# Patient Record
Sex: MALE | Race: WHITE | Employment: OTHER | URBAN - METROPOLITAN AREA
[De-identification: names, ages, dates, MRNs, and addresses within clinical notes are randomized per-mention and may not be internally consistent; named-entity substitution may affect disease eponyms.]

---

## 2017-03-17 ENCOUNTER — HOSPITAL ENCOUNTER (OUTPATIENT)
Dept: VASCULAR LAB | Age: 72
Discharge: HOME | End: 2017-03-17
Payer: MEDICARE

## 2017-03-17 DIAGNOSIS — I65.23 BILATERAL CAROTID ARTERY STENOSIS: Primary | ICD-10-CM

## 2017-03-17 PROCEDURE — 93880 EXTRACRANIAL BILAT STUDY: CPT

## 2018-02-14 ENCOUNTER — HOSPITAL ENCOUNTER (OUTPATIENT)
Dept: VASCULAR LAB | Facility: CLINIC | Age: 73
Discharge: HOME | End: 2018-02-14
Payer: MEDICARE

## 2018-02-14 PROCEDURE — 93880 EXTRACRANIAL BILAT STUDY: CPT

## 2018-05-04 PROBLEM — 76114004 DECREASED RENAL FUNCTION: Status: ACTIVE | Noted: 2018-05-04

## 2018-05-04 PROBLEM — 363418001 MALIGNANT TUMOR OF PANCREAS: Status: ACTIVE | Noted: 2018-05-04

## 2018-05-04 PROBLEM — 35489007 DEPRESSION: Status: ACTIVE | Noted: 2018-05-04

## 2018-05-04 PROBLEM — 931000119107 DEPENDENCE ON SUPPLEMENTAL OXYGEN: Status: ACTIVE | Noted: 2018-05-04

## 2018-05-04 PROBLEM — 13644009 HYPERCHOLESTEROLEMIA: Status: ACTIVE | Noted: 2018-05-04

## 2018-05-04 PROBLEM — 38341003 HYPERTENSION: Status: ACTIVE | Noted: 2018-05-04

## 2018-05-04 PROBLEM — 53741008 CORONARY ARTERY DISEASE: Status: ACTIVE | Noted: 2018-05-04

## 2018-05-04 PROBLEM — 134407002 CHRONIC BACK PAIN: Status: ACTIVE | Noted: 2018-05-04

## 2018-05-04 PROBLEM — 396275006 OSTEOARTHRITIS: Status: ACTIVE | Noted: 2018-05-04

## 2018-05-04 PROBLEM — 197480006 ANXIETY DISORDER: Status: ACTIVE | Noted: 2018-05-04

## 2018-05-14 PROBLEM — 313435000 TYPE I DIABETES MELLITUS WITHOUT COMPLICATION: Status: ACTIVE | Noted: 2018-05-14

## 2018-05-14 PROBLEM — 90708001 RENAL DISEASE: Status: ACTIVE | Noted: 2018-05-14

## 2018-06-10 ENCOUNTER — HOSPITAL ENCOUNTER (OUTPATIENT)
Age: 73
Setting detail: OBSERVATION
Discharge: HOME | End: 2018-06-11
Attending: EMERGENCY MEDICINE | Admitting: INTERNAL MEDICINE
Payer: MEDICARE

## 2018-06-10 ENCOUNTER — APPOINTMENT (OUTPATIENT)
Dept: GENERAL RADIOLOGY | Age: 73
End: 2018-06-10
Payer: MEDICARE

## 2018-06-10 DIAGNOSIS — I20.0 UNSTABLE ANGINA (HCC): Primary | ICD-10-CM

## 2018-06-10 LAB
ABSOLUTE EOS #: 0.1 K/UL (ref 0–0.4)
ABSOLUTE IMMATURE GRANULOCYTE: ABNORMAL K/UL (ref 0–0.3)
ABSOLUTE LYMPH #: 2.2 K/UL (ref 1–4.8)
ABSOLUTE MONO #: 0.5 K/UL (ref 0.1–1.3)
ANION GAP SERPL CALCULATED.3IONS-SCNC: 12 MMOL/L (ref 9–17)
BASOPHILS # BLD: 1 % (ref 0–2)
BASOPHILS ABSOLUTE: 0 K/UL (ref 0–0.2)
BUN BLDV-MCNC: 20 MG/DL (ref 8–23)
BUN/CREAT BLD: ABNORMAL (ref 9–20)
CALCIUM SERPL-MCNC: 9.2 MG/DL (ref 8.6–10.4)
CHLORIDE BLD-SCNC: 100 MMOL/L (ref 98–107)
CO2: 27 MMOL/L (ref 20–31)
CREAT SERPL-MCNC: 0.85 MG/DL (ref 0.7–1.2)
DIFFERENTIAL TYPE: ABNORMAL
EKG ATRIAL RATE: 68 BPM
EKG P AXIS: 69 DEGREES
EKG P-R INTERVAL: 252 MS
EKG Q-T INTERVAL: 378 MS
EKG QRS DURATION: 104 MS
EKG QTC CALCULATION (BAZETT): 401 MS
EKG R AXIS: 53 DEGREES
EKG T AXIS: 56 DEGREES
EKG VENTRICULAR RATE: 68 BPM
EOSINOPHILS RELATIVE PERCENT: 2 % (ref 0–4)
GFR AFRICAN AMERICAN: >60 ML/MIN
GFR NON-AFRICAN AMERICAN: >60 ML/MIN
GFR SERPL CREATININE-BSD FRML MDRD: ABNORMAL ML/MIN/{1.73_M2}
GFR SERPL CREATININE-BSD FRML MDRD: ABNORMAL ML/MIN/{1.73_M2}
GLUCOSE BLD-MCNC: 112 MG/DL (ref 70–99)
HCT VFR BLD CALC: 43.1 % (ref 41–53)
HEMOGLOBIN: 14.5 G/DL (ref 13.5–17.5)
IMMATURE GRANULOCYTES: ABNORMAL %
LYMPHOCYTES # BLD: 43 % (ref 24–44)
MCH RBC QN AUTO: 30.5 PG (ref 26–34)
MCHC RBC AUTO-ENTMCNC: 33.5 G/DL (ref 31–37)
MCV RBC AUTO: 90.9 FL (ref 80–100)
MONOCYTES # BLD: 10 % (ref 1–7)
NRBC AUTOMATED: ABNORMAL PER 100 WBC
PDW BLD-RTO: 14 % (ref 11.5–14.9)
PLATELET # BLD: 174 K/UL (ref 150–450)
PLATELET ESTIMATE: ABNORMAL
PMV BLD AUTO: 9 FL (ref 6–12)
POTASSIUM SERPL-SCNC: 4.3 MMOL/L (ref 3.7–5.3)
RBC # BLD: 4.74 M/UL (ref 4.5–5.9)
RBC # BLD: ABNORMAL 10*6/UL
SEG NEUTROPHILS: 44 % (ref 36–66)
SEGMENTED NEUTROPHILS ABSOLUTE COUNT: 2.3 K/UL (ref 1.3–9.1)
SODIUM BLD-SCNC: 139 MMOL/L (ref 135–144)
TROPONIN INTERP: NORMAL
TROPONIN INTERP: NORMAL
TROPONIN T: <0.03 NG/ML
TROPONIN T: <0.03 NG/ML
WBC # BLD: 5.2 K/UL (ref 3.5–11)
WBC # BLD: ABNORMAL 10*3/UL

## 2018-06-10 PROCEDURE — G0378 HOSPITAL OBSERVATION PER HR: HCPCS

## 2018-06-10 PROCEDURE — 84484 ASSAY OF TROPONIN QUANT: CPT

## 2018-06-10 PROCEDURE — 2700000000 HC OXYGEN THERAPY PER DAY

## 2018-06-10 PROCEDURE — 6370000000 HC RX 637 (ALT 250 FOR IP): Performed by: INTERNAL MEDICINE

## 2018-06-10 PROCEDURE — 99285 EMERGENCY DEPT VISIT HI MDM: CPT

## 2018-06-10 PROCEDURE — 6370000000 HC RX 637 (ALT 250 FOR IP): Performed by: EMERGENCY MEDICINE

## 2018-06-10 PROCEDURE — 85025 COMPLETE CBC W/AUTO DIFF WBC: CPT

## 2018-06-10 PROCEDURE — 93005 ELECTROCARDIOGRAM TRACING: CPT

## 2018-06-10 PROCEDURE — 36415 COLL VENOUS BLD VENIPUNCTURE: CPT

## 2018-06-10 PROCEDURE — 6360000002 HC RX W HCPCS: Performed by: INTERNAL MEDICINE

## 2018-06-10 PROCEDURE — 96372 THER/PROPH/DIAG INJ SC/IM: CPT

## 2018-06-10 PROCEDURE — 99220 PR INITIAL OBSERVATION CARE/DAY 70 MINUTES: CPT | Performed by: INTERNAL MEDICINE

## 2018-06-10 PROCEDURE — 2580000003 HC RX 258: Performed by: INTERNAL MEDICINE

## 2018-06-10 PROCEDURE — 71045 X-RAY EXAM CHEST 1 VIEW: CPT

## 2018-06-10 PROCEDURE — 80048 BASIC METABOLIC PNL TOTAL CA: CPT

## 2018-06-10 RX ORDER — ATORVASTATIN CALCIUM 20 MG/1
20 TABLET, FILM COATED ORAL NIGHTLY
Status: DISCONTINUED | OUTPATIENT
Start: 2018-06-10 | End: 2018-06-11 | Stop reason: HOSPADM

## 2018-06-10 RX ORDER — NITROGLYCERIN 0.4 MG/1
0.4 TABLET SUBLINGUAL EVERY 5 MIN PRN
Status: DISCONTINUED | OUTPATIENT
Start: 2018-06-10 | End: 2018-06-11 | Stop reason: HOSPADM

## 2018-06-10 RX ORDER — ONDANSETRON 2 MG/ML
4 INJECTION INTRAMUSCULAR; INTRAVENOUS EVERY 6 HOURS PRN
Status: DISCONTINUED | OUTPATIENT
Start: 2018-06-10 | End: 2018-06-11 | Stop reason: HOSPADM

## 2018-06-10 RX ORDER — ASPIRIN 81 MG/1
324 TABLET, CHEWABLE ORAL ONCE
Status: COMPLETED | OUTPATIENT
Start: 2018-06-10 | End: 2018-06-10

## 2018-06-10 RX ORDER — SODIUM CHLORIDE 0.9 % (FLUSH) 0.9 %
10 SYRINGE (ML) INJECTION PRN
Status: DISCONTINUED | OUTPATIENT
Start: 2018-06-10 | End: 2018-06-11 | Stop reason: HOSPADM

## 2018-06-10 RX ORDER — LISINOPRIL 20 MG/1
20 TABLET ORAL DAILY
Status: DISCONTINUED | OUTPATIENT
Start: 2018-06-10 | End: 2018-06-11 | Stop reason: HOSPADM

## 2018-06-10 RX ORDER — SODIUM CHLORIDE 0.9 % (FLUSH) 0.9 %
10 SYRINGE (ML) INJECTION EVERY 12 HOURS SCHEDULED
Status: DISCONTINUED | OUTPATIENT
Start: 2018-06-10 | End: 2018-06-11 | Stop reason: HOSPADM

## 2018-06-10 RX ADMIN — ASPIRIN 81 MG 324 MG: 81 TABLET ORAL at 05:30

## 2018-06-10 RX ADMIN — Medication 10 ML: at 09:51

## 2018-06-10 RX ADMIN — NITROGLYCERIN 0.4 MG: 0.4 TABLET SUBLINGUAL at 05:30

## 2018-06-10 RX ADMIN — ENOXAPARIN SODIUM 30 MG: 30 INJECTION SUBCUTANEOUS at 09:51

## 2018-06-10 RX ADMIN — ENOXAPARIN SODIUM 30 MG: 30 INJECTION SUBCUTANEOUS at 20:46

## 2018-06-10 RX ADMIN — ATORVASTATIN CALCIUM 20 MG: 20 TABLET, FILM COATED ORAL at 20:45

## 2018-06-10 RX ADMIN — LISINOPRIL 20 MG: 20 TABLET ORAL at 15:55

## 2018-06-10 RX ADMIN — Medication 10 ML: at 20:46

## 2018-06-10 RX ADMIN — METOPROLOL TARTRATE 25 MG: 25 TABLET ORAL at 20:45

## 2018-06-10 ASSESSMENT — PAIN SCALES - GENERAL
PAINLEVEL_OUTOF10: 4
PAINLEVEL_OUTOF10: 0
PAINLEVEL_OUTOF10: 2
PAINLEVEL_OUTOF10: 7

## 2018-06-10 ASSESSMENT — ENCOUNTER SYMPTOMS
DIARRHEA: 0
WHEEZING: 0
VOMITING: 0
ABDOMINAL PAIN: 0
BLURRED VISION: 0
COUGH: 0
EYE DISCHARGE: 0
SHORTNESS OF BREATH: 0
NAUSEA: 0
CONSTIPATION: 0
SORE THROAT: 0

## 2018-06-10 ASSESSMENT — PAIN DESCRIPTION - ORIENTATION
ORIENTATION: LEFT
ORIENTATION: LEFT

## 2018-06-10 ASSESSMENT — PAIN DESCRIPTION - PAIN TYPE
TYPE: ACUTE PAIN
TYPE: ACUTE PAIN

## 2018-06-10 ASSESSMENT — PAIN DESCRIPTION - LOCATION
LOCATION: CHEST
LOCATION: CHEST

## 2018-06-11 ENCOUNTER — APPOINTMENT (OUTPATIENT)
Dept: NUCLEAR MEDICINE | Age: 73
End: 2018-06-11
Payer: MEDICARE

## 2018-06-11 VITALS
TEMPERATURE: 98.1 F | DIASTOLIC BLOOD PRESSURE: 67 MMHG | OXYGEN SATURATION: 97 % | HEIGHT: 70 IN | SYSTOLIC BLOOD PRESSURE: 136 MMHG | HEART RATE: 66 BPM | WEIGHT: 268.74 LBS | BODY MASS INDEX: 38.47 KG/M2 | RESPIRATION RATE: 16 BRPM

## 2018-06-11 LAB
ALBUMIN SERPL-MCNC: 4 G/DL (ref 3.5–5.2)
ALBUMIN/GLOBULIN RATIO: ABNORMAL (ref 1–2.5)
ALP BLD-CCNC: 81 U/L (ref 40–129)
ALT SERPL-CCNC: 16 U/L (ref 5–41)
AST SERPL-CCNC: 18 U/L
BILIRUB SERPL-MCNC: 0.47 MG/DL (ref 0.3–1.2)
BILIRUBIN DIRECT: 0.11 MG/DL
BILIRUBIN, INDIRECT: 0.36 MG/DL (ref 0–1)
CHOLESTEROL/HDL RATIO: 6.7
CHOLESTEROL: 234 MG/DL
GLOBULIN: ABNORMAL G/DL (ref 1.5–3.8)
HDLC SERPL-MCNC: 35 MG/DL
LDL CHOLESTEROL: 136 MG/DL (ref 0–130)
LV EF: 43 %
LV EF: 55 %
LVEF MODALITY: NORMAL
LVEF MODALITY: NORMAL
TOTAL PROTEIN: 6.3 G/DL (ref 6.4–8.3)
TRIGL SERPL-MCNC: 313 MG/DL
VLDLC SERPL CALC-MCNC: ABNORMAL MG/DL (ref 1–30)

## 2018-06-11 PROCEDURE — 2580000003 HC RX 258: Performed by: INTERNAL MEDICINE

## 2018-06-11 PROCEDURE — 78452 HT MUSCLE IMAGE SPECT MULT: CPT

## 2018-06-11 PROCEDURE — 3430000000 HC RX DIAGNOSTIC RADIOPHARMACEUTICAL: Performed by: INTERNAL MEDICINE

## 2018-06-11 PROCEDURE — 99217 PR OBSERVATION CARE DISCHARGE MANAGEMENT: CPT | Performed by: INTERNAL MEDICINE

## 2018-06-11 PROCEDURE — 80076 HEPATIC FUNCTION PANEL: CPT

## 2018-06-11 PROCEDURE — 6360000002 HC RX W HCPCS: Performed by: INTERNAL MEDICINE

## 2018-06-11 PROCEDURE — 80061 LIPID PANEL: CPT

## 2018-06-11 PROCEDURE — 6370000000 HC RX 637 (ALT 250 FOR IP): Performed by: INTERNAL MEDICINE

## 2018-06-11 PROCEDURE — 93306 TTE W/DOPPLER COMPLETE: CPT

## 2018-06-11 PROCEDURE — G0378 HOSPITAL OBSERVATION PER HR: HCPCS

## 2018-06-11 PROCEDURE — 90670 PCV13 VACCINE IM: CPT | Performed by: INTERNAL MEDICINE

## 2018-06-11 PROCEDURE — 93017 CV STRESS TEST TRACING ONLY: CPT

## 2018-06-11 PROCEDURE — A9500 TC99M SESTAMIBI: HCPCS | Performed by: INTERNAL MEDICINE

## 2018-06-11 PROCEDURE — 36415 COLL VENOUS BLD VENIPUNCTURE: CPT

## 2018-06-11 PROCEDURE — G0009 ADMIN PNEUMOCOCCAL VACCINE: HCPCS | Performed by: INTERNAL MEDICINE

## 2018-06-11 RX ORDER — SODIUM CHLORIDE 0.9 % (FLUSH) 0.9 %
10 SYRINGE (ML) INJECTION PRN
Status: DISCONTINUED | OUTPATIENT
Start: 2018-06-11 | End: 2018-06-11 | Stop reason: HOSPADM

## 2018-06-11 RX ORDER — SODIUM CHLORIDE 0.9 % (FLUSH) 0.9 %
10 SYRINGE (ML) INJECTION PRN
Status: ACTIVE | OUTPATIENT
Start: 2018-06-11 | End: 2018-06-11

## 2018-06-11 RX ORDER — SODIUM CHLORIDE 9 MG/ML
INJECTION, SOLUTION INTRAVENOUS ONCE
Status: DISCONTINUED | OUTPATIENT
Start: 2018-06-11 | End: 2018-06-11 | Stop reason: HOSPADM

## 2018-06-11 RX ORDER — AMINOPHYLLINE DIHYDRATE 25 MG/ML
100 INJECTION, SOLUTION INTRAVENOUS
Status: DISCONTINUED | OUTPATIENT
Start: 2018-06-11 | End: 2018-06-11 | Stop reason: HOSPADM

## 2018-06-11 RX ORDER — METOPROLOL TARTRATE 5 MG/5ML
2.5 INJECTION INTRAVENOUS PRN
Status: ACTIVE | OUTPATIENT
Start: 2018-06-11 | End: 2018-06-11

## 2018-06-11 RX ORDER — ATORVASTATIN CALCIUM 20 MG/1
20 TABLET, FILM COATED ORAL NIGHTLY
Qty: 30 TABLET | Refills: 3 | Status: ON HOLD | OUTPATIENT
Start: 2018-06-11 | End: 2019-07-26 | Stop reason: HOSPADM

## 2018-06-11 RX ORDER — LISINOPRIL 20 MG/1
20 TABLET ORAL DAILY
Qty: 30 TABLET | Refills: 3 | Status: ON HOLD | OUTPATIENT
Start: 2018-06-12 | End: 2019-07-26 | Stop reason: HOSPADM

## 2018-06-11 RX ORDER — NITROGLYCERIN 0.4 MG/1
0.4 TABLET SUBLINGUAL EVERY 5 MIN PRN
Status: ACTIVE | OUTPATIENT
Start: 2018-06-11 | End: 2018-06-11

## 2018-06-11 RX ADMIN — METOPROLOL TARTRATE 25 MG: 25 TABLET ORAL at 13:34

## 2018-06-11 RX ADMIN — TETRAKIS(2-METHOXYISOBUTYLISOCYANIDE)COPPER(I) TETRAFLUOROBORATE 12.3 MILLICURIE: 1 INJECTION, POWDER, LYOPHILIZED, FOR SOLUTION INTRAVENOUS at 07:23

## 2018-06-11 RX ADMIN — REGADENOSON 0.4 MG: 0.08 INJECTION, SOLUTION INTRAVENOUS at 09:11

## 2018-06-11 RX ADMIN — TETRAKIS(2-METHOXYISOBUTYLISOCYANIDE)COPPER(I) TETRAFLUOROBORATE 35 MILLICURIE: 1 INJECTION, POWDER, LYOPHILIZED, FOR SOLUTION INTRAVENOUS at 09:14

## 2018-06-11 RX ADMIN — Medication 10 ML: at 07:23

## 2018-06-11 RX ADMIN — LISINOPRIL 20 MG: 20 TABLET ORAL at 11:46

## 2018-06-11 RX ADMIN — Medication 10 ML: at 09:11

## 2018-06-11 RX ADMIN — ASPIRIN 325 MG: 325 TABLET, DELAYED RELEASE ORAL at 11:46

## 2018-06-11 RX ADMIN — PNEUMOCOCCAL 13-VALENT CONJUGATE VACCINE 0.5 ML: 2.2; 2.2; 2.2; 2.2; 2.2; 4.4; 2.2; 2.2; 2.2; 2.2; 2.2; 2.2; 2.2 INJECTION, SUSPENSION INTRAMUSCULAR at 17:11

## 2018-07-06 ENCOUNTER — HOSPITAL ENCOUNTER (OUTPATIENT)
Dept: NON INVASIVE DIAGNOSTICS | Age: 73
Discharge: HOME | End: 2018-07-06
Payer: MEDICARE

## 2018-07-06 DIAGNOSIS — R00.2 HEART PALPITATIONS: ICD-10-CM

## 2018-07-06 LAB
LV EF: 55 %
LVEF MODALITY: NORMAL

## 2018-07-06 PROCEDURE — 93306 TTE W/DOPPLER COMPLETE: CPT

## 2018-07-06 PROCEDURE — 93225 XTRNL ECG REC<48 HRS REC: CPT

## 2018-07-06 PROCEDURE — 93226 XTRNL ECG REC<48 HR SCAN A/R: CPT

## 2018-07-10 LAB
ACQUISITION DURATION: NORMAL S
AVERAGE HEART RATE: 69 BPM
FASTEST SUPRAVENTRICULAR RATE: 140 BPM
HOOKUP DATE: NORMAL
HOOKUP TIME: NORMAL
LONGEST SUPRAVENTRICULAR RATE: 121 BPM
MAX HEART RATE TIME/DATE: NORMAL
MAX HEART RATE: 119 BPM
MIN HEART RATE TIME/DATE: NORMAL
MIN HEART RATE: 44 BPM
NUMBER OF FASTEST SUPRAVENTRICULAR BEATS: 3
NUMBER OF LONGEST SUPRAVENTRICULAR BEATS: 6
NUMBER OF QRS COMPLEXES: NORMAL
NUMBER OF SUPRAVENTRICULAR BEATS IN RUNS: 12
NUMBER OF SUPRAVENTRICULAR COUPLETS: 1
NUMBER OF SUPRAVENTRICULAR ECTOPICS: 47
NUMBER OF SUPRAVENTRICULAR ISOLATED BEATS: 32
NUMBER OF SUPRAVENTRICULAR RUNS: 3
NUMBER OF VENTRICULAR BEATS IN RUNS: 0
NUMBER OF VENTRICULAR BIGEMINAL CYCLES: 0
NUMBER OF VENTRICULAR COUPLETS: 1
NUMBER OF VENTRICULAR ECTOPICS: 6
NUMBER OF VENTRICULAR ISOLATED BEATS: 4
NUMBER OF VENTRICULAR RUNS: 0

## 2018-07-19 PROBLEM — I42.9 MYOCARDIOPATHY (HCC): Status: ACTIVE | Noted: 2018-07-19

## 2018-07-19 PROBLEM — R19.01 ABDOMINAL MASS, RUQ (RIGHT UPPER QUADRANT): Status: ACTIVE | Noted: 2018-07-19

## 2018-07-27 ENCOUNTER — HOSPITAL ENCOUNTER (OUTPATIENT)
Age: 73
Discharge: HOME | End: 2018-07-27
Payer: MEDICARE

## 2018-07-27 ENCOUNTER — HOSPITAL ENCOUNTER (OUTPATIENT)
Dept: ULTRASOUND IMAGING | Age: 73
Discharge: HOME | End: 2018-07-29
Payer: MEDICARE

## 2018-07-27 DIAGNOSIS — R19.01 RIGHT UPPER QUADRANT ABDOMINAL MASS: ICD-10-CM

## 2018-07-27 DIAGNOSIS — R10.11 RIGHT UPPER QUADRANT ABDOMINAL PAIN: ICD-10-CM

## 2018-07-27 LAB
ABSOLUTE EOS #: 0.1 K/UL (ref 0–0.4)
ABSOLUTE IMMATURE GRANULOCYTE: ABNORMAL K/UL (ref 0–0.3)
ABSOLUTE LYMPH #: 1.9 K/UL (ref 1–4.8)
ABSOLUTE MONO #: 0.4 K/UL (ref 0.1–1.3)
ALBUMIN SERPL-MCNC: 4.5 G/DL (ref 3.5–5.2)
ALBUMIN/GLOBULIN RATIO: NORMAL (ref 1–2.5)
ALP BLD-CCNC: 85 U/L (ref 40–129)
ALT SERPL-CCNC: 22 U/L (ref 5–41)
ANION GAP SERPL CALCULATED.3IONS-SCNC: 12 MMOL/L (ref 9–17)
AST SERPL-CCNC: 22 U/L
BASOPHILS # BLD: 1 % (ref 0–2)
BASOPHILS ABSOLUTE: 0 K/UL (ref 0–0.2)
BILIRUB SERPL-MCNC: 0.57 MG/DL (ref 0.3–1.2)
BILIRUBIN DIRECT: 0.13 MG/DL
BILIRUBIN, INDIRECT: 0.44 MG/DL (ref 0–1)
BUN BLDV-MCNC: 20 MG/DL (ref 8–23)
CHLORIDE BLD-SCNC: 100 MMOL/L (ref 98–107)
CHOLESTEROL/HDL RATIO: 3.7
CHOLESTEROL: 179 MG/DL
CO2: 28 MMOL/L (ref 20–31)
CREAT SERPL-MCNC: 0.71 MG/DL (ref 0.7–1.2)
CREATININE URINE: 135.4 MG/DL (ref 39–259)
DIFFERENTIAL TYPE: ABNORMAL
EOSINOPHILS RELATIVE PERCENT: 2 % (ref 0–4)
ESTIMATED AVERAGE GLUCOSE: 128 MG/DL
GFR AFRICAN AMERICAN: >60 ML/MIN
GFR NON-AFRICAN AMERICAN: >60 ML/MIN
GFR SERPL CREATININE-BSD FRML MDRD: NORMAL ML/MIN/{1.73_M2}
GFR SERPL CREATININE-BSD FRML MDRD: NORMAL ML/MIN/{1.73_M2}
GLOBULIN: NORMAL G/DL (ref 1.5–3.8)
GLUCOSE BLD-MCNC: 119 MG/DL (ref 70–99)
HBA1C MFR BLD: 6.1 % (ref 4–6)
HCT VFR BLD CALC: 41.5 % (ref 41–53)
HDLC SERPL-MCNC: 48 MG/DL
HEMOGLOBIN: 13.9 G/DL (ref 13.5–17.5)
IMMATURE GRANULOCYTES: ABNORMAL %
LDL CHOLESTEROL: 107 MG/DL (ref 0–130)
LYMPHOCYTES # BLD: 39 % (ref 24–44)
MCH RBC QN AUTO: 31 PG (ref 26–34)
MCHC RBC AUTO-ENTMCNC: 33.5 G/DL (ref 31–37)
MCV RBC AUTO: 92.4 FL (ref 80–100)
MICROALBUMIN/CREAT 24H UR: <12 MG/L
MICROALBUMIN/CREAT UR-RTO: NORMAL MCG/MG CREAT
MONOCYTES # BLD: 7 % (ref 1–7)
NRBC AUTOMATED: ABNORMAL PER 100 WBC
PDW BLD-RTO: 13.7 % (ref 11.5–14.9)
PLATELET # BLD: 149 K/UL (ref 150–450)
PLATELET ESTIMATE: ABNORMAL
PMV BLD AUTO: 9.7 FL (ref 6–12)
POTASSIUM SERPL-SCNC: 4.3 MMOL/L (ref 3.7–5.3)
PROSTATE SPECIFIC ANTIGEN: 0.95 UG/L
RBC # BLD: 4.49 M/UL (ref 4.5–5.9)
RBC # BLD: ABNORMAL 10*6/UL
SEG NEUTROPHILS: 51 % (ref 36–66)
SEGMENTED NEUTROPHILS ABSOLUTE COUNT: 2.5 K/UL (ref 1.3–9.1)
SODIUM BLD-SCNC: 140 MMOL/L (ref 135–144)
TOTAL PROTEIN: 7.6 G/DL (ref 6.4–8.3)
TRIGL SERPL-MCNC: 122 MG/DL
TSH SERPL DL<=0.05 MIU/L-ACNC: 0.58 MIU/L (ref 0.3–5)
VITAMIN D 25-HYDROXY: 25.2 NG/ML (ref 30–100)
VLDLC SERPL CALC-MCNC: NORMAL MG/DL (ref 1–30)
WBC # BLD: 4.9 K/UL (ref 3.5–11)
WBC # BLD: ABNORMAL 10*3/UL

## 2018-07-27 PROCEDURE — 80051 ELECTROLYTE PANEL: CPT

## 2018-07-27 PROCEDURE — 84520 ASSAY OF UREA NITROGEN: CPT

## 2018-07-27 PROCEDURE — 36415 COLL VENOUS BLD VENIPUNCTURE: CPT

## 2018-07-27 PROCEDURE — 82306 VITAMIN D 25 HYDROXY: CPT

## 2018-07-27 PROCEDURE — 82570 ASSAY OF URINE CREATININE: CPT

## 2018-07-27 PROCEDURE — 80061 LIPID PANEL: CPT

## 2018-07-27 PROCEDURE — 83036 HEMOGLOBIN GLYCOSYLATED A1C: CPT

## 2018-07-27 PROCEDURE — 82043 UR ALBUMIN QUANTITATIVE: CPT

## 2018-07-27 PROCEDURE — 84443 ASSAY THYROID STIM HORMONE: CPT

## 2018-07-27 PROCEDURE — 82565 ASSAY OF CREATININE: CPT

## 2018-07-27 PROCEDURE — G0103 PSA SCREENING: HCPCS

## 2018-07-27 PROCEDURE — 80076 HEPATIC FUNCTION PANEL: CPT

## 2018-07-27 PROCEDURE — 85025 COMPLETE CBC W/AUTO DIFF WBC: CPT

## 2018-07-27 PROCEDURE — 82947 ASSAY GLUCOSE BLOOD QUANT: CPT

## 2018-07-27 PROCEDURE — 76705 ECHO EXAM OF ABDOMEN: CPT

## 2018-08-12 ENCOUNTER — APPOINTMENT (OUTPATIENT)
Dept: CT IMAGING | Age: 73
End: 2018-08-12
Payer: MEDICARE

## 2018-08-12 ENCOUNTER — HOSPITAL ENCOUNTER (EMERGENCY)
Age: 73
Discharge: HOME | End: 2018-08-12
Attending: EMERGENCY MEDICINE
Payer: MEDICARE

## 2018-08-12 VITALS
BODY MASS INDEX: 38.65 KG/M2 | DIASTOLIC BLOOD PRESSURE: 76 MMHG | SYSTOLIC BLOOD PRESSURE: 161 MMHG | HEIGHT: 70 IN | WEIGHT: 270 LBS | HEART RATE: 60 BPM | RESPIRATION RATE: 16 BRPM | TEMPERATURE: 97.7 F | OXYGEN SATURATION: 97 %

## 2018-08-12 DIAGNOSIS — M54.31 SCIATICA OF RIGHT SIDE: Primary | ICD-10-CM

## 2018-08-12 DIAGNOSIS — M79.604 RIGHT LEG PAIN: ICD-10-CM

## 2018-08-12 LAB
BILIRUBIN URINE: NEGATIVE
COLOR: YELLOW
COMMENT UA: NORMAL
GLUCOSE URINE: NEGATIVE
KETONES, URINE: NEGATIVE
LEUKOCYTE ESTERASE, URINE: NEGATIVE
NITRITE, URINE: NEGATIVE
PH UA: 5.5 (ref 5–8)
PROTEIN UA: NEGATIVE
SPECIFIC GRAVITY UA: 1.02 (ref 1–1.03)
TURBIDITY: CLEAR
URINE HGB: NEGATIVE
UROBILINOGEN, URINE: NORMAL

## 2018-08-12 PROCEDURE — 74176 CT ABD & PELVIS W/O CONTRAST: CPT

## 2018-08-12 PROCEDURE — 99284 EMERGENCY DEPT VISIT MOD MDM: CPT

## 2018-08-12 PROCEDURE — 81003 URINALYSIS AUTO W/O SCOPE: CPT

## 2018-08-12 PROCEDURE — 6360000002 HC RX W HCPCS: Performed by: EMERGENCY MEDICINE

## 2018-08-12 RX ORDER — DEXAMETHASONE SODIUM PHOSPHATE 4 MG/ML
10 INJECTION, SOLUTION INTRA-ARTICULAR; INTRALESIONAL; INTRAMUSCULAR; INTRAVENOUS; SOFT TISSUE ONCE
Status: COMPLETED | OUTPATIENT
Start: 2018-08-12 | End: 2018-08-12

## 2018-08-12 RX ORDER — ASPIRIN 81 MG/1
81 TABLET ORAL DAILY
Status: ON HOLD | COMMUNITY
End: 2019-07-26 | Stop reason: HOSPADM

## 2018-08-12 RX ORDER — HYDROCODONE BITARTRATE AND ACETAMINOPHEN 5; 325 MG/1; MG/1
1 TABLET ORAL EVERY 6 HOURS PRN
Qty: 10 TABLET | Refills: 0 | Status: SHIPPED | OUTPATIENT
Start: 2018-08-12 | End: 2018-08-15

## 2018-08-12 RX ADMIN — DEXAMETHASONE SODIUM PHOSPHATE 10 MG: 4 INJECTION, SOLUTION INTRAMUSCULAR; INTRAVENOUS at 11:14

## 2018-08-12 ASSESSMENT — PAIN SCALES - GENERAL
PAINLEVEL_OUTOF10: 6
PAINLEVEL_OUTOF10: 4

## 2018-12-09 ENCOUNTER — APPOINTMENT (OUTPATIENT)
Dept: GENERAL RADIOLOGY | Age: 73
DRG: 311 | End: 2018-12-09
Payer: MEDICARE

## 2018-12-09 ENCOUNTER — HOSPITAL ENCOUNTER (INPATIENT)
Age: 73
LOS: 1 days | Discharge: HOME | DRG: 311 | End: 2018-12-11
Attending: EMERGENCY MEDICINE | Admitting: INTERNAL MEDICINE
Payer: MEDICARE

## 2018-12-09 DIAGNOSIS — R07.9 CHEST PAIN, UNSPECIFIED TYPE: Primary | ICD-10-CM

## 2018-12-09 LAB
ABSOLUTE EOS #: 0.1 K/UL (ref 0–0.4)
ABSOLUTE IMMATURE GRANULOCYTE: ABNORMAL K/UL (ref 0–0.3)
ABSOLUTE LYMPH #: 2.1 K/UL (ref 1–4.8)
ABSOLUTE MONO #: 0.5 K/UL (ref 0.1–1.3)
ANION GAP SERPL CALCULATED.3IONS-SCNC: 8 MMOL/L (ref 9–17)
BASOPHILS # BLD: 1 % (ref 0–2)
BASOPHILS ABSOLUTE: 0 K/UL (ref 0–0.2)
BUN BLDV-MCNC: 21 MG/DL (ref 8–23)
BUN/CREAT BLD: ABNORMAL (ref 9–20)
CALCIUM SERPL-MCNC: 8.8 MG/DL (ref 8.6–10.4)
CHLORIDE BLD-SCNC: 104 MMOL/L (ref 98–107)
CO2: 27 MMOL/L (ref 20–31)
CREAT SERPL-MCNC: 0.69 MG/DL (ref 0.7–1.2)
D-DIMER QUANTITATIVE: 0.33 MG/L FEU
DIFFERENTIAL TYPE: ABNORMAL
EOSINOPHILS RELATIVE PERCENT: 2 % (ref 0–4)
GFR AFRICAN AMERICAN: >60 ML/MIN
GFR NON-AFRICAN AMERICAN: >60 ML/MIN
GFR SERPL CREATININE-BSD FRML MDRD: ABNORMAL ML/MIN/{1.73_M2}
GFR SERPL CREATININE-BSD FRML MDRD: ABNORMAL ML/MIN/{1.73_M2}
GLUCOSE BLD-MCNC: 152 MG/DL (ref 70–99)
HCT VFR BLD CALC: 39.6 % (ref 41–53)
HEMOGLOBIN: 13.3 G/DL (ref 13.5–17.5)
IMMATURE GRANULOCYTES: ABNORMAL %
LYMPHOCYTES # BLD: 38 % (ref 24–44)
MCH RBC QN AUTO: 30.9 PG (ref 26–34)
MCHC RBC AUTO-ENTMCNC: 33.6 G/DL (ref 31–37)
MCV RBC AUTO: 91.9 FL (ref 80–100)
MONOCYTES # BLD: 8 % (ref 1–7)
MYOGLOBIN: 57 NG/ML (ref 28–72)
NRBC AUTOMATED: ABNORMAL PER 100 WBC
PDW BLD-RTO: 13.1 % (ref 11.5–14.9)
PLATELET # BLD: 162 K/UL (ref 150–450)
PLATELET ESTIMATE: ABNORMAL
PMV BLD AUTO: 9.4 FL (ref 6–12)
POTASSIUM SERPL-SCNC: 3.7 MMOL/L (ref 3.7–5.3)
RBC # BLD: 4.31 M/UL (ref 4.5–5.9)
RBC # BLD: ABNORMAL 10*6/UL
SEG NEUTROPHILS: 51 % (ref 36–66)
SEGMENTED NEUTROPHILS ABSOLUTE COUNT: 2.9 K/UL (ref 1.3–9.1)
SODIUM BLD-SCNC: 139 MMOL/L (ref 135–144)
TROPONIN INTERP: NORMAL
TROPONIN T: <0.03 NG/ML
WBC # BLD: 5.6 K/UL (ref 3.5–11)
WBC # BLD: ABNORMAL 10*3/UL

## 2018-12-09 PROCEDURE — 71046 X-RAY EXAM CHEST 2 VIEWS: CPT

## 2018-12-09 PROCEDURE — 80048 BASIC METABOLIC PNL TOTAL CA: CPT

## 2018-12-09 PROCEDURE — 85025 COMPLETE CBC W/AUTO DIFF WBC: CPT

## 2018-12-09 PROCEDURE — 83874 ASSAY OF MYOGLOBIN: CPT

## 2018-12-09 PROCEDURE — 84484 ASSAY OF TROPONIN QUANT: CPT

## 2018-12-09 PROCEDURE — 85379 FIBRIN DEGRADATION QUANT: CPT

## 2018-12-09 PROCEDURE — 6370000000 HC RX 637 (ALT 250 FOR IP): Performed by: EMERGENCY MEDICINE

## 2018-12-09 PROCEDURE — 36415 COLL VENOUS BLD VENIPUNCTURE: CPT

## 2018-12-09 PROCEDURE — 99285 EMERGENCY DEPT VISIT HI MDM: CPT

## 2018-12-09 RX ORDER — ASPIRIN 81 MG/1
324 TABLET, CHEWABLE ORAL ONCE
Status: COMPLETED | OUTPATIENT
Start: 2018-12-09 | End: 2018-12-09

## 2018-12-09 RX ORDER — PANTOPRAZOLE SODIUM 20 MG/1
20 TABLET, DELAYED RELEASE ORAL DAILY
COMMUNITY
End: 2020-11-20 | Stop reason: ALTCHOICE

## 2018-12-09 RX ADMIN — ASPIRIN 81 MG 324 MG: 81 TABLET ORAL at 22:13

## 2018-12-09 ASSESSMENT — ENCOUNTER SYMPTOMS
CHEST TIGHTNESS: 0
COUGH: 0
BACK PAIN: 0
EYE DISCHARGE: 0
ABDOMINAL PAIN: 0
SINUS PRESSURE: 0
COLOR CHANGE: 0
EYE PAIN: 0
SORE THROAT: 0
DIARRHEA: 0
FACIAL SWELLING: 0
BLOOD IN STOOL: 0
EYE REDNESS: 0
NAUSEA: 0
VOMITING: 0
WHEEZING: 0
TROUBLE SWALLOWING: 0
CONSTIPATION: 0
RHINORRHEA: 0
SHORTNESS OF BREATH: 0

## 2018-12-09 ASSESSMENT — PAIN SCALES - GENERAL: PAINLEVEL_OUTOF10: 10

## 2018-12-10 PROBLEM — R07.9 CHEST PAIN: Status: ACTIVE | Noted: 2018-12-10

## 2018-12-10 LAB
EKG ATRIAL RATE: 59 BPM
EKG ATRIAL RATE: 59 BPM
EKG ATRIAL RATE: 72 BPM
EKG P AXIS: 49 DEGREES
EKG P AXIS: 59 DEGREES
EKG P AXIS: 59 DEGREES
EKG P-R INTERVAL: 226 MS
EKG P-R INTERVAL: 250 MS
EKG P-R INTERVAL: 250 MS
EKG Q-T INTERVAL: 382 MS
EKG Q-T INTERVAL: 414 MS
EKG Q-T INTERVAL: 414 MS
EKG QRS DURATION: 90 MS
EKG QRS DURATION: 90 MS
EKG QRS DURATION: 94 MS
EKG QTC CALCULATION (BAZETT): 409 MS
EKG QTC CALCULATION (BAZETT): 409 MS
EKG QTC CALCULATION (BAZETT): 418 MS
EKG R AXIS: 51 DEGREES
EKG R AXIS: 51 DEGREES
EKG R AXIS: 56 DEGREES
EKG T AXIS: 52 DEGREES
EKG T AXIS: 52 DEGREES
EKG T AXIS: 58 DEGREES
EKG VENTRICULAR RATE: 59 BPM
EKG VENTRICULAR RATE: 59 BPM
EKG VENTRICULAR RATE: 72 BPM
MYOGLOBIN: 50 NG/ML (ref 28–72)
TROPONIN INTERP: NORMAL
TROPONIN T: <0.03 NG/ML

## 2018-12-10 PROCEDURE — 6370000000 HC RX 637 (ALT 250 FOR IP): Performed by: STUDENT IN AN ORGANIZED HEALTH CARE EDUCATION/TRAINING PROGRAM

## 2018-12-10 PROCEDURE — G0378 HOSPITAL OBSERVATION PER HR: HCPCS

## 2018-12-10 PROCEDURE — 99220 PR INITIAL OBSERVATION CARE/DAY 70 MINUTES: CPT | Performed by: INTERNAL MEDICINE

## 2018-12-10 PROCEDURE — 93005 ELECTROCARDIOGRAM TRACING: CPT

## 2018-12-10 PROCEDURE — 6360000002 HC RX W HCPCS: Performed by: STUDENT IN AN ORGANIZED HEALTH CARE EDUCATION/TRAINING PROGRAM

## 2018-12-10 PROCEDURE — 96372 THER/PROPH/DIAG INJ SC/IM: CPT

## 2018-12-10 PROCEDURE — 6370000000 HC RX 637 (ALT 250 FOR IP): Performed by: INTERNAL MEDICINE

## 2018-12-10 PROCEDURE — 2500000003 HC RX 250 WO HCPCS: Performed by: STUDENT IN AN ORGANIZED HEALTH CARE EDUCATION/TRAINING PROGRAM

## 2018-12-10 RX ORDER — MORPHINE SULFATE 4 MG/ML
4 INJECTION, SOLUTION INTRAMUSCULAR; INTRAVENOUS
Status: DISCONTINUED | OUTPATIENT
Start: 2018-12-10 | End: 2018-12-11 | Stop reason: HOSPADM

## 2018-12-10 RX ORDER — DEXTROSE, SODIUM CHLORIDE, AND POTASSIUM CHLORIDE 5; .45; .15 G/100ML; G/100ML; G/100ML
INJECTION INTRAVENOUS CONTINUOUS
Status: DISCONTINUED | OUTPATIENT
Start: 2018-12-10 | End: 2018-12-11 | Stop reason: HOSPADM

## 2018-12-10 RX ORDER — SODIUM CHLORIDE 0.9 % (FLUSH) 0.9 %
10 SYRINGE (ML) INJECTION PRN
Status: DISCONTINUED | OUTPATIENT
Start: 2018-12-10 | End: 2018-12-11 | Stop reason: HOSPADM

## 2018-12-10 RX ORDER — PANTOPRAZOLE SODIUM 20 MG/1
20 TABLET, DELAYED RELEASE ORAL DAILY
Status: DISCONTINUED | OUTPATIENT
Start: 2018-12-10 | End: 2018-12-11 | Stop reason: HOSPADM

## 2018-12-10 RX ORDER — METOPROLOL TARTRATE 50 MG/1
25 TABLET, FILM COATED ORAL 2 TIMES DAILY
Status: DISCONTINUED | OUTPATIENT
Start: 2018-12-10 | End: 2018-12-11 | Stop reason: HOSPADM

## 2018-12-10 RX ORDER — MORPHINE SULFATE 2 MG/ML
2 INJECTION, SOLUTION INTRAMUSCULAR; INTRAVENOUS
Status: DISCONTINUED | OUTPATIENT
Start: 2018-12-10 | End: 2018-12-11 | Stop reason: HOSPADM

## 2018-12-10 RX ORDER — ONDANSETRON 2 MG/ML
4 INJECTION INTRAMUSCULAR; INTRAVENOUS EVERY 8 HOURS PRN
Status: DISCONTINUED | OUTPATIENT
Start: 2018-12-10 | End: 2018-12-11 | Stop reason: HOSPADM

## 2018-12-10 RX ORDER — LISINOPRIL 20 MG/1
20 TABLET ORAL DAILY
Status: DISCONTINUED | OUTPATIENT
Start: 2018-12-10 | End: 2018-12-11 | Stop reason: HOSPADM

## 2018-12-10 RX ORDER — AMLODIPINE BESYLATE 2.5 MG/1
2.5 TABLET ORAL DAILY
Status: DISCONTINUED | OUTPATIENT
Start: 2018-12-10 | End: 2018-12-11 | Stop reason: HOSPADM

## 2018-12-10 RX ORDER — ATORVASTATIN CALCIUM 20 MG/1
20 TABLET, FILM COATED ORAL NIGHTLY
Status: DISCONTINUED | OUTPATIENT
Start: 2018-12-10 | End: 2018-12-11 | Stop reason: HOSPADM

## 2018-12-10 RX ORDER — SODIUM CHLORIDE 0.9 % (FLUSH) 0.9 %
10 SYRINGE (ML) INJECTION EVERY 12 HOURS SCHEDULED
Status: DISCONTINUED | OUTPATIENT
Start: 2018-12-10 | End: 2018-12-11 | Stop reason: HOSPADM

## 2018-12-10 RX ORDER — ASPIRIN 81 MG/1
81 TABLET ORAL DAILY
Status: DISCONTINUED | OUTPATIENT
Start: 2018-12-10 | End: 2018-12-10

## 2018-12-10 RX ADMIN — ATORVASTATIN CALCIUM 20 MG: 20 TABLET, FILM COATED ORAL at 20:04

## 2018-12-10 RX ADMIN — PANTOPRAZOLE SODIUM 20 MG: 20 TABLET, DELAYED RELEASE ORAL at 11:45

## 2018-12-10 RX ADMIN — METOPROLOL TARTRATE 25 MG: 50 TABLET, FILM COATED ORAL at 11:44

## 2018-12-10 RX ADMIN — ASPIRIN 325 MG: 325 TABLET, DELAYED RELEASE ORAL at 12:19

## 2018-12-10 RX ADMIN — AMLODIPINE BESYLATE 2.5 MG: 2.5 TABLET ORAL at 12:19

## 2018-12-10 RX ADMIN — ENOXAPARIN SODIUM 30 MG: 30 INJECTION SUBCUTANEOUS at 20:04

## 2018-12-10 RX ADMIN — POTASSIUM CHLORIDE, DEXTROSE MONOHYDRATE AND SODIUM CHLORIDE: 150; 5; 450 INJECTION, SOLUTION INTRAVENOUS at 02:07

## 2018-12-10 RX ADMIN — METOPROLOL TARTRATE 25 MG: 50 TABLET, FILM COATED ORAL at 20:04

## 2018-12-10 RX ADMIN — ENOXAPARIN SODIUM 30 MG: 30 INJECTION SUBCUTANEOUS at 11:44

## 2018-12-10 RX ADMIN — LISINOPRIL 20 MG: 20 TABLET ORAL at 11:44

## 2018-12-10 ASSESSMENT — PAIN SCALES - GENERAL: PAINLEVEL_OUTOF10: 2

## 2018-12-10 NOTE — ED PROVIDER NOTES
[address]  eMERGENCY dEPARTMENT eNCOUnter      Pt Name: London Doss  MRN: [id number]  [place] 1945  Date of evaluation: 12/9/18      CHIEF COMPLAINT       Chief Complaint   Patient presents with    Chest Pain         HISTORY OF PRESENT ILLNESS    London Doss is a 75 y.o. male who presents complaining of Chest pain. Patient states for the last 2 days he's been having intermittent pain in his left upper chest.  Patient states this pain hits at any time and only last for a few seconds and then goes away. Patient has had multiple episodes over the last 2 days with it. Patient states that with that he has some dizziness but no shortness of breath palpitation or radiation of the pain. Patient states the pain is severe when it happens. Patient states that 6 months ago he had a stress test that was normal.  Patient's had no pain or swelling in the legs. Patient's had no recent travel or surgery. REVIEW OF SYSTEMS       Review of Systems   Constitutional: Negative for activity change, appetite change, chills, diaphoresis and fever. HENT: Negative for congestion, ear pain, facial swelling, nosebleeds, rhinorrhea, sinus pressure, sore throat and trouble swallowing. Eyes: Negative for pain, discharge and redness. Respiratory: Negative for cough, chest tightness, shortness of breath and wheezing. Cardiovascular: Positive for chest pain. Negative for palpitations and leg swelling. Gastrointestinal: Negative for abdominal pain, blood in stool, constipation, diarrhea, nausea and vomiting. Genitourinary: Negative for difficulty urinating, dysuria, flank pain, frequency, genital sores and hematuria. Musculoskeletal: Negative for arthralgias, back pain, gait problem, joint swelling, myalgias and neck pain. Skin: Negative for color change, pallor, rash and wound. Neurological: Positive for dizziness.  Negative for tremors, seizures, syncope, speech difficulty, weakness, numbness and He is oriented to person, place, and time. He appears well-developed and well-nourished. No distress. HENT:   Head: Normocephalic and atraumatic. Eyes: Pupils are equal, round, and reactive to light. Conjunctivae and EOM are normal. Right eye exhibits no discharge. Left eye exhibits no discharge. No scleral icterus. Cardiovascular: Normal rate, regular rhythm and normal heart sounds. Exam reveals no gallop and no friction rub. No murmur heard. Pulmonary/Chest: Effort normal and breath sounds normal. No respiratory distress. He has no wheezes. He has no rales. He exhibits tenderness (Left upper anterior chest). Abdominal: Soft. Bowel sounds are normal. He exhibits no distension and no mass. There is no tenderness. There is no rebound and no guarding. Musculoskeletal: Normal range of motion. He exhibits no edema or tenderness. Neurological: He is alert and oriented to person, place, and time. He displays normal reflexes. No cranial nerve deficit. He exhibits normal muscle tone. Coordination normal.   Skin: Skin is warm and dry. No rash noted. He is not diaphoretic. No erythema. No pallor. Psychiatric: He has a normal mood and affect. His behavior is normal. Judgment and thought content normal.   Nursing note and vitals reviewed. MEDICAL DECISION MAKING:     Patient's symptoms are very atypical.  Patient has reproducible tenderness to palpation. His does not sound like cardiac and with a normal stress test just 6 months ago I think were okay just doing 2 sets of enzymes and discharge if normal.  Will do a d-dimer due to the atypical nature of this. If everything is negative I think patient be okay to go home.     DIAGNOSTIC RESULTS     EKG: All EKG's are interpreted by the Emergency Department Physician who either signs or Co-signs this chart in the absence of a cardiologist.      EKG Interpretation    Interpreted by emergency department physician    Rhythm: normal sinus   Rate: normal  Axis:

## 2018-12-10 NOTE — PLAN OF CARE
Problem: Pain:  Goal: Pain level will decrease  Pain level will decrease   Outcome: Met This Shift  Patient has slept quietly with no complaints of pain this shift.

## 2018-12-10 NOTE — CONSULTS
[facility])            Consult        Date of Admission:  12/9/2018  Date of Consultation:  12/10/2018      PCP:  Michael Arceo MD      Reason for the consult:  Chest pains    History of Present Illness:  José Miguel Mackenzie is a 75 y.o. maleO cane because of episodes of chest pains he stated that this pain is about 10 over 10 they are located in the left side of his chest feel this pressure last 4 about a minute no radiation. No precipitating or relieving factors. PMH:   has a past medical history of Anxiety; Bronchitis with asthma, acute; Carotid stenosis, left; Diabetes mellitus ([address] GERD (gastroesophageal reflux disease); Hyperlipidemia; Hypertension; and Osteoarthritis. PSH:   has a past surgical history that includes Colonoscopy; Tonsillectomy and adenoidectomy; joint replacement (Left); shoulder surgery (Right); and Carotid endarterectomy (Left, 02/02/16). Allergies:  No Known Allergies     Home Meds:    Prior to Admission medications    Medication Sig Start Date End Date Taking?  Authorizing Provider   pantoprazole (PROTONIX) 20 MG tablet Take 20 mg by mouth daily   Yes Historical Provider, MD   aspirin 81 MG EC tablet Take 81 mg by mouth daily   Yes Historical Provider, MD   lisinopril (PRINIVIL;ZESTRIL) 20 MG tablet Take 1 tablet by mouth daily 6/12/18  Yes Marybeth Morley MD   metoprolol tartrate (LOPRESSOR) 25 MG tablet Take 1 tablet by mouth 2 times daily 6/11/18  Yes Marybeth Morley MD   atorvastatin (LIPITOR) 20 MG tablet Take 1 tablet by mouth nightly 6/11/18  Yes Antonella Crowell MD   aspirin 325 MG EC tablet Take 1 tablet by mouth daily 2/3/16   Janneth Trent MD        Hospital Meds:    Current Facility-Administered Medications   Medication Dose Route Frequency Provider Last Rate Last Dose    aspirin EC tablet 325 mg  325 mg Oral Daily Gabe Ovalle MD        lisinopril (PRINIVIL;ZESTRIL) tablet 20 mg  20 mg Oral Daily Gabe Ovalle MD HPI  · Respiratory: No cough or wheezing, no sputum production. No hemoptysis     · Gastrointestinal: No Nausea, Vomiting, Diarrhea, or Constipation  · Genitourinary: No dysuria,hematuria. · Musculoskeletal:  No gait disturbance, weakness or joint complaints. · Integumentary: No rash or pruritis. · Neurological: No headache, No Hx CVA/TIA  · Psychiatric: No anxiety, or depression. · Endocrine: No temperature intolerance. · Hematologic/Lymphatic: No abnormal bruising or bleeding     Physical Exam   Vital Signs: /81   Pulse 60   Temp 97.8 °F (36.6 °C) (Oral)   Resp 17   Ht 5' 10\" (1.778 m)   Wt 274 lb 0.5 oz (124.3 kg)   SpO2 99%   BMI 39.32 kg/m²        Admission Weight: 270 lb (122.5 kg)     General appearance: Alert oriented and cooperative. In no acute distress    Skin:  Warm and Dry to touch    Head: Normocephalic, without obvious abnormality, atraumatic    Eyes: Conjunctivae unremarkable, EOM's intact. Neck: No JVD, No carotid bruit. Neck supple, trachea midline    Lungs: Clear to ausculation bilaterally, no use of accessory muscles. Heart: normal first and second heart sound    Abdomen: Soft, non-tender. Bowel sounds normal.    Extremities: No edema    Neurologic: Oriented to time, person and place, affect appropriate. No focal/major motor defects noted. Psychiatric:  Appropriate mood, memory and judgment      Pertinent Testing:     Cardiac Cath:      ECHO/CLARA:  Summary 06/11/2018   Technically difficult study. Left ventricle is normal in size  Global left ventricular systolic function is normal Estimated ejection  fraction is 55 % . Mild left ventricular hypertrophy. Grade I (mild) left ventricular diastolic dysfunction. Aortic leaflets show calcification without restriction of motion. Mild aortic insufficiency. Mild mitral regurgitation. Trivial tricuspid regurgitation. Trivial pulmonic insufficiency.     STRESS: 06/11/2018   There is normal distribution of radiotracer provided that every mistake has been identified and corrected by editing.     Shaq Morrow MD [facility]

## 2018-12-10 NOTE — PROGRESS NOTES
[facility]      [facility]     HISTORY AND PHYSICAL EXAMINATION            Date:   12/10/2018  Patient name:  Marino Russell  Date of admission:  12/9/2018  9:44 PM  MRN:   [id number]  Account:  [de-identified]  YOB: 1945  PCP:    Allyson Diaz MD  Room:   [facility]  Code Status:    Full Code    Chief Complaint:     Chief Complaint   Patient presents with    Chest Pain       History Obtained From:     patient    History of Present Illness: The patient is a 75 y.o. Non-/non  male who presents with Chest Pain   and he is admitted to the hospital for the management of  Chest pain. Pain has been intermittent over the last 2 days burning and shooting in nature, non radiating. Pain is associated with lightheadedness and palpitations. Pain is self limiting exacerbated by nothing has occurred at rest while watching television and usually lasts seconds to minute. Currently he is pain free. Recent stress test 6/11/18 negative with LVEF 43%, 2D echo 7/2018  Summary  Technically difficult study. Left ventricle is normal in size and wall thickness. Global left ventricular systolic function is normal. Estimated LV EF >55 %. Evidence of diastolic dysfunction. Mild aortic insufficiency. Mild mitral regurgitation. Mild pulmonic insufficiency.         Past Medical History:     Past Medical History:   Diagnosis Date    Anxiety     Bronchitis with asthma, acute 11/24/2015    Carotid stenosis, left 2/2/2016    Diabetes mellitus ([address]     borderline patient off metformin    GERD (gastroesophageal reflux disease)     Hyperlipidemia     Hypertension     Osteoarthritis         Past Surgical History:     Past Surgical History:   Procedure Laterality Date    CAROTID ENDARTERECTOMY Left 02/02/16    COLONOSCOPY      JOINT REPLACEMENT Left knee    SHOULDER SURGERY Right     collar bone    TONSILLECTOMY AND ADENOIDECTOMY          Medications Prior to Admission:     Prior to Admission medications    Medication Sig Start Date End Date Taking? Authorizing Provider   pantoprazole (PROTONIX) 20 MG tablet Take 20 mg by mouth daily   Yes Historical Provider, MD   aspirin 81 MG EC tablet Take 81 mg by mouth daily   Yes Historical Provider, MD   lisinopril (PRINIVIL;ZESTRIL) 20 MG tablet Take 1 tablet by mouth daily 18  Yes Mickie Martinez MD   metoprolol tartrate (LOPRESSOR) 25 MG tablet Take 1 tablet by mouth 2 times daily 18  Yes Mickie Martinez MD   atorvastatin (LIPITOR) 20 MG tablet Take 1 tablet by mouth nightly 18  Yes Nathaniel Robb MD   aspirin 325 MG EC tablet Take 1 tablet by mouth daily 2/3/16   Arnaldo Wei MD        Allergies:     Patient has no known allergies. Social History:     Tobacco:    reports that he has never smoked. He has never used smokeless tobacco.  Alcohol:      reports that he drinks alcohol. occasional beer  Drug Use:  reports that he does not use drugs. Works construction partially retired  Family History:     Family History   Problem Relation Age of Onset    Cancer Mother    Marietta Eric Cancer Father    Brother  of MI age 76  Sister metastatic liver cancer    Review of Systems:     Positive and Negative as described in HPI. CONSTITUTIONAL:  negative for fevers, chills, sweats, fatigue, weight loss  HEENT:  negative for vision, hearing changes, runny nose, throat pain  RESPIRATORY:  negative for shortness of breath, cough, congestion, wheezing.   CARDIOVASCULAR: see hpi  GASTROINTESTINAL:  negative for nausea, vomiting, diarrhea, constipation, change in bowel habits, abdominal pain   GENITOURINARY:  negative for difficulty of urination, burning with urination, frequency   INTEGUMENT:  negative for rash, skin lesions, easy bruising   HEMATOLOGIC/LYMPHATIC:  negative for swelling/edema ALLERGIC/IMMUNOLOGIC:  negative for urticaria , itching  ENDOCRINE:  negative increase in drinking, increase in urination, hot or cold intolerance  MUSCULOSKELETAL:  negative joint pains, muscle aches, swelling of joints  NEUROLOGICAL:  negative for headaches, dizziness, positive for occasional numbness and tingling of hands self limiting  BEHAVIOR/PSYCH:  negative for depression, anxiety    Physical Exam:   /81   Pulse 60   Temp 97.8 °F (36.6 °C) (Oral)   Resp 17   Ht 5' 10\" (1.778 m)   Wt 274 lb 0.5 oz (124.3 kg)   SpO2 99%   BMI 39.32 kg/m²   Temp (24hrs), Av.8 °F (36.6 °C), Min:97.5 °F (36.4 °C), Max:98 °F (36.7 °C)    No results for input(s): POCGLU in the last 72 hours. Intake/Output Summary (Last 24 hours) at 12/10/18 1006  Last data filed at 12/10/18 0515   Gross per 24 hour   Intake              235 ml   Output                0 ml   Net              235 ml       General Appearance:  alert, well appearing, and in no acute distress  Mental status: oriented to person, place, and time with normal affect  Head:  normocephalic, atraumatic. Eye: no icterus, redness, pupils equal and reactive, extraocular eye movements intact, conjunctiva clear  Ear: normal external ear, no discharge, hearing intact  Nose:  no drainage noted  Mouth: mucous membranes moist  Neck: supple, no carotid bruits, thyroid not palpable  Lungs: Bilateral equal air entry, clear to ausculation, no wheezing, rales or rhonchi, normal effort  Cardiovascular: normal rate, regular rhythm, no murmur, gallop, rub.   Abdomen: Soft, nontender, obese, normal bowel sounds, no hepatomegaly or splenomegaly  Neurologic: There are no new focal motor or sensory deficits, normal muscle tone and bulk, no abnormal sensation, normal speech, cranial nerves II through XII grossly intact  Skin: No gross lesions, rashes, bruising or bleeding on exposed skin area  Extremities:  peripheral pulses palpable, no pedal edema or calf pain with

## 2018-12-10 NOTE — PROGRESS NOTES
Writer spoke with Dr. Bong Abarca regarding clarification of cardiology consult. Physician states to contact Dr. Kirby Hammond for cardiology consult.

## 2018-12-11 ENCOUNTER — HOSPITAL ENCOUNTER (OUTPATIENT)
Dept: CARDIAC CATH/INVASIVE PROCEDURES | Age: 73
Discharge: HOME | End: 2018-12-11
Payer: MEDICARE

## 2018-12-11 VITALS
OXYGEN SATURATION: 97 % | WEIGHT: 274.03 LBS | RESPIRATION RATE: 15 BRPM | SYSTOLIC BLOOD PRESSURE: 133 MMHG | TEMPERATURE: 97.9 F | HEIGHT: 70 IN | BODY MASS INDEX: 39.23 KG/M2 | HEART RATE: 59 BPM | DIASTOLIC BLOOD PRESSURE: 76 MMHG

## 2018-12-11 VITALS
DIASTOLIC BLOOD PRESSURE: 70 MMHG | RESPIRATION RATE: 19 BRPM | SYSTOLIC BLOOD PRESSURE: 133 MMHG | HEART RATE: 58 BPM | OXYGEN SATURATION: 97 %

## 2018-12-11 LAB
LV EF: 55 %
LVEF MODALITY: NORMAL

## 2018-12-11 PROCEDURE — 2709999900 HC NON-CHARGEABLE SUPPLY

## 2018-12-11 PROCEDURE — 93458 L HRT ARTERY/VENTRICLE ANGIO: CPT

## 2018-12-11 PROCEDURE — 99239 HOSP IP/OBS DSCHRG MGMT >30: CPT | Performed by: INTERNAL MEDICINE

## 2018-12-11 PROCEDURE — C1887 CATHETER, GUIDING: HCPCS

## 2018-12-11 PROCEDURE — 2580000003 HC RX 258: Performed by: STUDENT IN AN ORGANIZED HEALTH CARE EDUCATION/TRAINING PROGRAM

## 2018-12-11 PROCEDURE — 2060000000 HC ICU INTERMEDIATE R&B

## 2018-12-11 PROCEDURE — 6360000002 HC RX W HCPCS

## 2018-12-11 PROCEDURE — 2500000003 HC RX 250 WO HCPCS: Performed by: STUDENT IN AN ORGANIZED HEALTH CARE EDUCATION/TRAINING PROGRAM

## 2018-12-11 PROCEDURE — C1894 INTRO/SHEATH, NON-LASER: HCPCS

## 2018-12-11 PROCEDURE — 6370000000 HC RX 637 (ALT 250 FOR IP): Performed by: INTERNAL MEDICINE

## 2018-12-11 PROCEDURE — C1769 GUIDE WIRE: HCPCS

## 2018-12-11 PROCEDURE — 7100000010 HC PHASE II RECOVERY - FIRST 15 MIN

## 2018-12-11 PROCEDURE — 6360000004 HC RX CONTRAST MEDICATION

## 2018-12-11 PROCEDURE — 7100000011 HC PHASE II RECOVERY - ADDTL 15 MIN

## 2018-12-11 PROCEDURE — 2500000003 HC RX 250 WO HCPCS

## 2018-12-11 PROCEDURE — 6370000000 HC RX 637 (ALT 250 FOR IP): Performed by: STUDENT IN AN ORGANIZED HEALTH CARE EDUCATION/TRAINING PROGRAM

## 2018-12-11 RX ORDER — AMLODIPINE BESYLATE 2.5 MG/1
2.5 TABLET ORAL DAILY
Qty: 30 TABLET | Refills: 3 | OUTPATIENT
Start: 2018-12-12

## 2018-12-11 RX ORDER — SODIUM CHLORIDE 9 MG/ML
INJECTION, SOLUTION INTRAVENOUS CONTINUOUS
Status: DISCONTINUED | OUTPATIENT
Start: 2018-12-11 | End: 2018-12-12 | Stop reason: HOSPADM

## 2018-12-11 RX ORDER — ACETAMINOPHEN 325 MG/1
650 TABLET ORAL EVERY 4 HOURS PRN
Status: DISCONTINUED | OUTPATIENT
Start: 2018-12-11 | End: 2018-12-12 | Stop reason: HOSPADM

## 2018-12-11 RX ORDER — SODIUM CHLORIDE 0.9 % (FLUSH) 0.9 %
10 SYRINGE (ML) INJECTION EVERY 12 HOURS SCHEDULED
Status: DISCONTINUED | OUTPATIENT
Start: 2018-12-11 | End: 2018-12-12 | Stop reason: HOSPADM

## 2018-12-11 RX ORDER — SODIUM CHLORIDE 9 MG/ML
INJECTION, SOLUTION INTRAVENOUS CONTINUOUS
Status: ACTIVE | OUTPATIENT
Start: 2018-12-11 | End: 2018-12-11

## 2018-12-11 RX ORDER — ISOSORBIDE MONONITRATE 30 MG/1
30 TABLET, EXTENDED RELEASE ORAL DAILY
Qty: 30 TABLET | Refills: 3 | Status: ON HOLD | OUTPATIENT
Start: 2018-12-11 | End: 2019-07-26 | Stop reason: HOSPADM

## 2018-12-11 RX ORDER — SODIUM CHLORIDE 0.9 % (FLUSH) 0.9 %
10 SYRINGE (ML) INJECTION PRN
Status: DISCONTINUED | OUTPATIENT
Start: 2018-12-11 | End: 2018-12-12 | Stop reason: HOSPADM

## 2018-12-11 RX ADMIN — Medication 10 ML: at 08:25

## 2018-12-11 RX ADMIN — AMLODIPINE BESYLATE 2.5 MG: 2.5 TABLET ORAL at 08:24

## 2018-12-11 RX ADMIN — ASPIRIN 325 MG: 325 TABLET, DELAYED RELEASE ORAL at 08:24

## 2018-12-11 RX ADMIN — SODIUM CHLORIDE: 9 INJECTION, SOLUTION INTRAVENOUS at 13:25

## 2018-12-11 RX ADMIN — METOPROLOL TARTRATE 25 MG: 50 TABLET, FILM COATED ORAL at 08:24

## 2018-12-11 RX ADMIN — LISINOPRIL 20 MG: 20 TABLET ORAL at 08:25

## 2018-12-11 RX ADMIN — POTASSIUM CHLORIDE, DEXTROSE MONOHYDRATE AND SODIUM CHLORIDE: 150; 5; 450 INJECTION, SOLUTION INTRAVENOUS at 01:29

## 2019-01-01 NOTE — H&P
[address] [facility].      [address] [facility]     HISTORY AND PHYSICAL EXAMINATION            Date:   1/1/2019  Patient name:  Jaren Cabezas  Date of admission:  12/9/2018  9:44 PM  MRN:   [id number]  Account:  [de-identified]  YOB: 1945  PCP:    Elvis Omalley MD  Room:   2087/2087-01  Code Status:    Prior    Chief Complaint:     Chief Complaint   Patient presents with    Chest Pain       History Obtained From:     patient    History of Present Illness: The patient is a 75 y.o. Non-/non  male who presents with Chest Pain   and he is admitted to the hospital for the management of  Chest pain. Pain has been intermittent over the last 2 days burning and shooting in nature, non radiating. Pain is associated with lightheadedness and palpitations. Pain is self limiting exacerbated by nothing has occurred at rest while watching television and usually lasts seconds to minute. Currently he is pain free. Recent stress test 6/11/18 negative with LVEF 43%, 2D echo 7/2018  Summary  Technically difficult study. Left ventricle is normal in size and wall thickness. Global left ventricular systolic function is normal. Estimated LV EF >55 %. Evidence of diastolic dysfunction. Mild aortic insufficiency. Mild mitral regurgitation. Mild pulmonic insufficiency.         Past Medical History:     Past Medical History:   Diagnosis Date    Anxiety     Bronchitis with asthma, acute 11/24/2015    Carotid stenosis, left 2/2/2016    Diabetes mellitus ([address]     borderline patient off metformin    GERD (gastroesophageal reflux disease)     Hyperlipidemia     Hypertension     Osteoarthritis         Past Surgical History:     Past Surgical History:   Procedure Laterality Date    CAROTID ENDARTERECTOMY Left 02/02/16    COLONOSCOPY      JOINT REPLACEMENT Left hour(s)). Imaging/Diagnostics:        Assessment :      Primary Problem  <principal problem not specified>    Active Hospital Problems    Diagnosis Date Noted    Chest pain [R07.9] 12/10/2018       Plan:     Patient status Admit   1. Plan per cardiology recent negative stress test June 2018  ASA, beta blocker, PPI  Consultations:   None     Patient is admitted as inpatient status because of co-morbidities listed above, severity of signs and symptoms as outlined, requirement for current medical therapies and most importantly because of direct risk to patient if care not provided in a hospital setting. Faviola Alvarez RN nurse practitioner student  12/10/2018  10:06 AM  Copy sent to Dr. Liane Hernandez MD     IM Attending    Pt seen and examined today   I have discussed the care of pt, including pertinent history and exam findings,  with the NP student lesli  .  I have reviewed the key elements of all parts of the encounter with  lesli  I agree with the assessment, plan and orders as documented by lesli    Atypical chest pain   Trop negative  Risk factors - HTN   Stress test negative recently     Electronically signed by Rosario Rooney MD on 12/10/2018 at 11:23 AM

## 2019-07-02 NOTE — PROGRESS NOTES
[address]   Acute Inpatient Rehab Preadmission Assessment     Patient Name:     Rachana Calixto   MRN:     [id number]  : [id number]  Gender:  Male     Admitted from:   [facility]  [][facility]   [][facility]   [x]Outside Admission - Location:                                [x]Initial         []Updated       Date of Onset / admission to the acute hospital:  19     Impairment group:  1.2 Stroke     Did patient have surgery?   []No  [x]Yes: Craniotomy, EVD placement and removal,  intervention and PCI to RCA on 7-3-61 complicated by perforation and tamponade s/p pericardiocentesis with a covered stent through the perforation (prior to current hospitalization)      Physicians: Oswaldo Mitchell     Risk for clinical complications:  High     Co-morbidities:   Arthritis  CAD  CHF  Hx DVT  Dyslipidemia  HLD  HTN  Obesity  Hx mechanically assisted ventilation  Stenosis of carotid artery           Financial Information  Primary insurance:  []Medicare         [x] Medicare HMO           []Commercial insurance    []Medicaid          [] Medicaid HMO           []Workers Compensation            []Personal Pay     Secondary Insurance:  []Medicare         [] Medicare HMO           []Commercial insurance    []Medicaid          []Workers Compensation          [x]None     Precautions:   [x]Cardiac Precautions   []Total hip precautions    []Weight Bearing status:  [x]Safety Precautions/Concerns  []Visually impaired Vision Exceptions: Wears glasses at all times                  []Hard of Hearing      Isolation Precautions:         []Yes                   [x]No  If Yes:   [] Droplet  []Contact        []Airborne     []VRE     []MRSA                []C-diff         [] TB               [] Other:                         Physiatrist:  [x]         Patients Occupation: Full-time    Reviewed Lab and Diagnostic reports from Current Admission:  Yes, per faxed records     Patients Prior

## 2019-07-03 ENCOUNTER — HOSPITAL ENCOUNTER (INPATIENT)
Age: 74
LOS: 26 days | Discharge: NURSING, REHAB OR LONG-TERM CARE | DRG: 057 | End: 2019-07-29
Attending: PHYSICAL MEDICINE & REHABILITATION | Admitting: PHYSICAL MEDICINE & REHABILITATION
Payer: MEDICARE

## 2019-07-03 DIAGNOSIS — M17.0 PRIMARY OSTEOARTHRITIS OF BOTH KNEES: Primary | ICD-10-CM

## 2019-07-03 PROBLEM — I62.9: Status: ACTIVE | Noted: 2019-07-03

## 2019-07-03 PROCEDURE — 6370000000 HC RX 637 (ALT 250 FOR IP): Performed by: PHYSICIAN ASSISTANT

## 2019-07-03 PROCEDURE — 1180000000 HC REHAB R&B

## 2019-07-03 PROCEDURE — 6360000002 HC RX W HCPCS: Performed by: PHYSICIAN ASSISTANT

## 2019-07-03 RX ORDER — NITROGLYCERIN 0.4 MG/1
0.4 TABLET SUBLINGUAL EVERY 5 MIN PRN
Status: DISCONTINUED | OUTPATIENT
Start: 2019-07-03 | End: 2019-07-29 | Stop reason: HOSPADM

## 2019-07-03 RX ORDER — CALCIUM CARBONATE 200(500)MG
500 TABLET,CHEWABLE ORAL 3 TIMES DAILY
Status: DISCONTINUED | OUTPATIENT
Start: 2019-07-03 | End: 2019-07-03

## 2019-07-03 RX ORDER — LIDOCAINE 4 G/G
1 PATCH TOPICAL DAILY
Status: DISCONTINUED | OUTPATIENT
Start: 2019-07-03 | End: 2019-07-29 | Stop reason: HOSPADM

## 2019-07-03 RX ORDER — METHOCARBAMOL 500 MG/1
500 TABLET, FILM COATED ORAL 4 TIMES DAILY PRN
Status: DISCONTINUED | OUTPATIENT
Start: 2019-07-03 | End: 2019-07-29 | Stop reason: HOSPADM

## 2019-07-03 RX ORDER — BISACODYL 10 MG
10 SUPPOSITORY, RECTAL RECTAL DAILY PRN
Status: DISCONTINUED | OUTPATIENT
Start: 2019-07-03 | End: 2019-07-29 | Stop reason: HOSPADM

## 2019-07-03 RX ORDER — SENNA PLUS 8.6 MG/1
1 TABLET ORAL DAILY PRN
Status: DISCONTINUED | OUTPATIENT
Start: 2019-07-03 | End: 2019-07-04

## 2019-07-03 RX ORDER — OXYCODONE HYDROCHLORIDE 5 MG/1
10 TABLET ORAL EVERY 4 HOURS PRN
Status: DISCONTINUED | OUTPATIENT
Start: 2019-07-03 | End: 2019-07-04

## 2019-07-03 RX ORDER — COLCHICINE 0.6 MG/1
0.6 TABLET ORAL 2 TIMES DAILY
Status: DISCONTINUED | OUTPATIENT
Start: 2019-07-03 | End: 2019-07-04

## 2019-07-03 RX ORDER — OXYCODONE HYDROCHLORIDE 5 MG/1
5 TABLET ORAL EVERY 4 HOURS PRN
Status: DISCONTINUED | OUTPATIENT
Start: 2019-07-03 | End: 2019-07-29 | Stop reason: HOSPADM

## 2019-07-03 RX ORDER — CLOPIDOGREL BISULFATE 75 MG/1
75 TABLET ORAL DAILY
Status: DISCONTINUED | OUTPATIENT
Start: 2019-07-04 | End: 2019-07-29 | Stop reason: HOSPADM

## 2019-07-03 RX ORDER — SENNA PLUS 8.6 MG/1
1 TABLET ORAL 2 TIMES DAILY
Status: DISCONTINUED | OUTPATIENT
Start: 2019-07-03 | End: 2019-07-05

## 2019-07-03 RX ORDER — COLCHICINE 0.6 MG/1
0.6 TABLET ORAL DAILY
Status: DISCONTINUED | OUTPATIENT
Start: 2019-07-04 | End: 2019-07-03

## 2019-07-03 RX ORDER — AMLODIPINE BESYLATE 5 MG/1
5 TABLET ORAL DAILY
Status: DISCONTINUED | OUTPATIENT
Start: 2019-07-04 | End: 2019-07-29 | Stop reason: HOSPADM

## 2019-07-03 RX ORDER — CALCIUM CARBONATE 200(500)MG
500 TABLET,CHEWABLE ORAL 3 TIMES DAILY PRN
Status: DISCONTINUED | OUTPATIENT
Start: 2019-07-03 | End: 2019-07-29 | Stop reason: HOSPADM

## 2019-07-03 RX ORDER — ASPIRIN 81 MG/1
81 TABLET, CHEWABLE ORAL DAILY
Status: DISCONTINUED | OUTPATIENT
Start: 2019-07-04 | End: 2019-07-29 | Stop reason: HOSPADM

## 2019-07-03 RX ORDER — ATORVASTATIN CALCIUM 80 MG/1
80 TABLET, FILM COATED ORAL NIGHTLY
Status: DISCONTINUED | OUTPATIENT
Start: 2019-07-03 | End: 2019-07-29 | Stop reason: HOSPADM

## 2019-07-03 RX ORDER — ACETAMINOPHEN 325 MG/1
650 TABLET ORAL EVERY 4 HOURS PRN
Status: DISCONTINUED | OUTPATIENT
Start: 2019-07-03 | End: 2019-07-29 | Stop reason: HOSPADM

## 2019-07-03 RX ORDER — CALCIUM CARBONATE 200(500)MG
500 TABLET,CHEWABLE ORAL 3 TIMES DAILY PRN
Status: DISCONTINUED | OUTPATIENT
Start: 2019-07-03 | End: 2019-07-03

## 2019-07-03 RX ORDER — MAGNESIUM HYDROXIDE/ALUMINUM HYDROXICE/SIMETHICONE 120; 1200; 1200 MG/30ML; MG/30ML; MG/30ML
30 SUSPENSION ORAL EVERY 6 HOURS PRN
Status: DISCONTINUED | OUTPATIENT
Start: 2019-07-03 | End: 2019-07-29 | Stop reason: HOSPADM

## 2019-07-03 RX ORDER — HEPARIN SODIUM 5000 [USP'U]/ML
5000 INJECTION, SOLUTION INTRAVENOUS; SUBCUTANEOUS EVERY 8 HOURS SCHEDULED
Status: DISCONTINUED | OUTPATIENT
Start: 2019-07-03 | End: 2019-07-29 | Stop reason: HOSPADM

## 2019-07-03 RX ADMIN — SENNOSIDES 8.6 MG: 8.6 TABLET, FILM COATED ORAL at 20:34

## 2019-07-03 RX ADMIN — ATORVASTATIN CALCIUM 80 MG: 80 TABLET, FILM COATED ORAL at 20:34

## 2019-07-03 RX ADMIN — COLCHICINE 0.6 MG: 0.6 TABLET, FILM COATED ORAL at 20:57

## 2019-07-03 RX ADMIN — HEPARIN SODIUM 5000 UNITS: 5000 INJECTION INTRAVENOUS; SUBCUTANEOUS at 20:53

## 2019-07-03 ASSESSMENT — PAIN SCALES - GENERAL: PAINLEVEL_OUTOF10: 0

## 2019-07-04 ENCOUNTER — APPOINTMENT (OUTPATIENT)
Dept: GENERAL RADIOLOGY | Age: 74
DRG: 057 | End: 2019-07-04
Attending: PHYSICAL MEDICINE & REHABILITATION
Payer: MEDICARE

## 2019-07-04 LAB
ANION GAP SERPL CALCULATED.3IONS-SCNC: 14 MMOL/L (ref 9–17)
BUN BLDV-MCNC: 23 MG/DL (ref 8–23)
BUN/CREAT BLD: ABNORMAL (ref 9–20)
CALCIUM SERPL-MCNC: 9.9 MG/DL (ref 8.6–10.4)
CHLORIDE BLD-SCNC: 96 MMOL/L (ref 98–107)
CO2: 27 MMOL/L (ref 20–31)
CREAT SERPL-MCNC: 0.65 MG/DL (ref 0.7–1.2)
GFR AFRICAN AMERICAN: >60 ML/MIN
GFR NON-AFRICAN AMERICAN: >60 ML/MIN
GFR SERPL CREATININE-BSD FRML MDRD: ABNORMAL ML/MIN/{1.73_M2}
GFR SERPL CREATININE-BSD FRML MDRD: ABNORMAL ML/MIN/{1.73_M2}
GLUCOSE BLD-MCNC: 108 MG/DL (ref 70–99)
HCT VFR BLD CALC: 39.7 % (ref 41–53)
HEMOGLOBIN: 13.1 G/DL (ref 13.5–17.5)
MCH RBC QN AUTO: 29.4 PG (ref 26–34)
MCHC RBC AUTO-ENTMCNC: 33 G/DL (ref 31–37)
MCV RBC AUTO: 89.1 FL (ref 80–100)
NRBC AUTOMATED: ABNORMAL PER 100 WBC
PDW BLD-RTO: 14.6 % (ref 11.5–14.9)
PLATELET # BLD: 338 K/UL (ref 150–450)
PMV BLD AUTO: 8.3 FL (ref 6–12)
POTASSIUM SERPL-SCNC: 4 MMOL/L (ref 3.7–5.3)
RBC # BLD: 4.45 M/UL (ref 4.5–5.9)
SODIUM BLD-SCNC: 137 MMOL/L (ref 135–144)
WBC # BLD: 7.3 K/UL (ref 3.5–11)

## 2019-07-04 PROCEDURE — 99223 1ST HOSP IP/OBS HIGH 75: CPT | Performed by: PHYSICAL MEDICINE & REHABILITATION

## 2019-07-04 PROCEDURE — 97110 THERAPEUTIC EXERCISES: CPT

## 2019-07-04 PROCEDURE — 97116 GAIT TRAINING THERAPY: CPT

## 2019-07-04 PROCEDURE — 6370000000 HC RX 637 (ALT 250 FOR IP): Performed by: PHYSICIAN ASSISTANT

## 2019-07-04 PROCEDURE — 97162 PT EVAL MOD COMPLEX 30 MIN: CPT

## 2019-07-04 PROCEDURE — 92523 SPEECH SOUND LANG COMPREHEN: CPT

## 2019-07-04 PROCEDURE — 1180000000 HC REHAB R&B

## 2019-07-04 PROCEDURE — 36415 COLL VENOUS BLD VENIPUNCTURE: CPT

## 2019-07-04 PROCEDURE — 51798 US URINE CAPACITY MEASURE: CPT

## 2019-07-04 PROCEDURE — 6360000002 HC RX W HCPCS: Performed by: PHYSICIAN ASSISTANT

## 2019-07-04 PROCEDURE — 80048 BASIC METABOLIC PNL TOTAL CA: CPT

## 2019-07-04 PROCEDURE — 74019 RADEX ABDOMEN 2 VIEWS: CPT

## 2019-07-04 PROCEDURE — 85027 COMPLETE CBC AUTOMATED: CPT

## 2019-07-04 PROCEDURE — 6370000000 HC RX 637 (ALT 250 FOR IP): Performed by: PHYSICAL MEDICINE & REHABILITATION

## 2019-07-04 PROCEDURE — 97535 SELF CARE MNGMENT TRAINING: CPT

## 2019-07-04 PROCEDURE — 97166 OT EVAL MOD COMPLEX 45 MIN: CPT

## 2019-07-04 PROCEDURE — 97530 THERAPEUTIC ACTIVITIES: CPT

## 2019-07-04 RX ORDER — GABAPENTIN 100 MG/1
100 CAPSULE ORAL 3 TIMES DAILY
Status: DISCONTINUED | OUTPATIENT
Start: 2019-07-04 | End: 2019-07-08

## 2019-07-04 RX ORDER — POLYETHYLENE GLYCOL 3350 17 G/17G
17 POWDER, FOR SOLUTION ORAL DAILY
Status: DISCONTINUED | OUTPATIENT
Start: 2019-07-04 | End: 2019-07-29 | Stop reason: HOSPADM

## 2019-07-04 RX ORDER — ONDANSETRON 4 MG/1
4 TABLET, FILM COATED ORAL EVERY 8 HOURS PRN
Status: DISCONTINUED | OUTPATIENT
Start: 2019-07-04 | End: 2019-07-29 | Stop reason: HOSPADM

## 2019-07-04 RX ORDER — COLCHICINE 0.6 MG/1
0.6 TABLET ORAL 2 TIMES DAILY
Status: DISCONTINUED | OUTPATIENT
Start: 2019-07-04 | End: 2019-07-29 | Stop reason: HOSPADM

## 2019-07-04 RX ORDER — MECLIZINE HYDROCHLORIDE 25 MG/1
12.5 TABLET ORAL 3 TIMES DAILY PRN
Status: DISCONTINUED | OUTPATIENT
Start: 2019-07-04 | End: 2019-07-07

## 2019-07-04 RX ADMIN — COLCHICINE 0.6 MG: 0.6 TABLET, FILM COATED ORAL at 20:49

## 2019-07-04 RX ADMIN — MECLIZINE HYDROCHLORIDE 12.5 MG: 25 TABLET ORAL at 17:46

## 2019-07-04 RX ADMIN — SENNOSIDES 8.6 MG: 8.6 TABLET, FILM COATED ORAL at 07:21

## 2019-07-04 RX ADMIN — GABAPENTIN 100 MG: 100 CAPSULE ORAL at 20:48

## 2019-07-04 RX ADMIN — HEPARIN SODIUM 5000 UNITS: 5000 INJECTION INTRAVENOUS; SUBCUTANEOUS at 06:02

## 2019-07-04 RX ADMIN — HEPARIN SODIUM 5000 UNITS: 5000 INJECTION INTRAVENOUS; SUBCUTANEOUS at 20:50

## 2019-07-04 RX ADMIN — CLOPIDOGREL BISULFATE 75 MG: 75 TABLET ORAL at 07:21

## 2019-07-04 RX ADMIN — OXYCODONE HYDROCHLORIDE 5 MG: 5 TABLET ORAL at 12:23

## 2019-07-04 RX ADMIN — SENNOSIDES 8.6 MG: 8.6 TABLET, FILM COATED ORAL at 20:48

## 2019-07-04 RX ADMIN — ASPIRIN 81 MG 81 MG: 81 TABLET ORAL at 07:21

## 2019-07-04 RX ADMIN — HEPARIN SODIUM 5000 UNITS: 5000 INJECTION INTRAVENOUS; SUBCUTANEOUS at 14:00

## 2019-07-04 RX ADMIN — OXYCODONE HYDROCHLORIDE 5 MG: 5 TABLET ORAL at 07:25

## 2019-07-04 RX ADMIN — AMLODIPINE BESYLATE 5 MG: 5 TABLET ORAL at 12:23

## 2019-07-04 RX ADMIN — COLCHICINE 0.6 MG: 0.6 TABLET, FILM COATED ORAL at 07:23

## 2019-07-04 RX ADMIN — ATORVASTATIN CALCIUM 80 MG: 80 TABLET, FILM COATED ORAL at 20:48

## 2019-07-04 RX ADMIN — ONDANSETRON HYDROCHLORIDE 4 MG: 4 TABLET, FILM COATED ORAL at 20:50

## 2019-07-04 RX ADMIN — ONDANSETRON HYDROCHLORIDE 4 MG: 4 TABLET, FILM COATED ORAL at 12:20

## 2019-07-04 ASSESSMENT — PAIN DESCRIPTION - FREQUENCY
FREQUENCY: INTERMITTENT
FREQUENCY: INTERMITTENT

## 2019-07-04 ASSESSMENT — PAIN DESCRIPTION - PROGRESSION
CLINICAL_PROGRESSION: NOT CHANGED
CLINICAL_PROGRESSION: GRADUALLY IMPROVING

## 2019-07-04 ASSESSMENT — PAIN SCALES - GENERAL
PAINLEVEL_OUTOF10: 3
PAINLEVEL_OUTOF10: 3
PAINLEVEL_OUTOF10: 10
PAINLEVEL_OUTOF10: 3
PAINLEVEL_OUTOF10: 6
PAINLEVEL_OUTOF10: 5

## 2019-07-04 ASSESSMENT — PAIN DESCRIPTION - DIRECTION: RADIATING_TOWARDS: TO LEFT ARM

## 2019-07-04 ASSESSMENT — 9 HOLE PEG TEST
TEST_RESULT: FUNCTIONAL
TESTTIME_SECONDS: 45
TESTTIME_SECONDS: 70
TEST_RESULT: IMPAIRED

## 2019-07-04 ASSESSMENT — PAIN DESCRIPTION - ONSET
ONSET: ON-GOING
ONSET: ON-GOING

## 2019-07-04 ASSESSMENT — PAIN DESCRIPTION - DESCRIPTORS
DESCRIPTORS: ACHING
DESCRIPTORS: ACHING;DISCOMFORT

## 2019-07-04 ASSESSMENT — PAIN DESCRIPTION - LOCATION
LOCATION: SHOULDER
LOCATION: SHOULDER

## 2019-07-04 ASSESSMENT — PAIN DESCRIPTION - ORIENTATION
ORIENTATION: LEFT
ORIENTATION: LEFT

## 2019-07-04 ASSESSMENT — PAIN DESCRIPTION - PAIN TYPE
TYPE: ACUTE PAIN
TYPE: ACUTE PAIN

## 2019-07-04 NOTE — H&P
PROTIME, INR in the last 72 hours. APTT: No results for input(s): APTT in the last 72 hours. CARDIAC ENZYMES: No results for input(s): CKMB, CKMBINDEX, TROPONINT in the last 72 hours. Invalid input(s): CKTOTAL;3  FASTING LIPID PANEL:  Lab Results   Component Value Date    CHOL 179 07/27/2018    HDL 48 07/27/2018    TRIG 122 07/27/2018     LIVER PROFILE: No results for input(s): AST, ALT, ALB, BILIDIR, BILITOT, ALKPHOS in the last 72 hours. I/O (24Hr): Intake/Output Summary (Last 24 hours) at 7/4/2019 1200  Last data filed at 7/4/2019 0607  Gross per 24 hour   Intake --   Output 800 ml   Net -800 ml       Glu last 24 hour  No results for input(s): POCGLU in the last 72 hours. No results for input(s): CLARITYU, COLORU, PHUR, SPECGRAV, PROTEINU, RBCUA, BLOODU, BACTERIA, NITRU, WBCUA, LEUKOCYTESUR, YEAST, GLUCOSEU, BILIRUBINUR in the last 72 hours. Social History:  Dwelling House - 2 story. Steps to enter:  3,  Bed/bathroom level:  1 and 2  Lives with: wife and kids  Tobacco: none  Alcohol usage:  none  Illicit: denies  Activity level:  normal activities of daily living    Family History:       Problem Relation Age of Onset    Cancer Mother     Cancer Father        Review of Systems:  CONSTITUTIONAL:  Denies fevers, chills, sweats or fatigue. EYES:  Denies diplopia, blind spots, blurring. HEENT:  Denies hearing loss, trouble chewing or swallowing. RESPIRATORY:  No wheezing, coughing, shortness of breath. CARDIOVASCULAR:  Denies chest pain, palpitations, lightheadedness. GASTROINTESTINAL:  Denies heartburn, nausea, constipation, diarrhea, abdominal pain. Nausea  GENITOURINARY:  No urgency, frequency, incontinence, dysuria. ENDOCRINE:  Denies hot or cold intolerance. MUSCULOSKELETAL:  Denies focal joint pain, back pain, neck pain. NEUROLOGICAL:  Denies focal weakness, numbness, tingling, balance loss, headache. BEHAVIOR/PSYCH:  Denies depression, anxiety, memory loss, insomnia.   SKIN: No ulcers, rash, bruises. Physical Exam:  BP (!) 157/93   Pulse 81   Temp 98.2 °F (36.8 °C) (Oral)   Resp 16   Ht 5' 10\" (1.778 m)   Wt 266 lb (120.7 kg)   SpO2 95%   BMI 38.17 kg/m²   HEENT:  Symmetrical facial features. EOMI. Visual fields intact- double vision. Hearing intact. Speech fluent, no dystarthria. Comprehension intact. Object naming intact. Repetition intact. Basic cognition intact. Double vision patient new year, president and location, follows commands names object  NECK:  ROM functional.  Carotid bruit negative. THORAX:  Symmetrical.    LUNGS:  Clear to ausculation. clear  HEART:  Regular. No murmurs of gallops. ABDOMEN:  Non-distended. Normal bowel sounds. No guarding, tenderness, mass. BACK:  No ulcers or deformity. EXTREMITIES:  PROM within functional limits. Except decreased left shoulder no calf tenderness,1+ edema. Feet warm. NEUROMUSCULAR:  Sensation intact Right, ? Decreased on left anterior lateral hip, no extinction. .  Motor testing 4/5. Balance impaired. SKIN:  intact. 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 The post admission physician evaluation (ILANA) is consistent with the pre-admission assessment. See above findings to reflect the elements required in the ILANA. Patient's admitting condition is consistent with the findings of the preadmission assessment by the rehabilitation admissions coordinator. Other Diagnoses/plan:    1.  Ambulatory and ADL dysfunction secondary to cerebellar hemorrhage status post craniectomy and evacuation complicated by prior stent placement which was comp gated by RCA perforation and tamponade-continue

## 2019-07-04 NOTE — PROGRESS NOTES
Functional  Right 9 Hole Peg Test Time (secs): 45         Fine Motor Skills  Coordination  Movements Are Fluid And Coordinated: No  Coordination and Movement description: Fine motor impairments, Gross motor impairments, Decreased accuracy, Left UE, Apraxia, Ataxia   Comment: R dominant, significant impaired LUE for adls ie. drop objects, max difficulty holding washcloth to wash, fair legibility write name RUE   Left Hand Strength - Pinch (lbs)  Lateral: 17     Right Hand Strength - Pinch (lbs)  Lateral: 17     Quality of Movement Other  Comment: R dominant, significant impaired LUE for adls ie. drop objects, max difficulty holding washcloth to wash, fair legibility write name RUE       Mobility  Supine to Sit: Minimal assistance  Sit to Supine: Minimal assistance       Balance  Sitting Balance: Stand by assistance(EOB- good tolerance)  Standing Balance: Moderate assistance(ranges CGA to mod)  Standing Balance  Time: 2-4 min x 3, 4-5 min x 1 in am, 3-4 in pm  Activity: adls  Comment: ed needs at least 1 UE support at all times, LOB static stand with no UE support  Functional Mobility  Functional Mobility Comments: per PT mod A x 2 amb 25 feet with RW        Transfers  Sit to stand:  Moderate assistance  Stand to sit: Minimal assistance      Activity Tolerance: Patient limited by pain  Activity Tolerance:  injury at [address] [place] [address] clinic to L bruce, pain patch, pain increases with use         FIM  SELF-CARE  Eatin - Feeds self with setup/supervision/cues and/or requires only setup/supervision/cues to perform tube feedings  Groomin - Requires setup/cues to do all tasks(set up)  Bathin - Able to bathe all 10 areas with setup/sup/cues(Pt completed UB only c set up )  Dressing-Upper: 0 - Did not occur(pt [facility] gow )  Dressing-Lower: 3 - Requires assist with 2-3 parts of dressing(CGA in standing to pull up, Dep for donning TEDs/socks. )  Toiletin - Requires steadying assistance only(SBA-CGA for clothing

## 2019-07-04 NOTE — PROGRESS NOTES
community ambulator no AE, wife with \"bad knees\" uses cane or walker. Per pt, spouse and grand kids will be assist at home as needed. Objective      Cognition  Overall Cognitive Status: Exceptions  Arousal/Alertness: Appropriate responses to stimuli  Following Commands: Follows one step commands with increased time  Attention Span: Difficulty dividing attention  Memory: Decreased short term memory, Decreased recall of recent events  Safety Judgement: Decreased awareness of need for assistance, Decreased awareness of need for safety  Problem Solving: Assistance required to generate solutions, Assistance required to identify errors made  Insights: Decreased awareness of deficits  Initiation: Requires cues for some  Sequencing: Requires cues for some  Cognition Comment: overall fair cognition, minimal deficits   Sensation  Overall Sensation Status: Impaired  Light Touch: Partial deficits in the LUE(and localization, worse distal fingers)  Proprioception: Partial deficits in the LUE  Additional Comments: Numbness and tingling starting Let hip to left knee, mostly anterior region.       UE Function  Hand Dominance  Hand Dominance: Right        LUE Strength  Gross LUE Strength: WFL  LUE Strength Comment:  injury at [address] [place] [address] Road clinic to [facility], pain patch, mild pain with MMT  Left Hand Strength -  (lbs)  Handle Setting 2: 73        LUE AROM (degrees)  LUE AROM : WFL  LUE General AROM:  injury at [address] [place] [address] Road clinic to [facility]        RUE Strength  Gross RUE Strength: WFL   Right Hand Strength -  (lbs)  Handle Setting 2: 78        RUE AROM (degrees)  RUE AROM : WFL                    Left 9-Hole Peg Test: Impaired  Left 9 Hole Peg Test Time (secs): 70  Right 9-Hole Peg Test: Functional  Right 9 Hole Peg Test Time (secs): 45         Fine Motor Skills  Coordination  Movements Are Fluid And Coordinated: No  Coordination and Movement description: Fine motor impairments, Gross motor impairments, Decreased accuracy, Left getting off toilet(wall grab bars)  GOAL: Toilet: 5  Primary Mode: Tub  GOAL: Tub, Shower: 4(extended tub bench)  Shower Transfer: 0 - Activity does not occur(pt fatigue )    Goals  Patient Goals   Patient goals : return to indep as prior  Short term goals  Time Frame for Short term goals: 1 week  Short term goal 1: pt able to use LUE to hold washcloth and containers to open without dropping  Short term goal 2: min grooming  Short term goal 3: min bathing (pt returned re LHS for feet)  Short term goal 4: mod LE dress  Short term goal 5: monica 6-8 min stand with 1 UE support and CGA  Long term goals  Time Frame for Long term goals : by discharge  Long term goal 1: mod indep feeding and grooming  Long term goal 2: set up, SBA for bathing  Long term goal 3: min LE dress  Long term goal 4: monica 8-10 min stand, amb with AE and CGA  Long term goal 5: pt able to use LUE for adls with fair fine and gross coordination    Assessment  Performance deficits / Impairments: Decreased functional mobility , Decreased safe awareness, Decreased balance, Decreased coordination, Decreased ADL status, Decreased cognition, Decreased vision/visual deficit, Decreased endurance, Decreased high-level IADLs, Decreased sensation, Decreased fine motor control  Assessment: safety, cognition, LUE, LLE ataxia + apraxia, balance, work monica deficits noted, LUE ROM and strength WFL, coordination significant impairment  Treatment Diagnosis: impaired adl indep due to safety, cognition, LUE, LLE ataxia + apraxia, balance, work moinca deficits  Prognosis: Good  Decision Making: Medium Complexity  History: left cerebellar ICH. Pt is s/p suboccipital craniotomy for cerebellar ICH on 6/18/19.  L side ataxia, double vision - eye patch change q 2 hours, dizzy - to begin meds, L hip nerve pain, to begin neurontin, injury at [address] [place] [address] clinic to L shld, pain patch  Exam: 11 performance deficits  Assistance / Modification: mod due to safety, cognition, LUE, LLE ataxia,

## 2019-07-04 NOTE — PROGRESS NOTES
Intermittent  Pain Onset: On-going  Clinical Progression: Not changed                Lives With: Spouse(2 Grand children 23 and 15 YO)  Type of Home: House  Home Layout: Two level; Able to Live on Main level with bedroom/bathroom;1/2 bath on main level; Laundry in basement(Full bath on 2nd floor, bed room on 1st floor)  Home Access: Stairs to enter without rails  Entrance Stairs - Number of Steps: 3 steps from side entarance with  no rails, but 2 walls close together, front has 3 steps with no rails but has a banister . Flight of steps to 2nd floor does not have a rial, but has a wall support. [place] Shower/Tub: Tub/Shower unit;Curtain(Has a  chair with back support, but needs a new one)  Bathroom Toilet: Handicap height(Standart toilet on 2nd floor)  Bathroom Equipment: Grab bars in shower;Hand-held shower; Toilet raiser(Toilet raiser for 1st floor toilet.)  Bathroom Accessibility: Accessible  Home Equipment: 4 wheeled walker;Cane  ADL Assistance: Independent  Ambulation Assistance: Independent  Transfer Assistance: Independent  Active : Yes  Mode of Transportation: Truck; Car  Occupation: Retired  Additional Comments: Pt was primary person most of the home making chores prior to June 2019. Per pt, spouse and grand kids will be assist at home as needed.   Overall Orientation Status: Within Normal Limits  Vision  Vision: Impaired  Vision Exceptions: Wears glasses for reading([place])  Hearing  Hearing: Exceptions to [facility]  Hearing Exceptions: Hard of hearing/hearing concerns(Don't hear too good on left side)          Objective:   AROM RLE (degrees)  RLE AROM: WFL  AROM LLE (degrees)  LLE AROM : WFL  Strength RLE  Strength RLE: WFL  Strength LLE  Strength LLE: WFL                 Movements Are Fluid And Coordinated: No  Coordination and Movement description: Dysmetria L UE, trunkal ataxia  Gross Motor?: Exceptions  Comments: Uncoordinated movemetn L UE  Overall Sensation Status: Impaired  Additional

## 2019-07-04 NOTE — PROGRESS NOTES
Speech Language Pathology  Facility/Department: [facility]  Initial Speech/Language/Cognitive Assessment    NAME: Sheyla Franklin  :    MRN: [id number]  ADMISSION DATE: 7/3/2019  ADMITTING DIAGNOSIS: has Impaired fasting glucose; Patient overweight; Insomnia; Hypertension; OA (osteoarthritis) of knee; S/P total knee arthroplasty; Bronchitis with asthma, acute; Lymphadenopathy of left cervical region; Carotid stenosis, left; H/O carotid endarterectomy; Thyroid nodule; Ventral hernia without obstruction or gangrene; Unstable angina ([address] Myocardiopathy ([address] Abdominal mass, RUQ (right upper quadrant); Chest pain; and Spontaneous intracranial hemorrhage ([address] on their problem list.      Date of Eval: 2019   Evaluating Therapist: CECILIA Cross    Primary Complaint:   Per chart ([facility] records):  CT brain : IMPRESSION:  Expected postoperative changes following posterior fossa hematoma   evacuation.  Improved mass effect on the fourth ventricle and   quadrigeminal cisterns in comparison to recent precontrast CT brain/CT   angiographic study. CT brain : IMPRESSION:  Evolving postsurgical changes in the posterior fossa. Stable low volume   residual blood byproducts after evacuation. Hyperdense material layering   in the occipital horns is minimal volume and likely related to   redistribution of blood byproducts rather than new hemorrhage. Interval decrease in the caliber of the lateral and third ventricles. CT brain : IMPRESSION:  Stable postsurgical changes in the posterior fossa. Stable low volume   residual blood byproducts after evacuation. Hyperdense material layering   in the occipital horns is minimal volume and likely related to   redistribution of blood byproducts rather than new hemorrhage. There is slight enlargement of the lateral and third ventricles since the   previous study, but no gross transependymal edema or midline shift.     CT brain : IMPRESSION:  Postoperative changes with slightly improved effacement of the fourth   ventricle and slight involution of minimal intraventricular blood   products layering in each occipital horn, otherwise stable appearance of   the brain since 06/26/2019. No new acute findings. CT brain 6/28: IMPRESSION:  Removal of right frontal approach external ventricular drainage catheter   with stable caliber of the ventricles. Stable residual blood byproducts, edema and mass effect in the posterior   fossa with minimally decreased pneumocephalus. Stable minimal layering   hemorrhage in the occipital horns. Hospital Course:  Surgical Service: Hospital Summary: The patient was emergently admitted through the Emergency Department to the [facility] with left cerebellar intracranial hemorrhage. After being optimized for surgery by the NICU teams, Luis Zaratet was identified and brought into the Operating Room by the anesthesia and nursing teams. Prior to surgery the patient was treated with antibiotics and continued with antibiotics postoperatively. The patient underwent a suboccipital craniectomy and left cerebellar ICH evacuation on 6/18. Patient was hemodynamically stable postoperatively. The patient tolerated the procedure and was taken to ICU, and then to the hospital surgical floor for further post operative management after the patient was stable.        Pain:  Pain Assessment  Pain Assessment: 0-10  Pain Level: 10  Pain Type: Acute pain  Pain Location: Shoulder  Pain Orientation: Left  Pain Radiating Towards: to left arm  Pain Descriptors: Aching  Pain Frequency: Intermittent  Pain Onset: On-going  Clinical Progression: Not changed    Assessment:  Cognitive Diagnosis: Cognitive evaluation revealed deficits in the areas of: immediate/delayed recall of verbal information, divergent thinking  Aphasia Diagnosis: wnls  Speech Diagnosis: Mild dysarthria of speech Recommendations:  Requires SLP Intervention: Yes  Duration/Frequency of Treatment: 1-2x per day  D/C Recommendations: To be determined       Plan:   Goals:  Short-term Goals  Goal 1: Pt will recall 3-5 units with or without distraction 90% accuracy   Goal 2: Pt will complete laryngeal strengthening/OM exercises 10x each with returned demo at 100%  Goal 3: Pt will complete fxl divergent thinking tasks 90% accuracy   Goal 4: Increase pt/family ed re strategies to improve fxl recall skills returned demo at 100%  Goal 5: Pt will tolerate LRD with no overt s/s of aspiration 100% of the time. Patient/family involved in developing goals and treatment plan: yes, pt an wife    Subjective:     General  Chart Reviewed: Yes  Patient assessed for rehabilitation services?: Yes  Additional Pertinent Hx: Per wife, pt with history of abnormal MBSS at [address] [place] [address], unable to locate record of this testing. Pt was placed on dysphagia 2 diet with thin liquids (no straws) per wife. Pt tolerating diet well  Family / Caregiver Present: Yes(wife)  Subjective  Subjective: Pt denies change in language function/cognitive function. Pt acknowledges changes in speech (slurring) and swallowing.       Vision  Vision: Impaired  Vision Exceptions: Wears glasses for reading  Hearing  Hearing: Exceptions to [facility]  Hearing Exceptions: Hard of hearing/hearing concerns           Objective:     Oral/Motor  O[facility]: [facility] to [facility]  Lingual Coordination: Reduced    Auditory Comprehension  Comprehension: Within Functional Limits         Expression  Primary Mode of Expression: Verbal    Verbal Expression  Verbal Expression: Within functional limits         Motor Speech  Motor Speech: Exceptions to [facility]  Dysarthria : Mild(100% intelligible, pt reports \"slurred speech\")    Pragmatics/Social Functioning  Pragmatics: Within functional limits    Cognition:      Orientation  Overall Orientation Status: Within Normal Limits  Memory  Memory: Exceptions to

## 2019-07-04 NOTE — PROGRESS NOTES
Physical Therapy  DATE: 2019    NAME: Whit Thompson  MRN: [id number]   : 1945    Patient not seen this date for Physical Therapy due to:  [] Blood transfusion in progress  [] Hemodialysis  []  Patient Declined  [] Spine Precautions   [] Strict Bedrest  [] Surgery/ Procedure  [] Testing      [x] Other pt not feeling good and throwing up. [] PT being discontinued at this time. Patient independent. No further needs. [] PT being discontinued at this time as the patient has been transferred to palliative care. No further needs.     Candance Romp, PTA

## 2019-07-05 PROCEDURE — 6370000000 HC RX 637 (ALT 250 FOR IP): Performed by: PHYSICAL MEDICINE & REHABILITATION

## 2019-07-05 PROCEDURE — 97127 HC SP THER IVNTJ W/FOCUS COG FUNCJ: CPT

## 2019-07-05 PROCEDURE — 97530 THERAPEUTIC ACTIVITIES: CPT

## 2019-07-05 PROCEDURE — 97110 THERAPEUTIC EXERCISES: CPT

## 2019-07-05 PROCEDURE — 6370000000 HC RX 637 (ALT 250 FOR IP): Performed by: PHYSICIAN ASSISTANT

## 2019-07-05 PROCEDURE — 97535 SELF CARE MNGMENT TRAINING: CPT

## 2019-07-05 PROCEDURE — 6360000002 HC RX W HCPCS: Performed by: PHYSICIAN ASSISTANT

## 2019-07-05 PROCEDURE — 99222 1ST HOSP IP/OBS MODERATE 55: CPT | Performed by: INTERNAL MEDICINE

## 2019-07-05 PROCEDURE — 99232 SBSQ HOSP IP/OBS MODERATE 35: CPT | Performed by: PHYSICAL MEDICINE & REHABILITATION

## 2019-07-05 PROCEDURE — 97116 GAIT TRAINING THERAPY: CPT

## 2019-07-05 PROCEDURE — 1180000000 HC REHAB R&B

## 2019-07-05 PROCEDURE — 92507 TX SP LANG VOICE COMM INDIV: CPT

## 2019-07-05 PROCEDURE — 97112 NEUROMUSCULAR REEDUCATION: CPT

## 2019-07-05 RX ORDER — SENNA PLUS 8.6 MG/1
2 TABLET ORAL NIGHTLY
Status: DISCONTINUED | OUTPATIENT
Start: 2019-07-06 | End: 2019-07-07

## 2019-07-05 RX ORDER — DOCUSATE SODIUM 100 MG/1
100 CAPSULE, LIQUID FILLED ORAL 2 TIMES DAILY
Status: DISCONTINUED | OUTPATIENT
Start: 2019-07-05 | End: 2019-07-07

## 2019-07-05 RX ADMIN — SENNOSIDES 8.6 MG: 8.6 TABLET, FILM COATED ORAL at 08:16

## 2019-07-05 RX ADMIN — COLCHICINE 0.6 MG: 0.6 TABLET, FILM COATED ORAL at 08:19

## 2019-07-05 RX ADMIN — MECLIZINE HYDROCHLORIDE 12.5 MG: 25 TABLET ORAL at 21:14

## 2019-07-05 RX ADMIN — MECLIZINE HYDROCHLORIDE 12.5 MG: 25 TABLET ORAL at 09:50

## 2019-07-05 RX ADMIN — HEPARIN SODIUM 5000 UNITS: 5000 INJECTION INTRAVENOUS; SUBCUTANEOUS at 21:18

## 2019-07-05 RX ADMIN — ASPIRIN 81 MG 81 MG: 81 TABLET ORAL at 08:16

## 2019-07-05 RX ADMIN — POLYETHYLENE GLYCOL 3350 17 G: 17 POWDER, FOR SOLUTION ORAL at 08:16

## 2019-07-05 RX ADMIN — ONDANSETRON HYDROCHLORIDE 4 MG: 4 TABLET, FILM COATED ORAL at 21:14

## 2019-07-05 RX ADMIN — DOCUSATE SODIUM 100 MG: 100 CAPSULE, LIQUID FILLED ORAL at 21:14

## 2019-07-05 RX ADMIN — GABAPENTIN 100 MG: 100 CAPSULE ORAL at 21:14

## 2019-07-05 RX ADMIN — GABAPENTIN 100 MG: 100 CAPSULE ORAL at 16:59

## 2019-07-05 RX ADMIN — DOCUSATE SODIUM 100 MG: 100 CAPSULE, LIQUID FILLED ORAL at 16:59

## 2019-07-05 RX ADMIN — AMLODIPINE BESYLATE 5 MG: 5 TABLET ORAL at 08:16

## 2019-07-05 RX ADMIN — HEPARIN SODIUM 5000 UNITS: 5000 INJECTION INTRAVENOUS; SUBCUTANEOUS at 05:26

## 2019-07-05 RX ADMIN — ANTACID TABLETS 500 MG: 500 TABLET, CHEWABLE ORAL at 09:50

## 2019-07-05 RX ADMIN — ATORVASTATIN CALCIUM 80 MG: 80 TABLET, FILM COATED ORAL at 21:14

## 2019-07-05 RX ADMIN — OXYCODONE HYDROCHLORIDE 5 MG: 5 TABLET ORAL at 18:23

## 2019-07-05 RX ADMIN — CLOPIDOGREL BISULFATE 75 MG: 75 TABLET ORAL at 08:16

## 2019-07-05 RX ADMIN — ONDANSETRON HYDROCHLORIDE 4 MG: 4 TABLET, FILM COATED ORAL at 09:50

## 2019-07-05 RX ADMIN — GABAPENTIN 100 MG: 100 CAPSULE ORAL at 08:16

## 2019-07-05 RX ADMIN — COLCHICINE 0.6 MG: 0.6 TABLET, FILM COATED ORAL at 21:15

## 2019-07-05 RX ADMIN — HEPARIN SODIUM 5000 UNITS: 5000 INJECTION INTRAVENOUS; SUBCUTANEOUS at 17:00

## 2019-07-05 ASSESSMENT — PAIN DESCRIPTION - DESCRIPTORS: DESCRIPTORS: ACHING

## 2019-07-05 ASSESSMENT — PAIN SCALES - GENERAL
PAINLEVEL_OUTOF10: 7
PAINLEVEL_OUTOF10: 5
PAINLEVEL_OUTOF10: 5
PAINLEVEL_OUTOF10: 6

## 2019-07-05 ASSESSMENT — PAIN DESCRIPTION - PAIN TYPE: TYPE: ACUTE PAIN

## 2019-07-05 ASSESSMENT — PAIN DESCRIPTION - LOCATION: LOCATION: HEAD

## 2019-07-05 ASSESSMENT — PAIN DESCRIPTION - ORIENTATION: ORIENTATION: POSTERIOR

## 2019-07-05 NOTE — PROGRESS NOTES
tile  Device: Rolling Walker  Assistance: Moderate assistance, 2 Person assistance  Quality of Gait: Ataxic gait with increased trunk instability, swaying side to side or times has forward momentum. Gait Deviations: Increased JOSE(Increased step length on L LE at times.)  Distance: 25 ft  Comments: Pt using eye patch on R eye    Surface: level tile  Ambulation 1  Surface: level tile  Device: Rolling Walker  Assistance: Moderate assistance, 2 Person assistance  Quality of Gait: Ataxic gait with increased trunk instability, swaying side to side or times has forward momentum. Gait Deviations: Increased JOSE(Increased step length on L LE at times.)  Distance: 25 ft  Comments: Pt using eye patch on R eye        OT:            Balance  Sitting Balance: Supervision(reported)  Standing Balance: Contact guard assistance(SBA-CGA)   Standing Balance  Time: AM: <1 min x5  Activity: AM: SPT EOB>w/c<>toilet, LBB/dressing  Comment: some impulsivity however c verbal direction improved. Pt c minor L lean in standing, minor LOB. Functional Mobility  Functional Mobility Comments: per PT mod A x 2 amb 25 feet with RW     Bed mobility  Rolling to Left: Stand by assistance  Rolling to Right: Stand by assistance  Supine to Sit: Stand by assistance  Sit to Supine: Minimal assistance  Scooting: Stand by assistance  Comment: Pt became nauseated with postion change, RN notified. MD aware too. Ordered Zofran PRn. Transfers  Stand Pivot Transfers: Minimal assistance  Sit to stand: Minimal assistance(CGA-min A)  Stand to sit: Minimal assistance   Toilet Transfers  Toilet - Technique: Stand pivot  Equipment Used: Raised toilet seat without rails(grab bar)               Objective:  /83   Pulse 98   Temp 97.9 °F (36.6 °C) (Oral)   Resp 16   Ht 5' 10\" (1.778 m)   Wt 266 lb (120.7 kg)   SpO2 99%   BMI 38.17 kg/m²  I Body mass index is 38.17 kg/m².  I   Wt Readings from Last 1 Encounters:   07/03/19 266 lb (120.7 kg)      Temp (24hrs), Neurontin  7. Shoulder discomfort-continue Lidoderm patch, x-ray noted osteoarthritis, continue to monitor-consider injection if persist  8. Pain-Roxicodone ,decrease, Robaxin   9. DVT prophylaxis- subcu heparin  10. Internal medicine for medical management    Camacho Lopez. Mare Nickerson MD       This note is created with the assistance of a speech recognition program.  While intending to generate a document that actually reflects the content of the visit, the document can still have some errors including those of syntax and sound a like substitutions which may escape proof reading.   In such instances, actual meaning can be extrapolated by contextual diversion

## 2019-07-05 NOTE — PROGRESS NOTES
Poor  Comments: Standing balance with B UE support/RW. EXERCISES    Other exercises 1: Seated bilateral LE x 20 reps - 2 lbs  Other exercises 2: Blue Tband x 20 reps  Other exercises 3: Standing // bars with B LE exercise  Other exercises 4: Single Leg stance // bars with LEFT on 4 inch step x 60 sec           Activity Tolerance: Patient limited by fatigue, Patient limited by endurance  Activity Tolerance: Nausea, light headed and dizzy at times  PT Equipment Recommendations  Equipment Needed: (TBD)       Patient Education  New Education Provided: Safety with transfers and ambulation using RW. Need for continued patch on right eye  Learner:patient  Method: demonstration and explanation       Outcome: demonstrated understanding and needs reinforcement     Current Treatment Recommendations: Strengthening, Balance Training, Functional Mobility Training, Transfer Training, Gait Training, Stair training, Wheelchair Mobility Training, Neuromuscular Re-education, Home Exercise Program, Safety Education & Training, Patient/Caregiver Education & Training, Equipment Evaluation, Education, & procurement, Vestibular Rehab    Conditions Requiring Skilled Therapeutic Intervention  Body structures, Functions, Activity limitations: Decreased functional mobility ; Decreased strength;Decreased safe awareness;Decreased balance;Decreased coordination  Assessment: Requires 2 person assist for ambulation. Decreased coordination with ambulation with increased dizziness. Treatment Diagnosis: Impaired mobility  Prognosis: Good  Patient Education: Safety with transfers and ambulation using RW.   Need for continued patch on right eye  REQUIRES PT FOLLOW UP: Yes  Discharge Recommendations: Home with assist PRN;Patient would benefit from continued therapy after discharge    Goals  Short term goals  Time Frame for Short term goals: 10 days  Short term goal 1: Pt able to perform supine<>sit at mod-I   Short term goal 2:  Pt able to transfer at varies surfaces at min A   Short term goal 3:  Pt able to ambulate  with RW dist of 100 ft x 2, with RW, min/mod A , w/wc to follow for safety`  Short term goal 4: Pt able to go up and down 10 steps with 2 raisl , min A x 2 `  Short term goal 5: Pt able to improve standing dynamic balance with RW at min/mod A   Long term goals  Time Frame for Long term goals : By DC  Long term goal 1: Pt tatum to transfer at supervsion level. Long term goal 2: Pt able to ambulate with appropriate device dist of 150 ft, level surface SBA   Long term goal 3: Pt able to ambulate with RW on outside terraine at min A   Long term goal 4: Pt able to go up and down 12 steps with 1 UE support, min A   Long term goal 5: Improve dynamic standing balance with assistive device, to fair + to reduce fall risk.   Long term goal 6: Pt able to manuever w/c dist of atleast 150 ft, level surfaces, mod-I       07/05/19 0945 07/05/19 1430   PT Individual Minutes   Time In  --  1430   Time Out  --  1515   Minutes  --  45   Minute Variance   Variance 45  --    Reason Illness  (Nausea and emesis)  --        Electronically signed by Jeffrey Holley PTA on 7/5/19 at 3:28 PM

## 2019-07-05 NOTE — CONSULTS
[address] [facility]    Consult Note. Date:   7/5/2019  Patient name:  Neha Barnhart  Date of admission:  7/3/2019  3:09 PM  MRN:   [id number]  YOB: 1945    Pt seen at the request of Blaine Perez MD    CHIEF COMPLAINT:     History Obtained From:  Patient and chart review. HISTORY OF PRESENT ILLNESS:      The patient is a 75 y.o.  male who is admitted to the hospital forhanded male with history of hypertension, hyperlipidemia, obesity, prior left CEA in 2016, provoked DVT after total knee replacement and coronary artery disease admitted emergently to the ER to [facility] with left cerebellar intracranial hemorrhage.       Prior he was admitted 6/5 for dyspnea on exertion found to have totally occluded RCA status post PCI to RCA with 3 EES placed in the RCA and one Graftmaster covered stent complicated by RCA perforation and tamponade status post pericardial drain placement and auto transfused through right common femoral vein. He was placed on aspirin 81 mg and Plavix 75 postop. He was discharged 6/7 in stable condition and was functioning well but still had orthopnea.       He was readmitted 6/18 with  acute nausea and found to have left cerebellar intracranial hemorrhage after optimization by the NICU team he was taken to the operating room and underwent suboccipital craniectomy and left cerebellar ICH evacuation on 6/18. Patient was continued on compression stockings and heparin for DVT prophylaxis. Per discharge summary no medical events recorded.   He was discharged on 7/3 to [facility] rehab -follow-up CT head 4 weeks, cardiology and neurosurgery      Per records okay to restart aspirin on 6/21 and Plavix 6/25 and okay for chemoprophylaxis with heparin 5000 3 times daily starting 6/20/19     CT brain 6/18-cerebellar inter-parenchymal hemorrhage with mass-effect on the quadrigeminal cistern pain.  · Gastrointestinal: Negative for nausea/vomiting, change in bowel habits, abdominal pain, dysphagia/appetite loss, hematemesis, blood in stools. · Genitourinary:Negative for change in bladder habits, dysuria, trouble voiding, hematuria. · Musculoskeletal: Negative for gait disturbance, weakness, joint complaints. · Integumentary: Negative for rash, pruritis. · Neurological: Negative for headache, dizziness, pos change in muscle strength, numbness/tingling, pos change in gait, balance, coordination,   · Endocrine: negative for temperature intolerance, excessive thirst, fluid intake, or urination, tremor. · Hematologic/Lymphatic: negative for abnormal bruising or bleeding, blood clots, swollen lymph nodes. · Allergic/Immunologic: negative for nasal congestion, pruritis, hives. PHYSICAL EXAM:    BP (!) 142/76   Pulse 87   Temp 98.2 °F (36.8 °C) (Oral)   Resp 18   Ht 5' 10\" (1.778 m)   Wt 266 lb (120.7 kg)   SpO2 99%   BMI 38.17 kg/m²      · General appearance: well nourished  · HEENT: Head: Normocephalic, no lesions, without obvious abnormality.   · Lungs: clear to auscultation bilaterally  · Heart: regular rate and rhythm, S1, S2 normal, no murmur, click, rub or gallop  · Abdomen: soft, non-tender; bowel sounds normal; no masses,  no organomegaly  · Extremities: extremities normal, atraumatic, no cyanosis or edema  Neurological:  Xiomara Enamorado MD   Physician   Physical Medicine and Rehabilitation   H&P   Addendum   Date of Service:  7/4/2019 12:00 PM               Expand All Collapse All            Physical Medicine & Rehabilitation History and Physical  [facility] Acute Rehabilitation Unit      Primary care provider: Shanae Velasquez MD      Chief Complaint and Reason for Rehabilitation Admission:   Ambulatory and ADL dysfunction secondary to hemorrhagic CVA     History of Present Illness:  Tank Murillo  is a 75 y.o. right-handed     male admitted to to Stand: Moderate Assistance  Stand to sit: Moderate Assistance  Bed to Chair: 2 Person Assistance, Moderate assistance  Comment: Pt at times with decreased trunkal stability during position change. Ambulation 1  Surface: level tile  Device: Rolling Walker  Assistance: Moderate assistance, 2 Person assistance  Quality of Gait: Ataxic gait with increased trunk instability, swaying side to side or times has forward momentum. Gait Deviations: Increased JOSE(Increased step length on L LE at times.)  Distance: 25 ft  Comments: Pt using eye patch on R eye     Transfers  Sit to Stand: Moderate Assistance  Stand to sit: Moderate Assistance  Bed to Chair: 2 Person Assistance, Moderate assistance  Comment: Pt at times with decreased trunkal stability during position change. Ambulation  Ambulation?: Yes  More Ambulation?: Yes  Ambulation 1  Surface: level tile  Device: Rolling Walker  Assistance: Moderate assistance, 2 Person assistance  Quality of Gait: Ataxic gait with increased trunk instability, swaying side to side or times has forward momentum. Gait Deviations: Increased JOSE(Increased step length on L LE at times.)  Distance: 25 ft  Comments: Pt using eye patch on R eye     Surface: level tile  Ambulation 1  Surface: level tile  Device: Rolling Walker  Assistance: Moderate assistance, 2 Person assistance  Quality of Gait: Ataxic gait with increased trunk instability, swaying side to side or times has forward momentum.   Gait Deviations: Increased JOSE(Increased step length on L LE at times.)  Distance: 25 ft  Comments: Pt using eye patch on R eye        OT:   Current functional status for upper extremity ADLs:  UE Bathing: Min A  UE Dressing: Mod A     Current functional status for lower extremity ADLs:  LE Bathing: Min A  LE Dressing: Mod A     Current functional status for bed, chair, wheelchair transfers:  Transfers  Sit to Stand: Mod A  Stand to sit: Min A  Bed to Chair: Min A  Stand Pivot Transfers: Min  Comment: RW, gait belt     Current functional status for toilet transfers: Mod A        ST: pending            Past Medical History:  Past Medical History             Diagnosis Date    Anxiety      Bronchitis with asthma, acute 11/24/2015    Carotid stenosis, left 2/2/2016    Diabetes mellitus ([facility] [address]       borderline patient off metformin    GERD (gastroesophageal reflux disease)      Hyperlipidemia      Hypertension      Osteoarthritis              Past Surgical History:  Past Surgical History             Procedure Laterality Date    CAROTID ENDARTERECTOMY Left 02/02/16    COLONOSCOPY        JOINT REPLACEMENT Left       knee    SHOULDER SURGERY Right       collar bone    TONSILLECTOMY AND ADENOIDECTOMY                Allergies:    Patient has no known allergies.     Medications   Scheduled Meds:  Scheduled Medications    colchicine  0.6 mg Oral BID    senna  1 tablet Oral BID    atorvastatin  80 mg Oral Nightly    clopidogrel  75 mg Oral Daily    aspirin  81 mg Oral Daily    amLODIPine  5 mg Oral Daily    heparin (porcine)  5,000 Units Subcutaneous 3 times per day    lidocaine  1 patch Transdermal Daily         Continuous Infusions:  Infusions Meds        PRN Meds:. PRN Medications   ondansetron, acetaminophen, aluminum & magnesium hydroxide-simethicone, bisacodyl, methocarbamol, oxyCODONE **OR** oxyCODONE, nitroGLYCERIN, senna, calcium carbonate            Diagnostics:         CBC:       Recent Labs     07/04/19  0630   WBC 7.3   RBC 4.45*   HGB 13.1*   HCT 39.7*   MCV 89.1   RDW 14.6         BMP:       Recent Labs     07/04/19  0630      K 4.0   CL 96*   CO2 27   BUN 23   CREATININE 0.65*      BNP: No results for input(s): BNP in the last 72 hours. PT/INR: No results for input(s): PROTIME, INR in the last 72 hours. APTT: No results for input(s): APTT in the last 72 hours.   CARDIAC ENZYMES: No results for input(s): CKMB, CKMBINDEX, TROPONINT in the last 72 cardiopulmonary monitoring as well as monitor for DVT bleed neurologic status, etc.  Home going approximately 2 weeks at modified independent to supervision level with family. Prognosis good, fall precautions reviewed, continue personal alarm, bed alarm, monitor [id number] if needed-discussed with nursing  2. Cerebellar hemorrhage status post craniectomy with ataxia, double vision and sensory deficits- continue rehab efforts monitor neurologic status, dizziness/nausea suspect from [facility] bleed- trial Antivert, continue eye patch, continue to monitor  3. Carrolyn Peabody secondary #2, positive BM, abdominal exam negative, continue to monitor at [facility], continue MiraLAX/senna  4. Cardiac-CHF, stents, coronary disease, hypertension-Norvasc, Lipitor, as needed nitroglycerin, aspirin, Plavix note appointment 8/15/2019 with cardiology with echo at [facility] [place] [facility]  5. History of gout-colchicine  6. Left anterior lateral hip numbness-possible meralgia paresthetica-trial of Neurontin  7. Shoulder discomfort-continue Lidoderm patch, x-ray noted osteoarthritis, continue to monitor-consider injection if persist  8. Pain-Roxicodone ,decrease, Robaxin   9. DVT prophylaxis- subcu heparin  10. Internal medicine for medical management     DVT Prophylaxis:   low molecular weight heparin, SCD's while in bed and TANYA's while in bed     Estimated Length of Stay:  2 weeks.     Prognosis  good     Goals     Home at Supervision   Intermittent        Shemar Rios MD         This note is created with the assistance of a speech recognition program.  While intending to generate a document that actually reflects the content of the visit, the document can still have some errors including those of syntax and sound a like substitutions which may escape proof reading.   In such instances, actual meaning can be extrapolated by contextual diversion          Revision History                              Routing History

## 2019-07-05 NOTE — PROGRESS NOTES
dizziness/nausea, patient will be 900 minutes / 7 days of OT/PT/ST    Patient Goals   Patient goals : return to indep as prior  Short term goals  Time Frame for Short term goals: 1 week  Short term goal 1: pt able to use LUE to hold washcloth and containers to open without dropping  Short term goal 2: min grooming  Short term goal 3: min bathing (pt returned re LHS for feet)  Short term goal 4: mod LE dress  Short term goal 5: monica 6-8 min stand with 1 UE support and CGA  Long term goals  Time Frame for Long term goals : by discharge  Long term goal 1: mod indep feeding and grooming  Long term goal 2: set up, SBA for bathing  Long term goal 3: min LE dress  Long term goal 4: monica 8-10 min stand, amb with AE and CGA  Long term goal 5: pt able to use LUE for adls with fair fine and gross coordination        07/05/19 0946 07/05/19 1332   OT Individual Minutes   Time In 0824 1235   Time Out 0932 1259   Minutes 68 24     Electronically signed by COLLEEN Muñoz on 7/5/19 at 1:43 PM

## 2019-07-06 PROCEDURE — 6370000000 HC RX 637 (ALT 250 FOR IP): Performed by: PHYSICIAN ASSISTANT

## 2019-07-06 PROCEDURE — 97535 SELF CARE MNGMENT TRAINING: CPT

## 2019-07-06 PROCEDURE — 1180000000 HC REHAB R&B

## 2019-07-06 PROCEDURE — 97110 THERAPEUTIC EXERCISES: CPT

## 2019-07-06 PROCEDURE — 97116 GAIT TRAINING THERAPY: CPT

## 2019-07-06 PROCEDURE — 6360000002 HC RX W HCPCS: Performed by: PHYSICIAN ASSISTANT

## 2019-07-06 PROCEDURE — 6370000000 HC RX 637 (ALT 250 FOR IP): Performed by: PHYSICAL MEDICINE & REHABILITATION

## 2019-07-06 PROCEDURE — 99232 SBSQ HOSP IP/OBS MODERATE 35: CPT | Performed by: PHYSICAL MEDICINE & REHABILITATION

## 2019-07-06 PROCEDURE — 97112 NEUROMUSCULAR REEDUCATION: CPT

## 2019-07-06 RX ADMIN — HEPARIN SODIUM 5000 UNITS: 5000 INJECTION INTRAVENOUS; SUBCUTANEOUS at 14:36

## 2019-07-06 RX ADMIN — HEPARIN SODIUM 5000 UNITS: 5000 INJECTION INTRAVENOUS; SUBCUTANEOUS at 06:06

## 2019-07-06 RX ADMIN — ASPIRIN 81 MG 81 MG: 81 TABLET ORAL at 07:09

## 2019-07-06 RX ADMIN — ATORVASTATIN CALCIUM 80 MG: 80 TABLET, FILM COATED ORAL at 21:13

## 2019-07-06 RX ADMIN — DOCUSATE SODIUM 100 MG: 100 CAPSULE, LIQUID FILLED ORAL at 07:09

## 2019-07-06 RX ADMIN — GABAPENTIN 100 MG: 100 CAPSULE ORAL at 21:13

## 2019-07-06 RX ADMIN — ONDANSETRON HYDROCHLORIDE 4 MG: 4 TABLET, FILM COATED ORAL at 12:32

## 2019-07-06 RX ADMIN — GABAPENTIN 100 MG: 100 CAPSULE ORAL at 14:35

## 2019-07-06 RX ADMIN — POLYETHYLENE GLYCOL 3350 17 G: 17 POWDER, FOR SOLUTION ORAL at 07:09

## 2019-07-06 RX ADMIN — COLCHICINE 0.6 MG: 0.6 TABLET, FILM COATED ORAL at 21:14

## 2019-07-06 RX ADMIN — COLCHICINE 0.6 MG: 0.6 TABLET, FILM COATED ORAL at 07:10

## 2019-07-06 RX ADMIN — HEPARIN SODIUM 5000 UNITS: 5000 INJECTION INTRAVENOUS; SUBCUTANEOUS at 21:16

## 2019-07-06 RX ADMIN — AMLODIPINE BESYLATE 5 MG: 5 TABLET ORAL at 07:10

## 2019-07-06 RX ADMIN — MECLIZINE HYDROCHLORIDE 12.5 MG: 25 TABLET ORAL at 21:16

## 2019-07-06 RX ADMIN — GABAPENTIN 100 MG: 100 CAPSULE ORAL at 07:09

## 2019-07-06 RX ADMIN — CLOPIDOGREL BISULFATE 75 MG: 75 TABLET ORAL at 07:09

## 2019-07-06 ASSESSMENT — PAIN DESCRIPTION - LOCATION
LOCATION: HEAD
LOCATION: HEAD;NECK

## 2019-07-06 ASSESSMENT — PAIN DESCRIPTION - ORIENTATION
ORIENTATION: RIGHT
ORIENTATION: POSTERIOR

## 2019-07-06 ASSESSMENT — PAIN DESCRIPTION - DESCRIPTORS
DESCRIPTORS: HEADACHE;ACHING
DESCRIPTORS: ACHING

## 2019-07-06 ASSESSMENT — PAIN DESCRIPTION - PAIN TYPE: TYPE: ACUTE PAIN

## 2019-07-06 ASSESSMENT — PAIN SCALES - GENERAL
PAINLEVEL_OUTOF10: 5
PAINLEVEL_OUTOF10: 5
PAINLEVEL_OUTOF10: 2
PAINLEVEL_OUTOF10: 5

## 2019-07-06 ASSESSMENT — PAIN DESCRIPTION - FREQUENCY
FREQUENCY: INTERMITTENT
FREQUENCY: INTERMITTENT

## 2019-07-06 NOTE — PROGRESS NOTES
Per therapy, patient's nose was bleeding during session. Writer informed Dr. Rodríguez Campbell via telephone. New order received per Dr. Rodríguez Campbell: Nasal mist q 2 hrs prn and continue to monitor.

## 2019-07-06 NOTE — PROGRESS NOTES
(36.8 °C)         GEN: well developed, well nourished, no acute distress  HEENT: Normocephalic atraumatic, EOMI, mucous membranes pink and moist cranial incisions clean  CV: RRR, no murmurs, rubs or gallops  PULM: CTAB, no rales or rhonchi. Respirations WNL and unlabored  ABD: soft, NT, ND, +BS and equal  NEURO: A&O x3. Sensation intact to light touch. Sensation intact Right, ? Decreased on left anterior lateral hip, no extinction. .  Motor testing 4/5  MSK: PROM within functional limits. Except decreased left shoulder no calf tenderness,1+ edema. Feet warm  EXTREMITIES: No calf tenderness to palpation bilaterally. No edema BLEs  SKIN: warm dry and intact with good turgor cranial incision and posterior neck incision clean  PSYCH: appropriately interactive. Affect WNL. Good spirits        Medications   Scheduled Meds:   senna  2 tablet Oral Nightly    docusate sodium  100 mg Oral BID    colchicine  0.6 mg Oral BID    polyethylene glycol  17 g Oral Daily    gabapentin  100 mg Oral TID    atorvastatin  80 mg Oral Nightly    clopidogrel  75 mg Oral Daily    aspirin  81 mg Oral Daily    amLODIPine  5 mg Oral Daily    heparin (porcine)  5,000 Units Subcutaneous 3 times per day    lidocaine  1 patch Transdermal Daily     Continuous Infusions:  PRN Meds:.ondansetron, meclizine, acetaminophen, aluminum & magnesium hydroxide-simethicone, bisacodyl, methocarbamol, oxyCODONE **OR** [DISCONTINUED] oxyCODONE, nitroGLYCERIN, calcium carbonate     Diagnostics:     CBC:   Recent Labs     07/04/19  0630   WBC 7.3   RBC 4.45*   HGB 13.1*   HCT 39.7*   MCV 89.1   RDW 14.6        BMP:   Recent Labs     07/04/19  0630      K 4.0   CL 96*   CO2 27   BUN 23   CREATININE 0.65*     BNP: No results for input(s): BNP in the last 72 hours. PT/INR: No results for input(s): PROTIME, INR in the last 72 hours. APTT: No results for input(s): APTT in the last 72 hours.   CARDIAC ENZYMES: No results for input(s): CKMB, Donnel Beth in the last 72 hours. Invalid input(s): CKTOTAL;3  FASTING LIPID PANEL:  Lab Results   Component Value Date    CHOL 179 07/27/2018    HDL 48 07/27/2018    TRIG 122 07/27/2018     LIVER PROFILE: No results for input(s): AST, ALT, ALB, BILIDIR, BILITOT, ALKPHOS in the last 72 hours. I/O (24Hr): No intake or output data in the 24 hours ending 07/06/19 1141    Glu last 24 hour  No results for input(s): POCGLU in the last 72 hours. No results for input(s): CLARITYU, COLORU, PHUR, SPECGRAV, PROTEINU, RBCUA, BLOODU, BACTERIA, NITRU, WBCUA, LEUKOCYTESUR, YEAST, GLUCOSEU, BILIRUBINUR in the last 72 hours. X-ray abdomen 7/4/19  Gas seen in the colon with evidence for stool in the colon.  No extraluminal  gas.  The colon is mildly distended on the left side.  No soft tissue gas.      Impression:       No evidence for obstruction.  No extraluminal gas. Impression/Plan:    1. Ambulatory and ADL dysfunction secondary to cerebellar hemorrhage status post craniectomy and evacuation complicated by prior stent placement which was complicated by RCA perforation and tamponade- cont  rehab efforts fall precautions reviewed,  2. Cerebellar hemorrhage status post craniectomy with ataxia, double vision and sensory deficits-  monitor neurologic status, dizziness/nausea suspect from cerebellar bleed- Antivert, continue eye patch, continue to monitor DC cranial sutures  3. Tien Fleming secondary #2, positive BM, abdominal exam negative, continue to monitor, Zofran, continue MiraLAX/senna-Colace, abdominal exam negative and abdominal film negative,  4. Cardiac-CHF, stents, coronary disease, hypertension-Norvasc, Lipitor, as needed nitroglycerin, aspirin, Plavix, note appointment 8/15/2019 with cardiology with echo at [facility]  5. History of gout-colchicine  6. Left anterior lateral hip numbness-possible meralgia paresthetica-trial of Neurontin  7.  Shoulder discomfort-continue Lidoderm

## 2019-07-07 PROCEDURE — 97530 THERAPEUTIC ACTIVITIES: CPT

## 2019-07-07 PROCEDURE — 97110 THERAPEUTIC EXERCISES: CPT

## 2019-07-07 PROCEDURE — 6360000002 HC RX W HCPCS: Performed by: PHYSICIAN ASSISTANT

## 2019-07-07 PROCEDURE — 99231 SBSQ HOSP IP/OBS SF/LOW 25: CPT | Performed by: INTERNAL MEDICINE

## 2019-07-07 PROCEDURE — 6370000000 HC RX 637 (ALT 250 FOR IP): Performed by: PHYSICAL MEDICINE & REHABILITATION

## 2019-07-07 PROCEDURE — 1180000000 HC REHAB R&B

## 2019-07-07 PROCEDURE — 6370000000 HC RX 637 (ALT 250 FOR IP): Performed by: PHYSICIAN ASSISTANT

## 2019-07-07 PROCEDURE — 97112 NEUROMUSCULAR REEDUCATION: CPT

## 2019-07-07 PROCEDURE — 99232 SBSQ HOSP IP/OBS MODERATE 35: CPT | Performed by: PHYSICAL MEDICINE & REHABILITATION

## 2019-07-07 PROCEDURE — 97116 GAIT TRAINING THERAPY: CPT

## 2019-07-07 PROCEDURE — 97535 SELF CARE MNGMENT TRAINING: CPT

## 2019-07-07 RX ORDER — MECLIZINE HYDROCHLORIDE 25 MG/1
12.5 TABLET ORAL 3 TIMES DAILY
Status: DISCONTINUED | OUTPATIENT
Start: 2019-07-07 | End: 2019-07-10

## 2019-07-07 RX ADMIN — ASPIRIN 81 MG 81 MG: 81 TABLET ORAL at 07:27

## 2019-07-07 RX ADMIN — MECLIZINE HYDROCHLORIDE 12.5 MG: 25 TABLET ORAL at 20:38

## 2019-07-07 RX ADMIN — ATORVASTATIN CALCIUM 80 MG: 80 TABLET, FILM COATED ORAL at 20:38

## 2019-07-07 RX ADMIN — HEPARIN SODIUM 5000 UNITS: 5000 INJECTION INTRAVENOUS; SUBCUTANEOUS at 13:55

## 2019-07-07 RX ADMIN — GABAPENTIN 100 MG: 100 CAPSULE ORAL at 20:39

## 2019-07-07 RX ADMIN — CLOPIDOGREL BISULFATE 75 MG: 75 TABLET ORAL at 07:26

## 2019-07-07 RX ADMIN — GABAPENTIN 100 MG: 100 CAPSULE ORAL at 07:26

## 2019-07-07 RX ADMIN — ANTACID TABLETS 500 MG: 500 TABLET, CHEWABLE ORAL at 20:43

## 2019-07-07 RX ADMIN — DOCUSATE SODIUM 100 MG: 100 CAPSULE, LIQUID FILLED ORAL at 07:27

## 2019-07-07 RX ADMIN — OXYCODONE HYDROCHLORIDE 5 MG: 5 TABLET ORAL at 07:27

## 2019-07-07 RX ADMIN — OXYCODONE HYDROCHLORIDE 5 MG: 5 TABLET ORAL at 17:21

## 2019-07-07 RX ADMIN — ONDANSETRON HYDROCHLORIDE 4 MG: 4 TABLET, FILM COATED ORAL at 08:34

## 2019-07-07 RX ADMIN — AMLODIPINE BESYLATE 5 MG: 5 TABLET ORAL at 07:27

## 2019-07-07 RX ADMIN — GABAPENTIN 100 MG: 100 CAPSULE ORAL at 13:55

## 2019-07-07 RX ADMIN — HEPARIN SODIUM 5000 UNITS: 5000 INJECTION INTRAVENOUS; SUBCUTANEOUS at 06:08

## 2019-07-07 RX ADMIN — HEPARIN SODIUM 5000 UNITS: 5000 INJECTION INTRAVENOUS; SUBCUTANEOUS at 20:37

## 2019-07-07 RX ADMIN — COLCHICINE 0.6 MG: 0.6 TABLET, FILM COATED ORAL at 07:28

## 2019-07-07 RX ADMIN — MECLIZINE HYDROCHLORIDE 12.5 MG: 25 TABLET ORAL at 13:55

## 2019-07-07 RX ADMIN — COLCHICINE 0.6 MG: 0.6 TABLET, FILM COATED ORAL at 20:40

## 2019-07-07 ASSESSMENT — PAIN SCALES - GENERAL
PAINLEVEL_OUTOF10: 6
PAINLEVEL_OUTOF10: 5
PAINLEVEL_OUTOF10: 4
PAINLEVEL_OUTOF10: 0
PAINLEVEL_OUTOF10: 5
PAINLEVEL_OUTOF10: 6
PAINLEVEL_OUTOF10: 2
PAINLEVEL_OUTOF10: 6

## 2019-07-07 ASSESSMENT — PAIN DESCRIPTION - FREQUENCY: FREQUENCY: INTERMITTENT

## 2019-07-07 ASSESSMENT — PAIN DESCRIPTION - DESCRIPTORS: DESCRIPTORS: ACHING;DULL

## 2019-07-07 ASSESSMENT — PAIN DESCRIPTION - LOCATION
LOCATION: HEAD
LOCATION: HEAD;NECK

## 2019-07-07 ASSESSMENT — PAIN DESCRIPTION - ORIENTATION: ORIENTATION: RIGHT

## 2019-07-07 ASSESSMENT — PAIN - FUNCTIONAL ASSESSMENT: PAIN_FUNCTIONAL_ASSESSMENT: ACTIVITIES ARE NOT PREVENTED

## 2019-07-07 ASSESSMENT — PAIN DESCRIPTION - PAIN TYPE
TYPE: ACUTE PAIN
TYPE: ACUTE PAIN

## 2019-07-07 NOTE — PROGRESS NOTES
CGA  Long term goal 5: pt able to use LUE for adls with fair fine and gross coordination  Timed Code Treatment Minutes: 69 Minutes    Electronically s     07/07/19 0731   OT Individual Minutes   Time In 4240   Time Out 0840   Minutes 69   Time Code Minutes    Timed Code Treatment Minutes 69 Minutes   igned by Mariela Metzger OT on 7/7/19 at 11:12 AM

## 2019-07-07 NOTE — CONSULTS
[place] [address] Internal Medicine    Progress Note    7/7/2019    6:43 PM    Name:   eNha Barnhart  MRN:     [id number]     Acct:      [de-identified]   Room:   [address] [place] [address] Day:  4  Admit Date:  7/3/2019  3:09 PM    PCP:   Monet Arango MD  Code Status:  Full Code    Subjective:     C/C: No chief complaint on file. HPI:     Night no fever chills no nausea vomiting  Deferred diplopia and poor coordination in the left hand   is unchanged from the time of admission    Review of Systems:     Constitutional:  negative for chills, fevers, sweats  Respiratory:  negative for cough, dyspnea on exertion, hemoptysis, shortness of breath, wheezing  Cardiovascular:  negative for chest pain, chest pressure/discomfort, lower extremity edema, palpitations  Gastrointestinal:  negative for abdominal pain, constipation, diarrhea, nausea, vomiting  Neurological: Diplopia poor left hand coordination    Medications: Allergies:  No Known Allergies    Current Meds:   Scheduled Meds:    meclizine  12.5 mg Oral TID    colchicine  0.6 mg Oral BID    polyethylene glycol  17 g Oral Daily    gabapentin  100 mg Oral TID    atorvastatin  80 mg Oral Nightly    clopidogrel  75 mg Oral Daily    aspirin  81 mg Oral Daily    amLODIPine  5 mg Oral Daily    heparin (porcine)  5,000 Units Subcutaneous 3 times per day    lidocaine  1 patch Transdermal Daily     Continuous Infusions:   PRN Meds: sodium chloride, ondansetron, acetaminophen, aluminum & magnesium hydroxide-simethicone, bisacodyl, methocarbamol, oxyCODONE **OR** [DISCONTINUED] oxyCODONE, nitroGLYCERIN, calcium carbonate    Data:     Past Medical History:   has a past medical history of Anxiety, Bronchitis with asthma, acute, Carotid stenosis, left, Diabetes mellitus ([address] GERD (gastroesophageal reflux disease), Hyperlipidemia, Hypertension, and Osteoarthritis. Social History:   reports that he has never smoked.  He has never used

## 2019-07-07 NOTE — PROGRESS NOTES
CA  BNP: No results for input(s): BNP in the last 72 hours. PT/INR: No results for input(s): PROTIME, INR in the last 72 hours. APTT: No results for input(s): APTT in the last 72 hours. CARDIAC ENZYMES: No results for input(s): CKMB, CKMBINDEX, TROPONINT in the last 72 hours. Invalid input(s): CKTOTAL;3  FASTING LIPID PANEL:  Lab Results   Component Value Date    CHOL 179 07/27/2018    HDL 48 07/27/2018    TRIG 122 07/27/2018     LIVER PROFILE: No results for input(s): AST, ALT, ALB, BILIDIR, BILITOT, ALKPHOS in the last 72 hours. I/O (24Hr): No intake or output data in the 24 hours ending 07/07/19 1154    Glu last 24 hour  No results for input(s): POCGLU in the last 72 hours. No results for input(s): CLARITYU, COLORU, PHUR, SPECGRAV, PROTEINU, RBCUA, BLOODU, BACTERIA, NITRU, WBCUA, LEUKOCYTESUR, YEAST, GLUCOSEU, BILIRUBINUR in the last 72 hours. X-ray abdomen 7/4/19  Gas seen in the colon with evidence for stool in the colon.  No extraluminal  gas.  The colon is mildly distended on the left side.  No soft tissue gas.      Impression:       No evidence for obstruction.  No extraluminal gas. Impression/Plan:    1. Ambulatory and ADL dysfunction secondary to cerebellar hemorrhage status post craniectomy and evacuation complicated by prior stent placement which was complicated by RCA perforation and tamponade- cont  rehab efforts fall precautions reviewed,  2. Cerebellar hemorrhage status post craniectomy with ataxia, double vision and sensory deficits-  monitor neurologic status, dizziness/nausea suspect from cerebellar bleed- Antivert-change to scheduled, continue eye patch, continue to monitor DC cranial sutures  3. Nury Finder secondary #2, positive BM, abdominal exam negative, continue to monitor, Zofran, continue  abdominal exam negative and abdominal film negative, stools- DC Colace and senna, change MiraLAX as needed, monitor-noted patient on colchicine  4.  Cardiac-CHF, stents,

## 2019-07-07 NOTE — PROGRESS NOTES
dizziness. Comments: chair alarm,Needs to wear eye patch     Vital Signs  BP: (!) 148/90  BP Location: Right upper arm  MAP (mmHg): 83  Patient Currently in Pain: Yes  Pain Assessment: 0-10  Pain Level: 2  Patient's Stated Pain Goal: No pain  Pain Type: Acute pain  Pain Location: Head;Neck  Pain Orientation: Right  Pain Descriptors: Aching;Dull  Pain Frequency: Intermittent  Functional Pain Assessment: Activities are not prevented  Non-Pharmaceutical Pain Intervention(s): (activity)    Bed Mobility:   Bed Mobility  Supine to Sit: Moderate assistance  Sit to Supine: Contact guard assistance  Scooting: Contact guard assistance  Bed mobility  Scooting: Contact guard assistance    Transfers:  Sit to Stand: Minimal Assistance;Contact guard assistance  Stand to sit: Minimal Assistance;Contact guard assistance  Bed to Chair: Minimal assistance;Contact guard assistance(instruction for hand placement and balance before movement )      Ambulation 1  Surface: level tile  Device: Rolling Walker  Other Apparatus: (Eye patch )  Assistance: 2 Person assistance;Minimal assistance;Contact guard assistance  Quality of Gait: decreased left LE coordination , right lateral lean 3x , pt able to identify  Distance: 90 ft  Comments: instruction and visual for decreased step length and slower gait to increased weight shift to left.   Ambulation 2  Surface - 2: level tile  Device 2: Rolling Walker  Assistance 2: 2 Person assistance;Minimal assistance  Quality of Gait 2: increased gait deviations with increased fatigue PM  Distance: 80 ft  Comments: limited by fatigue      Stairs/Curb  Stairs?: No(Not ready to safely ambulate stairs.)      Propulsion 1  Propulsion: Manual  Level: Level Tile  Method: RUE;LUE  Level of Assistance: Stand by assistance  Description/ Details: straight path and right turn  Distance: 100 ft     FIMS:      TRANSFERS  Bed, Chair, Wheel Chair: 4 - Requires steadying assistance only <25% assist  and/or requires assist with one leg only  GOAL: Bed, Chair, Wheel Chair: 5   LOCOMOTION  Primary Mode: Both  GOAL: Walk/Wheel Chair: 5  Distance Walked: 80 ft  Distance Traveled in [facility] Chair: 100 ft  Walk: 1 - Total Assistance Walks < 50 feet OR requires two or more people OR patient performs < 25% of locomotion effort  Wheel Chair: 2 - Maximal Assistance Requires up to [address] requires assistance of one person to operate wheelchair between  feet  Stairs: 0 - Activity Does not Occur ( 0 only for the admission assessment)  GOAL: Stairs: 4       BALANCE Posture: Fair  Sitting - Static: Fair; +(Needs UE support to maitain balance)  Sitting - Dynamic: Fair;-  Standing - Static: Fair;-  Standing - Dynamic: Poor  Comments: Standing balance with B UE support/RW. EXERCISES    Other exercises 1: Seated bilateral LE x 15 2# and blue reciprical pattern except tband  Other exercises 2: standing right <> left reach /weight shift visual nput  Other exercises 3: standing in parallel bars step taps bilateral LE x x 10  Other exercises 4: standing bilateral LE marching alternating LE with visual input  Other exercises 5: nu step L2 10 minutes  Other exercises 6: 6 inch step up parallel bars right ascending, left descending retro x 3 CGA           Activity Tolerance: Patient limited by fatigue, Patient limited by endurance  Activity Tolerance: Nausea, light headed and dizzy at times  PT Equipment Recommendations  Equipment Needed: (TBD)  Other Comments  Comments: nursing notified BP levels in AM and PM, no symptoms. /83 HR 86 , vomiting sputum after PM session     Patient Education  New Education Provided: Safety with transfers and ambulation using RW.   Need for continued patch on right eye  Learner:patient  Method: explanation       Outcome: needs reinforcement     Current Treatment Recommendations: Strengthening, Balance Training, Functional Mobility Training, Transfer Training, Gait Training, Stair training, Wheelchair

## 2019-07-08 LAB — PLATELET # BLD: 255 K/UL (ref 150–450)

## 2019-07-08 PROCEDURE — 97542 WHEELCHAIR MNGMENT TRAINING: CPT

## 2019-07-08 PROCEDURE — 97110 THERAPEUTIC EXERCISES: CPT

## 2019-07-08 PROCEDURE — 6370000000 HC RX 637 (ALT 250 FOR IP): Performed by: PHYSICIAN ASSISTANT

## 2019-07-08 PROCEDURE — 92507 TX SP LANG VOICE COMM INDIV: CPT

## 2019-07-08 PROCEDURE — 6370000000 HC RX 637 (ALT 250 FOR IP): Performed by: PHYSICAL MEDICINE & REHABILITATION

## 2019-07-08 PROCEDURE — 36415 COLL VENOUS BLD VENIPUNCTURE: CPT

## 2019-07-08 PROCEDURE — 85049 AUTOMATED PLATELET COUNT: CPT

## 2019-07-08 PROCEDURE — 97127 HC SP THER IVNTJ W/FOCUS COG FUNCJ: CPT

## 2019-07-08 PROCEDURE — 99232 SBSQ HOSP IP/OBS MODERATE 35: CPT | Performed by: INTERNAL MEDICINE

## 2019-07-08 PROCEDURE — 6360000002 HC RX W HCPCS: Performed by: PHYSICIAN ASSISTANT

## 2019-07-08 PROCEDURE — 97530 THERAPEUTIC ACTIVITIES: CPT

## 2019-07-08 PROCEDURE — 97535 SELF CARE MNGMENT TRAINING: CPT

## 2019-07-08 PROCEDURE — 1180000000 HC REHAB R&B

## 2019-07-08 PROCEDURE — 97116 GAIT TRAINING THERAPY: CPT

## 2019-07-08 PROCEDURE — 6360000002 HC RX W HCPCS: Performed by: PHYSICAL MEDICINE & REHABILITATION

## 2019-07-08 PROCEDURE — 99232 SBSQ HOSP IP/OBS MODERATE 35: CPT | Performed by: PHYSICAL MEDICINE & REHABILITATION

## 2019-07-08 RX ORDER — GABAPENTIN 100 MG/1
200 CAPSULE ORAL 3 TIMES DAILY
Status: DISCONTINUED | OUTPATIENT
Start: 2019-07-08 | End: 2019-07-12

## 2019-07-08 RX ADMIN — COLCHICINE 0.6 MG: 0.6 TABLET, FILM COATED ORAL at 21:36

## 2019-07-08 RX ADMIN — GABAPENTIN 200 MG: 100 CAPSULE ORAL at 21:36

## 2019-07-08 RX ADMIN — ONDANSETRON HYDROCHLORIDE 4 MG: 4 TABLET, FILM COATED ORAL at 07:14

## 2019-07-08 RX ADMIN — AMLODIPINE BESYLATE 5 MG: 5 TABLET ORAL at 07:14

## 2019-07-08 RX ADMIN — GABAPENTIN 200 MG: 100 CAPSULE ORAL at 13:23

## 2019-07-08 RX ADMIN — ASPIRIN 81 MG 81 MG: 81 TABLET ORAL at 07:14

## 2019-07-08 RX ADMIN — HEPARIN SODIUM 5000 UNITS: 5000 INJECTION INTRAVENOUS; SUBCUTANEOUS at 21:36

## 2019-07-08 RX ADMIN — GABAPENTIN 100 MG: 100 CAPSULE ORAL at 07:14

## 2019-07-08 RX ADMIN — POLYETHYLENE GLYCOL 3350 17 G: 17 POWDER, FOR SOLUTION ORAL at 07:15

## 2019-07-08 RX ADMIN — HEPARIN SODIUM 5000 UNITS: 5000 INJECTION INTRAVENOUS; SUBCUTANEOUS at 05:05

## 2019-07-08 RX ADMIN — COLCHICINE 0.6 MG: 0.6 TABLET, FILM COATED ORAL at 07:16

## 2019-07-08 RX ADMIN — MECLIZINE HYDROCHLORIDE 12.5 MG: 25 TABLET ORAL at 21:35

## 2019-07-08 RX ADMIN — MECLIZINE HYDROCHLORIDE 12.5 MG: 25 TABLET ORAL at 07:14

## 2019-07-08 RX ADMIN — ANTACID TABLETS 500 MG: 500 TABLET, CHEWABLE ORAL at 05:04

## 2019-07-08 RX ADMIN — ATORVASTATIN CALCIUM 80 MG: 80 TABLET, FILM COATED ORAL at 21:35

## 2019-07-08 RX ADMIN — MECLIZINE HYDROCHLORIDE 12.5 MG: 25 TABLET ORAL at 13:23

## 2019-07-08 RX ADMIN — CLOPIDOGREL BISULFATE 75 MG: 75 TABLET ORAL at 07:14

## 2019-07-08 RX ADMIN — HEPARIN SODIUM 5000 UNITS: 5000 INJECTION INTRAVENOUS; SUBCUTANEOUS at 13:23

## 2019-07-08 ASSESSMENT — PAIN SCALES - GENERAL
PAINLEVEL_OUTOF10: 4
PAINLEVEL_OUTOF10: 5
PAINLEVEL_OUTOF10: 4

## 2019-07-08 ASSESSMENT — PAIN DESCRIPTION - LOCATION
LOCATION: HEAD
LOCATION: HEAD

## 2019-07-08 ASSESSMENT — PAIN DESCRIPTION - PAIN TYPE
TYPE: ACUTE PAIN
TYPE: ACUTE PAIN

## 2019-07-08 NOTE — PROGRESS NOTES
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 Continue to burning left anterior lateral hip    ROS:  Denies fevers, chills, sweats. No chest pain, palpitations, lightheadedness. Denies coughing, wheezing or shortness of breath. Denies abdominal pain, nausea, diarrhea or constipation. No new areas of joint pain. Denies new areas of numbness or weakness. Denies new anxiety or depression issues. No new skin problems. Rehabilitation:   PT:  Restrictions/Precautions: Cardiac, Fall Risk  Implants present? : Metal implants(L TKA)  Other position/activity restrictions: Alternate eye patch q2h for diplopia from [facility] clinic notes. Transfers  Sit to Stand: Minimal Assistance, Contact guard assistance  Stand to sit: Minimal Assistance, Contact guard assistance  Bed to Chair: Minimal assistance, Contact guard assistance(instruction for hand placement and balance before movement )  Comment: Cues for hand placement and safety  Ambulation 1  Surface: level tile  Device: Rolling Walker  Other Apparatus: (Eye patch )  Assistance: 2 Person assistance, Minimal assistance, Contact guard assistance  Quality of Gait: decreased left LE coordination , right lateral lean 3x , pt able to identify  Gait Deviations: Increased JSOE  Distance: 90 ft  Comments: instruction and visual for decreased step length and slower gait to increased weight shift to left.     Transfers  Sit to Stand: Minimal Assistance, Contact guard assistance  Stand to sit: Minimal Assistance, Contact guard assistance  Bed to Chair: Minimal assistance, Contact guard assistance(instruction for hand placement and balance before movement )  Comment: Cues for hand placement and safety  Ambulation  Ambulation?: Yes  More Ambulation?: Yes  Ambulation 1  Surface: level

## 2019-07-08 NOTE — CONSULTS
[place] [address] Internal Medicine    Progress Note    7/8/2019    2:34 PM    Name:   Miko Coyne  MRN:     [id number]     [place]:      [de-identified]   Room:   [address] [place] [address] Day:  5  Admit Date:  7/3/2019  3:09 PM    PCP:   Kathryn Lyons MD  Code Status:  Full Code    Subjective:     C/C: No chief complaint on file. HPI:     Night no fever chills no nausea vomiting  Deferred diplopia and poor coordination in the left hand   is unchanged from the time of admission    Review of Systems:     Constitutional:  negative for chills, fevers, sweats  Respiratory:  negative for cough, dyspnea on exertion, hemoptysis, shortness of breath, wheezing  Cardiovascular:  negative for chest pain, chest pressure/discomfort, lower extremity edema, palpitations  Gastrointestinal:  negative for abdominal pain, constipation, diarrhea, nausea, vomiting  Neurological: Diplopia poor left hand coordination    Medications: Allergies:  No Known Allergies    Current Meds:   Scheduled Meds:    gabapentin  200 mg Oral TID    meclizine  12.5 mg Oral TID    colchicine  0.6 mg Oral BID    polyethylene glycol  17 g Oral Daily    atorvastatin  80 mg Oral Nightly    clopidogrel  75 mg Oral Daily    aspirin  81 mg Oral Daily    amLODIPine  5 mg Oral Daily    heparin (porcine)  5,000 Units Subcutaneous 3 times per day    lidocaine  1 patch Transdermal Daily     Continuous Infusions:   PRN Meds: sodium chloride, ondansetron, acetaminophen, aluminum & magnesium hydroxide-simethicone, bisacodyl, methocarbamol, oxyCODONE **OR** [DISCONTINUED] oxyCODONE, nitroGLYCERIN, calcium carbonate    Data:     Past Medical History:   has a past medical history of Anxiety, Bronchitis with asthma, acute, Carotid stenosis, left, Diabetes mellitus ([address] GERD (gastroesophageal reflux disease), Hyperlipidemia, Hypertension, and Osteoarthritis. Social History:   reports that he has never smoked.  He has never used smokeless tobacco. He reports that he drinks alcohol. He reports that he does not use drugs. Family History:   Family History   Problem Relation Age of Onset   [employer] Cancer Mother     Cancer Father        Vitals:  /81   Pulse 88   Temp 98.1 °F (36.7 °C) (Oral)   Resp 16   Ht 5' 10\" (1.778 m)   Wt 248 lb 6 oz (112.7 kg)   SpO2 98%   BMI 35.64 kg/m²   Temp (24hrs), Av.4 °F (36.9 °C), Min:98.1 °F (36.7 °C), Max:98.7 °F (37.1 °C)    No results for input(s): POCGLU in the last 72 hours. I/O (24Hr): No intake or output data in the 24 hours ending 19 1434    Labs:    Hematology:  Recent Labs     19  0648        Chemistry:No results for input(s): NA, K, CL, CO2, GLUCOSE, BUN, CREATININE, MG, ANIONGAP, LABGLOM, GFRAA, CALCIUM, CAION, PHOS, PSA, PROBNP, TROPHS, CKTOTAL, CKMB, CKMBINDEX, MYOGLOBIN, DIGOXIN, LACTACIDWB in the last 72 hours. No results for input(s): PROT, LABALBU, LABA1C, L2PJKFU, L9KUYWJ, FT4, TSH, AST, ALT, LDH, GGT, ALKPHOS, LABGGT, BILITOT, BILIDIR, AMMONIA, AMYLASE, LIPASE, LACTATE, CHOL, HDL, LDLCHOLESTEROL, CHOLHDLRATIO, TRIG, VLDL, DID86PG, PHENYTOIN, PHENYF, URICACID, POCGLU in the last 72 hours.       No results found for: SPECIAL  No results found for: CULTURE    No results found for: POCPH, PHART, PH, POCPCO2, AWK6IVN, PCO2, POCPO2, PO2ART, PO2, POCHCO3, CGS5HAL, HCO3, NBEA, PBEA, BEART, BE, THGBART, THB, XCU3SPD, SUUJ8NNG, G5XFXIRJ, O2SAT, FIO2    Radiology:        Physical Examination:        General appearance:  alert, cooperative and no distress  Mental Status:  oriented to person, place and time and normal affect  Lungs:  clear to auscultation bilaterally, normal effort  Heart:  regular rate and rhythm, no murmur  Abdomen:  soft, nontender, nondistended, normal bowel sounds, no masses, hepatomegaly, splenomegaly  Extremities:  no edema, redness, tenderness in the calves  Skin:  no gross lesions, rashes, induration  Diplopia right eye is covered poor left Ambulatory

## 2019-07-08 NOTE — PROGRESS NOTES
[] Outpatient Therapy  [] Follow up at trauma clinic   [] Other:       Treatment completed by: Tony Kulkarni A.CCC/SLP

## 2019-07-08 NOTE — PLAN OF CARE
Problem: Risk for Impaired Skin Integrity  Goal: Tissue integrity - skin and mucous membranes  Description  Structural intactness and normal physiological function of skin and  mucous membranes.   Outcome: Ongoing     Problem: Falls - Risk of:  Goal: Will remain free from falls  Description  Will remain free from falls  Outcome: Ongoing     Problem: Falls - Risk of:  Goal: Absence of physical injury  Description  Absence of physical injury  Outcome: Ongoing     Problem: Pain:  Goal: Pain level will decrease  Description  Pain level will decrease  Outcome: Ongoing     Problem: Pain:  Goal: Control of acute pain  Description  Control of acute pain  Outcome: Ongoing     Problem: Pain:  Goal: Control of chronic pain  Description  Control of chronic pain  Outcome: Ongoing     Problem: Nutrition  Goal: Optimal nutrition therapy  Outcome: Ongoing

## 2019-07-08 NOTE — PATIENT CARE CONFERENCE
[facility]    ACUTE REHABILITATION  TEAM CONFERENCE NOTE  Date: 19  Patient Name: Daron Romero       Room: 1652/1243-23  MRN: [id number]       : 1945  (70 y.o.)     Gender: male      Diagnosis: Left cerebellar ICH. Pt is s/p suboccipital craniotomy for cerebellar ICH on 19. L side ataxia, double vision -eye patch change every 2 hours. NURSING  FIMS:  Bladder: 6 - Uses device independently (including EMPTYING of device, or uses medication)  Bladder Level of Assistance: 6- Requires bedpan, urinal, diaper, catheter but patient obtains and empties own device. Or requires bladder management medication  Bladder Frequency of Accidents: 6 - No accidents,uses device like urinal, bedpan, absorbant pad, or requires medication to manage bladder  Bowel: 6 - Uses toilet independently with device or oral medication(s)  Bowel Level of Assistance: 6- Requires device like bedpan, diaper, bedside commode, but patient obtains and empties own device including colostomy. Or requires bowel management medication including stool softeners, laxatives, inserts own suppository  Bowel Frequency of Accidents: 6 - No accidents, uses device like bedpan, diaper, bedside commode colostomy, or requires medication to manage bowels   Bladder  Continent  Bowel   Continent  Intervention    Both Bowel & Bladder Program     Wounds/Incisions/Ulcers: No skin issues identified  Medication Education Program: Patient currently unable to manage medications and family being educated  Pain: Patient's pain is currently controlled with -    Neurontin  Fall Risk:  Falling star program initiated    PHYSICAL THERAPY  Bed mobility  Scooting: Stand by assistance  Bed Mobility  Supine to Sit: Contact guard assistance  Sit to Supine: Stand by assistance  Scooting: Stand by assistance  Comment: Mat, wedge, 3 pillows. Pt instructed in breathing technique as well as rest breaks d/t dizziness.        Transfers:  Sit to Stand: Contact guard assistance;Minimal Assistance  Stand to sit: Contact guard assistance;Minimal Assistance  Bed to Chair: Minimal assistance    Ambulation 1  Surface: level tile  Device: Rolling Walker  Other Apparatus: Wheelchair follow  Assistance: Minimal assistance; Moderate assistance  Quality of Gait: Pt is unsteady d/t decrease coordination with LLE. LOB with right lateral lean x4 total. Min A for correction. Pt demos crossing midline with LLE. VC's for technique with fair carryover. Distance: 100'x2   Comments: instruction and visual for decreased step length and slower gait to increased weight shift to left. Ambulation 2  Surface - 2: level tile  Device 2: Rolling Walker  Other Apparatus 2: Wheelchair follow  Assistance 2: Minimal assistance  Quality of Gait 2: same as above. Distance: 100'  Comments: limited by fatigue          FIMS:  Bed, Chair, Wheel Chair: 4 - Requires steadying assistance only <25% assist  and/or requires assist with one leg only  Walk: 1 - Total Assistance Walks < 50 feet OR requires two or more people OR patient performs < 25% of locomotion effort  Distance Walked: 100'  Wheel Chair: 4 - [address] up to [address] or Minimal Assistance to operate wheelchair at least 150 feet  Distance Traveled in [address] [place] [address]: 250'  Stairs: 0 - Activity Does not Occur ( 0 only for the admission assessment)    PT Equipment Recommendations  Equipment Needed: (TBD)    Assessment: Pt requires one person assist for amb with a W/C follow for safety. Pt continues to demo difficulty with coordination with amb/standing tasks.      Goals  Short term goals  Time Frame for Short term goals: 10 days  Short term goal 1: Pt able to perform supine<>sit at mod-I   Short term goal 2:  Pt able to transfer at varies surfaces at min A   Short term goal 3:  Pt able to ambulate  with RW dist of 100 ft x 2, with RW, min/mod A , w/wc to follow for safety`  Short term goal 4: Pt able to go up and down Functional mobility training with assistive device    Diplopia Pt uses eye patch-alternated every 2 hours. Full bath on 2nd floor Stair training with pt/family. Impaired ADL status  Training in modified care strategies and use of devices to support self care safety                      Functional FIM Gain  Admission Score:  58  Progress:  TBD  Goal:  96   `  Discharge Plan   Estimated Discharge Date: 7/24/19  Overnight or Day Pass: No  Factors facilitating achievement of predicted outcomes: Family support, Motivated, Cooperative and Good insight into deficits  Barriers to the achievement of predicted outcomes: Anxiety, Decreased endurance, Upper extremity weakness, Lower extremity weakness, Medical complications, Skin Care and Medication managment    Functional Goals at discharge:  Home with family Close supervision  Discharge therapy goals:  PT: Long term goals  Time Frame for Long term goals : By DC  Long term goal 1: Pt tatum to transfer at supervsion level. Long term goal 2: Pt able to ambulate with appropriate device dist of 150 ft, level surface SBA   Long term goal 3: Pt able to ambulate with RW on outside terraine at min A   Long term goal 4: Pt able to go up and down 12 steps with 1 UE support, min A   Long term goal 5: Improve dynamic standing balance with assistive device, to fair + to reduce fall risk.   Long term goal 6: Pt able to manuever w/c dist of atleast 150 ft, level surfaces, mod-I  OT:Long term goals  Time Frame for Long term goals : by discharge  Long term goal 1: mod indep feeding and grooming  Long term goal 2: set up, SBA for bathing  Long term goal 3: min LE dress  Long term goal 4: monica 8-10 min stand, amb with AE and CGA  Long term goal 5: pt able to use LUE for adls with fair fine and gross coordination  ST: mod I expression, memory    Team Members Present at Conference:  :  Jose Lopez  LSW  Occupational Therapist: Olivia Black OT   Physical Therapist:Yoly

## 2019-07-08 NOTE — DISCHARGE INSTR - COC
Continuity of Care Form    Patient Name: Isiah Pisano   :  [birth year]  MRN:  [id number]    Admit date:  7/3/2019  Discharge date:  2019    Code Status Order: Full Code   Advance Directives:   Advance Care Flowsheet Documentation     Date/Time Healthcare Directive Type of Healthcare Directive Copy in [address] Agent's Name Healthcare Agent's Phone Number    19 1703  No, patient does not have an advance directive for healthcare treatment -- -- -- -- --          Admitting Physician:  Beth Saenz MD  PCP: jJ Sweet MD    Discharging Nurse: [facility] Unit/Room#: 5533/4087-94  Discharging Unit Phone Number: [phone]    Emergency Contact:   Extended Emergency Contact Information  Primary Emergency Contact: Rolly Ghosh  Address: [address] Phone: [phone]  Work Phone: [phone]  Mobile Phone: [phone]  Relation: Spouse  Secondary Emergency Contact: Edil Zuniga  Mobile Phone: [phone]  Relation: Child  Preferred language: English   needed?  No    Past Surgical History:  Past Surgical History:   Procedure Laterality Date    CAROTID ENDARTERECTOMY Left 16    COLONOSCOPY      JOINT REPLACEMENT Left     knee    SHOULDER SURGERY Right     collar bone    TONSILLECTOMY AND ADENOIDECTOMY         Immunization History:   Immunization History   Administered Date(s) Administered    Influenza, Triv, 3 Years and older, IM (Afluria (5 yrs and older) 11/15/2016    Pneumococcal Conjugate 13-valent (Veronica Telma) 2018       Active Problems:  Patient Active Problem List   Diagnosis Code    Impaired fasting glucose R73.01    Patient overweight E66.3    Insomnia G47.00    Hypertension I10    OA (osteoarthritis) of knee M17.10    S/P total knee arthroplasty Z96.659    Bronchitis with asthma, acute J20.9, J45.909    Lymphadenopathy of

## 2019-07-08 NOTE — PLAN OF CARE
Nutrition Problem: Inadequate oral intake  Intervention: Food and/or Nutrient Delivery: Continue current diet, Start ONS  Nutritional Goals: Meet % of nutrient needs with PO diet.

## 2019-07-09 PROCEDURE — 97110 THERAPEUTIC EXERCISES: CPT

## 2019-07-09 PROCEDURE — 97535 SELF CARE MNGMENT TRAINING: CPT

## 2019-07-09 PROCEDURE — 1180000000 HC REHAB R&B

## 2019-07-09 PROCEDURE — 97116 GAIT TRAINING THERAPY: CPT

## 2019-07-09 PROCEDURE — 92526 ORAL FUNCTION THERAPY: CPT

## 2019-07-09 PROCEDURE — 6370000000 HC RX 637 (ALT 250 FOR IP): Performed by: PHYSICAL MEDICINE & REHABILITATION

## 2019-07-09 PROCEDURE — 99232 SBSQ HOSP IP/OBS MODERATE 35: CPT | Performed by: PHYSICAL MEDICINE & REHABILITATION

## 2019-07-09 PROCEDURE — 97127 HC SP THER IVNTJ W/FOCUS COG FUNCJ: CPT

## 2019-07-09 PROCEDURE — 99231 SBSQ HOSP IP/OBS SF/LOW 25: CPT | Performed by: INTERNAL MEDICINE

## 2019-07-09 PROCEDURE — 97542 WHEELCHAIR MNGMENT TRAINING: CPT

## 2019-07-09 PROCEDURE — 6360000002 HC RX W HCPCS: Performed by: PHYSICAL MEDICINE & REHABILITATION

## 2019-07-09 PROCEDURE — 97530 THERAPEUTIC ACTIVITIES: CPT

## 2019-07-09 RX ADMIN — GABAPENTIN 200 MG: 100 CAPSULE ORAL at 14:03

## 2019-07-09 RX ADMIN — CLOPIDOGREL BISULFATE 75 MG: 75 TABLET ORAL at 07:33

## 2019-07-09 RX ADMIN — HEPARIN SODIUM 5000 UNITS: 5000 INJECTION INTRAVENOUS; SUBCUTANEOUS at 14:03

## 2019-07-09 RX ADMIN — POLYETHYLENE GLYCOL 3350 17 G: 17 POWDER, FOR SOLUTION ORAL at 07:33

## 2019-07-09 RX ADMIN — MECLIZINE HYDROCHLORIDE 12.5 MG: 25 TABLET ORAL at 07:33

## 2019-07-09 RX ADMIN — COLCHICINE 0.6 MG: 0.6 TABLET, FILM COATED ORAL at 07:34

## 2019-07-09 RX ADMIN — GABAPENTIN 200 MG: 100 CAPSULE ORAL at 20:31

## 2019-07-09 RX ADMIN — COLCHICINE 0.6 MG: 0.6 TABLET, FILM COATED ORAL at 20:32

## 2019-07-09 RX ADMIN — ACETAMINOPHEN 650 MG: 325 TABLET, FILM COATED ORAL at 20:34

## 2019-07-09 RX ADMIN — AMLODIPINE BESYLATE 5 MG: 5 TABLET ORAL at 07:33

## 2019-07-09 RX ADMIN — MECLIZINE HYDROCHLORIDE 12.5 MG: 25 TABLET ORAL at 14:03

## 2019-07-09 RX ADMIN — ASPIRIN 81 MG 81 MG: 81 TABLET ORAL at 07:33

## 2019-07-09 RX ADMIN — GABAPENTIN 200 MG: 100 CAPSULE ORAL at 07:33

## 2019-07-09 RX ADMIN — ATORVASTATIN CALCIUM 80 MG: 80 TABLET, FILM COATED ORAL at 20:31

## 2019-07-09 RX ADMIN — NYSTATIN 500000 UNITS: 100000 SUSPENSION ORAL at 14:36

## 2019-07-09 RX ADMIN — NYSTATIN 500000 UNITS: 100000 SUSPENSION ORAL at 20:32

## 2019-07-09 RX ADMIN — HEPARIN SODIUM 5000 UNITS: 5000 INJECTION INTRAVENOUS; SUBCUTANEOUS at 05:36

## 2019-07-09 RX ADMIN — MECLIZINE HYDROCHLORIDE 12.5 MG: 25 TABLET ORAL at 20:31

## 2019-07-09 RX ADMIN — HEPARIN SODIUM 5000 UNITS: 5000 INJECTION INTRAVENOUS; SUBCUTANEOUS at 20:33

## 2019-07-09 ASSESSMENT — PAIN SCALES - GENERAL
PAINLEVEL_OUTOF10: 5
PAINLEVEL_OUTOF10: 0
PAINLEVEL_OUTOF10: 5
PAINLEVEL_OUTOF10: 5
PAINLEVEL_OUTOF10: 4

## 2019-07-09 ASSESSMENT — PAIN DESCRIPTION - ONSET: ONSET: ON-GOING

## 2019-07-09 ASSESSMENT — PAIN DESCRIPTION - ORIENTATION: ORIENTATION: RIGHT

## 2019-07-09 ASSESSMENT — PAIN DESCRIPTION - FREQUENCY: FREQUENCY: INTERMITTENT

## 2019-07-09 ASSESSMENT — PAIN DESCRIPTION - PAIN TYPE: TYPE: ACUTE PAIN

## 2019-07-09 ASSESSMENT — PAIN DESCRIPTION - LOCATION: LOCATION: HEAD

## 2019-07-09 ASSESSMENT — PAIN DESCRIPTION - PROGRESSION: CLINICAL_PROGRESSION: NOT CHANGED

## 2019-07-09 ASSESSMENT — PAIN DESCRIPTION - DESCRIPTORS: DESCRIPTORS: ACHING;DISCOMFORT;DULL

## 2019-07-09 NOTE — PLAN OF CARE
Problem: Risk for Impaired Skin Integrity  Goal: Tissue integrity - skin and mucous membranes  Description  Structural intactness and normal physiological function of skin and  mucous membranes. Outcome: Ongoing  Note:   No new areas of skin breakdown. Moisture/protective barrier applied to skin as needed to prevent skin breakdown. Will continue to monitor. Problem: Falls - Risk of:  Goal: Will remain free from falls  Description  Will remain free from falls  Outcome: Ongoing  Note:   Patient remains free from falls so far during shift. Bed locked and in lowest position. Call light within reach. Hourly rounding completed during shift.    Goal: Absence of physical injury  Description  Absence of physical injury  Outcome: Ongoing     Problem: Nutrition  Goal: Optimal nutrition therapy  Outcome: Ongoing     Problem: Neurological  Goal: Maximum potential motor/sensory/cognitive function  Outcome: Ongoing

## 2019-07-09 NOTE — PROGRESS NOTES
Ambulating, Stand pivot(AM/PM)  Equipment Used: Raised toilet seat without rails(c grab bar)  Toilet Transfer: Minimal assistance(CGA-min A)  Toilet Transfers Comments: Cues for safety w transfer/RW mgmt. Objective:  /80   Pulse 86   Temp 97.8 °F (36.6 °C) (Oral)   Resp 16   Ht 5' 10\" (1.778 m)   Wt 248 lb 6 oz (112.7 kg)   SpO2 97%   BMI 35.64 kg/m²  I Body mass index is 35.64 kg/m². I   Wt Readings from Last 1 Encounters:   19 248 lb 6 oz (112.7 kg)      Temp (24hrs), Av °F (36.7 °C), Min:97.8 °F (36.6 °C), Max:98.2 °F (36.8 °C)         GEN: well developed, well nourished, no acute distress  HEENT: Normocephalic atraumatic, EOMI, mucous membranes pink and moist cranial incisions clean  CV: RRR, no murmurs, rubs or gallops  PULM: CTAB, no rales or rhonchi. Respirations WNL and unlabored  ABD: soft, NT, ND, +BS and equal  NEURO: A&O x3. Sensation intact to light touch. Sensation intact Right, ? Decreased on left anterior lateral hip, no extinction. .  Motor testing 4/5  MSK: PROM within functional limits. Except decreased left shoulder no calf tenderness,1+ edema. Feet warm  EXTREMITIES: No calf tenderness to palpation bilaterally. No edema BLEs  SKIN: warm dry and intact with good turgor cranial incision and posterior neck incision clean  PSYCH: appropriately interactive. Affect WNL.   Good spirits        Medications   Scheduled Meds:   gabapentin  200 mg Oral TID    meclizine  12.5 mg Oral TID    colchicine  0.6 mg Oral BID    polyethylene glycol  17 g Oral Daily    atorvastatin  80 mg Oral Nightly    clopidogrel  75 mg Oral Daily    aspirin  81 mg Oral Daily    amLODIPine  5 mg Oral Daily    heparin (porcine)  5,000 Units Subcutaneous 3 times per day    lidocaine  1 patch Transdermal Daily     Continuous Infusions:  PRN Meds:.sodium chloride, ondansetron, acetaminophen, aluminum & magnesium hydroxide-simethicone, bisacodyl, methocarbamol, oxyCODONE **OR** cranial sutures  3. Tien Fleming secondary #2, positive BM, abdominal exam negative, continue to monitor, Zofran, continue  abdominal exam negative and abdominal film negative,s- DC Colace and senna due to freq stools this w/e, change MiraLAX as needed, monitor-noted patient on colchicine  4. Cardiac-CHF, stents, coronary disease, hypertension-Norvasc, Lipitor, as needed nitroglycerin, aspirin, Plavix, note appointment 8/15/2019 with cardiology with echo [facility]  5. History of gout-colchicine  6. Left anterior lateral hip numbness-possible meralgia paresthetica-Neurontin titrate  7. Shoulder discomfort- improved- Lidoderm patch, x-ray noted osteoarthritis, continue to monitor- hold on injection as improving, monitor need for Lidoderm patch  8. Pain-Roxicodone ,decrease, Robaxin   9. DVT prophylaxis- subcu heparin  10. Internal medicine for medical management    Liane Amor. Amber Conn MD       This note is created with the assistance of a speech recognition program.  While intending to generate a document that actually reflects the content of the visit, the document can still have some errors including those of syntax and sound a like substitutions which may escape proof reading.   In such instances, actual meaning can be extrapolated by contextual diversion

## 2019-07-09 NOTE — PROGRESS NOTES
[facility]   Acute Rehabilitation Physical Therapy Progress Note    Date: 19  Patient Name: Judah Serrano       Room: 7851/4323-00  MRN: [id number]   Account: [de-identified]   : 1945  (70 y.o.) Gender: male     Referring Practitioner: Isis Kaplan  Diagnosis: Left cerebellar ICH. Pt is s/p suboccipital craniotomy for cerebellar ICH on 19. L side ataxia, double vision -eye patch change every 2 hours. Past Medical History:  has a past medical history of Anxiety, Bronchitis with asthma, acute, Carotid stenosis, left, Diabetes mellitus ([address] GERD (gastroesophageal reflux disease), Hyperlipidemia, Hypertension, and Osteoarthritis. Past Surgical History:   has a past surgical history that includes Colonoscopy; Tonsillectomy and adenoidectomy; joint replacement (Left); shoulder surgery (Right); and Carotid endarterectomy (Left, 16). Additional Pertinent Hx: 75year old male with pertinent PMHx for CAD, HTN, HLD, L CEA in 2016, who presents to [address] [place] [address] with [address]. Patient recently had dyspnea on exertion and underwent cardiac catheterization , s/p RCA stenting 75, complicated by perforation and tamponade s/p pericardiocentesis with a covered stent through the perforation. Pt at [address] [place] [address] for follow up visit, when he c/o of nausea/vomiting, dysarthria and vison deficits. CT brain was done STAT and showed a left cerebellar ICH. Pt is s/p suboccipital craniotomy for cerebellar ICH on 19. Pt admitted to rehab unit on 7/3/19. Overall Orientation Status: Within Normal Limits  Restrictions/Precautions  Restrictions/Precautions: Cardiac; Fall Risk  Implants present? : Metal implants(L TKA)  Position Activity Restriction  Other position/activity restrictions: [facility]. Subjective: Patient nauseated with emesis during am PT session. Patient reporting he is laying in bed most of the day.   Requested

## 2019-07-09 NOTE — CONSULTS
right eye is covered poor left hand coordination failed finger-nose test   Back of the head surgical scar is well-healed no signs of infection a  Assessment:        Primary Problem  <principal problem not specified>    Active Hospital Problems    Diagnosis Date Noted    Spontaneous intracranial hemorrhage ([facility] [I62.9] 07/03/2019    Myocardiopathy ([facility] [I42.9] 07/19/2018    Unstable angina ([facility] [I20.0] 06/10/2018    Thyroid nodule [E04.1] 05/19/2016    Hypertension [I10] 07/13/2015       Plan:        1. Spontaneous cerebellar bleed with cerebellar ataxia and diplopia  2. Morbid obesity  3. Cad with stent  4. franko follow  5.  Fall risk    Amy Daniels MD  7/9/2019  1:40 PM

## 2019-07-10 PROCEDURE — 99232 SBSQ HOSP IP/OBS MODERATE 35: CPT | Performed by: PHYSICAL MEDICINE & REHABILITATION

## 2019-07-10 PROCEDURE — 99231 SBSQ HOSP IP/OBS SF/LOW 25: CPT | Performed by: INTERNAL MEDICINE

## 2019-07-10 PROCEDURE — 92507 TX SP LANG VOICE COMM INDIV: CPT

## 2019-07-10 PROCEDURE — 97127 HC OT THER IVNTJ W/FOCUS COG FUNCJ: CPT

## 2019-07-10 PROCEDURE — 6370000000 HC RX 637 (ALT 250 FOR IP): Performed by: PHYSICAL MEDICINE & REHABILITATION

## 2019-07-10 PROCEDURE — 97116 GAIT TRAINING THERAPY: CPT

## 2019-07-10 PROCEDURE — 97110 THERAPEUTIC EXERCISES: CPT

## 2019-07-10 PROCEDURE — 1180000000 HC REHAB R&B

## 2019-07-10 PROCEDURE — 97127 HC SP THER IVNTJ W/FOCUS COG FUNCJ: CPT

## 2019-07-10 PROCEDURE — 6360000002 HC RX W HCPCS: Performed by: PHYSICAL MEDICINE & REHABILITATION

## 2019-07-10 PROCEDURE — 97535 SELF CARE MNGMENT TRAINING: CPT

## 2019-07-10 PROCEDURE — 97542 WHEELCHAIR MNGMENT TRAINING: CPT

## 2019-07-10 RX ORDER — MECLIZINE HYDROCHLORIDE 25 MG/1
25 TABLET ORAL 3 TIMES DAILY
Status: DISCONTINUED | OUTPATIENT
Start: 2019-07-10 | End: 2019-07-29 | Stop reason: HOSPADM

## 2019-07-10 RX ADMIN — OXYCODONE HYDROCHLORIDE 5 MG: 5 TABLET ORAL at 15:36

## 2019-07-10 RX ADMIN — GABAPENTIN 200 MG: 100 CAPSULE ORAL at 13:30

## 2019-07-10 RX ADMIN — CLOPIDOGREL BISULFATE 75 MG: 75 TABLET ORAL at 07:28

## 2019-07-10 RX ADMIN — GABAPENTIN 200 MG: 100 CAPSULE ORAL at 20:24

## 2019-07-10 RX ADMIN — AMLODIPINE BESYLATE 5 MG: 5 TABLET ORAL at 07:28

## 2019-07-10 RX ADMIN — GABAPENTIN 200 MG: 100 CAPSULE ORAL at 07:27

## 2019-07-10 RX ADMIN — MECLIZINE HYDROCHLORIDE 25 MG: 25 TABLET ORAL at 20:24

## 2019-07-10 RX ADMIN — NYSTATIN 500000 UNITS: 100000 SUSPENSION ORAL at 15:37

## 2019-07-10 RX ADMIN — ATORVASTATIN CALCIUM 80 MG: 80 TABLET, FILM COATED ORAL at 20:24

## 2019-07-10 RX ADMIN — HEPARIN SODIUM 5000 UNITS: 5000 INJECTION INTRAVENOUS; SUBCUTANEOUS at 20:24

## 2019-07-10 RX ADMIN — HEPARIN SODIUM 5000 UNITS: 5000 INJECTION INTRAVENOUS; SUBCUTANEOUS at 13:31

## 2019-07-10 RX ADMIN — NYSTATIN 500000 UNITS: 100000 SUSPENSION ORAL at 07:29

## 2019-07-10 RX ADMIN — POLYETHYLENE GLYCOL 3350 17 G: 17 POWDER, FOR SOLUTION ORAL at 07:28

## 2019-07-10 RX ADMIN — NYSTATIN 500000 UNITS: 100000 SUSPENSION ORAL at 13:30

## 2019-07-10 RX ADMIN — HEPARIN SODIUM 5000 UNITS: 5000 INJECTION INTRAVENOUS; SUBCUTANEOUS at 06:05

## 2019-07-10 RX ADMIN — MECLIZINE HYDROCHLORIDE 12.5 MG: 25 TABLET ORAL at 13:30

## 2019-07-10 RX ADMIN — COLCHICINE 0.6 MG: 0.6 TABLET, FILM COATED ORAL at 20:24

## 2019-07-10 RX ADMIN — ONDANSETRON HYDROCHLORIDE 4 MG: 4 TABLET, FILM COATED ORAL at 09:16

## 2019-07-10 RX ADMIN — MECLIZINE HYDROCHLORIDE 12.5 MG: 25 TABLET ORAL at 07:28

## 2019-07-10 RX ADMIN — COLCHICINE 0.6 MG: 0.6 TABLET, FILM COATED ORAL at 07:29

## 2019-07-10 RX ADMIN — ACETAMINOPHEN 650 MG: 325 TABLET, FILM COATED ORAL at 20:24

## 2019-07-10 RX ADMIN — ASPIRIN 81 MG 81 MG: 81 TABLET ORAL at 07:28

## 2019-07-10 ASSESSMENT — PAIN SCALES - GENERAL
PAINLEVEL_OUTOF10: 7
PAINLEVEL_OUTOF10: 0
PAINLEVEL_OUTOF10: 0
PAINLEVEL_OUTOF10: 2
PAINLEVEL_OUTOF10: 5

## 2019-07-10 ASSESSMENT — PAIN DESCRIPTION - ORIENTATION: ORIENTATION: LEFT

## 2019-07-10 ASSESSMENT — PAIN DESCRIPTION - LOCATION: LOCATION: HEAD;SHOULDER

## 2019-07-10 NOTE — PROGRESS NOTES
Training, Patient/Caregiver Education & Training, Self-Care / ADL, Balance Training, Home Management Training, Cognitive/Perceptual Training, Functional Mobility Training, Safety Education & Training  Plan Comment: due to effects of cerebellar infarct and dizziness/nausea, patient will be 900 minutes / 7 days of OT/PT/ST    Patient Goals   Patient goals : return to indep as prior  Short term goals  Time Frame for Short term goals: 1 week  Short term goal 1: pt able to use LUE to hold washcloth and containers to open without dropping  Short term goal 2: min grooming  Short term goal 3: min bathing (pt returned re LHS for feet)  Short term goal 4: mod LE dress  Short term goal 5: monica 6-8 min stand with 1 UE support and CGA  Long term goals  Time Frame for Long term goals : by discharge  Long term goal 1: mod indep feeding and grooming  Long term goal 2: set up, SBA for bathing  Long term goal 3: min LE dress  Long term goal 4: monica 8-10 min stand, amb with AE and CGA  Long term goal 5: pt able to use LUE for adls with fair fine and gross coordination       Electronically signed by MIRLANDE Caban on 7/10/19 at 2:41 PM

## 2019-07-10 NOTE — PROGRESS NOTES
Speech Language Pathology  Speech Language Pathology  [facility]    Speech Language Treatment Note    Date: 7/10/2019  Patients Name: Julian Vigil  MRN: [id number]  Diagnosis:   Patient Active Problem List   Diagnosis Code    Impaired fasting glucose R73.01    Patient overweight E66.3    Insomnia G47.00    Hypertension I10    OA (osteoarthritis) of knee M17.10    S/P total knee arthroplasty Z96.659    Bronchitis with asthma, acute J20.9, J45.909    Lymphadenopathy of left cervical region R59.0    Carotid stenosis, left I65.22    H/O carotid endarterectomy Z98.890    Thyroid nodule E04.1    Ventral hernia without obstruction or gangrene K43.9    Unstable angina (HCC) I20.0    Myocardiopathy (HCC) I42.9    Abdominal mass, RUQ (right upper quadrant) R19.01    Chest pain R07.9    Spontaneous intracranial hemorrhage (HCC) I62.9       Pain: 5/10    Speech and Language Treatment  Treatment time: 1775-5848    Subjective: [] Alert [x] Cooperative     [] Confused     [] Agitated    [x] Lethargic    Objective/Assessment:    cognition: 3 word alphabetical order- 50%, 84% c cues. 3 word reverse order- 75%, 100% c cues, pt. Initially having difficulty c directions of task. Paragraph recall- 50%, 90% c cues. Speech:   Pt. Needed min cues in conversation and in structured tasks to use compensatory dysarthria strategies. Other: Pt. Reports he continues to experience nausea and vomiting at times. Pt. reports no difficulty c swallowing or difficulty c mastication when dentures in when eating meal on regular diet. Plan:  [x] Continue ST services    [] Discharge from ST:      Discharge recommendations: [] Inpatient Rehab   [] [place]   [] Outpatient Therapy  [] Follow up at trauma clinic   [] Other:       Treatment completed by: Mario Pablo A.CCC/SLP

## 2019-07-10 NOTE — PROGRESS NOTES
well nourished, no acute distress  HEENT: Normocephalic atraumatic, EOMI, mucous membranes pink and moist cranial incisions clean  CV: RRR, no murmurs, rubs or gallops  PULM: CTAB, no rales or rhonchi. Respirations WNL and unlabored  ABD: soft, NT, ND, +BS and equal  NEURO: A&O x3. Sensation intact to light touch. Sensation intact Right, ? Decreased on left anterior lateral hip, no extinction. .  Motor testing 4/5  MSK: PROM within functional limits. Except decreased left shoulder no calf tenderness,1+ edema. Feet warm  EXTREMITIES: No calf tenderness to palpation bilaterally. No edema BLEs  SKIN: warm dry and intact with good turgor cranial incision and posterior neck incision clean  PSYCH: appropriately interactive. Affect WNL. Good spirits        Medications   Scheduled Meds:   nystatin  5 mL Oral 4x Daily    gabapentin  200 mg Oral TID    meclizine  12.5 mg Oral TID    colchicine  0.6 mg Oral BID    polyethylene glycol  17 g Oral Daily    atorvastatin  80 mg Oral Nightly    clopidogrel  75 mg Oral Daily    aspirin  81 mg Oral Daily    amLODIPine  5 mg Oral Daily    heparin (porcine)  5,000 Units Subcutaneous 3 times per day    lidocaine  1 patch Transdermal Daily     Continuous Infusions:  PRN Meds:.sodium chloride, ondansetron, acetaminophen, aluminum & magnesium hydroxide-simethicone, bisacodyl, methocarbamol, oxyCODONE **OR** [DISCONTINUED] oxyCODONE, nitroGLYCERIN, calcium carbonate     Diagnostics:     CBC:   Recent Labs     07/08/19  0648        BMP:   No results for input(s): NA, K, CL, CO2, PHOS, BUN, CREATININE in the last 72 hours. Invalid input(s): CA  BNP: No results for input(s): BNP in the last 72 hours. PT/INR: No results for input(s): PROTIME, INR in the last 72 hours. APTT: No results for input(s): APTT in the last 72 hours. CARDIAC ENZYMES: No results for input(s): CKMB, CKMBINDEX, TROPONINT in the last 72 hours.     Invalid input(s): CKTOTAL;3  FASTING LIPID

## 2019-07-10 NOTE — PROGRESS NOTES
[address]  Acute Rehabilitation Physical Therapy Progress Note    Date: 7/10/19  Patient Name: Alfonso Veras       Room: 3050/7802-33  MRN: [id number]   Account: [de-identified]   : 1945  (70 y.o.) Gender: male     Referring Practitioner: Kobe Hinton  Diagnosis: Left cerebellar ICH. Pt is s/p suboccipital craniotomy for cerebellar ICH on 19. L side ataxia, double vision -eye patch change every 2 hours. Past Medical History:  has a past medical history of Anxiety, Bronchitis with asthma, acute, Carotid stenosis, left, Diabetes mellitus ([address] GERD (gastroesophageal reflux disease), Hyperlipidemia, Hypertension, and Osteoarthritis. Past Surgical History:   has a past surgical history that includes Colonoscopy; Tonsillectomy and adenoidectomy; joint replacement (Left); shoulder surgery (Right); and Carotid endarterectomy (Left, 16). Additional Pertinent Hx: 75year old male with pertinent PMHx for CAD, HTN, HLD, L CEA in 2016, who presents to [address] [place] [address] with [address] Patient recently had dyspnea on exertion and underwent cardiac catheterization , s/p RCA stenting , complicated by perforation and tamponade s/p pericardiocentesis with a covered stent through the perforation. Pt at [address] [place] [address] for follow up visit, when he c/o of nausea/vomiting, dysarthria and vison deficits. CT brain was done STAT and showed a left cerebellar ICH. Pt is s/p suboccipital craniotomy for cerebellar ICH on 19. Pt admitted to rehab unit on 7/3/19. Overall Orientation Status: Within Normal Limits  Restrictions/Precautions  Restrictions/Precautions: Cardiac; Fall Risk  Implants present? : Metal implants(L TKA)  Position Activity Restriction  Other position/activity restrictions: Alternate eye patch q2h for diplopia from [facility] clinic notes. Subjective: Continues to report nausea but to a lesser degree.   Patient reports he is sitting up in the chair more throughout the dist of 100 ft x 2, with RW, min/mod A , w/wc to follow for safety`  Short term goal 4: Pt able to go up and down 10 steps with 2 raisl , min A x 2 `  Short term goal 5: Pt able to improve standing dynamic balance with RW at min/mod A   Long term goals  Time Frame for Long term goals : By DC  Long term goal 1: Pt tatum to transfer at supervsion level. Long term goal 2: Pt able to ambulate with appropriate device dist of 150 ft, level surface SBA   Long term goal 3: Pt able to ambulate with RW on outside terraine at min A   Long term goal 4: Pt able to go up and down 12 steps with 1 UE support, min A   Long term goal 5: Improve dynamic standing balance with assistive device, to fair + to reduce fall risk.   Long term goal 6: Pt able to manuever w/c dist of atleast 150 ft, level surfaces, mod-I       07/10/19 0800 07/10/19 1300   PT Individual Minutes   Time In 0800 1300   Time Out 0900 1345   Minutes 60 45       Electronically signed by Thaddeus Melendrez PTA on 7/10/19 at 4:27 PM

## 2019-07-11 LAB
ALBUMIN SERPL-MCNC: 4.1 G/DL (ref 3.5–5.2)
ALBUMIN/GLOBULIN RATIO: ABNORMAL (ref 1–2.5)
ALP BLD-CCNC: 105 U/L (ref 40–129)
ALT SERPL-CCNC: 71 U/L (ref 5–41)
ANION GAP SERPL CALCULATED.3IONS-SCNC: 13 MMOL/L (ref 9–17)
AST SERPL-CCNC: 48 U/L
BILIRUB SERPL-MCNC: 0.67 MG/DL (ref 0.3–1.2)
BUN BLDV-MCNC: 14 MG/DL (ref 8–23)
BUN/CREAT BLD: ABNORMAL (ref 9–20)
CALCIUM SERPL-MCNC: 9.6 MG/DL (ref 8.6–10.4)
CHLORIDE BLD-SCNC: 99 MMOL/L (ref 98–107)
CO2: 27 MMOL/L (ref 20–31)
CREAT SERPL-MCNC: 0.61 MG/DL (ref 0.7–1.2)
GFR AFRICAN AMERICAN: >60 ML/MIN
GFR NON-AFRICAN AMERICAN: >60 ML/MIN
GFR SERPL CREATININE-BSD FRML MDRD: ABNORMAL ML/MIN/{1.73_M2}
GFR SERPL CREATININE-BSD FRML MDRD: ABNORMAL ML/MIN/{1.73_M2}
GLUCOSE BLD-MCNC: 102 MG/DL (ref 70–99)
HCT VFR BLD CALC: 37.9 % (ref 41–53)
HEMOGLOBIN: 12.2 G/DL (ref 13.5–17.5)
MCH RBC QN AUTO: 28.9 PG (ref 26–34)
MCHC RBC AUTO-ENTMCNC: 32.3 G/DL (ref 31–37)
MCV RBC AUTO: 89.6 FL (ref 80–100)
NRBC AUTOMATED: ABNORMAL PER 100 WBC
PDW BLD-RTO: 14.8 % (ref 11.5–14.9)
PLATELET # BLD: 204 K/UL (ref 150–450)
PMV BLD AUTO: 9.3 FL (ref 6–12)
POTASSIUM SERPL-SCNC: 3.9 MMOL/L (ref 3.7–5.3)
RBC # BLD: 4.23 M/UL (ref 4.5–5.9)
SODIUM BLD-SCNC: 139 MMOL/L (ref 135–144)
TOTAL PROTEIN: 7 G/DL (ref 6.4–8.3)
WBC # BLD: 4.3 K/UL (ref 3.5–11)

## 2019-07-11 PROCEDURE — 97127 HC SP THER IVNTJ W/FOCUS COG FUNCJ: CPT

## 2019-07-11 PROCEDURE — 97110 THERAPEUTIC EXERCISES: CPT

## 2019-07-11 PROCEDURE — 97535 SELF CARE MNGMENT TRAINING: CPT

## 2019-07-11 PROCEDURE — 6370000000 HC RX 637 (ALT 250 FOR IP): Performed by: PHYSICAL MEDICINE & REHABILITATION

## 2019-07-11 PROCEDURE — 99231 SBSQ HOSP IP/OBS SF/LOW 25: CPT | Performed by: INTERNAL MEDICINE

## 2019-07-11 PROCEDURE — 1180000000 HC REHAB R&B

## 2019-07-11 PROCEDURE — 80053 COMPREHEN METABOLIC PANEL: CPT

## 2019-07-11 PROCEDURE — 36415 COLL VENOUS BLD VENIPUNCTURE: CPT

## 2019-07-11 PROCEDURE — 97112 NEUROMUSCULAR REEDUCATION: CPT

## 2019-07-11 PROCEDURE — 97116 GAIT TRAINING THERAPY: CPT

## 2019-07-11 PROCEDURE — 97530 THERAPEUTIC ACTIVITIES: CPT

## 2019-07-11 PROCEDURE — 99232 SBSQ HOSP IP/OBS MODERATE 35: CPT | Performed by: PHYSICAL MEDICINE & REHABILITATION

## 2019-07-11 PROCEDURE — 97542 WHEELCHAIR MNGMENT TRAINING: CPT

## 2019-07-11 PROCEDURE — 85027 COMPLETE CBC AUTOMATED: CPT

## 2019-07-11 PROCEDURE — 6360000002 HC RX W HCPCS: Performed by: PHYSICAL MEDICINE & REHABILITATION

## 2019-07-11 PROCEDURE — 92507 TX SP LANG VOICE COMM INDIV: CPT

## 2019-07-11 RX ADMIN — GABAPENTIN 200 MG: 100 CAPSULE ORAL at 21:38

## 2019-07-11 RX ADMIN — MECLIZINE HYDROCHLORIDE 25 MG: 25 TABLET ORAL at 21:38

## 2019-07-11 RX ADMIN — AMLODIPINE BESYLATE 5 MG: 5 TABLET ORAL at 07:42

## 2019-07-11 RX ADMIN — ONDANSETRON HYDROCHLORIDE 4 MG: 4 TABLET, FILM COATED ORAL at 17:59

## 2019-07-11 RX ADMIN — ASPIRIN 81 MG 81 MG: 81 TABLET ORAL at 07:42

## 2019-07-11 RX ADMIN — HEPARIN SODIUM 5000 UNITS: 5000 INJECTION INTRAVENOUS; SUBCUTANEOUS at 21:39

## 2019-07-11 RX ADMIN — HEPARIN SODIUM 5000 UNITS: 5000 INJECTION INTRAVENOUS; SUBCUTANEOUS at 14:45

## 2019-07-11 RX ADMIN — HEPARIN SODIUM 5000 UNITS: 5000 INJECTION INTRAVENOUS; SUBCUTANEOUS at 06:28

## 2019-07-11 RX ADMIN — POLYETHYLENE GLYCOL 3350 17 G: 17 POWDER, FOR SOLUTION ORAL at 07:42

## 2019-07-11 RX ADMIN — OXYCODONE HYDROCHLORIDE 5 MG: 5 TABLET ORAL at 21:39

## 2019-07-11 RX ADMIN — MECLIZINE HYDROCHLORIDE 25 MG: 25 TABLET ORAL at 06:28

## 2019-07-11 RX ADMIN — CLOPIDOGREL BISULFATE 75 MG: 75 TABLET ORAL at 07:42

## 2019-07-11 RX ADMIN — COLCHICINE 0.6 MG: 0.6 TABLET, FILM COATED ORAL at 07:43

## 2019-07-11 RX ADMIN — ATORVASTATIN CALCIUM 80 MG: 80 TABLET, FILM COATED ORAL at 21:38

## 2019-07-11 RX ADMIN — COLCHICINE 0.6 MG: 0.6 TABLET, FILM COATED ORAL at 21:37

## 2019-07-11 RX ADMIN — MECLIZINE HYDROCHLORIDE 25 MG: 25 TABLET ORAL at 14:44

## 2019-07-11 RX ADMIN — GABAPENTIN 200 MG: 100 CAPSULE ORAL at 14:44

## 2019-07-11 RX ADMIN — GABAPENTIN 200 MG: 100 CAPSULE ORAL at 07:43

## 2019-07-11 RX ADMIN — NYSTATIN 500000 UNITS: 100000 SUSPENSION ORAL at 14:46

## 2019-07-11 RX ADMIN — NYSTATIN 500000 UNITS: 100000 SUSPENSION ORAL at 16:37

## 2019-07-11 ASSESSMENT — PAIN SCALES - GENERAL
PAINLEVEL_OUTOF10: 5
PAINLEVEL_OUTOF10: 0
PAINLEVEL_OUTOF10: 1

## 2019-07-11 NOTE — PROGRESS NOTES
Speech Language Pathology  Speech Language Pathology  [facility]    Speech Language Treatment Note    Date: 7/11/2019  Patients Name: Verna Kang  MRN: [id number]  Diagnosis:   Patient Active Problem List   Diagnosis Code    Impaired fasting glucose R73.01    Patient overweight E66.3    Insomnia G47.00    Hypertension I10    OA (osteoarthritis) of knee M17.10    S/P total knee arthroplasty Z96.659    Bronchitis with asthma, acute J20.9, J45.909    Lymphadenopathy of left cervical region R59.0    Carotid stenosis, left I65.22    H/O carotid endarterectomy Z98.890    Thyroid nodule E04.1    Ventral hernia without obstruction or gangrene K43.9    Unstable angina (HCC) I20.0    Myocardiopathy (HCC) I42.9    Abdominal mass, RUQ (right upper quadrant) R19.01    Chest pain R07.9    Spontaneous intracranial hemorrhage (HCC) I62.9       Pain: 0/10    Speech and Language Treatment  Treatment time: 9202-3931    Subjective: [x] Alert [x] Cooperative     [] Confused     [] Agitated    [] Lethargic    Objective/Assessment:    cognition:  Diagnostic therapy for reassessment completed. Pt. Able to recall paragraphs, 3 words immediately and following delay. Pt. Needed very min A c abstract reasoning, otherwise sequencing, problem solving and orientation are within functional limits. Speech:   Pt. Needed min cues in conversation and in structured tasks to use compensatory dysarthria strategies as he demonstrates rushes of speech at times. He was able to recall 3/3 strategies. Other: Pt. Reports he continues to experience nausea and vomiting at times. Pt. reports no difficulty c swallowing or difficulty c mastication when dentures in when eating meal on regular diet. Pt. Demonstrated no overt s/s aspiration when taking small sips from cup or straw. Pt. Reports he is able to remember to take small sips when drinking. ST removed straw restriction on diet order.      Updated treatment goals: 1. Increase speech intelligibility via compensatory dysarthria strategies and articulation drills to a functional level in conversation. 2.  Increase abstract reasoning to 90%. Plan:  [x] Continue ST services    [] Discharge from ST:      Discharge recommendations: [] Inpatient Rehab   [] [place]   [] Outpatient Therapy  [] Follow up at trauma clinic   [] Other:       Treatment completed by: Jay Jay Mccain A.CCC/SLP

## 2019-07-11 NOTE — CONSULTS
ondansetron, acetaminophen, aluminum & magnesium hydroxide-simethicone, bisacodyl, methocarbamol, oxyCODONE **OR** [DISCONTINUED] oxyCODONE, nitroGLYCERIN, calcium carbonate    Data:     Past Medical History:   has a past medical history of Anxiety, Bronchitis with asthma, acute, Carotid stenosis, left, Diabetes mellitus ([address] GERD (gastroesophageal reflux disease), Hyperlipidemia, Hypertension, and Osteoarthritis. Social History:   reports that he has never smoked. He has never used smokeless tobacco. He reports that he drinks alcohol. He reports that he does not use drugs. Family History:   Family History   Problem Relation Age of Onset   Lim Cancer Mother     Cancer Father        Vitals:  /72   Pulse 87   Temp 98.4 °F (36.9 °C) (Oral)   Resp 18   Ht 5' 10\" (1.778 m)   Wt 252 lb (114.3 kg)   SpO2 100%   BMI 36.16 kg/m²   Temp (24hrs), Av.7 °F (37.1 °C), Min:98.4 °F (36.9 °C), Max:98.9 °F (37.2 °C)    No results for input(s): POCGLU in the last 72 hours. I/O (24Hr):   No intake or output data in the 24 hours ending 19 1651    Labs:    Hematology:  Recent Labs     19  0628   WBC 4.3   RBC 4.23*   HGB 12.2*   HCT 37.9*   MCV 89.6   MCH 28.9   MCHC 32.3   RDW 14.8      MPV 9.3     Chemistry:  Recent Labs     19  0628      K 3.9   CL 99   CO2 27   GLUCOSE 102*   BUN 14   CREATININE 0.61*   ANIONGAP 13   LABGLOM >60   GFRAA >60   CALCIUM 9.6     Recent Labs     19  0628   PROT 7.0   LABALBU 4.1   AST 48*   ALT 71*   ALKPHOS 105   BILITOT 0.67         No results found for: SPECIAL  No results found for: CULTURE    No results found for: POCPH, PHART, PH, POCPCO2, ODI8OWN, PCO2, POCPO2, PO2ART, PO2, POCHCO3, NTD9ZKT, HCO3, NBEA, PBEA, BEART, BE, THGBART, THB, BTB3GXC, CZTD7KPX, T5UUANIR, O2SAT, FIO2    Radiology:        Physical Examination:        General appearance:  alert, cooperative and no distress  Mental Status:  oriented to person, place and time and

## 2019-07-11 NOTE — PROGRESS NOTES
Component Value Date    CHOL 179 07/27/2018    HDL 48 07/27/2018    TRIG 122 07/27/2018     LIVER PROFILE:   Recent Labs     07/11/19  0628   AST 48*   ALT 71*   BILITOT 0.67   ALKPHOS 105        I/O (24Hr): No intake or output data in the 24 hours ending 07/11/19 1046    Glu last 24 hour  No results for input(s): POCGLU in the last 72 hours. No results for input(s): CLARITYU, COLORU, PHUR, SPECGRAV, PROTEINU, RBCUA, BLOODU, BACTERIA, NITRU, WBCUA, LEUKOCYTESUR, YEAST, GLUCOSEU, BILIRUBINUR in the last 72 hours. X-ray abdomen 7/4/19  Gas seen in the colon with evidence for stool in the colon.  No extraluminal  gas.  The colon is mildly distended on the left side.  No soft tissue gas.      Impression:       No evidence for obstruction.  No extraluminal gas. Impression/Plan:    1. Ambulatory and ADL dysfunction secondary to cerebellar hemorrhage status post craniectomy and evacuation complicated by prior stent placement which was complicated by RCA perforation and tamponade- cont  rehab efforts fall precautions reviewed, team conference reviewed, plan 7/24 depending on progress  2. Cerebellar hemorrhage status post craniectomy with ataxia, double vision and sensory deficits-  monitor neurologic status, dizziness/nausea suspect from cerebellar bleed-  Antivert-scheduled, continue eye patch, continue to monitor   3. [place] secondary #2, positive BM, abdominal exam negative, continue to monitor, Zofran,   abdominal exam negative and abdominal film negative,s- MiraLAX as needed, monitor  4. Cardiac-CHF, stents, coronary disease, hypertension-Norvasc, Lipitor, as needed nitroglycerin, aspirin, Plavix, note appointment 8/15/2019 with cardiology with echo at [facility]  5. History of gout-colchicine  6. Left anterior lateral hip numbness-possible meralgia paresthetica-Neurontin   7.  Shoulder discomfort- improved- Lidoderm patch, x-ray noted osteoarthritis, continue to monitor- hold on

## 2019-07-12 PROCEDURE — 97116 GAIT TRAINING THERAPY: CPT

## 2019-07-12 PROCEDURE — 6370000000 HC RX 637 (ALT 250 FOR IP): Performed by: PHYSICAL MEDICINE & REHABILITATION

## 2019-07-12 PROCEDURE — 97542 WHEELCHAIR MNGMENT TRAINING: CPT

## 2019-07-12 PROCEDURE — 97112 NEUROMUSCULAR REEDUCATION: CPT

## 2019-07-12 PROCEDURE — 99231 SBSQ HOSP IP/OBS SF/LOW 25: CPT | Performed by: INTERNAL MEDICINE

## 2019-07-12 PROCEDURE — 1180000000 HC REHAB R&B

## 2019-07-12 PROCEDURE — 97110 THERAPEUTIC EXERCISES: CPT

## 2019-07-12 PROCEDURE — 97530 THERAPEUTIC ACTIVITIES: CPT

## 2019-07-12 PROCEDURE — 97127 HC SP THER IVNTJ W/FOCUS COG FUNCJ: CPT

## 2019-07-12 PROCEDURE — 99232 SBSQ HOSP IP/OBS MODERATE 35: CPT | Performed by: PHYSICAL MEDICINE & REHABILITATION

## 2019-07-12 PROCEDURE — 6360000002 HC RX W HCPCS: Performed by: PHYSICAL MEDICINE & REHABILITATION

## 2019-07-12 PROCEDURE — 97535 SELF CARE MNGMENT TRAINING: CPT

## 2019-07-12 RX ORDER — GABAPENTIN 300 MG/1
300 CAPSULE ORAL 3 TIMES DAILY
Status: DISCONTINUED | OUTPATIENT
Start: 2019-07-12 | End: 2019-07-15

## 2019-07-12 RX ADMIN — HEPARIN SODIUM 5000 UNITS: 5000 INJECTION INTRAVENOUS; SUBCUTANEOUS at 20:56

## 2019-07-12 RX ADMIN — ATORVASTATIN CALCIUM 80 MG: 80 TABLET, FILM COATED ORAL at 20:52

## 2019-07-12 RX ADMIN — MECLIZINE HYDROCHLORIDE 25 MG: 25 TABLET ORAL at 20:52

## 2019-07-12 RX ADMIN — MECLIZINE HYDROCHLORIDE 25 MG: 25 TABLET ORAL at 07:30

## 2019-07-12 RX ADMIN — MECLIZINE HYDROCHLORIDE 25 MG: 25 TABLET ORAL at 15:00

## 2019-07-12 RX ADMIN — POLYETHYLENE GLYCOL 3350 17 G: 17 POWDER, FOR SOLUTION ORAL at 07:30

## 2019-07-12 RX ADMIN — HEPARIN SODIUM 5000 UNITS: 5000 INJECTION INTRAVENOUS; SUBCUTANEOUS at 06:34

## 2019-07-12 RX ADMIN — GABAPENTIN 300 MG: 300 CAPSULE ORAL at 15:00

## 2019-07-12 RX ADMIN — COLCHICINE 0.6 MG: 0.6 TABLET, FILM COATED ORAL at 20:54

## 2019-07-12 RX ADMIN — CLOPIDOGREL BISULFATE 75 MG: 75 TABLET ORAL at 07:30

## 2019-07-12 RX ADMIN — NYSTATIN 500000 UNITS: 100000 SUSPENSION ORAL at 20:54

## 2019-07-12 RX ADMIN — OXYCODONE HYDROCHLORIDE 5 MG: 5 TABLET ORAL at 07:33

## 2019-07-12 RX ADMIN — COLCHICINE 0.6 MG: 0.6 TABLET, FILM COATED ORAL at 07:30

## 2019-07-12 RX ADMIN — GABAPENTIN 300 MG: 300 CAPSULE ORAL at 20:52

## 2019-07-12 RX ADMIN — OXYCODONE HYDROCHLORIDE 5 MG: 5 TABLET ORAL at 20:54

## 2019-07-12 RX ADMIN — HEPARIN SODIUM 5000 UNITS: 5000 INJECTION INTRAVENOUS; SUBCUTANEOUS at 13:00

## 2019-07-12 RX ADMIN — GABAPENTIN 200 MG: 100 CAPSULE ORAL at 07:30

## 2019-07-12 RX ADMIN — NYSTATIN 500000 UNITS: 100000 SUSPENSION ORAL at 18:18

## 2019-07-12 RX ADMIN — AMLODIPINE BESYLATE 5 MG: 5 TABLET ORAL at 07:30

## 2019-07-12 RX ADMIN — NYSTATIN 500000 UNITS: 100000 SUSPENSION ORAL at 07:31

## 2019-07-12 RX ADMIN — ASPIRIN 81 MG 81 MG: 81 TABLET ORAL at 07:30

## 2019-07-12 ASSESSMENT — PAIN DESCRIPTION - FREQUENCY: FREQUENCY: INTERMITTENT

## 2019-07-12 ASSESSMENT — PAIN SCALES - GENERAL
PAINLEVEL_OUTOF10: 4
PAINLEVEL_OUTOF10: 5
PAINLEVEL_OUTOF10: 2

## 2019-07-12 ASSESSMENT — PAIN DESCRIPTION - LOCATION: LOCATION: NECK

## 2019-07-12 ASSESSMENT — PAIN DESCRIPTION - DESCRIPTORS: DESCRIPTORS: ACHING;DISCOMFORT;OTHER (COMMENT)

## 2019-07-12 ASSESSMENT — PAIN DESCRIPTION - ORIENTATION: ORIENTATION: POSTERIOR

## 2019-07-12 ASSESSMENT — PAIN DESCRIPTION - PROGRESSION: CLINICAL_PROGRESSION: NOT CHANGED

## 2019-07-12 ASSESSMENT — PAIN DESCRIPTION - ONSET: ONSET: ON-GOING

## 2019-07-12 ASSESSMENT — PAIN DESCRIPTION - PAIN TYPE: TYPE: ACUTE PAIN

## 2019-07-12 NOTE — PROGRESS NOTES
Nutrition Assessment    Type and Reason for Visit: Reassess    Nutrition Recommendations: Continue current diet and continue Ensure Enlive twice daily. Nutrition Assessment: Pt is improving from a nutritional standpoint as evidenced by increasing PO intakes. Pt advanced to a general diet and is tolerating it well. Pt is tolerating oral nutrition supplement well, although intake may vary depending on his meal intake. Pt shows no signs of malnutrition but is at risk due to persistent nausea that may affect intake. Will continue to monitor PO and supplement intake, wt, and improvement of GI distress. Malnutrition Assessment:  · Malnutrition Status: At risk for malnutrition  · Context: Acute illness or injury  · Findings of the 6 clinical characteristics of malnutrition (Minimum of 2 out of 6 clinical characteristics is required to make the diagnosis of moderate or severe Protein Calorie Malnutrition based on AND/ASPEN Guidelines):  1. Energy Intake-Less than or equal to 75% of estimated energy requirement, (since 7-4)    2. Weight Loss-2% loss or greater, (in 7 months)  3. Fat Loss-Unable to assess,    4. Muscle Loss-Unable to assess,    5. Fluid Accumulation-No significant fluid accumulation, Extremities    Nutrition Risk Level: Moderate    Nutrient Needs:  · Estimated Daily Total Kcal: 2644-1595 kcals based on 16-18 kcals per kg of current body wt  · Estimated Daily Protein (g): 90-98 g based on 1.2-1.3 g per kg of ideal body wt    Nutrition Diagnosis:   · Problem: Inadequate oral intake  · Etiology: related to Insufficient energy/nutrient consumption     Signs and symptoms:  as evidenced by Diet history of poor intake, Nausea    Objective Information:  · Nutrition-Focused Physical Findings: No edema. Persistent nausea.    · Current Nutrition Therapies:  · Oral Diet Orders: General   · Oral Diet intake: 51-75%  · Oral Nutrition Supplement (ONS) Orders: Standard High Calorie Oral Supplement  · ONS intake:

## 2019-07-12 NOTE — PROGRESS NOTES
exercises 2: Blue Tband x 20 reps  Other exercises 3: Supine B LE x 15 reps 2 lbs  Other exercises 4: Bridging x 15 reps  Other exercises 5: Standing B LE x 10 reps  Other exercises 6: Nustep x 10 min f/b L4           Activity Tolerance: Patient limited by fatigue, Patient limited by endurance  Activity Tolerance: Nausea, light headed and dizzy at times          Patient Education  New Education Provided: Safety with transfers, visual focus with eyes open during ambulation. Learner:patient  Method: demonstration and explanation       Outcome: needs reinforcement     Current Treatment Recommendations: Strengthening, Balance Training, Functional Mobility Training, Transfer Training, Gait Training, Stair training, Wheelchair Mobility Training, Neuromuscular Re-education, Home Exercise Program, Safety Education & Training, Patient/Caregiver Education & Training, Equipment Evaluation, Education, & procurement, Vestibular Rehab    Conditions Requiring Skilled Therapeutic Intervention  Body structures, Functions, Activity limitations: Decreased functional mobility ; Decreased strength;Decreased safe awareness;Decreased balance;Decreased coordination  Assessment: Decreased nausea and dizziness noted. Treatment Diagnosis: Impaired mobility  Patient Education: Safety with transfers, visual focus with eyes open during ambulation.   REQUIRES PT FOLLOW UP: Yes  Discharge Recommendations: Home with assist PRN;Patient would benefit from continued therapy after discharge    Goals  Short term goals  Time Frame for Short term goals: 10 days  Short term goal 1: Pt able to perform supine<>sit at mod-I   Short term goal 2:  Pt able to transfer at varies surfaces at min A   Short term goal 3:  Pt able to ambulate  with RW dist of 100 ft x 2, with RW, min/mod A , w/wc to follow for safety`  Short term goal 4: Pt able to go up and down 10 steps with 2 raisl , min A x 2 `  Short term goal 5: Pt able to improve standing dynamic balance with

## 2019-07-12 NOTE — PROGRESS NOTES
chloride, ondansetron, acetaminophen, aluminum & magnesium hydroxide-simethicone, bisacodyl, methocarbamol, oxyCODONE **OR** [DISCONTINUED] oxyCODONE, nitroGLYCERIN, calcium carbonate     Diagnostics:     CBC:   Recent Labs     07/11/19  0628   WBC 4.3   RBC 4.23*   HGB 12.2*   HCT 37.9*   MCV 89.6   RDW 14.8        BMP:   Recent Labs     07/11/19  0628      K 3.9   CL 99   CO2 27   BUN 14   CREATININE 0.61*     BNP: No results for input(s): BNP in the last 72 hours. PT/INR: No results for input(s): PROTIME, INR in the last 72 hours. APTT: No results for input(s): APTT in the last 72 hours. CARDIAC ENZYMES: No results for input(s): CKMB, CKMBINDEX, TROPONINT in the last 72 hours. Invalid input(s): CKTOTAL;3  FASTING LIPID PANEL:  Lab Results   Component Value Date    CHOL 179 07/27/2018    HDL 48 07/27/2018    TRIG 122 07/27/2018     LIVER PROFILE:   Recent Labs     07/11/19  0628   AST 48*   ALT 71*   BILITOT 0.67   ALKPHOS 105        I/O (24Hr): No intake or output data in the 24 hours ending 07/12/19 1039    Glu last 24 hour  No results for input(s): POCGLU in the last 72 hours. No results for input(s): CLARITYU, COLORU, PHUR, SPECGRAV, PROTEINU, RBCUA, BLOODU, BACTERIA, NITRU, WBCUA, LEUKOCYTESUR, YEAST, GLUCOSEU, BILIRUBINUR in the last 72 hours. X-ray abdomen 7/4/19  Gas seen in the colon with evidence for stool in the colon.  No extraluminal  gas.  The colon is mildly distended on the left side.  No soft tissue gas.      Impression:       No evidence for obstruction.  No extraluminal gas. Impression/Plan:    1. Ambulatory and ADL dysfunction secondary to cerebellar hemorrhage status post craniectomy and evacuation complicated by prior stent placement which was complicated by RCA perforation and tamponade- cont  rehab efforts fall precautions reviewed, team conference reviewed, DC plan 7/24 depending on progress  2.  Cerebellar hemorrhage status post craniectomy with ataxia, double vision and sensory deficits-  monitor neurologic status, dizziness/nausea suspect from cerebellar bleed-  Antivert-scheduled, continue eye patch, continue to monitor, continues with impulsivity- has guard, monitor next few days-hopefully can remove soon  3. GI/Nausea-suspect secondary #2, positive BM 7/11,  continue to monitor, Zofran,   abdominal exam negative and abdominal film negative,s- MiraLAX as needed, monitor  4. Cardiac-CHF, stents, coronary disease, hypertension-Norvasc, Lipitor, as needed nitroglycerin, aspirin, Plavix, note appointment 8/15/2019 with cardiology with echo at [facility]  5. gout-colchicine  6. Left anterior lateral hip numbness-possible meralgia paresthetica- increase Neurontin   7. Shoulder discomfort- improved- Lidoderm patch, x-ray noted osteoarthritis, continue to monitor- hold on injection as improving, improve range, decreased pain, no tenderness to palpation today, monitor need for Lidoderm patch  8. Pain-Roxicodone decrease, Robaxin   9. DVT prophylaxis- subcu heparin  10. Internal medicine for medical management    Paulo Almonte. Savanna Childers MD       This note is created with the assistance of a speech recognition program.  While intending to generate a document that actually reflects the content of the visit, the document can still have some errors including those of syntax and sound a like substitutions which may escape proof reading.   In such instances, actual meaning can be extrapolated by contextual diversion

## 2019-07-13 PROCEDURE — 97116 GAIT TRAINING THERAPY: CPT

## 2019-07-13 PROCEDURE — 1180000000 HC REHAB R&B

## 2019-07-13 PROCEDURE — 99231 SBSQ HOSP IP/OBS SF/LOW 25: CPT | Performed by: INTERNAL MEDICINE

## 2019-07-13 PROCEDURE — 6370000000 HC RX 637 (ALT 250 FOR IP): Performed by: PHYSICAL MEDICINE & REHABILITATION

## 2019-07-13 PROCEDURE — 97535 SELF CARE MNGMENT TRAINING: CPT

## 2019-07-13 PROCEDURE — 97110 THERAPEUTIC EXERCISES: CPT

## 2019-07-13 PROCEDURE — 97530 THERAPEUTIC ACTIVITIES: CPT

## 2019-07-13 PROCEDURE — 97542 WHEELCHAIR MNGMENT TRAINING: CPT

## 2019-07-13 PROCEDURE — 6360000002 HC RX W HCPCS: Performed by: PHYSICAL MEDICINE & REHABILITATION

## 2019-07-13 RX ADMIN — GABAPENTIN 300 MG: 300 CAPSULE ORAL at 21:13

## 2019-07-13 RX ADMIN — CLOPIDOGREL BISULFATE 75 MG: 75 TABLET ORAL at 07:13

## 2019-07-13 RX ADMIN — COLCHICINE 0.6 MG: 0.6 TABLET, FILM COATED ORAL at 13:21

## 2019-07-13 RX ADMIN — NYSTATIN 500000 UNITS: 100000 SUSPENSION ORAL at 12:25

## 2019-07-13 RX ADMIN — MECLIZINE HYDROCHLORIDE 25 MG: 25 TABLET ORAL at 21:13

## 2019-07-13 RX ADMIN — OXYCODONE HYDROCHLORIDE 5 MG: 5 TABLET ORAL at 21:14

## 2019-07-13 RX ADMIN — GABAPENTIN 300 MG: 300 CAPSULE ORAL at 07:13

## 2019-07-13 RX ADMIN — ASPIRIN 81 MG 81 MG: 81 TABLET ORAL at 07:13

## 2019-07-13 RX ADMIN — ATORVASTATIN CALCIUM 80 MG: 80 TABLET, FILM COATED ORAL at 21:13

## 2019-07-13 RX ADMIN — NYSTATIN 500000 UNITS: 100000 SUSPENSION ORAL at 07:19

## 2019-07-13 RX ADMIN — NYSTATIN 500000 UNITS: 100000 SUSPENSION ORAL at 21:15

## 2019-07-13 RX ADMIN — POLYETHYLENE GLYCOL 3350 17 G: 17 POWDER, FOR SOLUTION ORAL at 07:14

## 2019-07-13 RX ADMIN — MECLIZINE HYDROCHLORIDE 25 MG: 25 TABLET ORAL at 07:13

## 2019-07-13 RX ADMIN — AMLODIPINE BESYLATE 5 MG: 5 TABLET ORAL at 07:13

## 2019-07-13 RX ADMIN — COLCHICINE 0.6 MG: 0.6 TABLET, FILM COATED ORAL at 21:15

## 2019-07-13 RX ADMIN — MECLIZINE HYDROCHLORIDE 25 MG: 25 TABLET ORAL at 13:50

## 2019-07-13 RX ADMIN — HEPARIN SODIUM 5000 UNITS: 5000 INJECTION INTRAVENOUS; SUBCUTANEOUS at 13:51

## 2019-07-13 RX ADMIN — HEPARIN SODIUM 5000 UNITS: 5000 INJECTION INTRAVENOUS; SUBCUTANEOUS at 06:03

## 2019-07-13 RX ADMIN — GABAPENTIN 300 MG: 300 CAPSULE ORAL at 13:50

## 2019-07-13 RX ADMIN — HEPARIN SODIUM 5000 UNITS: 5000 INJECTION INTRAVENOUS; SUBCUTANEOUS at 21:16

## 2019-07-13 ASSESSMENT — PAIN DESCRIPTION - LOCATION
LOCATION: HEAD;NECK
LOCATION: HEAD;NECK

## 2019-07-13 ASSESSMENT — PAIN DESCRIPTION - ORIENTATION
ORIENTATION: POSTERIOR
ORIENTATION: POSTERIOR

## 2019-07-13 ASSESSMENT — PAIN DESCRIPTION - DIRECTION: RADIATING_TOWARDS: FRONT OF HEAD

## 2019-07-13 ASSESSMENT — PAIN SCALES - GENERAL
PAINLEVEL_OUTOF10: 6
PAINLEVEL_OUTOF10: 4
PAINLEVEL_OUTOF10: 4
PAINLEVEL_OUTOF10: 0

## 2019-07-13 ASSESSMENT — PAIN DESCRIPTION - FREQUENCY: FREQUENCY: INTERMITTENT

## 2019-07-13 ASSESSMENT — PAIN DESCRIPTION - DESCRIPTORS: DESCRIPTORS: HEADACHE;ACHING;DISCOMFORT

## 2019-07-13 ASSESSMENT — PAIN DESCRIPTION - PAIN TYPE
TYPE: ACUTE PAIN
TYPE: ACUTE PAIN

## 2019-07-13 ASSESSMENT — PAIN DESCRIPTION - PROGRESSION: CLINICAL_PROGRESSION: NOT CHANGED

## 2019-07-13 ASSESSMENT — PAIN DESCRIPTION - ONSET: ONSET: ON-GOING

## 2019-07-13 NOTE — PROGRESS NOTES
half of AM tx session, pt reports dizziness-subsides c seated rest per pt report. Functional Mobility  Functional - Mobility Device: Rolling Walker  Activity: To/from bathroom  Assist Level: Contact guard assistance  Functional Mobility Comments: Vc's for safe technique c RW-fair carryover, pt unsteady with exiting shower and completing mobility back to w/c. Pt also utilized w/c to self propell self to/from sink for grooming-pt completed these tasks seated this date d/t dizziness for ensured safety.  (min A c w/c mgmt. )  Shower Transfers  Shower - Transfer From: Derek Melani - Transfer Type: To and From  Shower - Transfer To: Transfer tub bench  Shower - Technique: Ambulating  Shower Transfers: Contact Guard  Shower Transfers Comments: VC's for proper technique to ensure safety. Ataxia noted c LUE while reaching for grab bar. Additional Activities: Other  Additional Activities: Pt completed [place] spatial relations board to address visual scanning and perceptual skills as well as LUE FMC/GMC to improve overall functional task performance. Pt required occ use of RUE to assist c shape retrieval from indented board. Pt req increased time and noted improvmenet c removal of eye patch.       OT FIM:   Groomin - Requires setup/cues to do all tasks  Bathin - Able to bathe all 10 areas with setup/sup/cues  Dressing-Upper: 0 - Did not occur(gown)  Dressing-Lower: 4 - Requires assist with buttons/zippers/shoelaces and/or assist with shoes only(A c B TEDs, shoes)  Toiletin - Did not occur  Toilet Transfer: 0 - Did not occur  Primary Mode: Tub  Tub Transfer: 0 - Activity does not occur  Shower Transfer: 4 - Minimal contact assistance, pt. expends 75% or more effort    Social Interaction: 5 - Patient is appropriate with supervision/cues  Problem Solvin - Patient solves simple/routine tasks 75-90%+   Memory: 4 - Patient remembers 75-90%+ of the time    Assessment  Performance deficits / Impairments: Decreased functional mobility ; Decreased safe awareness;Decreased balance;Decreased coordination;Decreased ADL status; Decreased cognition;Decreased vision/visual deficit; Decreased endurance;Decreased high-level IADLs;Decreased sensation;Decreased fine motor control  Prognosis: Good  Discharge Recommendations: Patient would benefit from continued therapy after discharge;24 hour supervision or assist  Activity Tolerance: Patient Tolerated treatment well  Safety Devices in place: Yes  Type of devices: Gait belt;Left in chair;Call light within reach  Equipment Recommendations  Equipment Needed: Yes     Plan  Plan  Times per week: 900 minutes   Times per day: Twice a day  Current Treatment Recommendations: Endurance Training, Patient/Caregiver Education & Training, Self-Care / ADL, Balance Training, Home Management Training, Cognitive/Perceptual Training, Functional Mobility Training, Safety Education & Training  Plan Comment: due to effects of cerebellar infarct and dizziness/nausea, patient will be 900 minutes / 7 days of OT/PT/ST    Patient Goals   Patient goals : return to indep as prior  Short term goals  Time Frame for Short term goals: 1 week  Short term goal 1: pt able to use LUE to hold washcloth and containers to open without dropping  Short term goal 2: min grooming  Short term goal 3: min bathing (pt returned re LHS for feet)  Short term goal 4: mod LE dress  Short term goal 5: monica 6-8 min stand with 1 UE support and CGA  Long term goals  Time Frame for Long term goals : by discharge  Long term goal 1: mod indep feeding and grooming  Long term goal 2: set up, SBA for bathing  Long term goal 3: min LE dress  Long term goal 4: monica 8-10 min stand, amb with AE and CGA  Long term goal 5: pt able to use LUE for adls with fair fine and gross coordination        07/13/19 1536 07/13/19 1537   OT Individual Minutes   Time In [address]   Time Out 1036 1304   Minutes 59 29     Electronically signed by

## 2019-07-13 NOTE — PROGRESS NOTES
edema BLEs  SKIN: warm dry and intact with good turgor cranial incision and posterior neck incision clean  PSYCH: appropriately interactive. Affect WNL. Good spirits        Medications   Scheduled Meds:   gabapentin  300 mg Oral TID    meclizine  25 mg Oral TID    nystatin  5 mL Oral 4x Daily    colchicine  0.6 mg Oral BID    polyethylene glycol  17 g Oral Daily    atorvastatin  80 mg Oral Nightly    clopidogrel  75 mg Oral Daily    aspirin  81 mg Oral Daily    amLODIPine  5 mg Oral Daily    heparin (porcine)  5,000 Units Subcutaneous 3 times per day    lidocaine  1 patch Transdermal Daily     Continuous Infusions:  PRN Meds:.sodium chloride, ondansetron, acetaminophen, aluminum & magnesium hydroxide-simethicone, bisacodyl, methocarbamol, oxyCODONE **OR** [DISCONTINUED] oxyCODONE, nitroGLYCERIN, calcium carbonate     Diagnostics:     CBC:   Recent Labs     07/11/19  0628   WBC 4.3   RBC 4.23*   HGB 12.2*   HCT 37.9*   MCV 89.6   RDW 14.8        BMP:   Recent Labs     07/11/19  0628      K 3.9   CL 99   CO2 27   BUN 14   CREATININE 0.61*     BNP: No results for input(s): BNP in the last 72 hours. PT/INR: No results for input(s): PROTIME, INR in the last 72 hours. APTT: No results for input(s): APTT in the last 72 hours. CARDIAC ENZYMES: No results for input(s): CKMB, CKMBINDEX, TROPONINT in the last 72 hours. Invalid input(s): CKTOTAL;3  FASTING LIPID PANEL:  Lab Results   Component Value Date    CHOL 179 07/27/2018    HDL 48 07/27/2018    TRIG 122 07/27/2018     LIVER PROFILE:   Recent Labs     07/11/19  0628   AST 48*   ALT 71*   BILITOT 0.67   ALKPHOS 105        I/O (24Hr): No intake or output data in the 24 hours ending 07/13/19 1108    Glu last 24 hour  No results for input(s): POCGLU in the last 72 hours.     No results for input(s): CLARITYU, COLORU, PHUR, [address], [address] [facility], [facility], BLOODU, BACTERIA, NITRU, [address], LEUKOCYTESUR, YEAST, [address] in the last 72

## 2019-07-13 NOTE — PROGRESS NOTES
Physical Therapy  [address] [facility]   Acute Rehabilitation Physical Therapy Progress Note    Date: 19  Patient Name: Maryana Osullivan       Room: 7127/9523-46  MRN: [id number]   Account: [de-identified]   : 1945  (70 y.o.) Gender: male     Referring Practitioner: Marcial Mcknight  Diagnosis: Left cerebellar ICH. Pt is s/p suboccipital craniotomy for cerebellar ICH on 19. L side ataxia, double vision -eye patch change every 2 hours. Past Medical History:  has a past medical history of Anxiety, Bronchitis with asthma, acute, Carotid stenosis, left, Diabetes mellitus ([address] GERD (gastroesophageal reflux disease), Hyperlipidemia, Hypertension, and Osteoarthritis. Past Surgical History:   has a past surgical history that includes Colonoscopy; Tonsillectomy and adenoidectomy; joint replacement (Left); shoulder surgery (Right); and Carotid endarterectomy (Left, 16). Additional Pertinent Hx: 75year old male with pertinent PMHx for CAD, HTN, HLD, L CEA in 2016, who presents to [address] [place] [address] with [address]. Patient recently had dyspnea on exertion and underwent cardiac catheterization , s/p RCA stenting , complicated by perforation and tamponade s/p pericardiocentesis with a covered stent through the perforation. Pt at [address] [place] [address] for follow up visit, when he c/o of nausea/vomiting, dysarthria and vison deficits. CT brain was done STAT and showed a left cerebellar ICH. Pt is s/p suboccipital craniotomy for cerebellar ICH on 19. Pt admitted to rehab unit on 7/3/19. Overall Orientation Status: Within Normal Limits  Restrictions/Precautions  Restrictions/Precautions: Cardiac; Fall Risk  Implants present? : Metal implants(L TKA)  Position Activity Restriction  Other position/activity restrictions: [facility]. Subjective: Patient reports he has continuous dizziness with activity. Comments: Fall Risk;  Chair alarm; Pt able to go up and down 10 steps with 2 raisl , min A x 2 `  Short term goal 5: Pt able to improve standing dynamic balance with RW at min/mod A   Long term goals  Time Frame for Long term goals : By DC  Long term goal 1: Pt tatum to transfer at supervsion level. Long term goal 2: Pt able to ambulate with appropriate device dist of 150 ft, level surface SBA   Long term goal 3: Pt able to ambulate with RW on outside terraine at min A   Long term goal 4: Pt able to go up and down 12 steps with 1 UE support, min A   Long term goal 5: Improve dynamic standing balance with assistive device, to fair + to reduce fall risk.   Long term goal 6: Pt able to manuever w/c dist of atleast 150 ft, level surfaces, mod-I       07/13/19 0800 07/13/19 1305   PT Individual Minutes   Time In 0800 1305   Time Out 0845 1350   Minutes 45 45   PT Concurrent Minutes   Time In 0845  --    Time Out 0900  --    Minutes 15  --        Electronically signed by ShilpaAloricabandar Moralez PTA on 7/13/19 at 4:04 PM

## 2019-07-14 PROCEDURE — 6370000000 HC RX 637 (ALT 250 FOR IP): Performed by: PHYSICAL MEDICINE & REHABILITATION

## 2019-07-14 PROCEDURE — 1180000000 HC REHAB R&B

## 2019-07-14 PROCEDURE — 97535 SELF CARE MNGMENT TRAINING: CPT

## 2019-07-14 PROCEDURE — 6360000002 HC RX W HCPCS: Performed by: PHYSICAL MEDICINE & REHABILITATION

## 2019-07-14 PROCEDURE — 97530 THERAPEUTIC ACTIVITIES: CPT

## 2019-07-14 PROCEDURE — 97110 THERAPEUTIC EXERCISES: CPT

## 2019-07-14 PROCEDURE — 97116 GAIT TRAINING THERAPY: CPT

## 2019-07-14 PROCEDURE — 99231 SBSQ HOSP IP/OBS SF/LOW 25: CPT | Performed by: INTERNAL MEDICINE

## 2019-07-14 RX ADMIN — HEPARIN SODIUM 5000 UNITS: 5000 INJECTION INTRAVENOUS; SUBCUTANEOUS at 20:49

## 2019-07-14 RX ADMIN — COLCHICINE 0.6 MG: 0.6 TABLET, FILM COATED ORAL at 20:50

## 2019-07-14 RX ADMIN — AMLODIPINE BESYLATE 5 MG: 5 TABLET ORAL at 07:06

## 2019-07-14 RX ADMIN — GABAPENTIN 300 MG: 300 CAPSULE ORAL at 12:12

## 2019-07-14 RX ADMIN — MECLIZINE HYDROCHLORIDE 25 MG: 25 TABLET ORAL at 20:49

## 2019-07-14 RX ADMIN — OXYCODONE HYDROCHLORIDE 5 MG: 5 TABLET ORAL at 20:49

## 2019-07-14 RX ADMIN — GABAPENTIN 300 MG: 300 CAPSULE ORAL at 20:49

## 2019-07-14 RX ADMIN — CLOPIDOGREL BISULFATE 75 MG: 75 TABLET ORAL at 07:06

## 2019-07-14 RX ADMIN — NYSTATIN 500000 UNITS: 100000 SUSPENSION ORAL at 09:28

## 2019-07-14 RX ADMIN — COLCHICINE 0.6 MG: 0.6 TABLET, FILM COATED ORAL at 09:28

## 2019-07-14 RX ADMIN — DICLOFENAC 4 G: 10 GEL TOPICAL at 12:10

## 2019-07-14 RX ADMIN — HEPARIN SODIUM 5000 UNITS: 5000 INJECTION INTRAVENOUS; SUBCUTANEOUS at 12:08

## 2019-07-14 RX ADMIN — DICLOFENAC 4 G: 10 GEL TOPICAL at 07:07

## 2019-07-14 RX ADMIN — DICLOFENAC 4 G: 10 GEL TOPICAL at 20:50

## 2019-07-14 RX ADMIN — NYSTATIN 500000 UNITS: 100000 SUSPENSION ORAL at 20:50

## 2019-07-14 RX ADMIN — MECLIZINE HYDROCHLORIDE 25 MG: 25 TABLET ORAL at 12:12

## 2019-07-14 RX ADMIN — HEPARIN SODIUM 5000 UNITS: 5000 INJECTION INTRAVENOUS; SUBCUTANEOUS at 05:35

## 2019-07-14 RX ADMIN — ATORVASTATIN CALCIUM 80 MG: 80 TABLET, FILM COATED ORAL at 20:49

## 2019-07-14 RX ADMIN — GABAPENTIN 300 MG: 300 CAPSULE ORAL at 07:06

## 2019-07-14 RX ADMIN — ASPIRIN 81 MG 81 MG: 81 TABLET ORAL at 07:06

## 2019-07-14 RX ADMIN — MECLIZINE HYDROCHLORIDE 25 MG: 25 TABLET ORAL at 07:06

## 2019-07-14 ASSESSMENT — 9 HOLE PEG TEST
TEST_RESULT: FUNCTIONAL
TEST_RESULT: IMPAIRED
TESTTIME_SECONDS: 36
TESTTIME_SECONDS: 68

## 2019-07-14 ASSESSMENT — PAIN SCALES - GENERAL
PAINLEVEL_OUTOF10: 5
PAINLEVEL_OUTOF10: 0
PAINLEVEL_OUTOF10: 4
PAINLEVEL_OUTOF10: 3

## 2019-07-14 ASSESSMENT — PAIN DESCRIPTION - PROGRESSION: CLINICAL_PROGRESSION: NOT CHANGED

## 2019-07-14 ASSESSMENT — PAIN DESCRIPTION - PAIN TYPE: TYPE: ACUTE PAIN

## 2019-07-14 ASSESSMENT — PAIN DESCRIPTION - DESCRIPTORS: DESCRIPTORS: ACHING;DISCOMFORT;HEADACHE

## 2019-07-14 ASSESSMENT — PAIN DESCRIPTION - ORIENTATION: ORIENTATION: POSTERIOR

## 2019-07-14 ASSESSMENT — PAIN DESCRIPTION - FREQUENCY: FREQUENCY: INTERMITTENT

## 2019-07-14 ASSESSMENT — PAIN DESCRIPTION - LOCATION: LOCATION: HEAD;NECK

## 2019-07-14 ASSESSMENT — PAIN DESCRIPTION - ONSET: ONSET: ON-GOING

## 2019-07-14 NOTE — PROGRESS NOTES
[address] [facility].    Date:   7/14/2019  Patient name:  Zackery David  Date of admission:  7/3/2019  3:09 PM  MRN:   [id number]  YOB: 1945    CC- CVA     HPI-  Pt with hemorrhagic CVA   bp well controlled     No complaints today     REVIEW OF SYSTEMS:    · General----negative for fatigue, weight loss  · GI negative for nausea and vomiting, no dysphagia       EXAM-  /74   Pulse 88   Temp 97.8 °F (36.6 °C) (Oral)   Resp 18   Ht 5' 10\" (1.778 m)   Wt 252 lb (114.3 kg)   SpO2 99%   BMI 36.16 kg/m²      · General appearance: NAD conversant  · Lungs: normal effort, clear to auscultation bilaterally,no wheeze. · Heart: regular rate and rhythm, S1, S2 normal, no murmur  · Abdomen: soft, non-tender; no masses, no organomegaly  · Extremities: no cyanosis, no edema no clubbing no synovitis      Laboratory Testing:  CBC:   No results for input(s): WBC, HGB, PLT in the last 72 hours. BMP:    No results for input(s): NA, K, CL, CO2, BUN, CREATININE, GLUCOSE in the last 72 hours. ASSESSMENT:    Patient Active Problem List   Diagnosis    Impaired fasting glucose    Patient overweight    Insomnia    Hypertension    OA (osteoarthritis) of knee    S/P total knee arthroplasty    Bronchitis with asthma, acute    Lymphadenopathy of left cervical region    Carotid stenosis, left    H/O carotid endarterectomy    Thyroid nodule    Ventral hernia without obstruction or gangrene    Unstable angina (HCC)    Myocardiopathy (HCC)    Abdominal mass, RUQ (right upper quadrant)    Chest pain    Spontaneous intracranial hemorrhage (HCC)       PLAN:  CVA s/p craniectomy complicated by RCA perforation   Dizziness improved   bp controlled renal function stable       Cont norvasc ASA plavix       Leatha Doyle MD  [address], [address] [place] [address].    Phone [phone]   Fax: [phone])

## 2019-07-14 NOTE — PROGRESS NOTES
MD       This note is created with the assistance of a speech recognition program.  While intending to generate a document that actually reflects the content of the visit, the document can still have some errors including those of syntax and sound a like substitutions which may escape proof reading.   In such instances, actual meaning can be extrapolated by contextual diversion

## 2019-07-14 NOTE — PROGRESS NOTES
[address] [facility]    ACUTE REHABILITATION OCCUPATIONAL THERAPY  DAILY NOTE    Date: 19  Patient Name: Jayne Flores      Room: 0960/7110-91    MRN: [id number]   : 1945  (75 y.o.)  Gender: male   Referring Practitioner: Inder Pena  Diagnosis: Left cerebellar ICH. Pt is s/p suboccipital craniotomy for cerebellar ICH on 19. L side ataxia, double vision -eye patch change every 2 hours. Additional Pertinent Hx: Pt reports being dizzy when changing postitions from laying to sitting and sitting to standing. Restrictions  Restrictions/Precautions: Cardiac, Fall Risk  Implants present? : Metal implants(L TKA)  Other position/activity restrictions: Alternate eye patch q2h for diplopia from [facility]. Subjective  Comments: Pt pleasant and cooperative. Patient Currently in Pain: Denies  Pain Level: 0  Restrictions/Precautions: Cardiac; Fall Risk  Overall Orientation Status: Within Functional Limits     Pain Assessment  Pain Assessment: 0-10  Pain Level: 0  Pain Type: Acute pain  Pain Location: Head, Neck  Pain Orientation: Posterior  Pain Descriptors: Aching    Objective     Balance  Sitting Balance: Modified independent   Standing Balance: Minimal assistance  Transfers  Sit to stand: Contact guard assistance  Stand to sit: Contact guard assistance  Standing Balance  Time: PM: ~4 min   Activity: PM: Rope ladder in standing using LUE mostly incorporating RUE as needed to manipulate. Pt demo crossing midline throughout which challenged pt standing balance.       OT FIM:   Eatin - Feeds self with setup/supervision/cues and/or requires only setup/supervision/cues to perform tube feedings  Groomin - Requires setup/cues to do all tasks  Bathin - Able to bathe all 10 areas with setup/sup/cues(UB, lower legs and feet addressed; s/u, LHS)  Dressing-Upper: 0 - Did not occur(gown only per pt request. )  Dressing-Lower: 4 - Requires assist with buttons/zippers/shoelaces amb with AE and CGA  Long term goal 5: pt able to use LUE for adls with fair fine and gross coordination        07/14/19 1507 07/14/19 1511   OT Individual Minutes   Time In 0941 1240   Time Out 1020 1302   Minutes 39 22     Electronically signed by COLLEEN Andrade on 7/14/19 at 3:28 PM

## 2019-07-14 NOTE — PROGRESS NOTES
double vision. Vital Signs  Patient Currently in Pain: Denies                   Bed Mobility:   Rolling: Stand by assistance  Supine to Sit: Stand by assistance  Sit to Supine: Stand by assistance  Scooting: Stand by assistance  Comment: Cues for hand placement and safety. All transfers completed with RW. Unsteady at times. Transfers:  Sit to Stand: Contact guard assistance  Stand to sit: Contact guard assistance  Bed to Chair: Minimal assistance(RW)     Squat Pivot Transfers: Minimal Assistance        Ambulation 1  Surface: level tile  Device: Rolling Walker  Assistance: Minimal assistance; Moderate assistance  Quality of Gait: Cues to stay within RW. Patient tends to look down with amb. Requires Mod A with turns due to unsteay and increased fall risk. Large LOB and needed lowered  to chair during PM amb. Cues for safety when descending to chair. Distance: 150 ft  x 2 AM ;  150 ft  PM ;   small distances within gym  Comments: Cues for eyes open and focus on single object at end of hallway. Stairs/Curb  Stairs?: Yes  Stairs  # Steps : 10  Stairs Height: 6\"  Rails: Bilateral  Device: No Device  Assistance: Minimal assistance  Comment: Verbal cues for safety and step sequence. Propulsion 1  Propulsion: Manual  Level: Level Tile  Method: RUE;LUE;RLE;LLE  Level of Assistance: Stand by assistance  Description/ Details: One rest break  Distance: 200 ft                 FIMS:      TRANSFERS  Bed, Chair, Wheel Chair: 4 - Requires steadying assistance only <25% assist  and/or requires assist with one leg only   LOCOMOTION  Primary Mode:  Both  Distance Walked: 150 ft  Distance Traveled in [facility] Chair: 200 ft  Walk: 3 - Moderate Assistance Requires up to Moderate Assistance to walk at least 150 feet(Patient performs 50-74% of locomotion effort)  Wheel Chair: 5 - Supervision Requires standby supervision or cuing to operate wheelchair at least 150 feet  Stairs: 2- [facility] w/wc to follow for safety`  Short term goal 4: Pt able to go up and down 10 steps with 2 raisl , min A x 2 `  Short term goal 5: Pt able to improve standing dynamic balance with RW at min/mod A   Long term goals  Time Frame for Long term goals : By DC  Long term goal 1: Pt tatum to transfer at supervsion level. Long term goal 2: Pt able to ambulate with appropriate device dist of 150 ft, level surface SBA   Long term goal 3: Pt able to ambulate with RW on outside terraine at min A   Long term goal 4: Pt able to go up and down 12 steps with 1 UE support, min A   Long term goal 5: Improve dynamic standing balance with assistive device, to fair + to reduce fall risk.   Long term goal 6: Pt able to manuever w/c dist of atleast 150 ft, level surfaces, mod-I       07/14/19 0810 07/14/19 1315   PT Individual Minutes   Time In 0810 1315   Time Out 0845 1345   Minutes 35 30   PT Concurrent Minutes   Time In 0845 1345   Time Out 0900 1410   Minutes 15 25       Electronically signed by Lynette Choi PTA on 7/14/19 at 4:15 PM

## 2019-07-15 PROBLEM — H53.2 DIPLOPIA: Status: ACTIVE | Noted: 2019-07-15

## 2019-07-15 LAB
ANION GAP SERPL CALCULATED.3IONS-SCNC: 11 MMOL/L (ref 9–17)
BUN BLDV-MCNC: 8 MG/DL (ref 8–23)
BUN/CREAT BLD: ABNORMAL (ref 9–20)
CALCIUM SERPL-MCNC: 9.6 MG/DL (ref 8.6–10.4)
CHLORIDE BLD-SCNC: 101 MMOL/L (ref 98–107)
CO2: 27 MMOL/L (ref 20–31)
CREAT SERPL-MCNC: 0.6 MG/DL (ref 0.7–1.2)
GFR AFRICAN AMERICAN: >60 ML/MIN
GFR NON-AFRICAN AMERICAN: >60 ML/MIN
GFR SERPL CREATININE-BSD FRML MDRD: ABNORMAL ML/MIN/{1.73_M2}
GFR SERPL CREATININE-BSD FRML MDRD: ABNORMAL ML/MIN/{1.73_M2}
GLUCOSE BLD-MCNC: 110 MG/DL (ref 70–99)
HCT VFR BLD CALC: 35.7 % (ref 41–53)
HEMOGLOBIN: 11.6 G/DL (ref 13.5–17.5)
MCH RBC QN AUTO: 29.2 PG (ref 26–34)
MCHC RBC AUTO-ENTMCNC: 32.4 G/DL (ref 31–37)
MCV RBC AUTO: 90 FL (ref 80–100)
NRBC AUTOMATED: ABNORMAL PER 100 WBC
PDW BLD-RTO: 14.8 % (ref 11.5–14.9)
PLATELET # BLD: 164 K/UL (ref 150–450)
PMV BLD AUTO: 9 FL (ref 6–12)
POTASSIUM SERPL-SCNC: 4 MMOL/L (ref 3.7–5.3)
RBC # BLD: 3.97 M/UL (ref 4.5–5.9)
SODIUM BLD-SCNC: 139 MMOL/L (ref 135–144)
WBC # BLD: 3.4 K/UL (ref 3.5–11)

## 2019-07-15 PROCEDURE — 85027 COMPLETE CBC AUTOMATED: CPT

## 2019-07-15 PROCEDURE — 6360000002 HC RX W HCPCS: Performed by: PHYSICAL MEDICINE & REHABILITATION

## 2019-07-15 PROCEDURE — 99232 SBSQ HOSP IP/OBS MODERATE 35: CPT | Performed by: PHYSICAL MEDICINE & REHABILITATION

## 2019-07-15 PROCEDURE — 97530 THERAPEUTIC ACTIVITIES: CPT

## 2019-07-15 PROCEDURE — 97116 GAIT TRAINING THERAPY: CPT

## 2019-07-15 PROCEDURE — 99231 SBSQ HOSP IP/OBS SF/LOW 25: CPT | Performed by: INTERNAL MEDICINE

## 2019-07-15 PROCEDURE — 97110 THERAPEUTIC EXERCISES: CPT

## 2019-07-15 PROCEDURE — 6370000000 HC RX 637 (ALT 250 FOR IP): Performed by: PHYSICAL MEDICINE & REHABILITATION

## 2019-07-15 PROCEDURE — 1180000000 HC REHAB R&B

## 2019-07-15 PROCEDURE — 92507 TX SP LANG VOICE COMM INDIV: CPT

## 2019-07-15 PROCEDURE — 36415 COLL VENOUS BLD VENIPUNCTURE: CPT

## 2019-07-15 PROCEDURE — 80048 BASIC METABOLIC PNL TOTAL CA: CPT

## 2019-07-15 PROCEDURE — 97127 HC SP THER IVNTJ W/FOCUS COG FUNCJ: CPT

## 2019-07-15 PROCEDURE — 97535 SELF CARE MNGMENT TRAINING: CPT

## 2019-07-15 PROCEDURE — 99232 SBSQ HOSP IP/OBS MODERATE 35: CPT | Performed by: INTERNAL MEDICINE

## 2019-07-15 RX ORDER — GABAPENTIN 400 MG/1
400 CAPSULE ORAL 3 TIMES DAILY
Status: DISCONTINUED | OUTPATIENT
Start: 2019-07-15 | End: 2019-07-29 | Stop reason: HOSPADM

## 2019-07-15 RX ADMIN — HEPARIN SODIUM 5000 UNITS: 5000 INJECTION INTRAVENOUS; SUBCUTANEOUS at 21:10

## 2019-07-15 RX ADMIN — COLCHICINE 0.6 MG: 0.6 TABLET, FILM COATED ORAL at 21:10

## 2019-07-15 RX ADMIN — ATORVASTATIN CALCIUM 80 MG: 80 TABLET, FILM COATED ORAL at 21:10

## 2019-07-15 RX ADMIN — MECLIZINE HYDROCHLORIDE 25 MG: 25 TABLET ORAL at 21:10

## 2019-07-15 RX ADMIN — NYSTATIN 500000 UNITS: 100000 SUSPENSION ORAL at 07:28

## 2019-07-15 RX ADMIN — HEPARIN SODIUM 5000 UNITS: 5000 INJECTION INTRAVENOUS; SUBCUTANEOUS at 13:47

## 2019-07-15 RX ADMIN — DICLOFENAC 4 G: 10 GEL TOPICAL at 21:11

## 2019-07-15 RX ADMIN — AMLODIPINE BESYLATE 5 MG: 5 TABLET ORAL at 07:27

## 2019-07-15 RX ADMIN — DICLOFENAC 4 G: 10 GEL TOPICAL at 07:28

## 2019-07-15 RX ADMIN — GABAPENTIN 300 MG: 300 CAPSULE ORAL at 07:27

## 2019-07-15 RX ADMIN — GABAPENTIN 400 MG: 400 CAPSULE ORAL at 21:10

## 2019-07-15 RX ADMIN — MECLIZINE HYDROCHLORIDE 25 MG: 25 TABLET ORAL at 13:47

## 2019-07-15 RX ADMIN — HEPARIN SODIUM 5000 UNITS: 5000 INJECTION INTRAVENOUS; SUBCUTANEOUS at 05:46

## 2019-07-15 RX ADMIN — NYSTATIN 500000 UNITS: 100000 SUSPENSION ORAL at 17:01

## 2019-07-15 RX ADMIN — NYSTATIN 500000 UNITS: 100000 SUSPENSION ORAL at 21:10

## 2019-07-15 RX ADMIN — COLCHICINE 0.6 MG: 0.6 TABLET, FILM COATED ORAL at 07:28

## 2019-07-15 RX ADMIN — GABAPENTIN 400 MG: 400 CAPSULE ORAL at 13:47

## 2019-07-15 RX ADMIN — ASPIRIN 81 MG 81 MG: 81 TABLET ORAL at 07:27

## 2019-07-15 RX ADMIN — MECLIZINE HYDROCHLORIDE 25 MG: 25 TABLET ORAL at 07:27

## 2019-07-15 RX ADMIN — OXYCODONE HYDROCHLORIDE 5 MG: 5 TABLET ORAL at 21:10

## 2019-07-15 RX ADMIN — DICLOFENAC 4 G: 10 GEL TOPICAL at 14:11

## 2019-07-15 RX ADMIN — CLOPIDOGREL BISULFATE 75 MG: 75 TABLET ORAL at 07:27

## 2019-07-15 ASSESSMENT — PAIN DESCRIPTION - DESCRIPTORS: DESCRIPTORS: ACHING;DISCOMFORT

## 2019-07-15 ASSESSMENT — PAIN DESCRIPTION - ORIENTATION: ORIENTATION: POSTERIOR

## 2019-07-15 ASSESSMENT — PAIN DESCRIPTION - LOCATION: LOCATION: HEAD;NECK

## 2019-07-15 ASSESSMENT — PAIN DESCRIPTION - ONSET: ONSET: ON-GOING

## 2019-07-15 ASSESSMENT — PAIN DESCRIPTION - PAIN TYPE: TYPE: ACUTE PAIN

## 2019-07-15 ASSESSMENT — PAIN SCALES - GENERAL
PAINLEVEL_OUTOF10: 5
PAINLEVEL_OUTOF10: 0
PAINLEVEL_OUTOF10: 5

## 2019-07-15 ASSESSMENT — PAIN DESCRIPTION - PROGRESSION: CLINICAL_PROGRESSION: NOT CHANGED

## 2019-07-15 ASSESSMENT — PAIN DESCRIPTION - FREQUENCY: FREQUENCY: INTERMITTENT

## 2019-07-15 NOTE — PROGRESS NOTES
Physical Therapy  [address]   Acute Rehabilitation Physical Therapy Progress Note    Date: 7/15/19  Patient Name: Lavelle Terrell       Room: 6909/8276-77  MRN: [id number]   Account: [de-identified]   : 1945  (70 y.o.) Gender: male     Referring Practitioner: Rhea Ragland  Diagnosis: Left cerebellar ICH. Pt is s/p suboccipital craniotomy for cerebellar ICH on 19. L side ataxia, double vision -eye patch change every 2 hours. Past Medical History:  has a past medical history of Anxiety, Bronchitis with asthma, acute, Carotid stenosis, left, Diabetes mellitus ([address] GERD (gastroesophageal reflux disease), Hyperlipidemia, Hypertension, and Osteoarthritis. Past Surgical History:   has a past surgical history that includes Colonoscopy; Tonsillectomy and adenoidectomy; joint replacement (Left); shoulder surgery (Right); and Carotid endarterectomy (Left, 16). Additional Pertinent Hx: 75year old male with pertinent PMHx for CAD, HTN, HLD, L CEA in 2016, who presents to [address] [place] [address] with [address]. Patient recently had dyspnea on exertion and underwent cardiac catheterization , s/p RCA stenting , complicated by perforation and tamponade s/p pericardiocentesis with a covered stent through the perforation. Pt at [address] [place] [address] for follow up visit, when he c/o of nausea/vomiting, dysarthria and vison deficits. CT brain was done STAT and showed a left cerebellar ICH. Pt is s/p suboccipital craniotomy for cerebellar ICH on 19. Pt admitted to rehab unit on 7/3/19. Overall Orientation Status: Within Normal Limits  Restrictions/Precautions  Restrictions/Precautions: Cardiac; Fall Risk  Implants present? : Metal implants(L TKA)  Position Activity Restriction  Other position/activity restrictions: Alternate eye patch q2h for diplopia from [facility] clinic notes. Subjective: Patient reports he has continuous dizziness with activity. Comments: Fall Risk;  Chair alarm; supervision or cuing to operate wheelchair at least 150 feet  Stairs: 4- Minimal Contact Assistance Perfoms 75% or more of the effort to go up and down one flight of stairs       BALANCE Posture: Fair  Sitting - Static: Fair;+  Sitting - Dynamic: Fair;-  Standing - Static: Fair;-  Standing - Dynamic: Poor  Comments: Standing balance with B UE support/RW. EXERCISES    Other exercises 1: Seated B LE x 20 reps 2 lb  Other exercises 2: Blue Tband x 20 reps  Other exercises 3: Supine B LE x 15 reps 2 lbs  Other exercises 4: Bridging x 15 reps  Other exercises 5: Standing B LE x 10 reps  Other exercises 6: Nustep x 10 min L4  Other exercises 7: FWD, Retro , and lateral amb. in // bars working on balance decreasing UE support. Patient relies heavily on UE's for support. Activity Tolerance: Patient limited by fatigue, Patient limited by endurance         Current Treatment Recommendations: Strengthening, Balance Training, Functional Mobility Training, Transfer Training, Gait Training, Stair training, Wheelchair Mobility Training, Neuromuscular Re-education, Home Exercise Program, Safety Education & Training, Patient/Caregiver Education & Training, Equipment Evaluation, Education, & procurement, Vestibular Rehab    Conditions Requiring Skilled Therapeutic Intervention  Body structures, Functions, Activity limitations: Decreased functional mobility ; Decreased strength;Decreased safe awareness;Decreased balance;Decreased coordination  Treatment Diagnosis: Impaired mobility  Patient Education: Safety with transfers, visual focus with eyes open during ambulation.   REQUIRES PT FOLLOW UP: Yes  Discharge Recommendations: Home with assist PRN;Patient would benefit from continued therapy after discharge    Goals  Short term goals  Time Frame for Short term goals: 10 days  Short term goal 1: Pt able to perform supine<>sit at mod-I   Short term goal 2:  Pt able to transfer at varies surfaces at min A   Short term goal 3:

## 2019-07-15 NOTE — PROGRESS NOTES
Minimal Assistance  Comment: Cues for hand placement and safety. All transfers completed with RW. Unsteady at times. Ambulation  Ambulation?: Yes  More Ambulation?: Yes  Ambulation 1  Surface: level tile  Device: Rolling Walker  Other Apparatus: Wheelchair follow  Assistance: Minimal assistance, Moderate assistance  Quality of Gait: Cues to stay within RW. Patient tends to look down with amb. Requires Mod A with turns due to unsteay and increased fall risk. Large LOB and needed lowered  to chair during PM amb. Cues for safety when descending to chair. Gait Deviations: Increased JOSE  Distance: 150 ft  x 2 AM ;  150 ft  PM ;   small distances within gym  Comments: Cues for eyes open and focus on single object at end of hallway. Surface: level tile  Ambulation 1  Surface: level tile  Device: Rolling Walker  Other Apparatus: Wheelchair follow  Assistance: Minimal assistance, Moderate assistance  Quality of Gait: Cues to stay within RW. Patient tends to look down with amb. Requires Mod A with turns due to unsteay and increased fall risk. Large LOB and needed lowered  to chair during PM amb. Cues for safety when descending to chair. Gait Deviations: Increased JOSE  Distance: 150 ft  x 2 AM ;  150 ft  PM ;   small distances within gym  Comments: Cues for eyes open and focus on single object at end of hallway. OT:            Balance  Sitting Balance: Modified independent   Standing Balance: Minimal assistance   Standing Balance  Time: PM: ~4 min   Activity: PM: Rope ladder in standing using LUE mostly incorporating RUE as needed to manipulate. Pt demo crossing midline throughout which challenged pt standing balance. Comment: multiple LOB's during second half of AM tx session, pt reports dizziness-subsides c seated rest per pt report.    Functional Mobility  Functional - Mobility Device: Rolling Walker  Activity: To/from bathroom  Assist Level: Contact guard assistance  Functional Mobility Comments: follow up appointment [facility] 8/15/19. 6. L shoulder pain: X-ray done at [facility] negative for acute, advanced degenerative changes L AC joint and small inferior glenohumeral osteophytes. Has Lidoderm patch (with no significant relief), oxycodone, robaxin, and diclofenac gel. 7. DVT prophylaxis:  Heparin  8. Internal medicine for medical management      Electronically signed by Michell Lauren MD on 7/15/2019 at 10:10 AM      This note is created with the assistance of a speech recognition program.  While intending to generate a document that actually reflects the content of the visit, the document can still have some errors including those of syntax and sound a like substitutions which may escape proof reading. In such instances, actual meaning can be extrapolated by contextual diversion.

## 2019-07-15 NOTE — PROGRESS NOTES
Nutrition Assessment    Type and Reason for Visit: Reassess    Nutrition Recommendations: Continue current diet. Modify Ensure Enlive to Glucerna twice daily. Nutrition Assessment: Pt continues to improve from a nutritional standpoint as evidenced by increasing PO intakes. Pt is currently on a general diet with oral nutrition supplements twice daily. Intake of supplement varies depending on meal intake. Pt shows no signs of malnutrition but is at risk due to history of poor intake. GI distress has improved. Will modify supplement intake from Ensure Enlive to Glucerna. Will continue to monitor PO and supplement intake. Malnutrition Assessment:  · Malnutrition Status: At risk for malnutrition  · Context: Acute illness or injury  · Findings of the 6 clinical characteristics of malnutrition (Minimum of 2 out of 6 clinical characteristics is required to make the diagnosis of moderate or severe Protein Calorie Malnutrition based on AND/ASPEN Guidelines):  1. Energy Intake-Greater than 75% of estimated energy requirement, Greater than or equal to 7 days    2. Weight Loss-2% loss or greater, (in 7 months)  3. Fat Loss-Unable to assess,    4. Muscle Loss-Unable to assess,    5. Fluid Accumulation-No significant fluid accumulation, Extremities    Nutrition Risk Level: Moderate    Nutrient Needs:  · Estimated Daily Total Kcal: 4119-2839 kcals based on 16-18 kcals per kg of current body wt  · Estimated Daily Protein (g): 90-98 g based on 1.2-1.3 g per kg of ideal body wt    Nutrition Diagnosis:   · Problem: Inadequate oral intake  · Etiology: related to Insufficient energy/nutrient consumption     Signs and symptoms:  as evidenced by Diet history of poor intake    Objective Information:  · Nutrition-Focused Physical Findings: No edema.   · Current Nutrition Therapies:  · Oral Diet Orders: General   · Oral Diet intake: 51-75%  · Oral Nutrition Supplement (ONS) Orders: Standard High Calorie Oral Supplement  · ONS

## 2019-07-15 NOTE — PATIENT CARE CONFERENCE
[address] [facility]    ACUTE REHABILITATION  TEAM CONFERENCE NOTE  Date: 7/15/19  Patient Name: John Araujo       Room: 7291/8281-69  MRN: [id number]       : 1945  (70 y.o.)     Gender: male      Diagnosis: Left cerebellar ICH. Pt is s/p suboccipital craniotomy for cerebellar ICH on 19. L side ataxia, double vision -eye patch change every 2 hours. NURSING  FIMS:  Bladder: 5 - Voids, but staff empties urinal/BSC and/or requires setup/sup/cues to urinate (e.g. timed voids/self cath) (no accidents, no touching) (help w/I&O cath)  Bladder Level of Assistance: 5- Requires helper to provide or empty urinal, bedside commode, or requires set-up/cues to empty bladder (ie. helper provides self-catheter supplies )  Bladder Frequency of Accidents: 6 - No accidents,uses device like urinal, bedpan, absorbant pad, or requires medication to manage bladder  Bowel: 4 - Requires touching assistance to manage or place device (e.g.  insertion of suppository only) < 25% assist  Bowel Level of Assistance: 4- Requires Minimal assistance to manage or place device, helper inserts suppository without digital stimulation, patient performs 75% or more of the bowel management tasks  Bowel Frequency of Accidents: 6 - No accidents, uses device like bedpan, diaper, bedside commode colostomy, or requires medication to manage bowels   Bladder  Continent  Bowel   Continent  Intervention    None     Wounds/Incisions/Ulcers: No skin issues identified  Medication Education Program: Patient currently unable to manage medications and family being educated  Pain: Patient's pain is currently controlled with -  5mg Nalini Q4 prn    Fall Risk:  Falling star program initiated    PHYSICAL THERAPY  Bed mobility  Scooting: Stand by assistance  Bed Mobility  Rolling: Stand by assistance  Supine to Sit: Stand by assistance  Sit to Supine: Stand by assistance  Scooting: Stand by assistance  Comment: Cues for hand placement and safety.  All

## 2019-07-15 NOTE — PLAN OF CARE
Nutrition Problem: Inadequate oral intake  Intervention: Food and/or Nutrient Delivery: Continue current diet, Modify current ONS  Nutritional Goals: Meet % of nutrient needs with PO diet.

## 2019-07-15 NOTE — PROGRESS NOTES
setup/sup/cues(Shower from bench with hand hold shower )  Dressing-Upper: 5 - Requires setup/supervision/cues and/or requires assist with presthesis/brace only(short sleeve shirt )  Dressing-Lower: 4 - Requires assist with buttons/zippers/shoelaces and/or assist with shoes only  Toiletin - Requires steadying assistance only  Toilet Transfer: 4 - Requires steadying assistance only < 25% assist  Primary Mode: Shower  Shower Transfer: 4 - Minimal contact assistance, pt. expends 75% or more effort               Assessment     Activity Tolerance: Patient Tolerated treatment well  Type of devices: Left in chair;Call light within reach          Patient Education:  Patient Goals   Patient goals : return to indep as prior     Learner:patient  Method: demonstration and explanation       Outcome: verbalized concerns and asked questions     Plan  Plan  Times per week: 900 minutes   Times per day: Twice a day  Current Treatment Recommendations: Endurance Training, Patient/Caregiver Education & Training, Self-Care / ADL, Balance Training, Home Management Training, Cognitive/Perceptual Training, Functional Mobility Training, Safety Education & Training  Plan Comment: due to effects of cerebellar infarct and dizziness/nausea, patient will be 900 minutes / 7 days of OT/PT/ST    Patient Goals   Patient goals : return to indep as prior  Short term goals  Time Frame for Short term goals: 1 week  Short term goal 1: pt able to use LUE to hold washcloth and containers to open without dropping  Short term goal 2: min grooming  Short term goal 3: min bathing (pt returned re LHS for feet)  Short term goal 4: mod LE dress  Short term goal 5: monica 6-8 min stand with 1 UE support and CGA  Long term goals  Time Frame for Long term goals : by discharge  Long term goal 1: mod indep feeding and grooming  Long term goal 2: set up, SBA for bathing  Long term goal 3: min LE dress  Long term goal 4: monica 8-10 min stand, amb with AE and CGA  Long term

## 2019-07-16 PROCEDURE — 6370000000 HC RX 637 (ALT 250 FOR IP): Performed by: PHYSICAL MEDICINE & REHABILITATION

## 2019-07-16 PROCEDURE — 97542 WHEELCHAIR MNGMENT TRAINING: CPT

## 2019-07-16 PROCEDURE — 6360000002 HC RX W HCPCS: Performed by: PHYSICAL MEDICINE & REHABILITATION

## 2019-07-16 PROCEDURE — 1180000000 HC REHAB R&B

## 2019-07-16 PROCEDURE — 97127 HC SP THER IVNTJ W/FOCUS COG FUNCJ: CPT

## 2019-07-16 PROCEDURE — 97116 GAIT TRAINING THERAPY: CPT

## 2019-07-16 PROCEDURE — 97164 PT RE-EVAL EST PLAN CARE: CPT

## 2019-07-16 PROCEDURE — 97530 THERAPEUTIC ACTIVITIES: CPT

## 2019-07-16 PROCEDURE — 97112 NEUROMUSCULAR REEDUCATION: CPT

## 2019-07-16 PROCEDURE — 99232 SBSQ HOSP IP/OBS MODERATE 35: CPT | Performed by: PHYSICAL MEDICINE & REHABILITATION

## 2019-07-16 PROCEDURE — 97110 THERAPEUTIC EXERCISES: CPT

## 2019-07-16 PROCEDURE — 99232 SBSQ HOSP IP/OBS MODERATE 35: CPT | Performed by: INTERNAL MEDICINE

## 2019-07-16 RX ADMIN — HEPARIN SODIUM 5000 UNITS: 5000 INJECTION INTRAVENOUS; SUBCUTANEOUS at 21:57

## 2019-07-16 RX ADMIN — COLCHICINE 0.6 MG: 0.6 TABLET, FILM COATED ORAL at 19:59

## 2019-07-16 RX ADMIN — AMLODIPINE BESYLATE 5 MG: 5 TABLET ORAL at 07:53

## 2019-07-16 RX ADMIN — HEPARIN SODIUM 5000 UNITS: 5000 INJECTION INTRAVENOUS; SUBCUTANEOUS at 14:17

## 2019-07-16 RX ADMIN — ASPIRIN 81 MG 81 MG: 81 TABLET ORAL at 07:46

## 2019-07-16 RX ADMIN — NYSTATIN 500000 UNITS: 100000 SUSPENSION ORAL at 14:17

## 2019-07-16 RX ADMIN — NYSTATIN 500000 UNITS: 100000 SUSPENSION ORAL at 20:02

## 2019-07-16 RX ADMIN — NYSTATIN 500000 UNITS: 100000 SUSPENSION ORAL at 07:51

## 2019-07-16 RX ADMIN — HEPARIN SODIUM 5000 UNITS: 5000 INJECTION INTRAVENOUS; SUBCUTANEOUS at 06:27

## 2019-07-16 RX ADMIN — GABAPENTIN 400 MG: 400 CAPSULE ORAL at 07:46

## 2019-07-16 RX ADMIN — GABAPENTIN 400 MG: 400 CAPSULE ORAL at 14:15

## 2019-07-16 RX ADMIN — DICLOFENAC 4 G: 10 GEL TOPICAL at 15:20

## 2019-07-16 RX ADMIN — COLCHICINE 0.6 MG: 0.6 TABLET, FILM COATED ORAL at 07:51

## 2019-07-16 RX ADMIN — NYSTATIN 500000 UNITS: 100000 SUSPENSION ORAL at 16:08

## 2019-07-16 RX ADMIN — MECLIZINE HYDROCHLORIDE 25 MG: 25 TABLET ORAL at 19:59

## 2019-07-16 RX ADMIN — DICLOFENAC 4 G: 10 GEL TOPICAL at 20:02

## 2019-07-16 RX ADMIN — ATORVASTATIN CALCIUM 80 MG: 80 TABLET, FILM COATED ORAL at 19:59

## 2019-07-16 RX ADMIN — MECLIZINE HYDROCHLORIDE 25 MG: 25 TABLET ORAL at 07:45

## 2019-07-16 RX ADMIN — DICLOFENAC 4 G: 10 GEL TOPICAL at 07:52

## 2019-07-16 RX ADMIN — MECLIZINE HYDROCHLORIDE 25 MG: 25 TABLET ORAL at 14:15

## 2019-07-16 RX ADMIN — GABAPENTIN 400 MG: 400 CAPSULE ORAL at 20:00

## 2019-07-16 RX ADMIN — CLOPIDOGREL BISULFATE 75 MG: 75 TABLET ORAL at 07:45

## 2019-07-16 RX ADMIN — POLYETHYLENE GLYCOL 3350 17 G: 17 POWDER, FOR SOLUTION ORAL at 07:45

## 2019-07-16 ASSESSMENT — PAIN DESCRIPTION - LOCATION: LOCATION: NECK

## 2019-07-16 ASSESSMENT — PAIN DESCRIPTION - ORIENTATION: ORIENTATION: POSTERIOR

## 2019-07-16 ASSESSMENT — PAIN SCALES - GENERAL
PAINLEVEL_OUTOF10: 5
PAINLEVEL_OUTOF10: 4
PAINLEVEL_OUTOF10: 1

## 2019-07-16 ASSESSMENT — PAIN DESCRIPTION - PAIN TYPE: TYPE: ACUTE PAIN

## 2019-07-16 NOTE — PROGRESS NOTES
[address] [facility]   Acute Rehabilitation Physical Therapy Progress Note    Date: 19  Patient Name: Jostin Livingston       Room: 5032/1843-63  MRN: [id number]   Account: [de-identified]   : 1945  (70 y.o.) Gender: male     Referring Practitioner: Merlyn Haynes  Diagnosis: Left cerebellar ICH. Pt is s/p suboccipital craniotomy for cerebellar ICH on 19. L side ataxia, double vision -eye patch change every 2 hours. Past Medical History:  has a past medical history of Anxiety, Bronchitis with asthma, acute, Carotid stenosis, left, Diabetes mellitus ([facility] [address] GERD (gastroesophageal reflux disease), Hyperlipidemia, Hypertension, and Osteoarthritis. Past Surgical History:   has a past surgical history that includes Colonoscopy; Tonsillectomy and adenoidectomy; joint replacement (Left); shoulder surgery (Right); and Carotid endarterectomy (Left, 16). Additional Pertinent Hx: 75year old male with pertinent PMHx for CAD, HTN, HLD, L CEA in 2016, who presents to [address] [place] [address] with [address]. Patient recently had dyspnea on exertion and underwent cardiac catheterization , s/p RCA stenting 3/5/88, complicated by perforation and tamponade s/p pericardiocentesis with a covered stent through the perforation. Pt at [address] [place] [address] for follow up visit, when he c/o of nausea/vomiting, dysarthria and vison deficits. CT brain was done STAT and showed a left cerebellar ICH. Pt is s/p suboccipital craniotomy for cerebellar ICH on 19. Pt admitted to rehab unit on 7/3/19. Overall Orientation Status: Within Normal Limits  Restrictions/Precautions  Restrictions/Precautions: Cardiac; Fall Risk  Implants present? : Metal implants(L TKA)  Position Activity Restriction  Other position/activity restrictions: Alternate eye patch q2h for diplopia from [facility] notes. Subjective: Patient reports he has continuous dizziness with activity. Comments: Fall Risk;  Chair alarm; Nausea and double Notified dr Roma Chisholm of need for admission pt accepted to [address] [place] [address].       Lin Perez RN  06/05/19 1930

## 2019-07-17 PROCEDURE — 6370000000 HC RX 637 (ALT 250 FOR IP): Performed by: PHYSICAL MEDICINE & REHABILITATION

## 2019-07-17 PROCEDURE — 97116 GAIT TRAINING THERAPY: CPT

## 2019-07-17 PROCEDURE — 97530 THERAPEUTIC ACTIVITIES: CPT

## 2019-07-17 PROCEDURE — 97535 SELF CARE MNGMENT TRAINING: CPT

## 2019-07-17 PROCEDURE — 1180000000 HC REHAB R&B

## 2019-07-17 PROCEDURE — 97110 THERAPEUTIC EXERCISES: CPT

## 2019-07-17 PROCEDURE — 6360000002 HC RX W HCPCS: Performed by: PHYSICAL MEDICINE & REHABILITATION

## 2019-07-17 PROCEDURE — 92507 TX SP LANG VOICE COMM INDIV: CPT

## 2019-07-17 PROCEDURE — 97112 NEUROMUSCULAR REEDUCATION: CPT

## 2019-07-17 PROCEDURE — 99232 SBSQ HOSP IP/OBS MODERATE 35: CPT | Performed by: PHYSICAL MEDICINE & REHABILITATION

## 2019-07-17 PROCEDURE — 99231 SBSQ HOSP IP/OBS SF/LOW 25: CPT | Performed by: INTERNAL MEDICINE

## 2019-07-17 PROCEDURE — 97542 WHEELCHAIR MNGMENT TRAINING: CPT

## 2019-07-17 PROCEDURE — 97127 HC SP THER IVNTJ W/FOCUS COG FUNCJ: CPT

## 2019-07-17 RX ADMIN — MECLIZINE HYDROCHLORIDE 25 MG: 25 TABLET ORAL at 14:24

## 2019-07-17 RX ADMIN — COLCHICINE 0.6 MG: 0.6 TABLET, FILM COATED ORAL at 20:40

## 2019-07-17 RX ADMIN — HEPARIN SODIUM 5000 UNITS: 5000 INJECTION INTRAVENOUS; SUBCUTANEOUS at 06:38

## 2019-07-17 RX ADMIN — ATORVASTATIN CALCIUM 80 MG: 80 TABLET, FILM COATED ORAL at 20:39

## 2019-07-17 RX ADMIN — GABAPENTIN 400 MG: 400 CAPSULE ORAL at 14:25

## 2019-07-17 RX ADMIN — ASPIRIN 81 MG 81 MG: 81 TABLET ORAL at 07:36

## 2019-07-17 RX ADMIN — GABAPENTIN 400 MG: 400 CAPSULE ORAL at 20:39

## 2019-07-17 RX ADMIN — MECLIZINE HYDROCHLORIDE 25 MG: 25 TABLET ORAL at 20:39

## 2019-07-17 RX ADMIN — CLOPIDOGREL BISULFATE 75 MG: 75 TABLET ORAL at 07:36

## 2019-07-17 RX ADMIN — COLCHICINE 0.6 MG: 0.6 TABLET, FILM COATED ORAL at 10:46

## 2019-07-17 RX ADMIN — NYSTATIN 500000 UNITS: 100000 SUSPENSION ORAL at 14:24

## 2019-07-17 RX ADMIN — GABAPENTIN 400 MG: 400 CAPSULE ORAL at 07:36

## 2019-07-17 RX ADMIN — AMLODIPINE BESYLATE 5 MG: 5 TABLET ORAL at 07:36

## 2019-07-17 RX ADMIN — DICLOFENAC 4 G: 10 GEL TOPICAL at 14:25

## 2019-07-17 RX ADMIN — DICLOFENAC 4 G: 10 GEL TOPICAL at 20:36

## 2019-07-17 RX ADMIN — HEPARIN SODIUM 5000 UNITS: 5000 INJECTION INTRAVENOUS; SUBCUTANEOUS at 20:42

## 2019-07-17 RX ADMIN — HEPARIN SODIUM 5000 UNITS: 5000 INJECTION INTRAVENOUS; SUBCUTANEOUS at 14:25

## 2019-07-17 RX ADMIN — DICLOFENAC 4 G: 10 GEL TOPICAL at 07:35

## 2019-07-17 RX ADMIN — METHOCARBAMOL 500 MG: 500 TABLET ORAL at 07:36

## 2019-07-17 RX ADMIN — NYSTATIN 500000 UNITS: 100000 SUSPENSION ORAL at 07:36

## 2019-07-17 RX ADMIN — NYSTATIN 500000 UNITS: 100000 SUSPENSION ORAL at 17:01

## 2019-07-17 RX ADMIN — MECLIZINE HYDROCHLORIDE 25 MG: 25 TABLET ORAL at 07:01

## 2019-07-17 RX ADMIN — NYSTATIN 500000 UNITS: 100000 SUSPENSION ORAL at 20:38

## 2019-07-17 ASSESSMENT — PAIN SCALES - GENERAL
PAINLEVEL_OUTOF10: 0

## 2019-07-17 NOTE — CONSULTS
diarrhea, nausea, vomiting  Neurological: Diplopia poor left hand coordination    Medications: Allergies:  No Known Allergies    Current Meds:   Scheduled Meds:    gabapentin  400 mg Oral TID    diclofenac sodium  4 g Topical TID    meclizine  25 mg Oral TID    nystatin  5 mL Oral 4x Daily    colchicine  0.6 mg Oral BID    polyethylene glycol  17 g Oral Daily    atorvastatin  80 mg Oral Nightly    clopidogrel  75 mg Oral Daily    aspirin  81 mg Oral Daily    amLODIPine  5 mg Oral Daily    heparin (porcine)  5,000 Units Subcutaneous 3 times per day    lidocaine  1 patch Transdermal Daily     Continuous Infusions:   PRN Meds: sodium chloride, ondansetron, acetaminophen, aluminum & magnesium hydroxide-simethicone, bisacodyl, methocarbamol, oxyCODONE **OR** [DISCONTINUED] oxyCODONE, nitroGLYCERIN, calcium carbonate    Data:     Past Medical History:   has a past medical history of Anxiety, Bronchitis with asthma, acute, Carotid stenosis, left, Diabetes mellitus ([address] GERD (gastroesophageal reflux disease), Hyperlipidemia, Hypertension, and Osteoarthritis. Social History:   reports that he has never smoked. He has never used smokeless tobacco. He reports that he drinks alcohol. He reports that he does not use drugs. Family History:   Family History   Problem Relation Age of Onset   Chloe Mederos Cancer Mother     Cancer Father        Vitals:  /73   Pulse 95   Temp 97.3 °F (36.3 °C) (Oral)   Resp 17   Ht 5' 10\" (1.778 m)   Wt 252 lb (114.3 kg)   SpO2 98%   BMI 36.16 kg/m²   Temp (24hrs), Av.8 °F (36.6 °C), Min:97.3 °F (36.3 °C), Max:98.2 °F (36.8 °C)    No results for input(s): POCGLU in the last 72 hours. I/O (24Hr):   No intake or output data in the 24 hours ending 19 1614    Labs:    Hematology:  Recent Labs     07/15/19  0636   WBC 3.4*   RBC 3.97*   HGB 11.6*   HCT 35.7*   MCV 90.0   MCH 29.2   MCHC 32.4   RDW 14.8      MPV 9.0     Chemistry:  Recent Labs 07/15/19  0636      K 4.0      CO2 27   GLUCOSE 110*   BUN 8   CREATININE 0.60*   ANIONGAP 11   LABGLOM >60   GFRAA >60   CALCIUM 9.6     No results for input(s): PROT, LABALBU, LABA1C, K4QZBBF, N6XTJJC, FT4, TSH, AST, ALT, LDH, GGT, ALKPHOS, LABGGT, BILITOT, BILIDIR, AMMONIA, AMYLASE, LIPASE, LACTATE, CHOL, HDL, LDLCHOLESTEROL, CHOLHDLRATIO, TRIG, VLDL, KTG08EK, PHENYTOIN, PHENYF, URICACID, POCGLU in the last 72 hours. No results found for: SPECIAL  No results found for: CULTURE    No results found for: POCPH, PHART, PH, POCPCO2, NAZ1SLN, PCO2, POCPO2, PO2ART, PO2, POCHCO3, ONT2KHW, HCO3, NBEA, PBEA, BEART, BE, THGBART, THB, CHM3TGV, QMZX0TAZ, E0GMTPRZ, O2SAT, FIO2    Radiology:        Physical Examination:        General appearance:  alert, cooperative and no distress  Mental Status:  oriented to person, place and time and normal affect  Lungs:  clear to auscultation bilaterally, normal effort  Heart:  regular rate and rhythm, no murmur  Abdomen:  soft, nontender, nondistended, normal bowel sounds, no masses, hepatomegaly, splenomegaly  Extremities:  no edema, redness, tenderness in the calves  Skin:  no gross lesions, rashes, induration  Diplopia right eye is covered poor left hand coordination failed finger-nose test   Back of the head surgical scar is well-healed no signs of infection a  Assessment:        Primary Problem  <principal problem not specified>    Active Hospital Problems    Diagnosis Date Noted    Diplopia [H53.2] 07/15/2019    Spontaneous intracranial hemorrhage ([facility] [address] [I62.9] 07/03/2019    Myocardiopathy ([address] [I42.9] 07/19/2018    Unstable angina ([facility] [address] [I20.0] 06/10/2018    Thyroid nodule [E04.1] 05/19/2016    Hypertension [I10] 07/13/2015       Plan:        1. Spontaneous cerebellar bleed with cerebellar ataxia and diplopia  2. Morbid obesity  3. Cad with stent  4. franko follow  5. Fall risk  Patient tolerating rehabilitative therapies . Progressing fairly well .   Continue present therapy . Will continue present therapy    Medications: Allergies:  No Known Allergies    Current Meds:   Scheduled Meds:    gabapentin  400 mg Oral TID    diclofenac sodium  4 g Topical TID    meclizine  25 mg Oral TID    nystatin  5 mL Oral 4x Daily    colchicine  0.6 mg Oral BID    polyethylene glycol  17 g Oral Daily    atorvastatin  80 mg Oral Nightly    clopidogrel  75 mg Oral Daily    aspirin  81 mg Oral Daily    amLODIPine  5 mg Oral Daily    heparin (porcine)  5,000 Units Subcutaneous 3 times per day    lidocaine  1 patch Transdermal Daily     Continuous Infusions:   PRN Meds: sodium chloride, ondansetron, acetaminophen, aluminum & magnesium hydroxide-simethicone, bisacodyl, methocarbamol, oxyCODONE **OR** [DISCONTINUED] oxyCODONE, nitroGLYCERIN, calcium carbonate       Patient tolerating rehabilitative therapies . Progressing fairly well . Continue present therapy .        Lilian Jimenez MD  7/17/2019  4:14 PM

## 2019-07-17 NOTE — PROGRESS NOTES
Deficit:    [x] Dynamic Balance deficit   [] Positive Houstonia Hallpike or Roll Test for BPPV:  [] ? Function: Dizziness Handicap Inventory:  [x] Gait Deviations:  [x] Other: (+) CENTRAL SIGN OF DIRECTION CHANGING NYSTAGMUS, (+) CENTRAL SIGN OF OCULOMOTOR DEFICIT OF POOR TRACKING AND HYPOMETRIC SACCADES      STG: (to be met in 10 treatments) (BID IN ACUTE REHAB)  1. Patient will report 50% improvement in dizziness and imbalance  2. Improve balance OCULOMOTOR FUNCTION, NO DYSMETRIC SACCADES  3. Independent with Home Exercise Programs  4. Demonstrate Knowledge of fall prevention  LTG: (to be met in TBD treatments)  1. Patient goals: STOP DOUBLE VISION, WALK AND CARE FOR SELF AT HOME    Rehab Potential:  [x] Good  [] Fair  [] Poor   Suggested Professional Referral:  [] No  [x] Yes: NEURO OPTHALMOLOGIST  Barriers to Goal Achievement[de-identified]  [] No  [x] Yes: CENTRAL SIGNS, SLOWER PROGRESS, OUT COMES NOT AS GOOD    Pt. Education:  [x] Plans/Goals, Risks/Benefits discussed, results of clinical tests    [] Home exercise program  Method of Education: [x] Verbal  [x] Demo  [] Written  Comprehension of Education:  [x] Verbalizes understanding. [x] Demonstrates understanding. [] Needs Review. Treatment Plan:  [] Therapeutic Exercise     [x] Vestibular Rehabilitation; AS AN OUT PATIENT  [] Therapeutic Activity       [] Gait Training     [] Modalities      [] Neuromuscular Re-education [] Cold/hotpack   [] Instruction in HEP               [] Manual Therapy               [] Aquatic Therapy  [] Other:    ADD OCULOMOTOR EXERCISES TO PROGRAM WHILE IN THE ACUTE REHAB UNIT. SACCADIC EYE MOVEMENT WITH AND W/O EYE PATCH, TRACKING WITH AND WITHOUT EYE PATCH AND PENCIL PUSHUPS    Todays Treatment:  Modalities:   Exercises:  Other: PATIENT EDUCATION RE RESULTS OF OCULOMOTOR EXAM AND POC.     Specific Instructions for next treatment:    Treatment Charges: Mins Units   [x] RE Evaluation  1   []  Canalith Repositioning Maneuver     []  Ther

## 2019-07-17 NOTE — PROGRESS NOTES
cues throughout for hand placement and safety. Unsteady at times. Stand pivot transfer: Min A w/RW  Ambulation  Ambulation?: Yes  More Ambulation?: Yes  Ambulation 1  Surface: level tile  Device: Rolling Walker  Other Apparatus: Wheelchair follow  Assistance: Minimal assistance, Contact guard assistance  Quality of Gait: Initially good heel strike & moderate JOSE, deteriorating over distance, but good return to cues for standing rest/\"reset. \" Heavy reliance on UEs, forward flexion, increased speed & decreased safe distance with RW with fatigue; good return to cues. Downward gaze 75% of time; good but fleeting return to cues for upward gaze/focal point. Gait Deviations: Increased JOSE  Distance: 100' x2 a.m. with seated rest between. 150' p.m. with 180* turn  Comments: Cues for focal point, speed control, posture, all with fair return. Surface: level tile  Ambulation 1  Surface: level tile  Device: Rolling Walker  Other Apparatus: Wheelchair follow  Assistance: Minimal assistance, Contact guard assistance  Quality of Gait: Initially good heel strike & moderate JOSE, deteriorating over distance, but good return to cues for standing rest/\"reset. \" Heavy reliance on UEs, forward flexion, increased speed & decreased safe distance with RW with fatigue; good return to cues. Downward gaze 75% of time; good but fleeting return to cues for upward gaze/focal point. Gait Deviations: Increased JOSE  Distance: 100' x2 a.m. with seated rest between. 150' p.m. with 180* turn  Comments: Cues for focal point, speed control, posture, all with fair return. OT:            Balance  Sitting Balance: Modified independent   Standing Balance: Minimal assistance   Standing Balance  Time: PM: ~4 min   Activity: PM: Rope ladder in standing using LUE mostly incorporating RUE as needed to manipulate. Pt demo crossing midline throughout which challenged pt standing balance.    Comment: multiple LOB's during second half of AM tx session, pt

## 2019-07-17 NOTE — CONSULTS
[place] [address] Internal Medicine    Progress Note    7/16/2019    8:45 PM    Name:   Elena Thomas  MRN:     [id number]     Acct:      [de-identified]   Room:   [address] [place] [address] Day:  13  Admit Date:  7/3/2019  3:09 PM    PCP:   Mouna Tyler MD  Code Status:  Full Code    Subjective:     C/C: No chief complaint on file. HPI:     Night no fever chills no nausea vomiting  Deferred diplopia and poor coordination in the left hand   is unchanged from the time of admission    7/16/2019  Discussed with patient regarding ophthalmology appointment  Asked nurse to check into what is the status regarding his appointment with eye doctor  He continues to make progress  However when he does not wear a hepatic diplopia persist  Blood pressure control much better recent hemoglobin 11.6 creatinine 0.6 which are both satisfactory  Is still wearing the eye patch for diplopia  He has an appointment with the ophthalmologist tomorrow  He is progressing satisfactorily with the rehab  Appetite and bowel regimen are okay  Blood pressure control satisfactory     Patient tolerating rehabilitative therapies . Progressing fairly well . Continue present therapy . Patient's appetite bowel movements and cognitive function are satisfactory ·     Vitals:    07/15/19 1908 07/16/19 0637 07/16/19 0751 07/16/19 1818   BP: 137/76 139/87 119/68 119/73   Pulse: 100 88 103 96   Resp: 18 17  16   Temp: 97.7 °F (36.5 °C) 97.7 °F (36.5 °C)  98.2 °F (36.8 °C)   TempSrc: Oral Oral  Oral   SpO2: 97% 98%  99%   Weight:       Height:         ·  1.            Review of Systems:     Constitutional:  negative for chills, fevers, sweats  Respiratory:  negative for cough, dyspnea on exertion, hemoptysis, shortness of breath, wheezing  Cardiovascular:  negative for chest pain, chest pressure/discomfort, lower extremity edema, palpitations  Gastrointestinal:  negative for abdominal pain, constipation, diarrhea, nausea, vomiting  Neurological: Diplopia poor left hand coordination    Medications: Allergies:  No Known Allergies    Current Meds:   Scheduled Meds:    gabapentin  400 mg Oral TID    diclofenac sodium  4 g Topical TID    meclizine  25 mg Oral TID    nystatin  5 mL Oral 4x Daily    colchicine  0.6 mg Oral BID    polyethylene glycol  17 g Oral Daily    atorvastatin  80 mg Oral Nightly    clopidogrel  75 mg Oral Daily    aspirin  81 mg Oral Daily    amLODIPine  5 mg Oral Daily    heparin (porcine)  5,000 Units Subcutaneous 3 times per day    lidocaine  1 patch Transdermal Daily     Continuous Infusions:   PRN Meds: sodium chloride, ondansetron, acetaminophen, aluminum & magnesium hydroxide-simethicone, bisacodyl, methocarbamol, oxyCODONE **OR** [DISCONTINUED] oxyCODONE, nitroGLYCERIN, calcium carbonate    Data:     Past Medical History:   has a past medical history of Anxiety, Bronchitis with asthma, acute, Carotid stenosis, left, Diabetes mellitus ([address] GERD (gastroesophageal reflux disease), Hyperlipidemia, Hypertension, and Osteoarthritis. Social History:   reports that he has never smoked. He has never used smokeless tobacco. He reports that he drinks alcohol. He reports that he does not use drugs. Family History:   Family History   Problem Relation Age of Onset   [facility] Cancer Mother     Cancer Father        Vitals:  /73   Pulse 96   Temp 98.2 °F (36.8 °C) (Oral)   Resp 16   Ht 5' 10\" (1.778 m)   Wt 252 lb (114.3 kg)   SpO2 99%   BMI 36.16 kg/m²   Temp (24hrs), Av °F (36.7 °C), Min:97.7 °F (36.5 °C), Max:98.2 °F (36.8 °C)    No results for input(s): POCGLU in the last 72 hours. I/O (24Hr):   No intake or output data in the 24 hours ending 19    Labs:    Hematology:  Recent Labs     07/15/19  0636   WBC 3.4*   RBC 3.97*   HGB 11.6*   HCT 35.7*   MCV 90.0   MCH 29.2   MCHC 32.4   RDW 14.8      MPV 9.0     Chemistry:  Recent Labs     07/15/19  0636      K 4.0 continue present therapy    Medications: Allergies:  No Known Allergies    Current Meds:   Scheduled Meds:    gabapentin  400 mg Oral TID    diclofenac sodium  4 g Topical TID    meclizine  25 mg Oral TID    nystatin  5 mL Oral 4x Daily    colchicine  0.6 mg Oral BID    polyethylene glycol  17 g Oral Daily    atorvastatin  80 mg Oral Nightly    clopidogrel  75 mg Oral Daily    aspirin  81 mg Oral Daily    amLODIPine  5 mg Oral Daily    heparin (porcine)  5,000 Units Subcutaneous 3 times per day    lidocaine  1 patch Transdermal Daily     Continuous Infusions:   PRN Meds: sodium chloride, ondansetron, acetaminophen, aluminum & magnesium hydroxide-simethicone, bisacodyl, methocarbamol, oxyCODONE **OR** [DISCONTINUED] oxyCODONE, nitroGLYCERIN, calcium carbonate       Patient tolerating rehabilitative therapies . Progressing fairly well . Continue present therapy .        Alycia Mark MD  7/16/2019  8:45 PM

## 2019-07-18 LAB — PLATELET # BLD: 163 K/UL (ref 150–450)

## 2019-07-18 PROCEDURE — 97127 HC SP THER IVNTJ W/FOCUS COG FUNCJ: CPT

## 2019-07-18 PROCEDURE — 1180000000 HC REHAB R&B

## 2019-07-18 PROCEDURE — 99231 SBSQ HOSP IP/OBS SF/LOW 25: CPT | Performed by: INTERNAL MEDICINE

## 2019-07-18 PROCEDURE — 6370000000 HC RX 637 (ALT 250 FOR IP): Performed by: PHYSICAL MEDICINE & REHABILITATION

## 2019-07-18 PROCEDURE — 99232 SBSQ HOSP IP/OBS MODERATE 35: CPT | Performed by: PHYSICAL MEDICINE & REHABILITATION

## 2019-07-18 PROCEDURE — 97530 THERAPEUTIC ACTIVITIES: CPT

## 2019-07-18 PROCEDURE — 97116 GAIT TRAINING THERAPY: CPT

## 2019-07-18 PROCEDURE — 97110 THERAPEUTIC EXERCISES: CPT

## 2019-07-18 PROCEDURE — 97535 SELF CARE MNGMENT TRAINING: CPT

## 2019-07-18 PROCEDURE — 82947 ASSAY GLUCOSE BLOOD QUANT: CPT

## 2019-07-18 PROCEDURE — 85049 AUTOMATED PLATELET COUNT: CPT

## 2019-07-18 PROCEDURE — 36415 COLL VENOUS BLD VENIPUNCTURE: CPT

## 2019-07-18 PROCEDURE — 97112 NEUROMUSCULAR REEDUCATION: CPT

## 2019-07-18 PROCEDURE — 6360000002 HC RX W HCPCS: Performed by: PHYSICAL MEDICINE & REHABILITATION

## 2019-07-18 RX ADMIN — GABAPENTIN 400 MG: 400 CAPSULE ORAL at 08:19

## 2019-07-18 RX ADMIN — DICLOFENAC 4 G: 10 GEL TOPICAL at 13:28

## 2019-07-18 RX ADMIN — HEPARIN SODIUM 5000 UNITS: 5000 INJECTION INTRAVENOUS; SUBCUTANEOUS at 06:40

## 2019-07-18 RX ADMIN — NYSTATIN 500000 UNITS: 100000 SUSPENSION ORAL at 13:28

## 2019-07-18 RX ADMIN — DICLOFENAC 4 G: 10 GEL TOPICAL at 23:46

## 2019-07-18 RX ADMIN — MECLIZINE HYDROCHLORIDE 25 MG: 25 TABLET ORAL at 13:35

## 2019-07-18 RX ADMIN — MECLIZINE HYDROCHLORIDE 25 MG: 25 TABLET ORAL at 07:19

## 2019-07-18 RX ADMIN — GABAPENTIN 400 MG: 400 CAPSULE ORAL at 21:41

## 2019-07-18 RX ADMIN — ATORVASTATIN CALCIUM 80 MG: 80 TABLET, FILM COATED ORAL at 21:41

## 2019-07-18 RX ADMIN — COLCHICINE 0.6 MG: 0.6 TABLET, FILM COATED ORAL at 13:27

## 2019-07-18 RX ADMIN — CLOPIDOGREL BISULFATE 75 MG: 75 TABLET ORAL at 08:19

## 2019-07-18 RX ADMIN — HEPARIN SODIUM 5000 UNITS: 5000 INJECTION INTRAVENOUS; SUBCUTANEOUS at 21:40

## 2019-07-18 RX ADMIN — HEPARIN SODIUM 5000 UNITS: 5000 INJECTION INTRAVENOUS; SUBCUTANEOUS at 13:34

## 2019-07-18 RX ADMIN — AMLODIPINE BESYLATE 5 MG: 5 TABLET ORAL at 08:19

## 2019-07-18 RX ADMIN — GABAPENTIN 400 MG: 400 CAPSULE ORAL at 13:35

## 2019-07-18 RX ADMIN — ONDANSETRON HYDROCHLORIDE 4 MG: 4 TABLET, FILM COATED ORAL at 06:40

## 2019-07-18 RX ADMIN — ASPIRIN 81 MG 81 MG: 81 TABLET ORAL at 08:19

## 2019-07-18 RX ADMIN — COLCHICINE 0.6 MG: 0.6 TABLET, FILM COATED ORAL at 23:45

## 2019-07-18 RX ADMIN — MECLIZINE HYDROCHLORIDE 25 MG: 25 TABLET ORAL at 21:40

## 2019-07-18 RX ADMIN — NYSTATIN 500000 UNITS: 100000 SUSPENSION ORAL at 17:20

## 2019-07-18 ASSESSMENT — PAIN SCALES - GENERAL
PAINLEVEL_OUTOF10: 0
PAINLEVEL_OUTOF10: 5
PAINLEVEL_OUTOF10: 5
PAINLEVEL_OUTOF10: 0

## 2019-07-18 NOTE — CONSULTS
breath, wheezing  Cardiovascular:  negative for chest pain, chest pressure/discomfort, lower extremity edema, palpitations  Gastrointestinal:  negative for abdominal pain, constipation, diarrhea, nausea, vomiting  Neurological: Diplopia poor left hand coordination    Medications: Allergies:  No Known Allergies    Current Meds:   Scheduled Meds:    gabapentin  400 mg Oral TID    diclofenac sodium  4 g Topical TID    meclizine  25 mg Oral TID    nystatin  5 mL Oral 4x Daily    colchicine  0.6 mg Oral BID    polyethylene glycol  17 g Oral Daily    atorvastatin  80 mg Oral Nightly    clopidogrel  75 mg Oral Daily    aspirin  81 mg Oral Daily    amLODIPine  5 mg Oral Daily    heparin (porcine)  5,000 Units Subcutaneous 3 times per day    lidocaine  1 patch Transdermal Daily     Continuous Infusions:   PRN Meds: sodium chloride, ondansetron, acetaminophen, aluminum & magnesium hydroxide-simethicone, bisacodyl, methocarbamol, oxyCODONE **OR** [DISCONTINUED] oxyCODONE, nitroGLYCERIN, calcium carbonate    Data:     Past Medical History:   has a past medical history of Anxiety, Bronchitis with asthma, acute, Carotid stenosis, left, Diabetes mellitus ([address] GERD (gastroesophageal reflux disease), Hyperlipidemia, Hypertension, and Osteoarthritis. Social History:   reports that he has never smoked. He has never used smokeless tobacco. He reports that he drinks alcohol. He reports that he does not use drugs. Family History:   Family History   Problem Relation Age of Onset   Meena He Cancer Mother     Cancer Father        Vitals:  /76   Pulse 91   Temp 97.9 °F (36.6 °C) (Oral)   Resp 18   Ht 5' 10\" (1.778 m)   Wt 252 lb (114.3 kg)   SpO2 99%   BMI 36.16 kg/m²   Temp (24hrs), Av °F (36.7 °C), Min:97.9 °F (36.6 °C), Max:98.1 °F (36.7 °C)    No results for input(s): POCGLU in the last 72 hours. I/O (24Hr):   No intake or output data in the 24 hours ending 19 [address] [place] [address] Avenue:    Hematology:  Recent Labs     07/18/19  0618        Chemistry:  No results for input(s): NA, K, CL, CO2, GLUCOSE, BUN, CREATININE, MG, ANIONGAP, LABGLOM, GFRAA, CALCIUM, CAION, PHOS, PSA, PROBNP, TROPHS, CKTOTAL, CKMB, CKMBINDEX, MYOGLOBIN, DIGOXIN, LACTACIDWB in the last 72 hours. No results for input(s): PROT, LABALBU, LABA1C, G2UCDDW, X2MPAEI, FT4, TSH, AST, ALT, LDH, GGT, ALKPHOS, LABGGT, BILITOT, BILIDIR, AMMONIA, AMYLASE, LIPASE, LACTATE, CHOL, HDL, LDLCHOLESTEROL, CHOLHDLRATIO, TRIG, VLDL, MZX73QJ, PHENYTOIN, PHENYF, URICACID, POCGLU in the last 72 hours. No results found for: SPECIAL  No results found for: CULTURE    No results found for: POCPH, PHART, PH, POCPCO2, YTI4TRT, PCO2, POCPO2, PO2ART, PO2, POCHCO3, AWX8TQG, HCO3, NBEA, PBEA, BEART, BE, THGBART, THB, GYT2TNT, IOIY9RED, M0KQMCBT, O2SAT, FIO2    Radiology:        Physical Examination:        General appearance:  alert, cooperative and no distress  Mental Status:  oriented to person, place and time and normal affect  Lungs:  clear to auscultation bilaterally, normal effort  Heart:  regular rate and rhythm, no murmur  Abdomen:  soft, nontender, nondistended, normal bowel sounds, no masses, hepatomegaly, splenomegaly  Extremities:  no edema, redness, tenderness in the calves  Skin:  no gross lesions, rashes, induration  Diplopia right eye is covered poor left hand coordination failed finger-nose test   Back of the head surgical scar is well-healed no signs of infection a  Assessment:        Primary Problem  <principal problem not specified>    Active Hospital Problems    Diagnosis Date Noted    Diplopia [H53.2] 07/15/2019    [facility] [I62.9] 07/03/2019    Myocardiopathy ([facility] [I42.9] 07/19/2018    Unstable angina ([facility] [I20.0] 06/10/2018    Thyroid nodule [E04.1] 05/19/2016    Hypertension [I10] 07/13/2015       Plan:        1.  Spontaneous cerebellar bleed with cerebellar ataxia and

## 2019-07-18 NOTE — PROGRESS NOTES
[address]    ACUTE REHABILITATION OCCUPATIONAL THERAPY  DAILY NOTE    Date: 19  Patient Name: Rubén Marques      Room: 6688/4271-39    MRN: [id number]   : 1945  (75 y.o.)  Gender: male   Referring Practitioner: Lauren Vega  Diagnosis: Left cerebellar ICH. Pt is s/p suboccipital craniotomy for cerebellar ICH on 19. L side ataxia, double vision -eye patch change every 2 hours. Additional Pertinent Hx: pt. reports being tired from brain bleed    Restrictions  Restrictions/Precautions: Cardiac, Fall Risk  Implants present? : Metal implants(L TKA)  Other position/activity restrictions: Alternate eye patch q2h for diplopia from [facility] clinic notes. Subjective  Subjective: \"I was told I need to do it, so I'm doing it\"   Comments: Pt very eager to complete tasks this date however demo decreased safety c urgency. Pt wife present for ADL session this date. Patient Currently in Pain: Denies  Pain Level: 0  Restrictions/Precautions: Cardiac; Fall Risk        Pain Assessment  Pain Assessment: 0-10  Pain Level: 0  Pain Type: Acute pain  Pain Location: Neck  Pain Orientation: Posterior  Pain Descriptors: Aching    Objective  Cognition  Overall Cognitive Status: Impaired  Following Directions:  Follows one step commands  Safety Judgement: Decreased awareness of need for safety  Balance  Sitting Balance: Modified independent   Standing Balance: Minimal assistance(CGA-min A)  Transfers  Stand Step Transfers: Minimal assistance(x2 LOB d/t poor pacing and technique)  Sit to stand: Contact guard assistance  Stand to sit: Contact guard assistance  Transfer Comments: Vc for hand placement and safety  Standing Balance  Time: AM: 1-2 min x 4 PM: 1-2 min x2  Activity: AM: various ADL tasks, PM: functional transfer training  Functional Mobility  Functional - Mobility Device: Rolling Walker(w/c for longer distances)  Activity: Other(within bathroom area)  Assist Level: Minimal assistance(sba/sup

## 2019-07-18 NOTE — PROGRESS NOTES
end of hallway. Transfers  Sit to Stand: Contact guard assistance, Minimal Assistance(RW. Cues for slow, steady movement; safe hand placement; 2x Min A req'd to prevent posterior LOB as pt grabs and pulls on RW. )  Stand to sit: Contact guard assistance(RW. Cues for safety with good return. )  Bed to Chair: Minimal assistance(RW)  Squat Pivot Transfers: Minimal Assistance  Comment: RW & cues throughout for hand placement and safety. Unsteady at times. Stand pivot transfer: Min A w/RW  Ambulation  Ambulation?: Yes  More Ambulation?: Yes  Ambulation 1  Surface: level tile  Device: Rolling Walker  Other Apparatus: Wheelchair follow  Assistance: Minimal assistance, Contact guard assistance  Quality of Gait: Initially good heel strike & moderate JOSE, deteriorating over distance, but good return to cues for standing rest break to [employer]. \" Heavy reliance on UEs, forward flexion, increased speed & decreased safe distance with RW with fatigue; good return to cues. Downward gaze 75% of time; good but fleeting return to cues for upward gaze/focal point. Gait Deviations: Increased JOSE  Distance: 100' x2 a.m. with seated rest between. 150' p.m. with 180* turn; smaller distances within gym. Comments: Cues for eyes open and focus on single object at end of hallway. Surface: level tile  Ambulation 1  Surface: level tile  Device: Rolling Walker  Other Apparatus: Wheelchair follow  Assistance: Minimal assistance, Contact guard assistance  Quality of Gait: Initially good heel strike & moderate JOSE, deteriorating over distance, but good return to cues for standing rest break to [employer]. \" Heavy reliance on UEs, forward flexion, increased speed & decreased safe distance with RW with fatigue; good return to cues. Downward gaze 75% of time; good but fleeting return to cues for upward gaze/focal point. Gait Deviations: Increased JOSE  Distance: 100' x2 a.m. with seated rest between.  150' p.m. with 180* turn; smaller distances

## 2019-07-19 ENCOUNTER — APPOINTMENT (OUTPATIENT)
Dept: CT IMAGING | Age: 74
DRG: 057 | End: 2019-07-19
Attending: PHYSICAL MEDICINE & REHABILITATION
Payer: MEDICARE

## 2019-07-19 PROCEDURE — 97530 THERAPEUTIC ACTIVITIES: CPT

## 2019-07-19 PROCEDURE — 6370000000 HC RX 637 (ALT 250 FOR IP): Performed by: PHYSICAL MEDICINE & REHABILITATION

## 2019-07-19 PROCEDURE — 97542 WHEELCHAIR MNGMENT TRAINING: CPT

## 2019-07-19 PROCEDURE — 99231 SBSQ HOSP IP/OBS SF/LOW 25: CPT | Performed by: INTERNAL MEDICINE

## 2019-07-19 PROCEDURE — 97127 HC SP THER IVNTJ W/FOCUS COG FUNCJ: CPT

## 2019-07-19 PROCEDURE — 70450 CT HEAD/BRAIN W/O DYE: CPT

## 2019-07-19 PROCEDURE — 6360000002 HC RX W HCPCS: Performed by: PHYSICAL MEDICINE & REHABILITATION

## 2019-07-19 PROCEDURE — 97116 GAIT TRAINING THERAPY: CPT

## 2019-07-19 PROCEDURE — 97110 THERAPEUTIC EXERCISES: CPT

## 2019-07-19 PROCEDURE — 99232 SBSQ HOSP IP/OBS MODERATE 35: CPT | Performed by: PHYSICAL MEDICINE & REHABILITATION

## 2019-07-19 PROCEDURE — 97112 NEUROMUSCULAR REEDUCATION: CPT

## 2019-07-19 PROCEDURE — 1180000000 HC REHAB R&B

## 2019-07-19 RX ADMIN — NYSTATIN 500000 UNITS: 100000 SUSPENSION ORAL at 07:06

## 2019-07-19 RX ADMIN — HEPARIN SODIUM 5000 UNITS: 5000 INJECTION INTRAVENOUS; SUBCUTANEOUS at 05:55

## 2019-07-19 RX ADMIN — AMLODIPINE BESYLATE 5 MG: 5 TABLET ORAL at 07:04

## 2019-07-19 RX ADMIN — OXYCODONE HYDROCHLORIDE 5 MG: 5 TABLET ORAL at 20:44

## 2019-07-19 RX ADMIN — ASPIRIN 81 MG 81 MG: 81 TABLET ORAL at 07:04

## 2019-07-19 RX ADMIN — GABAPENTIN 400 MG: 400 CAPSULE ORAL at 07:04

## 2019-07-19 RX ADMIN — CLOPIDOGREL BISULFATE 75 MG: 75 TABLET ORAL at 07:04

## 2019-07-19 RX ADMIN — NYSTATIN 500000 UNITS: 100000 SUSPENSION ORAL at 20:42

## 2019-07-19 RX ADMIN — DICLOFENAC 4 G: 10 GEL TOPICAL at 14:28

## 2019-07-19 RX ADMIN — COLCHICINE 0.6 MG: 0.6 TABLET, FILM COATED ORAL at 20:43

## 2019-07-19 RX ADMIN — MECLIZINE HYDROCHLORIDE 25 MG: 25 TABLET ORAL at 20:43

## 2019-07-19 RX ADMIN — HEPARIN SODIUM 5000 UNITS: 5000 INJECTION INTRAVENOUS; SUBCUTANEOUS at 14:28

## 2019-07-19 RX ADMIN — HEPARIN SODIUM 5000 UNITS: 5000 INJECTION INTRAVENOUS; SUBCUTANEOUS at 22:15

## 2019-07-19 RX ADMIN — COLCHICINE 0.6 MG: 0.6 TABLET, FILM COATED ORAL at 07:06

## 2019-07-19 RX ADMIN — DICLOFENAC 4 G: 10 GEL TOPICAL at 07:05

## 2019-07-19 RX ADMIN — GABAPENTIN 400 MG: 400 CAPSULE ORAL at 20:43

## 2019-07-19 RX ADMIN — ATORVASTATIN CALCIUM 80 MG: 80 TABLET, FILM COATED ORAL at 20:43

## 2019-07-19 RX ADMIN — NYSTATIN 500000 UNITS: 100000 SUSPENSION ORAL at 14:28

## 2019-07-19 RX ADMIN — DICLOFENAC 4 G: 10 GEL TOPICAL at 20:48

## 2019-07-19 RX ADMIN — OXYCODONE HYDROCHLORIDE 5 MG: 5 TABLET ORAL at 20:43

## 2019-07-19 RX ADMIN — MECLIZINE HYDROCHLORIDE 25 MG: 25 TABLET ORAL at 07:04

## 2019-07-19 RX ADMIN — GABAPENTIN 400 MG: 400 CAPSULE ORAL at 14:32

## 2019-07-19 RX ADMIN — MECLIZINE HYDROCHLORIDE 25 MG: 25 TABLET ORAL at 14:28

## 2019-07-19 RX ADMIN — NYSTATIN 500000 UNITS: 100000 SUSPENSION ORAL at 17:39

## 2019-07-19 RX ADMIN — POLYETHYLENE GLYCOL 3350 17 G: 17 POWDER, FOR SOLUTION ORAL at 07:04

## 2019-07-19 ASSESSMENT — PAIN DESCRIPTION - ORIENTATION
ORIENTATION: LEFT
ORIENTATION: LEFT

## 2019-07-19 ASSESSMENT — PAIN SCALES - GENERAL
PAINLEVEL_OUTOF10: 8
PAINLEVEL_OUTOF10: 4
PAINLEVEL_OUTOF10: 0
PAINLEVEL_OUTOF10: 5
PAINLEVEL_OUTOF10: 8

## 2019-07-19 ASSESSMENT — PAIN DESCRIPTION - LOCATION
LOCATION: SHOULDER
LOCATION: SHOULDER

## 2019-07-19 ASSESSMENT — PAIN DESCRIPTION - PAIN TYPE
TYPE: ACUTE PAIN
TYPE: ACUTE PAIN

## 2019-07-19 NOTE — PROGRESS NOTES
Yoni MCGRATH informed me that patient had fallen and needed to be checked out. Entered patients room and he was laying on bed will left forehead swollen. Informed Dr. Jayde Granados and she ordered Stat CT of head, vital signs obtained HR high, and blood pressure increased. MEWS of a 3, will inform house supervisor and recheck vitals in two hours.

## 2019-07-19 NOTE — PROGRESS NOTES
Physical Medicine & Rehabilitation  Progress Note      Subjective:      75year-old gentleman with hemorrhagic CVA. Patient is well, and has had no acute complaints or problems    ROS:  Denies fevers, chills, sweats. No chest pain, palpitations, lightheadedness. Denies coughing, wheezing or shortness of breath. Denies abdominal pain, nausea, diarrhea or constipation. No new areas of joint pain. Denies new areas of numbness or weakness. Denies new anxiety or depression issues. No new skin problems. Rehabilitation:   Progressing in therapies. PT:  Restrictions/Precautions: Cardiac, Fall Risk  Implants present? : Metal implants(L TKA)  Other position/activity restrictions: Alternate eye patch q2h for diplopia from [facility] clinic [facility]. Transfers  Sit to Stand: Contact guard assistance, Minimal Assistance, Moderate Assistance(RW. Safety VCs. Mod A req'd 1x due to F LOB in // bars. )  Stand to sit: Contact guard assistance(RW. Cues for safety with good return. )  Bed to Chair: Minimal assistance(RW)  Squat Pivot Transfers: Minimal Assistance  Comment: RW & cues throughout for hand placement and safety. Unsteady at times. Stand pivot transfer: Min A w/RW  Ambulation 1  Surface: level tile  Device: Rolling Walker  Other Apparatus: (Eye patch)  Assistance: Minimal assistance, Contact guard assistance, Moderate assistance  Quality of Gait: Initially good heel strike & moderate JOSE, deteriorating over distance, but good return to cues for standing rest break to \"reset. \" Heavy reliance on UEs, forward flexion, increased speed & decreased safe distance with RW with fatigue; good return to cues. Downward gaze 75% of time, pt states to avoid double vision in distance, but wearing eye patch; good but fleeting return to cues for upward gaze/focal point. Slight LOBs a.m. & p.m., with 1 large LOB p.m. Gait Deviations: Decreased step height, Staggers, Increased JOSE  Distance: 150' a.m. with 2 180* turns; 100' x2 p.m. toilet)  Toilet Transfer: Contact guard assistance  Toilet Transfers Comments: Cues for safety w transfer/RW mgmt. Tub Transfers  Tub - Transfer From: Rolling walker  Tub - Transfer Type: To and From  Tub - Transfer To: Transfer tub bench  Tub - Technique: Ambulating  Tub Transfers: Minimal assistance  Tub Transfers Comments: req cues for safety and proper technique. Req spouse to measure various heights and depths of tub at home (garden step down tub) to ensure good fuctional use of TTB as appropriate. Shower Transfers  Shower - Transfer From: Jama Nguyen - Transfer Type: To and From  Shower - Transfer To: Transfer tub bench  Shower - Technique: Ambulating  Shower Transfers: Contact Guard  Shower Transfers Comments: VC's for proper technique to ensure safety. Ataxia noted c LUE while reaching for grab bar. SPEECH:  Objective/Assessment:     cognition:    Paragraph recall- 30%, 40% c cues. ? Pt. Not always giving full attention to tasks. 4 word category inclusion- 84%, 100% c cues. Name 12 items in category- 100%. Abstract convergent categorization- 90%     Speech:   Min A needed for compensatory dysarthria strategies today.       Other:   Pt. Reports he has a lot on his mind when asked if it was more difficult for him to pay attention today. Objective:  /75   Pulse 97   Temp 98.4 °F (36.9 °C) (Oral)   Resp 16   Ht 5' 10\" (1.778 m)   Wt 252 lb (114.3 kg)   SpO2 95%   BMI 36.16 kg/m²       GEN: well developed, well nourished, NAD  HEENT: NCAT, PERRL, EOMI, mucous membranes pink and moist  CV: RRR, no murmurs, rubs or gallops  PULM: CTAB, no rales or rhonchi. Respirations WNL and unlabored  ABD: soft, NT, ND, BS+ and equal  NEURO: A&O x3. Sensation intact to light touch. Impaired vision - using alternating eye patch to correct. MSK: Functional ROM all extremities.  Strength 4+/5 key muscles all extremities. EXTREMITIES: No calf tenderness to palpation bilaterally.  No edema Nightly  clopidogrel (PLAVIX) tablet 75 mg, 75 mg, Oral, Daily  aspirin chewable tablet 81 mg, 81 mg, Oral, Daily  nitroGLYCERIN (NITROSTAT) SL tablet 0.4 mg, 0.4 mg, Sublingual, Q5 Min PRN  amLODIPine (NORVASC) tablet 5 mg, 5 mg, Oral, Daily  heparin (porcine) injection 5,000 Units, 5,000 Units, Subcutaneous, 3 times per day  lidocaine 4 % external patch 1 patch, 1 patch, Transdermal, Daily  calcium carbonate (TUMS) chewable tablet 500 mg, 500 mg, Oral, TID PRN      Impression/Plan:   Impaired ADLs, gait, and mobility due to:      1. 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 Repeat head CT week of 8/3 - results to neuro. PT/OT/speech for gait, mobility, cognition, speech intelligibility, strengthening, endurance, self care, and ADLs. On Antivert for dizziness. Vestibular therapist evaluated and is incorporating techniques in treatment plan. Using eye patch for diplopia. Nursing to follow-up on ophthalmology appointment. 2. Neuropathic pain L leg: stable on gabapentin 400 mg TID.   3. RCA occlusion: s/p PCI 6/5/19 for stent placement complicated by perforation and tamponade with drain and autotransfusion treatment. 4. Nausea: Resolved. on antivert and has Zofran. Abdominal workup negative. Likely secondary to cerebellar ICH.   5. CHF/CAD/HTN: on Norvasc, lipitor, prn nitro, ASA, plavix. Has follow up appointment [facility] 8/15/19. 6. L shoulder pain: [facility] done at [facility] negative for acute, advanced degenerative changes L AC joint and small inferior glenohumeral osteophytes. Has Lidoderm patch, oxycodone, robaxin, and diclofenac gel.   7. DVT prophylaxis:  Heparin  8. Internal medicine for medical management      Notified by nursing staff after rounds that patient fell and did hit his head. Ordered STAT CT scan and neurochecks.      Electronically signed by Manjeet Baez MD on 7/19/2019 at 9:47 AM      This note is created with the assistance of bandar

## 2019-07-19 NOTE — PROGRESS NOTES
[address]  Acute Rehabilitation Physical Therapy Progress Note    Date: 19  Patient Name: Angeles Gutierrez       Room: 8962/4377-24  MRN: [id number]   Account: [de-identified]   : 1945  (70 y.o.) Gender: male     Referring Practitioner: Peter Hoff  Diagnosis: Left cerebellar ICH. Pt is s/p suboccipital craniotomy for cerebellar ICH on 19. L side ataxia, double vision -eye patch change every 2 hours. Past Medical History:  has a past medical history of Anxiety, Bronchitis with asthma, acute, Carotid stenosis, left, Diabetes mellitus ([address] GERD (gastroesophageal reflux disease), Hyperlipidemia, Hypertension, and Osteoarthritis. Past Surgical History:   has a past surgical history that includes Colonoscopy; Tonsillectomy and adenoidectomy; joint replacement (Left); shoulder surgery (Right); and Carotid endarterectomy (Left, 16). Additional Pertinent Hx: 75year old male with pertinent PMHx for CAD, HTN, HLD, L CEA in 2016, who presents to [address] [place] [address] with [address]. Patient recently had dyspnea on exertion and underwent cardiac catheterization , s/p RCA stenting 79, complicated by perforation and tamponade s/p pericardiocentesis with a covered stent through the perforation. Pt at [address] [place] [address] for follow up visit, when he c/o of nausea/vomiting, dysarthria and vison deficits. CT brain was done STAT and showed a left cerebellar ICH. Pt is s/p suboccipital craniotomy for cerebellar ICH on 19. Pt admitted to rehab unit on 7/3/19. Overall Orientation Status: Within Normal Limits  Restrictions/Precautions  Restrictions/Precautions: Cardiac; Fall Risk  Implants present? : Metal implants(L TKA)  Position Activity Restriction  Other position/activity restrictions: Alternate eye patch q2h for diplopia from [facility] clinic notes. Subjective: Patient pleasant. Comments: Fall Risk; Chair alarm; Ongoing nausea and double vision.  Pt's wears eye patch - alt. eyes; stairs       BALANCE Posture: Fair  Sitting - Static: Good;-(EOC, no back or UE support)  Sitting - Dynamic: Good;-(EOC, no back or UE support)  Standing - Static: Fair(RW)  Standing - Dynamic: Poor;+(RW; LOB req'ing Mod A. )    EXERCISES    Other exercises 1: Standing B LE x 15 reps  Other exercises 2: Step-ups x 15 reps  Other exercises 3: Nustep x 13 min L4  Other exercises 4: Seated B LE x 20 reps  Other exercises 5: Blue Tband x 15 reps  Other exercises 9: Oculomotor exercises with education, per PT Yadira: saccadic eye mvmt, tracking and pencil pushups, each w/, without eye patch. HEP of first 2 issued. Activity Tolerance: Patient limited by fatigue, Patient limited by endurance  Activity Tolerance: Nausea, light headed and dizzy at times          Patient Education  New Education Provided: Safety with RW  Learner:patient  Method: demonstration and explanation       Outcome: needs reinforcement     Current Treatment Recommendations: Strengthening, Balance Training, Functional Mobility Training, Transfer Training, Gait Training, Stair training, Wheelchair Mobility Training, Neuromuscular Re-education, Home Exercise Program, Safety Education & Training, Patient/Caregiver Education & Training, Equipment Evaluation, Education, & procurement, Vestibular Rehab    Conditions Requiring Skilled Therapeutic Intervention  Body structures, Functions, Activity limitations: Decreased functional mobility ; Decreased strength;Decreased safe awareness;Decreased balance;Decreased coordination  Assessment: Decreased balance, coordination and safety awareness.     Treatment Diagnosis: Impaired mobility  Patient Education: Safety with RW  REQUIRES PT FOLLOW UP: Yes  Discharge Recommendations: Home with assist PRN;Patient would benefit from continued therapy after discharge    Goals  Short term goals  Time Frame for Short term goals: 10 days  Short term goal 1: Pt able to perform supine<>sit at mod-I   Short term goal 2:

## 2019-07-19 NOTE — PROGRESS NOTES
[address]   ACUTE REHABILITATION OCCUPATIONAL THERAPY  DAILY NOTE    Date: 19  Patient Name: Samir Fisher      Room: 6024/0328-64    MRN: [id number]   : 1945  (75 y.o.)  Gender: male   Referring Practitioner: Gray Galvan  Diagnosis: Left cerebellar ICH. Pt is s/p suboccipital craniotomy for cerebellar ICH on 19. L side ataxia, double vision -eye patch change every 2 hours. Additional Pertinent Hx: pt. reports being tired from brain bleed    Restrictions  Restrictions/Precautions: Cardiac, Fall Risk  Implants present? : Metal implants(L TKA)  Other position/activity restrictions: Alternate eye patch q2h for diplopia from [facility] clinic notes. Subjective  Subjective: Pt reports fatigue  Comments: Pt pleasant and cooperative c participation   Patient Currently in Pain: Denies  Pain Level: 0  Restrictions/Precautions: Cardiac; Fall Risk        Pain Assessment  Pain Assessment: 0-10  Pain Level: 0  Pain Type: Acute pain  Pain Location: Neck  Pain Orientation: Posterior  Pain Descriptors: Aching    Objective  Cognition  Overall Cognitive Status: Impaired  Following Directions: Follows two step commands  Safety Judgement: Decreased awareness of need for safety  Insights: Decreased awareness of deficits  Sequencing and Organization: Assistance required to identify errors made     Transfers  Sit to stand: Contact guard assistance  Stand to sit: Contact guard assistance  Transfer Comments: Vc for hand placement and safety     Functional Mobility  Functional - Mobility Device: Wheelchair  Activity: To/From therapy gym  Assist Level: Supervision  Functional Mobility Comments: CGA c RW for short distance in therapy bathroom to completed faciliated transfer  Toilet Transfers  Toilet - Technique: Stand step  Equipment Used: Raised toilet seat with rails  Toilet Transfer: Contact guard assistance  Toilet Transfers Comments: Cues for safety w transfer/RW mgmt.  Toilet transfer completed

## 2019-07-19 NOTE — PLAN OF CARE
Problem: Risk for Impaired Skin Integrity  Goal: Tissue integrity - skin and mucous membranes  Description  Structural intactness and normal physiological function of skin and  mucous membranes. Outcome: Ongoing  Note:   No new areas of skin breakdown. Skin remains intact so far during shift. Will continue to monitor. Problem: Falls - Risk of:  Goal: Will remain free from falls  Description  Will remain free from falls  Outcome: Ongoing  Note:   Patient remains free from falls so far during shift. Call light within reach. Bed locked and in lowest position. Hourly rounding completed during shift. Will continue to monitor. Goal: Absence of physical injury  Description  Absence of physical injury  Outcome: Ongoing     Problem: Pain:  Goal: Pain level will decrease  Description  Pain level will decrease  Outcome: Ongoing  Note:   Pain controlled with prn medications. Safety maintained during shift. Will continue to monitor.    Goal: Control of acute pain  Description  Control of acute pain  Outcome: Ongoing  Goal: Control of chronic pain  Description  Control of chronic pain  Outcome: Ongoing     Problem: Nutrition  Goal: Optimal nutrition therapy  Outcome: Ongoing     Problem: Neurological  Goal: Maximum potential motor/sensory/cognitive function  Outcome: Ongoing

## 2019-07-19 NOTE — CONSULTS
breath, wheezing  Cardiovascular:  negative for chest pain, chest pressure/discomfort, lower extremity edema, palpitations  Gastrointestinal:  negative for abdominal pain, constipation, diarrhea, nausea, vomiting  Neurological: Diplopia poor left hand coordination    Medications: Allergies:  No Known Allergies    Current Meds:   Scheduled Meds:    gabapentin  400 mg Oral TID    diclofenac sodium  4 g Topical TID    meclizine  25 mg Oral TID    nystatin  5 mL Oral 4x Daily    colchicine  0.6 mg Oral BID    polyethylene glycol  17 g Oral Daily    atorvastatin  80 mg Oral Nightly    clopidogrel  75 mg Oral Daily    aspirin  81 mg Oral Daily    amLODIPine  5 mg Oral Daily    heparin (porcine)  5,000 Units Subcutaneous 3 times per day    lidocaine  1 patch Transdermal Daily     Continuous Infusions:   PRN Meds: sodium chloride, ondansetron, acetaminophen, aluminum & magnesium hydroxide-simethicone, bisacodyl, methocarbamol, oxyCODONE **OR** [DISCONTINUED] oxyCODONE, nitroGLYCERIN, calcium carbonate    Data:     Past Medical History:   has a past medical history of Anxiety, Bronchitis with asthma, acute, Carotid stenosis, left, Diabetes mellitus ([address] GERD (gastroesophageal reflux disease), Hyperlipidemia, Hypertension, and Osteoarthritis. Social History:   reports that he has never smoked. He has never used smokeless tobacco. He reports that he drinks alcohol. He reports that he does not use drugs. Family History:   Family History   Problem Relation Age of Onset   Roshan Flores Cancer Mother     Cancer Father        Vitals:  /75   Pulse 97   Temp 98.4 °F (36.9 °C) (Oral)   Resp 16   Ht 5' 10\" (1.778 m)   Wt 252 lb (114.3 kg)   SpO2 95%   BMI 36.16 kg/m²   Temp (24hrs), Av.5 °F (36.9 °C), Min:98.4 °F (36.9 °C), Max:98.6 °F (37 °C)    No results for input(s): POCGLU in the last 72 hours. I/O (24Hr):   No intake or output data in the 24 hours ending 19

## 2019-07-19 NOTE — PROGRESS NOTES
Speech Language Pathology  Speech Language Pathology  [facility]    Speech Language Treatment Note    Date: 7/19/2019  Patients Name: Cynthia Mcmullen  MRN: [id number]  Diagnosis:   Patient Active Problem List   Diagnosis Code    Impaired fasting glucose R73.01    Patient overweight E66.3    Insomnia G47.00    Hypertension I10    OA (osteoarthritis) of knee M17.10    S/P total knee arthroplasty Z96.659    Bronchitis with asthma, acute J20.9, J45.909    Lymphadenopathy of left cervical region R59.0    Carotid stenosis, left I65.22    H/O carotid endarterectomy Z98.890    Thyroid nodule E04.1    Ventral hernia without obstruction or gangrene K43.9    Unstable angina (HCC) I20.0    Myocardiopathy (HCC) I42.9    Abdominal mass, RUQ (right upper quadrant) R19.01    Chest pain R07.9    Spontaneous intracranial hemorrhage (HCC) I62.9    Diplopia H53.2       Pain: 0/10    Speech and Language Treatment  Treatment time: 4411-5962    Subjective: [x] Alert [x] Cooperative     [] Confused     [] Agitated    [] Lethargic    Objective/Assessment:    cognition:    Paragraph recall- 30%, 40% c cues. ? Pt. Not always giving full attention to tasks. 4 word category inclusion- 84%, 100% c cues. Name 12 items in category- 100%. Abstract convergent categorization- 90%    Speech:   Min A needed for compensatory dysarthria strategies today. Other:   Pt. Reports he has a lot on his mind when asked if it was more difficult for him to pay attention today. Plan:  [x] Continue ST services    [] Discharge from ST:      Discharge recommendations: [] Inpatient Rehab   [] [place]   [] Outpatient Therapy  [] Follow up at trauma clinic   [] Other:       Treatment completed by: Jonathon Lester A.CCC/SLP

## 2019-07-20 ENCOUNTER — APPOINTMENT (OUTPATIENT)
Dept: GENERAL RADIOLOGY | Age: 74
DRG: 057 | End: 2019-07-20
Attending: PHYSICAL MEDICINE & REHABILITATION
Payer: MEDICARE

## 2019-07-20 LAB — GLUCOSE BLD-MCNC: 124 MG/DL (ref 75–110)

## 2019-07-20 PROCEDURE — 1180000000 HC REHAB R&B

## 2019-07-20 PROCEDURE — 6360000002 HC RX W HCPCS: Performed by: PHYSICAL MEDICINE & REHABILITATION

## 2019-07-20 PROCEDURE — 6370000000 HC RX 637 (ALT 250 FOR IP): Performed by: PHYSICAL MEDICINE & REHABILITATION

## 2019-07-20 PROCEDURE — 97535 SELF CARE MNGMENT TRAINING: CPT

## 2019-07-20 PROCEDURE — 97116 GAIT TRAINING THERAPY: CPT

## 2019-07-20 PROCEDURE — 99232 SBSQ HOSP IP/OBS MODERATE 35: CPT | Performed by: INTERNAL MEDICINE

## 2019-07-20 PROCEDURE — 97110 THERAPEUTIC EXERCISES: CPT

## 2019-07-20 PROCEDURE — 99233 SBSQ HOSP IP/OBS HIGH 50: CPT | Performed by: PHYSICAL MEDICINE & REHABILITATION

## 2019-07-20 PROCEDURE — 73030 X-RAY EXAM OF SHOULDER: CPT

## 2019-07-20 PROCEDURE — 97530 THERAPEUTIC ACTIVITIES: CPT

## 2019-07-20 RX ADMIN — NYSTATIN 500000 UNITS: 100000 SUSPENSION ORAL at 21:35

## 2019-07-20 RX ADMIN — GABAPENTIN 400 MG: 400 CAPSULE ORAL at 08:33

## 2019-07-20 RX ADMIN — DICLOFENAC 4 G: 10 GEL TOPICAL at 21:35

## 2019-07-20 RX ADMIN — NYSTATIN 500000 UNITS: 100000 SUSPENSION ORAL at 08:32

## 2019-07-20 RX ADMIN — HEPARIN SODIUM 5000 UNITS: 5000 INJECTION INTRAVENOUS; SUBCUTANEOUS at 14:25

## 2019-07-20 RX ADMIN — HEPARIN SODIUM 5000 UNITS: 5000 INJECTION INTRAVENOUS; SUBCUTANEOUS at 06:13

## 2019-07-20 RX ADMIN — MECLIZINE HYDROCHLORIDE 25 MG: 25 TABLET ORAL at 08:33

## 2019-07-20 RX ADMIN — DICLOFENAC 4 G: 10 GEL TOPICAL at 08:32

## 2019-07-20 RX ADMIN — DICLOFENAC 4 G: 10 GEL TOPICAL at 14:24

## 2019-07-20 RX ADMIN — AMLODIPINE BESYLATE 5 MG: 5 TABLET ORAL at 08:37

## 2019-07-20 RX ADMIN — ACETAMINOPHEN 650 MG: 325 TABLET, FILM COATED ORAL at 03:48

## 2019-07-20 RX ADMIN — DICLOFENAC 4 G: 10 GEL TOPICAL at 06:21

## 2019-07-20 RX ADMIN — NYSTATIN 500000 UNITS: 100000 SUSPENSION ORAL at 14:24

## 2019-07-20 RX ADMIN — COLCHICINE 0.6 MG: 0.6 TABLET, FILM COATED ORAL at 08:35

## 2019-07-20 RX ADMIN — GABAPENTIN 400 MG: 400 CAPSULE ORAL at 21:35

## 2019-07-20 RX ADMIN — GABAPENTIN 400 MG: 400 CAPSULE ORAL at 14:25

## 2019-07-20 RX ADMIN — ATORVASTATIN CALCIUM 80 MG: 80 TABLET, FILM COATED ORAL at 21:35

## 2019-07-20 RX ADMIN — ONDANSETRON HYDROCHLORIDE 4 MG: 4 TABLET, FILM COATED ORAL at 21:11

## 2019-07-20 RX ADMIN — HEPARIN SODIUM 5000 UNITS: 5000 INJECTION INTRAVENOUS; SUBCUTANEOUS at 21:36

## 2019-07-20 RX ADMIN — MECLIZINE HYDROCHLORIDE 25 MG: 25 TABLET ORAL at 21:36

## 2019-07-20 RX ADMIN — OXYCODONE HYDROCHLORIDE 5 MG: 5 TABLET ORAL at 21:36

## 2019-07-20 RX ADMIN — ASPIRIN 81 MG 81 MG: 81 TABLET ORAL at 08:33

## 2019-07-20 RX ADMIN — CLOPIDOGREL BISULFATE 75 MG: 75 TABLET ORAL at 08:32

## 2019-07-20 RX ADMIN — NYSTATIN 500000 UNITS: 100000 SUSPENSION ORAL at 16:50

## 2019-07-20 RX ADMIN — MECLIZINE HYDROCHLORIDE 25 MG: 25 TABLET ORAL at 14:25

## 2019-07-20 RX ADMIN — POLYETHYLENE GLYCOL 3350 17 G: 17 POWDER, FOR SOLUTION ORAL at 08:32

## 2019-07-20 RX ADMIN — ACETAMINOPHEN 650 MG: 325 TABLET, FILM COATED ORAL at 06:19

## 2019-07-20 RX ADMIN — COLCHICINE 0.6 MG: 0.6 TABLET, FILM COATED ORAL at 21:35

## 2019-07-20 ASSESSMENT — PAIN SCALES - GENERAL
PAINLEVEL_OUTOF10: 0
PAINLEVEL_OUTOF10: 9
PAINLEVEL_OUTOF10: 8
PAINLEVEL_OUTOF10: 5
PAINLEVEL_OUTOF10: 9

## 2019-07-20 ASSESSMENT — PAIN DESCRIPTION - FREQUENCY: FREQUENCY: INTERMITTENT

## 2019-07-20 ASSESSMENT — PAIN DESCRIPTION - PAIN TYPE
TYPE: ACUTE PAIN
TYPE: ACUTE PAIN

## 2019-07-20 ASSESSMENT — PAIN DESCRIPTION - ORIENTATION
ORIENTATION: LEFT
ORIENTATION: LEFT;POSTERIOR

## 2019-07-20 ASSESSMENT — PAIN DESCRIPTION - LOCATION
LOCATION: SHOULDER
LOCATION: SHOULDER;HEAD

## 2019-07-20 ASSESSMENT — PAIN DESCRIPTION - DESCRIPTORS
DESCRIPTORS: ACHING
DESCRIPTORS: ACHING;DISCOMFORT

## 2019-07-20 NOTE — PROGRESS NOTES
and mastoid air cells demonstrate no acute abnormality. SOFT TISSUES/SKULL:  No acute abnormality of the visualized skull or soft tissues. No acute intracranial hemorrhage. Suboccipital craniotomy with encephalomalacia within the left cerebellum. Left periorbital soft tissue injury. Xr Shoulder Left (min 2 Views)    Result Date: 7/20/2019  EXAMINATION: 3 XRAY VIEWS OF THE LEFT SHOULDER 7/20/2019 3:01 pm COMPARISON: None. HISTORY: ORDERING SYSTEM PROVIDED HISTORY: Vevelyn Even yesterday, pain anterior L shoulder with impaired Active ROM TECHNOLOGIST PROVIDED HISTORY: Vevelyn Even yesterday, pain anterior L shoulder with impaired Active ROM Reason for Exam: Pt fell 7/19/19 pain top of shoulder Acuity: Acute Type of Exam: Initial Mechanism of Injury: Pt fell 7/19/19 pain top of shoulder FINDINGS: No acute fracture. No joint subluxation or dislocation. Mild degenerative changes of the glenohumeral joint. Severe degenerative changes of the acromioclavicular joint. Soft tissues unremarkable. Visualized left lung clear. 1. No acute osseous abnormality the left shoulder. 2. Severe degenerative changes of the left acromioclavicular joint. Xr Abdomen (2 Views)    Result Date: 7/4/2019  EXAMINATION: TWO XRAY VIEWS OF THE ABDOMEN 7/4/2019 3:58 pm COMPARISON: None. HISTORY: ORDERING SYSTEM PROVIDED HISTORY: include decubitus TECHNOLOGIST PROVIDED HISTORY: include decubitus emesis rule out obstruction Reason for Exam: emesis rule out obstruction Acuity: Acute Type of Exam: Initial Additional signs and symptoms: include decubitus FINDINGS: Gas seen in the colon with evidence for stool in the colon. No extraluminal gas. The colon is mildly distended on the left side. No soft tissue gas. No evidence for obstruction. No extraluminal gas.                  ASSESSMENT:    Patient Active Problem List   Diagnosis    Impaired fasting glucose    Patient overweight    Insomnia    Hypertension    OA (osteoarthritis) of knee  S/P total knee arthroplasty    Bronchitis with asthma, acute    Lymphadenopathy of left cervical region    Carotid stenosis, left    H/O carotid endarterectomy    Thyroid nodule    Ventral hernia without obstruction or gangrene    Unstable angina (HCC)    Myocardiopathy (HCC)    Abdominal mass, RUQ (right upper quadrant)    Chest pain    Spontaneous intracranial hemorrhage (HCC)    Diplopia    Ataxia following nontraumatic intracerebral hemorrhage    Neuropathy      post iph   bp controlled   developed diplopia rt eye   likley related to left cerebellat iph     Has eye patch  1.   2. Neuropathic pain L leg: stable on gabapentin 400 mg TID.   3. RCA occlusion: s/p PCI 6/5/19 for stent placement complicated by perforation and tamponade with drain and autotransfusion treatment. 4. Nausea: Resolved. on antivert and has Zofran. Abdominal workup negative. Likely secondary to cerebellar ICH.   5. CHF/CAD/HTN: on Norvasc, lipitor, prn nitro, ASA, plavix     no cp/sob   bp controlled   PLAN:     cont same meds   will reviev ophthalmology evaluation   report not available        Claudell Canes, MD [address], [address] [place] [address].    Phone [phone]   Fax: [phone]  Answering Service: [phone]

## 2019-07-20 NOTE — PROGRESS NOTES
ORBITS: Left periorbital soft tissue injury.  Globes are intact.       SINUSES: The visualized paranasal sinuses and mastoid air cells demonstrate   no acute abnormality.       SOFT TISSUES/SKULL:  No acute abnormality of the visualized skull or soft   tissues.           Impression   No acute intracranial hemorrhage.       Suboccipital craniotomy with encephalomalacia within the left cerebellum.       Left periorbital soft tissue injury. CBC:   Recent Labs     07/18/19  0618        BMP: No results for input(s): NA, K, CL, CO2, PHOS, BUN, CREATININE, GLUCOSE in the last 72 hours. Invalid input(s): CA  BNP: No results for input(s): BNP in the last 72 hours. PT/INR: No results for input(s): PROTIME, INR in the last 72 hours. APTT: No results for input(s): APTT in the last 72 hours. CARDIAC ENZYMES: No results for input(s): CKMB, CKMBINDEX, TROPONINT in the last 72 hours. Invalid input(s): CKTOTAL;3  FASTING LIPID PANEL:  Lab Results   Component Value Date    CHOL 179 07/27/2018    HDL 48 07/27/2018    TRIG 122 07/27/2018     LIVER PROFILE: No results for input(s): AST, ALT, ALB, BILIDIR, BILITOT, ALKPHOS in the last 72 hours.      Current Medications:   Current Facility-Administered Medications: gabapentin (NEURONTIN) capsule 400 mg, 400 mg, Oral, TID  diclofenac sodium 1 % gel 4 g, 4 g, Topical, TID  meclizine (ANTIVERT) tablet 25 mg, 25 mg, Oral, TID  nystatin (MYCOSTATIN) [id number] UNIT/ML suspension 500,000 Units, 5 mL, Oral, 4x Daily  sodium chloride (OCEAN, BABY AYR) 0.65 % nasal spray 1 spray, 1 spray, Each Nostril, Q2H PRN  colchicine (COLCRYS) tablet 0.6 mg, 0.6 mg, Oral, BID  ondansetron (ZOFRAN) tablet 4 mg, 4 mg, Oral, Q8H PRN  polyethylene glycol (GLYCOLAX) packet 17 g, 17 g, Oral, Daily  acetaminophen (TYLENOL) tablet 650 mg, 650 mg, Oral, Q4H PRN  aluminum & magnesium hydroxide-simethicone (MAALOX) 200-200-20 MG/5ML suspension 30 mL, 30 mL, Oral, Q6H PRN  bisacodyl (DULCOLAX)

## 2019-07-20 NOTE — PROGRESS NOTES
bed.  Reports he hit his head and eye and then tried to get up and fell back down on his left shoulder. CT scan of brain was negative. Patient may need an xray of left should also. Comments: Fall Risk; Chair alarm; Nausea and double vision. Vital Signs  Patient Currently in Pain: Denies           Patient Observation  Observations: Impulsive, unsafe, unsteady with decreased coordination due to vestibular issues. Poor understanding of physical limitations. Bed Mobility:   Bed Mobility  Rolling: Stand by assistance  Supine to Sit: Stand by assistance  Sit to Supine: Stand by assistance  Scooting: Stand by assistance  Comment: Patient unsafe and impulsive. Poor motor planning. Rushing to stand with LOB and unplanned sit back into chair. Bed mobility  Scooting: Stand by assistance    Transfers:  Sit to Stand: Contact guard assistance;Minimal Assistance  Stand to sit: Moderate Assistance;Minimal Assistance(LOB x 1 after standing)  Bed to Chair: Minimal assistance     Squat Pivot Transfers: Minimal Assistance        Ambulation 1  Surface: level tile  Device: Rolling Walker  Assistance: Minimal assistance; Moderate assistance  Quality of Gait: Cues to stay within RW. Gait Deviations: Deviated path;Staggers  Distance: 200 ft x 2  Comments: Ataxic gait pattern with decreased coordination and balance. Verbal cues for safety. Stairs/Curb  Stairs?: Yes  Stairs  # Steps : 15  Stairs Height: 6\"(and 4\")  Rails: Bilateral  Device: No Device  Assistance: Minimal assistance  Comment: Verbal cues for safety and step sequence. Propulsion 1  Propulsion: Manual  Level: Level Tile  Method: RUE;LUE;RLE;LLE  Level of Assistance: Stand by assistance  Description/ Details: Decreased endurance and requires short breaks. Distance: 200 ft                                     FIMS:      TRANSFERS  Bed, Chair, Wheel Chair: 3 - Requires 25-49% assistance to transfer   LOCOMOTION  Primary Mode:  Both  Distance Walked: 200 ft  Distance Traveled in [place] Chair: 200 ft  Walk: 4 - Contact Guard/Minimal Assistance Requires up to [employer] or Minimal Assistance to walk at least 150 feet  Wheel Chair: 5 - [address] standby supervision or cuing to operate wheelchair at least 150 feet  Stairs: 4- Minimal Contact Assistance Perfoms 75% or more of the effort to go up and down one flight of stairs       BALANCE Posture: Fair  Sitting - Static: Fair;+  Sitting - Dynamic: Fair;-  Standing - Static: Fair;-  Standing - Dynamic: Poor  Comments: Standing balance with B UE support/RW. EXERCISES    Other exercises 1: Seated B LE x 20 reps 2 lb  Other exercises 2: Blue Tband x 20 reps  Other exercises 3: Supine B LE x 15 reps 2 lbs  Other exercises 4: Bridging x 15 reps  Other exercises 5: Standing B LE x 10 reps  Other exercises 6: Nustep x 10 min L4  Other exercises 7: FWD, Retro , and lateral amb. in // bars working on balance decreasing UE support. Patient relies heavily on UE's for support. Other exercises 8: Nustep x 10 min f/b           Activity Tolerance: Patient limited by fatigue, Patient limited by endurance  Activity Tolerance: Nausea, light headed and dizzy at times     Other Comments  Comments: AM session - Patient became ill with vestibular exercises and became nauseated with emesis. Patient returned to room and RN Omer Armenta) advised of patient situation. Patient left at bedside with nurse alert clipped to shirt and call light within reach. Patient Education  New Education Provided: Safety with transfers, visual focus with eyes open during ambulation.   Learner:patient  Method: demonstration and explanation       Outcome: needs reinforcement     Current Treatment Recommendations: Strengthening, Balance Training, Functional Mobility Training, Transfer Training, Gait Training, Stair training, Wheelchair Mobility Training, Neuromuscular Re-education, Home Exercise Program, Safety Education & Training,

## 2019-07-20 NOTE — PROGRESS NOTES
Contact guard assistance  Sit to stand: Contact guard assistance  Stand to sit: Contact guard assistance  Transfer Comments: cuing for safety c F-P return, impulsive c transfers  Standing Balance  Time: AM: 1-2 min x5  Activity: AM: functional transfers/mobility in room/bathroom, ADL tasks  Comment: no LOB noted but unsteady d/t pace c transfers/mobility, cuing req for safety c P return  Functional Mobility  Functional - Mobility Device: Wheelchair  Activity: To/from bathroom; Other  Assist Level: Supervision  Functional Mobility Comments: CGA c R/W in bathroom     Exercises  Other: PM: juxaciser c L hand, 2 reps to increase wrist rotation/manipulation for improved task performance, increased time req d/t decreased wrist coordination     Additional Activities: PM: pt participated in tabletop card game deal cards using L hand, cards held in R hand face down requiring pt to turn card upright to distribute, increased time req d/t decreased coordination and accuracy using L hand; [place] number board utilized for in-hand manipulation of B hands to improve coordination/manipulation for functional tasks, pt demo no difficulty c R hand but req extended time and use of board for task completion              Vision  Vision Comment: Continues to use eye-patch to reduce double vision, alterating eyes throughtout the day              OT FIM:   Eatin - Feeds self with setup/supervision/cues and/or requires only setup/supervision/cues to perform tube feedings(s/u req, per pt report)  Groomin - Requires setup/cues to do all tasks(s/u req)  Bathin - Able to bathe all 10 areas with setup/sup/cues(s/u req, weight shifting for arash-care, SUP req, LHS used)  Dressing-Upper: 5 - Requires setup/supervision/cues and/or requires assist with presthesis/brace only(s/u req)  Dressing-Lower: 4 - Requires assist with buttons/zippers/shoelaces and/or assist with shoes only(CGA in standing, s/u req, A TEDs)  Toiletin - Requires

## 2019-07-21 PROCEDURE — 99232 SBSQ HOSP IP/OBS MODERATE 35: CPT | Performed by: INTERNAL MEDICINE

## 2019-07-21 PROCEDURE — 6360000002 HC RX W HCPCS: Performed by: PHYSICAL MEDICINE & REHABILITATION

## 2019-07-21 PROCEDURE — 97110 THERAPEUTIC EXERCISES: CPT

## 2019-07-21 PROCEDURE — 97535 SELF CARE MNGMENT TRAINING: CPT

## 2019-07-21 PROCEDURE — 99233 SBSQ HOSP IP/OBS HIGH 50: CPT | Performed by: PHYSICAL MEDICINE & REHABILITATION

## 2019-07-21 PROCEDURE — 6370000000 HC RX 637 (ALT 250 FOR IP): Performed by: PHYSICAL MEDICINE & REHABILITATION

## 2019-07-21 PROCEDURE — 97530 THERAPEUTIC ACTIVITIES: CPT

## 2019-07-21 PROCEDURE — 97112 NEUROMUSCULAR REEDUCATION: CPT

## 2019-07-21 PROCEDURE — 1180000000 HC REHAB R&B

## 2019-07-21 PROCEDURE — 97116 GAIT TRAINING THERAPY: CPT

## 2019-07-21 RX ADMIN — MECLIZINE HYDROCHLORIDE 25 MG: 25 TABLET ORAL at 21:42

## 2019-07-21 RX ADMIN — GABAPENTIN 400 MG: 400 CAPSULE ORAL at 07:16

## 2019-07-21 RX ADMIN — ATORVASTATIN CALCIUM 80 MG: 80 TABLET, FILM COATED ORAL at 21:45

## 2019-07-21 RX ADMIN — DICLOFENAC 4 G: 10 GEL TOPICAL at 14:19

## 2019-07-21 RX ADMIN — NYSTATIN 500000 UNITS: 100000 SUSPENSION ORAL at 16:51

## 2019-07-21 RX ADMIN — HEPARIN SODIUM 5000 UNITS: 5000 INJECTION INTRAVENOUS; SUBCUTANEOUS at 21:49

## 2019-07-21 RX ADMIN — POLYETHYLENE GLYCOL 3350 17 G: 17 POWDER, FOR SOLUTION ORAL at 07:20

## 2019-07-21 RX ADMIN — HEPARIN SODIUM 5000 UNITS: 5000 INJECTION INTRAVENOUS; SUBCUTANEOUS at 06:04

## 2019-07-21 RX ADMIN — OXYCODONE HYDROCHLORIDE 5 MG: 5 TABLET ORAL at 13:53

## 2019-07-21 RX ADMIN — DICLOFENAC 4 G: 10 GEL TOPICAL at 07:19

## 2019-07-21 RX ADMIN — MECLIZINE HYDROCHLORIDE 25 MG: 25 TABLET ORAL at 07:16

## 2019-07-21 RX ADMIN — ASPIRIN 81 MG 81 MG: 81 TABLET ORAL at 07:16

## 2019-07-21 RX ADMIN — CLOPIDOGREL BISULFATE 75 MG: 75 TABLET ORAL at 07:16

## 2019-07-21 RX ADMIN — ONDANSETRON HYDROCHLORIDE 4 MG: 4 TABLET, FILM COATED ORAL at 09:53

## 2019-07-21 RX ADMIN — NYSTATIN 500000 UNITS: 100000 SUSPENSION ORAL at 21:41

## 2019-07-21 RX ADMIN — HEPARIN SODIUM 5000 UNITS: 5000 INJECTION INTRAVENOUS; SUBCUTANEOUS at 14:22

## 2019-07-21 RX ADMIN — MECLIZINE HYDROCHLORIDE 25 MG: 25 TABLET ORAL at 14:21

## 2019-07-21 RX ADMIN — ONDANSETRON HYDROCHLORIDE 4 MG: 4 TABLET, FILM COATED ORAL at 21:42

## 2019-07-21 RX ADMIN — NYSTATIN 500000 UNITS: 100000 SUSPENSION ORAL at 14:20

## 2019-07-21 RX ADMIN — GABAPENTIN 400 MG: 400 CAPSULE ORAL at 21:42

## 2019-07-21 RX ADMIN — COLCHICINE 0.6 MG: 0.6 TABLET, FILM COATED ORAL at 07:18

## 2019-07-21 RX ADMIN — ANTACID TABLETS 500 MG: 500 TABLET, CHEWABLE ORAL at 09:53

## 2019-07-21 RX ADMIN — GABAPENTIN 400 MG: 400 CAPSULE ORAL at 14:19

## 2019-07-21 RX ADMIN — NYSTATIN 500000 UNITS: 100000 SUSPENSION ORAL at 07:18

## 2019-07-21 RX ADMIN — AMLODIPINE BESYLATE 5 MG: 5 TABLET ORAL at 07:25

## 2019-07-21 RX ADMIN — COLCHICINE 0.6 MG: 0.6 TABLET, FILM COATED ORAL at 21:45

## 2019-07-21 RX ADMIN — DICLOFENAC 4 G: 10 GEL TOPICAL at 21:41

## 2019-07-21 ASSESSMENT — PAIN SCALES - GENERAL
PAINLEVEL_OUTOF10: 0
PAINLEVEL_OUTOF10: 0
PAINLEVEL_OUTOF10: 5
PAINLEVEL_OUTOF10: 0
PAINLEVEL_OUTOF10: 4
PAINLEVEL_OUTOF10: 5

## 2019-07-21 NOTE — PROGRESS NOTES
To: Transfer tub bench  Tub - Technique: Ambulating  Tub Transfers: Minimal assistance  Tub Transfers Comments: req cues for safety and proper technique. Req spouse to measure various heights and depths of tub at home (garden step down tub) to ensure good fuctional use of TTB as appropriate. Shower Transfers  Shower - Transfer From: Brittney Pel - Transfer Type: To and From  Shower - Transfer To: Transfer tub bench  Shower - Technique: Ambulating  Shower Transfers: Contact Guard  Shower Transfers Comments: VC's for proper technique to ensure safety. Ataxia noted c LUE while reaching for grab bar. SPEECH:      Objective:  /60   Pulse 96   Temp 99.1 °F (37.3 °C) (Oral)   Resp 16   Ht 5' 10\" (1.778 m)   Wt 252 lb (114.3 kg)   SpO2 97%   BMI 36.16 kg/m²       GEN: well developed, well nourished, NAD  HEENT: normocephalic, ecchymosis around L orbit with mild edema, PERRL, EOMI, mucous membranes pink and moist  CV: RRR, no murmurs, rubs or gallops  PULM: CTAB, no rales or rhonchi. Respirations WNL and unlabored  ABD: soft, NT, ND, BS+ and equal  NEURO: A&O x3. Sensation intact to light touch. Impaired vision - using alternating eye patch to correct. CN exam WNL. MSK: Functional ROM RUE and BLEs.  Strength 4+/5 key muscles all extremities. L shoulder tender to palpation anteriorly and laterally. EXTREMITIES: No calf tenderness to palpation bilaterally. No edema BLEs  SKIN: warm dry and intact with good turgor  PSYCH: appropriately interactive.  Affect WNL    Diagnostics:     3 XRAY VIEWS OF THE LEFT SHOULDER       7/20/2019 3:01 pm       COMPARISON:   None.       HISTORY:   ORDERING SYSTEM PROVIDED HISTORY: Jhoanaa Larve yesterday, pain anterior L shoulder   with impaired Active ROM   TECHNOLOGIST PROVIDED HISTORY:   Walda Larve yesterday, pain anterior L shoulder with impaired Active ROM   Reason for Exam: Pt fell 7/19/19 pain top of shoulder   Acuity: Acute   Type of Exam: Initial   Mechanism of Injury: Pt fell 7/19/19 pain top of shoulder       FINDINGS:   No acute fracture.  No joint subluxation or dislocation.  Mild degenerative   changes of the glenohumeral joint.  Severe degenerative changes of the   acromioclavicular joint.  Soft tissues unremarkable.  Visualized left lung   clear.           Impression   1. No acute osseous abnormality the left shoulder. 2. Severe degenerative changes of the left acromioclavicular joint. CBC: No results for input(s): WBC, RBC, HGB, HCT, MCV, RDW, PLT in the last 72 hours. BMP: No results for input(s): NA, K, CL, CO2, PHOS, BUN, CREATININE, GLUCOSE in the last 72 hours. Invalid input(s): CA  BNP: No results for input(s): BNP in the last 72 hours. PT/INR: No results for input(s): PROTIME, INR in the last 72 hours. APTT: No results for input(s): APTT in the last 72 hours. CARDIAC ENZYMES: No results for input(s): CKMB, CKMBINDEX, TROPONINT in the last 72 hours. Invalid input(s): CKTOTAL;3  FASTING LIPID PANEL:  Lab Results   Component Value Date    CHOL 179 07/27/2018    HDL 48 07/27/2018    TRIG 122 07/27/2018     LIVER PROFILE: No results for input(s): AST, ALT, ALB, BILIDIR, BILITOT, ALKPHOS in the last 72 hours.      Current Medications:   Current Facility-Administered Medications: gabapentin (NEURONTIN) capsule 400 mg, 400 mg, Oral, TID  diclofenac sodium 1 % gel 4 g, 4 g, Topical, TID  meclizine (ANTIVERT) tablet 25 mg, 25 mg, Oral, TID  nystatin (MYCOSTATIN) [id number] UNIT/ML suspension 500,000 Units, 5 mL, Oral, 4x Daily  sodium chloride (OCEAN, BABY AYR) 0.65 % nasal spray 1 spray, 1 spray, Each Nostril, Q2H PRN  colchicine (COLCRYS) tablet 0.6 mg, 0.6 mg, Oral, BID  ondansetron (ZOFRAN) tablet 4 mg, 4 mg, Oral, Q8H PRN  polyethylene glycol (GLYCOLAX) packet 17 g, 17 g, Oral, Daily  acetaminophen (TYLENOL) tablet 650 mg, 650 mg, Oral, Q4H PRN  aluminum & magnesium hydroxide-simethicone (MAALOX) 200-200-20 MG/5ML suspension 30 mL, 30 mL, Oral, Q6H PRN  bisacodyl

## 2019-07-21 NOTE — PROGRESS NOTES
[address]  Acute Rehabilitation Physical Therapy Progress Note    Date: 19  Patient Name: Alfonso Veras       Room: 0857/0223-58  MRN: [id number]   Account: [de-identified]   : 1945  (70 y.o.) Gender: male     Referring Practitioner: Kobe Hinton  Diagnosis: Left cerebellar ICH. Pt is s/p suboccipital craniotomy for cerebellar ICH on 19. L side ataxia, double vision -eye patch change every 2 hours. Past Medical History:  has a past medical history of Anxiety, Bronchitis with asthma, acute, Carotid stenosis, left, Diabetes mellitus ([address] GERD (gastroesophageal reflux disease), Hyperlipidemia, Hypertension, and Osteoarthritis. Past Surgical History:   has a past surgical history that includes Colonoscopy; Tonsillectomy and adenoidectomy; joint replacement (Left); shoulder surgery (Right); and Carotid endarterectomy (Left, 16). Additional Pertinent Hx: 75year old male with pertinent PMHx for CAD, HTN, HLD, L CEA in , who presents to [address] [place] [address] with [address]. Patient recently had dyspnea on exertion and underwent cardiac catheterization , s/p RCA stenting , complicated by perforation and tamponade s/p pericardiocentesis with a covered stent through the perforation. Pt at [address] [place] [address] for follow up visit, when he c/o of nausea/vomiting, dysarthria and vison deficits. CT brain was done STAT and showed a left cerebellar ICH. Pt is s/p suboccipital craniotomy for cerebellar ICH on 19. Pt admitted to rehab unit on 7/3/19. Overall Orientation Status: Within Normal Limits  Restrictions/Precautions  Restrictions/Precautions: Cardiac; Fall Risk  Implants present? : Metal implants(L TKA)  Position Activity Restriction  Other position/activity restrictions: Alternate eye patch q2h for diplopia from [facility] notes. Subjective: Discussed impulsive, unsafe behavior at length today.   Patient educated on hazards of moving too fast.  Encouraged to slow

## 2019-07-21 NOTE — PROGRESS NOTES
[facility]    ACUTE REHABILITATION OCCUPATIONAL THERAPY  DAILY NOTE    Date: 19  Patient Name: John Araujo      Room: 5674/6243-74    MRN: [id number]   : 1945  (75 y.o.)  Gender: male   Referring Practitioner: Sd Collado  Diagnosis: Left cerebellar ICH. Pt is s/p suboccipital craniotomy for cerebellar ICH on 19. L side ataxia, double vision -eye patch change every 2 hours. Additional Pertinent Hx: pt. reports being tired from brain bleed    Restrictions  Restrictions/Precautions: Cardiac, Fall Risk  Implants present? : Metal implants(L TKA)  Other position/activity restrictions: Alternate eye patch q2h for diplopia from [facility] notes. Subjective  Subjective: \"no pain just dizziness and nausea so far\"  Comments: pt pleasant and cooperative  Pain Level: 0  Restrictions/Precautions: Cardiac; Fall Risk(telesitter)  Overall Orientation Status: Within Functional Limits  Patient Observation  Observations: impulsive c standing and transfers, cuing for safety, telesitter  Pain Assessment  Pain Assessment: 0-10  Pain Level: 0  Pain Type: Acute pain  Pain Location: Shoulder  Pain Orientation: Left  Pain Descriptors: Aching    Objective  Cognition  Overall Cognitive Status: Impaired  Following Directions:  Follows two step commands  Safety Judgement: Decreased awareness of need for safety  Insights: Decreased awareness of deficits  Sequencing and Organization: Assistance required to identify errors made  Perception  Overall Perceptual Status: [facility]  Balance  Sitting Balance: Supervision  Standing Balance: Contact guard assistance  Bed mobility  Rolling to Left: Supervision  Rolling to Right: Supervision  Sit to Supine: Supervision  Scooting: Supervision  Transfers  Sit to stand: Contact guard assistance  Stand to sit: Contact guard assistance  Transfer Comments: cuing for safety c F-P return, impulsive c transfers  Standing Balance  Time: PM: 3-4 min x2, 1-2 min x2  Activity: Decreased functional mobility ; Decreased safe awareness;Decreased balance;Decreased coordination;Decreased ADL status; Decreased cognition;Decreased vision/visual deficit; Decreased endurance;Decreased high-level IADLs;Decreased sensation;Decreased fine motor control  Prognosis: Good  Discharge Recommendations: Patient would benefit from continued therapy after discharge;24 hour supervision or assist  Activity Tolerance: Patient Tolerated treatment well;Patient limited by fatigue  Safety Devices in place: Yes  Type of devices: Left in chair(taken to PT in AM)  Equipment Recommendations  Equipment Needed: (TBD)       Patient Education:  Patient Goals   Patient goals : return to indep as prior  Patient Education: hand coordination tasks, OT POC, safety  Learner:patient  Method: demonstration and explanation       Outcome: needs reinforcement     Plan  Plan  Times per week: 900 minutes   Times per day: Twice a day  Current Treatment Recommendations: Endurance Training, Patient/Caregiver Education & Training, Self-Care / ADL, Balance Training, Home Management Training, Cognitive/Perceptual Training, Functional Mobility Training, Safety Education & Training  Plan Comment: due to effects of cerebellar infarct and dizziness/nausea, patient will be 900 minutes / 7 days of OT/PT/ST    Patient Goals   Patient goals : return to indep as prior  Short term goals  Time Frame for Short term goals: 1 week  Short term goal 1: pt able to use LUE to hold washcloth and containers to open without dropping  Short term goal 2: min grooming  Short term goal 3: min bathing (pt returned re LHS for feet)  Short term goal 4: mod LE dress  Short term goal 5: monica 6-8 min stand with 1 UE support and CGA  Long term goals  Time Frame for Long term goals : by discharge  Long term goal 1: mod indep feeding and grooming  Long term goal 2: set up, SBA for bathing  Long term goal 3: min LE dress  Long term goal 4: monica 8-10 min stand, amb with AE and

## 2019-07-22 PROCEDURE — 97110 THERAPEUTIC EXERCISES: CPT

## 2019-07-22 PROCEDURE — 6370000000 HC RX 637 (ALT 250 FOR IP): Performed by: PHYSICAL MEDICINE & REHABILITATION

## 2019-07-22 PROCEDURE — 97127 HC SP THER IVNTJ W/FOCUS COG FUNCJ: CPT

## 2019-07-22 PROCEDURE — 99222 1ST HOSP IP/OBS MODERATE 55: CPT | Performed by: PHYSICAL MEDICINE & REHABILITATION

## 2019-07-22 PROCEDURE — 97530 THERAPEUTIC ACTIVITIES: CPT

## 2019-07-22 PROCEDURE — 6360000002 HC RX W HCPCS: Performed by: PHYSICAL MEDICINE & REHABILITATION

## 2019-07-22 PROCEDURE — 1180000000 HC REHAB R&B

## 2019-07-22 PROCEDURE — 97116 GAIT TRAINING THERAPY: CPT

## 2019-07-22 RX ADMIN — CLOPIDOGREL BISULFATE 75 MG: 75 TABLET ORAL at 10:25

## 2019-07-22 RX ADMIN — ATORVASTATIN CALCIUM 80 MG: 80 TABLET, FILM COATED ORAL at 21:27

## 2019-07-22 RX ADMIN — MECLIZINE HYDROCHLORIDE 25 MG: 25 TABLET ORAL at 10:25

## 2019-07-22 RX ADMIN — HEPARIN SODIUM 5000 UNITS: 5000 INJECTION INTRAVENOUS; SUBCUTANEOUS at 15:36

## 2019-07-22 RX ADMIN — NYSTATIN 500000 UNITS: 100000 SUSPENSION ORAL at 21:27

## 2019-07-22 RX ADMIN — POLYETHYLENE GLYCOL 3350 17 G: 17 POWDER, FOR SOLUTION ORAL at 15:35

## 2019-07-22 RX ADMIN — ASPIRIN 81 MG 81 MG: 81 TABLET ORAL at 10:25

## 2019-07-22 RX ADMIN — ACETAMINOPHEN 650 MG: 325 TABLET, FILM COATED ORAL at 21:27

## 2019-07-22 RX ADMIN — COLCHICINE 0.6 MG: 0.6 TABLET, FILM COATED ORAL at 10:24

## 2019-07-22 RX ADMIN — DICLOFENAC 4 G: 10 GEL TOPICAL at 21:27

## 2019-07-22 RX ADMIN — ONDANSETRON HYDROCHLORIDE 4 MG: 4 TABLET, FILM COATED ORAL at 06:26

## 2019-07-22 RX ADMIN — MECLIZINE HYDROCHLORIDE 25 MG: 25 TABLET ORAL at 21:27

## 2019-07-22 RX ADMIN — GABAPENTIN 400 MG: 400 CAPSULE ORAL at 21:27

## 2019-07-22 RX ADMIN — MECLIZINE HYDROCHLORIDE 25 MG: 25 TABLET ORAL at 15:35

## 2019-07-22 RX ADMIN — NYSTATIN 500000 UNITS: 100000 SUSPENSION ORAL at 15:35

## 2019-07-22 RX ADMIN — DICLOFENAC 4 G: 10 GEL TOPICAL at 15:36

## 2019-07-22 RX ADMIN — AMLODIPINE BESYLATE 5 MG: 5 TABLET ORAL at 10:25

## 2019-07-22 RX ADMIN — GABAPENTIN 400 MG: 400 CAPSULE ORAL at 15:35

## 2019-07-22 RX ADMIN — COLCHICINE 0.6 MG: 0.6 TABLET, FILM COATED ORAL at 21:27

## 2019-07-22 RX ADMIN — HEPARIN SODIUM 5000 UNITS: 5000 INJECTION INTRAVENOUS; SUBCUTANEOUS at 06:25

## 2019-07-22 RX ADMIN — GABAPENTIN 400 MG: 400 CAPSULE ORAL at 10:25

## 2019-07-22 RX ADMIN — HEPARIN SODIUM 5000 UNITS: 5000 INJECTION INTRAVENOUS; SUBCUTANEOUS at 21:30

## 2019-07-22 ASSESSMENT — PAIN DESCRIPTION - ONSET
ONSET: ON-GOING
ONSET_2: ON-GOING

## 2019-07-22 ASSESSMENT — PAIN DESCRIPTION - ORIENTATION
ORIENTATION: LEFT
ORIENTATION: LEFT

## 2019-07-22 ASSESSMENT — PAIN DESCRIPTION - LOCATION
LOCATION: EYE;SHOULDER
LOCATION: EYE

## 2019-07-22 ASSESSMENT — PAIN DESCRIPTION - FREQUENCY: FREQUENCY: CONTINUOUS

## 2019-07-22 ASSESSMENT — PAIN DESCRIPTION - DESCRIPTORS
DESCRIPTORS: ACHING
DESCRIPTORS_2: ACHING;SORE

## 2019-07-22 ASSESSMENT — PAIN SCALES - GENERAL
PAINLEVEL_OUTOF10: 4
PAINLEVEL_OUTOF10: 0
PAINLEVEL_OUTOF10: 4

## 2019-07-22 ASSESSMENT — PAIN DESCRIPTION - DURATION: DURATION_2: CONTINUOUS

## 2019-07-22 ASSESSMENT — PAIN DESCRIPTION - PAIN TYPE
TYPE: ACUTE PAIN
TYPE: ACUTE PAIN

## 2019-07-22 ASSESSMENT — PAIN DESCRIPTION - INTENSITY: RATING_2: 4

## 2019-07-22 NOTE — PLAN OF CARE
Nutrition Problem: Inadequate oral intake  Intervention: Food and/or Nutrient Delivery: Continue current diet, Continue current ONS  Nutritional Goals: Meet % of nutrient needs with PO diet.

## 2019-07-22 NOTE — PROGRESS NOTES
[DISCONTINUED] oxyCODONE (ROXICODONE) immediate release tablet 10 mg, 10 mg, Oral, Q4H PRN  atorvastatin (LIPITOR) tablet 80 mg, 80 mg, Oral, Nightly  clopidogrel (PLAVIX) tablet 75 mg, 75 mg, Oral, Daily  aspirin chewable tablet 81 mg, 81 mg, Oral, Daily  nitroGLYCERIN (NITROSTAT) SL tablet 0.4 mg, 0.4 mg, Sublingual, Q5 Min PRN  amLODIPine (NORVASC) tablet 5 mg, 5 mg, Oral, Daily  heparin (porcine) injection 5,000 Units, 5,000 Units, Subcutaneous, 3 times per day  lidocaine 4 % external patch 1 patch, 1 patch, Transdermal, Daily  calcium carbonate (TUMS) chewable tablet 500 mg, 500 mg, Oral, TID PRN      Impression/Plan:   Impaired ADLs, gait, and mobility due to:      1. 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 Can send 7/19/19 CT results to neurology in place of repeat the week of 8/3/19 as initially planned. PT/OT/speech for gait, mobility, cognition, speech intelligibility, strengthening, endurance, self care, vestibular therapy, and ADLs. On Antivert for dizziness. Using eye patch for diplopia. Nursing to follow-up on ophthalmology appointment. 2. Fall: Contusion L periorbit. CT scan and neuro checks WNL with no new focal deficits. Ice prn pain L eye   3. L shoulder OA:  Previous L shoulder X-ray done at [facility] negative for acute, advanced degenerative changes L AC joint and small inferior glenohumeral osteophytes. Has Lidoderm patch, oxycodone, robaxin, and diclofenac gel for pain. X-ray 7/20/19 negative for acute findings. Mild GH arthritis, severe L AC arthritis (similar to previous findings.) Ice prn and continue current plan. 4. Neuropathic pain L leg: stable on gabapentin 400 mg TID.   5. RCA occlusion: s/p PCI 6/5/19 for stent placement complicated by perforation and tamponade with drain and autotransfusion treatment. 6. Nausea: Resolved. on antivert and has Zofran. Abdominal workup negative.  Likely secondary to cerebellar ICH.   7. CHF/CAD/HTN:

## 2019-07-22 NOTE — PROGRESS NOTES
Nutrition Assessment    Type and Reason for Visit: Reassess    Nutrition Recommendations: Continue current diet and Glucerna twice daily. Nutrition Assessment: Pt is stable from a nutritional standpoint as evidenced by no changes since previous visit. Pt's intake remains at 51-75% with variable oral nutrition supplement intake. Pt has had persistent nausea that may affect intake depending on severity. Will continue to monitor PO and supplement intake. Malnutrition Assessment:  · Malnutrition Status: At risk for malnutrition  · Context: Acute illness or injury  · Findings of the 6 clinical characteristics of malnutrition (Minimum of 2 out of 6 clinical characteristics is required to make the diagnosis of moderate or severe Protein Calorie Malnutrition based on AND/ASPEN Guidelines):  1. Energy Intake-Less than or equal to 75% of estimated energy requirement, Greater than or equal to 7 days    2. Weight Loss-2% loss or greater, (in 7 months)  3. Fat Loss-Unable to assess,    4. Muscle Loss-Unable to assess,    5. Fluid Accumulation-No significant fluid accumulation, Extremities    Nutrition Risk Level: Moderate    Nutrient Needs:  · Estimated Daily Total Kcal: 2943-4804 kcals based on 16-18 kcals per kg of current body wt  · Estimated Daily Protein (g): 90-98 g based on 1.2-1.3 g per kg of ideal body wt    Nutrition Diagnosis:   · Problem: Inadequate oral intake  · Etiology: related to Insufficient energy/nutrient consumption     Signs and symptoms:  as evidenced by Intake 50-75%, Diet history of poor intake    Objective Information:  · Nutrition-Focused Physical Findings: No edema. Persistent nausea.   · Current Nutrition Therapies:  · Oral Diet Orders: General   · Oral Diet intake: 51-75%  · Oral Nutrition Supplement (ONS) Orders: Diabetic Oral Supplement  · ONS intake: (varies depending on meal intake)  · Anthropometric Measures:  · Ht: 5' 10\" (177.8 cm)   · Current Body Wt: 252 lb (114.3 kg)  · Admission

## 2019-07-23 PROCEDURE — 1180000000 HC REHAB R&B

## 2019-07-23 PROCEDURE — 6370000000 HC RX 637 (ALT 250 FOR IP): Performed by: PHYSICAL MEDICINE & REHABILITATION

## 2019-07-23 PROCEDURE — 6360000002 HC RX W HCPCS: Performed by: PHYSICAL MEDICINE & REHABILITATION

## 2019-07-23 PROCEDURE — 97116 GAIT TRAINING THERAPY: CPT

## 2019-07-23 PROCEDURE — 97535 SELF CARE MNGMENT TRAINING: CPT

## 2019-07-23 PROCEDURE — 97127 HC SP THER IVNTJ W/FOCUS COG FUNCJ: CPT

## 2019-07-23 PROCEDURE — 99231 SBSQ HOSP IP/OBS SF/LOW 25: CPT | Performed by: INTERNAL MEDICINE

## 2019-07-23 PROCEDURE — 97110 THERAPEUTIC EXERCISES: CPT

## 2019-07-23 PROCEDURE — 99232 SBSQ HOSP IP/OBS MODERATE 35: CPT | Performed by: PHYSICAL MEDICINE & REHABILITATION

## 2019-07-23 PROCEDURE — 97112 NEUROMUSCULAR REEDUCATION: CPT

## 2019-07-23 PROCEDURE — 97530 THERAPEUTIC ACTIVITIES: CPT

## 2019-07-23 RX ADMIN — ATORVASTATIN CALCIUM 80 MG: 80 TABLET, FILM COATED ORAL at 22:19

## 2019-07-23 RX ADMIN — MECLIZINE HYDROCHLORIDE 25 MG: 25 TABLET ORAL at 14:29

## 2019-07-23 RX ADMIN — NYSTATIN 500000 UNITS: 100000 SUSPENSION ORAL at 18:08

## 2019-07-23 RX ADMIN — HEPARIN SODIUM 5000 UNITS: 5000 INJECTION INTRAVENOUS; SUBCUTANEOUS at 22:20

## 2019-07-23 RX ADMIN — ONDANSETRON HYDROCHLORIDE 4 MG: 4 TABLET, FILM COATED ORAL at 22:19

## 2019-07-23 RX ADMIN — NYSTATIN 500000 UNITS: 100000 SUSPENSION ORAL at 22:18

## 2019-07-23 RX ADMIN — DICLOFENAC 4 G: 10 GEL TOPICAL at 07:57

## 2019-07-23 RX ADMIN — GABAPENTIN 400 MG: 400 CAPSULE ORAL at 07:55

## 2019-07-23 RX ADMIN — DICLOFENAC 4 G: 10 GEL TOPICAL at 22:18

## 2019-07-23 RX ADMIN — AMLODIPINE BESYLATE 5 MG: 5 TABLET ORAL at 07:55

## 2019-07-23 RX ADMIN — CLOPIDOGREL BISULFATE 75 MG: 75 TABLET ORAL at 07:55

## 2019-07-23 RX ADMIN — POLYETHYLENE GLYCOL 3350 17 G: 17 POWDER, FOR SOLUTION ORAL at 07:56

## 2019-07-23 RX ADMIN — MECLIZINE HYDROCHLORIDE 25 MG: 25 TABLET ORAL at 07:56

## 2019-07-23 RX ADMIN — COLCHICINE 0.6 MG: 0.6 TABLET, FILM COATED ORAL at 22:18

## 2019-07-23 RX ADMIN — NYSTATIN 500000 UNITS: 100000 SUSPENSION ORAL at 14:29

## 2019-07-23 RX ADMIN — HEPARIN SODIUM 5000 UNITS: 5000 INJECTION INTRAVENOUS; SUBCUTANEOUS at 06:28

## 2019-07-23 RX ADMIN — MECLIZINE HYDROCHLORIDE 25 MG: 25 TABLET ORAL at 22:19

## 2019-07-23 RX ADMIN — ASPIRIN 81 MG 81 MG: 81 TABLET ORAL at 07:56

## 2019-07-23 RX ADMIN — COLCHICINE 0.6 MG: 0.6 TABLET, FILM COATED ORAL at 07:57

## 2019-07-23 RX ADMIN — OXYCODONE HYDROCHLORIDE 5 MG: 5 TABLET ORAL at 22:19

## 2019-07-23 RX ADMIN — GABAPENTIN 400 MG: 400 CAPSULE ORAL at 22:19

## 2019-07-23 RX ADMIN — NYSTATIN 500000 UNITS: 100000 SUSPENSION ORAL at 07:57

## 2019-07-23 RX ADMIN — GABAPENTIN 400 MG: 400 CAPSULE ORAL at 14:29

## 2019-07-23 RX ADMIN — HEPARIN SODIUM 5000 UNITS: 5000 INJECTION INTRAVENOUS; SUBCUTANEOUS at 14:30

## 2019-07-23 ASSESSMENT — PAIN SCALES - GENERAL
PAINLEVEL_OUTOF10: 0
PAINLEVEL_OUTOF10: 0
PAINLEVEL_OUTOF10: 5
PAINLEVEL_OUTOF10: 0

## 2019-07-23 ASSESSMENT — 9 HOLE PEG TEST
TESTTIME_SECONDS: 83
TEST_RESULT: IMPAIRED
TEST_RESULT: FUNCTIONAL
TESTTIME_SECONDS: 32

## 2019-07-23 NOTE — PROGRESS NOTES
Mobility Comments: CGA c R/W in bathroom/room     Bed mobility  Rolling to Left: Supervision  Rolling to Right: Supervision  Supine to Sit: Stand by assistance  Sit to Supine: Supervision  Scooting: Stand by assistance  Comment: bed rails used  Transfers  Stand Step Transfers: Contact guard assistance  Stand Pivot Transfers: Contact guard assistance  Sit to stand: Contact guard assistance  Stand to sit: Contact guard assistance  Transfer Comments: cuing for safety c F-P return, impulsive c transfers   Toilet Transfers  Toilet - Technique: Stand step  Equipment Used: Raised toilet seat with rails  Toilet Transfer: Contact guard assistance  Toilet Transfers Comments: Cues for safety w transfer/RW mgmt. Toilet transfer completed as part of faciliated transfer training c DME to determine proper need for home. Tub Transfers  Tub - Transfer From: Rolling walker  Tub - Transfer Type: To and From  Tub - Transfer To: Transfer tub bench  Tub - Technique: Ambulating  Tub Transfers: Minimal assistance  Tub Transfers Comments: req cues for safety and proper technique. Req spouse to measure various heights and depths of tub at home (garden step down tub) to ensure good fuctional use of TTB as appropriate. Shower Transfers  Shower - Transfer From: Yonatanedith Lock - Transfer Type: To and From  Shower - Transfer To: Transfer tub bench  Shower - Technique: Ambulating  Shower Transfers: Contact Guard  Shower Transfers Comments: VC's for proper technique to ensure safety. Ataxia noted c IVORYE while reaching for grab bar. SPEECH:  Subjective: [x] Alert         [x] Cooperative     [] Confused     [] Agitated    [] Lethargic     Objective/Assessment:     cognition:    Paragraph recall- 43%. 3 word reverse order- 100%. Name 12 items in category- 75%, 100% c cues.        Speech:   Pt. Speech found to be functional in conversation today.       Objective:  BP (!) 144/86   Pulse 91   Temp 98 °F (36.7 °C) (Oral)   Resp 16   Ht

## 2019-07-23 NOTE — PLAN OF CARE
Problem: Risk for Impaired Skin Integrity  Goal: Tissue integrity - skin and mucous membranes  Description  Structural intactness and normal physiological function of skin and  mucous membranes. 7/23/2019 1701 by Gisele Drake RN  Outcome: Ongoing  Note:   Skin assessment completed. See Head to Toe assessment for details. Waffle mattress overlay in place. Patient repositioned every two hours and more frequently if necessary. Will continue to monitor. Problem: Falls - Risk of:  Goal: Will remain free from falls  Description  Will remain free from falls  7/23/2019 1701 by Gisele Drake RN  Outcome: Ongoing  Note:   Pt has been free from falls this shift. Bed is in lowest position, brake is locked, call light is within reach. Will continue to monitor. Problem: Pain:  Goal: Pain level will decrease  Description  Pain level will decrease  7/23/2019 1701 by Gisele Drake RN  Outcome: Ongoing  Note:   Pain assessed with each interaction. Meds given, see MAR. Will continue to monitor.

## 2019-07-23 NOTE — PROGRESS NOTES
[address]   ACUTE REHABILITATION OCCUPATIONAL THERAPY  DAILY NOTE    Date: 19  Patient Name: Salud Canchola      Room: 8380/3890-97    MRN: [id number]   : 1945  (75 y.o.)  Gender: male   Referring Practitioner: Rickey Sánchez  Diagnosis: Left cerebellar ICH. Pt is s/p suboccipital craniotomy for cerebellar ICH on 19. L side ataxia, double vision -eye patch change every 2 hours. Additional Pertinent Hx: pt. reports being tired from brain bleed    Restrictions  Restrictions/Precautions: Cardiac, Fall Risk  Implants present? : Metal implants(L TKA)  Other position/activity restrictions: Alternate eye patch q2h for diplopia from [facility] clinic notes. Subjective  Comments: Pt pleasant and cooperative. Patient Currently in Pain: Denies  Pain Level: 0  Restrictions/Precautions: Cardiac; Fall Risk        Pain Assessment  Pain Assessment: 0-10  Pain Level: 0  Pain Type: Acute pain  Pain Location: Eye, Shoulder  Pain Orientation: Left  Pain Descriptors: Aching    Objective     Balance  Sitting Balance: Supervision  Standing Balance: Contact guard assistance  Bed mobility  Sit to Supine: Supervision  Transfers  Sit to stand: Contact guard assistance  Stand to sit: Contact guard assistance  Standing Balance  Time: AM: ~1 min x 4  Activity: AM: ADL care tasks. Comment: pt unsteady, no major LOB. Cues for safety. Functional Mobility  Functional - Mobility Device: Rolling Walker  Activity: To/from bathroom  Assist Level: Contact guard assistance  Functional Mobility Comments: cues for safety   Shower Transfers  Shower - Transfer From: [facility] - Transfer Type: To and From  Shower - Transfer To: Transfer tub bench  Shower - Technique: Ambulating  Shower Transfers: Contact Guard  Shower Transfers Comments: VC's for proper technique to ensure safety. Ataxia noted c LUE while reaching for grab bar.      Gross Motor: bean bag retrieval c reacher using L UE, retrieves c R hand and

## 2019-07-23 NOTE — CARE COORDINATION
Pt and his wife alexei like the writer to [employer] [facility], pt also gave hospital permission to speak to his dr Tonie Adkins

## 2019-07-23 NOTE — PROGRESS NOTES
Speech Language Pathology  Speech Language Pathology  [facility]    Speech Language Treatment Note    Date: 7/23/2019  Patients Name: Antione Valverde  MRN: [id number]  Diagnosis:   Patient Active Problem List   Diagnosis Code    Impaired fasting glucose R73.01    Patient overweight E66.3    Insomnia G47.00    Hypertension I10    OA (osteoarthritis) of knee M17.10    S/P total knee arthroplasty Z96.659    Bronchitis with asthma, acute J20.9, J45.909    Lymphadenopathy of left cervical region R59.0    Carotid stenosis, left I65.22    H/O carotid endarterectomy Z98.890    Thyroid nodule E04.1    Ventral hernia without obstruction or gangrene K43.9    Unstable angina (HCC) I20.0    Myocardiopathy (HCC) I42.9    Abdominal mass, RUQ (right upper quadrant) R19.01    Chest pain R07.9    Spontaneous intracranial hemorrhage (HCC) I62.9    Diplopia H53.2    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 3 word reverse order- 100%. Name 12 items in category- 75%, 100% c cues. Speech:   Pt. Speech found to be functional in conversation today. Other:          Plan:  [x] Continue ST services    [] Discharge from ST:      Discharge recommendations: [] Inpatient Rehab   [] [place]   [] Outpatient Therapy  [] Follow up at trauma clinic   [] Other:       Treatment completed by: Greg Sheridan A.CCC/SLP

## 2019-07-24 PROCEDURE — 6360000002 HC RX W HCPCS: Performed by: PHYSICAL MEDICINE & REHABILITATION

## 2019-07-24 PROCEDURE — 97530 THERAPEUTIC ACTIVITIES: CPT

## 2019-07-24 PROCEDURE — 6370000000 HC RX 637 (ALT 250 FOR IP): Performed by: PHYSICAL MEDICINE & REHABILITATION

## 2019-07-24 PROCEDURE — 99231 SBSQ HOSP IP/OBS SF/LOW 25: CPT | Performed by: INTERNAL MEDICINE

## 2019-07-24 PROCEDURE — 97110 THERAPEUTIC EXERCISES: CPT

## 2019-07-24 PROCEDURE — 1180000000 HC REHAB R&B

## 2019-07-24 PROCEDURE — 97116 GAIT TRAINING THERAPY: CPT

## 2019-07-24 PROCEDURE — 99232 SBSQ HOSP IP/OBS MODERATE 35: CPT | Performed by: PHYSICAL MEDICINE & REHABILITATION

## 2019-07-24 PROCEDURE — 97112 NEUROMUSCULAR REEDUCATION: CPT

## 2019-07-24 PROCEDURE — 97127 HC SP THER IVNTJ W/FOCUS COG FUNCJ: CPT

## 2019-07-24 RX ADMIN — NYSTATIN 500000 UNITS: 100000 SUSPENSION ORAL at 07:51

## 2019-07-24 RX ADMIN — MECLIZINE HYDROCHLORIDE 25 MG: 25 TABLET ORAL at 07:48

## 2019-07-24 RX ADMIN — ONDANSETRON HYDROCHLORIDE 4 MG: 4 TABLET, FILM COATED ORAL at 06:08

## 2019-07-24 RX ADMIN — GABAPENTIN 400 MG: 400 CAPSULE ORAL at 07:48

## 2019-07-24 RX ADMIN — HEPARIN SODIUM 5000 UNITS: 5000 INJECTION INTRAVENOUS; SUBCUTANEOUS at 22:07

## 2019-07-24 RX ADMIN — ASPIRIN 81 MG 81 MG: 81 TABLET ORAL at 07:48

## 2019-07-24 RX ADMIN — MECLIZINE HYDROCHLORIDE 25 MG: 25 TABLET ORAL at 15:43

## 2019-07-24 RX ADMIN — NYSTATIN 500000 UNITS: 100000 SUSPENSION ORAL at 12:33

## 2019-07-24 RX ADMIN — NYSTATIN 500000 UNITS: 100000 SUSPENSION ORAL at 22:15

## 2019-07-24 RX ADMIN — DICLOFENAC 4 G: 10 GEL TOPICAL at 07:49

## 2019-07-24 RX ADMIN — HEPARIN SODIUM 5000 UNITS: 5000 INJECTION INTRAVENOUS; SUBCUTANEOUS at 06:07

## 2019-07-24 RX ADMIN — MECLIZINE HYDROCHLORIDE 25 MG: 25 TABLET ORAL at 22:07

## 2019-07-24 RX ADMIN — GABAPENTIN 400 MG: 400 CAPSULE ORAL at 22:06

## 2019-07-24 RX ADMIN — AMLODIPINE BESYLATE 5 MG: 5 TABLET ORAL at 07:48

## 2019-07-24 RX ADMIN — NYSTATIN 500000 UNITS: 100000 SUSPENSION ORAL at 15:56

## 2019-07-24 RX ADMIN — COLCHICINE 0.6 MG: 0.6 TABLET, FILM COATED ORAL at 07:50

## 2019-07-24 RX ADMIN — CLOPIDOGREL BISULFATE 75 MG: 75 TABLET ORAL at 07:48

## 2019-07-24 RX ADMIN — COLCHICINE 0.6 MG: 0.6 TABLET, FILM COATED ORAL at 22:11

## 2019-07-24 RX ADMIN — GABAPENTIN 400 MG: 400 CAPSULE ORAL at 15:43

## 2019-07-24 RX ADMIN — ATORVASTATIN CALCIUM 80 MG: 80 TABLET, FILM COATED ORAL at 22:07

## 2019-07-24 RX ADMIN — HEPARIN SODIUM 5000 UNITS: 5000 INJECTION INTRAVENOUS; SUBCUTANEOUS at 15:43

## 2019-07-24 RX ADMIN — DICLOFENAC 4 G: 10 GEL TOPICAL at 15:55

## 2019-07-24 ASSESSMENT — PAIN DESCRIPTION - LOCATION
LOCATION: HIP
LOCATION: SHOULDER

## 2019-07-24 ASSESSMENT — PAIN DESCRIPTION - DESCRIPTORS
DESCRIPTORS: ACHING
DESCRIPTORS: ACHING

## 2019-07-24 ASSESSMENT — PAIN DESCRIPTION - ORIENTATION
ORIENTATION: LEFT
ORIENTATION: LEFT

## 2019-07-24 ASSESSMENT — PAIN SCALES - GENERAL
PAINLEVEL_OUTOF10: 3
PAINLEVEL_OUTOF10: 0
PAINLEVEL_OUTOF10: 0
PAINLEVEL_OUTOF10: 3
PAINLEVEL_OUTOF10: 0
PAINLEVEL_OUTOF10: 0

## 2019-07-24 ASSESSMENT — PAIN DESCRIPTION - PAIN TYPE
TYPE: ACUTE PAIN
TYPE: CHRONIC PAIN

## 2019-07-24 NOTE — PROGRESS NOTES
[address] [facility]    ACUTE REHABILITATION OCCUPATIONAL THERAPY  DAILY NOTE    Date: 19  Patient Name: Taj Lester      Room: 7651/5311-83    MRN: [id number]   : 1945  (75 y.o.)  Gender: male   Referring Practitioner: Rommel Bettencourt  Diagnosis: Left cerebellar ICH. Pt is s/p suboccipital craniotomy for cerebellar ICH on 19. L side ataxia, double vision -eye patch change every 2 hours. Additional Pertinent Hx: pt. reports being tired from brain bleed    Restrictions  Restrictions/Precautions: Cardiac, Fall Risk, General Precautions  Implants present? : Metal implants  Other position/activity restrictions: Alternate eye patch q2h for diplopia from [facility] clinic notes. Subjective  Comments: Reports his family wants him to go for to a skilled nursing home to walk better before going home   Patient Currently in Pain: Yes  Pain Level: 3  Pain Location: Shoulder  Pain Orientation: Left  Restrictions/Precautions: Cardiac; Fall Risk;General Precautions  Overall Orientation Status: Within Normal Limits     Pain Assessment  Pain Assessment: 0-10  Pain Level: 3  Patient's Stated Pain Goal: No pain  Pain Type: Chronic pain  Pain Location: Shoulder  Pain Orientation: Left  Pain Descriptors: Aching    Objective  Cognition  Arousal/Alertness: Appropriate responses to stimuli  Following Commands:  Follows one step commands with increased time  Attention Span: Difficulty dividing attention  Safety Judgement: Decreased awareness of need for assistance;Decreased awareness of need for safety  Balance  Sitting Balance: Supervision  Standing Balance: Contact guard assistance  Bed mobility  Rolling to Right: Supervision  Sit to Supine: Supervision  Scooting: Supervision  Transfers  Sit to stand: Minimal assistance  Transfer Comments: cuing for safety c, impulsive with  transfers     Functional Mobility  Functional - Mobility Device: Wheelchair  Activity: To/From therapy gym  Assist Level: Contact guard

## 2019-07-24 NOTE — PROGRESS NOTES
Explained to  Patient, patient's wife and sister that 4 siderails cannot be up or a telesitter cannot be in use for  Patient to go to [facility]. Explained importance of using bed alarm and personal alarm and called nurse to get up to prevent falls.

## 2019-07-24 NOTE — PROGRESS NOTES
Speech Language Pathology  [facility]  [facility]    Speech Language Treatment Note    Date: 7/24/2019  Patients Name: Maryana Osullivan  MRN: [id number]  Diagnosis:   Patient Active Problem List   Diagnosis Code    Impaired fasting glucose R73.01    Patient overweight E66.3    Insomnia G47.00    Hypertension I10    OA (osteoarthritis) of knee M17.10    S/P total knee arthroplasty Z96.659    Bronchitis with asthma, acute J20.9, J45.909    Lymphadenopathy of left cervical region R59.0    Carotid stenosis, left I65.22    H/O carotid endarterectomy Z98.890    Thyroid nodule E04.1    Ventral hernia without obstruction or gangrene K43.9    Unstable angina (HCC) I20.0    Myocardiopathy (HCC) I42.9    Abdominal mass, RUQ (right upper quadrant) R19.01    Chest pain R07.9    Spontaneous intracranial hemorrhage (HCC) I62.9    Diplopia H53.2    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 Paragraph recall- 50%, 67% c cues. Pt. Named 12 items in category c 75% accuracy, 100% c cues. Speech:   Pt. Speech mildly slurred at times today, needed min cues to use compensatory dysarthria strategies. Other:      Unable to utilize visual stimuli d/t pt. Diplopia and dizziness. Plan:  [x] Continue  services    [] Discharge from :      Discharge recommendations: [] Inpatient Rehab   [] [place]   [] Outpatient Therapy  [] Follow up at trauma clinic   [] Other:       Treatment completed by: Brad Garcia A.CCC/SLP

## 2019-07-24 NOTE — PROGRESS NOTES
mgmt. Toilet transfer completed as part of faciliated transfer training c DME to determine proper need for home. Tub Transfers  Tub - Transfer From: Rolling walker  Tub - Transfer Type: To and From  Tub - Transfer To: Transfer tub bench  Tub - Technique: Ambulating  Tub Transfers: Minimal assistance  Tub Transfers Comments: req cues for safety and proper technique. Req spouse to measure various heights and depths of tub at home (garden step down tub) to ensure good fuctional use of TTB as appropriate. Shower Transfers  Shower - Transfer From: Oral Zandra - Transfer Type: To and From  Shower - Transfer To: Transfer tub bench  Shower - Technique: Ambulating  Shower Transfers: Contact Guard  Shower Transfers Comments: VC's for proper technique to ensure safety. Ataxia noted c LUE while reaching for grab bar. SPEECH:  Subjective: [x] Alert         [x] Cooperative     [] Confused     [] Agitated    [] Lethargic     Objective/Assessment:     cognition:    Household problem solving- 85%, 95% c cues. Paragraph recall- 50%, 67% c cues. Pt. Named 12 items in category c 75% accuracy, 100% c cues.       Speech:   Pt. Speech mildly slurred at times today, needed min cues to use compensatory dysarthria strategies.         Other:      Unable to utilize visual stimuli d/t pt. Diplopia and dizziness. Objective:  /76   Pulse 90   Temp 97.5 °F (36.4 °C) (Axillary)   Resp 18   Ht 5' 10\" (1.778 m)   Wt 252 lb (114.3 kg)   SpO2 98%   BMI 36.16 kg/m²       GEN: well developed, well nourished, NAD  HEENT: normocephalic, ecchymosis around L orbit with mild edema - improved  CV: RRR, no murmurs, rubs or gallops  PULM: CTAB, no rales or rhonchi. Respirations WNL and unlabored  ABD: soft, NT, ND, BS+ and equal  NEURO: A&O x3. Sensation intact to light touch. Impaired vision - using alternating eye patch to correct.   MSK: Functional ROM RUE and BLEs.  Strength 4+/5 key muscles all extremities.  L shoulder tender to palpation anteriorly and laterally. Observed performing fine motor exercises in OT today with improved control and accuracy L hand   EXTREMITIES: No calf tenderness to palpation bilaterally. No edema BLEs  SKIN: warm dry and intact with good turgor  PSYCH: appropriately interactive. Affect WNL    Diagnostics:     CBC: No results for input(s): WBC, RBC, HGB, HCT, MCV, RDW, PLT in the last 72 hours. BMP: No results for input(s): NA, K, CL, CO2, PHOS, BUN, CREATININE, GLUCOSE in the last 72 hours. Invalid input(s): CA  BNP: No results for input(s): BNP in the last 72 hours. PT/INR: No results for input(s): PROTIME, INR in the last 72 hours. APTT: No results for input(s): APTT in the last 72 hours. CARDIAC ENZYMES: No results for input(s): CKMB, CKMBINDEX, TROPONINT in the last 72 hours. Invalid input(s): CKTOTAL;3  FASTING LIPID PANEL:  Lab Results   Component Value Date    CHOL 179 07/27/2018    HDL 48 07/27/2018    TRIG 122 07/27/2018     LIVER PROFILE: No results for input(s): AST, ALT, ALB, BILIDIR, BILITOT, ALKPHOS in the last 72 hours.      Current Medications:   Current Facility-Administered Medications: gabapentin (NEURONTIN) capsule 400 mg, 400 mg, Oral, TID  diclofenac sodium 1 % gel 4 g, 4 g, Topical, TID  meclizine (ANTIVERT) tablet 25 mg, 25 mg, Oral, TID  nystatin (MYCOSTATIN) 373311 UNIT/ML suspension 500,000 Units, 5 mL, Oral, 4x Daily  sodium chloride (OCEAN, BABY AYR) 0.65 % nasal spray 1 spray, 1 spray, Each Nostril, Q2H PRN  colchicine (COLCRYS) tablet 0.6 mg, 0.6 mg, Oral, BID  ondansetron (ZOFRAN) tablet 4 mg, 4 mg, Oral, Q8H PRN  polyethylene glycol (GLYCOLAX) packet 17 g, 17 g, Oral, Daily  acetaminophen (TYLENOL) tablet 650 mg, 650 mg, Oral, Q4H PRN  aluminum & magnesium hydroxide-simethicone (MAALOX) 200-200-20 MG/5ML suspension 30 mL, 30 mL, Oral, Q6H PRN  bisacodyl (DULCOLAX) suppository 10 mg, 10 mg, Rectal, Daily PRN  methocarbamol (ROBAXIN) tablet 500 mg, 500 mg, Oral,

## 2019-07-24 NOTE — CONSULTS
[place] [address] Internal Medicine    Progress Note    7/24/2019    4:13 PM    Name:   Lavelle Terrell  MRN:     [id number]     Acct:      [de-identified]   Room:   [address] [place] [address] Day:  21  Admit Date:  7/3/2019  3:09 PM    PCP:   Lady Modesta MD  Code Status:  Full Code    Subjective:     C/C: No chief complaint on file. HPI:     Intermittent dizziness no nausea vomiting    Review of Systems:     Constitutional:  negative for chills, fevers, sweats  Respiratory:  negative for cough, dyspnea on exertion, hemoptysis, shortness of breath, wheezing  Cardiovascular:  negative for chest pain, chest pressure/discomfort, lower extremity edema, palpitations  Gastrointestinal:  negative for abdominal pain, constipation, diarrhea, nausea, vomiting  Neurological: Refers intermittent dizziness    Medications: Allergies:  No Known Allergies    Current Meds:   Scheduled Meds:    gabapentin  400 mg Oral TID    diclofenac sodium  4 g Topical TID    meclizine  25 mg Oral TID    nystatin  5 mL Oral 4x Daily    colchicine  0.6 mg Oral BID    polyethylene glycol  17 g Oral Daily    atorvastatin  80 mg Oral Nightly    clopidogrel  75 mg Oral Daily    aspirin  81 mg Oral Daily    amLODIPine  5 mg Oral Daily    heparin (porcine)  5,000 Units Subcutaneous 3 times per day    lidocaine  1 patch Transdermal Daily     Continuous Infusions:   PRN Meds: sodium chloride, ondansetron, acetaminophen, aluminum & magnesium hydroxide-simethicone, bisacodyl, methocarbamol, oxyCODONE **OR** [DISCONTINUED] oxyCODONE, nitroGLYCERIN, calcium carbonate    Data:     Past Medical History:   has a past medical history of Anxiety, Bronchitis with asthma, acute, Carotid stenosis, left, Diabetes mellitus ([address] GERD (gastroesophageal reflux disease), Hyperlipidemia, Hypertension, and Osteoarthritis. Social History:   reports that he has never smoked.  He has never used smokeless tobacco. He reports that clear to auscultation bilaterally, normal effort  Heart:  regular rate and rhythm, no murmur  Abdomen:  soft, nontender, nondistended, normal bowel sounds, no masses, hepatomegaly, splenomegaly  Extremities:  no edema, redness, tenderness in the calves  Skin:  no gross lesions, rashes, induration    Assessment:        Primary Problem  <principal problem not specified>    Active Hospital Problems    Diagnosis Date Noted    Ataxia following nontraumatic intracerebral hemorrhage [I69.193]     Neuropathy [G62.9]     Diplopia [H53.2] 07/15/2019    Spontaneous intracranial hemorrhage ([address] [I62.9] 07/03/2019    Myocardiopathy ([address] [I42.9] 07/19/2018    Unstable angina ([address] [I20.0] 06/10/2018    Thyroid nodule [E04.1] 05/19/2016    Hypertension [I10] 07/13/2015       Plan:      Left cerebellar hemorrhage  1. History of hypertension hyperlipidemia left carotid endarterectomy  2. Recent right coronary artery PCI  3.  Secondary to cerebellar dysfunction    Bear Loera MD  7/24/2019  4:13 PM

## 2019-07-25 PROCEDURE — 99231 SBSQ HOSP IP/OBS SF/LOW 25: CPT | Performed by: INTERNAL MEDICINE

## 2019-07-25 PROCEDURE — 99232 SBSQ HOSP IP/OBS MODERATE 35: CPT | Performed by: PHYSICAL MEDICINE & REHABILITATION

## 2019-07-25 PROCEDURE — 97530 THERAPEUTIC ACTIVITIES: CPT

## 2019-07-25 PROCEDURE — 97535 SELF CARE MNGMENT TRAINING: CPT

## 2019-07-25 PROCEDURE — 97542 WHEELCHAIR MNGMENT TRAINING: CPT

## 2019-07-25 PROCEDURE — 1180000000 HC REHAB R&B

## 2019-07-25 PROCEDURE — 97110 THERAPEUTIC EXERCISES: CPT

## 2019-07-25 PROCEDURE — 6360000002 HC RX W HCPCS: Performed by: PHYSICAL MEDICINE & REHABILITATION

## 2019-07-25 PROCEDURE — 6370000000 HC RX 637 (ALT 250 FOR IP): Performed by: PHYSICAL MEDICINE & REHABILITATION

## 2019-07-25 PROCEDURE — 97116 GAIT TRAINING THERAPY: CPT

## 2019-07-25 PROCEDURE — 97127 HC SP THER IVNTJ W/FOCUS COG FUNCJ: CPT

## 2019-07-25 RX ADMIN — ASPIRIN 81 MG 81 MG: 81 TABLET ORAL at 09:06

## 2019-07-25 RX ADMIN — DICLOFENAC 4 G: 10 GEL TOPICAL at 09:03

## 2019-07-25 RX ADMIN — HEPARIN SODIUM 5000 UNITS: 5000 INJECTION INTRAVENOUS; SUBCUTANEOUS at 06:35

## 2019-07-25 RX ADMIN — MECLIZINE HYDROCHLORIDE 25 MG: 25 TABLET ORAL at 14:11

## 2019-07-25 RX ADMIN — MECLIZINE HYDROCHLORIDE 25 MG: 25 TABLET ORAL at 09:06

## 2019-07-25 RX ADMIN — GABAPENTIN 400 MG: 400 CAPSULE ORAL at 09:06

## 2019-07-25 RX ADMIN — DICLOFENAC 4 G: 10 GEL TOPICAL at 21:05

## 2019-07-25 RX ADMIN — NYSTATIN 500000 UNITS: 100000 SUSPENSION ORAL at 09:02

## 2019-07-25 RX ADMIN — NYSTATIN 500000 UNITS: 100000 SUSPENSION ORAL at 20:51

## 2019-07-25 RX ADMIN — COLCHICINE 0.6 MG: 0.6 TABLET, FILM COATED ORAL at 09:02

## 2019-07-25 RX ADMIN — GABAPENTIN 400 MG: 400 CAPSULE ORAL at 20:51

## 2019-07-25 RX ADMIN — HEPARIN SODIUM 5000 UNITS: 5000 INJECTION INTRAVENOUS; SUBCUTANEOUS at 14:15

## 2019-07-25 RX ADMIN — NYSTATIN 500000 UNITS: 100000 SUSPENSION ORAL at 14:12

## 2019-07-25 RX ADMIN — HEPARIN SODIUM 5000 UNITS: 5000 INJECTION INTRAVENOUS; SUBCUTANEOUS at 20:51

## 2019-07-25 RX ADMIN — ATORVASTATIN CALCIUM 80 MG: 80 TABLET, FILM COATED ORAL at 20:51

## 2019-07-25 RX ADMIN — DICLOFENAC 4 G: 10 GEL TOPICAL at 14:11

## 2019-07-25 RX ADMIN — GABAPENTIN 400 MG: 400 CAPSULE ORAL at 14:11

## 2019-07-25 RX ADMIN — COLCHICINE 0.6 MG: 0.6 TABLET, FILM COATED ORAL at 20:51

## 2019-07-25 RX ADMIN — MECLIZINE HYDROCHLORIDE 25 MG: 25 TABLET ORAL at 20:51

## 2019-07-25 RX ADMIN — AMLODIPINE BESYLATE 5 MG: 5 TABLET ORAL at 09:12

## 2019-07-25 RX ADMIN — CLOPIDOGREL BISULFATE 75 MG: 75 TABLET ORAL at 09:06

## 2019-07-25 ASSESSMENT — PAIN SCALES - GENERAL
PAINLEVEL_OUTOF10: 0

## 2019-07-25 NOTE — PROGRESS NOTES
Mobility:   Bed Mobility  Rolling: Stand by assistance  Supine to Sit: Stand by assistance  Sit to Supine: Stand by assistance  Scooting: Stand by assistance  Bed mobility  Scooting: Stand by assistance    Transfers:  Sit to Stand: Contact guard assistance;Minimal Assistance  Stand to sit: Contact guard assistance  Bed to Chair: Minimal assistance     Squat Pivot Transfers: Minimal Assistance        Ambulation 1  Surface: level tile  Device: Rolling Walker  Assistance: Minimal assistance  Quality of Gait: Continued to rely on bilat. UE but improved balance with slower pace. Impulsive and inconsistant with level of assist.  Gait Deviations: Deviated path;Staggers  Distance: 200 ft x 2  Comments: Ambulates too fast and outside of RW        Stairs/Curb  Stairs?: Yes  Stairs  # Steps : 15  Stairs Height: 6\"(and 4\")  Rails: Bilateral  Device: No Device  Assistance: Minimal assistance  Comment: Verbal cues for safety and step sequence. Propulsion 1  Propulsion: Manual  Level: Level Tile  Method: RUE;LUE;RLE;LLE  Level of Assistance: Stand by assistance  Description/ Details: Decreased endurance and requires short breaks. Distance: 200 ft                                     FIMS:      TRANSFERS  Bed, Chair, Wheel Chair: 4 - Requires steadying assistance only <25% assist  and/or requires assist with one leg only   LOCOMOTION  Primary Mode:  Both  Distance Walked: 200 ft  Distance Traveled in [facility] Chair: 200 ft  Walk: 4 - Contact Guard/Minimal Assistance Requires up to Contact Guard or Minimal Assistance to walk at least 150 feet  Wheel Chair: 5 - Supervision Requires standby supervision or cuing to operate wheelchair at least 150 feet  Stairs: 4- Minimal Contact Assistance Perfoms 75% or more of the effort to go up and down one flight of stairs       BALANCE Posture: Fair  Sitting - Static: Fair;+  Sitting - Dynamic: Fair;-  Standing - Static: Fair;-  Standing - Dynamic: Poor  Comments: Standing balance with

## 2019-07-25 NOTE — CARE COORDINATION
The pt wife stopped by the writer office. She said if pt cant go to [facility] her 2nd choice would be [facility].  As of this date and time [facility] has not heard from the insurance co.

## 2019-07-25 NOTE — PROGRESS NOTES
[address]   ACUTE REHABILITATION OCCUPATIONAL THERAPY  DAILY NOTE    Date: 19  Patient Name: Luis Salas      Room: 8734/8274-09    MRN: [id number]   : 1945  (75 y.o.)  Gender: male   Referring Practitioner: Zechariah Grimaldo  Diagnosis: Left cerebellar ICH. Pt is s/p suboccipital craniotomy for cerebellar ICH on 19. L side ataxia, double vision -eye patch change every 2 hours. Additional Pertinent Hx: pt. reports being tired from brain bleed    Restrictions  Restrictions/Precautions: Cardiac, Fall Risk, General Precautions  Implants present? : Metal implants(L TKA)  Other position/activity restrictions: [facility] Subjective  Subjective: \"I guess I'll be here a couple more days, they are trying to find me somewhere decent to go\"  Comments: Pt pleasant and cooperative. Patient Currently in Pain: Denies  Pain Level: 0  Restrictions/Precautions: Cardiac; Fall Risk;General Precautions  Overall Orientation Status: Within Normal Limits     Pain Assessment  Pain Assessment: 0-10  Pain Level: 0  Pain Type: Acute pain  Pain Location: Eye, Shoulder  Pain Orientation: Left  Pain Descriptors: Aching    Objective     Balance  Sitting Balance: Supervision  Standing Balance: Contact guard assistance  Transfers  Sit to stand: Contact guard assistance  Stand to sit: Contact guard assistance(uncontrolled sitting. )  Transfer Comments: impulsive c transers, unsteady, cueing req for proper technique and hand placement  Standing Balance  Time: Am: 1-2 min x 5  Activity: AM: functional mobility/transfers, ADL tasks  Functional Mobility  Functional - Mobility Device: Wheelchair  Activity: To/from bathroom  Assist Level: Contact guard assistance  Functional Mobility Comments: cues for safety. Pt does well c self propelling in w/c down to therapy gym. Shower Transfers  Shower - Transfer From: Ferd Plaster - Transfer Type:  To and Management Training, Cognitive/Perceptual Training, Functional Mobility Training, Safety Education & Training  Plan Comment: due to effects of cerebellar infarct and dizziness/nausea, patient will be 900 minutes / 7 days of OT/PT/ST    Patient Goals   Patient goals : return to indep as prior  Short term goals  Time Frame for Short term goals: 1 week  Short term goal 1: pt able to use LUE to hold washcloth and containers to open without dropping  Short term goal 2: min grooming  Short term goal 3: min bathing (pt returned re LHS for feet)  Short term goal 4: mod LE dress  Short term goal 5: monica 6-8 min stand with 1 UE support and CGA  Long term goals  Time Frame for Long term goals : by discharge  Long term goal 1: mod indep feeding and grooming  Long term goal 2: set up, SBA for bathing  Long term goal 3: min LE dress  Long term goal 4: monica 8-10 min stand, amb with AE and CGA  Long term goal 5: pt able to use LUE for adls with fair fine and gross coordination        07/25/19 1056   OT Individual Minutes   Time In 0935   Time Out 1023   Minutes 48     Electronically signed by COLLEEN Grayson on 7/25/19 at 11:40 AM

## 2019-07-25 NOTE — CONSULTS
he drinks alcohol. He reports that he does not use drugs. Family History:   Family History   Problem Relation Age of Onset   Jeramie Bey Cancer Mother     Cancer Father        Vitals:  /73   Pulse 88   Temp 98 °F (36.7 °C) (Oral)   Resp 16   Ht 5' 10\" (1.778 m)   Wt 249 lb 11.2 oz (113.3 kg)   SpO2 99%   BMI 35.83 kg/m²   Temp (24hrs), Av.1 °F (36.7 °C), Min:98 °F (36.7 °C), Max:98.1 °F (36.7 °C)    No results for input(s): POCGLU in the last 72 hours. I/O (24Hr): No intake or output data in the 24 hours ending 19 1726    Labs:    Hematology:No results for input(s): WBC, RBC, HGB, HCT, MCV, MCH, MCHC, RDW, PLT, MPV, SEGS, LYMPHOPCT, MONOPCT, EOSRELPCT, BASOPCT, SEDRATE, CRP, PROTIME, APTT, INR, DDIMER, LABABSO, CEA, , TREPG, URICACID in the last 72 hours. Chemistry:No results for input(s): NA, K, CL, CO2, GLUCOSE, BUN, CREATININE, MG, ANIONGAP, LABGLOM, GFRAA, CALCIUM, CAION, PHOS, PSA, PROBNP, TROPONINI, TROPHS, CKTOTAL, CKMB, CKMBINDEX, MYOGLOBIN, DIGOXIN, LACTA, PROCAL, GASTRIN in the last 72 hours. No results for input(s): PROT, LABALBU, EAG, R2WTAQR, J6CZTFS, FT4, TSH, CORTISOL, PROLACTIN, AST, ALT, LDH, GGT, ALKPHOS, LABGGT, BILITOT, BILIDIR, AMMONIA, AMYLASE, LIPASE, LACTATE, CHOL, HDL, LDLCHOLESTEROL, CHOLHDLRATIO, TRIG, VLDL, PHENYTOIN, PHENYF, VALPROATE in the last 72 hours. Glucose:No results for input(s): LABA1C, POCGLU, LABINSU in the last 72 hours.       No results found for: SPECIAL  No results found for: CULTURE    No results found for: POCPH, PHART, PH, POCPCO2, XBB0EJU, PCO2, POCPO2, PO2ART, PO2, POCHCO3, MRL0DXM, HCO3, NBEA, PBEA, BEART, BE, THGBART, THB, HTR4PTG, WSSV5OVK, W1STWLAI, O2SAT, FIO2    Radiology:        Physical Examination:        General appearance:  alert, cooperative and no distress  Mental Status:  oriented to person, place and time and normal affect  Lungs:  clear to auscultation bilaterally, normal effort  Heart:  regular rate and rhythm, no

## 2019-07-25 NOTE — FLOWSHEET NOTE
07/03/19 1830   Encounter Summary   Services provided to: Patient   Referral/Consult From: Rounding   Complexity of Encounter Low   Length of Encounter 15 minutes   Spiritual/Pentecostal   Type Spiritual support   Assessment Sleeping   Intervention Prayer   Outcome Did not respond   Visited by Thomas Berumen
07/24/19 1930   Encounter Summary   Services provided to: Patient and family together  (Wife Donna Mai we had as a patient.)   Referral/Consult From: Rounding   Support System Spouse; Children;Family members;Friends/neighbors   Continue Visiting   (7/24/19)   Complexity of Encounter Low   Length of Encounter 15 minutes   Spiritual Assessment Completed Yes   Routine   Type Follow up   Assessment Calm; Approachable   Intervention Active listening;Explored feelings, thoughts, concerns;Nurtured hope;Tutor Key;Sustaining presence/ Ministry of presence; Discussed illness/injury and it's impact   Visited by Marce Esqueda
Dr Micaela Bowser please note Appointment on 8/15/2019 with Qian Del Rosario (Cardiology) and echo at this time.  [address] [place] [address] [place] [phone] to SHARYN
Motor: Pt opened small, medium, and large bottles with rt hand stablizing while lt hand opened the bottles. Pt placed all of the bottles back in the bin with lt hand, but left hand was unsteady. Type of ROM/Therapeutic Exercise  Type of ROM/Therapeutic Exercise: PROM  Comment: Pt engaged in PROM and UE exercises to improve strength and enduance, as well as focus on LUE ataxia. Exercises  Finger Flexion: Red digiflex rt/lt each finger 20x  Grasp/Release: Red digiflex rt/lt 20x  Other: L UE rt PROM to decrease stiffness. OT FIM:                   Assessment  Performance deficits / Impairments: Decreased functional mobility ; Decreased safe awareness;Decreased balance;Decreased coordination;Decreased ADL status; Decreased cognition;Decreased vision/visual deficit; Decreased endurance;Decreased high-level IADLs;Decreased sensation;Decreased fine motor control  Assessment: Pt seen to increase functional mobility, coordination, fine motor control, and safety awareness.    Prognosis: Good  Discharge Recommendations: Patient would benefit from continued therapy after discharge;24 hour supervision or assist     Safety Devices in place: Yes  Type of devices: Gait belt(Pt left in [facility] with PT in PT room.)          Patient Education:  Patient Goals   Patient goals : return to indep as prior  Barriers to Learning: none  Learner:  Method:      Outcome:     Plan  Plan  Times per week: 900 minutes   Times per day: Twice a day  Current Treatment Recommendations: Endurance Training, Patient/Caregiver Education & Training, Self-Care / ADL, Balance Training, Home Management Training, Cognitive/Perceptual Training, Functional Mobility Training, Safety Education & Training  Plan Comment: due to effects of cerebellar infarct and dizziness/nausea, patient will be 900 minutes / 7 days of OT/PT/ST    Patient Goals   Patient goals : return to indep as prior  Short term goals  Time Frame for Short term goals: 1

## 2019-07-25 NOTE — PROGRESS NOTES
Speech Language Pathology  Speech Language Pathology  [facility]    Speech Language Treatment Note    Date: 7/25/2019  Patients Name: Neha Barnhart  MRN: [id number]  Diagnosis:   Patient Active Problem List   Diagnosis Code    Impaired fasting glucose R73.01    Patient overweight E66.3    Insomnia G47.00    Hypertension I10    OA (osteoarthritis) of knee M17.10    S/P total knee arthroplasty Z96.659    Bronchitis with asthma, acute J20.9, J45.909    Lymphadenopathy of left cervical region R59.0    Carotid stenosis, left I65.22    H/O carotid endarterectomy Z98.890    Thyroid nodule E04.1    Ventral hernia without obstruction or gangrene K43.9    Unstable angina (HCC) I20.0    Myocardiopathy (HCC) I42.9    Abdominal mass, RUQ (right upper quadrant) R19.01    Chest pain R07.9    Spontaneous intracranial hemorrhage (HCC) I62.9    Diplopia H53.2    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 Pt. Named 12 items in category c very min A. Speech:   Pt. Speech very clear today, no cues to use compensatory dysarthria strategies needed. Other:      Unable to utilize visual stimuli d/t pt. Diplopia and dizziness. Plan:  [x] Continue ST services    [] Discharge from ST:      Discharge recommendations: [] Inpatient Rehab   [] [place]   [] Outpatient Therapy  [] Follow up at trauma clinic   [] Other:       Treatment completed by: Meaghan Jett A.CCC/SLP

## 2019-07-26 PROCEDURE — 6370000000 HC RX 637 (ALT 250 FOR IP): Performed by: PHYSICAL MEDICINE & REHABILITATION

## 2019-07-26 PROCEDURE — 97127 HC SP THER IVNTJ W/FOCUS COG FUNCJ: CPT

## 2019-07-26 PROCEDURE — 97110 THERAPEUTIC EXERCISES: CPT

## 2019-07-26 PROCEDURE — 99231 SBSQ HOSP IP/OBS SF/LOW 25: CPT | Performed by: INTERNAL MEDICINE

## 2019-07-26 PROCEDURE — 97116 GAIT TRAINING THERAPY: CPT

## 2019-07-26 PROCEDURE — 97530 THERAPEUTIC ACTIVITIES: CPT

## 2019-07-26 PROCEDURE — 97535 SELF CARE MNGMENT TRAINING: CPT

## 2019-07-26 PROCEDURE — 1180000000 HC REHAB R&B

## 2019-07-26 PROCEDURE — 6360000002 HC RX W HCPCS: Performed by: PHYSICAL MEDICINE & REHABILITATION

## 2019-07-26 PROCEDURE — 99232 SBSQ HOSP IP/OBS MODERATE 35: CPT | Performed by: PHYSICAL MEDICINE & REHABILITATION

## 2019-07-26 RX ORDER — ASPIRIN 81 MG/1
81 TABLET, CHEWABLE ORAL DAILY
Qty: 30 TABLET | Refills: 3 | DISCHARGE
Start: 2019-07-27 | End: 2022-09-02 | Stop reason: ALTCHOICE

## 2019-07-26 RX ORDER — GABAPENTIN 400 MG/1
400 CAPSULE ORAL 3 TIMES DAILY
Qty: 90 CAPSULE | Refills: 0 | DISCHARGE
Start: 2019-07-26 | End: 2019-10-15 | Stop reason: SDUPTHER

## 2019-07-26 RX ORDER — HEPARIN SODIUM 5000 [USP'U]/ML
5000 INJECTION, SOLUTION INTRAVENOUS; SUBCUTANEOUS EVERY 8 HOURS SCHEDULED
DISCHARGE
Start: 2019-07-26 | End: 2020-11-20 | Stop reason: ALTCHOICE

## 2019-07-26 RX ORDER — MAGNESIUM HYDROXIDE/ALUMINUM HYDROXICE/SIMETHICONE 120; 1200; 1200 MG/30ML; MG/30ML; MG/30ML
30 SUSPENSION ORAL EVERY 6 HOURS PRN
Qty: 1 BOTTLE | Refills: 0 | DISCHARGE
Start: 2019-07-26 | End: 2020-11-20 | Stop reason: ALTCHOICE

## 2019-07-26 RX ORDER — POLYETHYLENE GLYCOL 3350 17 G/17G
17 POWDER, FOR SOLUTION ORAL DAILY
Qty: 527 G | Refills: 1 | DISCHARGE
Start: 2019-07-27 | End: 2019-08-26

## 2019-07-26 RX ORDER — AMLODIPINE BESYLATE 5 MG/1
5 TABLET ORAL DAILY
Qty: 30 TABLET | Refills: 3 | DISCHARGE
Start: 2019-07-27 | End: 2020-11-20 | Stop reason: ALTCHOICE

## 2019-07-26 RX ORDER — NITROGLYCERIN 0.4 MG/1
TABLET SUBLINGUAL
Qty: 25 TABLET | Refills: 3 | DISCHARGE
Start: 2019-07-26

## 2019-07-26 RX ORDER — OXYCODONE HYDROCHLORIDE 5 MG/1
5 TABLET ORAL EVERY 4 HOURS PRN
Qty: 18 TABLET | Refills: 0 | Status: SHIPPED | OUTPATIENT
Start: 2019-07-26 | End: 2019-07-29

## 2019-07-26 RX ORDER — COLCHICINE 0.6 MG/1
0.6 TABLET ORAL 2 TIMES DAILY
Qty: 30 TABLET | Refills: 3 | DISCHARGE
Start: 2019-07-26 | End: 2020-11-20 | Stop reason: ALTCHOICE

## 2019-07-26 RX ORDER — CLOPIDOGREL BISULFATE 75 MG/1
75 TABLET ORAL DAILY
Qty: 30 TABLET | Refills: 3 | DISCHARGE
Start: 2019-07-27 | End: 2022-03-31 | Stop reason: SDUPTHER

## 2019-07-26 RX ORDER — CALCIUM CARBONATE 200(500)MG
500 TABLET,CHEWABLE ORAL 3 TIMES DAILY PRN
DISCHARGE
Start: 2019-07-26 | End: 2019-08-25

## 2019-07-26 RX ORDER — METHOCARBAMOL 500 MG/1
500 TABLET, FILM COATED ORAL 4 TIMES DAILY PRN
DISCHARGE
Start: 2019-07-26 | End: 2019-08-05

## 2019-07-26 RX ORDER — LIDOCAINE 4 G/G
1 PATCH TOPICAL DAILY
DISCHARGE
Start: 2019-07-27 | End: 2020-11-20 | Stop reason: ALTCHOICE

## 2019-07-26 RX ORDER — MECLIZINE HYDROCHLORIDE 25 MG/1
25 TABLET ORAL 3 TIMES DAILY
Qty: 30 TABLET | Refills: 0 | DISCHARGE
Start: 2019-07-26 | End: 2019-08-05

## 2019-07-26 RX ORDER — ONDANSETRON 4 MG/1
4 TABLET, FILM COATED ORAL EVERY 8 HOURS PRN
DISCHARGE
Start: 2019-07-26 | End: 2020-11-20 | Stop reason: ALTCHOICE

## 2019-07-26 RX ORDER — ATORVASTATIN CALCIUM 80 MG/1
80 TABLET, FILM COATED ORAL NIGHTLY
Qty: 30 TABLET | Refills: 3 | DISCHARGE
Start: 2019-07-26 | End: 2022-03-31 | Stop reason: SDUPTHER

## 2019-07-26 RX ADMIN — CLOPIDOGREL BISULFATE 75 MG: 75 TABLET ORAL at 08:10

## 2019-07-26 RX ADMIN — COLCHICINE 0.6 MG: 0.6 TABLET, FILM COATED ORAL at 20:44

## 2019-07-26 RX ADMIN — ASPIRIN 81 MG 81 MG: 81 TABLET ORAL at 08:10

## 2019-07-26 RX ADMIN — AMLODIPINE BESYLATE 5 MG: 5 TABLET ORAL at 08:10

## 2019-07-26 RX ADMIN — NYSTATIN 500000 UNITS: 100000 SUSPENSION ORAL at 13:43

## 2019-07-26 RX ADMIN — ONDANSETRON HYDROCHLORIDE 4 MG: 4 TABLET, FILM COATED ORAL at 12:00

## 2019-07-26 RX ADMIN — MECLIZINE HYDROCHLORIDE 25 MG: 25 TABLET ORAL at 08:10

## 2019-07-26 RX ADMIN — GABAPENTIN 400 MG: 400 CAPSULE ORAL at 13:43

## 2019-07-26 RX ADMIN — HEPARIN SODIUM 5000 UNITS: 5000 INJECTION INTRAVENOUS; SUBCUTANEOUS at 06:03

## 2019-07-26 RX ADMIN — GABAPENTIN 400 MG: 400 CAPSULE ORAL at 20:44

## 2019-07-26 RX ADMIN — DICLOFENAC 4 G: 10 GEL TOPICAL at 20:44

## 2019-07-26 RX ADMIN — HEPARIN SODIUM 5000 UNITS: 5000 INJECTION INTRAVENOUS; SUBCUTANEOUS at 20:45

## 2019-07-26 RX ADMIN — HEPARIN SODIUM 5000 UNITS: 5000 INJECTION INTRAVENOUS; SUBCUTANEOUS at 13:43

## 2019-07-26 RX ADMIN — DICLOFENAC 4 G: 10 GEL TOPICAL at 08:10

## 2019-07-26 RX ADMIN — GABAPENTIN 400 MG: 400 CAPSULE ORAL at 08:10

## 2019-07-26 RX ADMIN — MECLIZINE HYDROCHLORIDE 25 MG: 25 TABLET ORAL at 20:44

## 2019-07-26 RX ADMIN — NYSTATIN 500000 UNITS: 100000 SUSPENSION ORAL at 20:44

## 2019-07-26 RX ADMIN — ACETAMINOPHEN 650 MG: 325 TABLET, FILM COATED ORAL at 06:03

## 2019-07-26 RX ADMIN — MECLIZINE HYDROCHLORIDE 25 MG: 25 TABLET ORAL at 13:43

## 2019-07-26 RX ADMIN — ATORVASTATIN CALCIUM 80 MG: 80 TABLET, FILM COATED ORAL at 20:44

## 2019-07-26 RX ADMIN — COLCHICINE 0.6 MG: 0.6 TABLET, FILM COATED ORAL at 08:10

## 2019-07-26 RX ADMIN — NYSTATIN 500000 UNITS: 100000 SUSPENSION ORAL at 08:10

## 2019-07-26 ASSESSMENT — PAIN SCALES - GENERAL
PAINLEVEL_OUTOF10: 5
PAINLEVEL_OUTOF10: 0

## 2019-07-26 NOTE — CONSULTS
[facility] Internal Medicine    Progress Note    7/26/2019    2:47 PM    Name:   Taj Lester  MRN:     [id number]     Acct:      [de-identified]   Room:   [address] [place] [address] Day:  23  Admit Date:  7/3/2019  3:09 PM    PCP:   Tameka Michel MD  Code Status:  Full Code    Subjective:     C/C: No chief complaint on file. HPI:     Intermittent dizziness no nausea vomiting    Review of Systems:     Constitutional:  negative for chills, fevers, sweats  Respiratory:  negative for cough, dyspnea on exertion, hemoptysis, shortness of breath, wheezing  Cardiovascular:  negative for chest pain, chest pressure/discomfort, lower extremity edema, palpitations  Gastrointestinal:  negative for abdominal pain, constipation, diarrhea, nausea, vomiting  Neurological: Refers intermittent dizziness    Medications: Allergies:  No Known Allergies    Current Meds:   Scheduled Meds:    gabapentin  400 mg Oral TID    diclofenac sodium  4 g Topical TID    meclizine  25 mg Oral TID    nystatin  5 mL Oral 4x Daily    colchicine  0.6 mg Oral BID    polyethylene glycol  17 g Oral Daily    atorvastatin  80 mg Oral Nightly    clopidogrel  75 mg Oral Daily    aspirin  81 mg Oral Daily    amLODIPine  5 mg Oral Daily    heparin (porcine)  5,000 Units Subcutaneous 3 times per day    lidocaine  1 patch Transdermal Daily     Continuous Infusions:   PRN Meds: sodium chloride, ondansetron, acetaminophen, aluminum & magnesium hydroxide-simethicone, bisacodyl, methocarbamol, oxyCODONE **OR** [DISCONTINUED] oxyCODONE, nitroGLYCERIN, calcium carbonate    Data:     Past Medical History:   has a past medical history of Anxiety, Bronchitis with asthma, acute, Carotid stenosis, left, Diabetes mellitus ([address] GERD (gastroesophageal reflux disease), Hyperlipidemia, Hypertension, and Osteoarthritis. Social History:   reports that he has never smoked.  He has never used smokeless tobacco. He reports that

## 2019-07-26 NOTE — PLAN OF CARE
Problem: Risk for Impaired Skin Integrity  Goal: Tissue integrity - skin and mucous membranes  Description  Structural intactness and normal physiological function of skin and  mucous membranes. 7/26/2019 0518 by Juan Ramon Ridley RN  Outcome: Ongoing  Note:   No new areas of skin breakdown. 7/25/2019 1915 by Mena Chen RN  Outcome: Ongoing     Problem: Falls - Risk of:  Goal: Will remain free from falls  Description  Will remain free from falls  7/26/2019 0518 by Juan Ramon Ridley RN  Outcome: Ongoing  Note:   Patient remains free from falls so far during shift. Call light within reach. Bed alarm activated. Bed locked and in lowest position. Hourly rounding completed during shift. 7/25/2019 1915 by Mena Chen RN  Outcome: Ongoing  Goal: Absence of physical injury  Description  Absence of physical injury  7/26/2019 0518 by Juan Ramon Ridley RN  Outcome: Ongoing  7/25/2019 1915 by Mena Chen RN  Outcome: Ongoing     Problem: Pain:  Goal: Pain level will decrease  Description  Pain level will decrease  7/26/2019 0518 by Juan Ramon Ridley RN  Outcome: Ongoing  Note:   Patient denies pain at this time.    7/25/2019 1915 by Mena Chen RN  Outcome: Ongoing  Goal: Control of acute pain  Description  Control of acute pain  7/26/2019 0518 by Juan Ramon Ridley RN  Outcome: Ongoing  7/25/2019 1915 by Mena Chen RN  Outcome: Ongoing  Goal: Control of chronic pain  Description  Control of chronic pain  7/26/2019 0518 by Juan Ramon Ridley RN  Outcome: Ongoing  7/25/2019 1915 by Mena Chen RN  Outcome: Ongoing     Problem: Nutrition  Goal: Optimal nutrition therapy  7/26/2019 0518 by Juan Ramon Ridley RN  Outcome: Ongoing  7/25/2019 1915 by Mena Chen RN  Outcome: Ongoing     Problem: Neurological  Goal: Maximum potential motor/sensory/cognitive function  7/26/2019 0518 by Juan Ramon Ridley RN  Outcome: Ongoing  7/25/2019 1915 by Mena Chen RN  Outcome: Ongoing

## 2019-07-26 NOTE — PROGRESS NOTES
assistance  Sit to stand: Stand by assistance  Stand to sit: Stand by assistance  Transfer Comments: Pt demo good safety c w/c brakes   Toilet Transfers  Toilet - Technique: Stand pivot  Equipment Used: Raised toilet seat with rails  Toilet Transfer: Stand by assistance  Toilet Transfers Comments: use of grab bar  Tub Transfers  Tub - Transfer From: Rolling walker  Tub - Transfer Type: To and From  Tub - Transfer To: Transfer tub bench  Tub - Technique: Ambulating  Tub Transfers: Minimal assistance  Tub Transfers Comments: req cues for safety and proper technique. Req spouse to measure various heights and depths of tub at home (garden step down tub) to ensure good fuctional use of TTB as appropriate. Shower Transfers  Shower - Transfer From: Sidney Rout - Transfer Type: To and From  Shower - Transfer To: Transfer tub bench  Shower - Technique: Ambulating  Shower Transfers: Contact Guard  Shower Transfers Comments: VC's for safety/technique. SPEECH:  Objective/Assessment:     cognition:    Word list retention- 78%, 89% c cues. Paragraph recall- 50%.    Speech:   Pt. Speech slurred today, needed min A to use compensatory dysarthria strategies.       Other:      Unable to utilize visual stimuli d/t pt. Diplopia and dizziness. Objective:  /81   Pulse 93   Temp 97.7 °F (36.5 °C) (Oral)   Resp 16   Ht 5' 10\" (1.778 m)   Wt 249 lb 11.2 oz (113.3 kg)   SpO2 97%   BMI 35.83 kg/m²       GEN: well developed, well nourished, NAD  HEENT: normocephalic, ecchymosis around L orbit with mild edema - continues to improve  CV: RRR, no murmurs, rubs or gallops  PULM: CTAB, no rales or rhonchi. Respirations WNL and unlabored  ABD: soft, NT, ND, BS+ and equal  NEURO: A&O x3. Sensation intact to light touch. Diplopia  MSK: Functional ROM RUE and BLEs.  Strength 4+/5 key muscles all extremities. L shoulder tender to palpation anteriorly and laterally. LUE ataxia improving.    EXTREMITIES: No calf Cardiology 8/15/19.   8. DVT prophylaxis:  Heparin  9. Internal medicine for medical management      Electronically signed by Luiza Levi MD on 7/26/2019 at 2:13 PM      This note is created with the assistance of a speech recognition program.  While intending to generate a document that actually reflects the content of the visit, the document can still have some errors including those of syntax and sound a like substitutions which may escape proof reading. In such instances, actual meaning can be extrapolated by contextual diversion.

## 2019-07-26 NOTE — PROGRESS NOTES
as possible. Vital Signs  Patient Currently in Pain: Denies    Transfers:  Sit to Stand: Minimal Assistance  Stand to sit: Minimal Assistance       Ambulation 1  Surface: level tile  Device: Rolling Walker  Assistance: Moderate assistance  Quality of Gait: More moderate pace, but tends to speed up with fatigue; still pushing RW forward, req'ing A to keep within JOSE. Flexed posture, downward gaze with fleeting return to cues to scan environment/adopt more distant focal point. Gait Deviations: Deviated path;Staggers;Decreased step height  Distance: 150'     Stairs/Curb  Stairs?: Yes  Stairs  # Steps : 15  Stairs Height: (6\" and 4\")  Rails: Bilateral  Assistance: Contact guard assistance  Comment: Using L LE to ascend, control descent     Propulsion 1  Propulsion: Manual  Level: Level Tile  Method: RUE;LUE;RLE;LLE  Level of Assistance: Supervision  Description/ Details: Decreased endurance and requires short breaks. Distance: 150'    FIMS:  TRANSFERS  Bed, Chair, Wheel Chair: 3 - Requires 25-49% assistance to transfer   LOCOMOTION  Primary Mode: Both  Distance Walked: 150'(RW, eye patch)  Distance Traveled in Wheel Chair: 150'(B UEs & LEs, eye patch)  Walk: 3 - Moderate Assistance Requires up to Moderate Assistance to walk at least 150 feet(Patient performs 50-74% of locomotion effort)  Wheel Chair: 5 - Supervision Requires standby supervision or cuing to operate wheelchair at least 150 feet  Stairs: 4- Minimal Contact Assistance Perfoms 75% or more of the effort to go up and down one flight of stairs(15 4\"/6\" steps, B HRs, eye patch)    BALANCE Posture: Fair  Sitting - Static: Fair(seated in W/C)  Sitting - Dynamic: Fair(seated in W/C)  Standing - Static: Poor(LOB without RW support; narrow JOSE)  Standing - Dynamic: Poor(RW)    EXERCISES    Other exercises 8: UBE, 5 min. each F/B(Seated in W/C )  Other exercises 9: Visuo-Vestibular: Head/ Eyes moving same direction. Oculomotor Saccades.   All vestibular ex. per

## 2019-07-26 NOTE — PROGRESS NOTES
engaged in medication mgmt task using weekly pill organizer to address problem solving, sequencing and FM and eye/hand coordination skills. pt req cuieng for accuracy of \"take x2 pills twice daily\" faciliated med. Pt initially only had x1 pill 2x day before writer assisted pt to recognize error, pt able to correct. (Pt tolerated well in all other areas, encouraged use of L ha)    OT FIM:   Eatin - Feeds self with adaptive equipment/dentures and/or feeds self with modified diet and/or performs own tube feeding  Groomin - Requires setup/cues to do all tasks  Bathin - Able to bathe all 10 areas with setup/sup/cues  Dressing-Upper: 5 - Requires setup/supervision/cues and/or requires assist with presthesis/brace only  Dressing-Lower: 5 - Requires setup/supervision/cues and/or staff applies TEDS/prosthesis/brace only  Toiletin - Requires setup/supervision/cues  Toilet Transfer: 5 - Requires setup/supervision/cues  Primary Mode: Shower  Tub Transfer: 0 - Activity does not occur  Shower Transfer: 0 - Activity does not occur    Assessment  Performance deficits / Impairments: Decreased functional mobility ; Decreased safe awareness;Decreased balance;Decreased coordination;Decreased ADL status; Decreased cognition;Decreased vision/visual deficit; Decreased endurance;Decreased high-level IADLs;Decreased sensation;Decreased fine motor control  Prognosis: Good  Discharge Recommendations: Patient would benefit from continued therapy after discharge;24 hour supervision or assist;Outpatient OT; Home with Home health OT  Activity Tolerance: Patient Tolerated treatment well  Safety Devices in place: Yes  Type of devices: Left in chair;Call light within reach; Chair alarm in place        Patient Education:  Patient Goals   Patient goals : return to indep as prior  Patient Education: reinforced safety and call light protocal.   Learner:patient  Method: explanation       Outcome: verbalized concerns and demonstrated

## 2019-07-26 NOTE — PROGRESS NOTES
Speech Language Pathology  Speech Language Pathology  [facility]    Speech Language Treatment Note    Date: 7/26/2019  Patients Name: Evgeny Kulkarni  MRN: [id number]  Diagnosis:   Patient Active Problem List   Diagnosis Code    Impaired fasting glucose R73.01    Patient overweight E66.3    Insomnia G47.00    Hypertension I10    OA (osteoarthritis) of knee M17.10    S/P total knee arthroplasty Z96.659    Bronchitis with asthma, acute J20.9, J45.909    Lymphadenopathy of left cervical region R59.0    Carotid stenosis, left I65.22    H/O carotid endarterectomy Z98.890    Thyroid nodule E04.1    Ventral hernia without obstruction or gangrene K43.9    Unstable angina (HCC) I20.0    Myocardiopathy (HCC) I42.9    Abdominal mass, RUQ (right upper quadrant) R19.01    Chest pain R07.9    Spontaneous intracranial hemorrhage (HCC) I62.9    Diplopia H53.2    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 Paragraph recall- 50%. Speech:   Pt. Speech slurred today, needed min A to use compensatory dysarthria strategies. Other:      Unable to utilize visual stimuli d/t pt. Diplopia and dizziness. Plan:  [x] Continue ST services    [] Discharge from ST:      Discharge recommendations: [] Inpatient Rehab   [] [place]   [] Outpatient Therapy  [] Follow up at trauma clinic   [] Other:       Treatment completed by: Kristie Dozier A.CCC/SLP

## 2019-07-27 PROCEDURE — 6370000000 HC RX 637 (ALT 250 FOR IP): Performed by: PHYSICAL MEDICINE & REHABILITATION

## 2019-07-27 PROCEDURE — 99231 SBSQ HOSP IP/OBS SF/LOW 25: CPT | Performed by: INTERNAL MEDICINE

## 2019-07-27 PROCEDURE — 97112 NEUROMUSCULAR REEDUCATION: CPT

## 2019-07-27 PROCEDURE — 6360000002 HC RX W HCPCS: Performed by: PHYSICAL MEDICINE & REHABILITATION

## 2019-07-27 PROCEDURE — 97530 THERAPEUTIC ACTIVITIES: CPT

## 2019-07-27 PROCEDURE — 99232 SBSQ HOSP IP/OBS MODERATE 35: CPT | Performed by: PHYSICAL MEDICINE & REHABILITATION

## 2019-07-27 PROCEDURE — 97116 GAIT TRAINING THERAPY: CPT

## 2019-07-27 PROCEDURE — 1180000000 HC REHAB R&B

## 2019-07-27 RX ADMIN — CLOPIDOGREL BISULFATE 75 MG: 75 TABLET ORAL at 08:28

## 2019-07-27 RX ADMIN — NYSTATIN 500000 UNITS: 100000 SUSPENSION ORAL at 16:44

## 2019-07-27 RX ADMIN — AMLODIPINE BESYLATE 5 MG: 5 TABLET ORAL at 08:28

## 2019-07-27 RX ADMIN — ASPIRIN 81 MG 81 MG: 81 TABLET ORAL at 08:28

## 2019-07-27 RX ADMIN — DICLOFENAC 4 G: 10 GEL TOPICAL at 21:26

## 2019-07-27 RX ADMIN — MECLIZINE HYDROCHLORIDE 25 MG: 25 TABLET ORAL at 13:29

## 2019-07-27 RX ADMIN — MECLIZINE HYDROCHLORIDE 25 MG: 25 TABLET ORAL at 21:24

## 2019-07-27 RX ADMIN — HEPARIN SODIUM 5000 UNITS: 5000 INJECTION INTRAVENOUS; SUBCUTANEOUS at 06:30

## 2019-07-27 RX ADMIN — GABAPENTIN 400 MG: 400 CAPSULE ORAL at 08:28

## 2019-07-27 RX ADMIN — NYSTATIN 500000 UNITS: 100000 SUSPENSION ORAL at 21:24

## 2019-07-27 RX ADMIN — COLCHICINE 0.6 MG: 0.6 TABLET, FILM COATED ORAL at 21:25

## 2019-07-27 RX ADMIN — MECLIZINE HYDROCHLORIDE 25 MG: 25 TABLET ORAL at 08:28

## 2019-07-27 RX ADMIN — COLCHICINE 0.6 MG: 0.6 TABLET, FILM COATED ORAL at 08:31

## 2019-07-27 RX ADMIN — ATORVASTATIN CALCIUM 80 MG: 80 TABLET, FILM COATED ORAL at 21:24

## 2019-07-27 RX ADMIN — DICLOFENAC 4 G: 10 GEL TOPICAL at 08:32

## 2019-07-27 RX ADMIN — GABAPENTIN 400 MG: 400 CAPSULE ORAL at 21:24

## 2019-07-27 RX ADMIN — GABAPENTIN 400 MG: 400 CAPSULE ORAL at 13:29

## 2019-07-27 RX ADMIN — HEPARIN SODIUM 5000 UNITS: 5000 INJECTION INTRAVENOUS; SUBCUTANEOUS at 21:32

## 2019-07-27 RX ADMIN — NYSTATIN 500000 UNITS: 100000 SUSPENSION ORAL at 13:29

## 2019-07-27 RX ADMIN — NYSTATIN 500000 UNITS: 100000 SUSPENSION ORAL at 08:31

## 2019-07-27 RX ADMIN — HEPARIN SODIUM 5000 UNITS: 5000 INJECTION INTRAVENOUS; SUBCUTANEOUS at 13:29

## 2019-07-27 ASSESSMENT — PAIN SCALES - GENERAL
PAINLEVEL_OUTOF10: 0
PAINLEVEL_OUTOF10: 0

## 2019-07-27 NOTE — PROGRESS NOTES
Step Transfers: Contact guard assistance  Stand Pivot Transfers: Contact guard assistance  Sit to stand: Stand by assistance  Stand to sit: Stand by assistance  Transfer Comments: Pt demo good safety c w/c brakes   Toilet Transfers  Toilet - Technique: Stand pivot  Equipment Used: Raised toilet seat with rails  Toilet Transfer: Stand by assistance  Toilet Transfers Comments: use of grab bar  Tub Transfers  Tub - Transfer From: Rolling walker  Tub - Transfer Type: To and From  Tub - Transfer To: Transfer tub bench  Tub - Technique: Ambulating  Tub Transfers: Minimal assistance  Tub Transfers Comments: req cues for safety and proper technique. Req spouse to measure various heights and depths of tub at home (garden step down tub) to ensure good fuctional use of TTB as appropriate. SP   cognition:    Word list retention- 78%, 89% c cues. Paragraph recall- 50%.    Speech:   Pt. Speech slurred today, needed min A to use compensatory dysarthria strategies.       Other:      Unable to utilize visual stimuli d/t pt. Diplopia and dizziness.                       Objective:  /86   Pulse 85   Temp 98.2 °F (36.8 °C)   Resp 16   Ht 5' 10\" (1.778 m)   Wt 249 lb 11.2 oz (113.3 kg)   SpO2 100%   BMI 35.83 kg/m²  I Body mass index is 35.83 kg/m². I   Wt Readings from Last 1 Encounters:   19 249 lb 11.2 oz (113.3 kg)      Temp (24hrs), Av.4 °F (36.9 °C), Min:98.2 °F (36.8 °C), Max:98.6 °F (37 °C)         GEN: well developed, well nourished, no acute distress  HEENT: Normocephalic atraumatic, EOMI, mucous membranes pink and moist cranial incisions clean ,continues with diplopia wearing eye patch  CV: RRR, no murmurs, rubs or gallops  PULM: CTAB, no rales or rhonchi.  Respirations WNL and unlabored  ABD: soft, NT, ND, +BS and equal  NEURO: A&O x3. .  Sensation intact Right,  Decreased on left anterior lateral hip,   Motor testing 4/5 ataxia upper extremity decreased from last visit  MSK: PROM within

## 2019-07-28 LAB
ANION GAP SERPL CALCULATED.3IONS-SCNC: 15 MMOL/L (ref 9–17)
BUN BLDV-MCNC: 11 MG/DL (ref 8–23)
BUN/CREAT BLD: ABNORMAL (ref 9–20)
CALCIUM SERPL-MCNC: 9.6 MG/DL (ref 8.6–10.4)
CHLORIDE BLD-SCNC: 96 MMOL/L (ref 98–107)
CO2: 26 MMOL/L (ref 20–31)
CREAT SERPL-MCNC: 0.64 MG/DL (ref 0.7–1.2)
GFR AFRICAN AMERICAN: >60 ML/MIN
GFR NON-AFRICAN AMERICAN: >60 ML/MIN
GFR SERPL CREATININE-BSD FRML MDRD: ABNORMAL ML/MIN/{1.73_M2}
GFR SERPL CREATININE-BSD FRML MDRD: ABNORMAL ML/MIN/{1.73_M2}
GLUCOSE BLD-MCNC: 160 MG/DL (ref 70–99)
HCT VFR BLD CALC: 36.3 % (ref 41–53)
HEMOGLOBIN: 12 G/DL (ref 13.5–17.5)
MCH RBC QN AUTO: 29.4 PG (ref 26–34)
MCHC RBC AUTO-ENTMCNC: 33 G/DL (ref 31–37)
MCV RBC AUTO: 89 FL (ref 80–100)
NRBC AUTOMATED: ABNORMAL PER 100 WBC
PDW BLD-RTO: 15.1 % (ref 11.5–14.9)
PLATELET # BLD: 243 K/UL (ref 150–450)
PMV BLD AUTO: 7.8 FL (ref 6–12)
POTASSIUM SERPL-SCNC: 4 MMOL/L (ref 3.7–5.3)
RBC # BLD: 4.08 M/UL (ref 4.5–5.9)
SODIUM BLD-SCNC: 137 MMOL/L (ref 135–144)
WBC # BLD: 4.2 K/UL (ref 3.5–11)

## 2019-07-28 PROCEDURE — 85027 COMPLETE CBC AUTOMATED: CPT

## 2019-07-28 PROCEDURE — 6370000000 HC RX 637 (ALT 250 FOR IP): Performed by: PHYSICAL MEDICINE & REHABILITATION

## 2019-07-28 PROCEDURE — 1180000000 HC REHAB R&B

## 2019-07-28 PROCEDURE — 36415 COLL VENOUS BLD VENIPUNCTURE: CPT

## 2019-07-28 PROCEDURE — 80048 BASIC METABOLIC PNL TOTAL CA: CPT

## 2019-07-28 PROCEDURE — 6360000002 HC RX W HCPCS: Performed by: PHYSICAL MEDICINE & REHABILITATION

## 2019-07-28 PROCEDURE — 97535 SELF CARE MNGMENT TRAINING: CPT

## 2019-07-28 PROCEDURE — 97530 THERAPEUTIC ACTIVITIES: CPT

## 2019-07-28 PROCEDURE — 99232 SBSQ HOSP IP/OBS MODERATE 35: CPT | Performed by: PHYSICAL MEDICINE & REHABILITATION

## 2019-07-28 PROCEDURE — 97112 NEUROMUSCULAR REEDUCATION: CPT

## 2019-07-28 PROCEDURE — 97110 THERAPEUTIC EXERCISES: CPT

## 2019-07-28 PROCEDURE — 97116 GAIT TRAINING THERAPY: CPT

## 2019-07-28 PROCEDURE — 99231 SBSQ HOSP IP/OBS SF/LOW 25: CPT | Performed by: INTERNAL MEDICINE

## 2019-07-28 RX ADMIN — DICLOFENAC 4 G: 10 GEL TOPICAL at 14:16

## 2019-07-28 RX ADMIN — HEPARIN SODIUM 5000 UNITS: 5000 INJECTION INTRAVENOUS; SUBCUTANEOUS at 14:18

## 2019-07-28 RX ADMIN — COLCHICINE 0.6 MG: 0.6 TABLET, FILM COATED ORAL at 08:13

## 2019-07-28 RX ADMIN — AMLODIPINE BESYLATE 5 MG: 5 TABLET ORAL at 08:16

## 2019-07-28 RX ADMIN — MECLIZINE HYDROCHLORIDE 25 MG: 25 TABLET ORAL at 21:50

## 2019-07-28 RX ADMIN — NYSTATIN 500000 UNITS: 100000 SUSPENSION ORAL at 16:13

## 2019-07-28 RX ADMIN — ASPIRIN 81 MG 81 MG: 81 TABLET ORAL at 08:12

## 2019-07-28 RX ADMIN — GABAPENTIN 400 MG: 400 CAPSULE ORAL at 14:15

## 2019-07-28 RX ADMIN — ATORVASTATIN CALCIUM 80 MG: 80 TABLET, FILM COATED ORAL at 21:50

## 2019-07-28 RX ADMIN — COLCHICINE 0.6 MG: 0.6 TABLET, FILM COATED ORAL at 21:50

## 2019-07-28 RX ADMIN — NYSTATIN 500000 UNITS: 100000 SUSPENSION ORAL at 14:17

## 2019-07-28 RX ADMIN — CLOPIDOGREL BISULFATE 75 MG: 75 TABLET ORAL at 08:12

## 2019-07-28 RX ADMIN — ONDANSETRON HYDROCHLORIDE 4 MG: 4 TABLET, FILM COATED ORAL at 06:24

## 2019-07-28 RX ADMIN — GABAPENTIN 400 MG: 400 CAPSULE ORAL at 08:11

## 2019-07-28 RX ADMIN — NYSTATIN 500000 UNITS: 100000 SUSPENSION ORAL at 21:51

## 2019-07-28 RX ADMIN — GABAPENTIN 400 MG: 400 CAPSULE ORAL at 21:50

## 2019-07-28 RX ADMIN — MECLIZINE HYDROCHLORIDE 25 MG: 25 TABLET ORAL at 08:11

## 2019-07-28 RX ADMIN — DICLOFENAC 4 G: 10 GEL TOPICAL at 21:59

## 2019-07-28 RX ADMIN — HEPARIN SODIUM 5000 UNITS: 5000 INJECTION INTRAVENOUS; SUBCUTANEOUS at 06:21

## 2019-07-28 RX ADMIN — DICLOFENAC 4 G: 10 GEL TOPICAL at 08:13

## 2019-07-28 RX ADMIN — HEPARIN SODIUM 5000 UNITS: 5000 INJECTION INTRAVENOUS; SUBCUTANEOUS at 21:50

## 2019-07-28 RX ADMIN — MECLIZINE HYDROCHLORIDE 25 MG: 25 TABLET ORAL at 14:15

## 2019-07-28 RX ADMIN — NYSTATIN 500000 UNITS: 100000 SUSPENSION ORAL at 08:13

## 2019-07-28 ASSESSMENT — PAIN DESCRIPTION - PROGRESSION: CLINICAL_PROGRESSION: NOT CHANGED

## 2019-07-28 ASSESSMENT — PAIN SCALES - GENERAL
PAINLEVEL_OUTOF10: 1
PAINLEVEL_OUTOF10: 0

## 2019-07-28 NOTE — PROGRESS NOTES
room)  and shows good cognition with tasks ie, initiation, memory, sequencing, pt is frustrated with his balance deficits preventing safe indep ambulation (safety judgement re this at times impairedj  Balance  Standing Balance: Stand by assistance  Transfers  Sit to stand: Stand by assistance  Stand to sit: Stand by assistance  Transfer Comments: Pt demo good safety c w/c brakes  Standing Balance  Time: 3-5 min x 4 am, pm 3-5 min x 4  Activity: adls  Comment: static stand SBA, dynamic stand CGA with BUE support in am for adls, pm:  static stand SBA without UE support for 1-2 min, dynamic stand with 1 UE support 1-2 min with CGA, practiced dynamic stand with no UE support- min - mod A     Gross Motor: LUE ataxia noted at times but pt is able to use LUE functionally                                     OT FIM:   Eatin - Feeds self with adaptive equipment/dentures and/or feeds self with modified diet and/or performs own tube feeding  Groomin - Independent with all tasks using assistive device  Bathin - Able to bathe all 10 areas with setup/sup/cues(CGA stand to bathe bottom, indep with remainder seated)  Dressing-Upper: 6 - Independent with device/prosthesis(obtains set up indep from w/c)  Dressing-Lower: 4 - Requires assist with buttons/zippers/shoelaces and/or assist with shoes only(CGA stand, assist with teds)  Toiletin - Requires steadying assistance only  Toilet Transfer: 4 - Requires steadying assistance only < 25% assist(wall grab bars, [facility])  Shower Transfer: 4 - Minimal contact assistance, pt. expends 75% or more effort(SPT from w/c with wall grab bars- [facility])    Social Interaction: 6 - Patient requires medication for mood and/or effect  Problem Solvin - Independent with device (e.g. notes, schedules)  Memory: 6 - Patient requires device to recall (e.g. memory book)    Assessment  Assessment: pt shows improved cognition , adls and functional mobility (w/c level) since eval 19.  balance has term goal 4: mod LE dress  Short term goal 5: monica 6-8 min stand with 1 UE support and CGA  Long term goals  Time Frame for Long term goals : by discharge  Long term goal 1: mod indep feeding and grooming  Long term goal 2: set up, SBA for bathing  Long term goal 3: min LE dress  Long term goal 4: monica 8-10 min stand, amb with AE and CGA  Long term goal 5: pt able to use LUE for adls with fair fine and gross coordination       Electronically signed by Juan Ramon Guzman OT on 7/28/19 at 4:17 PM

## 2019-07-28 NOTE — CONSULTS
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 History of hypertension hyperlipidemia left carotid endarterectomy  2. Recent right coronary artery PCI  3.  Secondary to cerebellar dysfunction    Freda Eugene MD  7/27/2019  10:40 PM

## 2019-07-28 NOTE — PROGRESS NOTES
Physical Therapy  [facility]   Acute Rehabilitation Physical Therapy Progress Note    Date: 19  Patient Name: Isiah Pisano       Room: 2122/7795-85  MRN: [id number]   Account: [de-identified]   : 1945  (70 y.o.) Gender: male     Referring Practitioner: Beth Saenz  Diagnosis: Left cerebellar ICH. Pt is s/p suboccipital craniotomy for cerebellar ICH on 19. L side ataxia, double vision -eye patch change every 2 hours. Past Medical History:  has a past medical history of Anxiety, Bronchitis with asthma, acute, Carotid stenosis, left, Diabetes mellitus ([address] GERD (gastroesophageal reflux disease), Hyperlipidemia, Hypertension, and Osteoarthritis. Past Surgical History:   has a past surgical history that includes Colonoscopy; Tonsillectomy and adenoidectomy; joint replacement (Left); shoulder surgery (Right); and Carotid endarterectomy (Left, 16). Additional Pertinent Hx: 75year old male with pertinent PMHx for CAD, HTN, HLD, L CEA in 2016, who presents to [address] [place] [address] with [address]. Patient recently had dyspnea on exertion and underwent cardiac catheterization , s/p RCA stenting 0/3/50, complicated by perforation and tamponade s/p pericardiocentesis with a covered stent through the perforation. Pt at [address] [place] [address] for follow up visit, when he c/o of nausea/vomiting, dysarthria and vison deficits. CT brain was done STAT and showed a left cerebellar ICH. Pt is s/p suboccipital craniotomy for cerebellar ICH on 19. Pt admitted to rehab unit on 7/3/19. Overall Orientation Status: Within Normal Limits  Restrictions/Precautions  Restrictions/Precautions: Cardiac; Fall Risk  Required Braces or Orthoses?: No  Implants present? : Metal implants(L TKA)  Position Activity Restriction  Other position/activity restrictions: Alternate eye patch q2h for diplopia from [address] [place] [address] notes. Subjective: Reports waiting from Insurance to [facility] for SNF.  Pt reports I want to go home, but my daughters think, I need ela william for couple weeks. Discussed Home safety and need for assistance at all times for mobility due to fall risk. Comments: Plan for SNF for discharge as soon as possible. Vital Signs  BP Location: Left Arm  Level of Consciousness: Alert  Patient Currently in Pain: Denies  Clinical Progression: Not changed  Response to Pain Intervention: Asleep with RR greater than 10;Patient Satisfied     Oxygen Therapy  O2 Device: None (Room air)  Patient Observation  Observations: Pt wearing eye patch on R eye, bruising around L eye       Bed Mobility:   Bed Mobility  Bridging: Supervision  Rolling: Stand by assistance  Supine to Sit: Stand by assistance  Sit to Supine: Stand by assistance  Scooting: Stand by assistance  Bed mobility  Bridging: Supervision  Scooting: Stand by assistance    Transfers:  Sit to Stand: Minimal Assistance  Stand to sit: Minimal Assistance  Bed to Chair: Minimal assistance     Squat Pivot Transfers: Minimal Assistance        Ambulation 1  Surface: level tile  Device: Rolling Walker  Other Apparatus: (Eye patch)  Assistance: Minimal assistance; Moderate assistance(Mod A to regain balance during ambulation at min A with RW.)  Quality of Gait: Increased step length, fast alyssa, slight forward trunk flexion. Pt did have one moderate LOB toward R side but able to correct with therapist's help. Gait Deviations: Deviated path;Staggers  Distance: 180 ft  Comments: Pt walks very fast - reminded to slow down and correct step length to avoid taking too big of step that could increase fall risk. Pt started at a min A x1 and progressed to Mod A x1 with distance.             Stairs  # Steps : 12  Stairs Height: (6\" and 4\")  Rails: Bilateral  Device: No Device  Assistance: Contact guard assistance  Comment: Using L LE to ascend, control descent           Wheelchair Activities  Wheelchair Type: Standard  Propulsion: Yes  Propulsion 1  Propulsion: Manual  Level: Level FWD, Retro , and lateral amb. in // bars working on balance decreasing UE support. Patient relies heavily on UE's for support. Activity Tolerance: Patient Tolerated treatment well  Activity Tolerance: Nausea, light headed and dizzy at times  PT Equipment Recommendations  Equipment Needed: No  Other: SNF will provide if needed. Patient Education  New Education Provided: Mechanism of balance, purpose of exercises, ambulation technique  Learner:patient  Method: demonstration and explanation       Outcome: needs reinforcement     Current Treatment Recommendations: Strengthening, Balance Training, Functional Mobility Training, Transfer Training, Gait Training, Stair training, Wheelchair Mobility Training, Neuromuscular Re-education, Home Exercise Program, Safety Education & Training, Patient/Caregiver Education & Training, Equipment Evaluation, Education, & procurement, Vestibular Rehab    Conditions Requiring Skilled Therapeutic Intervention  Body structures, Functions, Activity limitations: Decreased functional mobility ; Decreased strength;Decreased safe awareness;Decreased balance;Decreased coordination  Assessment: Ongoing balance and coordination issues due to CVA - focus of today's session. Patient impulsive with decreased safety awareness. Requiring cues to slow down for good technique. Treatment Diagnosis: Impaired mobility  Prognosis: Good  Decision Making: Medium Complexity  Clinical Presentation: Pt in w/c in room, motivated for PT session today. Barriers to Learning: safety awareness, impulsiveness  REQUIRES PT FOLLOW UP: Yes  Treatment Initiated : balance, coordination, gait training  Discharge Recommendations: [address]; Patient would benefit from continued therapy after discharge(Family unable to provide the level of care at home. )    Goals  Short term goals  Time Frame for Short term goals: 10 days  Short term goal 1: Pt able to perform supine<>sit at mod-I

## 2019-07-29 VITALS
SYSTOLIC BLOOD PRESSURE: 125 MMHG | HEIGHT: 70 IN | BODY MASS INDEX: 35.75 KG/M2 | OXYGEN SATURATION: 100 % | DIASTOLIC BLOOD PRESSURE: 67 MMHG | HEART RATE: 85 BPM | TEMPERATURE: 97.6 F | WEIGHT: 249.7 LBS | RESPIRATION RATE: 16 BRPM

## 2019-07-29 LAB
ANION GAP SERPL CALCULATED.3IONS-SCNC: 12 MMOL/L (ref 9–17)
BUN BLDV-MCNC: 9 MG/DL (ref 8–23)
BUN/CREAT BLD: ABNORMAL (ref 9–20)
CALCIUM SERPL-MCNC: 9.8 MG/DL (ref 8.6–10.4)
CHLORIDE BLD-SCNC: 99 MMOL/L (ref 98–107)
CO2: 28 MMOL/L (ref 20–31)
CREAT SERPL-MCNC: 0.54 MG/DL (ref 0.7–1.2)
GFR AFRICAN AMERICAN: >60 ML/MIN
GFR NON-AFRICAN AMERICAN: >60 ML/MIN
GFR SERPL CREATININE-BSD FRML MDRD: ABNORMAL ML/MIN/{1.73_M2}
GFR SERPL CREATININE-BSD FRML MDRD: ABNORMAL ML/MIN/{1.73_M2}
GLUCOSE BLD-MCNC: 106 MG/DL (ref 70–99)
POTASSIUM SERPL-SCNC: 4.6 MMOL/L (ref 3.7–5.3)
SODIUM BLD-SCNC: 139 MMOL/L (ref 135–144)

## 2019-07-29 PROCEDURE — 97530 THERAPEUTIC ACTIVITIES: CPT

## 2019-07-29 PROCEDURE — 6370000000 HC RX 637 (ALT 250 FOR IP): Performed by: PHYSICAL MEDICINE & REHABILITATION

## 2019-07-29 PROCEDURE — 99232 SBSQ HOSP IP/OBS MODERATE 35: CPT | Performed by: INTERNAL MEDICINE

## 2019-07-29 PROCEDURE — 6360000002 HC RX W HCPCS: Performed by: PHYSICAL MEDICINE & REHABILITATION

## 2019-07-29 PROCEDURE — 97110 THERAPEUTIC EXERCISES: CPT

## 2019-07-29 PROCEDURE — 97112 NEUROMUSCULAR REEDUCATION: CPT

## 2019-07-29 PROCEDURE — 36415 COLL VENOUS BLD VENIPUNCTURE: CPT

## 2019-07-29 PROCEDURE — 97116 GAIT TRAINING THERAPY: CPT

## 2019-07-29 PROCEDURE — 80048 BASIC METABOLIC PNL TOTAL CA: CPT

## 2019-07-29 PROCEDURE — 99238 HOSP IP/OBS DSCHRG MGMT 30/<: CPT | Performed by: PHYSICAL MEDICINE & REHABILITATION

## 2019-07-29 RX ADMIN — ASPIRIN 81 MG 81 MG: 81 TABLET ORAL at 07:18

## 2019-07-29 RX ADMIN — NYSTATIN 500000 UNITS: 100000 SUSPENSION ORAL at 07:19

## 2019-07-29 RX ADMIN — AMLODIPINE BESYLATE 5 MG: 5 TABLET ORAL at 07:18

## 2019-07-29 RX ADMIN — HEPARIN SODIUM 5000 UNITS: 5000 INJECTION INTRAVENOUS; SUBCUTANEOUS at 13:33

## 2019-07-29 RX ADMIN — MECLIZINE HYDROCHLORIDE 25 MG: 25 TABLET ORAL at 07:18

## 2019-07-29 RX ADMIN — GABAPENTIN 400 MG: 400 CAPSULE ORAL at 13:33

## 2019-07-29 RX ADMIN — GABAPENTIN 400 MG: 400 CAPSULE ORAL at 07:18

## 2019-07-29 RX ADMIN — COLCHICINE 0.6 MG: 0.6 TABLET, FILM COATED ORAL at 07:19

## 2019-07-29 RX ADMIN — DICLOFENAC 4 G: 10 GEL TOPICAL at 13:33

## 2019-07-29 RX ADMIN — HEPARIN SODIUM 5000 UNITS: 5000 INJECTION INTRAVENOUS; SUBCUTANEOUS at 05:43

## 2019-07-29 RX ADMIN — DICLOFENAC 4 G: 10 GEL TOPICAL at 07:19

## 2019-07-29 RX ADMIN — CLOPIDOGREL BISULFATE 75 MG: 75 TABLET ORAL at 07:18

## 2019-07-29 RX ADMIN — MECLIZINE HYDROCHLORIDE 25 MG: 25 TABLET ORAL at 13:33

## 2019-07-29 ASSESSMENT — PAIN SCALES - GENERAL
PAINLEVEL_OUTOF10: 5
PAINLEVEL_OUTOF10: 0
PAINLEVEL_OUTOF10: 7

## 2019-07-29 NOTE — PROGRESS NOTES
Mobility  Rolling: Stand by assistance  Supine to Sit: Stand by assistance  Sit to Supine: Stand by assistance  Scooting: Stand by assistance  Bed mobility  Scooting: Stand by assistance    Transfers:  Sit to Stand: Contact guard assistance  Stand to sit: Contact guard assistance                 Ambulation 1  Surface: level tile  Device: Rolling Walker  Other Apparatus: (eye patch)  Assistance: Contact guard assistance;Minimal assistance(Pt requires more assistance w/fatigue becomes more off bal)  Quality of Gait: Increased step length, fast alyssa, slight forward trunk flexion. Pt did have one moderate LOB toward R side but able to correct with Min A from therapist's help. Gait Deviations: Deviated path;Staggers  Distance: 200ft  Comments: VC's for pacing himself and to stay under control to prevent falls. Demos good technique with vcing           Stairs  # Steps : 12  Stairs Height: (6\" and 4\")  Rails: Bilateral  Device: No Device  Assistance: Contact guard assistance  Comment: pt demos safe step-to sequencing on steps with controlled decent. No LOB with turns. Wheelchair Activities  Wheelchair Type: Standard  Propulsion: Yes  Propulsion 1  Propulsion: Manual  Level: Level Tile  Method: RUE;LUE  Level of Assistance: Modified independent  Description/ Details: Pt self propelled w/c with B UE from therapy gym back to his room  Distance: 200ft                                     FIMS:      TRANSFERS  Bed, Chair, Wheel Chair: 4 - Requires steadying assistance only <25% assist  and/or requires assist with one leg only   LOCOMOTION  Primary Mode:  Both  Distance Walked: 200ft  Distance Traveled in Wheel Chair: 200ft  Walk: 4 - Contact Guard/Minimal Assistance Requires up to Contact Guard or Minimal Assistance to walk at least 150 feet  Wheel Chair: 6 - Modified [place] Operates wheelchair at least 150 feet with an ambulatory device, orthosis or prosthesis OR requires extra amount of time OR there is

## 2019-07-29 NOTE — PLAN OF CARE
Problem: Risk for Impaired Skin Integrity  Goal: Tissue integrity - skin and mucous membranes  Description  Structural intactness and normal physiological function of skin and  mucous membranes. Outcome: Ongoing   Skin assessment completed this shift. Nutrition and Hydration status assessed with adequate intake. Warren Score as charted. Pressure Relief Overlay remains intact and inflated to patient's bed throughout the shift. Bilateral heels remain elevated on pillows throughout the shift. Patient tolerates repositioning by staff at least every 2 hours. Patient able to reposition self for comfort and to prevent breakdown. Patient verbalizes understanding of pressure ulcer prevention measures. Skin integrity maintained. No new skin breakdown noted. Skin to high risk pressure areas including coccyx and heels are clear. Kay / Incontinence care provided as needed throughout the shift. Aloe Vesta Moisture Barrier ointment applied to buttocks as a preventative measure. Problem: Falls - Risk of:  Goal: Will remain free from falls  Description  Will remain free from falls  Outcome: Ongoing   No falls or injury this shift. Bed is maintained at the lowest level, brakes engaged, call light and assistive devices in reach. Room is clutter free, adequate lighting for safe transfers and ambulation. Assistance provided for transfers and toileting. Problem: Pain:  Goal: Control of acute pain  Description  Control of acute pain  Outcome: Ongoing   Interventions:  -Control environmental factors  -Medicate for pain prior to treatment/therapy  -Assess pain using appropriate pain scale tool  -Administer analgesic as ordered  -Handle areas of discomfort gently  -Assess and document results of pain interventions  -Initiate appropriate non-invasive pain relief measures  -Turn and reposition patient as appropriate for comfort    Evaluations:  Pain assessment completed.   Pt. able to rest.  Pt. denies pain this

## 2019-07-29 NOTE — CONSULTS
[facility] Internal Medicine    Progress Note    7/28/2019    10:30 PM    Name:   Roshni Ramirez  MRN:     [id number]     Acct:      [de-identified]   Room:   [address] [place] [address] Day:  25  Admit Date:  7/3/2019  3:09 PM    PCP:   Reji Vyas MD  Code Status:  Full Code    Subjective:     C/C: No chief complaint on file. HPI:     Intermittent dizziness no nausea vomiting    Review of Systems:     Constitutional:  negative for chills, fevers, sweats  Respiratory:  negative for cough, dyspnea on exertion, hemoptysis, shortness of breath, wheezing  Cardiovascular:  negative for chest pain, chest pressure/discomfort, lower extremity edema, palpitations  Gastrointestinal:  negative for abdominal pain, constipation, diarrhea, nausea, vomiting  Neurological: Refers intermittent dizziness    Medications: Allergies:  No Known Allergies    Current Meds:   Scheduled Meds:    gabapentin  400 mg Oral TID    diclofenac sodium  4 g Topical TID    meclizine  25 mg Oral TID    nystatin  5 mL Oral 4x Daily    colchicine  0.6 mg Oral BID    polyethylene glycol  17 g Oral Daily    atorvastatin  80 mg Oral Nightly    clopidogrel  75 mg Oral Daily    aspirin  81 mg Oral Daily    amLODIPine  5 mg Oral Daily    heparin (porcine)  5,000 Units Subcutaneous 3 times per day    lidocaine  1 patch Transdermal Daily     Continuous Infusions:   PRN Meds: sodium chloride, ondansetron, acetaminophen, aluminum & magnesium hydroxide-simethicone, bisacodyl, methocarbamol, oxyCODONE **OR** [DISCONTINUED] oxyCODONE, nitroGLYCERIN, calcium carbonate    Data:     Past Medical History:   has a past medical history of Anxiety, Bronchitis with asthma, acute, Carotid stenosis, left, Diabetes mellitus ([address] GERD (gastroesophageal reflux disease), Hyperlipidemia, Hypertension, and Osteoarthritis. Social History:   reports that he has never smoked.  He has never used smokeless tobacco. He reports that he drinks alcohol. He reports that he does not use drugs. Family History:   Family History   Problem Relation Age of Onset   [facility] Cancer Mother     Cancer Father        Vitals:  /69   Pulse 96   Temp 98.3 °F (36.8 °C) (Oral)   Resp 18   Ht 5' 10\" (1.778 m)   Wt 249 lb 11.2 oz (113.3 kg)   SpO2 98%   BMI 35.83 kg/m²   Temp (24hrs), Av.8 °F (36.6 °C), Min:97.2 °F (36.2 °C), Max:98.3 °F (36.8 °C)    No results for input(s): POCGLU in the last 72 hours. I/O (24Hr): No intake or output data in the 24 hours ending 19 2230    Labs:    Hematology:  Recent Labs     19  0655   WBC 4.2   RBC 4.08*   HGB 12.0*   HCT 36.3*   MCV 89.0   MCH 29.4   MCHC 33.0   RDW 15.1*      MPV 7.8     Chemistry:  Recent Labs     19  1208      K 4.0   CL 96*   CO2 26   GLUCOSE 160*   BUN 11   CREATININE 0.64*   ANIONGAP 15   LABGLOM >60   GFRAA >60   CALCIUM 9.6     No results for input(s): PROT, LABALBU, EAG, S1UCGWC, D2RWSCM, FT4, TSH, CORTISOL, PROLACTIN, AST, ALT, LDH, GGT, ALKPHOS, LABGGT, BILITOT, BILIDIR, AMMONIA, AMYLASE, LIPASE, LACTATE, CHOL, HDL, LDLCHOLESTEROL, CHOLHDLRATIO, TRIG, VLDL, PHENYTOIN, PHENYF, VALPROATE in the last 72 hours. Glucose:No results for input(s): LABA1C, POCGLU, LABINSU in the last 72 hours.       No results found for: SPECIAL  No results found for: CULTURE    No results found for: POCPH, PHART, PH, POCPCO2, EZS7MUE, PCO2, POCPO2, PO2ART, PO2, POCHCO3, MZS2CDJ, HCO3, NBEA, PBEA, BEART, BE, THGBART, THB, VFW0VKG, PRCX4STU, R4FTEYXS, O2SAT, FIO2    Radiology:        Physical Examination:        General appearance:  alert, cooperative and no distress  Mental Status:  oriented to person, place and time and normal affect  Lungs:  clear to auscultation bilaterally, normal effort  Heart:  regular rate and rhythm, no murmur  Abdomen:  soft, nontender, nondistended, normal bowel sounds, no masses, hepatomegaly, splenomegaly  Extremities:  no edema, redness, tenderness

## 2019-07-29 NOTE — PROGRESS NOTES
Physical Medicine & Rehabilitation  Progress Note    7/29/2019 10:21 AM     CC: Ambulatory and ADL dysfunction due to hemorrhagic CVA    Subjective:   Nursing notes and was felt ready to put his pants on today. Bowels and bladder okay. Feels well. Accepted to [place] today, patient ready for discharge    ROS:  Denies fevers, chills, sweats. No chest pain, palpitations, lightheadedness. Denies coughing, wheezing or shortness of breath. Denies abdominal pain, nausea, diarrhea or constipation. No new areas of joint pain. Denies new areas of numbness or weakness. Denies new anxiety or depression issues. No new skin problems. Rehabilitation:     PT:  Restrictions/Precautions: Cardiac, Fall Risk  Implants present? : Metal implants(L TKA)  Other position/activity restrictions: Alternate eye patch q2h for diplopia from [address] [place] [address] clinic notes. Transfers  Sit to Stand: Minimal Assistance  Stand to sit: Minimal Assistance  Bed to Chair: Minimal assistance  Squat Pivot Transfers: Minimal Assistance  Comment: RW used for transfers. Ambulation 1  Surface: level tile  Device: Rolling Walker  Other Apparatus: (eye patch)  Assistance: Minimal assistance, Moderate assistance(Mod A to regain balance during ambulation at min A with RW.)  Quality of Gait: Increased step length, fast alyssa, slight forward trunk flexion. Pt did have one moderate LOB toward R side but able to correct with therapist's help. Gait Deviations: Deviated path, Staggers  Distance: 180 ft  Comments: Pt walks very fast - reminded to slow down and correct step length to avoid taking too big of step that could increase fall risk. Pt started at a min A x1 and progressed to Mod A x1 with distance. Transfers  Sit to Stand: Minimal Assistance  Stand to sit: Minimal Assistance  Bed to Chair: Minimal assistance  Squat Pivot Transfers: Minimal Assistance  Comment: RW used for transfers.   Ambulation  Ambulation?: Yes  More Ambulation?:

## 2019-07-31 NOTE — DISCHARGE SUMMARY
Physical Medicine & Rehabilitation  Discharge Summary     Patient Identification:  Miko Coyne  : 1945  Admit date: 7/3/2019  Discharge date: 2019  Primary care provider: Kathryn Lyons MD     Problem List:    Ambulatory and ADL dysfunction due to hemorrhagic CVA      Patient Active Problem List   Diagnosis    Impaired fasting glucose    Patient overweight    Insomnia    Hypertension    OA (osteoarthritis) of knee    S/P total knee arthroplasty    Bronchitis with asthma, acute    Lymphadenopathy of left cervical region    Carotid stenosis, left    H/O carotid endarterectomy    Thyroid nodule    Ventral hernia without obstruction or gangrene    Unstable angina (HCC)    Myocardiopathy ([address]    Abdominal mass, RUQ (right upper quadrant)    Chest pain    Spontaneous intracranial hemorrhage (HCC)    Diplopia    Ataxia following nontraumatic intracerebral hemorrhage    Neuropathy       Admission History:     78-year-old right-handed male with history of hypertension, hyperlipidemia, obesity, prior left CEA in 2016, provoked DVT after total knee replacement and coronary artery disease admitted emergently to the ER to [facility] with left cerebellar intracranial hemorrhage.       Prior he was admitted  for dyspnea on exertion found to have totally occluded RCA status post PCI to RCA with 3 EES placed in the RCA and one Graftmaster covered stent complicated by RCA perforation and tamponade status post pericardial drain placement and auto transfused through right common femoral vein. He was placed on aspirin 81 mg and Plavix 75 postop.   He was discharged  in stable condition and was functioning well but still had orthopnea.       He was readmitted  with  acute nausea and found to have left cerebellar intracranial hemorrhage after optimization by the NICU team he was taken to the operating room and underwent suboccipital craniectomy and left cerebellar ICH evacuation on 6/18. Patient was continued on compression stockings and heparin for DVT prophylaxis. Per discharge summary no medical events recorded. He was discharged on 7/3 to [facility] rehab -follow-up CT head 4 weeks, cardiology and neurosurgery      Per records okay to restart aspirin on 6/21 and Plavix 6/25 and okay for chemoprophylaxis with heparin 5000 3 times daily starting 6/20/19     CT brain 6/18-cerebellar inter-parenchymal hemorrhage with mass-effect on the quadrigeminal cistern and fourth ventricle. No ventricular entrapment.     CTA head and neck-mildly mildly enlarging intraparenchymal hemorrhage centered within superior cerebellum, epicenter likely in the superior vermis. Mass-effect on the quadrigeminal turn and fourth ventricle with associated mild ventriculomegaly likely early hydrocephalus     CT brain 6/23-evolving postsurgical changes in the posterior fossa, stable low volume residual blood byproducts after evacuation. Hyperdense material layering in the occipital horns is minimal volume and likely related to redistribution of blood products rather than new hemorrhage. Interval decrease in the caliber of the lateral and third ventricles     CT brain 6/28-removal of right frontal approach external ventricular drainage catheter with stable caliber of the ventricles, stable residual blood products, edema and mass-effect in the posterior fossa with minimal decreased pneumocephalus, stable minimal layering hemorrhage and in the occipital horns     6/19/2019 echo  ventricular systolic function is   normal. EF = 65 ± 5% (2D biplane) Definity contrast used for endocardial border   detection. Left ventricular diastolic function was not evaluated due to limited   study.  -Limited echo s/p PCI (6/5/19), and stroke.  -Small pericardial effusion, no respiratory variations or evidence of tamponade.    Poor subcostal window.  -Negative saline study, clips 22 and 23     Left Shoulder x-ray 6/28  There is no evidence of fracture or of dislocation.  The   underlying bony architecture appears normal.  Soft tissue calcification   is not identified.  The glenohumeral joint is maintained, with small   inferiorly directed osteophytes.  Advanced degenerative change at the   acromioclavicular joint.  Acromiohumeral interval, and visualized   portions of the clavicle are normal.  Limited examination of the adjacent   lung demonstrates no abnormalities. IMPRESSION:  NO ACUTE [facility] CHANGES    Inpatient Rehabilitation Course:   Gabi David was admitted to inpatient rehabilitation on 7/3/2019. Rehab course:  -Steady progress, though continued with rehab needs and family unable to provide need, transfer to skilled nursing facility  -Continue Antivert and eye patch for double vision      Discharge status:  good    Discharge Dispostiion:   [address]      The patient participated in an aggressive multidisciplinary inpatient rehabilitation program involving 3 hours per day, 5 days per week of rehabilitation. Patient benefited from inpatient rehab and was discharged in good and stable condition.     Consults:   IM      Significant Diagnostics:   CBC:   Lab Results   Component Value Date    WBC 4.2 07/28/2019    RBC 4.08 07/28/2019    HGB 12.0 07/28/2019    HCT 36.3 07/28/2019    MCV 89.0 07/28/2019    MCH 29.4 07/28/2019    MCHC 33.0 07/28/2019    RDW 15.1 07/28/2019     07/28/2019    MPV 7.8 07/28/2019     BMP:    Lab Results   Component Value Date     07/29/2019    K 4.6 07/29/2019    CL 99 07/29/2019    CO2 28 07/29/2019    BUN 9 07/29/2019    LABALBU 4.1 07/11/2019    CREATININE 0.54 07/29/2019    CALCIUM 9.8 07/29/2019    GFRAA >60 07/29/2019    LABGLOM >60 07/29/2019    GLUCOSE 106 07/29/2019       Discharge Functional Status:    Physical therapy:  Bed Mobility: Rolling: Stand by assistance  Supine to Sit: Stand by assistance  Sit to Supine: Stand by assistance  Scooting: Stand by assistance  Transfers: Sit to Stand: Contact guard assistance  Stand to sit: Contact guard assistance  Bed to Chair: Minimal assistance  Squat Pivot Transfers: Minimal Assistance, Ambulation 1  Surface: level tile  Device: Rolling Walker  Other Apparatus: (eye patch)  Assistance: Contact guard assistance, Minimal assistance(Pt requires more assistance w/fatigue becomes more off bal)  Quality of Gait: Increased step length, fast alyssa, slight forward trunk flexion. Pt did have one moderate LOB toward R side but able to correct with Min A from therapist's help. Gait Deviations: Deviated path, Staggers  Distance: 200ft  Comments: VC's for pacing himself and to stay under control to prevent falls. Demos good technique with vcing, Stairs  # Steps : 12  Stairs Height: (6\" and 4\")  Rails: Bilateral  Device: No Device  Assistance: Contact guard assistance  Comment: pt demos safe step-to sequencing on steps with controlled decent. No LOB with turns. Mobility: Bed, Chair, Wheel Chair: 4 - Requires steadying assistance only <25% assist  and/or requires assist with one leg only  Walk: 4 - Contact Guard/Minimal Assistance Requires up to Contact Guard or Minimal Assistance to walk at least 150 feet  Distance Walked: 200ft  Wheel Chair: 6 - Modified [place] Operates wheelchair at least 150 feet with an ambulatory device, orthosis or prosthesis OR requires extra amount of time OR there is concern for safety  Distance Traveled in Wheel Chair: 200ft  Stairs: 4- Minimal Contact Assistance Perfoms 75% or more of the effort to go up and down one flight of stairs, PT Equipment Recommendations  Equipment Needed: No  Other: SNF will provide if needed., Assessment: Ongoing balance and coordination issues due to CVA - focus of today's session. Patient impulsive with decreased safety awareness. Requiring cues to slow down for good technique.       Occupational therapy: Eatin - Feeds self with adaptive equipment/dentures amLODIPine (NORVASC) 5 MG tablet  Take 1 tablet by mouth daily             aspirin 81 MG chewable tablet  Take 1 tablet by mouth daily             atorvastatin (LIPITOR) 80 MG tablet  Take 1 tablet by mouth nightly             calcium carbonate (TUMS) 500 MG chewable tablet  Take 1 tablet by mouth 3 times daily as needed for Heartburn             clopidogrel (PLAVIX) 75 MG tablet  Take 1 tablet by mouth daily             colchicine (COLCRYS) 0.6 MG tablet  Take 1 tablet by mouth 2 times daily             diclofenac sodium 1 % GEL  Apply 4 g topically 3 times daily             gabapentin (NEURONTIN) 400 MG capsule  Take 1 capsule by mouth 3 times daily for 30 days. Heparin Sodium, Porcine, (HEPARIN, PORCINE,) 5000 UNIT/ML injection  Inject 1 mL into the skin every 8 hours             lidocaine 4 % external patch  Place 1 patch onto the skin daily             meclizine (ANTIVERT) 25 MG tablet  Take 1 tablet by mouth 3 times daily for 10 days             methocarbamol (ROBAXIN) 500 MG tablet  Take 1 tablet by mouth 4 times daily as needed (muscle spasm)             nitroGLYCERIN (NITROSTAT) 0.4 MG SL tablet  up to max of 3 total doses. If no relief after 1 dose, call 911. ondansetron (ZOFRAN) 4 MG tablet  Take 1 tablet by mouth every 8 hours as needed for Nausea or Vomiting             pantoprazole (PROTONIX) 20 MG tablet  Take 20 mg by mouth daily             polyethylene glycol (GLYCOLAX) packet  Take 17 g by mouth daily             sodium chloride (OCEAN, BABY AYR) 0.65 % nasal spray  1 spray by Nasal route every 2 hours as needed for Congestion                 Shemar Fay MD

## 2019-08-13 ENCOUNTER — CARE COORDINATION (OUTPATIENT)
Dept: CASE MANAGEMENT | Age: 74
End: 2019-08-13

## 2019-08-16 ENCOUNTER — CARE COORDINATION (OUTPATIENT)
Dept: CASE MANAGEMENT | Age: 74
End: 2019-08-16

## 2019-08-19 ENCOUNTER — CARE COORDINATION (OUTPATIENT)
Dept: CASE MANAGEMENT | Age: 74
End: 2019-08-19

## 2019-08-20 ENCOUNTER — CARE COORDINATION (OUTPATIENT)
Dept: CASE MANAGEMENT | Age: 74
End: 2019-08-20

## 2019-08-20 NOTE — CARE COORDINATION
[facility] Transitions Initial Follow Up Call    Outreach made within 2 business days of discharge: Yes    Patient: Jostin Livingston Patient : 1945   MRN: [id number]  Reason for Admission: Hemorrhagic CVA  Discharge Date: 19       Spoke with: Melinda Hearn    Discharge department/facility: [facility] Patient Contact:  Was patient able to fill all prescriptions: Yes  Was patient instructed to bring all medications to the follow-up visit: Yes  Is patient taking all medications as directed in the discharge summary? Yes  Does patient understand their discharge instructions: Yes  Does patient have questions or concerns that need addressed prior to 7-14 day follow up office visit: yes - Patient and spouse reported confusion to writer, stating they do not understand why his Plavix was stopped at the facility? Writer informed spouse that this person is not an MD and would not be able to answer that question. Spouse verbalized understanding. Encouraged patient/family to follow-up/call cardiology and or PCP regarding medication. Spouse verbalized agreement with suggestion. Message routed to PCP with request to follow-up with patient/family regarding medication. Confirmed upcoming appointment with primary on 2019. LM for Lacie at [facility] to follow-up on medication confusion reported by patient/family. Notification to CTN, RN regarding patient/family reported questions/concerns completed.       cc  MD Won Og, ALBERT, CTN    Scheduled appointment with PCP within 7-14 days    Follow Up  Future Appointments   Date Time Provider Nay Arreola   2019 10:45 AM Reji Vyas MD [facility]  N Suhail Oconnor MA

## 2019-08-20 NOTE — CARE COORDINATION
[facility]    Outreach made within 2 business days of discharge: Yes    Patient: Arnie Aleman Patient : 1945   MRN: [id number]  Reason for Admission: Hemorrhagic CVA  Discharge Date: 19      Discharge department/facility: [facility] on 2019.    1st attempt:  Attempt to reach patient for Care Transitions. formerly [facility] requesting return call. Contact information provided. Outreach to [facility] at [phone] to verify start of care for patient. Spoke with Anthony Gibson reports that services have not started at this time due to not having a physician to sign orders. Confirmed with office that patient is scheduled for a hospital follow-up on 2019 at 10:45 AM. Shila reports once orders are signed, agency will initiate to begin start of care. Additional call received from  at [facility]. Janay Snow reports patient received his walker prior to discharge. She reports his medications were called into [address] [place] [address]. Per Janay Snow, patient was seen by Neurophthalmology on 2019 for vision/balance. Outgoing call to Janay Snow at [facility]. LM on secure VM with request to fax medication list and discharge summary to Dr. April Whitney at [phone]. Contact information provided should facility/ department have additional questions/concerns. cc  Isabel Keith RN, Janay Dietz MD        Scheduled appointment with PCP within 7-14 days- Yes    Care Team updated.      Follow Up  Future Appointments   Date Time Provider Nay Arreola   2019 10:45 AM Esmer Atkins MD [facility]  N Suhail Oconnor MA

## 2019-08-21 ENCOUNTER — CARE COORDINATION (OUTPATIENT)
Dept: CASE MANAGEMENT | Age: 74
End: 2019-08-21

## 2019-08-21 NOTE — CARE COORDINATION
Incoming call received from Margarita Child at [facility] regarding patient. Informed per Margarita Child, Plavix 75 MG, was not stopped at the facility - just not called in due to family reporting they had the medication at home. Margarita Child reported to writer facility will call in the Plavix as a precaution for patient. Writer verbalized understanding. [facility] reports Comfort[employer] Rao is running into difficulty attempting to send records to Dr. Cyndee Alarcon for Tami Rosario' appointment tomorrow. Copies sent to writer to upload into media for provider/patient. [facility] reports poor copy quality. Writer verbalized understanding. Additional report, per , Dr. Mason Ponce (PM&R) would like to continue seeing Tami Rosario within the community. [facility] reports Dr. Ching Ledezma office will be reaching out to the patient. Writer verbalized understanding. Media received an uploaded into patient's chart. Message left for patient/family regarding medication being called into [facility] in [place].

## 2019-08-22 ENCOUNTER — OFFICE VISIT (OUTPATIENT)
Dept: FAMILY MEDICINE CLINIC | Age: 74
End: 2019-08-22
Payer: MEDICARE

## 2019-08-22 VITALS
HEIGHT: 70 IN | DIASTOLIC BLOOD PRESSURE: 82 MMHG | BODY MASS INDEX: 36.51 KG/M2 | HEART RATE: 72 BPM | WEIGHT: 255 LBS | SYSTOLIC BLOOD PRESSURE: 134 MMHG | OXYGEN SATURATION: 97 %

## 2019-08-22 DIAGNOSIS — I25.10 CORONARY ARTERY DISEASE INVOLVING NATIVE CORONARY ARTERY OF NATIVE HEART WITHOUT ANGINA PECTORIS: ICD-10-CM

## 2019-08-22 DIAGNOSIS — I62.9 SPONTANEOUS INTRACRANIAL HEMORRHAGE (HCC): ICD-10-CM

## 2019-08-22 DIAGNOSIS — F51.01 PRIMARY INSOMNIA: ICD-10-CM

## 2019-08-22 DIAGNOSIS — R26.89 BALANCE PROBLEM: ICD-10-CM

## 2019-08-22 DIAGNOSIS — I61.9 STROKE DUE TO INTRACEREBRAL HEMORRHAGE (HCC): ICD-10-CM

## 2019-08-22 DIAGNOSIS — R73.01 IMPAIRED FASTING GLUCOSE: ICD-10-CM

## 2019-08-22 DIAGNOSIS — M17.0 PRIMARY OSTEOARTHRITIS OF BOTH KNEES: ICD-10-CM

## 2019-08-22 DIAGNOSIS — Z98.890 H/O CAROTID ENDARTERECTOMY: ICD-10-CM

## 2019-08-22 DIAGNOSIS — Z96.652 STATUS POST TOTAL LEFT KNEE REPLACEMENT: ICD-10-CM

## 2019-08-22 DIAGNOSIS — I10 ESSENTIAL HYPERTENSION: Primary | ICD-10-CM

## 2019-08-22 DIAGNOSIS — I65.22 CAROTID STENOSIS, LEFT: ICD-10-CM

## 2019-08-22 DIAGNOSIS — E04.1 THYROID NODULE: ICD-10-CM

## 2019-08-22 DIAGNOSIS — I42.8 OTHER CARDIOMYOPATHY (HCC): ICD-10-CM

## 2019-08-22 PROCEDURE — 99214 OFFICE O/P EST MOD 30 MIN: CPT | Performed by: FAMILY MEDICINE

## 2019-08-22 PROCEDURE — 4040F PNEUMOC VAC/ADMIN/RCVD: CPT | Performed by: FAMILY MEDICINE

## 2019-08-22 PROCEDURE — 1111F DSCHRG MED/CURRENT MED MERGE: CPT | Performed by: FAMILY MEDICINE

## 2019-08-22 PROCEDURE — 1036F TOBACCO NON-USER: CPT | Performed by: FAMILY MEDICINE

## 2019-08-22 PROCEDURE — G8510 SCR DEP NEG, NO PLAN REQD: HCPCS | Performed by: FAMILY MEDICINE

## 2019-08-22 PROCEDURE — 1123F ACP DISCUSS/DSCN MKR DOCD: CPT | Performed by: FAMILY MEDICINE

## 2019-08-22 PROCEDURE — G8417 CALC BMI ABV UP PARAM F/U: HCPCS | Performed by: FAMILY MEDICINE

## 2019-08-22 PROCEDURE — 3017F COLORECTAL CA SCREEN DOC REV: CPT | Performed by: FAMILY MEDICINE

## 2019-08-22 PROCEDURE — G8427 DOCREV CUR MEDS BY ELIG CLIN: HCPCS | Performed by: FAMILY MEDICINE

## 2019-08-22 PROCEDURE — G8598 ASA/ANTIPLAT THER USED: HCPCS | Performed by: FAMILY MEDICINE

## 2019-08-22 PROCEDURE — 3288F FALL RISK ASSESSMENT DOCD: CPT | Performed by: FAMILY MEDICINE

## 2019-08-22 ASSESSMENT — ENCOUNTER SYMPTOMS
BACK PAIN: 0
WHEEZING: 0
CHEST TIGHTNESS: 0
NAUSEA: 0
EYE PAIN: 0
FACIAL SWELLING: 0
ABDOMINAL PAIN: 0
SHORTNESS OF BREATH: 1
SORE THROAT: 0
TROUBLE SWALLOWING: 0
SINUS PRESSURE: 0
PHOTOPHOBIA: 0
VOMITING: 0
DIARRHEA: 0
ABDOMINAL DISTENTION: 0
EYE DISCHARGE: 0
CONSTIPATION: 0
APNEA: 0
COLOR CHANGE: 0
ANAL BLEEDING: 0

## 2019-08-22 ASSESSMENT — PATIENT HEALTH QUESTIONNAIRE - PHQ9
SUM OF ALL RESPONSES TO PHQ9 QUESTIONS 1 & 2: 0
SUM OF ALL RESPONSES TO PHQ QUESTIONS 1-9: 0
SUM OF ALL RESPONSES TO PHQ QUESTIONS 1-9: 0
1. LITTLE INTEREST OR PLEASURE IN DOING THINGS: 0
2. FEELING DOWN, DEPRESSED OR HOPELESS: 0

## 2019-08-22 NOTE — PROGRESS NOTES
 Diabetes mellitus ([facility] [address]     borderline patient off metformin    GERD (gastroesophageal reflux disease)     Hyperlipidemia     Hypertension     Osteoarthritis       Past Surgical History:   Procedure Laterality Date    CAROTID ENDARTERECTOMY Left 02/02/16    COLONOSCOPY      JOINT REPLACEMENT Left     knee    SHOULDER SURGERY Right     collar bone    TONSILLECTOMY AND ADENOIDECTOMY         Family History   Problem Relation Age of Onset    Cancer Mother     Cancer Father        Social History     Tobacco Use    Smoking status: Never Smoker    Smokeless tobacco: Never Used   Substance Use Topics    Alcohol use: Yes     Comment: few beers with dinner      Current Outpatient Medications   Medication Sig Dispense Refill    gabapentin (NEURONTIN) 400 MG capsule Take 1 capsule by mouth 3 times daily for 30 days. 90 capsule 0    diclofenac sodium 1 % GEL Apply 4 g topically 3 times daily 1 Tube 3    lidocaine 4 % external patch Place 1 patch onto the skin daily      aspirin 81 MG chewable tablet Take 1 tablet by mouth daily 30 tablet 3    aluminum & magnesium hydroxide-simethicone (MAALOX) 200-200-20 MG/5ML SUSP suspension Take 30 mLs by mouth every 6 hours as needed for Indigestion 1 Bottle 0    calcium carbonate (TUMS) 500 MG chewable tablet Take 1 tablet by mouth 3 times daily as needed for Heartburn (Patient not taking: Reported on 8/22/2019)      nitroGLYCERIN (NITROSTAT) 0.4 MG SL tablet up to max of 3 total doses.  If no relief after 1 dose, call 911. 25 tablet 3    Heparin Sodium, Porcine, (HEPARIN, PORCINE,) 5000 UNIT/ML injection Inject 1 mL into the skin every 8 hours      ondansetron (ZOFRAN) 4 MG tablet Take 1 tablet by mouth every 8 hours as needed for Nausea or Vomiting (Patient not taking: Reported on 8/22/2019)      atorvastatin (LIPITOR) 80 MG tablet Take 1 tablet by mouth nightly 30 tablet 3    amLODIPine (NORVASC) 5 MG tablet Take 1 tablet by mouth daily (Patient not taking: Reported on 8/22/2019) 30 tablet 3    sodium chloride (OCEAN, BABY AYR) 0.65 % nasal spray 1 spray by Nasal route every 2 hours as needed for Congestion  0    colchicine (COLCRYS) 0.6 MG tablet Take 1 tablet by mouth 2 times daily (Patient not taking: Reported on 8/22/2019) 30 tablet 3    polyethylene glycol (GLYCOLAX) packet Take 17 g by mouth daily (Patient not taking: Reported on 8/22/2019) 527 g 1    clopidogrel (PLAVIX) 75 MG tablet Take 1 tablet by mouth daily (Patient not taking: Reported on 8/22/2019) 30 tablet 3    pantoprazole (PROTONIX) 20 MG tablet Take 20 mg by mouth daily       No current facility-administered medications for this visit. No Known Allergies    Health Maintenance   Topic Date Due    DTaP/Tdap/Td vaccine (1 - Tdap) 08/20/1963    Shingles Vaccine (1 of 2) 08/20/1994    Colon cancer screen colonoscopy  08/20/1994    Annual Wellness Visit (AWV)  08/20/2007    Pneumococcal 65+ years Vaccine (2 of 2 - PPSV23) 06/11/2019    A1C test (Diabetic or Prediabetic)  07/27/2019    Flu vaccine (1) 09/01/2019    Lipid screen  07/27/2023       Subjective:      Review of Systems   Constitutional: Negative for fatigue, fever and unexpected weight change. HENT: Negative for congestion, ear discharge, facial swelling, sinus pressure, sore throat and trouble swallowing. Eyes: Negative for photophobia, pain and discharge. Respiratory: Positive for shortness of breath. Negative for apnea, chest tightness and wheezing. Cardiovascular: Negative for chest pain, palpitations and leg swelling. Gastrointestinal: Negative for abdominal distention, abdominal pain, anal bleeding, constipation, diarrhea, nausea and vomiting. Endocrine: Negative for cold intolerance, heat intolerance, polydipsia, polyphagia and polyuria. Genitourinary: Negative for difficulty urinating, flank pain, frequency and hematuria. Musculoskeletal: Positive for arthralgias and gait problem.  Negative for back pain replacement     9. Primary osteoarthritis of both knees     10. Stroke due to intracerebral hemorrhage ([facility] [address] [facility]     11. Balance problem     12. Spontaneous intracranial hemorrhage ([facility] [address] [facility]     13. Coronary artery disease involving native coronary artery of native heart without angina pectoris                   Plan:    Post intracranial bleeding pt has been on Asa and Plavix  Cardiology will decide ASAP about antiplatelet therapy  Continue Rehab/Phys Therapy  Fall precautions  Otherwise the current medical regimen is effective;  continue present plan and medications. Return in about 1 month (around 9/22/2019) for follow up. No orders of the defined types were placed in this encounter. Patient given educational materials - see patient instructions. Discussed use, benefit,and side effects of prescribed medications. All patient questions answered. Pt voiced understanding. Reviewed health maintenance. Instructed to continue current medications, diet and exercise. Patient agreed withtreatment plan. Follow up as directed. Electronically signed by Peter Godoy MD on 8/22/2019 at 6:53 PMVisit Information    Have you changed or started any medications since your last visit including any over-the-counter medicines, vitamins, or herbal medicines? no   Are you having any side effects from any of your medications? -  no  Have you stopped taking any of your medications? Is so, why? -  no    Have you seen any other physician or provider since your last visit? No  Have you had any other diagnostic tests since your last visit? No  Have you been seen in the emergency room and/or had an admission to a hospital since we last saw you? No  Have you had your routine dental cleaning in the past 6 months? no    Have you activated your [employer] account? If not, what are your barriers?  No:      Patient Care Team:  Peter Godoy MD as PCP - General (Family Medicine)  Peter Godoy MD as PCP -

## 2019-08-24 ENCOUNTER — CARE COORDINATION (OUTPATIENT)
Dept: CASE MANAGEMENT | Age: 74
End: 2019-08-24

## 2019-08-24 NOTE — CARE COORDINATION
[facility] Transitions Follow Up Call    2019    Patient: Evgeny Kulkarni  Patient : 1945   MRN: [id number]  Reason for Admission:  [address]  Discharge Date: 19 RARS: Readmission Risk Score: 16         Spoke with: [facility] Transitions Subsequent and Final Call    Subsequent and Final Calls  Are you currently active with any services?:  Home Health  Care Transitions Interventions  Other Interventions:        Reports dizziness, light headedness. Wayne Amber  States physician aware. Instructed to stand slowly. States that his wife uses a gate belt and that she is with him when he stands. Physician is aware of reported symptoms. Medications reviewed at physician follow up appointment. [address] following. Care Transition Episode Closed. CMRS:  6  Case sent to [facility]  to review for continued Case Management need. Follow Up  No future appointments.     Rebel Flores RN

## 2019-09-07 ENCOUNTER — HOSPITAL ENCOUNTER (OUTPATIENT)
Dept: CT IMAGING | Age: 74
Discharge: HOME | End: 2019-09-09
Payer: MEDICARE

## 2019-09-07 DIAGNOSIS — I62.9 INTRACRANIAL HEMORRHAGE (HCC): ICD-10-CM

## 2019-09-07 PROCEDURE — 70450 CT HEAD/BRAIN W/O DYE: CPT

## 2019-10-15 ENCOUNTER — OFFICE VISIT (OUTPATIENT)
Dept: FAMILY MEDICINE CLINIC | Age: 74
End: 2019-10-15
Payer: MEDICARE

## 2019-10-15 VITALS
WEIGHT: 263 LBS | OXYGEN SATURATION: 95 % | HEIGHT: 70 IN | SYSTOLIC BLOOD PRESSURE: 132 MMHG | DIASTOLIC BLOOD PRESSURE: 82 MMHG | HEART RATE: 93 BPM | BODY MASS INDEX: 37.65 KG/M2

## 2019-10-15 DIAGNOSIS — I61.9 STROKE DUE TO INTRACEREBRAL HEMORRHAGE (HCC): ICD-10-CM

## 2019-10-15 DIAGNOSIS — Z98.890 H/O CAROTID ENDARTERECTOMY: ICD-10-CM

## 2019-10-15 DIAGNOSIS — I69.193 ATAXIA FOLLOWING NONTRAUMATIC INTRACEREBRAL HEMORRHAGE: ICD-10-CM

## 2019-10-15 DIAGNOSIS — I10 ESSENTIAL HYPERTENSION: Primary | ICD-10-CM

## 2019-10-15 DIAGNOSIS — I25.10 CORONARY ARTERY DISEASE INVOLVING NATIVE CORONARY ARTERY OF NATIVE HEART WITHOUT ANGINA PECTORIS: ICD-10-CM

## 2019-10-15 DIAGNOSIS — E66.9 OBESITY (BMI 30-39.9): ICD-10-CM

## 2019-10-15 DIAGNOSIS — M19.012 GLENOHUMERAL ARTHRITIS, LEFT: ICD-10-CM

## 2019-10-15 DIAGNOSIS — Z98.890 S/P CRANIOTOMY: ICD-10-CM

## 2019-10-15 DIAGNOSIS — R26.89 BALANCE PROBLEM: ICD-10-CM

## 2019-10-15 DIAGNOSIS — I65.22 CAROTID STENOSIS, LEFT: ICD-10-CM

## 2019-10-15 DIAGNOSIS — M19.012 ARTHROSIS OF LEFT ACROMIOCLAVICULAR JOINT: ICD-10-CM

## 2019-10-15 DIAGNOSIS — M75.42 IMPINGEMENT SYNDROME OF LEFT SHOULDER: ICD-10-CM

## 2019-10-15 DIAGNOSIS — I62.9 SPONTANEOUS INTRACRANIAL HEMORRHAGE (HCC): ICD-10-CM

## 2019-10-15 DIAGNOSIS — R73.01 IMPAIRED FASTING GLUCOSE: ICD-10-CM

## 2019-10-15 DIAGNOSIS — M75.02 ADHESIVE CAPSULITIS OF LEFT SHOULDER: ICD-10-CM

## 2019-10-15 DIAGNOSIS — I42.8 OTHER CARDIOMYOPATHY (HCC): ICD-10-CM

## 2019-10-15 DIAGNOSIS — M17.0 PRIMARY OSTEOARTHRITIS OF BOTH KNEES: ICD-10-CM

## 2019-10-15 DIAGNOSIS — F51.01 PRIMARY INSOMNIA: ICD-10-CM

## 2019-10-15 DIAGNOSIS — M12.812 ROTATOR CUFF ARTHROPATHY, LEFT: ICD-10-CM

## 2019-10-15 DIAGNOSIS — Z96.652 STATUS POST TOTAL LEFT KNEE REPLACEMENT: ICD-10-CM

## 2019-10-15 DIAGNOSIS — E04.1 THYROID NODULE: ICD-10-CM

## 2019-10-15 PROCEDURE — 3017F COLORECTAL CA SCREEN DOC REV: CPT | Performed by: FAMILY MEDICINE

## 2019-10-15 PROCEDURE — 1123F ACP DISCUSS/DSCN MKR DOCD: CPT | Performed by: FAMILY MEDICINE

## 2019-10-15 PROCEDURE — G8484 FLU IMMUNIZE NO ADMIN: HCPCS | Performed by: FAMILY MEDICINE

## 2019-10-15 PROCEDURE — 1036F TOBACCO NON-USER: CPT | Performed by: FAMILY MEDICINE

## 2019-10-15 PROCEDURE — G8598 ASA/ANTIPLAT THER USED: HCPCS | Performed by: FAMILY MEDICINE

## 2019-10-15 PROCEDURE — G8427 DOCREV CUR MEDS BY ELIG CLIN: HCPCS | Performed by: FAMILY MEDICINE

## 2019-10-15 PROCEDURE — 4040F PNEUMOC VAC/ADMIN/RCVD: CPT | Performed by: FAMILY MEDICINE

## 2019-10-15 PROCEDURE — 20610 DRAIN/INJ JOINT/BURSA W/O US: CPT | Performed by: FAMILY MEDICINE

## 2019-10-15 PROCEDURE — G8417 CALC BMI ABV UP PARAM F/U: HCPCS | Performed by: FAMILY MEDICINE

## 2019-10-15 PROCEDURE — 99214 OFFICE O/P EST MOD 30 MIN: CPT | Performed by: FAMILY MEDICINE

## 2019-10-15 RX ORDER — GABAPENTIN 400 MG/1
400 CAPSULE ORAL 3 TIMES DAILY
Qty: 90 CAPSULE | Refills: 0 | Status: SHIPPED | OUTPATIENT
Start: 2019-10-15 | End: 2022-09-02 | Stop reason: ALTCHOICE

## 2019-10-15 RX ORDER — METHYLPREDNISOLONE ACETATE 40 MG/ML
40 INJECTION, SUSPENSION INTRA-ARTICULAR; INTRALESIONAL; INTRAMUSCULAR; SOFT TISSUE ONCE
Status: COMPLETED | OUTPATIENT
Start: 2019-10-15 | End: 2019-10-15

## 2019-10-15 RX ADMIN — METHYLPREDNISOLONE ACETATE 40 MG: 40 INJECTION, SUSPENSION INTRA-ARTICULAR; INTRALESIONAL; INTRAMUSCULAR; SOFT TISSUE at 09:54

## 2019-10-15 ASSESSMENT — ENCOUNTER SYMPTOMS
PHOTOPHOBIA: 0
ABDOMINAL DISTENTION: 0
ABDOMINAL PAIN: 0
APNEA: 0
SHORTNESS OF BREATH: 0
ANAL BLEEDING: 0
CONSTIPATION: 0
EYE DISCHARGE: 0
COLOR CHANGE: 0
EYE PAIN: 0
NAUSEA: 0
SORE THROAT: 0
WHEEZING: 0
BACK PAIN: 0
CHEST TIGHTNESS: 0
FACIAL SWELLING: 0
VOMITING: 0
TROUBLE SWALLOWING: 0
DIARRHEA: 0
SINUS PRESSURE: 0

## 2019-10-22 ENCOUNTER — HOSPITAL ENCOUNTER (EMERGENCY)
Age: 74
Discharge: HOME | End: 2019-10-22
Attending: EMERGENCY MEDICINE
Payer: MEDICARE

## 2019-10-22 ENCOUNTER — APPOINTMENT (OUTPATIENT)
Dept: GENERAL RADIOLOGY | Age: 74
End: 2019-10-22
Payer: MEDICARE

## 2019-10-22 VITALS
SYSTOLIC BLOOD PRESSURE: 143 MMHG | OXYGEN SATURATION: 97 % | DIASTOLIC BLOOD PRESSURE: 93 MMHG | BODY MASS INDEX: 35.79 KG/M2 | HEART RATE: 78 BPM | HEIGHT: 70 IN | TEMPERATURE: 98.4 F | RESPIRATION RATE: 18 BRPM | WEIGHT: 250 LBS

## 2019-10-22 DIAGNOSIS — S46.912A STRAIN OF LEFT SHOULDER, INITIAL ENCOUNTER: Primary | ICD-10-CM

## 2019-10-22 PROCEDURE — 73030 X-RAY EXAM OF SHOULDER: CPT

## 2019-10-22 PROCEDURE — 73060 X-RAY EXAM OF HUMERUS: CPT

## 2019-10-22 PROCEDURE — 6370000000 HC RX 637 (ALT 250 FOR IP): Performed by: PHYSICIAN ASSISTANT

## 2019-10-22 PROCEDURE — 99283 EMERGENCY DEPT VISIT LOW MDM: CPT

## 2019-10-22 RX ORDER — ACETAMINOPHEN 325 MG/1
650 TABLET ORAL ONCE
Status: COMPLETED | OUTPATIENT
Start: 2019-10-22 | End: 2019-10-22

## 2019-10-22 RX ORDER — IBUPROFEN 200 MG
400 TABLET ORAL ONCE
Status: COMPLETED | OUTPATIENT
Start: 2019-10-22 | End: 2019-10-22

## 2019-10-22 RX ADMIN — ACETAMINOPHEN 650 MG: 325 TABLET, FILM COATED ORAL at 17:01

## 2019-10-22 RX ADMIN — IBUPROFEN 400 MG: 200 TABLET, FILM COATED ORAL at 17:01

## 2019-10-22 ASSESSMENT — PAIN DESCRIPTION - DESCRIPTORS: DESCRIPTORS: SHOOTING

## 2019-10-22 ASSESSMENT — PAIN DESCRIPTION - LOCATION: LOCATION: SHOULDER

## 2019-10-22 ASSESSMENT — PAIN DESCRIPTION - ORIENTATION: ORIENTATION: LEFT

## 2019-10-22 ASSESSMENT — PAIN SCALES - GENERAL
PAINLEVEL_OUTOF10: 5
PAINLEVEL_OUTOF10: 4

## 2020-04-16 ENCOUNTER — APPOINTMENT (OUTPATIENT)
Dept: GENERAL RADIOLOGY | Age: 75
End: 2020-04-16
Payer: MEDICARE

## 2020-04-16 ENCOUNTER — HOSPITAL ENCOUNTER (EMERGENCY)
Age: 75
Discharge: HOME | End: 2020-04-16
Attending: EMERGENCY MEDICINE
Payer: MEDICARE

## 2020-04-16 VITALS
HEIGHT: 70 IN | HEART RATE: 80 BPM | BODY MASS INDEX: 37.22 KG/M2 | TEMPERATURE: 98 F | RESPIRATION RATE: 18 BRPM | WEIGHT: 260 LBS | DIASTOLIC BLOOD PRESSURE: 88 MMHG | OXYGEN SATURATION: 99 % | SYSTOLIC BLOOD PRESSURE: 164 MMHG

## 2020-04-16 PROCEDURE — 99283 EMERGENCY DEPT VISIT LOW MDM: CPT

## 2020-04-16 PROCEDURE — 73562 X-RAY EXAM OF KNEE 3: CPT

## 2020-04-16 PROCEDURE — 6370000000 HC RX 637 (ALT 250 FOR IP): Performed by: EMERGENCY MEDICINE

## 2020-04-16 RX ORDER — HYDROCODONE BITARTRATE AND ACETAMINOPHEN 5; 325 MG/1; MG/1
1 TABLET ORAL ONCE
Status: COMPLETED | OUTPATIENT
Start: 2020-04-16 | End: 2020-04-16

## 2020-04-16 RX ADMIN — HYDROCODONE BITARTRATE AND ACETAMINOPHEN 1 TABLET: 5; 325 TABLET ORAL at 11:11

## 2020-04-16 ASSESSMENT — ENCOUNTER SYMPTOMS
RHINORRHEA: 0
CHEST TIGHTNESS: 0
BACK PAIN: 0
SINUS PRESSURE: 0
COLOR CHANGE: 0
CONSTIPATION: 0
VOMITING: 0
WHEEZING: 0
DIARRHEA: 0
SHORTNESS OF BREATH: 0
BLOOD IN STOOL: 0
COUGH: 0
NAUSEA: 0
FACIAL SWELLING: 0
EYE PAIN: 0
EYE DISCHARGE: 0
EYE REDNESS: 0
ABDOMINAL PAIN: 0
TROUBLE SWALLOWING: 0
SORE THROAT: 0

## 2020-04-16 ASSESSMENT — PAIN DESCRIPTION - LOCATION: LOCATION: KNEE

## 2020-04-16 ASSESSMENT — PAIN SCALES - GENERAL
PAINLEVEL_OUTOF10: 8
PAINLEVEL_OUTOF10: 8

## 2020-04-16 ASSESSMENT — PAIN DESCRIPTION - ORIENTATION: ORIENTATION: RIGHT

## 2020-04-16 ASSESSMENT — PAIN DESCRIPTION - PAIN TYPE: TYPE: ACUTE PAIN

## 2020-04-16 NOTE — ED PROVIDER NOTES
[address]  eMERGENCY dEPARTMENT eNCOUnter      Pt Name: Augusto Salinas  MRN: [id number]  [place] 1945  Date of evaluation: 4/16/20      CHIEF COMPLAINT       Chief Complaint   Patient presents with    Fall    Knee Pain         HISTORY OF PRESENT ILLNESS    Augusto Salinas is a 75 y.o. male who presents complaining of knee injury    Location/Symptom: Fall with pain to the right knee  Timing/Onset: This morning  Context/Setting: Patient got up without his walker lost his balance and fell straight down onto the right knee  Quality: Aching  Duration: Constant  Modifying Factors: Unable to put weight on it  Severity: Moderate      REVIEW OF SYSTEMS       Review of Systems   Constitutional: Negative for activity change, appetite change, chills, diaphoresis and fever. HENT: Negative for congestion, ear pain, facial swelling, nosebleeds, rhinorrhea, sinus pressure, sore throat and trouble swallowing. Eyes: Negative for pain, discharge and redness. Respiratory: Negative for cough, chest tightness, shortness of breath and wheezing. Cardiovascular: Negative for chest pain, palpitations and leg swelling. Gastrointestinal: Negative for abdominal pain, blood in stool, constipation, diarrhea, nausea and vomiting. Genitourinary: Negative for difficulty urinating, dysuria, flank pain, frequency, genital sores and hematuria. Musculoskeletal: Negative for arthralgias, back pain, gait problem, joint swelling, myalgias and neck pain. Fall with injury to the right knee   Skin: Positive for wound. Negative for color change, pallor and rash. Neurological: Negative for dizziness, tremors, seizures, syncope, speech difficulty, weakness, numbness and headaches. Psychiatric/Behavioral: Negative for confusion, decreased concentration, hallucinations, self-injury, sleep disturbance and suicidal ideas.        PAST MEDICAL HISTORY     Past Medical History:   Diagnosis Date    Anxiety     Bronchitis with asthma, acute 11/24/2015    Carotid stenosis, left 2/2/2016    Diabetes mellitus ([facility]     borderline patient off metformin    GERD (gastroesophageal reflux disease)     [facility]/2019    Hyperlipidemia     Hypertension     Osteoarthritis     Stroke, hemorrhagic ([facility] 08/18/2019       SURGICAL HISTORY       Past Surgical History:   Procedure Laterality Date    CAROTID ENDARTERECTOMY Left 02/02/16    COLONOSCOPY      CORONARY ANGIOPLASTY WITH STENT PLACEMENT      JOINT REPLACEMENT Left     knee    SHOULDER SURGERY Right     collar bone    TONSILLECTOMY AND ADENOIDECTOMY         CURRENT MEDICATIONS       Current Discharge Medication List      CONTINUE these medications which have NOT CHANGED    Details   gabapentin (NEURONTIN) 400 MG capsule Take 1 capsule by mouth 3 times daily for 30 days. Qty: 90 capsule, Refills: 0      aspirin 81 MG chewable tablet Take 1 tablet by mouth daily  Qty: 30 tablet, Refills: 3      aluminum & magnesium hydroxide-simethicone (MAALOX) 200-200-20 MG/5ML SUSP suspension Take 30 mLs by mouth every 6 hours as needed for Indigestion  Qty: 1 Bottle, Refills: 0      nitroGLYCERIN (NITROSTAT) 0.4 MG SL tablet up to max of 3 total doses. If no relief after 1 dose, call 911.   Qty: 25 tablet, Refills: 3      Heparin Sodium, Porcine, (HEPARIN, PORCINE,) 5000 UNIT/ML injection Inject 1 mL into the skin every 8 hours      ondansetron (ZOFRAN) 4 MG tablet Take 1 tablet by mouth every 8 hours as needed for Nausea or Vomiting      atorvastatin (LIPITOR) 80 MG tablet Take 1 tablet by mouth nightly  Qty: 30 tablet, Refills: 3      amLODIPine (NORVASC) 5 MG tablet Take 1 tablet by mouth daily  Qty: 30 tablet, Refills: 3      sodium chloride (OCEAN, BABY AYR) 0.65 % nasal spray 1 spray by Nasal route every 2 hours as needed for Congestion  Refills: 0      diclofenac sodium 1 % GEL Apply 4 g topically 3 times daily  Qty: 1 Tube, Refills: 3      lidocaine 4 % external patch Place DIAGNOSTIC RESULTS     RADIOLOGY:All plain film, CT,MRI, and formal ultrasound images (except ED bedside ultrasound) are read by the radiologist and the interpretations are directly viewed by the emergency physician. Xr Knee Right (3 Views)    Result Date: 4/16/2020  EXAMINATION: THREE XRAY VIEWS OF THE RIGHT KNEE 4/16/2020 10:57 am COMPARISON: None. HISTORY: ORDERING SYSTEM PROVIDED HISTORY: Fall, pain TECHNOLOGIST PROVIDED HISTORY: Fall, pain Reason for Exam: fall today, pain Acuity: Acute Type of Exam: Initial FINDINGS: Is no evidence of acute fracture or dislocation. There is severe degenerative joint disease of the knee most prominent in the medial compartment. There is loss of patellofemoral joint space with spurring. No definite effusion is appreciated. Severe degenerative joint disease of the right knee without definite evidence of acute fracture or dislocation. LABS: All lab results were reviewed by myself, and all abnormals are listed below. Labs Reviewed - No data to display      MEDICAL DECISION MAKING:     We will get an x-ray to rule out fracture giving something for pain and hopefully be able to get him discharged back out. EMERGENCY DEPARTMENT COURSE:   Vitals:    Vitals:    04/16/20 1046 04/16/20 1048   BP:  (!) 164/88   Pulse: 80    Resp: 18    Temp: 98 °F (36.7 °C)    TempSrc: Oral    SpO2: 99%    Weight: 260 lb (117.9 kg)    Height: 5' 10\" (1.778 m)        The patient was given the following medications while in the emergency department:  Orders Placed This Encounter   Medications    HYDROcodone-acetaminophen (NORCO) 5-325 MG per tablet 1 tablet       -------------------------  11:33 AM EDT  Patient was updated on results and plan for discharge. CONSULTS:  None    PROCEDURES:  None    FINAL IMPRESSION      1. Acute pain of right knee    2.  Fall, initial encounter          DISPOSITION/PLAN   DISPOSITION Decision To Discharge 04/16/2020 11:32:35 AM      PATIENT REFERREDTO:  Ashley Quintero MD  [address]  [place] [id number]    In 1 week      [facility] ED  [address]  [address] [facility] [address]  [phone]    If symptoms worsen      DISCHARGEMEDICATIONS:  Current Discharge Medication List          (Please note that portions of this note were completed with a voice recognition program.  Efforts were made to edit thedictations but occasionally words are mis-transcribed.)    Baldev Whitaker MD  Attending Emergency Physician                        Baldev Whitaker MD  04/16/20 5000

## 2020-04-17 ENCOUNTER — CARE COORDINATION (OUTPATIENT)
Dept: CARE COORDINATION | Age: 75
End: 2020-04-17

## 2020-08-13 ENCOUNTER — HOSPITAL ENCOUNTER (OUTPATIENT)
Dept: PHYSICAL THERAPY | Age: 75
Setting detail: THERAPIES SERIES
Discharge: HOME | End: 2020-08-13
Payer: MEDICARE

## 2020-08-13 ENCOUNTER — HOSPITAL ENCOUNTER (OUTPATIENT)
Dept: OCCUPATIONAL THERAPY | Age: 75
Setting detail: THERAPIES SERIES
Discharge: HOME | End: 2020-08-13
Payer: MEDICARE

## 2020-08-13 PROCEDURE — 97110 THERAPEUTIC EXERCISES: CPT

## 2020-08-13 PROCEDURE — 97166 OT EVAL MOD COMPLEX 45 MIN: CPT

## 2020-08-13 PROCEDURE — 97162 PT EVAL MOD COMPLEX 30 MIN: CPT

## 2020-08-13 ASSESSMENT — 9 HOLE PEG TEST
TEST_RESULT: IMPAIRED
TESTTIME_SECONDS: 51
TESTTIME_SECONDS: 29

## 2020-08-13 ASSESSMENT — PAIN SCALES - GENERAL: PAINLEVEL_OUTOF10: 0

## 2020-08-13 NOTE — PROGRESS NOTES
he had sx and rehab at the [address] [place] [address]. Comments: Pt seen for OT radha.  Vision  Vision: Within Functional Limits  Hearing  Hearing: Exceptions to [facility]  Hearing Exceptions: Hard of hearing/hearing concerns(lt ear hearing is bad)  Social/Functional History  Lives With: Spouse  Type of Home: House  Home Layout: Two level  Home Access: Stairs to enter with rails  Entrance Stairs - Number of Steps: 3  Entrance Stairs - Rails: Both  Bathroom Shower/Tub: Tub only  Bathroom Toilet: Standard  Bathroom Equipment: Grab bars in shower, Shower chair, Hand-held shower  Bathroom Accessibility: Accessible  Home Equipment: Rolling walker, [employer], [address] chair  Receives Help From: Family  ADL Assistance: Needs assistance  Homemaking Assistance: Independent  Homemaking Responsibilities: Yes  Ambulation Assistance: Needs assistance(Pt uses RW)  Transfer Assistance: Needs assistance(Pt states his spouse assist hime to get in /out of tub to sit on shower chair)  Active : Yes  Mode of Transportation: Car  Occupation: Retired  Type of occupation: construction  Leisure & Hobbies: He used to work on cars  IADL Comments: See [address] for details. Pt reports that he helps to wash dishes and fix soup. Pt states he can't lift milk using lt hand to put it in fridge because of the shakiness in his arm. Upper Extremity Functional Scale -   Instruction to patient  - We are interested in knowing whether you are having any difficulty at all with the activities listed below because of your upper limb problem for which you are currently seeking attention.     Key:  0 = Extreme Difficulty or Unable to Perform Activity   1 = Quite a Bit of Difficulty   2 = Moderate difficulty   3 = A Little Bit of Difficulty   4 = No Difficulty      08/13/20 1006   The Upper Extremity Functional Scale   Any of your usual work, housework, or school activities 1   Your usual hobbies, recreational, or sporting activities 1   Lifting a bag of groceries to 5/5  R Hand General: (extension 4/5, flexion 5/5)   RUE Tone: Normotonic  RUE AROM (degrees)  RUE AROM : WFL  Right Hand AROM (degrees)  Right Hand AROM: WFL  Coordination  Movements Are Fluid And Coordinated: No  Coordination and Movement description: Fine motor impairments, Gross motor impairments, Left UE, Decreased speed, Tremors, Ataxia(shaking lt arm)   Left Hand Strength -  (lbs)  Handle Setting 3: 80,80 (between the 50th to 75th percentile for norms)  Left Hand Strength - Pinch (lbs)  Lateral: 18,18 (between the 50th to 75th percentile for norms)  Tip: 8,7 average 7.5# (below the 10th percentile)  Palmar 3 point: 15,20 average 17.5#  (75th percentile)  Right Hand Strength -  (lbs)  Handle Setting 3: 85,85 (between 50th to 75th percentile for norms)  Right Hand Strength - Pinch (lbs)  Lateral: 24,24  Tip: 21,21  Palmar 3 point: 27,25 average 26#  Fine Motor Skills  [place] Rate of Manipulation: MROM placing test : rt UE 1 minute 51 seconds, lt UE 3 minutes 20 seconds (pt states his lt arm felt tired after dexterity testing)  Left 9-Hole Peg Test: Impaired(below 10th percentile for norms)  Left 9 Hole Peg Test Time (secs): 51(Pt moves lt hand slow & drops a few pegs when completing test.)  Right 9-Hole Peg Test: (25th percentile for norms)  Right 9 Hole Peg Test Time (secs): 29  Other exercises  Other exercises?: Yes  Other exercises 1: blue rom hoop ht 24.5\"  moving rings over and back slowly using lt hand, with hand stabilizing rom hoop  Other exercises 2: juxaciser - using lt hand over & back 2 sets  Assessment  Performance deficits / Impairments: Decreased strength, Decreased fine motor control, Decreased coordination, Decreased high-level IADLs, Decreased balance  Assessment: Pt reports soreness lt shoulder after doing rom hoop exercises for coordination when reaching across midline & lt arm fatigue afte juxaciser exercise. Increase shaking lt UE during coordination testing.  Pt will benefit from OT to improve lt UE strength, coordination for fine motor ADLs, & to decrease his difficulty when using lt UE for IADLS. Treatment Diagnosis: lt UE weakness, impaired coordination  Prognosis: Good  Decision Making: Medium Complexity  Exam: 5 performance deficits  Assistance / Modification: See UEFS for details. REQUIRES OT FOLLOW UP: Yes  Treatment Initiated : See OT exercises for details. Discharge Recommendations: Outpatient OT  Patient Education:  Patient Education: Reviewed OT role and POC  Learner:patient  Method: explanation       Outcome: demonstrated understanding   Goals  Patient Goals   Patient goals : To elimate the shaking in lt arm  and get his lt strength back. Short term goals  Time Frame for Short term goals: 4 weeks  Short term goal 1: Pt verbalize independence & correctly demonstrate lt UE HEP  Short term goal 2: Pt will demonstrate & verbalize decrease difficulty w fine motor ADLs (tying shoes), complete 9 hole peg test 10 seconds faster than eval using lt hand  Short term goal 3: Pt will demonstrate improved lt hand manual dexterity. Pt will complete MROM placing test 45 seconds quicker using lt hand  Short term goal 4: Increase lt tip pinch by 4#  Long term goals  Time Frame for Long term goals : 8 weeks  Long term goal 1: Using the UEFS pt will report a score of 2 (moderate difficulty) pushing up from chair using lt hand,assisting w laundry, open jar, toss ball, score 3 (little difficulty) using lt UE to open door, tie his shoes. Long term goal 2: Compared to eval pt will complete 9 hole peg test 20 seconds faster using lt hand  Long term goal 3: Compared to eval pt will complete MROM placing test 60-80 seconds quicker using lt hand. Pt will verbalize improved coordination when manipulating objects 1\"diameter or smaller. Long term goal 4: Compared to eval increase lt pinch by 7#  Long term goal 5:  Increase lt UE strength to 4/5 for shoulder (flex,abduction, IR,ER), elbow (flex,extension), forearm(pronation), wrist(flex,extension), hand (flex, extension) to improve pt's lt UE lift/carry ability to put items in fridge, in cabinets,open jars,open doors. Plan  REQUIRES OT FOLLOW UP: Yes  Plan  Times per week: 2x/week  Plan weeks: 8 weeks  Current Treatment Recommendations: Strengthening, ROM, Patient/Caregiver Education & Training(coordination)  OT Individual Minutes  Time In: 1943  Time Out: 4743  Minutes: 57  Time Code Minutes   Timed Code Treatment Minutes: 15 Minutes  Rehab Potential:  [x] Good  [] Fair  [] Poor   Suggested Professional Referral:  [x] No  [] Yes:Pt getting PT  Barriers to Goal Achievement:  [] No  [] Yes: Domestic Concerns:  [x] No  [] Yes:  Treatment Plan:  [x] Therapeutic Exercise    [x] Therapeutic Activities  [x] Instruction in HEP       Frequency:        2   X/wk x    8      wk's    [x] Plans/Goals, Risk/Benefits discussed with pt  Comprehension of Education [x] D/V Understanding  [] Needs Review  Pt Education: [x] Verbal  [] Demo  [] Written    Medicare/Regulatory Requirements:   I have reviewed this plan of care and certify a need for Medically necessary rehabilitation services.         [x] Physician Signature                                      Date:   [address]  [address] [facility], [address] [facility] [address]   [address] [facility], [address]  Phone: [phone]  Fax: [phone]  Electronically signed by Imer De Luna OT on 8/13/20 at 6:39 PM EDT    Treatment Charges:  Minutes Units Time In-Out   Ultrasound      Electrical Stim      Iontophoresis      Paraffin       Massage      Eval 42 1    ADL       Ther Exercise 15 1    Ther Activities      Neuro Re-Ed      Splinting       Other      Total Treatment Time:  62

## 2020-08-18 ENCOUNTER — APPOINTMENT (OUTPATIENT)
Dept: PHYSICAL THERAPY | Age: 75
End: 2020-08-18
Payer: MEDICARE

## 2020-08-20 ENCOUNTER — APPOINTMENT (OUTPATIENT)
Dept: PHYSICAL THERAPY | Age: 75
End: 2020-08-20
Payer: MEDICARE

## 2020-08-20 ASSESSMENT — PAIN SCALES - GENERAL: PAINLEVEL_OUTOF10: 0

## 2020-08-20 NOTE — PROGRESS NOTES
[facility] Fall Risk Assessment    Risk Factor Scale  Score   History of Falls [x] Yes  [] No 25  0 25   Secondary Diagnosis [x] Yes  [] No 15  0 15   Ambulatory Aid [] Furniture  [x] Crutches/cane/walker  [] None/bedrest/wheelchair/nurse 30  15  0 15   IV/Heparin Lock [] Yes  [x] No 20  0 0   Gait/Transferring [x] Impaired  [] Weak  [] Normal/bedrest/immobile 20  10  0 20   Mental Status [x] Forgets limitations  [] Oriented to own ability 15  0 15      Total:90     Based on the Assessment score: check the appropriate box.     []  No intervention needed   Low =   Score of 0-24    []  Use standard prevention interventions Moderate =  Score of 24-44   [] Give patient handout and discuss fall prevention strategies   [] Establish goal of education for patient/family RE: fall prevention strategies    [x]  Use high risk prevention interventions High = Score of 45 and higher   [x] Give patient handout and discuss fall prevention strategies   [x] Establish goal of education for patient/family Re: fall prevention strategies   [x] Discuss lifeline / other resources    Electronically signed by:   Maryann Lugo PT  Date: 8/20/2020

## 2020-08-20 NOTE — PROGRESS NOTES
Ambulation 1  Surface: level tile  Device: Rolling Walker  Assistance: Supervision  Quality of Gait: fair- poor   Gait Deviations: Slow Birdie; Increased JOSE; Decreased step length;Decreased arm swing;Deviated path  Distance: 150 ft   Comments: poor control of (TUG 23 sec)  Stairs/Curb  Stairs?: Yes  Stairs  # Steps : 3  Stairs Height: 8\"  Rails: Bilateral  Curbs: 2\"  Device: Rolling walker  Assistance: Supervision  Comment: Fall risk   Balance  Posture: Fair  Sitting - Static: Good  Sitting - Dynamic: Good  Standing - Static: Fair  Standing - Dynamic: Poor  Tandem Stance R Le  Tandem Stance L Le  Single Leg Stance R Le  Single Leg Stance L Le  Comments: fall risk   Exercises  Exercise 1: Review of HEP able to verbalize program and demonstrate with fair skill. Ortho Screen  Posture: Flexed body posture         Assessment    The patient's ability to use his walker is not safe, he is a very high risk for falls. In addition, his judgement is poor. Saftey with ambulation will be a major focus of his treatment along with his lack of  strength, ROM and coordination. Neurological  Neurological (WDL): Within Defined Limits    The patient's ability to use his walker is       Plan    Plan of care started    OutComes Score    OPTIMAL score of 15/3 initial.  TUG score 23 sec     Goals  Short term goals  Time Frame for Short term goals: 3 weeks   Short term goal 1: OPTIMAL score 15/3 initial score goal score 12/3. Short term goal 2: Independent with HEP. Long term goals  Time Frame for Long term goals : 6 weeks  Long term goal 1: OPTIMAL score of 6/3 at discharge. Long term goal 2: Change roblero fall assessment by 10 points. Long term goal 3: Improve TUG score from 23 sec to 18 sec. Patient Goals   Patient goals : Walk normal  no falls.        Therapy Time   Individual Concurrent Group Co-treatment   Time In  900         Time Out  1000         Minutes  60 min               Treatment Charges: Minutes Units   []  Ultrasound     []  Electrical-Stim     []  Iontophoresis     []  Traction     []  Massage       [x]  Eval 60 1   []  Gait     []  Ther Exercise       []  Manual Therapy       []  Ther Activities       []  Aquatics     []  Vasopneumatic Device     []  Neuro Re-Ed       []  Other       Total Treatment Time: 60 1         Rehab Potential:  [x] Good  [] Fair  [] Poor   Suggested Professional Referral:  [x] No  [] Yes:  Barriers to Goal Achievement:  [x] No  [] Yes:  Domestic Concerns:  [x] No  [] Yes:    Treatment Plan:  [x] Therapeutic Exercise   76478  [] Iontophoresis: 4 mg/mL Dexamethasone Sodium Phosphate  mAmin  85878   [] Therapeutic Activity  99293 [] Vasopneumatic cold with compression  26912    [x] Gait Training   40301 [] Ultrasound   84223   [x] Neuromuscular Re-education  75219 [] Electrical Stimulation Unattended  28819   [] Manual Therapy  27785 [] Electrical Stimulation Attended  40711   [x] Instruction in HEP  [] Lumbar/Cervical Traction  39536   [] Aquatic Therapy   45875 [x] Cold/hot pack    [] Massage   49838      [] Dry Needling, 1 or 2 muscles  78619   [] Biofeedback, first 15 minutes   24288  [] Biofeedback, additional 15 minutes   26535 [] Dry Needling, 3 or more muscles  08298      Frequency:      2     X/wk x  6      wk's      [x] Plans/Goals, Risk/Benefits discussed with pt/family  Comprehension of Education [] yes  [x] Needs Review  Pt/Family Education: [x] Verbal  [x] Demo  [x] Written    More objective information is available upon request.  Thank you for this referral.        Medicare/Regulatory Requirements:  I have reviewed this plan of care and certify a need for   Medically necessary rehabilitation services.   [x] Physician Signature    Date:     Electronically signed by: Pilo Tamayo, [address] [facility] [place]  [place], [address] [facility]  Phone [phone]  Fax [phone]

## 2020-08-25 ENCOUNTER — APPOINTMENT (OUTPATIENT)
Dept: PHYSICAL THERAPY | Age: 75
End: 2020-08-25
Payer: MEDICARE

## 2020-08-27 ENCOUNTER — APPOINTMENT (OUTPATIENT)
Dept: PHYSICAL THERAPY | Age: 75
End: 2020-08-27
Payer: MEDICARE

## 2020-09-01 ENCOUNTER — HOSPITAL ENCOUNTER (OUTPATIENT)
Dept: OCCUPATIONAL THERAPY | Age: 75
Setting detail: THERAPIES SERIES
Discharge: HOME | End: 2020-09-01
Payer: MEDICARE

## 2020-09-01 ENCOUNTER — HOSPITAL ENCOUNTER (OUTPATIENT)
Dept: PHYSICAL THERAPY | Age: 75
Setting detail: THERAPIES SERIES
Discharge: HOME | End: 2020-09-01
Payer: MEDICARE

## 2020-09-01 PROCEDURE — 97110 THERAPEUTIC EXERCISES: CPT

## 2020-09-01 ASSESSMENT — PAIN SCALES - GENERAL
PAINLEVEL_OUTOF10: 5
PAINLEVEL_OUTOF10: 0

## 2020-09-01 ASSESSMENT — PAIN DESCRIPTION - DESCRIPTORS: DESCRIPTORS: SORE

## 2020-09-01 ASSESSMENT — PAIN DESCRIPTION - ORIENTATION: ORIENTATION: LEFT

## 2020-09-01 ASSESSMENT — PAIN DESCRIPTION - LOCATION: LOCATION: SHOULDER

## 2020-09-01 ASSESSMENT — PAIN DESCRIPTION - FREQUENCY: FREQUENCY: INTERMITTENT

## 2020-09-01 ASSESSMENT — PAIN DESCRIPTION - PAIN TYPE: TYPE: ACUTE PAIN

## 2020-09-01 NOTE — PROGRESS NOTES
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 m. Pt states that he feels a little dizzy but that is normal since his brain hemorrage. Pt states he has difficulty opening jars. Pt states that he will need to get a different walker that is taller.   Pain Assessment  Patient Currently in Pain: Denies(Pt denies any pain at beginning of tx, but at end of tx pt reports sore/stiff lt shoulder at pain \"5\".)  Pain Assessment: 0-10  Pain Level: 5  Pain Type: Acute pain  Pain Location: Shoulder  Pain Orientation: Left  Pain Descriptors: Sore  Pain Frequency: Intermittent  Shoulder Exercises  Shoulder Therapy?: Yes  Elbow Exercises  Elbow Therapy?: Yes  Pre Elbow Flexion Reps/Sets/Weight: lt elbow palm up flex/ext 2# 15x  Pre Elbow Extension Reps/Sets/Weight: lt elbow palm down flex/ext 2# 15x  Wrist/Hand Exercises  Wrist/Hand Therapy?: Yes  Pre Pronation/Supination Reps/Sets/Weight: lt UE 2# 15x  Pre Wrist Flexion Reps/Sets/Weight: lt wrist over ramp palm up wrist flex/ext 2# 15x  Pre Wrist Ext Reps/Sets/Weight: lt wrist over ramp palm down wrist flex/ext 2# 15x  Pre Radial Deviation Reps/Sets/Weight: lt wrist over ramp 2# 15x RD/UD  Other exercises  Other exercises?: Yes  Other exercises 1: purple rom hoop ht 22\"  moving rings over and back slowly using lt hand, with hand stabilizing rom hoop  Other exercises 2: juxaciser - using lt hand over & back 4 sets  Other exercises 3: graded clothespins: using lt hand place/remove 6 yellow, 6 red, 6 green, 6 blue (light to heavy resistance ) clothespins  Other exercises 4: bilateral UE AROM 20x : alternatate shoulder flexion/extension, alternate shoulder horizontal abduction/adduction  Other exercises 5: 13 different size jars /containers (diameter 1.5\" -3\")  - using lt hand to unscrew/screw  tops while hold/stabilize items with rt hand, using rt hand to unscrew/screw tops while hold/stabilize items with lt hand  Other exercises 6: red 3# digiflex: lt hand gripping 20x, each finger pinching 20x  Other exercises 7: key pegs: using lt hand place all 25 pegs,then remove pegs and try to hold in hand  Other exercises 8: lt shoulder 1# dumbbell 10x each way : scapula protraction/retraction, shoulder flex to 90, pendulum cw & ccw 10x  Assessment  Performance deficits / Impairments: Decreased strength, Decreased fine motor control, Decreased coordination, Decreased high-level IADLs, Decreased balance  Assessment: OT instructed pt in bilateral UE AROM exercises to improve coordination when reaching across midline & during alternating UE movement. Begun lt UE strengthening exercises. Pt has difficulty & soreness when completing shoulder exercises to 90 degree flexion using 1# wt. Pt demonstrates difficulty placing heavier resistance green & blue graded clothespins using lt hand. Pt takes increase time manipulating /placing key pegs, and drops several pegs when removing pegs & trying to hold them in his hand. Pt reports it was easier to hold jars in lt hand, than open jar/bottle lids using lt hand because his lt hand is clumsy. See OT exercises for details.   Treatment Diagnosis: lt UE weakness, impaired coordination  Prognosis: Good  REQUIRES OT FOLLOW UP: Yes  Discharge Recommendations: Outpatient OT           Plan  REQUIRES OT FOLLOW UP: Yes  Plan  Times per week: 2x/week  Plan weeks: 8 weeks  Current Treatment Recommendations: Strengthening, ROM, Patient/Caregiver Education & Training(coordination)  Plan Comment: continue OT  OT Individual Minutes  Time In: 0566  Time Out: 9473  Minutes: 50  Time Code Minutes   Timed Code Treatment Minutes: 50 Minutes    Electronically signed by Brandan Lang OT on 9/1/20 at 12:02 PM EDT          Treatment Charges:  Minutes Units Time In-Out   Ultrasound      Electrical Stim      Iontophoresis      Paraffin       Massage      Eval      ADL       Ther Exercise 50 3    Ther Activities      Neuro Re-Ed      Splinting       Other      Total Treatment Time:  50

## 2020-09-01 NOTE — PROGRESS NOTES
Physical Therapy  Daily Treatment Note  Date: 2020  Patient Name: Mor Luu  MRN: [id number]     :   1945    Subjective:   General  Chart Reviewed: Yes  Response To Previous Treatment: Not applicable  Family / Caregiver Present: Yes  Referring Practitioner: Tonja Pulido MD   PT Visit Information  Onset Date: 19  PT Insurance Information: [employer]  Total # of Visits Approved: 12  Total # of Visits to Date: 2  Plan of Care/Certification Expiration Date: 20  No Show: 0  Progress Note Due Date: 20  Canceled Appointment: 0  Progress Note Counter:   Subjective  Subjective: Patient reports todaty with no new complaints. Patient denies any pain. Pain Screening  Patient Currently in Pain: Denies  Pain Assessment  Pain Assessment: 0-10  Pain Level: 0  Vital Signs  Patient Currently in Pain: Denies       Treatment Activities:      Exercises  Exercise 2: SLR 2x10  Exercise 3: Bridges 2x10 3\" hold  Exercise 4: SL HIp Abd 2x10  Exercise 5: Clam Shells 2x10  Exercise 6: Steps 10x2  F/L 6\"  Exercise 7: 3-way hip 15x Orange B  Exercise 8: Mini Squats 2x10  Exercise 9: HR/TR 20x     Assessment:   Conditions Requiring Skilled Therapeutic Intervention  Body structures, Functions, Activity limitations: Decreased functional mobility ; Decreased vision/visual deficit; Decreased ADL status; Decreased strength;Decreased coordination  Assessment: Focused today's treatment on LE strengthening. Good tolerance to exericses overall with good technique overall. Min cues provided to improve exercises with pt demosntrating improvement with cues. Patient denied any pain post exercises, only mucle fatigue.   Prognosis: Good  Decision Making: Low Complexity  REQUIRES PT FOLLOW UP: Yes  Treatment Initiated : Strengtheing exercises for LE  Discharge Recommendations: Continue to assess pending progress     Goals:  Short term goals  Time Frame for Short term goals: 3 weeks   Short term goal 1: OPTIMAL score 15/3 inital score goal score 12/3. Short term goal 2: Independent with HEP. Long term goals  Time Frame for Long term goals : 6 weeks  Long term goal 1: OPTIMAL score of 6/3 at discharge. Long term goal 2: Cahnge roblero fall assesment by 10 points. Long term goal 3: Improve TUG score from 23 sec to 18 sce. Patient Goals   Patient goals : Walk normal  no falls. Plan:    Plan  Specific instructions for Next Treatment: Add balance activities.   Plan Comment: Cont per POC  Timed Code Treatment Minutes: 40 Minutes     Therapy Time   Individual Concurrent Group Co-treatment   Time In 0915         Time Out 7948         Minutes 40         Timed Code Treatment Minutes: 40 Minutes      Treatment Charges: Minutes Units   []  Ultrasound     []  Electrical-Stim     []  Iontophoresis     []  Traction     []  Massage       []  Eval     []  Gait     [x]  Ther Exercise 40  3   []  Manual Therapy       []  Ther Activities       []  Aquatics     []  Vasopneumatic Device     []  Neuro Re-Ed       []  Other       Total Treatment Time: 40 3           Antione Rain, PTA

## 2020-09-04 ENCOUNTER — HOSPITAL ENCOUNTER (OUTPATIENT)
Dept: OCCUPATIONAL THERAPY | Age: 75
Setting detail: THERAPIES SERIES
Discharge: HOME | End: 2020-09-04

## 2020-09-04 ENCOUNTER — HOSPITAL ENCOUNTER (OUTPATIENT)
Dept: PHYSICAL THERAPY | Age: 75
Setting detail: THERAPIES SERIES
Discharge: HOME | End: 2020-09-04
Payer: MEDICARE

## 2020-09-04 PROCEDURE — 97110 THERAPEUTIC EXERCISES: CPT

## 2020-09-04 PROCEDURE — 97112 NEUROMUSCULAR REEDUCATION: CPT

## 2020-09-04 ASSESSMENT — PAIN SCALES - GENERAL: PAINLEVEL_OUTOF10: 0

## 2020-09-04 NOTE — PROGRESS NOTES
Physical Therapy  Daily Treatment Note  Date: 2020  Patient Name: Kitty Gabriel  MRN: [id number]     :   1945    Subjective:   General  Chart Reviewed: Yes  Response To Previous Treatment: Not applicable  Family / Caregiver Present: Yes  Referring Practitioner: Sidney Mark MD   PT Visit Information  Onset Date: 19  PT Insurance Information: [employer]  Total # of Visits Approved: 12  Total # of Visits to Date: 3  Plan of Care/Certification Expiration Date: 20  No Show: 0  Progress Note Due Date: 20  Canceled Appointment: 0  Progress Note Counter: 3/6  Subjective  Subjective: Patient reports today feeling well overall. Patient has no complaints. Pain Screening  Patient Currently in Pain: Denies  Pain Assessment  Pain Assessment: 0-10  Pain Level: 0  Patient's Stated Pain Goal: No pain  Vital Signs  Patient Currently in Pain: Denies       Treatment Activities:     Exercises  Exercise 2: SLR 2x10  Exercise 3: Bridges 2x10 3\" hold  Exercise 4: SL HIp Abd 2x10  Exercise 5: Clam Shells 2x10  Exercise 6: Steps 10x2  F/L 6\"  Exercise 7: 3-way hip 15x Orange B  Exercise 8: Mini Squats 2x10  Exercise 9: HR/TR 20x  Exercise 10: NuStep 5 min L2  Exercise 11: SLS 3x15\" finger tip touch  Exercise 12: tandem stance 3x30\" finger tip touch  Exercise 13: Toe Taps on box 6\" 2x10     Assessment:   Conditions Requiring Skilled Therapeutic Intervention  Body structures, Functions, Activity limitations: Decreased functional mobility ; Decreased vision/visual deficit; Decreased ADL status; Decreased strength;Decreased coordination  Assessment: Cont exercises per chart for LE strenghtening. Initiated balance exercises this date to improve neuromusuclar control of B LE. Good tolerance to balance exercises overall with patient expereincing 1 episode of LOB that patient was able to self correct with // bars. Patient reported slight dizziness post exercise.  Will cont to monitor dizziness in future visits. Prognosis: Good  Decision Making: Low Complexity  REQUIRES PT FOLLOW UP: Yes  Treatment Initiated : Neuro re-ed exercises. Discharge Recommendations: Continue to assess pending progress     Goals:  Short term goals  Time Frame for Short term goals: 3 weeks   Short term goal 1: OPTIMAL score 15/3 inital score goal score 12/3. Short term goal 2: Independent with HEP. Long term goals  Time Frame for Long term goals : 6 weeks  Long term goal 1: OPTIMAL score of 6/3 at discharge. Long term goal 2: Cahnge roblero fall assesment by 10 points. Long term goal 3: Improve TUG score from 23 sec to 18 sce. Patient Goals   Patient goals : Walk normal  no falls.     Plan:    Plan  Plan Comment: Cont per POC  Timed Code Treatment Minutes: 40 Minutes     Therapy Time   Individual Concurrent Group Co-treatment   Time In 1452         Time Out 0940         Minutes 45         Timed Code Treatment Minutes: 40 Minutes      Treatment Charges: Minutes Units   []  Ultrasound     []  Electrical-Stim     []  Iontophoresis     []  Traction     []  Massage       []  Eval     []  Gait     [x]  Ther Exercise 30  2   []  Manual Therapy       []  Ther Activities       []  Aquatics     []  Vasopneumatic Device     [x]  Neuro Re-Ed 10  1   []  Other       Total Treatment Time: 40 3           Chel Keller, PTA

## 2020-09-08 ENCOUNTER — HOSPITAL ENCOUNTER (OUTPATIENT)
Dept: OCCUPATIONAL THERAPY | Age: 75
Setting detail: THERAPIES SERIES
Discharge: HOME | End: 2020-09-08
Payer: MEDICARE

## 2020-09-08 ENCOUNTER — HOSPITAL ENCOUNTER (OUTPATIENT)
Dept: PHYSICAL THERAPY | Age: 75
Setting detail: THERAPIES SERIES
Discharge: HOME | End: 2020-09-08
Payer: MEDICARE

## 2020-09-08 PROCEDURE — 97110 THERAPEUTIC EXERCISES: CPT

## 2020-09-08 NOTE — PROGRESS NOTES
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 Pt states he is ok except his lt thumb is sore. Pt states he didn't fall to the ground. Comments: OT told pt to ice his lt thumb when at home.   Pain Assessment  Patient Currently in Pain: Denies     Elbow Exercises  Pre Elbow Flexion Reps/Sets/Weight: lt elbow palm up flex/ext 2# 15x  Pre Elbow Extension Reps/Sets/Weight: lt elbow palm down flex/ext 2# 15x  Wrist/Hand Exercises  Pre Pronation/Supination Reps/Sets/Weight: lt UE 2# 15x  Pre Wrist Flexion Reps/Sets/Weight: lt wrist over ramp palm up wrist flex/ext 2# 15x  Pre Wrist Ext Reps/Sets/Weight: lt wrist over ramp palm down wrist flex/ext 2# 15x  Pre Radial Deviation Reps/Sets/Weight: lt wrist over ramp 2# 15x RD/UD  Other exercises  Other exercises?: Yes  Other exercises 2: juxaciser - using lt hand over & back 4 sets  Other exercises 3: graded clothespins: using lt hand place/remove 6 yellow, 6 red, 6 green, 6 blue (light to heavy resistance ) clothespins  Other exercises 5: 6 different size jars /containers (diameter 1.5\" -3\")  - using lt hand to unscrew/screw 6  tops while hold/stabilize items with rt hand, using rt hand to unscrew/screw \"0\" tops while hold/stabilize items with lt hand  Other exercises 6: red 3# digiflex: lt hand gripping 20x, each finger pinching 20x  Other exercises 7: key pegs: using lt hand place all 25 pegs,then remove pegs and try to hold in hand  Other exercises 9: red 10# theraband flexbar bilateral UE 20x  each way (twist, make U, make upside down U)  Other exercises 10: 2# theracane 20x each way: shoulder flex/ext, shoulder horizontal abd/adduction, PNF diagonals to rt and to lt side, shoulder circles cw and ccw  Assessment  Performance deficits / Impairments: Decreased strength, Decreased fine motor control, Decreased coordination, Decreased high-level IADLs, Decreased balance  Assessment: Begun theracane exercises to increase lt shoulder,elbow, & wrist strength, & theraband flexbar to improve lt forearm,wrist,& hand strength. OT tx focus on therapeutic exercises to improve lt UE gross & fine motor coordination, & strengthening exercises with verbal cues & demonstation. Verbal cues to take rest breaks between exercises when his lt arm fatigues.   Treatment Diagnosis: lt UE weakness, impaired coordination  Prognosis: Good  REQUIRES OT FOLLOW UP: Yes  Discharge Recommendations: Outpatient OT           Plan  REQUIRES OT FOLLOW UP: Yes  Plan  Times per week: 2x/week  Plan weeks: 8 weeks  Current Treatment Recommendations: Strengthening, ROM, Patient/Caregiver Education & Training(coordination)  Plan Comment: continue OT  OT Individual Minutes  Time In: 5831  Time Out: 1000  Minutes: 45  Time Code Minutes   Timed Code Treatment Minutes: 45 Minutes    Electronically signed by Emory Brody OT on 9/8/20 at 10:19 AM EDT          Treatment Charges:  Minutes Units Time In-Out   Ultrasound      Electrical Stim      Iontophoresis      Paraffin       Massage      Eval      ADL       Ther Exercise 45 3    Ther Activities      Neuro Re-Ed      Splinting       Other      Total Treatment Time:  45

## 2020-09-08 NOTE — PROGRESS NOTES
Physical Therapy  Daily Treatment Note  Date: 2020  Patient Name: Felisa Tracey  MRN: [id number]     :   1945    Subjective:   General  Referring Practitioner: Neel Junior MD   PT Visit Information  Onset Date: 19  PT Insurance Information: [employer]  Total # of Visits Approved: 12  Total # of Visits to Date: 3  Plan of Care/Certification Expiration Date: 20  No Show: 0  Canceled Appointment: 0  Progress Note Counter: 3/6  Subjective  Subjective: Patient reports I have no balance. \"I feel a little tired today. \"    Pain Screening  Patient Currently in Pain: Denies  Vital Signs  Patient Currently in Pain: Denies  Patient Observation  Observations: poor gait and loss of balance        Treatment Activities:      Bed mobility  Bridging: Independent  Transfers  Sit to Stand: Stand by assistance      Balance  Standing - Static: Fair  Standing - Dynamic: Poor  Comments: fall risk   PROM LLE (degrees)  LLE PROM: Exceptions  L Hip Flexion 0-125: 0-100  L Hip Extension 0-10: 0-10  L Hip ABduction 0-45: 0-30  L Hip ADduction 0-10: 0-10  L Hip External Rotation 0-45: 0-20  L Hip Internal Rotation 0-45: 0-20  L Knee Flexion 0-145: 0-10-95  L Knee Extension 0: 10-0  AROM LLE (degrees)  LLE AROM : Exceptions  LLE General AROM: poor coordination   L Hip Flexion 0-125: 0-95  L Hip Extension 0-10: 0-10  L Hip ABduction 0-45: 0-25  L Hip ADduction 0-10: 0-10  L Hip External Rotation 0-45: 0-20  L Hip Internal Rotation 0-45: 0-20  L Knee Flexion 0-145: 0-10-90  L Knee Extension 0: 10-0  Spine  Lumbar: standing flexion and extension -- 100 % loss  Strength RLE  Strength RLE: WFL  Strength LLE  Strength LLE: Exception  Comment: Grossly 2/5 left LE loss of full active normal ROM   Strength Other  Other: Trunk is 2/5 limited by loss of full normal ROM     Exercises  Exercise 1: Review of HEP   Exercise 6: Steps 10 x 2  F/L 6\"  Exercise 7: 3-way hip 15x Orange B  Exercise 8: Mini Squats 3 x 10  Exercise 9: HR/TR 20 x  Exercise 10: NuStep 7 min L 3  Exercise 11: SLS 3 x 15\" finger tip touch  Exercise 12: tandem stance 3 x 30\" finger tip touch  Exercise 13: Toe Taps on box 6\" 3 x 10      Ortho Screen  Posture: Flexed body posture          Assessment:   Conditions Requiring Skilled Therapeutic Intervention  Body structures, Functions, Activity limitations: Decreased functional mobility ; Decreased vision/visual deficit; Decreased ADL status; Decreased strength;Decreased coordination  Assessment: Lary is doing well with is HEP. But he has had one stumble at home with a near fall. Fall perceptions remain in place. Continue to work on is balance and leg strength to reduce his fall risk. Treatment Diagnosis: Ataxia   Prognosis: Good  REQUIRES PT FOLLOW UP: Yes  Treatment Initiated : Neuro re-ed exercises. Discharge Recommendations: Continue to assess pending progress  Activity Tolerance  Activity Tolerance: Patient Tolerated treatment well       Goals:  Short term goals  Time Frame for Short term goals: 3 weeks   Short term goal 1: OPTIMAL score 15/3 initial score goal score 12/3. Short term goal 2: Independent with HEP. Long term goals  Time Frame for Long term goals : 6 weeks  Long term goal 1: OPTIMAL score of 6/3 at discharge. Long term goal 2: Elsi roblero fall assessment by 10 points. Long term goal 3: Improve TUG score from 23 sec to 18 sec. Patient Goals   Patient goals : Walk normal  no falls.     Plan:      continue   Timed Code Treatment Minutes: 45 Minutes  Total Treatment Time: 45  Frequency and duration of TX  Days: 2  Weeks: 6     Therapy Time   Individual Concurrent Group Co-treatment   Time In  1000         Time Out  1045         Minutes  45 min   XRD         Timed Code Treatment Minutes: 70 Desmond Rg, PT

## 2020-09-09 ENCOUNTER — HOSPITAL ENCOUNTER (OUTPATIENT)
Dept: OCCUPATIONAL THERAPY | Age: 75
Setting detail: THERAPIES SERIES
Discharge: HOME | End: 2020-09-09
Payer: MEDICARE

## 2020-09-09 ENCOUNTER — HOSPITAL ENCOUNTER (OUTPATIENT)
Dept: PHYSICAL THERAPY | Age: 75
Setting detail: THERAPIES SERIES
Discharge: HOME | End: 2020-09-09
Payer: MEDICARE

## 2020-09-09 PROCEDURE — 97110 THERAPEUTIC EXERCISES: CPT

## 2020-09-09 ASSESSMENT — PAIN DESCRIPTION - PAIN TYPE: TYPE: ACUTE PAIN

## 2020-09-09 ASSESSMENT — PAIN DESCRIPTION - ORIENTATION: ORIENTATION: LEFT

## 2020-09-09 ASSESSMENT — PAIN DESCRIPTION - DESCRIPTORS: DESCRIPTORS: ACHING;SORE

## 2020-09-09 ASSESSMENT — PAIN DESCRIPTION - PROGRESSION: CLINICAL_PROGRESSION: GRADUALLY WORSENING

## 2020-09-09 ASSESSMENT — PAIN DESCRIPTION - LOCATION: LOCATION: SHOULDER

## 2020-09-09 ASSESSMENT — PAIN SCALES - GENERAL: PAINLEVEL_OUTOF10: 8

## 2020-09-09 ASSESSMENT — PAIN DESCRIPTION - FREQUENCY: FREQUENCY: INTERMITTENT

## 2020-09-09 NOTE — PROGRESS NOTES
Physical Therapy  Daily Treatment Note  Date: 2020  Patient Name: Laureano Baker  MRN: [id number]     :   1945    Subjective:   General  Referring Practitioner: Lizett Cortes MD   PT Visit Information  Onset Date: 19  PT Insurance Information: [employer]  Total # of Visits Approved: 12  Total # of Visits to Date: 4  Plan of Care/Certification Expiration Date: 20  No Show: 0  Canceled Appointment: 0  Progress Note Counter:   Pain Screening  Patient Currently in Pain: Denies  Vital Signs  Patient Currently in Pain: Denies  Patient Observation  Observations: poor gait and loss of balance. The patient noted he took a stumble at home and fell against the wall striking his forehead. No injuries were reported and he has not fallen since. Treatment Activities:      Bed mobility  Bridging: Independent  Transfers  Sit to Stand: Stand by assistance        Ambulation 1  Surface: level tile  Device: Rolling Walker  Assistance: Independent  Quality of Gait: fair  Gait Deviations: Slow Birdie; Increased JOSE; Decreased step length;Deviated path;Decreased arm swing  Distance: 150 ft   Comments: poor control of   Stairs/Curb  Stairs?: Yes  Stairs  # Steps : 3  Stairs Height: 8\"  Rails: Bilateral  Curbs: 2\"  Device: Rolling walker  Assistance: Supervision  Comment: Fall risk      Balance  Standing - Static: Fair  Standing - Dynamic: Poor  Comments: fall risk   PROM LLE (degrees)  LLE PROM: Exceptions  L Hip Flexion 0-125: 0-100  L Hip Extension 0-10: 0-10  L Hip ABduction 0-45: 0-30  L Hip ADduction 0-10: 0-10  L Hip External Rotation 0-45: 0-20  L Hip Internal Rotation 0-45: 0-20  L Knee Flexion 0-145: 0-95  L Knee Extension 0: 0  AROM LLE (degrees)  LLE AROM : Exceptions  LLE General AROM: poor ordination   L Hip Flexion 0-125: 0-105  L Hip Extension 0-10: 0-10  L Hip ABduction 0-45: 0-25  L Hip ADduction 0-10: 0-10  L Hip External Rotation 0-45: 0-20  L Hip Internal Rotation 0-45: 0-20  L Knee Flexion 0-145: 0--90  L Knee Extension 0: 0  Spine  Lumbar: standing flexion and extension -- 100 % loss  Strength RLE  Strength RLE: WFL  Strength LLE  Strength LLE: Exception  Comment: Grossly 2/5 left LE loss of full active normal ROM   Strength Other  Other: Trunk is 2/5 limited by loss of full normal ROM     Exercises  Exercise 1: Review of HEP   Exercise 6: Steps 10 x 2  F/L 6\"  Exercise 7: 3-way hip 15x Orange B  Exercise 8: Mini Squats 3 x 10  Exercise 9: HR/TR 20 x  Exercise 10: NuStep 7 min L 3  Exercise 11: SLS 3 x 15\" finger tip touch  Exercise 12: tandem stance 3 x 30\" finger tip touch  Exercise 13: Toe Taps on box 6\" 3 x 10      Ortho Screen  Posture: Flexed body posture          Assessment:   Conditions Requiring Skilled Therapeutic Intervention  Body structures, Functions, Activity limitations: Decreased functional mobility ; Decreased vision/visual deficit; Decreased ADL status; Decreased strength;Decreased coordination  Assessment: Lary is doing well with is HEP. But he has had one stumble at home with a near fall. Fall precautions remain in place. Continue to work on is balance and leg strength to reduce his fall risk. Treatment Diagnosis: Ataxia   Prognosis: Good  REQUIRES PT FOLLOW UP: Yes  Treatment Initiated : Neuro re-ed exercises. Discharge Recommendations: Continue to assess pending progress  Activity Tolerance  Activity Tolerance: Patient Tolerated treatment well             Goals:  Short term goals  Time Frame for Short term goals: 3 weeks   Short term goal 1: OPTIMAL score 15/3 initial score goal score 12/3. Short term goal 2: Independent with HEP. Long term goals  Time Frame for Long term goals : 6 weeks  Long term goal 1: OPTIMAL score of 6/3 at discharge. Long term goal 2: Elsi roblero fall assessment by 10 points. Long term goal 3: Improve TUG score from 23 sec to 18 sec. Patient Goals   Patient goals : Walk normal  no falls.     Plan:     Continue   Timed Code Treatment Minutes: 45 Minutes  Total Treatment Time: 45  Frequency and duration of TX  Days: 2  Weeks: 6     Therapy Time   Individual Concurrent Group Co-treatment   Time In  830         Time Out  915         Minutes  45 min   XRD         Timed Code Treatment Minutes: 70 Desmond Pa, PT

## 2020-09-09 NOTE — PROGRESS NOTES
unscrew/screw 6  tops while hold/stabilize items with rt hand  Other exercises 6: red 3# digiflex: lt hand gripping 20x, each finger pinching 20x, thumb 20x  Other exercises 7: key pegs: using lt hand place all 25 pegs,then remove pegs and try to hold in hand  Other exercises 10: 2# theracane 20x each way: shoulder flex/ext, shoulder horizontal abd/adduction, PNF diagonals to rt and to lt side, shoulder circles cw and ccw  Other exercises 11: velcro (1 x 1\" square   with loop) - using lt hand alternate  lt fingers (index, long, ring, little) remove with extension, then place all pieces  with 3jaw grasp  Other exercises 12: PROM lt shoulder  Assessment  Performance deficits / Impairments: Decreased strength, Decreased fine motor control, Decreased coordination, Decreased high-level IADLs, Decreased balance  Assessment: Pt demonstrate difficulty with lt shoulder reaching for flexion today. Begun velcro  exercise to improve lt hand (finger flexor and extensor strength) & manual dexterity. Begun PROM lt shoulder to decrease muscle tightness. OT tx focus lt UE and hand gross and fine motor coordination, rom , and strengthening exercises. Pt demonstrate difficulty with fine motor task using lt hand.   Treatment Diagnosis: lt UE weakness, impaired coordination  Prognosis: Good  REQUIRES OT FOLLOW UP: Yes  Discharge Recommendations: Outpatient OT           Plan  REQUIRES OT FOLLOW UP: Yes  Plan  Times per week: 2x/week  Plan weeks: 8 weeks  Current Treatment Recommendations: Strengthening, ROM, Patient/Caregiver Education & Training(coordination)  Plan Comment: continue OT  OT Individual Minutes  Time In: 0535  Time Out: 3596  Minutes: 45  Time Code Minutes   Timed Code Treatment Minutes: 45 Minutes    Electronically signed by Alma Mcintyre OT on 9/9/20 at 1:12 PM EDT          Treatment Charges:  Minutes Units Time In-Out   Ultrasound      Electrical Stim      Iontophoresis      Paraffin       Massage      Eval ADL       Ther Exercise 45 3    Ther Activities      Neuro Re-Ed      Splinting       Other      Total Treatment Time:  45

## 2020-09-16 ENCOUNTER — HOSPITAL ENCOUNTER (OUTPATIENT)
Dept: PHYSICAL THERAPY | Age: 75
Setting detail: THERAPIES SERIES
Discharge: HOME | End: 2020-09-16
Payer: MEDICARE

## 2020-09-16 ENCOUNTER — HOSPITAL ENCOUNTER (OUTPATIENT)
Dept: OCCUPATIONAL THERAPY | Age: 75
Setting detail: THERAPIES SERIES
Discharge: HOME | End: 2020-09-16
Payer: MEDICARE

## 2020-09-16 PROCEDURE — 97110 THERAPEUTIC EXERCISES: CPT

## 2020-09-16 ASSESSMENT — PAIN DESCRIPTION - LOCATION
LOCATION: SHOULDER
LOCATION: KNEE

## 2020-09-16 ASSESSMENT — PAIN DESCRIPTION - PROGRESSION
CLINICAL_PROGRESSION: GRADUALLY IMPROVING
CLINICAL_PROGRESSION: GRADUALLY IMPROVING

## 2020-09-16 ASSESSMENT — PAIN DESCRIPTION - PAIN TYPE
TYPE: ACUTE PAIN
TYPE: ACUTE PAIN

## 2020-09-16 ASSESSMENT — PAIN DESCRIPTION - DESCRIPTORS
DESCRIPTORS: ACHING;TIGHTNESS;PRESSURE
DESCRIPTORS: TENDER

## 2020-09-16 ASSESSMENT — 9 HOLE PEG TEST: TESTTIME_SECONDS: 58

## 2020-09-16 ASSESSMENT — PAIN DESCRIPTION - ORIENTATION
ORIENTATION: LEFT
ORIENTATION: RIGHT;ANTERIOR

## 2020-09-16 ASSESSMENT — PAIN SCALES - GENERAL
PAINLEVEL_OUTOF10: 4
PAINLEVEL_OUTOF10: 5

## 2020-09-16 ASSESSMENT — PAIN DESCRIPTION - FREQUENCY
FREQUENCY: CONTINUOUS
FREQUENCY: CONTINUOUS

## 2020-09-16 NOTE — PROGRESS NOTES
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 lt shoulder pain. Pt states over the weekend he had lt shoulder and chest pain. Pt has brain doctor appt at [facility] . Pt states MD put him on new medication.   Pain Assessment  Patient Currently in Pain: Yes  Pain Assessment: 0-10  Pain Level: 4(pain 3-4)  Pain Type: Acute pain  Pain Location: Shoulder  Pain Orientation: Left  Pain Descriptors: Aching, Tightness, Pressure(light pressure)  Pain Frequency: Continuous  Clinical Progression: Gradually improving   20 1017   Left Hand Strength - Pinch (lbs)   Tip 10,10 - increase   Fine Motor Skills   Left 9 Hole Peg Test Time (secs) 58  (slower by 7 seconds)     Elbow Exercises  Elbow Therapy?: Yes  Arom Elbow Flexion Reps/Sets: lt elbow flex/extension 15x  Wrist/Hand Exercises  Pre Pronation/Supination Reps/Sets/Weight: lt UE 2# 15x  Pre Wrist Flexion Reps/Sets/Weight: lt wrist over ramp palm up wrist flex/ext 2# 15x  Pre Wrist Ext Reps/Sets/Weight: lt wrist over ramp palm down wrist flex/ext 2# 15x  Pre Radial Deviation Reps/Sets/Weight: lt wrist over ramp 2# 15x RD/UD  Other exercises  Other exercises?: Yes  Other exercises 2: juxaciser - using lt hand over & back 4 sets  Other exercises 5: 13 different size jars /containers (diameter 1.5\" -3\")  - using lt hand to unscrew/screw 13 tops while hold/stabilize items with rt hand  Other exercises 6: red 3# digiflex: lt hand gripping 20x, each finger pinching 20x, thumb 20x  Other exercises 7: key pegs: using lt hand place all 25 pegs,then remove pegs and try to hold in hand  Other exercises 9: red 10# theraband flexbar bilateral UE 20x  each way (twist, make U, make upside down U)  Other exercises 11: velcro (1 x 1\" square   with loop) - using lt hand alternate  lt fingers (index, long, ring, little) remove with extension, then place all pieces  with 3jaw grasp  Other exercises 13: small peg (3/4\" x1/4\") place /remove 30 pegs in every other hole  Assessment  Performance deficits / Impairments: Decreased strength, Decreased fine motor control, Decreased coordination, Decreased high-level IADLs, Decreased balance  Assessment: OT modified tx to decrease lt shoulder pain by not doing shoulder strengthening or excessive shoulder reaching. Pt demo slow lt hand dexterity with testing and increase lt tip pinch. Begun small peg manipulation to improve lt hand coordination. OT had pt do AROM exercise  lt elbow because using a weight would pull on shoulder. Pt reports no increase shoulder during therapy. See OT exercises for details lt UE(forearm,wrist, hand) strengthening & coordination exercises.   Treatment Diagnosis: lt UE weakness, impaired coordination  Prognosis: Good  REQUIRES OT FOLLOW UP: Yes  Discharge Recommendations: Outpatient OT           Plan  REQUIRES OT FOLLOW UP: Yes  Plan  Times per week: 2x/week  Plan weeks: 8 weeks  Current Treatment Recommendations: Strengthening, ROM, Patient/Caregiver Education & Training(coordination)  Plan Comment: continue OT  OT Individual Minutes  Time In: 1017  Time Out: 1100  Minutes: 43  Time Code Minutes   Timed Code Treatment Minutes: 43 Minutes    Electronically signed by Eulalio Chávez Mehrdad Garber on 9/16/20 at 11:14 AM EDT          Treatment Charges:  Minutes Units Time In-Out   Ultrasound      Electrical Stim      Iontophoresis      Paraffin       Massage      Eval      ADL       Ther Exercise 43 3    Ther Activities      Neuro Re-Ed      Splinting       Other      Total Treatment Time:  37

## 2020-09-16 NOTE — PROGRESS NOTES
Physical Therapy  Daily Treatment Note  Date: 2020  Patient Name: Mor Luu  MRN: [id number]     :   1945    Subjective:   General  Referring Practitioner: Tonja Pulido MD   PT Visit Information  Onset Date: 19  PT Insurance Information: [employer]  Total # of Visits Approved: 12  Total # of Visits to Date: 5  Plan of Care/Certification Expiration Date: 20  No Show: 0  Progress Note Due Date: 20  Canceled Appointment: 0  Progress Note Counter:   Subjective  Subjective: \"I feel really irritable all the time. \"    Pain Screening  Patient Currently in Pain: Yes  Pain Assessment  Pain Assessment: 0-10  Pain Level: 5  Patient's Stated Pain Goal: No pain  Pain Type: Acute pain  Pain Location: Knee  Pain Orientation: Right; Anterior  Pain Descriptors: Tender  Pain Frequency: Continuous  Clinical Progression: Gradually improving  Vital Signs  Patient Currently in Pain: Yes  Patient Observation  Observations: poor gait and loss of balance       Treatment Activities:      Bed mobility  Bridging: Independent  Transfers  Sit to Stand: Stand by assistance        Ambulation 1  Surface: level tile  Device: Rolling Walker  Assistance: Independent  Quality of Gait: fair  Gait Deviations: Slow Birdie; Increased JOSE; Decreased step length;Deviated path;Decreased arm swing  Distance: 150 ft   Comments: poor control of   Stairs/Curb  Stairs?: Yes  Stairs  # Steps : 3  Stairs Height: 8\"  Rails: Bilateral  Curbs: 2\"  Device: Rolling walker  Assistance: Supervision  Comment: Fall risk      Balance  Standing - Static: Fair  Standing - Dynamic: Poor  Comments: fall risk   PROM LLE (degrees)  LLE PROM: Exceptions  L Hip Flexion 0-125: 0-100  L Hip Extension 0-10: 0-10  L Hip ABduction 0-45: 0-30  L Hip ADduction 0-10: 0-10  L Hip External Rotation 0-45: 0-20  L Hip Internal Rotation 0-45: 0-20  L Knee Flexion 0-145: 0-95  L Knee Extension 0: 0  AROM LLE (degrees)  LLE AROM : Exceptions  LLE General AROM: poor coordination   L Hip Flexion 0-125: 0-105  L Hip Extension 0-10: 0-10  L Hip ABduction 0-45: 0-25  L Hip ADduction 0-10: 0-10  L Hip External Rotation 0-45: 0-20  L Hip Internal Rotation 0-45: 0-20  L Knee Flexion 0-145: 0--90  L Knee Extension 0: 0  Spine  Lumbar: standing flexion and extension -- 100 % loss  Strength RLE  Strength RLE: WFL  Strength LLE  Strength LLE: Exception  Comment: Grossly 2/5 left LE loss of full active normal ROM   Strength Other  Other: Trunk is 2/5 limited by loss of full normal ROM     Exercises  Exercise 1: Review of HEP   Exercise 6: Steps 15 x 2  F/L 6\"  Exercise 7: 3-way hip 15x Orange B  Exercise 8: Mini Squats 3 x 15  Exercise 9: HR/TR 25 x 3  Exercise 10: NuStep 7 min L 3  Exercise 11: SLS 3 x 15\" finger tip touch  Exercise 12: tandem stance 4 x 30\" finger tip touch  Exercise 13: Toe Taps on box 6\" 3 x 10   Ortho Screen  Posture: Flexed body posture       Assessment:   Conditions Requiring Skilled Therapeutic Intervention  Body structures, Functions, Activity limitations: Decreased functional mobility ; Decreased vision/visual deficit; Decreased ADL status; Decreased strength;Decreased coordination  Assessment: . Continue to work on is balance and leg strength to reduce his fall risk. No reported falls since his last visit. The patient's ataxia is a significant aspect to his fall risk precautions. Treatment Diagnosis: Ataxia   Prognosis: Good  REQUIRES PT FOLLOW UP: Yes  Treatment Initiated : Neuro re-ed exercises. Discharge Recommendations: Continue to assess pending progress  Activity Tolerance  Activity Tolerance: Patient Tolerated treatment well     Goals:  Short term goals  Time Frame for Short term goals: 3 weeks   Short term goal 1: OPTIMAL score 15/3 initial score goal score 12/3. Short term goal 2: Independent with HEP. Long term goals  Time Frame for Long term goals : 6 weeks  Long term goal 1: OPTIMAL score of 6/3 at discharge.   Long term goal 2: Elsi roblero fall assessment by 10 points. Long term goal 3: Improve TUG score from 23 sec to 18 sec. Patient Goals   Patient goals : Walk normal  no falls.     Plan:     Continue   Timed Code Treatment Minutes: 45 Minutes  Total Treatment Time: 45  Frequency and duration of TX  Days: 2  Weeks: 6     Therapy Time   Individual Concurrent Group Co-treatment   Time In  1100         Time Out  1130         Minutes  45 min   XRD          Timed Code Treatment Minutes: 70 Desmond Mariscal PT

## 2020-09-18 ENCOUNTER — HOSPITAL ENCOUNTER (OUTPATIENT)
Dept: PHYSICAL THERAPY | Age: 75
Setting detail: THERAPIES SERIES
Discharge: HOME | End: 2020-09-18
Payer: MEDICARE

## 2020-09-18 ENCOUNTER — HOSPITAL ENCOUNTER (OUTPATIENT)
Dept: OCCUPATIONAL THERAPY | Age: 75
Setting detail: THERAPIES SERIES
Discharge: HOME | End: 2020-09-18
Payer: MEDICARE

## 2020-09-18 PROCEDURE — 97110 THERAPEUTIC EXERCISES: CPT

## 2020-09-18 NOTE — PROGRESS NOTES
weeks   Short term goal 1: OPTIMAL score 15/3 inital score goal score 12/3. Short term goal 2: Independent with HEP. Long term goals  Time Frame for Long term goals : 6 weeks  Long term goal 1: OPTIMAL score of 6/3 at discharge. Long term goal 2: Cahnge roblero fall assesment by 10 points. Long term goal 3: Improve TUG score from 23 sec to 18 sce. Patient Goals   Patient goals : Walk normal  no falls.     Plan:    Plan  Plan Comment: Cont per POC  Timed Code Treatment Minutes: 41 Minutes     Therapy Time   Individual Concurrent Group Co-treatment   Time In 8773         Time Out 1133         Minutes 47         Timed Code Treatment Minutes: 41 Minutes      Treatment Charges: Minutes Units   []  Ultrasound     []  Electrical-Stim     []  Iontophoresis     []  Traction     []  Massage       []  Eval     []  Gait     [x]  Ther Exercise 41  3   []  Manual Therapy       []  Ther Activities       []  Aquatics     []  Vasopneumatic Device     []  Neuro Re-Ed       []  Other       Total Treatment Time: 41 3           Janie Marrero PTA

## 2020-09-18 NOTE — PROGRESS NOTES
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   Pain Assessment  Patient Currently in Pain: Denies(no lt arm pain)        Wrist/Hand Exercises  Pre Pronation/Supination Reps/Sets/Weight: lt UE 2# 15x  Pre Wrist Flexion Reps/Sets/Weight: lt wrist over ramp palm up wrist flex/ext 2# 15x  Pre Wrist Ext Reps/Sets/Weight: lt wrist over ramp palm down wrist flex/ext 2# 15x  Pre Radial Deviation Reps/Sets/Weight: lt wrist over ramp 2# 15x RD/UD  Other exercises  Other exercises?: Yes  Other exercises 2: juxaciser - using lt hand over & back 4 sets  Other exercises 3: graded clothespins: using lt hand place/remove 6 yellow, 6 red, 6 green, 6 blue (light to heavy resistance ) clothespins  Other exercises 6: red 3# digiflex: lt hand gripping 25x, each finger pinching 20x, thumb 20x  Other exercises 7: key pegs: using lt hand place all 25 pegs,then remove pegs and try to hold in hand  Other exercises 9: red 10# theraband flexbar bilateral UE 25x  each way (twist, make U, make upside down U, make C, make backward C)  Other exercises 11: velcro (1 x 1\" square   with loop) - using lt hand alternate  lt fingers (index, long, ring, little) remove with extension, then place all pieces  with 3jaw grasp  Other exercises 13: small peg (3/4\" x1/4\") place /remove 30 pegs in every other hole  Other exercises 14: orange light resistance power web: lt hand gripping 25x, lt hand each finger oppositional pinch 25x  Other exercises 15: valpar 4 (nuts/bolts) - using lt hand to unscrew & screw 4 nuts (1\" diameter), 4 nuts (1/4\" diameter)  Assessment  Performance deficits / Impairments: Decreased strength, Decreased fine motor control, Decreased coordination, Decreased high-level IADLs, Decreased balance  Assessment: Pt completes lt UE (hand,wrist, & elbow ) therapeutic exercises to improve coordination & strength for daily activity participation. Begun orange power web to increase lt hand  & pinch strength & valpar 4 to imporve lt hand manual /fine dexterity. Pt tolerates tx well. See OT exericses for details.   Treatment Diagnosis: lt UE weakness, impaired coordination  Prognosis: Good  REQUIRES OT FOLLOW UP: Yes  Discharge Recommendations: Outpatient OT           Plan  REQUIRES OT FOLLOW UP: Yes  Plan  Times per week: 2x/week  Plan weeks: 8 weeks  Current Treatment Recommendations: Strengthening, ROM, Patient/Caregiver Education & Training(coordination)  Plan Comment: continue OT  OT Individual Minutes  Time In: 0750  Time Out: 6805  Minutes: 46  Time Code Minutes   Timed Code Treatment Minutes: 46 Minutes    Electronically signed by Mathew Butterfield OT on 9/18/20 at 12:20 PM EDT          Treatment Charges:  Minutes Units Time In-Out   Ultrasound      Electrical Stim      Iontophoresis      Paraffin       Massage      Eval      ADL       Ther Exercise 46 3    Ther Activities      Neuro Re-Ed      Splinting       Other      Total Treatment Time:  46

## 2020-09-23 ENCOUNTER — HOSPITAL ENCOUNTER (OUTPATIENT)
Dept: OCCUPATIONAL THERAPY | Age: 75
Setting detail: THERAPIES SERIES
Discharge: HOME | End: 2020-09-23
Payer: MEDICARE

## 2020-09-23 ENCOUNTER — HOSPITAL ENCOUNTER (OUTPATIENT)
Dept: PHYSICAL THERAPY | Age: 75
Setting detail: THERAPIES SERIES
Discharge: HOME | End: 2020-09-23
Payer: MEDICARE

## 2020-09-23 PROCEDURE — 97110 THERAPEUTIC EXERCISES: CPT

## 2020-09-23 NOTE — PROGRESS NOTES
Physical Therapy  Daily Treatment Note  Date: 2020  Patient Name: Sanket Ramos  MRN: [id number]     :   1945    Subjective:   General  Referring Practitioner: Andreas Kimball MD   PT Visit Information  Onset Date: 19  PT Insurance Information: [employer]  Total # of Visits Approved: 12  Total # of Visits to Date: 7  Plan of Care/Certification Expiration Date: 20  No Show: 0  Progress Note Due Date: 20  Progress Note Counter:   Subjective  Subjective: Patient reports today with no new complaints. Noted he does feel a little stiff today. Pain Screening  Patient Currently in Pain: Denies  Vital Signs  Patient Currently in Pain: Denies  Patient Observation  Observations: poor gait and loss of balance       Treatment Activities:      Bed mobility  Bridging: Supervision  Transfers  Sit to Stand: Stand by assistance        Ambulation  Ambulation?: Yes  WB Status: WBAT Left   Ambulation 1  Surface: level tile  Device: Rolling Walker  Assistance: Modified Independent  Quality of Gait: fair  Gait Deviations: Slow Birdie; Increased JOSE; Decreased step length;Deviated path;Decreased arm swing  Distance: 150 ft   Comments: poor control of   Stairs/Curb  Stairs?: Yes  Stairs  # Steps : 3  Stairs Height: 8\"  Rails: Bilateral  Curbs: 2\"  Device: Rolling walker  Assistance: Supervision  Comment: Fall risk      Balance  Posture: Fair  Sitting - Static: Good  Sitting - Dynamic: Good  Standing - Static: Fair  Standing - Dynamic: Poor  Tandem Stance R Le  Tandem Stance L Le  Single Leg Stance R Le  Single Leg Stance L Le  Comments: fall risk   PROM RLE (degrees)  RLE PROM: WFL  Strength RLE  Strength RLE: WFL  Strength LLE  Strength LLE: Exception  Comment: Grossly 2/5 left LE loss of full active normal ROM   Strength Other  Other: Trunk is 2/5 limited by loss of full normal ROM     Exercises  Exercise 1: Review of HEP   Exercise 2: SLR 2x10  Exercise 3: Bridges 2x10 3\" hold  Exercise 4: SL HIp Abd 2x10  Exercise 5: Clam Shells 2x10  Exercise 6: Steps 15 x 2  F/L 6\"  Exercise 7: 3-way hip 15x Orange B  Exercise 8: Mini Squats 3 x 15  Exercise 9: HR/TR 25 x 3  Exercise 10: NuStep 6 min L 3  Exercise 11: SLS 3 x 15\" finger tip touch  Exercise 12: tandem stance 4 x 30\" finger tip touch  Exercise 13: Toe Taps on box 6\" 3 x 10      Ortho Screen  Posture: Flexed body posture          Assessment:   Conditions Requiring Skilled Therapeutic Intervention  Body structures, Functions, Activity limitations: Decreased functional mobility ; Decreased ADL status; Decreased strength  Assessment: Cont exercises per chart to improve LE strength and balance. Attempted balance exercises without UE support this date with patient unable to complete safely and cont with finger tip touch for minimal tactile feedback. Decision Making: Low Complexity      Goals:  Short term goals  Time Frame for Short term goals: 3 weeks   Short term goal 1: OPTIMAL score 15/3 initial score goal score 12/3. Short term goal 2: Independent with HEP. Long term goals  Time Frame for Long term goals : 6 weeks  Long term goal 1: OPTIMAL score of 6/3 at discharge. Long term goal 2: Elsi roblero fall assessment by 10 points. Long term goal 3: Improve TUG score from 23 sec to 18 sec. Patient Goals   Patient goals : Walk normal  no falls. Plan:     Continue with gait and balance activities.          Therapy Time   Individual Concurrent Group Co-treatment   Time In  8158         Time Out  1100         Minutes  39 min                Genevieve Ortiz, PT

## 2020-09-28 NOTE — PROGRESS NOTES
Occupational [address] [facility]  Occupational Therapy Progress Note  Date: 20  Patient Name: Mor Luu      MRN: [id number]  Account: [de-identified]   : 1945  (76 y.o.)  Gender: male      Diagnosis: Intracerebral hemorrhage  Additional Pertinent Hx: Ataxia;  Non-Traumatic Intracerebral Hemorrhage  OT Visit Information  Onset Date: 19  Total # of Visits Approved: 16  Total # of Visits to Date: 7  Vision: Impaired(Patient was diagnosed with diplopia and was prescribed corrective lenses to correct, however patient reported that he stopped wearing them as he does not believe he needs them)  Vision Exceptions: Wears glasses at all times    Social/Functional History  Active : Yes(Patient reported that he has been driving, however did not have a  evaluation)  Education: Educated patient regarding the  evaluation  IADL Comments: Patient drives his tractor  Coordination  Coordination and Movement description: Fine motor impairments, Left UE, Ataxia, Decreased speed, Decreased accuracy  Other exercises  Other exercises?: Yes  Other exercises 1: Pronation/supination left forearm with 2#, 15#  Other exercises 2: Wrist flex/ext with 2#, 15x  Other exercises 3: Wrist radial/deviation with 2#, 15#  Other exercises 4: Juxaciser L hand, 4 sets  Other exercises 5: Graded clothespins using L hand  Other exercises 6: Red Digiflex, digits 2-5 L hand, 25x; L thumb 25x  Other exercises 7: Keyhole pegboard, L hand using 3-jaw grasp  Other exercises 8: Red theraband flexbar B UE, 25x  Other exercises 9: Velcro checkers, L hand  Other exercises 10: Valpar 4 Nuts & Bolts, L hand, 1' & 4\" nuts  Assessment  Performance deficits / Impairments: Decreased functional mobility , Decreased coordination, Decreased posture, Decreased balance, Decreased strength, Decreased vision/visual deficit, Decreased fine motor control, Decreased high-level IADLs  Prognosis: Fair  Decision Making: High Complexity  REQUIRES OT FOLLOW UP: Yes     REQUIRES OT FOLLOW UP: Yes  Plan  Times per week: 2x/wk  Plan weeks: 8  Current Treatment Recommendations: Strengthening, Patient/Caregiver Education & Training, Balance Training, Functional Mobility Training, Safety Education & Training  OT Individual Minutes  Time In: 0930  Time Out: 9581  Minutes: [address] [facility]  [address] [facility], [address] [facility] [address]   [address] [facility], [address]  Phone: [phone]  Fax: [phone]  Electronically signed by Vinny Marroquin OT on 9/28/20 at 2:04 PM EDT

## 2020-09-30 ENCOUNTER — HOSPITAL ENCOUNTER (OUTPATIENT)
Dept: OCCUPATIONAL THERAPY | Age: 75
Setting detail: THERAPIES SERIES
Discharge: HOME | End: 2020-09-30
Payer: MEDICARE

## 2020-09-30 ENCOUNTER — HOSPITAL ENCOUNTER (OUTPATIENT)
Dept: PHYSICAL THERAPY | Age: 75
Setting detail: THERAPIES SERIES
Discharge: HOME | End: 2020-09-30
Payer: MEDICARE

## 2020-09-30 PROCEDURE — 97110 THERAPEUTIC EXERCISES: CPT

## 2020-09-30 ASSESSMENT — PAIN DESCRIPTION - DESCRIPTORS: DESCRIPTORS: SORE

## 2020-09-30 ASSESSMENT — PAIN SCALES - GENERAL: PAINLEVEL_OUTOF10: 5

## 2020-09-30 ASSESSMENT — PAIN DESCRIPTION - LOCATION: LOCATION: SHOULDER

## 2020-09-30 ASSESSMENT — PAIN DESCRIPTION - ORIENTATION: ORIENTATION: LEFT

## 2020-09-30 ASSESSMENT — PAIN DESCRIPTION - PAIN TYPE: TYPE: ACUTE PAIN

## 2020-09-30 NOTE — PROGRESS NOTES
Physical Therapy  Daily Treatment Note  Date: 2020  Patient Name: Patricia Blas  MRN: [id number]     :   1945    Subjective:   General  Chart Reviewed: Yes  Response To Previous Treatment: Not applicable  Family / Caregiver Present: No  Referring Practitioner: Padmini Atkins MD   PT Visit Information  Onset Date: 19  PT Insurance Information: [employer]  Total # of Visits Approved: 12  Total # of Visits to Date: 8  Plan of Care/Certification Expiration Date: 20  No Show: 0  Progress Note Due Date: 20  Canceled Appointment: 0  Progress Note Counter:   Subjective  Subjective: Patient reports today with increase in generral body fgatigue. Noted he did a lot of yard work yesterday and has been tired since. General Comment  Comments: Pt arrived 15 min late, able to accomodate with shortened tretment, OT at 11:00  Pain Screening  Patient Currently in Pain: Denies  Vital Signs  Patient Currently in Pain: Denies       Treatment Activities:     Exercises  Exercise 2: SLR 2x10  Exercise 3: Bridges 2x10 3\" hold  Exercise 4: SL HIp Abd 2x10  Exercise 9: HR/TR 25 x 3  Exercise 11: SLS 3 x 15\" finger tip touch  Exercise 12: tandem stance 4 x 30\" finger tip touch  Exercise 13: Toe Taps on box 6\" 3 x 10     Assessment:   Conditions Requiring Skilled Therapeutic Intervention  Body structures, Functions, Activity limitations: Decreased functional mobility ; Decreased ADL status; Decreased strength  Assessment: Cont exercises per chart to improve LE strength and balance. Pateint able to perform balance activities this date with single finger tip touch vs B. Treatment Diagnosis: Ataxia   Prognosis: Good  Decision Making: Low Complexity  REQUIRES PT FOLLOW UP: Yes  Discharge Recommendations: Continue to assess pending progress     Goals:  Short term goals  Time Frame for Short term goals: 3 weeks   Short term goal 1: OPTIMAL score 15/3 inital score goal score 12/3.    Short term goal 2: Independent with HEP. Long term goals  Time Frame for Long term goals : 6 weeks  Long term goal 1: OPTIMAL score of 6/3 at discharge. Long term goal 2: Cahnge roblero fall assesment by 10 points. Long term goal 3: Improve TUG score from 23 sec to 18 sce. Patient Goals   Patient goals : Walk normal  no falls.     Plan:    Plan  Plan Comment: Cont per POC  Timed Code Treatment Minutes: 27 Minutes     Therapy Time   Individual Concurrent Group Co-treatment   Time In 1033         Time Out 1100         Minutes 27         Timed Code Treatment Minutes: 27 Minutes      Treatment Charges: Minutes Units   []  Ultrasound     []  Electrical-Stim     []  Iontophoresis     []  Traction     []  Massage       []  Eval     []  Gait     [x]  Ther Exercise 27  2   []  Manual Therapy       []  Ther Activities       []  Aquatics     []  Vasopneumatic Device     []  Neuro Re-Ed       []  Other       Total Treatment Time: 32 2           Nichole Shore, PTA

## 2020-09-30 NOTE — PROGRESS NOTES
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 Pt states he will have CAT scan but waiting for appt. Then results will go to [address] [place] [address]. Pt states he still isn't suppose to do lt shoulder exercises d/t pain. Pt states that sometimes he feels depressed and frustrated by the changes in what he is able to now compared to prior to his brain bleed. Pt states that he doesn't take depression medicine.   Pain Assessment  Patient Currently in Pain: Yes  Pain Assessment: 0-10  Pain Level: 5(pain 4-5)  Pain Type: Acute pain  Pain Location: Shoulder  Pain Orientation: Left  Pain Descriptors: Sore  Patient's Stated Pain Goal: No pain     Elbow Exercises  Pre Elbow Flexion Reps/Sets/Weight: lt elbow palm up flex/ext 2# 15x  Wrist/Hand Exercises  Pre Pronation/Supination Reps/Sets/Weight: lt UE 2# 15x  Pre Wrist Flexion Reps/Sets/Weight: lt wrist over ramp palm up wrist flex/ext 2# 15x  Pre Wrist Ext Reps/Sets/Weight: lt wrist over ramp palm down wrist flex/ext 2# 15x  Pre Radial Deviation Reps/Sets/Weight: lt wrist over ramp 2# 15x RD/UD  Other exercises  Other exercises?: Yes  Other exercises 6: Red  3# Digiflex, lt hand gripping 25x, digits 2-5 L hand 25x each ; L thumb 25x  Other exercises 7: Keyhole pegboard, L hand using 3-jaw grasp place all pegs then remove pegs & hold in hand  Other exercises 8: red 10# theraband flexbar bilateral UE 25x each way (twist, make U, make upside down U, make C, make backward C)  Other exercises 11: velcro (1 x 1\" square   with loop) - using lt hand alternate  lt fingers (index, long, ring, little) remove with extension, then place all pieces  with 3jaw grasp  Other exercises 14: orange light resistance power web: lt hand gripping 25x, lt hand each finger oppositional pinch 25x  Other exercises 15: valpar 4 (nuts/bolts) - using lt hand to unscrew & screw 8 nuts (1\" diameter), 8 nuts (1/4\" diameter)  Assessment  Performance deficits / Impairments: Decreased strength, Decreased fine motor control, Decreased coordination, Decreased high-level IADLs, Decreased balance  Assessment: OT tx focus on lt UE coordination and strengthening exercises. No lt  shoulder exercises were done because pt states his MD still doesn't want him to do that. Pt takes increase time to complete lt hand fine motor dexterity key peg manipulation, 1\" & 1/4\" nuts on /off bolts. Pt drops a couple key pegs and couple velcro checkers when using lt hand. Provided supportive listening to pt as he voiced changes in what he can do & can't do now at home. See OT exercises for details.   Treatment Diagnosis: lt UE weakness, impaired coordination  Prognosis: Good  REQUIRES OT FOLLOW UP: Yes  Discharge Recommendations: Outpatient OT           Plan  REQUIRES OT FOLLOW UP: Yes  Plan  Times per week: 2x/wk  Plan weeks: 8  Current Treatment Recommendations: Strengthening, Patient/Caregiver Education & Training, Safety Education & Training, ROM(coordination)  Plan Comment: continue OT  OT Individual Minutes  Time In: 1104  Time Out: 8193  Minutes: 47  Time Code Minutes   Timed Code Treatment Minutes: 52 Minutes    Electronically signed by Dalton Almeida OT on 9/30/20 at 12:08 PM EDT          Treatment Charges:  Minutes Units Time In-Out   Ultrasound      Electrical Stim      Iontophoresis      Paraffin       Massage      Eval      ADL Ther Exercise 47 3    Ther Activities      Neuro Re-Ed      Splinting       Other      Total Treatment Time:  52

## 2020-10-01 ENCOUNTER — HOSPITAL ENCOUNTER (OUTPATIENT)
Dept: PHYSICAL THERAPY | Age: 75
Setting detail: THERAPIES SERIES
Discharge: HOME | End: 2020-10-01
Payer: MEDICARE

## 2020-10-05 ENCOUNTER — HOSPITAL ENCOUNTER (OUTPATIENT)
Dept: OCCUPATIONAL THERAPY | Age: 75
Setting detail: THERAPIES SERIES
Discharge: HOME | End: 2020-10-05
Payer: MEDICARE

## 2020-10-05 ENCOUNTER — HOSPITAL ENCOUNTER (OUTPATIENT)
Dept: PHYSICAL THERAPY | Age: 75
Setting detail: THERAPIES SERIES
Discharge: HOME | End: 2020-10-05
Payer: MEDICARE

## 2020-10-05 PROCEDURE — 97110 THERAPEUTIC EXERCISES: CPT

## 2020-10-05 NOTE — PROGRESS NOTES
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 Pt states that they have been up in [place] seeing the sights and color changes. Pt reports a little tired today from driving.   Pain Assessment  Patient Currently in Pain: Denies     Elbow Exercises  Pre Elbow Flexion Reps/Sets/Weight: lt elbow palm up flex/ext 2# 15x (elbow on ramp)  Wrist/Hand Exercises  Pre Pronation/Supination Reps/Sets/Weight: lt UE 2# 15x  Pre Wrist Flexion Reps/Sets/Weight: lt wrist over ramp palm up wrist flex/ext 2# 15x  Pre Wrist Ext Reps/Sets/Weight: lt wrist over ramp palm down wrist flex/ext 2# 15x  Pre Radial Deviation Reps/Sets/Weight: lt wrist over ramp 2# 15x RD/UD  Other exercises  Other exercises 7: Keyhole pegboard, L hand using 3-jaw grasp place all pegs then remove pegs & hold in hand  Other exercises 13: small peg (3/4\" x1/4\") place /remove 30 pegs in every other hole using lt hand  Other exercises 14: green moderate resistance power web: lt hand gripping 25x, lt hand each finger oppositional pinch 25x  Other exercises 15: valpar 4 (nuts/bolts) - using lt hand to unscrew & screw 8 nuts  (1/4\" diameter)  Assessment  Performance deficits / Impairments: Decreased strength, Decreased fine motor control, Decreased coordination, Decreased high-level IADLs, Decreased balance  Assessment: OT tx focus on therapeutic exercises to improve lt UE coordination & strength. Pt completes green moderate resistance digiflex with little difficulty completing oppositional pinch. Pt drops small pegs & key pegs when using lt hand. See OT exercises for details.   Treatment Diagnosis: lt UE weakness, impaired coordination  Prognosis: Good  REQUIRES OT FOLLOW UP: Yes  Discharge Recommendations: Outpatient OT           Plan  REQUIRES OT FOLLOW UP: Yes  Plan  Times per week: 2x/wk  Plan weeks: 8 weeks  Specific instructions for Next Treatment: re-eval next visit  Current Treatment Recommendations: Strengthening, Patient/Caregiver Education & Training, Safety Education & Training, ROM(coordination)  Plan Comment: continue OT  OT Individual Minutes  Time In: 1628  Time Out: 1700  Minutes: 32  Time Code Minutes   Timed Code Treatment Minutes: 32 Minutes    Electronically signed by Nitin Rodrigues OT on 10/5/20 at 5:10 PM EDT          Treatment Charges:  Minutes Units Time In-Out   Ultrasound      Electrical Stim      Iontophoresis      Paraffin       Massage      Eval      ADL       Ther Exercise 32 2    Ther Activities      Neuro Re-Ed      Splinting       Other      Total Treatment Time:  32

## 2020-10-07 ENCOUNTER — HOSPITAL ENCOUNTER (OUTPATIENT)
Dept: OCCUPATIONAL THERAPY | Age: 75
Setting detail: THERAPIES SERIES
Discharge: HOME | End: 2020-10-07
Payer: MEDICARE

## 2020-10-07 ENCOUNTER — HOSPITAL ENCOUNTER (OUTPATIENT)
Dept: PHYSICAL THERAPY | Age: 75
Setting detail: THERAPIES SERIES
Discharge: HOME | End: 2020-10-07
Payer: MEDICARE

## 2020-10-07 PROCEDURE — 97110 THERAPEUTIC EXERCISES: CPT

## 2020-10-07 ASSESSMENT — PAIN DESCRIPTION - PAIN TYPE
TYPE: ACUTE PAIN
TYPE: ACUTE PAIN

## 2020-10-07 ASSESSMENT — PAIN DESCRIPTION - DESCRIPTORS
DESCRIPTORS: TIGHTNESS
DESCRIPTORS: ACHING

## 2020-10-07 ASSESSMENT — 9 HOLE PEG TEST
TESTTIME_SECONDS: 25
TEST_RESULT: IMPAIRED
TESTTIME_SECONDS: 58

## 2020-10-07 ASSESSMENT — PAIN SCALES - GENERAL
PAINLEVEL_OUTOF10: 3
PAINLEVEL_OUTOF10: 4

## 2020-10-07 ASSESSMENT — PAIN DESCRIPTION - LOCATION
LOCATION: KNEE
LOCATION: SHOULDER

## 2020-10-07 ASSESSMENT — PAIN DESCRIPTION - FREQUENCY
FREQUENCY: INTERMITTENT
FREQUENCY: INTERMITTENT

## 2020-10-07 ASSESSMENT — PAIN - FUNCTIONAL ASSESSMENT: PAIN_FUNCTIONAL_ASSESSMENT: ACTIVITIES ARE NOT PREVENTED

## 2020-10-07 ASSESSMENT — PAIN DESCRIPTION - ORIENTATION
ORIENTATION: LEFT
ORIENTATION: LEFT

## 2020-10-07 ASSESSMENT — PAIN DESCRIPTION - PROGRESSION: CLINICAL_PROGRESSION: GRADUALLY IMPROVING

## 2020-10-07 NOTE — PROGRESS NOTES
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 Pt states his balance was off today. Social/Functional History  IADL Comments: Using the UEFS pt scores 2 (moderate difficulty) tying shoes,open jar,open door, laundry & dishes (he uses rt UE more than lt UE), scores 1 (quite a bit of difficulty ) pushing up from chair, tossing ball. Upper Extremity Functional Scale -   Instruction to patient  - We are interested in knowing whether you are having any difficulty at all with the activities listed below because of your upper limb problem for which you are currently seeking attention.     Key:  0 = Extreme Difficulty or Unable to Perform Activity   1 = Quite a Bit of Difficulty   2 = Moderate difficulty   3 = A Little Bit of Difficulty   4 = No Difficulty     Objective    UE Function   LUE Strength  L Shoulder Flex: 4-/5(pain)  L Shoulder ABduction: 3+/5(pain)  L Shoulder Int Rotation: 4/5  L Shoulder Ext Rotation: 4-/5(pain)  L Elbow Flex: 5/5  L Elbow Ext: 5/5  L Forearm Pron: 5/5  L Forearm Sup: 5/5  L Wrist Flex: 5/5  L Wrist Ext: 5/5  L Hand General: (extension 4-/5, flexion 4/5)   RUE Strength  R Shoulder Flex: 4/5  R Shoulder ABduction: 4/5   Left Hand Strength - Pinch (lbs)  Tip: 12, 10 average 11# - increase   Fine Motor Skills  [place] Rate of Manipulation: MROM placing test : rt UE 1 minute 50 seconds, lt UE 2 minutes 48  seconds (pt reports occassional numbness rt fingers, lt arm gets numb). Pt reports lt shoulder tired. (Pt is 32 sesconds quicker than eval using lt hand,1 second quicker using rt hand). Left 9-Hole Peg Test: Impaired  Left 9 Hole Peg Test Time (secs): 58  Right 9 Hole Peg Test Time (secs): 25(pt 4 seconds quicker than eval)   Other exercises  Other exercises 1: HEP lt UE /hand . OT issued and instructed pt in coordination & dexterity OT handout (21 exercises /activities). Pt asked appropriate questions, practiced hand ROM for dexterity,manipulation paper clips. Other exercises 8: red 10# theraband flexbar bilateral UE 25x each way (twist, make U, make upside down U, make C, make backward C)  Other exercises 13: small peg (3/4\" x1/4\") place /remove 30 pegs in every other hole using lt hand  Other exercises 14: green moderate resistance power web: lt hand gripping 25x, lt hand each finger oppositional pinch 25x  Other exercises 15: valpar 4 (nuts/bolts) - using lt hand to unscrew & screw 8 nuts  (1/4\" diameter)  Assessment  Performance deficits / Impairments: Decreased strength, Decreased fine motor control, Decreased coordination, Decreased high-level IADLs, Decreased balance  Treatment Diagnosis: lt UE weakness, impaired coordination  Prognosis: Good  REQUIRES OT FOLLOW UP: Yes  Discharge Recommendations: Outpatient OT    Goals  Patient Goals   Patient goals : To elimate the shaking in lt arm  and get his lt strength back. Pt making progress - goal ongoing. Short term goals  Short term goal 1: Pt verbalize independence & correctly demonstrate lt UE HEP. Meeting goal (Pt was instructed in lt Ue/hand coordination HEP).   Short term goal 2: Pt will demonstrate & verbalize decrease difficulty w fine motor ADLs (tying shoes), complete 9 hole peg test 10 seconds faster than eval using lt hand. Goals ongoing. Short term goal 3: Pt will demonstrate improved lt hand manual dexterity. Pt will complete MROM placing test 45 seconds quicker using lt hand. Pt making progress with goal ongoing for MROM testing. Short term goal 4: Increase lt tip pinch by 4# . Pt making progress with 3.5# increase . Goal ongoing. Long term goals  Long term goal 1: Using the UEFS pt will report a score of 2 (moderate difficulty) pushing up from chair using lt hand,assisting w laundry, open jar, toss ball, score 3 (little difficulty) using lt UE to open door, tie his shoes. Goal met laundry, open jar. Pt making progress. goall ongoing for open door, tying shoes  Long term goal 2: Compared to eval pt will complete 9 hole peg test 20 seconds faster using lt hand. Goal ongoing. Long term goal 3: Compared to eval pt will complete MROM placing test 60-80 seconds quicker using lt hand. Pt will verbalize improved coordination when manipulating objects 1\"diameter or smaller. Pt making progress but goal is ongoing. Long term goal 4: Compared to eval increase lt pinch by 7#. Pt making progress,goal ongoing. Long term goal 5: Increase lt UE strength to 4/5 for shoulder (flex,abduction, IR,ER), elbow (flex,extension), forearm(pronation), wrist(flex,extension), hand (flex, extension) to improve pt's lt UE lift/carry ability to put items in fridge, in cabinets,open jars,open doors. Goal met lt elobw, forearm, wrist,hand flexion. Goal ongoing for lift/carry & lt shoulder (flex,exrtension,ER) hand extension. Plan  REQUIRES OT FOLLOW UP: Yes  Plan  Times per week: 2x/wk  Plan weeks: 5 weeks  Current Treatment Recommendations: Strengthening, Patient/Caregiver Education & Training, Safety Education & Training, ROM(coordination)  Plan Comment: continue OT.  Send OT re-eval to MD GANDARA Individual Minutes  Time In: 1106  Time Out: 1200  Minutes: 54  Time Code Minutes   Timed Code Treatment Minutes: 40 Minutes  Rehab Potential:  [x] Good  [] Fair  [] Poor   Barriers to Goal Achievement:  [x] No  [] Yes: Domestic Concerns:  [x] No  [] Yes:  Treatment Plan:  [x] Therapeutic Exercise      [x] Instruction in HEP       Frequency: recommend 2 X/wk x    5      wk's    [x] Plans/Goals, Risk/Benefits discussed with pt  Comprehension of Education [x] D/V Understanding  [] Needs Review  Pt Education: [x] Verbal  [x] Demo  [x] Written    Medicare/Regulatory Requirements:   I have reviewed this plan of care and certify a need for Medically necessary rehabilitation services.         [x] Physician Signature                                      Date:   [address] [facility]  [facility], [address] [place] [address],[address]   [facility], [address]  Phone: [phone]  Fax: [phone]  Electronically signed by Valery Rutherford OT on 10/7/20 at 2:45 PM EDT    Treatment Charges:  Minutes Units Time In-Out   Ultrasound      Electrical Stim      Iontophoresis      Paraffin       Massage      Eval      ADL       Ther Exercise 44 3    Ther Activities      Neuro Re-Ed      Splinting       Re-eval  10 0    Total Treatment Time:  54

## 2020-10-10 ASSESSMENT — PAIN - FUNCTIONAL ASSESSMENT: PAIN_FUNCTIONAL_ASSESSMENT: ACTIVITIES ARE NOT PREVENTED

## 2020-10-10 ASSESSMENT — PAIN SCALES - GENERAL: PAINLEVEL_OUTOF10: 2

## 2020-10-10 ASSESSMENT — PAIN DESCRIPTION - ORIENTATION: ORIENTATION: LEFT

## 2020-10-10 ASSESSMENT — PAIN DESCRIPTION - PAIN TYPE: TYPE: ACUTE PAIN

## 2020-10-10 ASSESSMENT — PAIN DESCRIPTION - DESCRIPTORS: DESCRIPTORS: ACHING

## 2020-10-10 ASSESSMENT — PAIN DESCRIPTION - PROGRESSION: CLINICAL_PROGRESSION: GRADUALLY IMPROVING

## 2020-10-10 ASSESSMENT — PAIN DESCRIPTION - FREQUENCY: FREQUENCY: INTERMITTENT

## 2020-10-10 ASSESSMENT — PAIN DESCRIPTION - LOCATION: LOCATION: KNEE

## 2020-10-10 NOTE — PROGRESS NOTES
Physical Therapy  Daily Treatment Note  Date: 10/05/2020  Patient Name: Velma Mcgee  MRN: [id number]     :   1945    Subjective:   General  Chart Reviewed: Yes  Response To Previous Treatment: Not applicable  Family / Caregiver Present: No  Referring Practitioner: Isaías Harding MD   PT Visit Information  Onset Date: 19  PT Insurance Information: [employer]  Total # of Visits Approved: 12  Total # of Visits to Date: 9  Plan of Care/Certification Expiration Date: 20  No Show: 1  Progress Note Due Date: 10/23/20  Canceled Appointment: 0  Progress Note Counter:   Subjective  Subjective: Patient reports feeling good today. No falls or near falls since last treatment. The patient reports trying to do more and more around his house. Noted driving is not a problem. .  Pain Screening  Patient Currently in Pain: Yes  Pain Assessment  Pain Assessment: 0-10  Pain Level: 2  Patient's Stated Pain Goal: No pain  Pain Type: Acute pain  Pain Location: Knee  Pain Orientation: Left  Pain Descriptors: Aching  Pain Frequency: Intermittent  Clinical Progression: Gradually improving  Functional Pain Assessment: Activities are not prevented  Non-Pharmaceutical Pain Intervention(s): Ambulation/Increased Activity  Response to Pain Intervention: None  Multiple Pain Sites: Yes  Vital Signs  Patient Currently in Pain: Yes  Patient Observation  Observations: poor gait and loss of balance       Treatment Activities:      Bed mobility  Bridging: Independent  Transfers  Sit to Stand: Stand by assistance        Ambulation  Ambulation?: Yes  WB Status: WBAT Left   Ambulation 1  Surface: level tile  Device: Rolling Walker  Assistance: Independent  Quality of Gait: fair  Gait Deviations: Slow Birdie; Increased JOSE; Decreased step length;Deviated path;Decreased arm swing  Distance: 150 ft   Comments: poor control of   Stairs/Curb  Stairs?: Yes  Stairs  # Steps : 3  Stairs Height: 8\"  Rails: Bilateral  Curbs: 2\"  Device: Rolling walker  Assistance: Supervision  Comment: Fall risk      Balance  Posture: Fair  Sitting - Static: Good  Sitting - Dynamic: Good  Standing - Static: Fair  Comments: fall risk   PROM RLE (degrees)  RLE PROM: WFL  PROM LLE (degrees)  LLE PROM: Exceptions  AROM LLE (degrees)  LLE AROM : Exceptions  Spine  Lumbar: standing flexion and extension -- 100 % loss  Strength RLE  Strength RLE: WFL  Strength LLE  Strength LLE: Exception  Comment: Grossly 2/5 left LE loss of full active normal ROM   Strength Other  Other: Trunk is 2/5 limited by loss of full normal ROM     Exercises  Exercise 6: Steps 15 x 2  F/L 6\"  Exercise 7: 3-way hip 15x Orange B  Exercise 8: Mini Squats 3 x 15  Exercise 9: HR/TR 25 x 3  Exercise 10: NuStep 7 min L 3  Exercise 11: SLS 3 x 15\" finger tip touch  Exercise 12: tandem stance 4 x 30\" finger tip touch  Exercise 13: Toe Taps on box 6\" 3 x 10           Ortho Screen  Posture: Flexed body posture          Assessment:   Conditions Requiring Skilled Therapeutic Intervention  Body structures, Functions, Activity limitations: Decreased functional mobility ; Decreased vision/visual deficit; Decreased ADL status; Decreased strength;Decreased coordination  Assessment: The patient continue to progress slowly with balance and gait activities. His fall risks are unchanged at this time due to his unsteadiness with walking and dynamic balance. Treatment Diagnosis: Ataxia   Prognosis: Good  Decision Making: Low Complexity  REQUIRES PT FOLLOW UP: Yes  Treatment Initiated : Neuro re-ed exercises. Discharge Recommendations: Continue to assess pending progress     Goals:  Short term goals  Time Frame for Short term goals: 3 weeks   Short term goal 1: OPTIMAL score 15/3 initial score goal score 12/3. (MET)  Short term goal 2: Independent with HEP. (MET)  Long term goals  Time Frame for Long term goals : 6 weeks  Long term goal 1: OPTIMAL score of 6/3 at discharge. (NOT MET)  Long term goal 2: Change roblero fall assessment by 10 points. (NOT MET)  Long term goal 3: Improve TUG score from 23 sec to 18 sec. (NOT MET)  Patient Goals   Patient goals : Walk normal  no falls. (NOT MET)    Plan:     Continue   Timed Code Treatment Minutes: 45 Minutes  Total Treatment Time: 45  Frequency and duration of TX  Days: 2  Weeks: 6     Therapy Time   Individual Concurrent Group Co-treatment   Time In  1700         Time Out  1745         Minutes  45 min          Timed Code Treatment Minutes: 45 Minutes     Treatment Charges: Minutes Units   []  Ultrasound     []  Electrical-Stim     []  Iontophoresis     []  Traction     []  Massage       []  Eval     []  Gait     [x]  Ther Exercise 45  3   []  Manual Therapy       []  Ther Activities       []  Aquatics     []  Vasopneumatic Device     []  Neuro Re-Ed       []  Other       Total Treatment Time: 39 3          Arleene Fraction, PT

## 2020-10-12 ENCOUNTER — HOSPITAL ENCOUNTER (OUTPATIENT)
Dept: OCCUPATIONAL THERAPY | Age: 75
Setting detail: THERAPIES SERIES
Discharge: HOME | End: 2020-10-12
Payer: MEDICARE

## 2020-10-12 ENCOUNTER — HOSPITAL ENCOUNTER (OUTPATIENT)
Dept: PHYSICAL THERAPY | Age: 75
Setting detail: THERAPIES SERIES
Discharge: HOME | End: 2020-10-12
Payer: MEDICARE

## 2020-10-12 PROCEDURE — 97110 THERAPEUTIC EXERCISES: CPT

## 2020-10-12 PROCEDURE — 97112 NEUROMUSCULAR REEDUCATION: CPT

## 2020-10-12 ASSESSMENT — PAIN DESCRIPTION - LOCATION: LOCATION: SHOULDER

## 2020-10-12 ASSESSMENT — PAIN DESCRIPTION - PAIN TYPE: TYPE: ACUTE PAIN

## 2020-10-12 ASSESSMENT — PAIN DESCRIPTION - ORIENTATION: ORIENTATION: LEFT

## 2020-10-12 ASSESSMENT — PAIN DESCRIPTION - FREQUENCY: FREQUENCY: INTERMITTENT

## 2020-10-12 ASSESSMENT — PAIN SCALES - GENERAL: PAINLEVEL_OUTOF10: 4

## 2020-10-12 ASSESSMENT — PAIN DESCRIPTION - DESCRIPTORS: DESCRIPTORS: TIGHTNESS

## 2020-10-12 NOTE — PROGRESS NOTES
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 Pt states he saw his kids over the weekend and had a hobo dinner. Pt states he is using borrowed walker with seat from a friend . Pt states he stands taller with this walker  Pain Assessment  Patient Currently in Pain: Yes  Pain Assessment: 0-10  Pain Level: 4  Pain Type: Acute pain  Pain Location: Shoulder  Pain Orientation: Left  Pain Descriptors: Tightness(stiffness lt shoulder)  Pain Frequency: Intermittent  Patient's Stated Pain Goal: No pain     Elbow Exercises  Pre Elbow Flexion Reps/Sets/Weight: lt elbow palm up flex/ext 2# 15x (elbow on ramp)  Pre Elbow Extension Reps/Sets/Weight: lt elbow palm down flex/ext 2# 15x  Wrist/Hand Exercises  Pre Pronation/Supination Reps/Sets/Weight: lt UE 2# 15x  Pre Wrist Flexion Reps/Sets/Weight: lt wrist over ramp palm up wrist flex/ext 2# 15x  Pre Wrist Ext Reps/Sets/Weight: lt wrist over ramp palm down wrist flex/ext 2# 15x  Pre Radial Deviation Reps/Sets/Weight: lt wrist over ramp 2# 15x RD/UD  Other exercises  Other exercises?: Yes  Other exercises 7: Keyhole pegboard, L hand using 3-jaw grasp place all pegs then remove pegs & hold in hand  Other exercises 8: red 10# theraband flexbar bilateral UE 25x each way (twist, make U, make upside down U, make C, make backward C)  Other exercises 11: velcro (1 x 1\" square   with loop) - using lt hand alternate  lt fingers (index, long, ring, little) remove with extension, then place all pieces  with 3jaw grasp  Other exercises 13: small peg (3/4\" x1/4\") place /remove 30 pegs in every other hole using lt hand  Other exercises 15: valpar 4 (nuts/bolts) - using lt hand to unscrew & screw 8 nuts  (1/4\" diameter)  Other exercises 16: red/beige peg (1/2\" diameter) - using lt hand flip over all 8 rows (47 pegs)  Assessment  Performance deficits / Impairments: Decreased strength, Decreased fine motor control, Decreased coordination, Decreased high-level IADLs, Decreased balance  Assessment: Begun manipulation of cylindrical pegs using lt hand to improve pt's manual dexterity. Pt demonstrate min difficulty flipping over 1/2\" diameter pegs but drops 1/4\" diameter pegs when using lt hand. OT tx focus on lt UE (elbow,wrist,hand) strengthening and coordination exercises. See OT exercises for details. Lt shoulder exercises not done until pt reports that his MD resumes that exercise.   Treatment Diagnosis: lt UE weakness, impaired coordination  Prognosis: Good  REQUIRES OT FOLLOW UP: Yes  Discharge Recommendations: Outpatient OT           Plan  REQUIRES OT FOLLOW UP: Yes  Plan  Times per week: 2x/wk  Plan weeks: 5 weeks  Current Treatment Recommendations: Strengthening, Patient/Caregiver Education & Training, Safety Education & Training, ROM(coordination)  Plan Comment: continue OT  OT Individual Minutes  Time In: 8065  Time Out: 2651  Minutes: 42  Time Code Minutes   Timed Code Treatment Minutes: 42 Minutes    Electronically signed by Nitin Rodrigues OT on 10/12/20 at 2:44 PM EDT          Treatment Charges:  Minutes Units Time In-Out   Ultrasound      Electrical Stim      Iontophoresis      Paraffin       Massage      Eval      ADL       Ther Exercise 42 3    Ther Activities      Neuro Re-Ed      Splinting       Other      Total Treatment Time:  42

## 2020-10-12 NOTE — PROGRESS NOTES
score goal score 12/3. Short term goal 2: Independent with HEP. Long term goals  Time Frame for Long term goals : 6 weeks  Long term goal 1: OPTIMAL score of 6/3 at discharge. Long term goal 2: Change roblero fall assesment by 10 points. Long term goal 3: Improve TUG score from 23 sec to 18 sce. Patient Goals   Patient goals : Walk normal  no falls.     Plan:    Plan  Plan Comment: Cont per POC  Timed Code Treatment Minutes: 40 Minutes     Therapy Time   Individual Concurrent Group Co-treatment   Time In 1118         Time Out 1210         Minutes 52         Timed Code Treatment Minutes: 44 Minutes      Treatment Charges: Minutes Units   []  Ultrasound     []  Electrical-Stim     []  Iontophoresis     []  Traction     []  Massage       []  Eval     []  Gait     [x]  Ther Exercise 34  2   []  Manual Therapy     []  Ther Activities       []  Aquatics     []  Vasopneumatic Device     [x]  Neuro Re-Ed 10 1   []  Other       Total Treatment Time: 44 3           Osiel Caul, PTA

## 2020-10-15 ENCOUNTER — HOSPITAL ENCOUNTER (OUTPATIENT)
Dept: OCCUPATIONAL THERAPY | Age: 75
Setting detail: THERAPIES SERIES
Discharge: HOME | End: 2020-10-15
Payer: MEDICARE

## 2020-10-15 ENCOUNTER — HOSPITAL ENCOUNTER (OUTPATIENT)
Dept: PHYSICAL THERAPY | Age: 75
Setting detail: THERAPIES SERIES
Discharge: HOME | End: 2020-10-15
Payer: MEDICARE

## 2020-10-15 PROCEDURE — 97110 THERAPEUTIC EXERCISES: CPT

## 2020-10-15 ASSESSMENT — PAIN DESCRIPTION - LOCATION
LOCATION: HEAD;SHOULDER
LOCATION: HEAD;SHOULDER

## 2020-10-15 ASSESSMENT — PAIN DESCRIPTION - FREQUENCY
FREQUENCY: INTERMITTENT
FREQUENCY: INTERMITTENT

## 2020-10-15 ASSESSMENT — PAIN SCALES - GENERAL
PAINLEVEL_OUTOF10: 4
PAINLEVEL_OUTOF10: 4

## 2020-10-15 ASSESSMENT — PAIN DESCRIPTION - ORIENTATION
ORIENTATION: POSTERIOR
ORIENTATION: POSTERIOR;LEFT

## 2020-10-15 ASSESSMENT — PAIN DESCRIPTION - PROGRESSION
CLINICAL_PROGRESSION: NOT CHANGED
CLINICAL_PROGRESSION: NOT CHANGED

## 2020-10-15 ASSESSMENT — PAIN - FUNCTIONAL ASSESSMENT: PAIN_FUNCTIONAL_ASSESSMENT: ACTIVITIES ARE NOT PREVENTED

## 2020-10-15 ASSESSMENT — PAIN DESCRIPTION - PAIN TYPE
TYPE: ACUTE PAIN
TYPE: ACUTE PAIN

## 2020-10-15 ASSESSMENT — PAIN DESCRIPTION - ONSET: ONSET: ON-GOING

## 2020-10-15 ASSESSMENT — PAIN DESCRIPTION - DESCRIPTORS
DESCRIPTORS: HEADACHE
DESCRIPTORS: HEADACHE;TIGHTNESS

## 2020-10-15 NOTE — PROGRESS NOTES
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 Pt reports having headache today. Pain Assessment  Patient Currently in Pain: Yes  Pain Assessment: 0-10  Pain Level: 4(Pain 3-4)  Pain Type: Acute pain  Pain Location: Head, Shoulder  Pain Orientation: Posterior, Left  Pain Descriptors: Headache, Tightness(lt shoulder tightness)  Pain Frequency: Intermittent  Clinical Progression: Not changed(No change with lt shoulder, headache is new.)  Patient's Stated Pain Goal: No pain           Other exercises  Other exercises 8: red 10# theraband flexbar bilateral UE 25x each way (twist, make U, make upside down U, make C, make backward C)  Other exercises 11: velcro (1 x 1\" square   with loop) - using lt hand alternate  lt fingers (index, long, ring, little) remove with extension, then place all pieces  with 3jaw grasp  Other exercises 12:  Fine motor ADL task (unbutton/button buttons 1\" to 1/4\" diameter, unhook/hook hooks, unsna/snap snaps, untie/tie string, unfasten/fasten velcro, zipper (unzip/zip), unfasten/fasten different size safety pins) using both hands together  Other exercises 13: small peg (3/4\" x1/4\") place /remove 30 pegs in every other hole using lt hand  Other exercises 14: green moderate resistance power web: lt hand gripping 25x, lt hand each finger oppositional pinch 25x  Other exercises 15: valpar 4 (nuts/bolts) - using lt hand to unscrew & screw 8 nuts  (1/4\" diameter)  Assessment  Performance deficits / Impairments: Decreased strength, Decreased fine motor control, Decreased coordination, Decreased high-level IADLs, Decreased balance  Assessment: Pt participated in manual dexterity/fine motor ADL task using both hands -manipulation (buttons,safety pins, snaps, hooks,tying string, zipper, velcro. Pt reports difficulty with safety pin & button manipulation. Tx includes lt UE strengthening & distal coordination exercises. Pt takes increase time for small peg manipulation with lt hand. See OT exercises for details.   Treatment Diagnosis: lt UE weakness, impaired coordination  Prognosis: Good  REQUIRES OT FOLLOW UP: Yes  Discharge Recommendations: Outpatient OT           Plan  REQUIRES OT FOLLOW UP: Yes  Plan  Times per week: 2x/wk  Plan weeks: 5 weeks  Current Treatment Recommendations: Strengthening, Patient/Caregiver Education & Training, Safety Education & Training, ROM(coordination)  Plan Comment: continue OT  OT Individual Minutes  Time In: 7446  Time Out: 1115  Minutes: 44  Time Code Minutes   Timed Code Treatment Minutes: 40 Minutes    Electronically signed by Junior Fermín OT on 10/15/20 at 12:16 PM EDT          Treatment Charges:  Minutes Units Time In-Out   Ultrasound      Electrical Stim      Iontophoresis      Paraffin       Massage      Eval      ADL       Ther Exercise 44 3    Ther Activities      Neuro Re-Ed      Splinting       Other      Total Treatment Time:  44

## 2020-10-16 NOTE — PROGRESS NOTES
Physical Therapy  Update Note  Date: 10/15/2020  Patient Name: Wing Kaur  MRN: [id number]     :   1945    Subjective:   General  Referring Practitioner: Elen Dacosta MD   PT Visit Information  Onset Date: 19  PT Insurance Information: [employer]  Total # of Visits Approved: 12  Total # of Visits to Date: 12  Plan of Care/Certification Expiration Date: 10/16/20  No Show: 1  Progress Note Due Date: 10/16/20  Canceled Appointment: 0  Progress Note Counter:   Subjective  Subjective: Patient reports today with no new complants of pain or discomfort. He did report a near fall while working at his home. He stated he was able to right himself before falling. Pain Screening  Patient Currently in Pain: Yes  Pain Assessment  Pain Assessment: 0-10  Pain Level: 4  Patient's Stated Pain Goal: No pain  Pain Type: Acute pain  Pain Location: Head;Shoulder  Pain Orientation: Posterior  Pain Descriptors: Headache  Pain Frequency: Intermittent  Pain Onset: On-going  Clinical Progression: Not changed  Functional Pain Assessment: Activities are not prevented  Non-Pharmaceutical Pain Intervention(s): Ambulation/Increased Activity  Response to Pain Intervention: None  Multiple Pain Sites: Yes  Vital Signs  Patient Currently in Pain: Yes  Patient Observation  Observations: poor gait and loss of balance       Treatment Activities:      Bed mobility  Bridging: Independent  Transfers  Sit to Stand: Stand by assistance        Ambulation  Ambulation?: Yes  WB Status: WBAT Left   Ambulation 1  Surface: level tile  Device: Rolling Walker  Assistance: Independent  Quality of Gait: fair  Gait Deviations: Slow Birdie; Increased JOSE; Decreased step length;Deviated path;Decreased arm swing  Distance: 150 ft   Comments: poor control of   Stairs/Curb  Stairs?: Yes  Stairs  # Steps : 3  Stairs Height: 8\"  Rails: Bilateral  Curbs: 2\"  Device: Rolling walker  Assistance: Supervision  Comment: Fall risk Balance  Posture: Fair  Sitting - Static: Good  Sitting - Dynamic: Good  Standing - Static: Fair  Comments: fall risk   PROM RLE (degrees)  RLE PROM: WFL  PROM LLE (degrees)  LLE PROM: Exceptions  L Hip Flexion 0-125: 0-100  L Hip Extension 0-10: 0-10  L Hip ABduction 0-45: 0-30  L Hip ADduction 0-10: 0-10  L Hip External Rotation 0-45: 0-20  L Hip Internal Rotation 0-45: 0-20  L Knee Flexion 0-145: 0-95  L Knee Extension 0: 0  AROM LLE (degrees)  LLE AROM : Exceptions  L Hip Flexion 0-125: 0-105  L Hip Extension 0-10: 0-10  L Hip ABduction 0-45: 0-25  L Hip ADduction 0-10: 0-10  L Hip External Rotation 0-45: 0-20  L Hip Internal Rotation 0-45: 0-20  L Knee Flexion 0-145: 0--90  L Knee Extension 0: 0  Spine  Lumbar: standing flexion and extension -- 100 % loss  Strength RLE  Strength RLE: WFL  Strength LLE  Strength LLE: Exception  Comment: Grossly 2/5 left LE loss of full active noramal ROM   Strength Other  Other: Trunk is 2/5 limitd by loss of full normal ROM     Exercises  Exercise 6: Steps 15 x 2  F/L 6\"  Exercise 7: 3-way hip 15x Orange B  Exercise 8:  Foam -- Mini Squats 3 x 15  Exercise 9: HR/TR 25 x 3  Exercise 10: NuStep 7 min L 3  Exercise 11: SLS 3 x 15\" finger tip touch  Exercise 12: tandem stance 4 x 30\" finger tip touch  Exercise 13: Toe Taps on box 6\" 3 x 10      Ortho Screen  Posture: Flexed body posture          Assessment:   Conditions Requiring Skilled Therapeutic Intervention  Body structures, Functions, Activity limitations: Decreased functional mobility ; Decreased vision/visual deficit; Decreased ADL status; Decreased strength;Decreased coordination  Assessment: The patinet is doing well with his treatments and his HEP. Close calls for a fall at home. His balance and gait have slightly improved. However, his fall risks are unchaged. The patient should contiue with the PT 2 x  a week for 4-6 more weeks.       Treatment Diagnosis: Ataxia   Prognosis: Good  Decision Making: Low [] Manual Therapy  38346 [] Electrical Stimulation Attended  [id number]   [x] Instruction in HEP  [] Lumbar/Cervical Traction  [id number]   [] Aquatic Therapy   [id number] [] Cold/hotpack    [] Massage   [id number]      [] Dry Needling, 1 or 2 muscles  02173   [] Biofeedback, first 15 minutes   61458  [] Biofeedback, additional 15 minutes   81857 [] Dry Needling, 3 or more muscles  92493      Frequency:       2    X/wk x    6      wk's      [x] Plans/Goals, Risk/Benefits discussed with pt/family  Comprehension of Education [] yes  [x] Needs Review  Pt/Family Education: [x] Verbal  [x] Demo  [x] Written    More objective information is available upon request.  Thank you for this referral.        Medicare/Regulatory Requirements:  I have reviewed this plan of care and certify a need for   Medically necessary rehabilitation services.   [x] Physician Signature    Date:     Electronically signed by: Yair Orlando, [address] [facility] [address] [place]  [place], [address] [facility]  Phone [phone]  Fax [phone]

## 2020-10-19 ENCOUNTER — HOSPITAL ENCOUNTER (OUTPATIENT)
Dept: OCCUPATIONAL THERAPY | Age: 75
Setting detail: THERAPIES SERIES
Discharge: HOME | End: 2020-10-19
Payer: MEDICARE

## 2020-10-19 ENCOUNTER — HOSPITAL ENCOUNTER (OUTPATIENT)
Dept: PHYSICAL THERAPY | Age: 75
Setting detail: THERAPIES SERIES
Discharge: HOME | End: 2020-10-19
Payer: MEDICARE

## 2020-10-19 ENCOUNTER — HOSPITAL ENCOUNTER (OUTPATIENT)
Dept: CT IMAGING | Age: 75
Discharge: HOME | End: 2020-10-21
Payer: MEDICARE

## 2020-10-19 PROCEDURE — 97110 THERAPEUTIC EXERCISES: CPT

## 2020-10-19 PROCEDURE — 70450 CT HEAD/BRAIN W/O DYE: CPT

## 2020-10-19 ASSESSMENT — PAIN DESCRIPTION - PAIN TYPE
TYPE: ACUTE PAIN
TYPE: ACUTE PAIN

## 2020-10-19 ASSESSMENT — PAIN DESCRIPTION - PROGRESSION
CLINICAL_PROGRESSION: GRADUALLY IMPROVING
CLINICAL_PROGRESSION: NOT CHANGED

## 2020-10-19 ASSESSMENT — PAIN - FUNCTIONAL ASSESSMENT: PAIN_FUNCTIONAL_ASSESSMENT: PREVENTS OR INTERFERES WITH MANY ACTIVE NOT PASSIVE ACTIVITIES

## 2020-10-19 ASSESSMENT — PAIN SCALES - GENERAL
PAINLEVEL_OUTOF10: 3
PAINLEVEL_OUTOF10: 3

## 2020-10-19 ASSESSMENT — PAIN DESCRIPTION - ORIENTATION
ORIENTATION: LEFT;POSTERIOR
ORIENTATION: OUTER;LEFT

## 2020-10-19 ASSESSMENT — PAIN DESCRIPTION - FREQUENCY: FREQUENCY: INTERMITTENT

## 2020-10-19 ASSESSMENT — PAIN DESCRIPTION - LOCATION: LOCATION: HEAD;SHOULDER

## 2020-10-19 NOTE — PROGRESS NOTES
Eval     []  Gait     []  Vasopneumatic Device     []  Ther Exercise 45  3   []  Manual Therapy       []  Ther Activities       []  Aquatics     []  Neuro Re-Ed       []  Other       Total Treatment Time: 39 3       Dustyena Head, PTA

## 2020-10-21 ENCOUNTER — HOSPITAL ENCOUNTER (OUTPATIENT)
Dept: OCCUPATIONAL THERAPY | Age: 75
Setting detail: THERAPIES SERIES
Discharge: HOME | End: 2020-10-21
Payer: MEDICARE

## 2020-10-21 ENCOUNTER — HOSPITAL ENCOUNTER (OUTPATIENT)
Dept: PHYSICAL THERAPY | Age: 75
Setting detail: THERAPIES SERIES
Discharge: HOME | End: 2020-10-21
Payer: MEDICARE

## 2020-10-21 PROCEDURE — 97110 THERAPEUTIC EXERCISES: CPT

## 2020-10-21 ASSESSMENT — PAIN DESCRIPTION - ORIENTATION: ORIENTATION: POSTERIOR

## 2020-10-21 ASSESSMENT — PAIN SCALES - GENERAL
PAINLEVEL_OUTOF10: 3
PAINLEVEL_OUTOF10: 0

## 2020-10-21 ASSESSMENT — PAIN DESCRIPTION - LOCATION: LOCATION: HEAD

## 2020-10-21 ASSESSMENT — PAIN DESCRIPTION - PROGRESSION: CLINICAL_PROGRESSION: NOT CHANGED

## 2020-10-21 ASSESSMENT — PAIN DESCRIPTION - DESCRIPTORS: DESCRIPTORS: HEADACHE

## 2020-10-21 NOTE — PROGRESS NOTES
exercises 15: valpar 4 (nuts/bolts) - using lt hand to unscrew & screw 8 nuts  (1/4\" diameter)  Other exercises 17: purdue peg board : using lt hand place together  (thin pin,1/4\" column, 1/4\" washer) - 13 sets, then remove all the pieces  Assessment  Performance deficits / Impairments: Decreased coordination, Decreased ADL status, Decreased sensation, Decreased functional mobility , Decreased posture, Decreased balance, Decreased strength, Decreased fine motor control  Assessment: Begun purdue peg board exercise to improve lt hand fine motor skills . Pt has moderate difficulty manipulating small pieces of purdue peg board. Pt demonstrate difficulty with ADL task (zipping zipper, snapping small snaps, buttoning small buttons) during tx. OT tx focus on distal lt arm strengthening & coordination exercises.   Treatment Diagnosis: lt UE weakness, impaired coordination  Prognosis: Good  REQUIRES OT FOLLOW UP: Yes  Discharge Recommendations: Outpatient OT           Plan  REQUIRES OT FOLLOW UP: Yes  Plan  Times per week: 2x/wk  Plan weeks: 5 weeks  Current Treatment Recommendations: Strengthening, Patient/Caregiver Education & Training, Safety Education & Training, ROM(coordination)  Plan Comment: continue OT  OT Individual Minutes  Time In: 8558  Time Out: 4937  Minutes: 46  Time Code Minutes   Timed Code Treatment Minutes: 55 Minutes    Electronically signed by Patrice Moss OT on 10/21/20 at 12:32 PM EDT          Treatment Charges:  Minutes Units Time In-Out   Ultrasound      Electrical Stim      Iontophoresis      Paraffin       Massage      Eval      ADL       Ther Exercise 46 3    Ther Activities      Neuro Re-Ed      Splinting       Other      Total Treatment Time:  46

## 2020-10-21 NOTE — PROGRESS NOTES
Physical Therapy  Daily Treatment Note  Date: 10/21/2020  Patient Name: Vin Hines  MRN: [id number]     :   1945    Subjective:   General  Chart Reviewed: Yes  Response To Previous Treatment: Not applicable  Family / Caregiver Present: No  Referring Practitioner: Perri Mahajan MD   PT Visit Information  Onset Date: 19  PT Insurance Information: [employer]  Total # of Visits Approved: 24  Total # of Visits to Date: 14  No Show: 1  Canceled Appointment: 0  Progress Note Counter:   Subjective  Subjective: Patient reports today with no new complaints. Reported a headache this morning which he has intermittently. General Comment  Comments: Pt arrived  Pain Screening  Patient Currently in Pain: No  Pain Assessment  Pain Assessment: 0-10  Pain Level: 0  Patient's Stated Pain Goal: No pain  Clinical Progression: Not changed  Vital Signs  Patient Currently in Pain: No       Treatment Activities:     Exercises  Exercise 6: Steps 15 x 2  F/L 8\"  Exercise 7: 3-way hip 15x Lime B  Exercise 8:  Foam -- Mini Squats 3 x 15  Exercise 9: Foam-- HR/TR 25 x 3  Exercise 11: SLS 3 x 15\" finger tip touch  Exercise 12: tandem stance 4 x 30\" finger tip touch  Exercise 13: Toe Taps on box 8\" 3 x 10  Exercise 14: amb in // bars 10x fwd/lat     Assessment:   Conditions Requiring Skilled Therapeutic Intervention  Body structures, Functions, Activity limitations: Decreased functional mobility ; Decreased vision/visual deficit; Decreased ADL status; Decreased strength;Decreased coordination  Assessment: Cont exercises per chart to improve BLE strength and balance. Progressed hieght of step and resistance with 3-way hip to increase strengthneing potential of exericse. Cont to challenege pt with balance acvtivities by encouraging less UE support.   Treatment Diagnosis: Ataxia   Prognosis: Good  Decision Making: Low Complexity  REQUIRES PT FOLLOW UP: Yes  Discharge Recommendations: Continue to assess pending progress Goals:  Short term goals  Time Frame for Short term goals: 3 weeks   Short term goal 1: OPTIMAL score 15/3 inital score goal score 12/3. Short term goal 2: Independent with HEP. Long term goals  Time Frame for Long term goals : 6 weeks  Long term goal 1: OPTIMAL score of 6/3 at discharge. Long term goal 2: Change roblero fall assesment by 10 points. Long term goal 3: Improve TUG score from 23 sec to 18 sce. Patient Goals   Patient goals : Walk normal  no falls.     Plan:    Plan  Plan Comment: Cont per POC  Timed Code Treatment Minutes: 35 Minutes     Therapy Time   Individual Concurrent Group Co-treatment   Time In 0343         Time Out 0930         Minutes 35         Timed Code Treatment Minutes: 35 Minutes      Treatment Charges: Minutes Units   []  Ultrasound     []  Electrical-Stim     []  Iontophoresis     []  Traction     []  Massage       []  Eval     []  Gait     [x]  Ther Exercise 35  2   []  Manual Therapy       []  Ther Activities       []  Aquatics     []  Vasopneumatic Device     []  Neuro Re-Ed       []  Other       Total Treatment Time: 28 2           Nichole Shore PTA

## 2020-10-29 ENCOUNTER — HOSPITAL ENCOUNTER (OUTPATIENT)
Dept: OCCUPATIONAL THERAPY | Age: 75
Setting detail: THERAPIES SERIES
Discharge: HOME | End: 2020-10-29
Payer: MEDICARE

## 2020-10-29 ENCOUNTER — HOSPITAL ENCOUNTER (OUTPATIENT)
Dept: PHYSICAL THERAPY | Age: 75
Setting detail: THERAPIES SERIES
Discharge: HOME | End: 2020-10-29
Payer: MEDICARE

## 2020-10-29 PROCEDURE — 97110 THERAPEUTIC EXERCISES: CPT

## 2020-10-29 ASSESSMENT — PAIN SCALES - GENERAL: PAINLEVEL_OUTOF10: 4

## 2020-10-29 ASSESSMENT — PAIN DESCRIPTION - PROGRESSION: CLINICAL_PROGRESSION: GRADUALLY WORSENING

## 2020-10-29 ASSESSMENT — PAIN DESCRIPTION - ORIENTATION: ORIENTATION: LEFT;RIGHT

## 2020-10-29 ASSESSMENT — PAIN DESCRIPTION - PAIN TYPE: TYPE: ACUTE PAIN

## 2020-10-29 ASSESSMENT — PAIN DESCRIPTION - LOCATION: LOCATION: SHOULDER;KNEE

## 2020-10-29 ASSESSMENT — PAIN DESCRIPTION - DESCRIPTORS: DESCRIPTORS: ACHING

## 2020-10-29 NOTE — PROGRESS NOTES
Physical Therapy  Daily Treatment Note  Date: 10/29/2020  Patient Name: Tanvi Alejandra  MRN: [id number]     :   1945    Subjective:   General  Chart Reviewed: Yes  Response To Previous Treatment: Not applicable  Family / Caregiver Present: No  Referring Practitioner: Bibiana Vega MD   PT Visit Information  Onset Date: 19  PT Insurance Information: [employer]  Total # of Visits Approved: 24  Total # of Visits to Date: 15  Plan of Care/Certification Expiration Date: 10/16/20  No Show: 1  Canceled Appointment: 0  Progress Note Counter: 15/24  Subjective  Subjective: Patient reports today with increase in R knee pain. Did not provide pain rating. Pain Screening  Patient Currently in Pain: Yes  Vital Signs  Patient Currently in Pain: Yes       Treatment Activities:     Exercises  Exercise 2: SLR 2x10  Exercise 3: Bridges 2x10 3\" hold  Exercise 5: Clam Shells 2x10 Lime  Exercise 8:  Foam -- Mini Squats 3 x 15  Exercise 9: Foam-- HR/TR 25 x 3  Exercise 10: NuStep 5 min L 5  Exercise 12: tandem stance 4 x 30\" foam    Assessment:   Conditions Requiring Skilled Therapeutic Intervention  Body structures, Functions, Activity limitations: Decreased functional mobility ; Decreased vision/visual deficit; Decreased ADL status; Decreased strength;Decreased coordination  Assessment: Cont exercises per chart to improve BLE strength and balance. Withheld most standing exercises this date secondary to pain in knee and focused on NWB exercises with improved tolerance. Treatment Diagnosis: Ataxia   Prognosis: Good  Decision Making: Low Complexity  REQUIRES PT FOLLOW UP: Yes  Discharge Recommendations: Continue to assess pending progress     Goals:  Short term goals  Time Frame for Short term goals: 3 weeks   Short term goal 1: OPTIMAL score 15/3 inital score goal score 12/3. Short term goal 2: Independent with HEP.    Long term goals  Time Frame for Long term goals : 6 weeks  Long term goal 1: OPTIMAL score of 6/3 at discharge. Long term goal 2: Change roblero fall assesment by 10 points. Long term goal 3: Improve TUG score from 23 sec to 18 sce. Patient Goals   Patient goals : Walk normal  no falls.     Plan:    Plan  Plan Comment: Cont per POC  Timed Code Treatment Minutes: 30 Minutes     Therapy Time   Individual Concurrent Group Co-treatment   Time In 9691         Time Out 6649         Minutes 33         Timed Code Treatment Minutes: 30 Minutes      Treatment Charges: Minutes Units   []  Ultrasound     []  Electrical-Stim     []  Iontophoresis     []  Traction     []  Massage       []  Eval     []  Gait     [x]  Ther Exercise 30  2   []  Manual Therapy       []  Ther Activities       []  Aquatics     []  Vasopneumatic Device     []  Neuro Re-Ed       []  Other       Total Treatment Time: 30 2           Wendy Davenport, DAVY

## 2020-10-29 NOTE — PROGRESS NOTES
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 Pt states he bumped his head on his hatchback yesterday. Pt states they are suppose to call him about his CT scan results and when he goes to [facility] . Pain Assessment  Patient Currently in Pain: Yes  Pain Assessment: 0-10  Pain Level: 4  Pain Type: Acute pain  Pain Location: Shoulder, Knee  Pain Orientation: Left, Right(rt knee, lt shoulder)  Pain Descriptors: Aching  Clinical Progression: Gradually worsening  Patient's Stated Pain Goal: No pain     Elbow Exercises  Pre Elbow Flexion Reps/Sets/Weight: lt elbow palm up flex/ext 2# 15x (elbow on ramp)  Pre Elbow Extension Reps/Sets/Weight: lt elbow palm down flex/ext 2# 15x  Wrist/Hand Exercises  Pre Pronation/Supination Reps/Sets/Weight: lt UE 2# 15x  Pre Wrist Flexion Reps/Sets/Weight: lt wrist over ramp palm up wrist flex/ext 2# 15x  Pre Wrist Ext Reps/Sets/Weight: lt wrist over ramp palm down wrist flex/ext 2# 15x  Pre Radial Deviation Reps/Sets/Weight: lt wrist over ramp 2# 15x RD/UD  Other exercises  Other exercises 5: Graded clothespins using L hand (place/remove red ,green ,blue,black) - light to heavy resistance clothespins. ...... Other exercises 12:  Fine motor ADL task (unbutton/button buttons 1\" to 1/4\" diameter) using both hands together  Other exercises 15: valpar 4 (nuts/bolts) - using lt hand

## 2020-10-30 ENCOUNTER — HOSPITAL ENCOUNTER (OUTPATIENT)
Dept: PHYSICAL THERAPY | Age: 75
Setting detail: THERAPIES SERIES
Discharge: HOME | End: 2020-10-30
Payer: MEDICARE

## 2020-10-30 ENCOUNTER — HOSPITAL ENCOUNTER (OUTPATIENT)
Dept: OCCUPATIONAL THERAPY | Age: 75
Setting detail: THERAPIES SERIES
Discharge: HOME | End: 2020-10-30
Payer: MEDICARE

## 2020-10-30 PROCEDURE — 97110 THERAPEUTIC EXERCISES: CPT

## 2020-10-30 ASSESSMENT — PAIN DESCRIPTION - PROGRESSION: CLINICAL_PROGRESSION: GRADUALLY WORSENING

## 2020-10-30 ASSESSMENT — PAIN DESCRIPTION - ORIENTATION: ORIENTATION: RIGHT

## 2020-10-30 ASSESSMENT — PAIN DESCRIPTION - DESCRIPTORS: DESCRIPTORS: ACHING

## 2020-10-30 ASSESSMENT — PAIN DESCRIPTION - FREQUENCY: FREQUENCY: INTERMITTENT

## 2020-10-30 ASSESSMENT — PAIN SCALES - GENERAL: PAINLEVEL_OUTOF10: 4

## 2020-10-30 ASSESSMENT — PAIN DESCRIPTION - LOCATION: LOCATION: KNEE

## 2020-10-30 NOTE — PROGRESS NOTES
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 Pt reports no pain. Pt reports feeling depressed because not being able to work & do what he used to before his brain bleed. . Pt states his family MD retired and he hasn't found a new one. Comments: OT encouraged pt to voice his concerns and let his MD at [address] [place] [address] know about feeling depressed. Pain Assessment  Patient Currently in Pain: Denies           Other exercises  Other exercises 6: Red  3# Digiflex, lt hand gripping 25x, digits 2-5 L hand 25x each ; L thumb 25x  Other exercises 8: red 10# theraband flexbar bilateral UE 25x each way (twist, make U, make upside down U, make C, make backward C)  Other exercises 11: velcro (1 x 1\" square   with loop) - using lt hand alternate  lt fingers (index, long, ring, little) remove with extension, then place all pieces  with 3jaw grasp  Other exercises 12:  Fine motor ADL task (unbutton/button buttons 1\" to 1/4\" diameter) ,different size safety pins, snaps, tying, velcro closer, hooks, zipper using both hands together  Other exercises 13: small peg (3/4\" x1/4\") place /remove 30 pegs in every other hole using lt hand  Other exercises 14: green moderate resistance power web: lt hand gripping 25x, lt hand each finger oppositional pinch 25x  Assessment  Performance deficits / Impairments: Decreased coordination, Decreased ADL status, Decreased sensation, Decreased functional mobility , Decreased posture, Decreased balance, Decreased strength, Decreased fine motor control  Assessment: Pt completes lt UE/hand dexterity and strengthening exercises. Pt voices frustration with difficulty manipulating & dropping some of the small pegs. See OT exercises for details. Pt states back when he was able to work he lifted drywall,did yardwork,& lots of things at home.   Treatment Diagnosis: lt UE weakness, impaired coordination  Prognosis: Good  REQUIRES OT FOLLOW UP: Yes  Discharge Recommendations: Outpatient OT           Plan  REQUIRES OT FOLLOW UP: Yes  Plan  Times per week: 2x/wk  Plan weeks: 5 weeks  Current Treatment Recommendations: Strengthening, Patient/Caregiver Education & Training, Safety Education & Training, ROM(coordination)  Plan Comment: continue OT  OT Individual Minutes  Time In: 9260  Time Out: 1100  Minutes: 40  Time Code Minutes   Timed Code Treatment Minutes: 40 Minutes    Electronically signed by Safia Thomas OT on 10/30/20 at 1:01 PM EDT          Treatment Charges:  Minutes Units Time In-Out   Ultrasound      Electrical Stim      Iontophoresis      Paraffin       Massage      Eval      ADL       Ther Exercise 40 3    Ther Activities      Neuro Re-Ed      Splinting       Other      Total Treatment Time:  40

## 2020-10-30 NOTE — PROGRESS NOTES
Physical Therapy  Daily Treatment Note  Date: 10/30/2020  Patient Name: Sandhya Gama  MRN: [id number]     :   1945    Subjective:   General  Chart Reviewed: Yes  Response To Previous Treatment: Not applicable  Family / Caregiver Present: No  Referring Practitioner: Margaux Sánchez MD   PT Visit Information  Onset Date: 19  PT Insurance Information: [employer] Medicare  Total # of Visits Approved: 24  Total # of Visits to Date: 16  No Show: 1  Canceled Appointment: 0  Progress Note Counter:   Subjective  Subjective: Patient reports today with cont pain in R knee. Noted it has no changed since appt yesterday. Pain Screening  Patient Currently in Pain: Yes  Pain Assessment  Pain Assessment: 0-10  Pain Level: 4  Patient's Stated Pain Goal: No pain  Pain Location: Knee  Pain Orientation: Right  Pain Descriptors: Aching  Pain Frequency: Intermittent  Clinical Progression: Gradually worsening  Vital Signs  Patient Currently in Pain: Yes       Treatment Activities:     Exercises  Exercise 2: SLR 2x10  Exercise 3: Bridges 2x10 3\" hold  Exercise 4: SL HIp Abd 2x10  Exercise 5: Clam Shells 2x10 Lime  Exercise 8:  Foam -- Mini Squats 3 x 15  Exercise 9: Foam-- HR/TR 25 x 3  Exercise 10: NuStep 8 min L 5  Exercise 12: tandem stance 4 x 30\" foam     Assessment:   Conditions Requiring Skilled Therapeutic Intervention  Body structures, Functions, Activity limitations: Decreased functional mobility ; Decreased vision/visual deficit; Decreased ADL status; Decreased strength;Decreased coordination  Assessment: Cont exercises per chart to improve BLE strength and balance. Avoided single leg WB exercises this date secondary to pain in knee.  Patient with good tolerance to exricses overll, reporting no change in pain post.  Treatment Diagnosis: Ataxia   Prognosis: Good  Decision Making: Low Complexity  REQUIRES PT FOLLOW UP: Yes  Discharge Recommendations: Continue to assess pending progress        Goals:  Short term goals  Time Frame for Short term goals: 3 weeks   Short term goal 1: OPTIMAL score 15/3 inital score goal score 12/3. Short term goal 2: Independent with HEP. Long term goals  Time Frame for Long term goals : 6 weeks  Long term goal 1: OPTIMAL score of 6/3 at discharge. Long term goal 2: Change roblero fall assesment by 10 points. Long term goal 3: Improve TUG score from 23 sec to 18 sce. Patient Goals   Patient goals : Walk normal  no falls.     Plan:    Plan  Plan Comment: Cont per POC  Timed Code Treatment Minutes: 35 Minutes     Therapy Time   Individual Concurrent Group Co-treatment   Time In 2848         Time Out 4638         Minutes 40         Timed Code Treatment Minutes: 35 Minutes      Treatment Charges: Minutes Units   []  Ultrasound     []  Electrical-Stim     []  Iontophoresis     []  Traction     []  Massage       []  Eval     []  Gait     [x]  Ther Exercise 35 2   []  Manual Therapy       []  Ther Activities       []  Aquatics     []  Vasopneumatic Device     []  Neuro Re-Ed       []  Other       Total Treatment Time: 28 2           Osiel Caul, PTA

## 2020-11-04 ENCOUNTER — HOSPITAL ENCOUNTER (OUTPATIENT)
Dept: PHYSICAL THERAPY | Age: 75
Setting detail: THERAPIES SERIES
Discharge: HOME | End: 2020-11-04
Payer: MEDICARE

## 2020-11-04 ENCOUNTER — HOSPITAL ENCOUNTER (OUTPATIENT)
Dept: OCCUPATIONAL THERAPY | Age: 75
Setting detail: THERAPIES SERIES
Discharge: HOME | End: 2020-11-04
Payer: MEDICARE

## 2020-11-04 PROCEDURE — 97110 THERAPEUTIC EXERCISES: CPT

## 2020-11-04 PROCEDURE — 97112 NEUROMUSCULAR REEDUCATION: CPT

## 2020-11-04 ASSESSMENT — PAIN DESCRIPTION - PROGRESSION: CLINICAL_PROGRESSION: NOT CHANGED

## 2020-11-04 ASSESSMENT — PAIN SCALES - GENERAL
PAINLEVEL_OUTOF10: 8
PAINLEVEL_OUTOF10: 8

## 2020-11-04 ASSESSMENT — PAIN DESCRIPTION - ORIENTATION
ORIENTATION: RIGHT
ORIENTATION: RIGHT

## 2020-11-04 ASSESSMENT — PAIN DESCRIPTION - LOCATION
LOCATION: KNEE
LOCATION: KNEE

## 2020-11-04 ASSESSMENT — PAIN DESCRIPTION - DESCRIPTORS
DESCRIPTORS: ACHING
DESCRIPTORS: ACHING

## 2020-11-04 ASSESSMENT — 9 HOLE PEG TEST
TESTTIME_SECONDS: 60
TEST_RESULT: IMPAIRED

## 2020-11-04 ASSESSMENT — PAIN DESCRIPTION - PAIN TYPE: TYPE: ACUTE PAIN

## 2020-11-04 ASSESSMENT — PAIN DESCRIPTION - ONSET: ONSET: ON-GOING

## 2020-11-04 NOTE — PROGRESS NOTES
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 Pt reports he gets occassion chest soreness.    LUE Strength  L Shoulder Flex: 4-/5  L Shoulder ABduction: 4-/5  L Hand General: (extension 4-/5, flexion 5/5)  RUE Strength  R Shoulder Flex: 4/5  R Shoulder ABduction: 4/5  Left Hand Strength -  (lbs)  Handle Setting 3: 80,85  Right Hand Strength -  (lbs)  Handle Setting 3: 90,90  Fine Motor Skills  [place] Rate of Manipulation: MROM place and turn test lt UE 4 minutes 1 second  Left 9-Hole Peg Test: Impaired  Left 9 Hole Peg Test Time (secs): 60          20 0918   Elbow Exercises   Pre Elbow Flexion Reps/Sets/Weight lt elbow palm up flex/ext 2# 15x (elbow on ramp)   Pre Elbow Extension Reps/Sets/Weight lt elbow palm down flex/ext 2# 15x   Wrist/Hand Exercises   Pre Pronation/Supination Reps/Sets/Weight lt UE 2# 15x   Pre Wrist Flexion Reps/Sets/Weight lt wrist over ramp palm up wrist flex/ext 2# 15x   Pre Wrist Ext Reps/Sets/Weight lt wrist over ramp palm down wrist flex/ext 2# 15x   Pre Radial Deviation Reps/Sets/Weight lt wrist over ramp 2# 15x RD/UD Other exercises  Other exercises 11: velcro (1 x 1\" square   with loop) - using lt hand alternate  lt fingers (index, long, ring, little) remove with extension, then place all pieces  with 3jaw grasp  Other exercises 14: green moderate resistance power web: lt hand gripping 25x, lt hand each finger oppositional pinch 25x  Other exercises 17: purdue peg board : using lt hand place together  (thin pin,1/4\" column, 1/4\" washer) - 13 sets, then remove all the pieces, then repeat with rt hand. Assessment  Performance deficits / Impairments: Decreased coordination, Decreased ADL status, Decreased sensation, Decreased functional mobility , Decreased posture, Decreased balance, Decreased strength, Decreased fine motor control  Assessment: Pt has slow manual dexterity lt hand,lt shoulder weakness, lt hand extensor weakness. Pt demonstrates quick opposition bilateral hands. Tx focus on UE strengthening & coordination exxercises. See OT exercises for details. OT instructed pt to rest his lt arm when it gets tired. Treatment Diagnosis: lt UE weakness, impaired coordination  Prognosis: Good  REQUIRES OT FOLLOW UP: Yes  Discharge Recommendations: Outpatient OT   Plan  REQUIRES OT FOLLOW UP: Yes  Plan  Times per week: 2x/wk  Plan weeks: 5 weeks  Current Treatment Recommendations: Strengthening, Patient/Caregiver Education & Training, Safety Education & Training, ROM(coordination)  Plan Comment: continue OT. Pt will get ok from MD to continue therapy after his procedure.   OT Individual Minutes  Time In: 6924  Time Out: 1003  Minutes: 45  Time Code Minutes   Timed Code Treatment Minutes: 38 Minutes    Electronically signed by Chris Solorzano OT on 11/4/20 at 12:53 PM EST          Treatment Charges:  Minutes Units Time In-Out   Ultrasound      Electrical Stim      Iontophoresis      Paraffin       Massage      Eval      ADL       Ther Exercise 38 3    Ther Activities      Neuro Re-Ed      measurements 7 0          Total Treatment Time:  45

## 2020-11-04 NOTE — PROGRESS NOTES
[facility] Fall Risk Assessment    Risk Factor Scale  Score   History of Falls [x] Yes , near falls against the wall  [] No 25  0 25   Secondary Diagnosis [] Yes  [] No 15  0 15   Ambulatory Aid [] Furniture  [x] Crutches/cane/walker  [] None/bedrest/wheelchair/nurse 30  15  0 15   IV/Heparin Lock [] Yes  [] No 20  0 0   Gait/Transferring [] Impaired  [] Weak  [] Normal/bedrest/immobile 20  10  0 20   Mental Status [] Forgets limitations  [] Oriented to own ability 15  0 0      Total: 75     Based on the Assessment score: check the appropriate box. []  No intervention needed   Low =   Score of 0-24    []  Use standard prevention interventions Moderate =  Score of 24-44   [] Give patient handout and discuss fall prevention strategies   [] Establish goal of education for patient/family RE: fall prevention strategies    []  Use high risk prevention interventions High = Score of 45 and higher   [x] Give patient handout and discuss fall prevention strategies   [x] Establish goal of education for patient/family Re: fall prevention strategies   [] Discuss lifeline / other resources    Electronically signed by:    Kaylynn Melendez  Date: 11/4/2020

## 2020-11-04 NOTE — PROGRESS NOTES
Physical Therapy  Progress Note  Date: 2020  Patient Name: Hector Graves  MRN: [id number]     :   1945    Subjective:   General  Chart Reviewed: Yes  Referring Practitioner: Pastor De Leon MD  PT Visit Information  Onset Date: 19  PT Insurance Information: [employer] Medicare  Total # of Visits Approved: 24  Total # of Visits to Date: 17  No Show: 1  Canceled Appointment: 0  Progress Note Counter:   Subjective  Subjective: Patient continues to complain of pain on R knee which limits his walking. Reports he still feels \" out of synch \", no balance. Scheduled for cardiac testing in [facility] on Friday, surgery on Monday, 2020, will let us know if ok to continue with therapy. Pain Screening  Patient Currently in Pain: Yes  Pain Assessment  Pain Assessment: 0-10  Pain Level: 8(6-8/10)  Pain Location: Knee  Pain Orientation: Right  Pain Descriptors: Aching  Pain Onset: On-going  Clinical Progression: Not changed  Vital Signs  Patient Currently in Pain: Yes       Treatment Activities:    Ambulation 1  Surface: level tile  Device: Rolling Walker  Assistance: Independent  Gait Deviations: Slow Birdie; Increased JOSE; Decreased step length;Decreased step height;Decreased arm swing  Comments: Timed up and go 23 sec, no assistive device but required close contact guard for safety   Strength LLE  Comment: Grossly 2/5 left LE loss of full active normal ROM  ( NOW 4-4+/5 L hip flex/abd/quads/hams/ant. tib. )    Exercises  Exercise 2: SLR 2x10 - 3 way  Exercise 8:  Foam -- Mini Squats 3 x 15  Exercise 9: Foam-- HR/TR 25 x 3  Exercise 10: NuStep 8 min L 5  Exercise 13: Toe Taps on box 8\" 3 x 10  Exercise 14: amb in // bars 10x fwd/lat    Assessment:   Conditions Requiring Skilled Therapeutic Intervention  Body structures, Functions, Activity limitations: Decreased functional mobility ; Decreased ADL status; Decreased strength;Decreased balance  Assessment: Continues to show balance deficit when ambulating without his rolling walker, not safe on his own but more stability with use of walker. Will benefit from continued therapy to work on his balance, strength, gait. Treatment Diagnosis: Ataxia   Prognosis: Good  REQUIRES PT FOLLOW UP: Yes    Goals:  Short term goals  Time Frame for Short term goals: 3 weeks   Short term goal 1: OPTIMAL score 15/3 inital score goal score 12/3 - ONGOING  Short term goal 2: Independent with HEP. - MET  Long term goals  Time Frame for Long term goals : 6 weeks  Long term goal 1: OPTIMAL score of 6/3 at discharge - ONGOING ( NOW 13/3 - BALANCING/ WALKING LONG DISTANCE = 4@; CARRYING = 5 = UNABLE )  Long term goal 2: Change roblero fall assesment by 10 points. - PARTLY MET  Long term goal 3: Improve TUG score from 23 sec to 18 sce. - ONGOING  Patient Goals   Patient goals : Walk normal  no falls.   Plan:    Plan  Times per week: 2x/wk for 24 visits, ends 2/2021  Current Treatment Recommendations: Neuromuscular Re-education, Home Exercise Program, Strengthening, ROM, Patient/Caregiver Education & Training, Balance Training, Safety Education & Training, Gait Training  Plan Comment: Cont per POC  Timed Code Treatment Minutes: 40 Minutes     Therapy Time   Individual Concurrent Group Co-treatment   Time In 0830         Time Out 0915         Minutes 45         Timed Code Treatment Minutes: 40 Minutes     Treatment Charges: Minutes Units   []  Ultrasound     []  Electrical-Stim     []  Iontophoresis     []  Traction     []  Massage       []  Eval     []  Gait     [x]  Ther Exercise 30  2    []  Manual Therapy       []  Ther Activities       []  Aquatics     []  Vasopneumatic Device     [x]  Neuro Re-Ed 10  1    []  Other       Total Treatment Time: Galo Henriquez PT Electronically signed by Dewayne Rivers PT on 11/4/2020 at 4:59 PM

## 2020-11-06 ENCOUNTER — APPOINTMENT (OUTPATIENT)
Dept: PHYSICAL THERAPY | Age: 75
End: 2020-11-06
Payer: MEDICARE

## 2020-11-10 ENCOUNTER — HOSPITAL ENCOUNTER (OUTPATIENT)
Dept: OCCUPATIONAL THERAPY | Age: 75
Setting detail: THERAPIES SERIES
Discharge: HOME | End: 2020-11-10

## 2020-11-10 ENCOUNTER — HOSPITAL ENCOUNTER (OUTPATIENT)
Dept: PHYSICAL THERAPY | Age: 75
Setting detail: THERAPIES SERIES
Discharge: HOME | End: 2020-11-10
Payer: MEDICARE

## 2020-11-10 NOTE — PROGRESS NOTES
[address]   Outpatient Physical Therapy  Cancel/No Show Note    Date: 11/10/20    Patient Name: Ashlee Mariee        MRN: [id number]  Account: [de-identified] : 1945      General Information:  Chart Reviewed: Yes  Referring Practitioner: Tay Jacques MD  Referral Date : 20  Diagnosis: CVA -- Ataxia  Follows Commands: Within Functional Limits  Onset Date: 19  PT Insurance Information: [employer] Medicare  Total # of Visits Approved: 24  Total # of Visits to Date: 16  No Show: 1  Canceled Appointment: 0  Progress Note Counter:         Comments: Patient cx due to being in [address] [place] [address]

## 2020-11-10 NOTE — PROGRESS NOTES
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 Pt will need to start cardiac rehab and will have to do therapy on opposite days. Pt will be at therapy 20.     Luca Jiménez, OT

## 2020-11-12 ENCOUNTER — HOSPITAL ENCOUNTER (OUTPATIENT)
Dept: PHYSICAL THERAPY | Age: 75
Setting detail: THERAPIES SERIES
Discharge: HOME | End: 2020-11-12
Payer: MEDICARE

## 2020-11-12 ENCOUNTER — HOSPITAL ENCOUNTER (OUTPATIENT)
Dept: OCCUPATIONAL THERAPY | Age: 75
Setting detail: THERAPIES SERIES
Discharge: HOME | End: 2020-11-12
Payer: MEDICARE

## 2020-11-12 NOTE — PROGRESS NOTES
[facility]   Outpatient Physical Therapy  Cancel/No Show Note    Date: 20    Patient Name: Keenan Johnson        MRN: [id number]  Account: [de-identified] : 1945      General Information:  Chart Reviewed: Yes  Referring Practitioner: Renny Islas MD  Referral Date : 20  Diagnosis: CVA -- Ataxia  Follows Commands: Within Functional Limits  Onset Date: 19  PT Insurance Information: [employer] Medicare  Total # of Visits Approved: 24  Total # of Visits to Date:   No Show: 1  Canceled Appointment: 1  Progress Note Counter:         Comments: Patient arrived to Pt but did not have written documentation stating he can continue with therapy this date. Doctor was out of office so could not confirm neither deny, elected to withold therapy this date and left message with MD to give clearance for patient to continue.     Valeria Pierce, PTA

## 2020-11-12 NOTE — PROGRESS NOTES
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 Pt reports he got stent put in,they went though his rt arm & he isn't suppose to put pressure on rt arm. Pt states he is suppose to start cardiac rehab. OT called the [facility] [phone] to speak with Dr Thalia Mcknight office to see if pt can resume OT/PT or if we have to wait till pt is done with cardiac rehab. Nurse states she will contact MD and call therapy back with an answer. Therapy will put pt on hold till we get ok . OT told pt therapy will call him when we get answer from MD office.         Feliberto Sousa, OT

## 2020-11-13 NOTE — PROGRESS NOTES
[] [facility] ([facility]) @ [facility]  [address], [address] [facility]  Phone [phone]  Fax [phone]    Physical Therapy Discharge Note    Date: 2020      Patient: Galdino Ma  : [birth year]  MRN: [id number]       Referring Practitioner: Stan Joaquin MD  Referral Date : 20  Diagnosis: CVA -- Ataxia  Follows Commands: Within Functional Limits  Onset Date: 19  PT Insurance Information: [employer] Medicare  Total # of Visits Approved: 24  Total # of Visits to Date: 17  No Show: 1  Canceled Appointment: 2     Date of initial visit:     2020               [] Patient recovered from conditions. Treatment goals were met. [] Patient received maximum benefit. No further therapy indicated at this time. [x] Patient demonstrated improvement from condition with 1  Of 2  Short term goals met. [x]Patient demonstrated improvement from condition with 0 Of  3 Long term goals met. [] Patient to continue exercise/home instructions independently. [x] Therapy interrupted due to:   See comments  [] Patient has 2 or more no shows/cancels, is discontinued per our policy. [] Patient has completed prescribed number of treatment sessions. [] Other:      Pain level at evaluation was       /10 and at discharge was       /10    It Is My Understanding That The:  [] Patient returned to work. [] Patient demonstrated improved level of function. [] Patient returned to previous functional level. [] Patient's current functional status is unknown due to no-shows  [] Other:     Recommendations/Comments:     Patient last seen on 2020, please refer to this progress note. Cancelled appointments on 11/10 and  as he was in [facility] for a heart procedure. Received message today from his cardiologist to have patient do his cardiac rehab first prior to resuming his physical therapy.  Discharge patient at this time and wait for further orders. Treatment Included:     [x] Therapeutic Exercise   10610  [] Iontophoresis: 4 mg/mL Dexamethasone Sodium Phosphate  mAmin  18398   [] Therapeutic Activity  67794 [] Vasopneumatic cold with compression  86423    [] Gait Training   17947 [] Ultrasound   52991   [x] Neuromuscular Re-education  20102 [] Electrical Stimulation Unattended  85649   [] Manual Therapy  95562 [] Electrical Stimulation Attended  74008   [x] Instruction in HEP  [] Lumbar/Cervical Traction  12738   [] Aquatic Therapy   40991 [] Cold/hotpack    [] Massage   83306      [] Dry Needling, 1 or 2 muscles  07217   [] Biofeedback, first 15 minutes   08892  [] Biofeedback, additional 15 minutes   95344 [] Dry Needling, 3 or more muscles  10692                If you have any questions or concerns regarding this patient's care, please contact us. Thank you for your referral.      Electronically signed by:  Bernardo Weber PT Electronically signed by Bernardo Weber PT on 11/13/2020 at 11:54 AM

## 2020-11-13 NOTE — PROGRESS NOTES
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 MD office reports that pt needs to complete cardiac rehab before doing anymore therapy. OT called pt (10:53 a.m ) and left message that doctor's office left message that he needs to do cardiac rehab 1st, & no therapy right now. Plan - discontinue therapy.      Orin Hagen, OT

## 2020-11-16 NOTE — PROGRESS NOTES
Occupational Therapy        [x] [facility] ([facility]) @ [facility]  [address]  [place], [address] [address] [facility]  Phone [phone]  Fax [phone]  Occupational Therapy Discharge Note  Date: 2020      Patient: Kianna Jolly  : [birth year]  MRN: [id number]    Physician:Dr Niranjan Licona    Diagnosis: ataxia following nontraumatic intracerebal hemorrhage    Rehab Diagnosis:  ICD-10 Codes:I69.193  Total visits attended:17  Cancels/No shows:2 cancel  Date of initial visit:2020            Date of last appt 2020  [] Patient recovered from conditions. Treatment goals were met. [] Patient received maximum benefit. No further therapy indicated at this time. [] Patient demonstrated improvement from condition with  ** Of  ** Short term goals met. []Patient demonstrated improvement from condition with **   Of **  Long term goals met. [] Patient to continue exercise/home instructions independently. [] Therapy interrupted due to:    [] Patient has 2 or more no shows/cancels, is discontinued per our policy. [] Patient has completed prescribed number of treatment sessions. [x] Other:Pt went to [facility] [employer] [facility] clinic for testing and procedure. OT called [facility] On 2020 to see if pt is to resume OT/PT or if we have to wait till pt is done with cardiac rehab. Nurse stated she would contact MD and get back with therapy. On 2020  informed OT that [facility] MD office called and that pt needs to complete cardiac rehab before doing any more therapy. OT called and left pt message on 2020 that he needs to do cardiac rehab 1st and no therapy right now. Plan is to discontinue OT. Pain level at evaluation was       /10 and at discharge was  6-8     /10  It Is My Understanding That The:  [] Patient returned to work. [] Patient demonstrated improved level of function. [] Patient returned to previous functional level.   [] Patient's

## 2020-11-20 ENCOUNTER — OFFICE VISIT (OUTPATIENT)
Dept: FAMILY MEDICINE CLINIC | Age: 75
End: 2020-11-20
Payer: MEDICARE

## 2020-11-20 VITALS
TEMPERATURE: 97.3 F | RESPIRATION RATE: 16 BRPM | DIASTOLIC BLOOD PRESSURE: 56 MMHG | HEIGHT: 70 IN | SYSTOLIC BLOOD PRESSURE: 92 MMHG | HEART RATE: 70 BPM | BODY MASS INDEX: 39.47 KG/M2 | WEIGHT: 275.7 LBS

## 2020-11-20 PROBLEM — E66.01 MORBIDLY OBESE (HCC): Status: ACTIVE | Noted: 2020-11-20

## 2020-11-20 PROBLEM — Z91.81 AT HIGH RISK FOR FALLS: Status: ACTIVE | Noted: 2020-11-20

## 2020-11-20 PROBLEM — I73.9 PVD (PERIPHERAL VASCULAR DISEASE) (HCC): Status: ACTIVE | Noted: 2020-11-20

## 2020-11-20 PROBLEM — I25.118 CORONARY ARTERY DISEASE OF NATIVE ARTERY OF NATIVE HEART WITH STABLE ANGINA PECTORIS (HCC): Status: ACTIVE | Noted: 2020-11-20

## 2020-11-20 LAB — HBA1C MFR BLD: 6.2 %

## 2020-11-20 PROCEDURE — 83036 HEMOGLOBIN GLYCOSYLATED A1C: CPT | Performed by: NURSE PRACTITIONER

## 2020-11-20 PROCEDURE — 1123F ACP DISCUSS/DSCN MKR DOCD: CPT | Performed by: NURSE PRACTITIONER

## 2020-11-20 PROCEDURE — 1036F TOBACCO NON-USER: CPT | Performed by: NURSE PRACTITIONER

## 2020-11-20 PROCEDURE — G8484 FLU IMMUNIZE NO ADMIN: HCPCS | Performed by: NURSE PRACTITIONER

## 2020-11-20 PROCEDURE — G8427 DOCREV CUR MEDS BY ELIG CLIN: HCPCS | Performed by: NURSE PRACTITIONER

## 2020-11-20 PROCEDURE — 99214 OFFICE O/P EST MOD 30 MIN: CPT | Performed by: NURSE PRACTITIONER

## 2020-11-20 PROCEDURE — G8417 CALC BMI ABV UP PARAM F/U: HCPCS | Performed by: NURSE PRACTITIONER

## 2020-11-20 PROCEDURE — 4040F PNEUMOC VAC/ADMIN/RCVD: CPT | Performed by: NURSE PRACTITIONER

## 2020-11-20 PROCEDURE — G8431 POS CLIN DEPRES SCRN F/U DOC: HCPCS | Performed by: NURSE PRACTITIONER

## 2020-11-20 RX ORDER — LISINOPRIL 20 MG/1
TABLET ORAL
COMMUNITY
Start: 2020-11-19 | End: 2020-11-20 | Stop reason: SDUPTHER

## 2020-11-20 RX ORDER — LISINOPRIL 20 MG/1
TABLET ORAL
Qty: 90 TABLET | Refills: 0 | Status: SHIPPED | OUTPATIENT
Start: 2020-11-20 | End: 2021-04-26

## 2020-11-20 RX ORDER — SERTRALINE HYDROCHLORIDE 25 MG/1
25 TABLET, FILM COATED ORAL DAILY
Qty: 90 TABLET | Refills: 0 | Status: SHIPPED | OUTPATIENT
Start: 2020-11-20 | End: 2021-05-20

## 2020-11-20 RX ORDER — METOPROLOL SUCCINATE 50 MG/1
TABLET, EXTENDED RELEASE ORAL
COMMUNITY
Start: 2020-09-10 | End: 2022-10-13 | Stop reason: SDUPTHER

## 2020-11-20 RX ORDER — PHENOL 1.4 %
AEROSOL, SPRAY (ML) MUCOUS MEMBRANE DAILY
COMMUNITY

## 2020-11-20 SDOH — ECONOMIC STABILITY: FOOD INSECURITY: WITHIN THE PAST 12 MONTHS, THE FOOD YOU BOUGHT JUST DIDN'T LAST AND YOU DIDN'T HAVE MONEY TO GET MORE.: NEVER TRUE

## 2020-11-20 SDOH — ECONOMIC STABILITY: FOOD INSECURITY: WITHIN THE PAST 12 MONTHS, YOU WORRIED THAT YOUR FOOD WOULD RUN OUT BEFORE YOU GOT MONEY TO BUY MORE.: NEVER TRUE

## 2020-11-20 SDOH — ECONOMIC STABILITY: INCOME INSECURITY: HOW HARD IS IT FOR YOU TO PAY FOR THE VERY BASICS LIKE FOOD, HOUSING, MEDICAL CARE, AND HEATING?: NOT HARD AT ALL

## 2020-11-20 ASSESSMENT — PATIENT HEALTH QUESTIONNAIRE - PHQ9
1. LITTLE INTEREST OR PLEASURE IN DOING THINGS: 0
SUM OF ALL RESPONSES TO PHQ9 QUESTIONS 1 & 2: 3
6. FEELING BAD ABOUT YOURSELF - OR THAT YOU ARE A FAILURE OR HAVE LET YOURSELF OR YOUR FAMILY DOWN: 3
5. POOR APPETITE OR OVEREATING: 0
2. FEELING DOWN, DEPRESSED OR HOPELESS: 3
SUM OF ALL RESPONSES TO PHQ QUESTIONS 1-9: 11
10. IF YOU CHECKED OFF ANY PROBLEMS, HOW DIFFICULT HAVE THESE PROBLEMS MADE IT FOR YOU TO DO YOUR WORK, TAKE CARE OF THINGS AT HOME, OR GET ALONG WITH OTHER PEOPLE: 1
3. TROUBLE FALLING OR STAYING ASLEEP: 2
7. TROUBLE CONCENTRATING ON THINGS, SUCH AS READING THE NEWSPAPER OR WATCHING TELEVISION: 0
4. FEELING TIRED OR HAVING LITTLE ENERGY: 2
8. MOVING OR SPEAKING SO SLOWLY THAT OTHER PEOPLE COULD HAVE NOTICED. OR THE OPPOSITE, BEING SO FIGETY OR RESTLESS THAT YOU HAVE BEEN MOVING AROUND A LOT MORE THAN USUAL: 1
SUM OF ALL RESPONSES TO PHQ QUESTIONS 1-9: 11
SUM OF ALL RESPONSES TO PHQ QUESTIONS 1-9: 11
9. THOUGHTS THAT YOU WOULD BE BETTER OFF DEAD, OR OF HURTING YOURSELF: 0

## 2020-11-20 ASSESSMENT — ENCOUNTER SYMPTOMS
COUGH: 0
NAUSEA: 0
ABDOMINAL PAIN: 0
SHORTNESS OF BREATH: 0

## 2020-11-20 ASSESSMENT — COLUMBIA-SUICIDE SEVERITY RATING SCALE - C-SSRS
2. HAVE YOU ACTUALLY HAD ANY THOUGHTS OF KILLING YOURSELF?: NO
1. WITHIN THE PAST MONTH, HAVE YOU WISHED YOU WERE DEAD OR WISHED YOU COULD GO TO SLEEP AND NOT WAKE UP?: NO
6. HAVE YOU EVER DONE ANYTHING, STARTED TO DO ANYTHING, OR PREPARED TO DO ANYTHING TO END YOUR LIFE?: NO

## 2020-11-20 NOTE — PROGRESS NOTES
On the basis of positive falls risk screening, assessment and plan is as follows: in-office gait and balance testing performed using The Timed Up and Go Test was positive for increased falls risk.  Home safety tips provided

## 2020-11-20 NOTE — PROGRESS NOTES
Subjective:      Patient ID: Minesh Valdes is a 67 y.o. male. Visit Information    Have you changed or started any medications since your last visit including any over-the-counter medicines, vitamins, or herbal medicines? no   Are you having any side effects from any of your medications? -  no  Have you stopped taking any of your medications? Is so, why? -  no    Have you seen any other physician or provider since your last visit? No  Have you had any other diagnostic tests since your last visit? No  Have you been seen in the emergency room and/or had an admission to a hospital since we last saw you? No  Have you had your routine dental cleaning in the past 6 months? no    Have you activated your [employer] account? If not, what are your barriers? No:      Patient Care Team:  Jie Lema MD as PCP - General (Family Medicine)  Marvin Owusu MD as PCP - [facility] Provider    Medical History Review  Past Medical, Family, and Social History reviewed and does contribute to the patient presenting condition    Health Maintenance   Topic Date Due    DTaP/Tdap/Td vaccine (1 - Tdap) 08/20/1963    Shingles Vaccine (1 of 2) 08/20/1994    Annual Wellness Visit (AWV)  06/19/2019    Lipid screen  07/27/2019    Potassium monitoring  07/29/2020    Creatinine monitoring  07/29/2020    Flu vaccine  Completed    Pneumococcal 65+ years Vaccine  Completed    Hepatitis A vaccine  Aged Out    Hepatitis B vaccine  Aged Out    Hib vaccine  Aged Out    Meningococcal (ACWY) vaccine  Aged Out     HPI    67year old male presents with management of IFG, HTN HlD. Noted to have elevated fasting glucose and A1c is 6.2 today. Currently is on dual therapy for bp and it is borderline low today. Is on statin therapy and tolerates it well. Denies   Hx of cardiomyopathy and CAD and follows up with cardiologist at [facility] and Dr. Anderson Gone here. Pt used to follow up with Dr. Marquise Santos for carotid artery stenosis and PVD.  States he doesn't watch diet and lost some weight but gained it back. States he feels very depressed but currently is not on therapy. Denies suicidal or homicidal ideation. Pt is consider right knee replacement   Pt is non smoker, denies socially. Denies illicit drug use. Review of Systems   Constitutional: Negative for chills and fever. Eyes: Negative for visual disturbance. Respiratory: Negative for cough and shortness of breath. Cardiovascular: Negative for chest pain and palpitations. Gastrointestinal: Negative for abdominal pain and nausea. Endocrine: Negative for polydipsia and polyuria. Genitourinary: Negative for dysuria and hematuria. Musculoskeletal: Positive for arthralgias and gait problem. Neurological: Negative for dizziness, weakness and numbness. Psychiatric/Behavioral: Positive for dysphoric mood. Negative for agitation, sleep disturbance and suicidal ideas. Objective:   Physical Exam  Vitals signs and nursing note reviewed. Constitutional:       General: He is not in acute distress. Appearance: Normal appearance. He is obese. HENT:      Nose: Nose normal. No congestion. Eyes:      General: No scleral icterus. Conjunctiva/sclera: Conjunctivae normal.   Neck:      Musculoskeletal: Neck supple. Cardiovascular:      Rate and Rhythm: Normal rate and regular rhythm. Pulses: Normal pulses. Heart sounds: Normal heart sounds. Pulmonary:      Effort: Pulmonary effort is normal. No respiratory distress. Breath sounds: Normal breath sounds. Abdominal:      Palpations: Abdomen is soft. Tenderness: There is no abdominal tenderness. Musculoskeletal:         General: No swelling or tenderness ( in right knee, ambulates with a walker ). Lymphadenopathy:      Cervical: No cervical adenopathy. Skin:     General: Skin is warm and dry. Neurological:      Mental Status: He is alert and oriented to person, place, and time. Cranial Nerves:  No ONCE DAILY  Dispense: 90 tablet; Refill: 0  - Comprehensive Metabolic Panel; Future    3. Mixed hyperlipidemia  - cont statin therapy   - Lipid Panel; Future    4. Other cardiomyopathy (HCC)/ 5. Coronary artery disease of native artery of native heart with stable angina pectoris ([facility] [address]  - stable. Cont to monitor   - follow up with Dr. Bebo Damico and [facility] as scheduled     6. PVD (peripheral vascular disease) ([facility])  - stable. Cont to monitor   - follow up with Dr. Thania Cash     7. Morbidly obese ([facility] [address]  - discussion with pt about his current diet and exercise habits, and personalized advice was provided regarding recommended lifestyle changes, diet and exercise. 8. Depression, unspecified depression type  - start zoloft . Pt declined BHI counseling.  - sertraline (ZOLOFT) 25 MG tablet; Take 1 tablet by mouth daily  Dispense: 90 tablet; Refill: 0    9. At high risk for falls  - Home safety tips: keep clear path, use non-stick mat or place a lamp near bed. 10. Positive depression screening   - Positive Screen for Clinical Depression with a Documented Follow-up Plan     11. Screening for prostate cancer  - Urinalysis Reflex to Culture; Future  - PSA Screening;  Future        Requested Prescriptions     Signed Prescriptions Disp Refills    lisinopril (PRINIVIL;ZESTRIL) 20 MG tablet 90 tablet 0     Sig: TAKE ONE TABLET BY MOUTH ONCE DAILY    sertraline (ZOLOFT) 25 MG tablet 90 tablet 0     Sig: Take 1 tablet by mouth daily       Medications Discontinued During This Encounter   Medication Reason    aluminum & magnesium hydroxide-simethicone (MAALOX) 200-200-20 MG/5ML SUSP suspension Therapy completed    amLODIPine (NORVASC) 5 MG tablet Therapy completed    colchicine (COLCRYS) 0.6 MG tablet Therapy completed    diclofenac sodium 1 % GEL Therapy completed    Heparin Sodium, Porcine, (HEPARIN, PORCINE,) 5000 UNIT/ML injection Therapy completed    lidocaine 4 % external patch Therapy completed    ondansetron (ZOFRAN) 4 MG tablet Therapy completed    pantoprazole (PROTONIX) 20 MG tablet Therapy completed    sodium chloride (OCEAN, BABY AYR) 0.65 % nasal spray Therapy completed    lisinopril (PRINIVIL;ZESTRIL) 20 MG tablet REORDER       Discussed use, benefit, and side effects of prescribed medications. Barriers to medication compliance addressed. All patient questions answered. Pt voiced understanding. Return in about 3 months (around 2/20/2021) for IFG, HTN, HLD depression .           Clemente Bowser, APRN - CNP

## 2020-12-01 ENCOUNTER — HOSPITAL ENCOUNTER (OUTPATIENT)
Dept: CARDIAC REHAB | Age: 75
Setting detail: THERAPIES SERIES
End: 2020-12-01
Payer: MEDICARE

## 2020-12-03 ENCOUNTER — APPOINTMENT (OUTPATIENT)
Dept: CARDIAC REHAB | Age: 75
End: 2020-12-03
Payer: MEDICARE

## 2020-12-08 ENCOUNTER — HOSPITAL ENCOUNTER (OUTPATIENT)
Dept: CARDIAC REHAB | Age: 75
Setting detail: THERAPIES SERIES
Discharge: HOME | End: 2020-12-08
Payer: MEDICARE

## 2020-12-08 VITALS — BODY MASS INDEX: 41.37 KG/M2 | HEIGHT: 69 IN | WEIGHT: 279.3 LBS

## 2020-12-08 PROCEDURE — 93798 PHYS/QHP OP CAR RHAB W/ECG: CPT

## 2020-12-08 ASSESSMENT — PATIENT HEALTH QUESTIONNAIRE - PHQ9
SUM OF ALL RESPONSES TO PHQ QUESTIONS 1-9: 1
SUM OF ALL RESPONSES TO PHQ QUESTIONS 1-9: 1

## 2020-12-10 ENCOUNTER — HOSPITAL ENCOUNTER (OUTPATIENT)
Dept: CARDIAC REHAB | Age: 75
Setting detail: THERAPIES SERIES
Discharge: HOME | End: 2020-12-10
Payer: MEDICARE

## 2020-12-10 VITALS — WEIGHT: 276.8 LBS | BODY MASS INDEX: 40.88 KG/M2

## 2020-12-10 PROCEDURE — 93798 PHYS/QHP OP CAR RHAB W/ECG: CPT

## 2020-12-15 ENCOUNTER — HOSPITAL ENCOUNTER (OUTPATIENT)
Dept: CARDIAC REHAB | Age: 75
Setting detail: THERAPIES SERIES
Discharge: HOME | End: 2020-12-15
Payer: MEDICARE

## 2020-12-15 VITALS — BODY MASS INDEX: 40.76 KG/M2 | WEIGHT: 276 LBS

## 2020-12-15 PROCEDURE — 93798 PHYS/QHP OP CAR RHAB W/ECG: CPT

## 2020-12-17 ENCOUNTER — HOSPITAL ENCOUNTER (OUTPATIENT)
Dept: CARDIAC REHAB | Age: 75
Setting detail: THERAPIES SERIES
Discharge: HOME | End: 2020-12-17
Payer: MEDICARE

## 2020-12-17 VITALS — WEIGHT: 274 LBS | BODY MASS INDEX: 40.46 KG/M2

## 2020-12-17 PROCEDURE — 93798 PHYS/QHP OP CAR RHAB W/ECG: CPT

## 2020-12-22 ENCOUNTER — HOSPITAL ENCOUNTER (OUTPATIENT)
Dept: CARDIAC REHAB | Age: 75
Setting detail: THERAPIES SERIES
Discharge: HOME | End: 2020-12-22
Payer: MEDICARE

## 2020-12-22 VITALS — WEIGHT: 274 LBS | BODY MASS INDEX: 40.46 KG/M2

## 2020-12-22 PROCEDURE — 93798 PHYS/QHP OP CAR RHAB W/ECG: CPT

## 2020-12-24 ENCOUNTER — APPOINTMENT (OUTPATIENT)
Dept: CARDIAC REHAB | Age: 75
End: 2020-12-24
Payer: MEDICARE

## 2020-12-29 ENCOUNTER — HOSPITAL ENCOUNTER (OUTPATIENT)
Dept: CARDIAC REHAB | Age: 75
Setting detail: THERAPIES SERIES
Discharge: HOME | End: 2020-12-29
Payer: MEDICARE

## 2020-12-29 VITALS — WEIGHT: 275 LBS | BODY MASS INDEX: 40.61 KG/M2

## 2020-12-29 PROCEDURE — 93798 PHYS/QHP OP CAR RHAB W/ECG: CPT

## 2020-12-31 ENCOUNTER — APPOINTMENT (OUTPATIENT)
Dept: CARDIAC REHAB | Age: 75
End: 2020-12-31
Payer: MEDICARE

## 2021-01-05 ENCOUNTER — HOSPITAL ENCOUNTER (OUTPATIENT)
Dept: CARDIAC REHAB | Age: 76
Setting detail: THERAPIES SERIES
Discharge: HOME | End: 2021-01-05
Payer: MEDICARE

## 2021-01-05 VITALS — WEIGHT: 274 LBS | BODY MASS INDEX: 40.46 KG/M2

## 2021-01-05 PROCEDURE — 93798 PHYS/QHP OP CAR RHAB W/ECG: CPT

## 2021-01-07 ENCOUNTER — HOSPITAL ENCOUNTER (OUTPATIENT)
Dept: CARDIAC REHAB | Age: 76
Setting detail: THERAPIES SERIES
Discharge: HOME | End: 2021-01-07
Payer: MEDICARE

## 2021-01-07 VITALS — BODY MASS INDEX: 40.73 KG/M2 | WEIGHT: 275 LBS | HEIGHT: 69 IN

## 2021-01-07 PROCEDURE — 93798 PHYS/QHP OP CAR RHAB W/ECG: CPT

## 2021-01-12 ENCOUNTER — HOSPITAL ENCOUNTER (OUTPATIENT)
Dept: CARDIAC REHAB | Age: 76
Setting detail: THERAPIES SERIES
Discharge: HOME | End: 2021-01-12
Payer: MEDICARE

## 2021-01-12 VITALS — BODY MASS INDEX: 40.61 KG/M2 | WEIGHT: 275 LBS

## 2021-01-12 PROCEDURE — 93798 PHYS/QHP OP CAR RHAB W/ECG: CPT

## 2021-01-14 ENCOUNTER — HOSPITAL ENCOUNTER (OUTPATIENT)
Dept: CARDIAC REHAB | Age: 76
Setting detail: THERAPIES SERIES
Discharge: HOME | End: 2021-01-14
Payer: MEDICARE

## 2021-01-14 VITALS — BODY MASS INDEX: 40.91 KG/M2 | WEIGHT: 277 LBS

## 2021-01-14 PROCEDURE — 93798 PHYS/QHP OP CAR RHAB W/ECG: CPT

## 2021-01-19 ENCOUNTER — HOSPITAL ENCOUNTER (OUTPATIENT)
Dept: CARDIAC REHAB | Age: 76
Setting detail: THERAPIES SERIES
Discharge: HOME | End: 2021-01-19
Payer: MEDICARE

## 2021-01-19 VITALS — BODY MASS INDEX: 40.76 KG/M2 | WEIGHT: 276 LBS

## 2021-01-19 PROCEDURE — 93798 PHYS/QHP OP CAR RHAB W/ECG: CPT

## 2021-01-21 ENCOUNTER — HOSPITAL ENCOUNTER (OUTPATIENT)
Dept: CARDIAC REHAB | Age: 76
Setting detail: THERAPIES SERIES
Discharge: HOME | End: 2021-01-21
Payer: MEDICARE

## 2021-01-21 VITALS — WEIGHT: 274 LBS | BODY MASS INDEX: 40.46 KG/M2

## 2021-01-21 PROCEDURE — 93798 PHYS/QHP OP CAR RHAB W/ECG: CPT

## 2021-01-26 ENCOUNTER — HOSPITAL ENCOUNTER (OUTPATIENT)
Dept: CARDIAC REHAB | Age: 76
Setting detail: THERAPIES SERIES
Discharge: HOME | End: 2021-01-26
Payer: MEDICARE

## 2021-01-26 VITALS — WEIGHT: 275 LBS | BODY MASS INDEX: 40.61 KG/M2

## 2021-01-26 PROCEDURE — 93798 PHYS/QHP OP CAR RHAB W/ECG: CPT

## 2021-01-28 ENCOUNTER — HOSPITAL ENCOUNTER (OUTPATIENT)
Dept: CARDIAC REHAB | Age: 76
Setting detail: THERAPIES SERIES
Discharge: HOME | End: 2021-01-28
Payer: MEDICARE

## 2021-01-28 VITALS — WEIGHT: 275 LBS | BODY MASS INDEX: 40.61 KG/M2

## 2021-01-28 PROCEDURE — 93798 PHYS/QHP OP CAR RHAB W/ECG: CPT

## 2021-01-29 ENCOUNTER — TELEPHONE (OUTPATIENT)
Dept: FAMILY MEDICINE CLINIC | Age: 76
End: 2021-01-29

## 2021-02-02 ENCOUNTER — HOSPITAL ENCOUNTER (OUTPATIENT)
Dept: CARDIAC REHAB | Age: 76
Setting detail: THERAPIES SERIES
Discharge: HOME | End: 2021-02-02
Payer: MEDICARE

## 2021-02-02 VITALS — BODY MASS INDEX: 40.61 KG/M2 | WEIGHT: 275 LBS

## 2021-02-02 PROCEDURE — 93798 PHYS/QHP OP CAR RHAB W/ECG: CPT

## 2021-02-02 NOTE — TELEPHONE ENCOUNTER
Patient was only seen 1 time as a new patient on 11-20-20. He would need to come in or do a Virtual Visit for evaluation. We will address at 2-15-21 visit with [facility].

## 2021-02-04 ENCOUNTER — APPOINTMENT (OUTPATIENT)
Dept: CARDIAC REHAB | Age: 76
End: 2021-02-04
Payer: MEDICARE

## 2021-02-09 ENCOUNTER — HOSPITAL ENCOUNTER (OUTPATIENT)
Dept: CARDIAC REHAB | Age: 76
Setting detail: THERAPIES SERIES
Discharge: HOME | End: 2021-02-09
Payer: MEDICARE

## 2021-02-09 VITALS — WEIGHT: 271.5 LBS | BODY MASS INDEX: 40.09 KG/M2

## 2021-02-09 PROCEDURE — 93798 PHYS/QHP OP CAR RHAB W/ECG: CPT

## 2021-02-11 ENCOUNTER — HOSPITAL ENCOUNTER (OUTPATIENT)
Dept: CARDIAC REHAB | Age: 76
Setting detail: THERAPIES SERIES
Discharge: HOME | End: 2021-02-11
Payer: MEDICARE

## 2021-02-11 VITALS — BODY MASS INDEX: 40.09 KG/M2 | WEIGHT: 271.5 LBS

## 2021-02-11 PROCEDURE — 93798 PHYS/QHP OP CAR RHAB W/ECG: CPT

## 2021-02-15 ENCOUNTER — OFFICE VISIT (OUTPATIENT)
Dept: FAMILY MEDICINE CLINIC | Age: 76
End: 2021-02-15
Payer: MEDICARE

## 2021-02-15 VITALS
RESPIRATION RATE: 14 BRPM | HEART RATE: 72 BPM | TEMPERATURE: 96.4 F | SYSTOLIC BLOOD PRESSURE: 113 MMHG | HEIGHT: 70 IN | OXYGEN SATURATION: 96 % | BODY MASS INDEX: 38.8 KG/M2 | DIASTOLIC BLOOD PRESSURE: 66 MMHG | WEIGHT: 271 LBS

## 2021-02-15 DIAGNOSIS — E78.2 MIXED HYPERLIPIDEMIA: ICD-10-CM

## 2021-02-15 DIAGNOSIS — I10 ESSENTIAL HYPERTENSION: ICD-10-CM

## 2021-02-15 DIAGNOSIS — I73.9 PVD (PERIPHERAL VASCULAR DISEASE) (HCC): ICD-10-CM

## 2021-02-15 DIAGNOSIS — E66.01 MORBIDLY OBESE (HCC): ICD-10-CM

## 2021-02-15 DIAGNOSIS — R73.01 IMPAIRED FASTING GLUCOSE: Primary | ICD-10-CM

## 2021-02-15 DIAGNOSIS — I25.118 CORONARY ARTERY DISEASE OF NATIVE ARTERY OF NATIVE HEART WITH STABLE ANGINA PECTORIS (HCC): ICD-10-CM

## 2021-02-15 DIAGNOSIS — I42.8 OTHER CARDIOMYOPATHY (HCC): ICD-10-CM

## 2021-02-15 PROCEDURE — G8484 FLU IMMUNIZE NO ADMIN: HCPCS | Performed by: NURSE PRACTITIONER

## 2021-02-15 PROCEDURE — 1036F TOBACCO NON-USER: CPT | Performed by: NURSE PRACTITIONER

## 2021-02-15 PROCEDURE — G8427 DOCREV CUR MEDS BY ELIG CLIN: HCPCS | Performed by: NURSE PRACTITIONER

## 2021-02-15 PROCEDURE — 4040F PNEUMOC VAC/ADMIN/RCVD: CPT | Performed by: NURSE PRACTITIONER

## 2021-02-15 PROCEDURE — G8417 CALC BMI ABV UP PARAM F/U: HCPCS | Performed by: NURSE PRACTITIONER

## 2021-02-15 PROCEDURE — 1123F ACP DISCUSS/DSCN MKR DOCD: CPT | Performed by: NURSE PRACTITIONER

## 2021-02-15 PROCEDURE — 99214 OFFICE O/P EST MOD 30 MIN: CPT | Performed by: NURSE PRACTITIONER

## 2021-02-15 RX ORDER — PANTOPRAZOLE SODIUM 40 MG/1
40 TABLET, DELAYED RELEASE ORAL
Qty: 90 TABLET | Refills: 0 | Status: SHIPPED | OUTPATIENT
Start: 2021-02-15

## 2021-02-15 ASSESSMENT — PATIENT HEALTH QUESTIONNAIRE - PHQ9
SUM OF ALL RESPONSES TO PHQ QUESTIONS 1-9: 2

## 2021-02-15 ASSESSMENT — ENCOUNTER SYMPTOMS
ABDOMINAL PAIN: 0
NAUSEA: 0
COUGH: 0
SHORTNESS OF BREATH: 0

## 2021-02-15 NOTE — PROGRESS NOTES
Subjective:      Patient ID: Tate Doyle is a 67 y.o. male. Have you changed or stopped any medications since your last visit including any over-the-counter medicines, vitamins, or herbal medicines? no     Are you taking all your prescribed medications? Yes          If NO, why? -      Have you seen any other physician or provider since your last visit yes- Cardiac Rehab     Have you had any other diagnostic tests since your last visit? no    Tobacco use:  Patient  reports that he has never smoked. He has never used smokeless tobacco.   If a smoker, cessation materials provided? No   1-800-QUIT-NOW ([phone])     Medical history Review  Past Medical, Family, and Social History reviewed and does contribute to the patient presenting condition    Health Maintenance Due   Topic Date Due    Hepatitis C screen  1945    DTaP/Tdap/Td vaccine (1 - Tdap) 08/20/1963    Shingles Vaccine (1 of 2) 08/20/1994    Annual Wellness Visit (AWV)  06/19/2019    Lipid screen  07/27/2019    Potassium monitoring  07/29/2020    Creatinine monitoring  07/29/2020     HPI  67year old male presents with management of IFG, HTN HlD GERD and obesity. Noted to have elevated fasting glucose and A1c was 6.2 last nov. bp is stable with dual therapy. Is compliant with statin therapy and tolerates it well. States he has intermittent acid reflux and currently is not any therapy. Blood tests ordered last visit were not complete    Hx of cardiomyopathy and CAD and is on anticoagulation therapy. Also follows up with Dr. Jayla Gomez and cardiologist at [facility]. The conditions have been stable. Hasn't followed up with Dr. Rosana Hooper as advised for carotid stenosis. Was placed on zoloft for depression and states it work well other than making him sleepy. Review of Systems   Constitutional: Negative for chills and fever. Eyes: Negative for visual disturbance. Respiratory: Negative for cough and shortness of breath.     Cardiovascular: Negative for chest pain and palpitations. Gastrointestinal: Negative for abdominal pain and nausea. Musculoskeletal: Positive for gait problem. Neurological: Negative for dizziness and weakness. Psychiatric/Behavioral: Positive for dysphoric mood. Negative for agitation. Objective:   Physical Exam  Vitals signs and nursing note reviewed. Constitutional:       General: He is not in acute distress. Appearance: Normal appearance. He is obese. HENT:      Nose: Nose normal.   Eyes:      Conjunctiva/sclera: Conjunctivae normal.   Neck:      Musculoskeletal: Neck supple. Cardiovascular:      Rate and Rhythm: Regular rhythm. Heart sounds: Normal heart sounds. Pulmonary:      Effort: Pulmonary effort is normal. No respiratory distress. Breath sounds: Normal breath sounds. Abdominal:      Palpations: Abdomen is soft. Tenderness: There is no abdominal tenderness. Musculoskeletal:         General: No tenderness or deformity. Lymphadenopathy:      Cervical: No cervical adenopathy. Skin:     General: Skin is warm and dry. Neurological:      Mental Status: He is alert and oriented to person, place, and time. Cranial Nerves: No cranial nerve deficit. Comments: Ambulates with a walker    Psychiatric:         Mood and Affect: Mood normal.         Behavior: Behavior normal.         Assessment:      1. Impaired fasting glucose    2. Essential hypertension    3. Mixed hyperlipidemia    4. Other cardiomyopathy ([facility] [address]    5. PVD (peripheral vascular disease) ([facility] [address]    6. Morbidly obese ([facility] [address]    7.  Coronary artery disease of native artery of native heart with stable angina pectoris [facility])            Plan:      BP Readings from Last 3 Encounters:   02/15/21 113/66   11/20/20 (!) 92/56   04/16/20 (!) 164/88     /66   Pulse 72   Temp 96.4 °F (35.8 °C) (Temporal)   Resp 14   Ht 5' 10\" (1.778 m)   Wt 271 lb (122.9 kg)   SpO2 96%   BMI 38.88 kg/m²   Lab Results   Component Value Date    WBC 4.2 07/28/2019    HGB 12.0 (L) 07/28/2019    HCT 36.3 (L) 07/28/2019     07/28/2019    CHOL 179 07/27/2018    TRIG 122 07/27/2018    HDL 48 07/27/2018    ALT 71 (H) 07/11/2019    AST 48 (H) 07/11/2019     07/29/2019    K 4.6 07/29/2019    CL 99 07/29/2019    CREATININE 0.54 (L) 07/29/2019    BUN 9 07/29/2019    CO2 28 07/29/2019    TSH 0.58 07/27/2018    PSA 0.95 07/27/2018    INR 1.0 01/22/2016    LABA1C 6.2 11/20/2020    LABMICR CANNOT BE CALCULATED 07/27/2018     Lab Results   Component Value Date    CALCIUM 9.8 07/29/2019    PHOS 3.7 02/03/2016     Lab Results   Component Value Date    LDLCHOLESTEROL 107 07/27/2018         1. Impaired fasting glucose  - Hemoglobin A1C; Future  - cont diet measures. 2. Essential hypertension  - stable. No therapy change  - blood tests ordered last visit reprinted out for pt     3. Mixed hyperlipidemia  - cont statin therapy. - check lipid panel    4. Other cardiomyopathy (HCC)/ 7. Coronary artery disease of native artery of native heart with stable angina pectoris ([facility] [address]  - stable. Cont to monitor   - follow up with Dr. Jasmyn Jenkins as scheduled     5. [facility]  - advised to follow up with Dr. Cristino Jin as scheduled     6. Morbidly obese (HCC)  - stable. Cont diet measures. -  discussion with pt/parent about his current diet and exercise habits, and personalized advice was provided regarding recommended lifestyle changes, diet and exercise. 7. Depression   - cont zoloft and advised to take it at HS    8. GERD  - start PPI therapy   - advised in detail about life style and dietary modifications: avoid caffeine, nicotine chocolate as well as fast foods, soda pops, spices, grease and fried food. Pt has verbalized understanding and agreement to these modifications.     Requested Prescriptions     Signed Prescriptions Disp Refills    pantoprazole (PROTONIX) 40 MG tablet 90 tablet 0     Sig: Take 1 tablet by mouth every morning (before breakfast)       There are no discontinued medications. Discussed use, benefit, and side effects of prescribed medications. Barriers to medication compliance addressed. All patient questions answered. Pt voiced understanding. Return in about 14 weeks (around 5/24/2021) for IFG, HTN, HLD depression .           Diane Smiley, APRN - CNP

## 2021-02-16 ENCOUNTER — HOSPITAL ENCOUNTER (OUTPATIENT)
Dept: CARDIAC REHAB | Age: 76
Setting detail: THERAPIES SERIES
Discharge: HOME | End: 2021-02-16
Payer: MEDICARE

## 2021-02-18 ENCOUNTER — HOSPITAL ENCOUNTER (OUTPATIENT)
Dept: CARDIAC REHAB | Age: 76
Setting detail: THERAPIES SERIES
Discharge: HOME | End: 2021-02-18
Payer: MEDICARE

## 2021-02-18 VITALS — WEIGHT: 272 LBS | BODY MASS INDEX: 39.03 KG/M2

## 2021-02-18 PROCEDURE — 93798 PHYS/QHP OP CAR RHAB W/ECG: CPT

## 2021-02-23 ENCOUNTER — HOSPITAL ENCOUNTER (OUTPATIENT)
Dept: CARDIAC REHAB | Age: 76
Setting detail: THERAPIES SERIES
Discharge: HOME | End: 2021-02-23
Payer: MEDICARE

## 2021-02-25 ENCOUNTER — APPOINTMENT (OUTPATIENT)
Dept: CARDIAC REHAB | Age: 76
End: 2021-02-25
Payer: MEDICARE

## 2021-03-02 ENCOUNTER — HOSPITAL ENCOUNTER (OUTPATIENT)
Dept: CARDIAC REHAB | Age: 76
Setting detail: THERAPIES SERIES
Discharge: HOME | End: 2021-03-02
Payer: MEDICARE

## 2021-03-02 VITALS — WEIGHT: 272 LBS | BODY MASS INDEX: 39.03 KG/M2

## 2021-03-02 PROCEDURE — 93798 PHYS/QHP OP CAR RHAB W/ECG: CPT

## 2021-03-04 ENCOUNTER — HOSPITAL ENCOUNTER (OUTPATIENT)
Dept: CARDIAC REHAB | Age: 76
Setting detail: THERAPIES SERIES
Discharge: HOME | End: 2021-03-04
Payer: MEDICARE

## 2021-03-04 VITALS — WEIGHT: 273.8 LBS | BODY MASS INDEX: 39.29 KG/M2

## 2021-03-04 PROCEDURE — 93798 PHYS/QHP OP CAR RHAB W/ECG: CPT

## 2021-03-09 ENCOUNTER — HOSPITAL ENCOUNTER (OUTPATIENT)
Dept: CARDIAC REHAB | Age: 76
Setting detail: THERAPIES SERIES
Discharge: HOME | End: 2021-03-09
Payer: MEDICARE

## 2021-03-09 VITALS — BODY MASS INDEX: 38.65 KG/M2 | HEIGHT: 70 IN | WEIGHT: 270 LBS

## 2021-03-09 PROCEDURE — 93798 PHYS/QHP OP CAR RHAB W/ECG: CPT

## 2021-03-11 ENCOUNTER — HOSPITAL ENCOUNTER (OUTPATIENT)
Dept: CARDIAC REHAB | Age: 76
Setting detail: THERAPIES SERIES
Discharge: HOME | End: 2021-03-11
Payer: MEDICARE

## 2021-03-16 ENCOUNTER — HOSPITAL ENCOUNTER (OUTPATIENT)
Dept: CARDIAC REHAB | Age: 76
Setting detail: THERAPIES SERIES
Discharge: HOME | End: 2021-03-16
Payer: MEDICARE

## 2021-03-16 VITALS — BODY MASS INDEX: 39.89 KG/M2 | WEIGHT: 278 LBS

## 2021-03-16 PROCEDURE — 93798 PHYS/QHP OP CAR RHAB W/ECG: CPT

## 2021-03-18 ENCOUNTER — HOSPITAL ENCOUNTER (OUTPATIENT)
Dept: CARDIAC REHAB | Age: 76
Setting detail: THERAPIES SERIES
Discharge: HOME | End: 2021-03-18
Payer: MEDICARE

## 2021-03-18 VITALS — WEIGHT: 272 LBS | BODY MASS INDEX: 39.03 KG/M2

## 2021-03-18 PROCEDURE — 93798 PHYS/QHP OP CAR RHAB W/ECG: CPT

## 2021-03-23 ENCOUNTER — HOSPITAL ENCOUNTER (OUTPATIENT)
Dept: CARDIAC REHAB | Age: 76
Setting detail: THERAPIES SERIES
Discharge: HOME | End: 2021-03-23
Payer: MEDICARE

## 2021-03-23 VITALS — BODY MASS INDEX: 38.88 KG/M2 | WEIGHT: 271 LBS

## 2021-03-23 PROCEDURE — 93798 PHYS/QHP OP CAR RHAB W/ECG: CPT

## 2021-03-25 ENCOUNTER — HOSPITAL ENCOUNTER (OUTPATIENT)
Dept: CARDIAC REHAB | Age: 76
Setting detail: THERAPIES SERIES
Discharge: HOME | End: 2021-03-25
Payer: MEDICARE

## 2021-03-25 ENCOUNTER — HOSPITAL ENCOUNTER (OUTPATIENT)
Age: 76
Discharge: HOME | End: 2021-03-25
Payer: MEDICARE

## 2021-03-25 VITALS — BODY MASS INDEX: 39.31 KG/M2 | WEIGHT: 274 LBS

## 2021-03-25 DIAGNOSIS — Z12.5 SCREENING FOR PROSTATE CANCER: ICD-10-CM

## 2021-03-25 DIAGNOSIS — E78.2 MIXED HYPERLIPIDEMIA: ICD-10-CM

## 2021-03-25 DIAGNOSIS — R73.01 IMPAIRED FASTING GLUCOSE: ICD-10-CM

## 2021-03-25 DIAGNOSIS — I10 ESSENTIAL HYPERTENSION: ICD-10-CM

## 2021-03-25 DIAGNOSIS — M79.674 PAIN IN TOES OF BOTH FEET: ICD-10-CM

## 2021-03-25 DIAGNOSIS — M79.675 PAIN IN TOES OF BOTH FEET: ICD-10-CM

## 2021-03-25 LAB
-: ABNORMAL
ALBUMIN SERPL-MCNC: 4.2 G/DL (ref 3.5–5.2)
ALBUMIN/GLOBULIN RATIO: ABNORMAL (ref 1–2.5)
ALP BLD-CCNC: 103 U/L (ref 40–129)
ALT SERPL-CCNC: 24 U/L (ref 5–41)
AMORPHOUS: ABNORMAL
ANION GAP SERPL CALCULATED.3IONS-SCNC: 8 MMOL/L (ref 9–17)
AST SERPL-CCNC: 25 U/L
BACTERIA: ABNORMAL
BILIRUB SERPL-MCNC: 0.49 MG/DL (ref 0.3–1.2)
BILIRUBIN URINE: NEGATIVE
BUN BLDV-MCNC: 17 MG/DL (ref 8–23)
BUN/CREAT BLD: ABNORMAL (ref 9–20)
CALCIUM SERPL-MCNC: 9.2 MG/DL (ref 8.6–10.4)
CASTS UA: ABNORMAL /LPF
CHLORIDE BLD-SCNC: 105 MMOL/L (ref 98–107)
CHOLESTEROL/HDL RATIO: 4.5
CHOLESTEROL: 167 MG/DL
CO2: 25 MMOL/L (ref 20–31)
COLOR: YELLOW
COMMENT UA: ABNORMAL
CREAT SERPL-MCNC: 0.79 MG/DL (ref 0.7–1.2)
CRYSTALS, UA: ABNORMAL /HPF
EPITHELIAL CELLS UA: ABNORMAL /HPF
ESTIMATED AVERAGE GLUCOSE: 140 MG/DL
GFR AFRICAN AMERICAN: >60 ML/MIN
GFR NON-AFRICAN AMERICAN: >60 ML/MIN
GFR SERPL CREATININE-BSD FRML MDRD: ABNORMAL ML/MIN/{1.73_M2}
GFR SERPL CREATININE-BSD FRML MDRD: ABNORMAL ML/MIN/{1.73_M2}
GLUCOSE BLD-MCNC: 122 MG/DL (ref 70–99)
GLUCOSE URINE: NEGATIVE
HBA1C MFR BLD: 6.5 % (ref 4–6)
HCT VFR BLD CALC: 42.2 % (ref 41–53)
HDLC SERPL-MCNC: 37 MG/DL
HEMOGLOBIN: 13.8 G/DL (ref 13.5–17.5)
KETONES, URINE: NEGATIVE
LDL CHOLESTEROL: 100 MG/DL (ref 0–130)
LEUKOCYTE ESTERASE, URINE: ABNORMAL
MAGNESIUM: 2.1 MG/DL (ref 1.6–2.6)
MCH RBC QN AUTO: 29.9 PG (ref 26–34)
MCHC RBC AUTO-ENTMCNC: 32.6 G/DL (ref 31–37)
MCV RBC AUTO: 91.8 FL (ref 80–100)
MUCUS: ABNORMAL
NITRITE, URINE: POSITIVE
NRBC AUTOMATED: NORMAL PER 100 WBC
OTHER OBSERVATIONS UA: ABNORMAL
PDW BLD-RTO: 13.8 % (ref 11.5–14.9)
PH UA: 6 (ref 5–8)
PLATELET # BLD: 156 K/UL (ref 150–450)
PMV BLD AUTO: 9 FL (ref 6–12)
POTASSIUM SERPL-SCNC: 4.5 MMOL/L (ref 3.7–5.3)
PROSTATE SPECIFIC ANTIGEN: 1.22 UG/L
PROTEIN UA: NEGATIVE
RBC # BLD: 4.6 M/UL (ref 4.5–5.9)
RBC UA: ABNORMAL /HPF
RENAL EPITHELIAL, UA: ABNORMAL /HPF
SODIUM BLD-SCNC: 138 MMOL/L (ref 135–144)
SPECIFIC GRAVITY UA: 1.02 (ref 1–1.03)
TOTAL PROTEIN: 6.9 G/DL (ref 6.4–8.3)
TRICHOMONAS: ABNORMAL
TRIGL SERPL-MCNC: 148 MG/DL
TURBIDITY: ABNORMAL
URIC ACID: 6.9 MG/DL (ref 3.4–7)
URINE HGB: NEGATIVE
UROBILINOGEN, URINE: NORMAL
VLDLC SERPL CALC-MCNC: ABNORMAL MG/DL (ref 1–30)
WBC # BLD: 5.7 K/UL (ref 3.5–11)
WBC UA: ABNORMAL /HPF
YEAST: ABNORMAL

## 2021-03-25 PROCEDURE — 81001 URINALYSIS AUTO W/SCOPE: CPT

## 2021-03-25 PROCEDURE — 87077 CULTURE AEROBIC IDENTIFY: CPT

## 2021-03-25 PROCEDURE — 80061 LIPID PANEL: CPT

## 2021-03-25 PROCEDURE — G0103 PSA SCREENING: HCPCS

## 2021-03-25 PROCEDURE — 83036 HEMOGLOBIN GLYCOSYLATED A1C: CPT

## 2021-03-25 PROCEDURE — 84550 ASSAY OF BLOOD/URIC ACID: CPT

## 2021-03-25 PROCEDURE — 83735 ASSAY OF MAGNESIUM: CPT

## 2021-03-25 PROCEDURE — 85027 COMPLETE CBC AUTOMATED: CPT

## 2021-03-25 PROCEDURE — 93798 PHYS/QHP OP CAR RHAB W/ECG: CPT

## 2021-03-25 PROCEDURE — 87086 URINE CULTURE/COLONY COUNT: CPT

## 2021-03-25 PROCEDURE — 80053 COMPREHEN METABOLIC PANEL: CPT

## 2021-03-25 PROCEDURE — 36415 COLL VENOUS BLD VENIPUNCTURE: CPT

## 2021-03-25 PROCEDURE — 87186 SC STD MICRODIL/AGAR DIL: CPT

## 2021-03-26 DIAGNOSIS — R73.01 IMPAIRED FASTING GLUCOSE: Primary | ICD-10-CM

## 2021-03-26 DIAGNOSIS — N30.00 ACUTE CYSTITIS WITHOUT HEMATURIA: ICD-10-CM

## 2021-03-26 LAB
CULTURE: ABNORMAL
Lab: ABNORMAL
SPECIMEN DESCRIPTION: ABNORMAL

## 2021-03-26 RX ORDER — METFORMIN HYDROCHLORIDE 500 MG/1
500 TABLET, EXTENDED RELEASE ORAL
Qty: 90 TABLET | Refills: 0 | Status: SHIPPED | OUTPATIENT
Start: 2021-03-26 | End: 2021-07-14

## 2021-03-26 RX ORDER — CIPROFLOXACIN 500 MG/1
500 TABLET, FILM COATED ORAL 2 TIMES DAILY
Qty: 20 TABLET | Refills: 0 | Status: SHIPPED | OUTPATIENT
Start: 2021-03-26 | End: 2021-04-05

## 2021-03-30 ENCOUNTER — HOSPITAL ENCOUNTER (OUTPATIENT)
Dept: CARDIAC REHAB | Age: 76
Setting detail: THERAPIES SERIES
Discharge: HOME | End: 2021-03-30
Payer: MEDICARE

## 2021-03-30 VITALS — WEIGHT: 273 LBS | BODY MASS INDEX: 39.17 KG/M2

## 2021-03-30 PROCEDURE — 93798 PHYS/QHP OP CAR RHAB W/ECG: CPT

## 2021-03-31 ENCOUNTER — HOSPITAL ENCOUNTER (OUTPATIENT)
Dept: VASCULAR LAB | Age: 76
Discharge: HOME | End: 2021-03-31
Payer: MEDICARE

## 2021-03-31 DIAGNOSIS — I65.23 BILATERAL CAROTID ARTERY STENOSIS: ICD-10-CM

## 2021-03-31 PROCEDURE — 93880 EXTRACRANIAL BILAT STUDY: CPT

## 2021-04-01 ENCOUNTER — HOSPITAL ENCOUNTER (OUTPATIENT)
Dept: CARDIAC REHAB | Age: 76
Setting detail: THERAPIES SERIES
Discharge: HOME | End: 2021-04-01
Payer: MEDICARE

## 2021-04-06 ENCOUNTER — HOSPITAL ENCOUNTER (OUTPATIENT)
Dept: CARDIAC REHAB | Age: 76
Setting detail: THERAPIES SERIES
Discharge: HOME | End: 2021-04-06
Payer: MEDICARE

## 2021-04-06 VITALS — WEIGHT: 275 LBS | BODY MASS INDEX: 39.46 KG/M2

## 2021-04-06 PROCEDURE — 93798 PHYS/QHP OP CAR RHAB W/ECG: CPT

## 2021-04-08 ENCOUNTER — HOSPITAL ENCOUNTER (OUTPATIENT)
Dept: CARDIAC REHAB | Age: 76
Setting detail: THERAPIES SERIES
Discharge: HOME | End: 2021-04-08
Payer: MEDICARE

## 2021-04-08 VITALS — WEIGHT: 275 LBS | BODY MASS INDEX: 39.46 KG/M2

## 2021-04-08 PROCEDURE — 93798 PHYS/QHP OP CAR RHAB W/ECG: CPT

## 2021-04-13 ENCOUNTER — HOSPITAL ENCOUNTER (OUTPATIENT)
Dept: CARDIAC REHAB | Age: 76
Setting detail: THERAPIES SERIES
Discharge: HOME | End: 2021-04-13
Payer: MEDICARE

## 2021-04-13 VITALS — BODY MASS INDEX: 39.6 KG/M2 | WEIGHT: 276 LBS

## 2021-04-13 PROCEDURE — 93798 PHYS/QHP OP CAR RHAB W/ECG: CPT

## 2021-04-15 ENCOUNTER — HOSPITAL ENCOUNTER (OUTPATIENT)
Dept: CARDIAC REHAB | Age: 76
Setting detail: THERAPIES SERIES
Discharge: HOME | End: 2021-04-15
Payer: MEDICARE

## 2021-04-15 VITALS — BODY MASS INDEX: 39.46 KG/M2 | WEIGHT: 275 LBS

## 2021-04-15 PROCEDURE — 93798 PHYS/QHP OP CAR RHAB W/ECG: CPT

## 2021-04-20 ENCOUNTER — HOSPITAL ENCOUNTER (OUTPATIENT)
Dept: CARDIAC REHAB | Age: 76
Setting detail: THERAPIES SERIES
Discharge: HOME | End: 2021-04-20
Payer: MEDICARE

## 2021-04-20 VITALS — WEIGHT: 274 LBS | BODY MASS INDEX: 39.31 KG/M2

## 2021-04-20 PROCEDURE — 93798 PHYS/QHP OP CAR RHAB W/ECG: CPT

## 2021-04-22 ENCOUNTER — HOSPITAL ENCOUNTER (OUTPATIENT)
Dept: CARDIAC REHAB | Age: 76
Setting detail: THERAPIES SERIES
Discharge: HOME | End: 2021-04-22
Payer: MEDICARE

## 2021-04-24 DIAGNOSIS — I10 ESSENTIAL HYPERTENSION: ICD-10-CM

## 2021-04-26 ENCOUNTER — HOSPITAL ENCOUNTER (OUTPATIENT)
Dept: CARDIAC REHAB | Age: 76
Setting detail: THERAPIES SERIES
End: 2021-04-26
Payer: MEDICARE

## 2021-04-26 RX ORDER — LISINOPRIL 20 MG/1
TABLET ORAL
Qty: 90 TABLET | Refills: 1 | Status: SHIPPED | OUTPATIENT
Start: 2021-04-26 | End: 2022-02-07 | Stop reason: SDUPTHER

## 2021-04-27 ENCOUNTER — HOSPITAL ENCOUNTER (OUTPATIENT)
Dept: CARDIAC REHAB | Age: 76
Setting detail: THERAPIES SERIES
Discharge: HOME | End: 2021-04-27
Payer: MEDICARE

## 2021-04-27 VITALS — WEIGHT: 273 LBS | BODY MASS INDEX: 39.17 KG/M2

## 2021-04-27 PROCEDURE — 93798 PHYS/QHP OP CAR RHAB W/ECG: CPT

## 2021-04-28 ENCOUNTER — APPOINTMENT (OUTPATIENT)
Dept: CARDIAC REHAB | Age: 76
End: 2021-04-28
Payer: MEDICARE

## 2021-04-29 ENCOUNTER — HOSPITAL ENCOUNTER (OUTPATIENT)
Dept: CARDIAC REHAB | Age: 76
Setting detail: THERAPIES SERIES
Discharge: HOME | End: 2021-04-29
Payer: MEDICARE

## 2021-04-29 VITALS — BODY MASS INDEX: 39.17 KG/M2 | WEIGHT: 273 LBS

## 2021-04-29 PROCEDURE — 93798 PHYS/QHP OP CAR RHAB W/ECG: CPT

## 2021-05-04 ENCOUNTER — HOSPITAL ENCOUNTER (OUTPATIENT)
Dept: CARDIAC REHAB | Age: 76
Setting detail: THERAPIES SERIES
Discharge: HOME | End: 2021-05-04
Payer: MEDICARE

## 2021-05-04 VITALS — WEIGHT: 275 LBS | BODY MASS INDEX: 39.46 KG/M2

## 2021-05-04 PROCEDURE — 93798 PHYS/QHP OP CAR RHAB W/ECG: CPT

## 2021-05-06 ENCOUNTER — HOSPITAL ENCOUNTER (OUTPATIENT)
Dept: CARDIAC REHAB | Age: 76
Setting detail: THERAPIES SERIES
Discharge: HOME | End: 2021-05-06
Payer: MEDICARE

## 2021-05-11 ENCOUNTER — HOSPITAL ENCOUNTER (OUTPATIENT)
Dept: CARDIAC REHAB | Age: 76
Setting detail: THERAPIES SERIES
Discharge: HOME | End: 2021-05-11
Payer: MEDICARE

## 2021-05-11 VITALS — BODY MASS INDEX: 39.6 KG/M2 | WEIGHT: 276 LBS

## 2021-05-11 PROCEDURE — 93798 PHYS/QHP OP CAR RHAB W/ECG: CPT

## 2021-05-13 ENCOUNTER — HOSPITAL ENCOUNTER (OUTPATIENT)
Dept: CARDIAC REHAB | Age: 76
Setting detail: THERAPIES SERIES
Discharge: HOME | End: 2021-05-13
Payer: MEDICARE

## 2021-05-13 VITALS — HEIGHT: 69 IN | WEIGHT: 274 LBS | BODY MASS INDEX: 40.58 KG/M2

## 2021-05-13 PROCEDURE — 93798 PHYS/QHP OP CAR RHAB W/ECG: CPT

## 2021-05-13 NOTE — PROGRESS NOTES
REVIEWED GOAL AND FINAL ITP WITH PATIENT - \" I FEEL LIKE I GOT MUCH BETTER BY DOING THIS PROGRAM\"   PATIENT PLANS TO GO TO PT FOR BALANCE.

## 2021-05-20 ENCOUNTER — OFFICE VISIT (OUTPATIENT)
Dept: FAMILY MEDICINE CLINIC | Age: 76
End: 2021-05-20
Payer: MEDICARE

## 2021-05-20 VITALS
BODY MASS INDEX: 40.73 KG/M2 | SYSTOLIC BLOOD PRESSURE: 126 MMHG | WEIGHT: 275 LBS | TEMPERATURE: 97.9 F | HEART RATE: 78 BPM | DIASTOLIC BLOOD PRESSURE: 70 MMHG | HEIGHT: 69 IN

## 2021-05-20 DIAGNOSIS — E78.2 MIXED HYPERLIPIDEMIA: ICD-10-CM

## 2021-05-20 DIAGNOSIS — I10 ESSENTIAL HYPERTENSION: ICD-10-CM

## 2021-05-20 DIAGNOSIS — R26.89 IMPAIRMENT OF BALANCE: ICD-10-CM

## 2021-05-20 DIAGNOSIS — R73.03 PREDIABETES: Primary | ICD-10-CM

## 2021-05-20 DIAGNOSIS — K59.00 CONSTIPATION, UNSPECIFIED CONSTIPATION TYPE: ICD-10-CM

## 2021-05-20 PROCEDURE — 1123F ACP DISCUSS/DSCN MKR DOCD: CPT | Performed by: NURSE PRACTITIONER

## 2021-05-20 PROCEDURE — 99214 OFFICE O/P EST MOD 30 MIN: CPT | Performed by: NURSE PRACTITIONER

## 2021-05-20 PROCEDURE — 4040F PNEUMOC VAC/ADMIN/RCVD: CPT | Performed by: NURSE PRACTITIONER

## 2021-05-20 PROCEDURE — 1036F TOBACCO NON-USER: CPT | Performed by: NURSE PRACTITIONER

## 2021-05-20 PROCEDURE — G8417 CALC BMI ABV UP PARAM F/U: HCPCS | Performed by: NURSE PRACTITIONER

## 2021-05-20 PROCEDURE — G8427 DOCREV CUR MEDS BY ELIG CLIN: HCPCS | Performed by: NURSE PRACTITIONER

## 2021-05-20 RX ORDER — DOCUSATE SODIUM 100 MG/1
CAPSULE, LIQUID FILLED ORAL
Qty: 30 CAPSULE | Refills: 3 | Status: SHIPPED | OUTPATIENT
Start: 2021-05-20 | End: 2022-10-13 | Stop reason: ALTCHOICE

## 2021-05-20 ASSESSMENT — ENCOUNTER SYMPTOMS
COUGH: 0
ABDOMINAL PAIN: 0
NAUSEA: 0
SHORTNESS OF BREATH: 0

## 2021-05-20 NOTE — PROGRESS NOTES
Subjective:      Patient ID: Melvin Jones is a 67 y.o. male. Visit Information    Have you changed or started any medications since your last visit including any over-the-counter medicines, vitamins, or herbal medicines? no   Are you having any side effects from any of your medications? -  no  Have you stopped taking any of your medications? Is so, why? -  no    Have you seen any other physician or provider since your last visit? No  Have you had any other diagnostic tests since your last visit? Yes - Records Obtained  Have you been seen in the emergency room and/or had an admission to a hospital since we last saw you? No  Have you had your routine dental cleaning in the past 6 months? no    Have you activated your [employer] account? If not, what are your barriers? No:      Patient Care Team:  Abdulaziz Toledo MD as PCP - General (Family Medicine)  MARKUS Terry CNP as PCP - [facility] Provider    Medical History Review  Past Medical, Family, and Social History reviewed and does not contribute to the patient presenting condition    Health Maintenance   Topic Date Due    Hepatitis C screen  Never done    DTaP/Tdap/Td vaccine (1 - Tdap) Never done    Shingles Vaccine (1 of 2) Never done   [employer] Visit (AWV)  Never done    COVID-19 Vaccine (2 - Moderna 2-dose series) 03/04/2021    Lipid screen  03/25/2022    Potassium monitoring  03/25/2022    Creatinine monitoring  03/25/2022    Flu vaccine  Completed    Pneumococcal 65+ years Vaccine  Completed    Hepatitis A vaccine  Aged Out    Hepatitis B vaccine  Aged Out    Hib vaccine  Aged Out    Meningococcal (ACWY) vaccine  Aged Out     HPI       67year old male presents with management of prediabete, HTN HlD fatigue. Has been on low dose metformin and recent a1c was 6.5. bp is stable with dual therapy. Is compliant with statin therapy and tolerates it well.    Hx of cardiomyopathy and CAD and is on anticoagulation therapy and follows up with Dr. Travis Alford and cardiologist at [facility] who referred him to cardiac rehab. Pt just finished rehab. Also c/o fatigue and impaired balance ever since stroke 2 years ago. Pt has hard time getting up from chair and have a lift chair at home which was broken. Pt would like a new one to assist him to get up. Also c/p epigastric pain and mid abd pain for 3-4 months, intermittent worse in the morning. Denies nausea vomiting diarrhea but admits intermittent constipation. States he had bowel movement today but he had to strain to go. Hx of carotid stenosis and follows up with Dr. Fidencio Wheeler . Review of Systems   Constitutional: Positive for fatigue. Negative for chills and fever. Eyes: Negative for visual disturbance. Respiratory: Negative for cough and shortness of breath. Cardiovascular: Negative for chest pain and palpitations. Gastrointestinal: Negative for abdominal pain and nausea. Musculoskeletal: Positive for gait problem. Neurological: Positive for weakness ( left sided ). Negative for dizziness and numbness. Psychiatric/Behavioral: Negative for agitation and behavioral problems.             Objective:   Physical Exam  Vitals and nursing note reviewed. Constitutional:       General: He is not in acute distress. Appearance: Normal appearance. He is obese. HENT:      Nose: Nose normal.   Eyes:      Conjunctiva/sclera: Conjunctivae normal.   Cardiovascular:      Rate and Rhythm: Regular rhythm. Heart sounds: Normal heart sounds. Pulmonary:      Effort: Pulmonary effort is normal. No respiratory distress. Breath sounds: Normal breath sounds. Abdominal:      Palpations: Abdomen is soft. Tenderness: There is no abdominal tenderness. Musculoskeletal:         General: No tenderness or deformity. Cervical back: Neck supple. Lymphadenopathy:      Cervical: No cervical adenopathy. Skin:     General: Skin is warm and dry.    Neurological:      Mental Status: He is alert and oriented to person, place, and time. Cranial Nerves: No cranial nerve deficit. Motor: Weakness present. Coordination: Coordination abnormal.      Comments: Left sided weakness, ambulates with a walker, gait unsteady    Psychiatric:         Mood and Affect: Mood normal.         Behavior: Behavior normal.       Assessment:      1. Prediabetes    2. Essential hypertension    3. Mixed hyperlipidemia    4. Impairment of balance    5. Constipation, unspecified constipation type            Plan:      BP Readings from Last 3 Encounters:   05/20/21 126/70   02/15/21 113/66   11/20/20 (!) 92/56     /70   Pulse 78   Temp 97.9 °F (36.6 °C) (Infrared)   Ht 5' 9\" (1.753 m)   Wt 275 lb (124.7 kg)   BMI 40.61 kg/m²   Lab Results   Component Value Date    WBC 5.7 03/25/2021    HGB 13.8 03/25/2021    HCT 42.2 03/25/2021     03/25/2021    CHOL 167 03/25/2021    TRIG 148 03/25/2021    HDL 37 (L) 03/25/2021    ALT 24 03/25/2021    AST 25 03/25/2021     03/25/2021    K 4.5 03/25/2021     03/25/2021    CREATININE 0.79 03/25/2021    BUN 17 03/25/2021    CO2 25 03/25/2021    TSH 0.58 07/27/2018    PSA 1.22 03/25/2021    INR 1.0 01/22/2016    LABA1C 6.5 (H) 03/25/2021    LABMICR CANNOT BE CALCULATED 07/27/2018     Lab Results   Component Value Date    CALCIUM 9.2 03/25/2021    PHOS 3.7 02/03/2016     Lab Results   Component Value Date    LDLCHOLESTEROL 100 03/25/2021         1. Prediabetes  - stable. Cont metformin   - extensive discussion with pt counseling on good control of blood sugar to prevent complications. Pt verbalizes understanding and is agreeable to make diet modifications. 2. Essential hypertension  - stable. No therapy change     3. Mixed hyperlipidemia  - cont statin therapy     4. Impairment of balance  - [address] for gait training   - lift chair prescribed     5.  Constipation, unspecified constipation type  - start colace and may increase to twice daily   - docusate sodium (COLACE) 100 MG capsule; Take one cap po daily  Dispense: 30 capsule; Refill: 3  - advised to increase fiber in diet     Requested Prescriptions     Signed Prescriptions Disp Refills    docusate sodium (COLACE) 100 MG capsule 30 capsule 3     Sig: Take one cap po daily       Medications Discontinued During This Encounter   Medication Reason    sertraline (ZOLOFT) 25 MG tablet LIST CLEANUP       Discussed use, benefit, and side effects of prescribed medications. Barriers to medication compliance addressed. All patient questions answered. Pt voiced understanding. Return in about 3 months (around 8/20/2021) for prediabetes, HTN, HLD impaired balance .

## 2021-06-07 ENCOUNTER — TELEPHONE (OUTPATIENT)
Dept: FAMILY MEDICINE CLINIC | Age: 76
End: 2021-06-07

## 2021-06-07 NOTE — TELEPHONE ENCOUNTER
James Richardson (Wife) called stating patient is having stomach pain, constipation (medication not helping), can hardly walk. She was told to get him to ER. She agreed.

## 2021-06-29 ENCOUNTER — HOSPITAL ENCOUNTER (EMERGENCY)
Age: 76
Discharge: HOME | End: 2021-06-29
Attending: EMERGENCY MEDICINE
Payer: MEDICARE

## 2021-06-29 ENCOUNTER — APPOINTMENT (OUTPATIENT)
Dept: GENERAL RADIOLOGY | Age: 76
End: 2021-06-29
Payer: MEDICARE

## 2021-06-29 VITALS
TEMPERATURE: 98.9 F | RESPIRATION RATE: 18 BRPM | HEART RATE: 71 BPM | BODY MASS INDEX: 38.65 KG/M2 | DIASTOLIC BLOOD PRESSURE: 76 MMHG | SYSTOLIC BLOOD PRESSURE: 138 MMHG | WEIGHT: 270 LBS | OXYGEN SATURATION: 98 % | HEIGHT: 70 IN

## 2021-06-29 DIAGNOSIS — K59.00 CONSTIPATION, UNSPECIFIED CONSTIPATION TYPE: Primary | ICD-10-CM

## 2021-06-29 LAB
ABSOLUTE EOS #: 0.1 K/UL (ref 0–0.4)
ABSOLUTE IMMATURE GRANULOCYTE: ABNORMAL K/UL (ref 0–0.3)
ABSOLUTE LYMPH #: 1.9 K/UL (ref 1–4.8)
ABSOLUTE MONO #: 0.5 K/UL (ref 0.1–1.3)
ALBUMIN SERPL-MCNC: 4.4 G/DL (ref 3.5–5.2)
ALBUMIN/GLOBULIN RATIO: ABNORMAL (ref 1–2.5)
ALP BLD-CCNC: 109 U/L (ref 40–129)
ALT SERPL-CCNC: 29 U/L (ref 5–41)
ANION GAP SERPL CALCULATED.3IONS-SCNC: 10 MMOL/L (ref 9–17)
AST SERPL-CCNC: 26 U/L
BASOPHILS # BLD: 1 % (ref 0–2)
BASOPHILS ABSOLUTE: 0 K/UL (ref 0–0.2)
BILIRUB SERPL-MCNC: 0.47 MG/DL (ref 0.3–1.2)
BILIRUBIN URINE: NEGATIVE
BUN BLDV-MCNC: 14 MG/DL (ref 8–23)
BUN/CREAT BLD: ABNORMAL (ref 9–20)
CALCIUM SERPL-MCNC: 9.4 MG/DL (ref 8.6–10.4)
CHLORIDE BLD-SCNC: 105 MMOL/L (ref 98–107)
CO2: 28 MMOL/L (ref 20–31)
COLOR: YELLOW
COMMENT UA: NORMAL
CREAT SERPL-MCNC: 0.58 MG/DL (ref 0.7–1.2)
DIFFERENTIAL TYPE: ABNORMAL
EOSINOPHILS RELATIVE PERCENT: 2 % (ref 0–4)
GFR AFRICAN AMERICAN: >60 ML/MIN
GFR NON-AFRICAN AMERICAN: >60 ML/MIN
GFR SERPL CREATININE-BSD FRML MDRD: ABNORMAL ML/MIN/{1.73_M2}
GFR SERPL CREATININE-BSD FRML MDRD: ABNORMAL ML/MIN/{1.73_M2}
GLUCOSE BLD-MCNC: 121 MG/DL (ref 70–99)
GLUCOSE URINE: NEGATIVE
HCT VFR BLD CALC: 43 % (ref 41–53)
HEMOGLOBIN: 13.9 G/DL (ref 13.5–17.5)
IMMATURE GRANULOCYTES: ABNORMAL %
KETONES, URINE: NEGATIVE
LEUKOCYTE ESTERASE, URINE: NEGATIVE
LYMPHOCYTES # BLD: 37 % (ref 24–44)
MCH RBC QN AUTO: 29.7 PG (ref 26–34)
MCHC RBC AUTO-ENTMCNC: 32.3 G/DL (ref 31–37)
MCV RBC AUTO: 92 FL (ref 80–100)
MONOCYTES # BLD: 9 % (ref 1–7)
NITRITE, URINE: NEGATIVE
NRBC AUTOMATED: ABNORMAL PER 100 WBC
PDW BLD-RTO: 13.8 % (ref 11.5–14.9)
PH UA: 5.5 (ref 5–8)
PLATELET # BLD: 151 K/UL (ref 150–450)
PLATELET ESTIMATE: ABNORMAL
PMV BLD AUTO: 8.8 FL (ref 6–12)
POTASSIUM SERPL-SCNC: 4.1 MMOL/L (ref 3.7–5.3)
PROTEIN UA: NEGATIVE
RBC # BLD: 4.67 M/UL (ref 4.5–5.9)
RBC # BLD: ABNORMAL 10*6/UL
SEG NEUTROPHILS: 51 % (ref 36–66)
SEGMENTED NEUTROPHILS ABSOLUTE COUNT: 2.7 K/UL (ref 1.3–9.1)
SODIUM BLD-SCNC: 143 MMOL/L (ref 135–144)
SPECIFIC GRAVITY UA: 1.02 (ref 1–1.03)
TOTAL PROTEIN: 7.2 G/DL (ref 6.4–8.3)
TURBIDITY: CLEAR
URINE HGB: NEGATIVE
UROBILINOGEN, URINE: NORMAL
WBC # BLD: 5.2 K/UL (ref 3.5–11)
WBC # BLD: ABNORMAL 10*3/UL

## 2021-06-29 PROCEDURE — 85025 COMPLETE CBC W/AUTO DIFF WBC: CPT

## 2021-06-29 PROCEDURE — 99285 EMERGENCY DEPT VISIT HI MDM: CPT

## 2021-06-29 PROCEDURE — 80053 COMPREHEN METABOLIC PANEL: CPT

## 2021-06-29 PROCEDURE — 6360000002 HC RX W HCPCS: Performed by: EMERGENCY MEDICINE

## 2021-06-29 PROCEDURE — 74018 RADEX ABDOMEN 1 VIEW: CPT

## 2021-06-29 PROCEDURE — 81003 URINALYSIS AUTO W/O SCOPE: CPT

## 2021-06-29 PROCEDURE — 96374 THER/PROPH/DIAG INJ IV PUSH: CPT

## 2021-06-29 PROCEDURE — 2580000003 HC RX 258: Performed by: EMERGENCY MEDICINE

## 2021-06-29 PROCEDURE — 36415 COLL VENOUS BLD VENIPUNCTURE: CPT

## 2021-06-29 RX ORDER — MAGNESIUM CARB/ALUMINUM HYDROX 105-160MG
296 TABLET,CHEWABLE ORAL ONCE
Qty: 1 BOTTLE | Refills: 1 | Status: SHIPPED | OUTPATIENT
Start: 2021-06-29 | End: 2021-06-29

## 2021-06-29 RX ORDER — 0.9 % SODIUM CHLORIDE 0.9 %
1000 INTRAVENOUS SOLUTION INTRAVENOUS ONCE
Status: COMPLETED | OUTPATIENT
Start: 2021-06-29 | End: 2021-06-29

## 2021-06-29 RX ORDER — KETOROLAC TROMETHAMINE 30 MG/ML
15 INJECTION, SOLUTION INTRAMUSCULAR; INTRAVENOUS ONCE
Status: COMPLETED | OUTPATIENT
Start: 2021-06-29 | End: 2021-06-29

## 2021-06-29 RX ADMIN — KETOROLAC TROMETHAMINE 15 MG: 30 INJECTION, SOLUTION INTRAMUSCULAR; INTRAVENOUS at 08:07

## 2021-06-29 RX ADMIN — SODIUM CHLORIDE 1000 ML: 9 INJECTION, SOLUTION INTRAVENOUS at 08:07

## 2021-06-29 ASSESSMENT — ENCOUNTER SYMPTOMS
COUGH: 0
WHEEZING: 0
EYE REDNESS: 0
EYE PAIN: 0
BLOOD IN STOOL: 0
DIARRHEA: 0
RHINORRHEA: 0
NAUSEA: 0
FACIAL SWELLING: 0
EYE DISCHARGE: 0
TROUBLE SWALLOWING: 0
BACK PAIN: 0
CHEST TIGHTNESS: 0
SINUS PRESSURE: 0
CONSTIPATION: 0
ABDOMINAL PAIN: 0
COLOR CHANGE: 0
SORE THROAT: 0
SHORTNESS OF BREATH: 0
VOMITING: 0

## 2021-06-29 ASSESSMENT — PAIN SCALES - GENERAL
PAINLEVEL_OUTOF10: 9
PAINLEVEL_OUTOF10: 1
PAINLEVEL_OUTOF10: 8

## 2021-06-29 ASSESSMENT — PAIN DESCRIPTION - FREQUENCY: FREQUENCY: CONTINUOUS

## 2021-06-29 ASSESSMENT — PAIN DESCRIPTION - ORIENTATION: ORIENTATION: RIGHT

## 2021-06-29 ASSESSMENT — PAIN DESCRIPTION - ONSET: ONSET: ON-GOING

## 2021-06-29 ASSESSMENT — PAIN DESCRIPTION - LOCATION: LOCATION: FLANK

## 2021-06-29 ASSESSMENT — PAIN DESCRIPTION - PROGRESSION: CLINICAL_PROGRESSION: NOT CHANGED

## 2021-06-29 ASSESSMENT — PAIN DESCRIPTION - DESCRIPTORS: DESCRIPTORS: BURNING

## 2021-06-29 NOTE — ED PROVIDER NOTES
[address]  eMERGENCY dEPARTMENT eNCOUnter      Pt Name: Kit Marvin  MRN: [id number]  [place] 1945  Date of evaluation: 6/29/21      CHIEF COMPLAINT       Chief Complaint   Patient presents with    Flank Pain         HISTORY OF PRESENT ILLNESS    Kit Marvin is a 67 y.o. male who presents complaining of flank pain. Patient states that for the past 2 weeks he has been having pain. Patient states it started more in the lower abdomen and is gone and settled more into the right flank. Pain is severe but states that he is not taking anything for it. Patient states that he does not have any nausea vomiting or diarrhea associated with this. Patient states nothing makes the pain better or worse. Patient states that he has had no dysuria or hematuria. Patient denies any injuries that he knows of. Patient denies any pain numbness tingling or weakness in the legs. REVIEW OF SYSTEMS       Review of Systems   Constitutional: Negative for activity change, appetite change, chills, diaphoresis and fever. HENT: Negative for congestion, ear pain, facial swelling, nosebleeds, rhinorrhea, sinus pressure, sore throat and trouble swallowing. Eyes: Negative for pain, discharge and redness. Respiratory: Negative for cough, chest tightness, shortness of breath and wheezing. Cardiovascular: Negative for chest pain, palpitations and leg swelling. Gastrointestinal: Negative for abdominal pain, blood in stool, constipation, diarrhea, nausea and vomiting. Genitourinary: Positive for flank pain. Negative for difficulty urinating, dysuria, frequency, genital sores and hematuria. Musculoskeletal: Negative for arthralgias, back pain, gait problem, joint swelling, myalgias and neck pain. Skin: Negative for color change, pallor, rash and wound. Neurological: Negative for dizziness, tremors, seizures, syncope, speech difficulty, weakness, numbness and headaches.    Psychiatric/Behavioral: Negative for confusion, decreased concentration, hallucinations, self-injury, sleep disturbance and suicidal ideas. PAST MEDICAL HISTORY     Past Medical History:   Diagnosis Date    Anxiety     Bronchitis with asthma, acute 11/24/2015    Carotid stenosis, left 2/2/2016    Diabetes mellitus ([address]     borderline patient off metformin    GERD (gastroesophageal reflux disease)     Heart attack ([facility] 08/05/2019    Hyperlipidemia     Hypertension     Osteoarthritis     Stroke, hemorrhagic ([facility] 08/18/2019       SURGICAL HISTORY       Past Surgical History:   Procedure Laterality Date    CAROTID ENDARTERECTOMY Left 02/02/16    COLONOSCOPY      CORONARY ANGIOPLASTY WITH STENT PLACEMENT      JOINT REPLACEMENT Left     knee    SHOULDER SURGERY Right     collar bone    TONSILLECTOMY AND ADENOIDECTOMY         CURRENT MEDICATIONS       Previous Medications    ASPIRIN 81 MG CHEWABLE TABLET    Take 1 tablet by mouth daily    ATORVASTATIN (LIPITOR) 80 MG TABLET    Take 1 tablet by mouth nightly    CLOPIDOGREL (PLAVIX) 75 MG TABLET    Take 1 tablet by mouth daily    DOCUSATE SODIUM (COLACE) 100 MG CAPSULE    Take one cap po daily    GABAPENTIN (NEURONTIN) 400 MG CAPSULE    Take 1 capsule by mouth 3 times daily for 30 days. LISINOPRIL (PRINIVIL;ZESTRIL) 20 MG TABLET    Take 1 tablet by mouth once daily    METFORMIN (GLUCOPHAGE-XR) 500 MG EXTENDED RELEASE TABLET    Take 1 tablet by mouth daily (with breakfast)    METOPROLOL SUCCINATE (TOPROL XL) 50 MG EXTENDED RELEASE TABLET    TAKE 1 TABLET BY MOUTH ONCE DAILY    MULTIPLE VITAMINS-MINERALS (MULTIVITAMIN ADULTS 50+) TABS    Take by mouth daily    NITROGLYCERIN (NITROSTAT) 0.4 MG SL TABLET    up to max of 3 total doses. If no relief after 1 dose, call 911. PANTOPRAZOLE (PROTONIX) 40 MG TABLET    Take 1 tablet by mouth every morning (before breakfast)       ALLERGIES     has No Known Allergies. SOCIAL HISTORY      reports that he has never smoked.  He has never used smokeless tobacco. He reports previous alcohol use. He reports that he does not use drugs. PHYSICAL EXAM     INITIAL VITALS: BP (!) 149/77   Pulse 76   Temp 98.9 °F (37.2 °C) (Oral)   Resp 18   Ht 5' 10\" (1.778 m)   Wt 270 lb (122.5 kg)   SpO2 98%   BMI 38.74 kg/m²      Physical Exam  Vitals and nursing note reviewed. Constitutional:       General: He is not in acute distress. Appearance: He is well-developed. He is not diaphoretic. HENT:      Head: Normocephalic and atraumatic. Eyes:      General: No scleral icterus. Right eye: No discharge. Left eye: No discharge. Conjunctiva/sclera: Conjunctivae normal.      Pupils: Pupils are equal, round, and reactive to light. Cardiovascular:      Rate and Rhythm: Normal rate and regular rhythm. Heart sounds: Normal heart sounds. No murmur heard. No friction rub. No gallop. Pulmonary:      Effort: Pulmonary effort is normal. No respiratory distress. Breath sounds: Normal breath sounds. No wheezing or rales. Chest:      Chest wall: No tenderness. Abdominal:      General: Bowel sounds are normal. There is no distension. Palpations: Abdomen is soft. There is no mass. Tenderness: There is no abdominal tenderness. There is no guarding or rebound. Musculoskeletal:         General: No tenderness. Normal range of motion. Skin:     General: Skin is warm and dry. Coloration: Skin is not pale. Findings: No erythema or rash. Neurological:      Mental Status: He is alert and oriented to person, place, and time. Cranial Nerves: No cranial nerve deficit. Sensory: No sensory deficit. Motor: No abnormal muscle tone. Coordination: Coordination normal.      Deep Tendon Reflexes: Reflexes normal.   Psychiatric:         Behavior: Behavior normal.         Thought Content:  Thought content normal.         Judgment: Judgment normal.         DIAGNOSTIC RESULTS     RADIOLOGY:All plain film, CT,MRI, and formal ultrasound images (except ED bedside ultrasound) are read by the radiologist and the interpretations are directly viewed by the emergency physician. XR ABDOMEN (KUB) (SINGLE AP VIEW)    Result Date: 6/29/2021  EXAMINATION: ONE SUPINE XRAY VIEW(S) OF THE ABDOMEN 6/29/2021 9:25 am COMPARISON: KUB from 07/04/2019 HISTORY: ORDERING SYSTEM PROVIDED HISTORY: Pain TECHNOLOGIST PROVIDED HISTORY: Pain Reason for Exam: pain Acuity: Unknown Type of Exam: Unknown FINDINGS: 5 lumbar type vertebral bodies. Moderate retained stool, best seen transverse colon. No abnormally dilated bowel. No mass or organomegaly. Severe multilevel DJD visualized spine large bridging osteophytes. No abnormal calcification. No destructive or blastic lesion. Nonspecific, nonobstructive bowel gas pattern. LABS: All lab results were reviewed by myself, and all abnormals are listed below. Labs Reviewed   COMPREHENSIVE METABOLIC PANEL - Abnormal; Notable for the following components:       Result Value    Glucose 121 (*)     CREATININE 0.58 (*)     All other components within normal limits   CBC WITH AUTO DIFFERENTIAL - Abnormal; Notable for the following components:    Monocytes 9 (*)     All other components within normal limits   URINE RT REFLEX TO CULTURE         MEDICAL DECISION MAKING:     Patient's history sounds like it could be a kidney infection versus kidney stone versus just a very deep muscular issue that is nonreproducible. We will start with just lab work and trying to treat his discomfort.       EMERGENCY DEPARTMENT COURSE:   Vitals:    Vitals:    06/29/21 0746   BP: (!) 149/77   Pulse: 76   Resp: 18   Temp: 98.9 °F (37.2 °C)   TempSrc: Oral   SpO2: 98%   Weight: 270 lb (122.5 kg)   Height: 5' 10\" (1.778 m)       The patient was given the following medications while in the emergency department:  Orders Placed This Encounter   Medications    0.9 % sodium chloride bolus    ketorolac (TORADOL) injection 15 mg  Magnesium Citrate 1.745 GM/30ML solution     Sig: Take 296 mLs by mouth once for 1 dose     Dispense:  1 Bottle     Refill:  1       -------------------------  10:04 AM EDT  Patient was updated on results and plan for discharge and laxative at home. CONSULTS:  None    PROCEDURES:  None    FINAL IMPRESSION      1.  Constipation, unspecified constipation type          DISPOSITION/PLAN   DISPOSITION Decision To Discharge 06/29/2021 10:02:04 AM      PATIENT REFERREDTO:  Carol Mcwilliams MD  [address] [address] [address]  [address] [facility] [phone]    Schedule an appointment as soon as possible for a visit in 3 days  Follow up within 3 days, Return to ED sooner if symptoms worsen    [facility] ED  [place] [id number] [phone]    If symptoms worsen      DISCHARGEMEDICATIONS:  New Prescriptions    MAGNESIUM CITRATE 1.745 GM/30ML SOLUTION    Take 296 mLs by mouth once for 1 dose       (Please note that portions of this note were completed with a voice recognition program.  Efforts were made to edit thedictations but occasionally words are mis-transcribed.)    Senait San MD  Attending Emergency Physician                        Senait San MD  06/29/21 1454

## 2021-07-01 ENCOUNTER — APPOINTMENT (OUTPATIENT)
Dept: CT IMAGING | Age: 76
End: 2021-07-01
Payer: MEDICARE

## 2021-07-01 ENCOUNTER — HOSPITAL ENCOUNTER (EMERGENCY)
Age: 76
Discharge: HOME | End: 2021-07-01
Attending: EMERGENCY MEDICINE
Payer: MEDICARE

## 2021-07-01 VITALS
WEIGHT: 270 LBS | HEART RATE: 74 BPM | RESPIRATION RATE: 18 BRPM | DIASTOLIC BLOOD PRESSURE: 84 MMHG | OXYGEN SATURATION: 97 % | TEMPERATURE: 97.5 F | SYSTOLIC BLOOD PRESSURE: 157 MMHG | BODY MASS INDEX: 38.74 KG/M2

## 2021-07-01 DIAGNOSIS — R10.9 RIGHT FLANK PAIN: Primary | ICD-10-CM

## 2021-07-01 LAB
ABSOLUTE EOS #: 0.1 K/UL (ref 0–0.4)
ABSOLUTE IMMATURE GRANULOCYTE: NORMAL K/UL (ref 0–0.3)
ABSOLUTE LYMPH #: 2.2 K/UL (ref 1–4.8)
ABSOLUTE MONO #: 0.4 K/UL (ref 0.1–1.3)
ALBUMIN SERPL-MCNC: 4.5 G/DL (ref 3.5–5.2)
ALBUMIN/GLOBULIN RATIO: ABNORMAL (ref 1–2.5)
ALP BLD-CCNC: 108 U/L (ref 40–129)
ALT SERPL-CCNC: 28 U/L (ref 5–41)
ANION GAP SERPL CALCULATED.3IONS-SCNC: 10 MMOL/L (ref 9–17)
AST SERPL-CCNC: 31 U/L
BASOPHILS # BLD: 1 % (ref 0–2)
BASOPHILS ABSOLUTE: 0 K/UL (ref 0–0.2)
BILIRUB SERPL-MCNC: 0.28 MG/DL (ref 0.3–1.2)
BUN BLDV-MCNC: 14 MG/DL (ref 8–23)
BUN/CREAT BLD: ABNORMAL (ref 9–20)
CALCIUM SERPL-MCNC: 9.6 MG/DL (ref 8.6–10.4)
CHLORIDE BLD-SCNC: 101 MMOL/L (ref 98–107)
CO2: 29 MMOL/L (ref 20–31)
CREAT SERPL-MCNC: 0.67 MG/DL (ref 0.7–1.2)
DIFFERENTIAL TYPE: NORMAL
EOSINOPHILS RELATIVE PERCENT: 2 % (ref 0–4)
GFR AFRICAN AMERICAN: >60 ML/MIN
GFR NON-AFRICAN AMERICAN: >60 ML/MIN
GFR SERPL CREATININE-BSD FRML MDRD: ABNORMAL ML/MIN/{1.73_M2}
GFR SERPL CREATININE-BSD FRML MDRD: ABNORMAL ML/MIN/{1.73_M2}
GLUCOSE BLD-MCNC: 109 MG/DL (ref 70–99)
HCT VFR BLD CALC: 41.9 % (ref 41–53)
HEMOGLOBIN: 13.9 G/DL (ref 13.5–17.5)
IMMATURE GRANULOCYTES: NORMAL %
INR BLD: 0.9
LACTIC ACID: 0.9 MMOL/L (ref 0.5–2.2)
LYMPHOCYTES # BLD: 39 % (ref 24–44)
MCH RBC QN AUTO: 30.3 PG (ref 26–34)
MCHC RBC AUTO-ENTMCNC: 33.3 G/DL (ref 31–37)
MCV RBC AUTO: 91.2 FL (ref 80–100)
MONOCYTES # BLD: 7 % (ref 1–7)
NRBC AUTOMATED: NORMAL PER 100 WBC
PDW BLD-RTO: 13.8 % (ref 11.5–14.9)
PLATELET # BLD: 152 K/UL (ref 150–450)
PLATELET ESTIMATE: NORMAL
PMV BLD AUTO: 8.9 FL (ref 6–12)
POTASSIUM SERPL-SCNC: 4.3 MMOL/L (ref 3.7–5.3)
PROTHROMBIN TIME: 12.6 SEC (ref 11.8–14.6)
RBC # BLD: 4.59 M/UL (ref 4.5–5.9)
RBC # BLD: NORMAL 10*6/UL
SEG NEUTROPHILS: 51 % (ref 36–66)
SEGMENTED NEUTROPHILS ABSOLUTE COUNT: 3 K/UL (ref 1.3–9.1)
SODIUM BLD-SCNC: 140 MMOL/L (ref 135–144)
TOTAL PROTEIN: 7.3 G/DL (ref 6.4–8.3)
WBC # BLD: 5.8 K/UL (ref 3.5–11)
WBC # BLD: NORMAL 10*3/UL

## 2021-07-01 PROCEDURE — 6360000002 HC RX W HCPCS: Performed by: EMERGENCY MEDICINE

## 2021-07-01 PROCEDURE — 80053 COMPREHEN METABOLIC PANEL: CPT

## 2021-07-01 PROCEDURE — 99283 EMERGENCY DEPT VISIT LOW MDM: CPT

## 2021-07-01 PROCEDURE — 74178 CT ABD&PLV WO CNTR FLWD CNTR: CPT

## 2021-07-01 PROCEDURE — 36415 COLL VENOUS BLD VENIPUNCTURE: CPT

## 2021-07-01 PROCEDURE — 96375 TX/PRO/DX INJ NEW DRUG ADDON: CPT

## 2021-07-01 PROCEDURE — 85610 PROTHROMBIN TIME: CPT

## 2021-07-01 PROCEDURE — 83605 ASSAY OF LACTIC ACID: CPT

## 2021-07-01 PROCEDURE — 96374 THER/PROPH/DIAG INJ IV PUSH: CPT

## 2021-07-01 PROCEDURE — 6360000004 HC RX CONTRAST MEDICATION: Performed by: EMERGENCY MEDICINE

## 2021-07-01 PROCEDURE — 2580000003 HC RX 258: Performed by: EMERGENCY MEDICINE

## 2021-07-01 PROCEDURE — 85025 COMPLETE CBC W/AUTO DIFF WBC: CPT

## 2021-07-01 RX ORDER — 0.9 % SODIUM CHLORIDE 0.9 %
80 INTRAVENOUS SOLUTION INTRAVENOUS ONCE
Status: COMPLETED | OUTPATIENT
Start: 2021-07-01 | End: 2021-07-01

## 2021-07-01 RX ORDER — SODIUM CHLORIDE 0.9 % (FLUSH) 0.9 %
10 SYRINGE (ML) INJECTION PRN
Status: DISCONTINUED | OUTPATIENT
Start: 2021-07-01 | End: 2021-07-01 | Stop reason: HOSPADM

## 2021-07-01 RX ORDER — 0.9 % SODIUM CHLORIDE 0.9 %
1000 INTRAVENOUS SOLUTION INTRAVENOUS ONCE
Status: COMPLETED | OUTPATIENT
Start: 2021-07-01 | End: 2021-07-01

## 2021-07-01 RX ORDER — MORPHINE SULFATE 4 MG/ML
4 INJECTION, SOLUTION INTRAMUSCULAR; INTRAVENOUS ONCE
Status: COMPLETED | OUTPATIENT
Start: 2021-07-01 | End: 2021-07-01

## 2021-07-01 RX ORDER — CYCLOBENZAPRINE HCL 10 MG
10 TABLET ORAL NIGHTLY PRN
Qty: 10 TABLET | Refills: 0 | Status: SHIPPED | OUTPATIENT
Start: 2021-07-01 | End: 2021-07-09 | Stop reason: ALTCHOICE

## 2021-07-01 RX ORDER — HYDROCODONE BITARTRATE AND ACETAMINOPHEN 5; 325 MG/1; MG/1
1 TABLET ORAL EVERY 6 HOURS PRN
Qty: 10 TABLET | Refills: 0 | Status: SHIPPED | OUTPATIENT
Start: 2021-07-01 | End: 2021-07-04

## 2021-07-01 RX ADMIN — SODIUM CHLORIDE, PRESERVATIVE FREE 10 ML: 5 INJECTION INTRAVENOUS at 02:52

## 2021-07-01 RX ADMIN — HYDROMORPHONE HYDROCHLORIDE 1 MG: 1 INJECTION, SOLUTION INTRAMUSCULAR; INTRAVENOUS; SUBCUTANEOUS at 03:00

## 2021-07-01 RX ADMIN — SODIUM CHLORIDE 80 ML: 9 INJECTION, SOLUTION INTRAVENOUS at 02:52

## 2021-07-01 RX ADMIN — SODIUM CHLORIDE 1000 ML: 9 INJECTION, SOLUTION INTRAVENOUS at 01:41

## 2021-07-01 RX ADMIN — MORPHINE SULFATE 4 MG: 4 INJECTION, SOLUTION INTRAMUSCULAR; INTRAVENOUS at 01:42

## 2021-07-01 RX ADMIN — IOPAMIDOL 75 ML: 755 INJECTION, SOLUTION INTRAVENOUS at 02:52

## 2021-07-01 ASSESSMENT — ENCOUNTER SYMPTOMS
BACK PAIN: 1
COUGH: 0
ABDOMINAL PAIN: 0

## 2021-07-01 ASSESSMENT — PAIN DESCRIPTION - LOCATION: LOCATION: FLANK

## 2021-07-01 ASSESSMENT — PAIN SCALES - GENERAL: PAINLEVEL_OUTOF10: 10

## 2021-07-01 ASSESSMENT — PAIN DESCRIPTION - PAIN TYPE: TYPE: ACUTE PAIN

## 2021-07-01 NOTE — ED PROVIDER NOTES
[address]  EMERGENCY DEPARTMENT ENCOUNTER      Pt Name: Sandhya Gama  MRN: [id number]  [place] 1945  Date of evaluation: 7/1/21      CHIEF COMPLAINT       Chief Complaint   Patient presents with    Flank Pain     HISTORY OF PRESENT ILLNESS   HPI 67 y.o. male presents with c/o r. Flank pain. Symptoms started about 2 weeks ago. Pain is described as sharp in character, severe in severity (rating it 10 / 10). The pain is located primarily in the r. flank with no radiation. The pain has been constant in course, and progressively worsening. The patient was seen here on the 29th of June. Laboratory studies and KUB was unremakrable. The patient tried stool softner thinking he was constipated, but it did not help symptoms. REVIEW OF SYSTEMS     Review of Systems   Constitutional: Negative for fatigue and fever. HENT: Negative for congestion. Eyes: Negative for visual disturbance. Respiratory: Negative for cough. Cardiovascular: Negative for chest pain. Gastrointestinal: Negative for abdominal pain. Genitourinary: Positive for flank pain. Negative for dysuria. Musculoskeletal: Positive for back pain. Skin: Negative for rash. Neurological: Negative for headaches.          PAST MEDICAL HISTORY     Past Medical History:   Diagnosis Date    Anxiety     Bronchitis with asthma, acute 11/24/2015    Carotid stenosis, left 2/2/2016    Diabetes mellitus ([address]     borderline patient off metformin    GERD (gastroesophageal reflux disease)     Heart attack ([address] 08/05/2019    Hyperlipidemia     Hypertension     Osteoarthritis     Stroke, hemorrhagic ([address] 08/18/2019       SURGICAL HISTORY       Past Surgical History:   Procedure Laterality Date    CAROTID ENDARTERECTOMY Left 02/02/16    COLONOSCOPY      CORONARY ANGIOPLASTY WITH STENT PLACEMENT      JOINT REPLACEMENT Left     knee    SHOULDER SURGERY Right     collar bone    TONSILLECTOMY AND ADENOIDECTOMY         CURRENT MEDICATIONS       Discharge Medication List as of 7/1/2021  4:50 AM      CONTINUE these medications which have NOT CHANGED    Details   docusate sodium (COLACE) 100 MG capsule Take one cap po daily, Disp-30 capsule, R-3Normal      lisinopril (PRINIVIL;ZESTRIL) 20 MG tablet Take 1 tablet by mouth once daily, Disp-90 tablet, R-1Normal      metFORMIN (GLUCOPHAGE-XR) 500 MG extended release tablet Take 1 tablet by mouth daily (with breakfast), Disp-90 tablet, R-0Normal      pantoprazole (PROTONIX) 40 MG tablet Take 1 tablet by mouth every morning (before breakfast), Disp-90 tablet, R-0Normal      metoprolol succinate (TOPROL XL) 50 MG extended release tablet TAKE 1 TABLET BY MOUTH ONCE DAILYHistorical Med      Multiple Vitamins-Minerals (MULTIVITAMIN ADULTS 50+) TABS Take by mouth dailyHistorical Med      gabapentin (NEURONTIN) 400 MG capsule Take 1 capsule by mouth 3 times daily for 30 days. , Disp-90 capsule, R-0Normal      aspirin 81 MG chewable tablet Take 1 tablet by mouth daily, Disp-30 tablet, R-3DC to SNF      nitroGLYCERIN (NITROSTAT) 0.4 MG SL tablet up to max of 3 total doses. If no relief after 1 dose, call 911., Disp-25 tablet, R-3DC to SNF      atorvastatin (LIPITOR) 80 MG tablet Take 1 tablet by mouth nightly, Disp-30 tablet, R-3DC to SNF      clopidogrel (PLAVIX) 75 MG tablet Take 1 tablet by mouth daily, Disp-30 tablet, R-3DC to SNF             ALLERGIES     has No Known Allergies. FAMILY HISTORY     He indicated that the status of his mother is unknown. He indicated that the status of his father is unknown. SOCIAL HISTORY      reports that he has never smoked. He has never used smokeless tobacco. He reports previous alcohol use. He reports that he does not use drugs.     PHYSICAL EXAM     INITIAL VITALS: BP (!) 157/84   Pulse 74   Temp 97.5 °F (36.4 °C) (Oral)   Resp 18   Wt 270 lb (122.5 kg)   SpO2 97%   BMI 38.74 kg/m²   Gen: Appears uncomfortable  Head: Normocephalic, atraumatic  Eye: Pupils equal round reactive to light, no conjunctivitis  ENT: MMM  Neck: No adenopathy or JVD  Heart: Regular rate and rhythm no murmurs  Lungs: Clear to auscultation bilaterally, no respiratory distress  Chest wall: No crepitus, no tenderness palpation  Abdomen: Soft, nontender, nondistended, with no peritoneal signs  MSK: No CVA TTP there is tenderness to the right flank and the right lower back. No midline lumbar or thoracic TTP  Skin: No rash or ecchymosis over the flanks  Neurologic: Patient is alert and oriented x3, motor and sensation is intact in all 4 extremities, fluent speech  Extremities: DP and PT pulses are present bilaterally    MEDICAL DECISION MAKING:     MDM  67 y.o. male presenting with persistent severe flank pain. Will obtain a CT scan to evaluate for any obstructing stone. We will also obtain scan with contrast to look for any vascular phenomena. Emergency Department course: White blood cell count is normal.  Hemoglobin is normal.  Renal function electrolytes are unremarkable. No lactic acid elevation. CT scan obtained shows no acute infectious or inflammatory process in the bowels. No sign of a bowel obstruction. No evidence of any dissection or aneurysm. No evidence of any retroperitoneal bleeding. There is a small stone in the right kidney, but there is no sign of any obstructive uropathy or ureteric stone. I think that the patient's flank and low back pain is likely muscular in nature. Providing analgesia. D/w pt the results, treatment plan, warning precautions for prompt ED return and importance of close OP FU, he verbalizes understanding and agrees with the treatment plan. DIAGNOSTIC RESULTS     RADIOLOGY:All plain film, CT, MRI, and formal ultrasound images (except ED bedside ultrasound) are read by the radiologist and the images and interpretations are directly viewed by the emergency physician.      CT ABDOMEN PELVIS W WO CONTRAST Additional Contrast? None   Final Result   1. Inferior right renal collecting system punctate subtle calculus without   associated urinary obstruction. 2. No evidence of obstructive uropathy. 3. Mild cardiomegaly. 4. Mild diffuse fatty infiltration of the liver. 5. Mild colonic diverticulosis without evidence of diverticulitis. LABS: All lab results were reviewed by myself, and all abnormals are listed below. Labs Reviewed   COMPREHENSIVE METABOLIC PANEL - Abnormal; Notable for the following components:       Result Value    Glucose 109 (*)     CREATININE 0.67 (*)     Total Bilirubin 0.28 (*)     All other components within normal limits   CBC WITH AUTO DIFFERENTIAL   PROTIME-INR   LACTIC ACID       EMERGENCY DEPARTMENT COURSE:   Vitals:    Vitals:    07/01/21 0115 07/01/21 0304 07/01/21 0400 07/01/21 0500   BP: (!) 151/89 (!) 170/94 (!) 142/78 (!) 157/84   Pulse:       Resp:       Temp:       TempSrc:       SpO2: 95% 97% 96% 97%   Weight:           The patient was given the following medications while in the emergency department:  Orders Placed This Encounter   Medications    morphine sulfate (PF) injection 4 mg    0.9 % sodium chloride bolus    iopamidol (ISOVUE-370) 76 % injection 75 mL    0.9 % sodium chloride bolus    DISCONTD: sodium chloride flush 0.9 % injection 10 mL    HYDROmorphone (DILAUDID) injection 1 mg    HYDROcodone-acetaminophen (NORCO) 5-325 MG per tablet     Sig: Take 1 tablet by mouth every 6 hours as needed for Pain for up to 3 days. Dispense:  10 tablet     Refill:  0    cyclobenzaprine (FLEXERIL) 10 MG tablet     Sig: Take 1 tablet by mouth nightly as needed for Muscle spasms     Dispense:  10 tablet     Refill:  0     -------------------------  CRITICAL CARE:   CONSULTS: None  PROCEDURES: Procedures     FINAL IMPRESSION      1.  Right flank pain          DISPOSITION/PLAN   DISPOSITION Decision To Discharge 07/01/2021 04:49:53 AM      PATIENT REFERRED TO:  Yoav Chavarria MD  [address] [place] [address] Vlad Welch  [phone]    In 2 days      [facility] ED  [address]  [address] [address]  [phone]    If symptoms worsen      DISCHARGE MEDICATIONS:  Discharge Medication List as of 7/1/2021  4:50 AM      START taking these medications    Details   HYDROcodone-acetaminophen (NORCO) 5-325 MG per tablet Take 1 tablet by mouth every 6 hours as needed for Pain for up to 3 days. , Disp-10 tablet, R-0Print      cyclobenzaprine (FLEXERIL) 10 MG tablet Take 1 tablet by mouth nightly as needed for Muscle spasms, Disp-10 tablet, R-0Print               Spencer Randall MD  Attending Emergency Physician                      Spencer Randall MD  07/01/21 0628

## 2021-07-09 ENCOUNTER — OFFICE VISIT (OUTPATIENT)
Dept: FAMILY MEDICINE CLINIC | Age: 76
End: 2021-07-09
Payer: MEDICARE

## 2021-07-09 VITALS
BODY MASS INDEX: 39.03 KG/M2 | RESPIRATION RATE: 18 BRPM | DIASTOLIC BLOOD PRESSURE: 76 MMHG | WEIGHT: 272 LBS | TEMPERATURE: 98.1 F | SYSTOLIC BLOOD PRESSURE: 124 MMHG | HEART RATE: 72 BPM

## 2021-07-09 DIAGNOSIS — R10.9 RIGHT FLANK PAIN: Primary | ICD-10-CM

## 2021-07-09 DIAGNOSIS — N20.0 RENAL CALCULUS, RIGHT: ICD-10-CM

## 2021-07-09 DIAGNOSIS — M54.50 ACUTE RIGHT-SIDED LOW BACK PAIN WITHOUT SCIATICA: ICD-10-CM

## 2021-07-09 PROCEDURE — 1036F TOBACCO NON-USER: CPT | Performed by: FAMILY MEDICINE

## 2021-07-09 PROCEDURE — 1123F ACP DISCUSS/DSCN MKR DOCD: CPT | Performed by: FAMILY MEDICINE

## 2021-07-09 PROCEDURE — 99213 OFFICE O/P EST LOW 20 MIN: CPT | Performed by: FAMILY MEDICINE

## 2021-07-09 PROCEDURE — 4040F PNEUMOC VAC/ADMIN/RCVD: CPT | Performed by: FAMILY MEDICINE

## 2021-07-09 PROCEDURE — G8427 DOCREV CUR MEDS BY ELIG CLIN: HCPCS | Performed by: FAMILY MEDICINE

## 2021-07-09 PROCEDURE — G8417 CALC BMI ABV UP PARAM F/U: HCPCS | Performed by: FAMILY MEDICINE

## 2021-07-09 RX ORDER — TIZANIDINE 4 MG/1
4 TABLET ORAL 3 TIMES DAILY PRN
Qty: 30 TABLET | Refills: 0 | Status: SHIPPED | OUTPATIENT
Start: 2021-07-09 | End: 2021-10-07 | Stop reason: SDUPTHER

## 2021-07-09 RX ORDER — TRAMADOL HYDROCHLORIDE 50 MG/1
50 TABLET ORAL EVERY 6 HOURS PRN
Qty: 28 TABLET | Refills: 0 | Status: SHIPPED | OUTPATIENT
Start: 2021-07-09 | End: 2021-07-16

## 2021-07-09 SDOH — ECONOMIC STABILITY: FOOD INSECURITY: WITHIN THE PAST 12 MONTHS, THE FOOD YOU BOUGHT JUST DIDN'T LAST AND YOU DIDN'T HAVE MONEY TO GET MORE.: NEVER TRUE

## 2021-07-09 SDOH — ECONOMIC STABILITY: FOOD INSECURITY: WITHIN THE PAST 12 MONTHS, YOU WORRIED THAT YOUR FOOD WOULD RUN OUT BEFORE YOU GOT MONEY TO BUY MORE.: NEVER TRUE

## 2021-07-09 ASSESSMENT — SOCIAL DETERMINANTS OF HEALTH (SDOH): HOW HARD IS IT FOR YOU TO PAY FOR THE VERY BASICS LIKE FOOD, HOUSING, MEDICAL CARE, AND HEATING?: NOT HARD AT ALL

## 2021-07-09 ASSESSMENT — ENCOUNTER SYMPTOMS
ABDOMINAL PAIN: 0
BACK PAIN: 1
SHORTNESS OF BREATH: 0
CHEST TIGHTNESS: 0

## 2021-07-09 NOTE — PROGRESS NOTES
Subjective:      Patient ID: Ramón Saavedra is a 67 y.o. male. Visit Information    Have you changed or started any medications since your last visit including any over-the-counter medicines, vitamins, or herbal medicines? no   Have you stopped taking any of your medications? Is so, why? -  no  Are you having any side effects from any of your medications? - no    Have you seen any other physician or provider since your last visit? yes -    Have you had any other diagnostic tests since your last visit? yes -    Have you been seen in the emergency room and/or had an admission in a hospital since we last saw you?  yes -    Have you had your routine dental cleaning in the past 6 months?  no     Do you have an active MyChart account? If no, what is the barrier? No:     Patient Care Team:  Dianne Crowell MD as PCP - General (Family Medicine)  Dianne Crowell MD as PCP - [facility] Provider    Medical History Review  Past Medical, Family, and Social History reviewed and does contribute to the patient presenting condition    Health Maintenance   Topic Date Due    Hepatitis C screen  Never done    DTaP/Tdap/Td vaccine (1 - Tdap) Never done    Shingles Vaccine (1 of 2) Never done    Annual Wellness Visit (AWV)  Never done    Flu vaccine (1) 09/01/2021    Lipid screen  03/25/2022    Potassium monitoring  07/01/2022    Creatinine monitoring  07/01/2022    Pneumococcal 65+ years Vaccine  Completed    COVID-19 Vaccine  Completed    Hepatitis A vaccine  Aged Out    Hepatitis B vaccine  Aged Out    Hib vaccine  Aged Out    Meningococcal (ACWY) vaccine  Aged Out               HPI  Patient is a 44-year-old obese white male who presents with his wife for follow-up of recent ER visit on 7/1/2021 for right flank pain. Patient states CT of abdomen showed a small right kidney stone.   He states that most of his pain today is in his right lower back in Flexeril which was prescribed by the ER physician has not helped much. He states he is taking and tolerating his routine medication. He denies any chest pain, shortness of breath, fever or chills. Review of Systems   Constitutional: Negative for chills and fever. Respiratory: Negative for chest tightness and shortness of breath. Cardiovascular: Negative for chest pain. Gastrointestinal: Negative for abdominal pain. Genitourinary: Positive for flank pain (  Right). Negative for dysuria and hematuria. Musculoskeletal: Positive for back pain (  Right lower back). Skin: Negative for rash. Objective:   Physical Exam  Vitals and nursing note reviewed. Constitutional:       General: He is not in acute distress. Appearance: He is well-developed. HENT:      Head: Normocephalic and atraumatic. Right Ear: Tympanic membrane, ear canal and external ear normal.      Left Ear: Tympanic membrane, ear canal and external ear normal.      Nose: Nose normal.      Mouth/Throat:      Mouth: Mucous membranes are moist.      Pharynx: Oropharynx is clear. Eyes:      General: No scleral icterus. Right eye: No discharge. Left eye: No discharge. Conjunctiva/sclera: Conjunctivae normal.   Cardiovascular:      Rate and Rhythm: Normal rate and regular rhythm. Heart sounds: Normal heart sounds. Pulmonary:      Effort: Pulmonary effort is normal. No respiratory distress. Breath sounds: Normal breath sounds. No wheezing. Abdominal:      General: There is no distension. Palpations: Abdomen is soft. Tenderness: There is no abdominal tenderness. Musculoskeletal:      Cervical back: Neck supple. Lumbar back: Spasms and tenderness (  On the right) present. Skin:     General: Skin is warm and dry. Findings: No rash. Neurological:      Mental Status: He is alert and oriented to person, place, and time.    Psychiatric:         Mood and Affect: Mood normal.         Behavior: Behavior normal.         Assessment: Diagnosis Orders   1. Right flank pain  traMADol (ULTRAM) 50 MG tablet   2. Acute right-sided low back pain without sciatica  tiZANidine (ZANAFLEX) 4 MG tablet    traMADol (ULTRAM) 50 MG tablet   3. Renal calculus, right             Plan:        Orders Placed This Encounter   Medications    tiZANidine (ZANAFLEX) 4 MG tablet     Sig: Take 1 tablet by mouth 3 times daily as needed (For muscle spasm)     Dispense:  30 tablet     Refill:  0    traMADol (ULTRAM) 50 MG tablet     Sig: Take 1 tablet by mouth every 6 hours as needed for Pain for up to 7 days. Intended supply: 7 days.  Take lowest dose possible to manage pain     Dispense:  28 tablet     Refill:  0     Reduce doses taken as pain becomes manageable   Discontinued Flexeril and started on Zanaflex      Patient referred to urology  Continue routine medication  Follow-up as scheduled

## 2021-07-13 DIAGNOSIS — R73.01 IMPAIRED FASTING GLUCOSE: ICD-10-CM

## 2021-07-14 RX ORDER — METFORMIN HYDROCHLORIDE 500 MG/1
TABLET, EXTENDED RELEASE ORAL
Qty: 90 TABLET | Refills: 0 | Status: SHIPPED | OUTPATIENT
Start: 2021-07-14 | End: 2021-07-21

## 2021-07-15 ENCOUNTER — OFFICE VISIT (OUTPATIENT)
Dept: FAMILY MEDICINE CLINIC | Age: 76
End: 2021-07-15
Payer: MEDICARE

## 2021-07-15 VITALS
WEIGHT: 273.4 LBS | TEMPERATURE: 97 F | SYSTOLIC BLOOD PRESSURE: 142 MMHG | HEIGHT: 70 IN | HEART RATE: 78 BPM | BODY MASS INDEX: 39.14 KG/M2 | DIASTOLIC BLOOD PRESSURE: 84 MMHG

## 2021-07-15 DIAGNOSIS — Z00.00 MEDICARE ANNUAL WELLNESS VISIT, INITIAL: Primary | ICD-10-CM

## 2021-07-15 DIAGNOSIS — Z00.00 ROUTINE GENERAL MEDICAL EXAMINATION AT A HEALTH CARE FACILITY: ICD-10-CM

## 2021-07-15 PROCEDURE — G0438 PPPS, INITIAL VISIT: HCPCS | Performed by: NURSE PRACTITIONER

## 2021-07-15 PROCEDURE — 4040F PNEUMOC VAC/ADMIN/RCVD: CPT | Performed by: NURSE PRACTITIONER

## 2021-07-15 PROCEDURE — 1123F ACP DISCUSS/DSCN MKR DOCD: CPT | Performed by: NURSE PRACTITIONER

## 2021-07-15 ASSESSMENT — PATIENT HEALTH QUESTIONNAIRE - PHQ9
SUM OF ALL RESPONSES TO PHQ9 QUESTIONS 1 & 2: 0
1. LITTLE INTEREST OR PLEASURE IN DOING THINGS: 0
SUM OF ALL RESPONSES TO PHQ QUESTIONS 1-9: 0
2. FEELING DOWN, DEPRESSED OR HOPELESS: 0

## 2021-07-15 ASSESSMENT — LIFESTYLE VARIABLES
AUDIT-C TOTAL SCORE: 2
HOW OFTEN DO YOU HAVE SIX OR MORE DRINKS ON ONE OCCASION: 0
HOW MANY STANDARD DRINKS CONTAINING ALCOHOL DO YOU HAVE ON A TYPICAL DAY: 1
HOW OFTEN DO YOU HAVE A DRINK CONTAINING ALCOHOL: 1

## 2021-07-15 NOTE — PATIENT INSTRUCTIONS
Personalized Preventive Plan for Vin Hines - 7/15/2021  Medicare offers a range of preventive health benefits. Some of the tests and screenings are paid in full while other may be subject to a deductible, co-insurance, and/or copay. Some of these benefits include a comprehensive review of your medical history including lifestyle, illnesses that may run in your family, and various assessments and screenings as appropriate. After reviewing your medical record and screening and assessments performed today your provider may have ordered immunizations, labs, imaging, and/or referrals for you. A list of these orders (if applicable) as well as your Preventive Care list are included within your After Visit Summary for your review. Other Preventive Recommendations:    · A preventive eye exam performed by an eye specialist is recommended every 1-2 years to screen for glaucoma; cataracts, macular degeneration, and other eye disorders. · A preventive dental visit is recommended every 6 months. · Try to get at least 150 minutes of exercise per week or 10,000 steps per day on a pedometer . · Order or download the FREE \"Exercise & Physical Activity: Your Everyday Guide\" from [employer]. Call 8-630.511.9319 or search [employer] online. · You need 3661-1394 mg of calcium and 4507-4155 IU of vitamin D per day. It is possible to meet your calcium requirement with diet alone, but a vitamin D supplement is usually necessary to meet this goal.  · When exposed to the sun, use a sunscreen that protects against both UVA and UVB radiation with an SPF of 30 or greater. Reapply every 2 to 3 hours or after sweating, drying off with a towel, or swimming. · Always wear a seat belt when traveling in a car. Always wear a helmet when riding a bicycle or motorcycle.

## 2021-07-15 NOTE — PROGRESS NOTES
Medicare Annual Wellness Visit  Name: Brandy Castillo Date: 7/15/2021   MRN: [id number] Sex: Male   Age: 67 y.o. Ethnicity: Non-/Non    : 1945 Race: Polo Felton is here for Medicare AWV    Screenings for behavioral, psychosocial and functional/safety risks, and cognitive dysfunction are all negative except as indicated below. These results, as well as other patient data from the [address] form, are documented in Flowsheets linked to this Encounter. No Known Allergies    Prior to Visit Medications    Medication Sig Taking? Authorizing Provider   metFORMIN (GLUCOPHAGE-XR) 500 MG extended release tablet Take 1 tablet by mouth once daily with breakfast Yes MARKUS Finley CNP   tiZANidine (ZANAFLEX) 4 MG tablet Take 1 tablet by mouth 3 times daily as needed (For muscle spasm) Yes Benton Mckeon MD   traMADol (ULTRAM) 50 MG tablet Take 1 tablet by mouth every 6 hours as needed for Pain for up to 7 days. Intended supply: 7 days. Take lowest dose possible to manage pain Yes Benton Mckeon MD   docusate sodium (COLACE) 100 MG capsule Take one cap po daily Yes MARKUS Finley CNP   lisinopril (PRINIVIL;ZESTRIL) 20 MG tablet Take 1 tablet by mouth once daily Yes MARKUS Finley CNP   pantoprazole (PROTONIX) 40 MG tablet Take 1 tablet by mouth every morning (before breakfast) Yes MARKUS Finley CNP   metoprolol succinate (TOPROL XL) 50 MG extended release tablet TAKE 1 TABLET BY MOUTH ONCE DAILY Yes Historical Provider, MD   Multiple Vitamins-Minerals (MULTIVITAMIN ADULTS 50+) TABS Take by mouth daily Yes Historical Provider, MD   aspirin 81 MG chewable tablet Take 1 tablet by mouth daily Yes Kendal Bear MD   nitroGLYCERIN (NITROSTAT) 0.4 MG SL tablet up to max of 3 total doses. If no relief after 1 dose, call 911.  Yes Kendal Bear MD   atorvastatin (LIPITOR) 80 MG tablet Take 1 tablet by mouth nightly Yes Kendal Bear MD   clopidogrel (PLAVIX) 75 MG tablet Take 1 tablet by mouth daily Yes Philippe Solis MD   gabapentin (NEURONTIN) 400 MG capsule Take 1 capsule by mouth 3 times daily for 30 days. Queenie Coyne MD       Past Medical History:   Diagnosis Date    Anxiety     Bronchitis with asthma, acute 11/24/2015    Carotid stenosis, left 2/2/2016    Diabetes mellitus ([facility]     borderline patient off metformin    GERD (gastroesophageal reflux disease)     Heart attack ([facility] [address] [facility] 08/05/2019    Hyperlipidemia     Hypertension     Osteoarthritis     Stroke, hemorrhagic ([facility] [address] 08/18/2019       Past Surgical History:   Procedure Laterality Date    CAROTID ENDARTERECTOMY Left 02/02/16    COLONOSCOPY      CORONARY ANGIOPLASTY WITH STENT PLACEMENT      JOINT REPLACEMENT Left     knee    SHOULDER SURGERY Right     collar bone    TONSILLECTOMY AND ADENOIDECTOMY         Family History   Problem Relation Age of Onset    Cancer Mother     Cancer Father        CareTeam (Including outside providers/suppliers regularly involved in providing care):   Patient Care Team:  Dave Lomeli MD as PCP - General (Family Medicine)  Dave Lomeli MD as PCP - REHABILITATION [facility] Empaneled Provider    Wt Readings from Last 3 Encounters:   07/15/21 273 lb 6.4 oz (124 kg)   07/09/21 272 lb (123.4 kg)   07/01/21 270 lb (122.5 kg)     Vitals:    07/15/21 1420   BP: (!) 142/84   Pulse: 78   Temp: 97 °F (36.1 °C)   TempSrc: Infrared   Weight: 273 lb 6.4 oz (124 kg)   Height: 5' 10\" (1.778 m)     Body mass index is 39.23 kg/m². Based upon direct observation of the patient, evaluation of cognition reveals recent and remote memory intact. Patient's complete Health Risk Assessment and screening values have been reviewed and are found in Flowsheets. The following problems were reviewed today and where indicated follow up appointments were made and/or referrals ordered.     Positive Risk Factor Screenings with Interventions:     Fall Risk:  Timed Up and Go Test > 12 seconds? (Complete if either Fall Risk answers are Yes): no  2 or more falls in past year?: (!) yes  Fall with injury in past year?: (!) yes  Fall Risk Interventions:    · Home safety tips provided and pt ambulates with a walker        General Health and ACP:  General  In general, how would you say your health is?: Fair  In the past 7 days, have you experienced any of the following? New or Increased Pain, New or Increased Fatigue, Loneliness, Social Isolation, Stress or Anger?: (!) New or Increased Pain  Do you get the social and emotional support that you need?: Yes  Do you have a Living Will?: (!) No  Advance Directives     Power of  Living Will ACP-Advance Directive ACP-Power of     Not on File Not on File Not on File Not on File      General Health Risk Interventions:  · Pain issues: pt recently had kidney stones but nonobstructive. has an appointment of Dr. Briana Keith   · No Living Will: pt filled out a form and will bring a copy to office. Health Habits/Nutrition:  Health Habits/Nutrition  Do you exercise for at least 20 minutes 2-3 times per week?: (!) No  Have you lost any weight without trying in the past 3 months?: No  Do you eat only one meal per day?: No  Have you seen the dentist within the past year?: (!) No  Body mass index: (!) 39.22  Health Habits/Nutrition Interventions:  · Inadequate physical activity:  patient agrees to wear a pedometer and walk at least 10,000 steps/day  · Dental exam overdue:  Pt has dentures. patient encouraged to make appointment with his/her dentist      ADL:  ADLs  In the past 7 days, did you need help from others to perform any of the following everyday activities? Eating, dressing, grooming, bathing, toileting, or walking/balance?: (!) Walking/Balance  In the past 7 days, did you need help from others to take care of any of the following?  Laundry, housekeeping, banking/finances, shopping, telephone use, food preparation, transportation, or taking medications?: (!) Taking Medications  ADL Interventions:  · pt had stroke 2018 which affects his balance. had rehab but still has balance issues. · Safety tips provided     Personalized Preventive Plan   Current Health Maintenance Status  Immunization History   Administered Date(s) Administered    COVID-19, Moderna, PF, 100mcg/0.5mL 02/04/2021, 03/04/2021    Influenza, High-dose, Quadv, 65 yrs +, IM (Fluzone) 11/10/2020    Influenza, Triv, 3 Years and older, IM (Afluria (5 yrs and older) 11/15/2016    Pneumococcal Conjugate 13-valent ([id number]) 06/11/2018    Pneumococcal Polysaccharide ([id number]) 11/10/2020        Health Maintenance   Topic Date Due    Hepatitis C screen  Never done    DTaP/Tdap/Td vaccine (1 - Tdap) Never done    Shingles Vaccine (1 of 2) Never done   [employer] Visit (AWV)  Never done    Flu vaccine (1) 09/01/2021    Lipid screen  03/25/2022    Potassium monitoring  07/01/2022    Creatinine monitoring  07/01/2022    Pneumococcal 65+ years Vaccine  Completed    COVID-19 Vaccine  Completed    Hepatitis A vaccine  Aged Out    Hepatitis B vaccine  Aged Out    Hib vaccine  Aged Out    Meningococcal (ACWY) vaccine  Aged Out     Recommendations for [employer] Due: see orders and patient instructions/AVS.  . Recommended screening schedule for the next 5-10 years is provided to the patient in written form: see Patient Instructions/AVS.    There are no diagnoses linked to this encounter.

## 2021-07-21 DIAGNOSIS — R73.01 IMPAIRED FASTING GLUCOSE: ICD-10-CM

## 2021-07-21 RX ORDER — METFORMIN HYDROCHLORIDE 500 MG/1
TABLET, EXTENDED RELEASE ORAL
Qty: 90 TABLET | Refills: 0 | Status: SHIPPED | OUTPATIENT
Start: 2021-07-21 | End: 2022-01-13 | Stop reason: SDUPTHER

## 2021-08-03 ENCOUNTER — HOSPITAL ENCOUNTER (EMERGENCY)
Age: 76
Discharge: HOME | End: 2021-08-04
Attending: EMERGENCY MEDICINE
Payer: MEDICARE

## 2021-08-03 VITALS
DIASTOLIC BLOOD PRESSURE: 76 MMHG | TEMPERATURE: 98.1 F | BODY MASS INDEX: 39.17 KG/M2 | SYSTOLIC BLOOD PRESSURE: 142 MMHG | OXYGEN SATURATION: 94 % | WEIGHT: 273 LBS | RESPIRATION RATE: 18 BRPM | HEART RATE: 64 BPM

## 2021-08-03 DIAGNOSIS — R10.9 RIGHT FLANK PAIN: Primary | ICD-10-CM

## 2021-08-03 LAB
BILIRUBIN URINE: NEGATIVE
COLOR: YELLOW
COMMENT UA: NORMAL
GLUCOSE URINE: NEGATIVE
KETONES, URINE: NEGATIVE
LEUKOCYTE ESTERASE, URINE: NEGATIVE
NITRITE, URINE: NEGATIVE
PH UA: 6 (ref 5–8)
PROTEIN UA: NEGATIVE
SPECIFIC GRAVITY UA: 1.02 (ref 1–1.03)
TURBIDITY: CLEAR
URINE HGB: NEGATIVE
UROBILINOGEN, URINE: NORMAL

## 2021-08-03 PROCEDURE — 99284 EMERGENCY DEPT VISIT MOD MDM: CPT

## 2021-08-03 PROCEDURE — 81003 URINALYSIS AUTO W/O SCOPE: CPT

## 2021-08-03 PROCEDURE — 6370000000 HC RX 637 (ALT 250 FOR IP): Performed by: EMERGENCY MEDICINE

## 2021-08-03 RX ORDER — HYDROCODONE BITARTRATE AND ACETAMINOPHEN 5; 325 MG/1; MG/1
1 TABLET ORAL ONCE
Status: COMPLETED | OUTPATIENT
Start: 2021-08-03 | End: 2021-08-03

## 2021-08-03 RX ADMIN — HYDROCODONE BITARTRATE AND ACETAMINOPHEN 1 TABLET: 5; 325 TABLET ORAL at 23:26

## 2021-08-03 ASSESSMENT — PAIN SCALES - GENERAL: PAINLEVEL_OUTOF10: 1

## 2021-08-04 ENCOUNTER — APPOINTMENT (OUTPATIENT)
Dept: CT IMAGING | Age: 76
End: 2021-08-04
Payer: MEDICARE

## 2021-08-04 ENCOUNTER — TELEPHONE (OUTPATIENT)
Dept: UROLOGY | Age: 76
End: 2021-08-04

## 2021-08-04 VITALS
TEMPERATURE: 97.8 F | RESPIRATION RATE: 18 BRPM | DIASTOLIC BLOOD PRESSURE: 63 MMHG | OXYGEN SATURATION: 92 % | BODY MASS INDEX: 39.37 KG/M2 | HEART RATE: 61 BPM | HEIGHT: 70 IN | WEIGHT: 275 LBS | SYSTOLIC BLOOD PRESSURE: 155 MMHG

## 2021-08-04 DIAGNOSIS — R10.9 FLANK PAIN: Primary | ICD-10-CM

## 2021-08-04 LAB
ABSOLUTE EOS #: 0.1 K/UL (ref 0–0.4)
ABSOLUTE IMMATURE GRANULOCYTE: ABNORMAL K/UL (ref 0–0.3)
ABSOLUTE LYMPH #: 1.6 K/UL (ref 1–4.8)
ABSOLUTE MONO #: 0.5 K/UL (ref 0.1–1.3)
ALBUMIN SERPL-MCNC: 4.4 G/DL (ref 3.5–5.2)
ALBUMIN/GLOBULIN RATIO: ABNORMAL (ref 1–2.5)
ALP BLD-CCNC: 116 U/L (ref 40–129)
ALT SERPL-CCNC: 28 U/L (ref 5–41)
ANION GAP SERPL CALCULATED.3IONS-SCNC: 9 MMOL/L (ref 9–17)
AST SERPL-CCNC: 25 U/L
BASOPHILS # BLD: 0 % (ref 0–2)
BASOPHILS ABSOLUTE: 0 K/UL (ref 0–0.2)
BILIRUB SERPL-MCNC: 0.65 MG/DL (ref 0.3–1.2)
BUN BLDV-MCNC: 16 MG/DL (ref 8–23)
BUN/CREAT BLD: ABNORMAL (ref 9–20)
CALCIUM SERPL-MCNC: 8.6 MG/DL (ref 8.6–10.4)
CHLORIDE BLD-SCNC: 100 MMOL/L (ref 98–107)
CO2: 28 MMOL/L (ref 20–31)
CREAT SERPL-MCNC: 0.65 MG/DL (ref 0.7–1.2)
DIFFERENTIAL TYPE: ABNORMAL
EOSINOPHILS RELATIVE PERCENT: 1 % (ref 0–4)
GFR AFRICAN AMERICAN: >60 ML/MIN
GFR NON-AFRICAN AMERICAN: >60 ML/MIN
GFR SERPL CREATININE-BSD FRML MDRD: ABNORMAL ML/MIN/{1.73_M2}
GFR SERPL CREATININE-BSD FRML MDRD: ABNORMAL ML/MIN/{1.73_M2}
GLUCOSE BLD-MCNC: 93 MG/DL (ref 70–99)
HCT VFR BLD CALC: 40.8 % (ref 41–53)
HEMOGLOBIN: 13.9 G/DL (ref 13.5–17.5)
IMMATURE GRANULOCYTES: ABNORMAL %
LACTIC ACID: 1 MMOL/L (ref 0.5–2.2)
LYMPHOCYTES # BLD: 28 % (ref 24–44)
MCH RBC QN AUTO: 30.8 PG (ref 26–34)
MCHC RBC AUTO-ENTMCNC: 34 G/DL (ref 31–37)
MCV RBC AUTO: 90.6 FL (ref 80–100)
MONOCYTES # BLD: 9 % (ref 1–7)
NRBC AUTOMATED: ABNORMAL PER 100 WBC
PDW BLD-RTO: 13.4 % (ref 11.5–14.9)
PLATELET # BLD: 159 K/UL (ref 150–450)
PLATELET ESTIMATE: ABNORMAL
PMV BLD AUTO: 8.7 FL (ref 6–12)
POTASSIUM SERPL-SCNC: 4.8 MMOL/L (ref 3.7–5.3)
RBC # BLD: 4.5 M/UL (ref 4.5–5.9)
RBC # BLD: ABNORMAL 10*6/UL
SEG NEUTROPHILS: 62 % (ref 36–66)
SEGMENTED NEUTROPHILS ABSOLUTE COUNT: 3.5 K/UL (ref 1.3–9.1)
SODIUM BLD-SCNC: 137 MMOL/L (ref 135–144)
TOTAL PROTEIN: 7.2 G/DL (ref 6.4–8.3)
WBC # BLD: 5.8 K/UL (ref 3.5–11)
WBC # BLD: ABNORMAL 10*3/UL

## 2021-08-04 PROCEDURE — 36415 COLL VENOUS BLD VENIPUNCTURE: CPT

## 2021-08-04 PROCEDURE — 6360000004 HC RX CONTRAST MEDICATION: Performed by: EMERGENCY MEDICINE

## 2021-08-04 PROCEDURE — 74177 CT ABD & PELVIS W/CONTRAST: CPT

## 2021-08-04 PROCEDURE — 2580000003 HC RX 258: Performed by: EMERGENCY MEDICINE

## 2021-08-04 PROCEDURE — 80053 COMPREHEN METABOLIC PANEL: CPT

## 2021-08-04 PROCEDURE — 85025 COMPLETE CBC W/AUTO DIFF WBC: CPT

## 2021-08-04 PROCEDURE — 96374 THER/PROPH/DIAG INJ IV PUSH: CPT

## 2021-08-04 PROCEDURE — 99283 EMERGENCY DEPT VISIT LOW MDM: CPT

## 2021-08-04 PROCEDURE — 6360000002 HC RX W HCPCS: Performed by: EMERGENCY MEDICINE

## 2021-08-04 PROCEDURE — 83605 ASSAY OF LACTIC ACID: CPT

## 2021-08-04 RX ORDER — HYDROCODONE BITARTRATE AND ACETAMINOPHEN 5; 325 MG/1; MG/1
1 TABLET ORAL EVERY 6 HOURS PRN
Qty: 10 TABLET | Refills: 0 | Status: SHIPPED | OUTPATIENT
Start: 2021-08-04 | End: 2021-08-11 | Stop reason: SDUPTHER

## 2021-08-04 RX ORDER — 0.9 % SODIUM CHLORIDE 0.9 %
80 INTRAVENOUS SOLUTION INTRAVENOUS ONCE
Status: COMPLETED | OUTPATIENT
Start: 2021-08-04 | End: 2021-08-04

## 2021-08-04 RX ORDER — MORPHINE SULFATE 10 MG/ML
6 INJECTION, SOLUTION INTRAMUSCULAR; INTRAVENOUS ONCE
Status: COMPLETED | OUTPATIENT
Start: 2021-08-04 | End: 2021-08-04

## 2021-08-04 RX ORDER — SODIUM CHLORIDE 0.9 % (FLUSH) 0.9 %
10 SYRINGE (ML) INJECTION PRN
Status: DISCONTINUED | OUTPATIENT
Start: 2021-08-04 | End: 2021-08-04 | Stop reason: HOSPADM

## 2021-08-04 RX ADMIN — SODIUM CHLORIDE, PRESERVATIVE FREE 10 ML: 5 INJECTION INTRAVENOUS at 19:00

## 2021-08-04 RX ADMIN — SODIUM CHLORIDE 80 ML: 9 INJECTION, SOLUTION INTRAVENOUS at 19:00

## 2021-08-04 RX ADMIN — MORPHINE SULFATE 6 MG: 10 INJECTION, SOLUTION INTRAMUSCULAR; INTRAVENOUS at 17:11

## 2021-08-04 RX ADMIN — IOPAMIDOL 75 ML: 755 INJECTION, SOLUTION INTRAVENOUS at 18:57

## 2021-08-04 ASSESSMENT — PAIN DESCRIPTION - ORIENTATION: ORIENTATION: RIGHT

## 2021-08-04 ASSESSMENT — ENCOUNTER SYMPTOMS
ABDOMINAL DISTENTION: 1
NAUSEA: 1
BACK PAIN: 1
ABDOMINAL PAIN: 1
NAUSEA: 1
COUGH: 0
TROUBLE SWALLOWING: 0
VOMITING: 0
BACK PAIN: 1
SORE THROAT: 0
CONSTIPATION: 1
ABDOMINAL PAIN: 1
RESPIRATORY NEGATIVE: 1
DIARRHEA: 0
VOMITING: 0
EYES NEGATIVE: 1

## 2021-08-04 ASSESSMENT — PAIN DESCRIPTION - PAIN TYPE
TYPE: ACUTE PAIN
TYPE: ACUTE PAIN

## 2021-08-04 ASSESSMENT — PAIN DESCRIPTION - LOCATION
LOCATION: ABDOMEN
LOCATION: ABDOMEN

## 2021-08-04 ASSESSMENT — PAIN SCALES - GENERAL
PAINLEVEL_OUTOF10: 10
PAINLEVEL_OUTOF10: 7
PAINLEVEL_OUTOF10: 10

## 2021-08-04 ASSESSMENT — PAIN DESCRIPTION - DESCRIPTORS: DESCRIPTORS: SHARP;STABBING

## 2021-08-04 ASSESSMENT — PAIN - FUNCTIONAL ASSESSMENT: PAIN_FUNCTIONAL_ASSESSMENT: PREVENTS OR INTERFERES SOME ACTIVE ACTIVITIES AND ADLS

## 2021-08-04 ASSESSMENT — PAIN DESCRIPTION - DIRECTION: RADIATING_TOWARDS: FRONT TO BACK

## 2021-08-04 ASSESSMENT — PAIN DESCRIPTION - FREQUENCY: FREQUENCY: CONTINUOUS

## 2021-08-04 ASSESSMENT — PAIN DESCRIPTION - PROGRESSION: CLINICAL_PROGRESSION: NOT CHANGED

## 2021-08-04 NOTE — ED NOTES
Patient returned to ED due to right flank from right arash area to back. Patient verbalizes ability to void but states it is more frequent and not as much volume.  Patient had some mild nausea with pain this AM.      Jenifer Brown RN  08/04/21 2218

## 2021-08-04 NOTE — ED TRIAGE NOTES
Mode of arrival (squad #, walk in, police, etc) : Walk in        Chief complaint(s): Flank pain, abdominal pain, nasuea        Arrival Note (brief scenario, treatment PTA, etc). : Pt arrives to ED c/o right sided flank pain that wraps around to his abdomen. Patient states that the pain has been ongoing \"for weeks. \" Patient states that he came in today because the pain got significantly worse. Patient reports nausea from the pain. C= \"Have you ever felt that you should Cut down on your drinking? \"  No  A= \"Have people Annoyed you by criticizing your drinking? \"  No  G= \"Have you ever felt bad or Guilty about your drinking? \"  No  E= \"Have you ever had a drink as an Eye-opener first thing in the morning to steady your nerves or to help a hangover? \"  No      Deferred []      Reason for deferring: N/A    *If yes to two or more: probable alcohol abuse. *

## 2021-08-04 NOTE — ED PROVIDER NOTES
[address]  EMERGENCY DEPARTMENT ENCOUNTER      Pt Name: Ashlee Mariee  MRN: [id number]  [place] 1945  Date of evaluation: 8/4/21      CHIEF COMPLAINT       Chief Complaint   Patient presents with    Flank Pain    Abdominal Pain    Nausea     HISTORY OF PRESENT ILLNESS   HPI 67 y.o. male presents with c/o r. Flank pain. Symptoms started about a month ago. Pain is described as sharp in character, severe in severity (rating it 10 / 10). The pain is located primarily in the r. flank with radiation around his side. .  The pain has been constant in course and progressively worsening. The patient tried tylenol prior to arrival with no relief of symptoms. Pt seen earlier in the month for same. CT scan showed a punctate non-obstructing kidney stone. No other acute abnormalities. Seen last night in the ED. UA showed no blood. Symtpoms worsening. Can't sleep. REVIEW OF SYSTEMS       Review of Systems   Constitutional: Negative for fever. HENT: Negative for congestion. Eyes: Negative for visual disturbance. Respiratory: Negative for cough. Cardiovascular: Negative for chest pain. Gastrointestinal: Positive for abdominal pain and nausea. Negative for vomiting. Genitourinary: Positive for flank pain. Negative for dysuria. Musculoskeletal: Positive for back pain. Skin: Negative for rash. Neurological: Negative for headaches.        PAST MEDICAL HISTORY     Past Medical History:   Diagnosis Date    Anxiety     Bronchitis with asthma, acute 11/24/2015    Carotid stenosis, left 2/2/2016    Diabetes mellitus ([address]     borderline patient off metformin    GERD (gastroesophageal reflux disease)     Heart attack ([address] 08/05/2019    Hyperlipidemia     Hypertension     Osteoarthritis     Stroke, hemorrhagic ([address] 08/18/2019       SURGICAL HISTORY       Past Surgical History:   Procedure Laterality Date    CAROTID ENDARTERECTOMY Left 02/02/16    COLONOSCOPY      CORONARY ANGIOPLASTY WITH STENT PLACEMENT      JOINT REPLACEMENT Left     knee    SHOULDER SURGERY Right     collar bone    TONSILLECTOMY AND ADENOIDECTOMY         CURRENT MEDICATIONS       Previous Medications    ASPIRIN 81 MG CHEWABLE TABLET    Take 1 tablet by mouth daily    ATORVASTATIN (LIPITOR) 80 MG TABLET    Take 1 tablet by mouth nightly    CLOPIDOGREL (PLAVIX) 75 MG TABLET    Take 1 tablet by mouth daily    DOCUSATE SODIUM (COLACE) 100 MG CAPSULE    Take one cap po daily    GABAPENTIN (NEURONTIN) 400 MG CAPSULE    Take 1 capsule by mouth 3 times daily for 30 days. LISINOPRIL (PRINIVIL;ZESTRIL) 20 MG TABLET    Take 1 tablet by mouth once daily    METFORMIN (GLUCOPHAGE-XR) 500 MG EXTENDED RELEASE TABLET    Take 1 tablet by mouth once daily with breakfast    METOPROLOL SUCCINATE (TOPROL XL) 50 MG EXTENDED RELEASE TABLET    TAKE 1 TABLET BY MOUTH ONCE DAILY    MULTIPLE VITAMINS-MINERALS (MULTIVITAMIN ADULTS 50+) TABS    Take by mouth daily    NITROGLYCERIN (NITROSTAT) 0.4 MG SL TABLET    up to max of 3 total doses. If no relief after 1 dose, call 911. PANTOPRAZOLE (PROTONIX) 40 MG TABLET    Take 1 tablet by mouth every morning (before breakfast)    TIZANIDINE (ZANAFLEX) 4 MG TABLET    Take 1 tablet by mouth 3 times daily as needed (For muscle spasm)       ALLERGIES     has No Known Allergies. FAMILY HISTORY     He indicated that the status of his mother is unknown. He indicated that the status of his father is unknown. SOCIAL HISTORY      reports that he has never smoked. He has never used smokeless tobacco. He reports previous alcohol use. He reports that he does not use drugs.     PHYSICAL EXAM     INITIAL VITALS: BP (!) 155/63   Pulse 61   Temp 97.8 °F (36.6 °C) (Oral)   Resp 18   Ht 5' 10\" (1.778 m)   Wt 275 lb (124.7 kg)   SpO2 92%   BMI 39.46 kg/m²   Gen: nad  Head: Normocephalic, atraumatic  Eye: Pupils equal round reactive to light, no conjunctivitis  ENT: MMM  Neck: no JVD  Heart: Regular rate and rhythm no murmurs  Lungs: Clear to auscultation bilaterally, no respiratory distress  Chest wall: No crepitus, no tenderness palpation  Abdomen: Soft, nontender, nondistended, with no peritoneal signs  MSK: no cva ttp. There is tenderness in his r. Lower back. No midline lumbar ttp. Skin: No rash. Neurologic: Patient is alert and oriented x3, motor and sensation is intact in all 4 extremities, fluent speech  Extremities:  Dp/pt pulses intact bilaterally. He does have equal bilateral LE edema. MEDICAL DECISION MAKING:     MDM  67 y.o. male presenting with subacute flank pain. Ddx: kidney stone, diverticulitis / colitis, dissection, bowel perforation. UA obtained yesterday shows no UTI. No rash to suggest shingles. Repeating blood work, obtaining CT scan. Providing analgesia. Emergency Department course:    CT scan shows no infectious process (no appendicitis, no diverticulitis, no bowel perforation, no obstruction. No evidence of a dissection, no obstructing kidney stone. Laboratory studies are unremarkable. Pt reassessed, feeling better after analgesics. I suspect a muscular pain. D/w pt the results, treatment plan, warning precautions for prompt ED return and importance of close OP FU, he verbalizes understanding and agrees with the treatment plan. DIAGNOSTIC RESULTS     RADIOLOGY:All plain film, CT, MRI, and formal ultrasound images (except ED bedside ultrasound) are read by the radiologist and the images and interpretations are directly viewed by the emergency physician. CT ABDOMEN PELVIS W IV CONTRAST Additional Contrast? None   Final Result   1. No CT evidence of an acute intra-abdominal or intrapelvic process. 2.  Diverticulosis coli without CT evidence of acute diverticulitis. 3. Coronary artery calcific atherosclerosis, status post PTCA. 4.  No findings to suggest acute appendicitis; no ureter calculus or   hydronephrosis. LABS: All lab results were reviewed by myself, and all abnormals are listed below. Labs Reviewed   CBC WITH AUTO DIFFERENTIAL - Abnormal; Notable for the following components:       Result Value    Hematocrit 40.8 (*)     Monocytes 9 (*)     All other components within normal limits   COMPREHENSIVE METABOLIC PANEL - Abnormal; Notable for the following components:    CREATININE 0.65 (*)     All other components within normal limits   LACTIC ACID       EMERGENCY DEPARTMENT COURSE:   Vitals:    Vitals:    08/04/21 1800 08/04/21 1815 08/04/21 1830 08/04/21 1845   BP: 125/79 137/81 132/85 (!) 155/63   Pulse:       Resp:       Temp:       TempSrc:       SpO2: 90% 95% 92%    Weight:       Height:           The patient was given the following medications while in the emergency department:  Orders Placed This Encounter   Medications    morphine (PF) injection 6 mg    0.9 % sodium chloride bolus    sodium chloride flush 0.9 % injection 10 mL    iopamidol (ISOVUE-370) 76 % injection 75 mL    HYDROcodone-acetaminophen (NORCO) 5-325 MG per tablet     Sig: Take 1 tablet by mouth every 6 hours as needed for Pain for up to 3 days. Dispense:  10 tablet     Refill:  0     -------------------------  CRITICAL CARE:   CONSULTS: None  PROCEDURES: Procedures     FINAL IMPRESSION      1. Flank pain          DISPOSITION/PLAN   DISPOSITION Decision To Discharge 08/04/2021 07:38:53 PM      PATIENT REFERRED TO:  Marijane Canavan, MD  [address] [place] [address] [address]  [address] [facility] [phone]    In 2 days      [facility] ED  [place] [phone]    If symptoms worsen      DISCHARGE MEDICATIONS:  New Prescriptions    HYDROCODONE-ACETAMINOPHEN (NORCO) 5-325 MG PER TABLET    Take 1 tablet by mouth every 6 hours as needed for Pain for up to 3 days.          Gabe Pond MD  Attending Emergency Physician                      Gabe Pond MD  08/04/21 5718

## 2021-08-04 NOTE — ED PROVIDER NOTES
[address] [facility] ED  Emergency Department Encounter  Emergency Medicine Resident     Pt Name: Patricia Blas  JII:[id number]  [place] 1945  Date of evaluation: 8/3/21  PCP:  Vero Gaffney MD    [address] [place] [address]       Chief Complaint   Patient presents with    Back Pain       HISTORY OF PRESENT ILLNESS  (Location/Symptom, Timing/Onset, Context/Setting, Quality, Duration, ModifyingFactors, Severity.)      Patricia Blas is a 67 y.o. male presents with complains of right flank pain radiating towards his groin. Patient states that he has been seen in the emergency department multiple times over the past month and that he knows that his diagnosis is correct. States that he was diagnosed with a kidney stone was not given any antibiotics. Patient also states that he was told it was a pulled muscle which he states \"I know my body and was not a pulled muscle\". The pain is sharp, located in the right flank with radiations towards the groin and stated that has become acutely worsened over the past day. Patient's prior studies did show small punctated right-sided renal calculi over nonobstructive. Patient also stating that he is having difficulties with activities of daily living. PAST MEDICAL / SURGICAL / SOCIAL /FAMILY HISTORY      has a past medical history of Anxiety, Bronchitis with asthma, acute, Carotid stenosis, left, Diabetes mellitus ([address] GERD (gastroesophageal reflux disease), Heart attack ([address] Hyperlipidemia, Hypertension, Osteoarthritis, and Stroke, hemorrhagic ([address] No other pertinent PMH on review with patient/guardian. has a past surgical history that includes Colonoscopy; Tonsillectomy and adenoidectomy; joint replacement (Left); shoulder surgery (Right); Carotid endarterectomy (Left, 02/02/16); and Coronary angioplasty with stent. No other pertinent PSH on review with patient/guardian.   Social History     Socioeconomic History    Marital status:      Spouse name: Not on file    Number of children: Not on file    Years of education: Not on file    Highest education level: Not on file   Occupational History    Not on file   Tobacco Use    Smoking status: Never Smoker    Smokeless tobacco: Never Used   Vaping Use    Vaping Use: Never used   Substance and Sexual Activity    Alcohol use: Not Currently    Drug use: No    Sexual activity: Not on file   Other Topics Concern    Not on file   Social History Narrative    Not on file     Social Determinants of Health     Financial Resource Strain: Low Risk     Difficulty of Paying Living Expenses: Not hard at all   Food Insecurity: No Food Insecurity    Worried About [address] in the Last Year: Never true    [facility] in the Last Year: Never true   Transportation Needs:     Lack of Transportation (Medical):  Lack of Transportation (Non-Medical):    Physical Activity:     Days of Exercise per Week:     Minutes of Exercise per Session:    Stress:     Feeling of Stress :    Social Connections:     Frequency of Communication with Friends and Family:     Frequency of Social Gatherings with Friends and Family:     Attends Christianity Services:     Active Member of Clubs or Organizations:     Attends Club or Organization Meetings:     Marital Status:    Intimate Partner Violence:     Fear of Current or Ex-Partner:     Emotionally Abused:     Physically Abused:     Sexually Abused:        I counseled the patient against using tobacco products. Family History   Problem Relation Age of Onset    Cancer Mother     Cancer Father      No other pertinent FamHx on review with patient/guardian. Allergies:  Patient has no known allergies. Home Medications:  Prior to Admission medications    Medication Sig Start Date End Date Taking?  Authorizing Provider   metFORMIN (GLUCOPHAGE-XR) 500 MG extended release tablet Take 1 tablet by mouth once daily with breakfast 7/21/21   Lisa Malin APRN - KEARA   tiZANidine (ZANAFLEX) 4 MG tablet Take 1 tablet by mouth 3 times daily as needed (For muscle spasm) 7/9/21   Debra Simpson MD   docusate sodium (COLACE) 100 MG capsule Take one cap po daily 5/20/21   Estefany Nielsen, APRN - CNP   lisinopril (PRINIVIL;ZESTRIL) 20 MG tablet Take 1 tablet by mouth once daily 4/26/21   Estefany Nielsen, APRN - CNP   pantoprazole (PROTONIX) 40 MG tablet Take 1 tablet by mouth every morning (before breakfast) 2/15/21   Estefany Nielsen, APRN - CNP   metoprolol succinate (TOPROL XL) 50 MG extended release tablet TAKE 1 TABLET BY MOUTH ONCE DAILY 9/10/20   Historical Provider, MD   Multiple Vitamins-Minerals (MULTIVITAMIN ADULTS 50+) TABS Take by mouth daily    Historical Provider, MD   gabapentin (NEURONTIN) 400 MG capsule Take 1 capsule by mouth 3 times daily for 30 days. 10/15/19 2/15/21  Shaun Griffin MD   aspirin 81 MG chewable tablet Take 1 tablet by mouth daily 7/27/19   Julio Cesar Arce MD   nitroGLYCERIN (NITROSTAT) 0.4 MG SL tablet up to max of 3 total doses. If no relief after 1 dose, call 911. 7/26/19   Julio Cesar Arce MD   atorvastatin (LIPITOR) 80 MG tablet Take 1 tablet by mouth nightly 7/26/19   Julio Cesar Arce MD   clopidogrel (PLAVIX) 75 MG tablet Take 1 tablet by mouth daily 7/27/19   Julio Cesar Arce MD       REVIEW OF SYSTEMS    (2-9 systems for level 4, 10 ormore for level 5)      Review of Systems   Constitutional: Negative for activity change, appetite change and fatigue. HENT: Negative for congestion, dental problem, sore throat and trouble swallowing. Eyes: Negative. Respiratory: Negative. Cardiovascular: Negative for chest pain, palpitations and leg swelling. Gastrointestinal: Positive for abdominal distention, abdominal pain, constipation and nausea. Negative for diarrhea and vomiting. Genitourinary: Positive for difficulty urinating and flank pain. Negative for enuresis and hematuria. Musculoskeletal: Positive for arthralgias, back pain and myalgias. Skin: Negative. Neurological: Negative. Psychiatric/Behavioral: Negative. PHYSICAL EXAM   (up to 7 for level 4, 8 or more for level 5)      INITIAL VITALS:   BP (!) 142/76   Pulse 64   Temp 98.1 °F (36.7 °C) (Oral)   Resp 18   Wt 273 lb (123.8 kg)   SpO2 94%   BMI 39.17 kg/m²     Physical Exam  Constitutional:       General: He is not in acute distress. Appearance: He is obese. He is not ill-appearing. HENT:      Head: Normocephalic and atraumatic. Right Ear: Tympanic membrane normal.      Left Ear: Tympanic membrane normal.      Nose: Nose normal.      Mouth/Throat:      Mouth: Mucous membranes are moist.      Pharynx: Oropharynx is clear. Eyes:      Extraocular Movements: Extraocular movements intact. Conjunctiva/sclera: Conjunctivae normal.      Pupils: Pupils are equal, round, and reactive to light. Cardiovascular:      Rate and Rhythm: Normal rate and regular rhythm. Pulses: Normal pulses. Heart sounds: Normal heart sounds. Pulmonary:      Effort: Pulmonary effort is normal.      Breath sounds: Normal breath sounds. Abdominal:      General: Abdomen is flat and protuberant. Bowel sounds are normal.      Palpations: Abdomen is soft. There is no shifting dullness or fluid wave. Tenderness: There is abdominal tenderness in the suprapubic area. There is right CVA tenderness. Hernia: No hernia is present. Musculoskeletal:      Cervical back: Normal range of motion and neck supple. Back:    Skin:     General: Skin is warm and dry. Capillary Refill: Capillary refill takes less than 2 seconds. Neurological:      General: No focal deficit present. Mental Status: He is alert. Mental status is at baseline.    Psychiatric:         Mood and Affect: Mood normal.         DIFFERENTIAL  DIAGNOSIS     DDX: Kidney stones, UTI, acute on chronic back pain    PLAN (LABS / IMAGING / EKG):  Orders Placed This Encounter   Procedures    Urinalysis MEDICATIONS ORDERED:  Orders Placed This Encounter   Medications    HYDROcodone-acetaminophen (NORCO) 5-325 MG per tablet 1 tablet           DIAGNOSTIC RESULTS / EMERGENCY DEPARTMENT COURSE / MDM     LABS:  Results for orders placed or performed during the hospital encounter of 08/03/21   Urinalysis   Result Value Ref Range    Color, UA YELLOW YELLOW    Turbidity UA CLEAR CLEAR    Glucose, Ur NEGATIVE NEGATIVE    Bilirubin Urine NEGATIVE NEGATIVE    Ketones, Urine NEGATIVE NEGATIVE    Specific Gravity, UA 1.025 1.000 - 1.030    Urine Hgb NEGATIVE NEGATIVE    pH, UA 6.0 5.0 - 8.0    Protein, UA NEGATIVE NEGATIVE    Urobilinogen, Urine Normal Normal    Nitrite, Urine NEGATIVE NEGATIVE    Leukocyte Esterase, Urine NEGATIVE NEGATIVE    Urinalysis Comments       Microscopic exam not performed based on chemical results unless requested in original order. IMPRESSION/MDM/ED COURSE:  67 y.o. male presented with acutely worsening of right lower back pain. Patient complaining worsening symptoms and that he has been seen multiple times emerge department without a thorough work-up. Patient's previous work-up did show me a small punctated right-sided renal calculi that were nonobstructive. Patient upset he was not given pain medication to be able to help him manage his pain. Given the nature of patient's discomfort will obtain urinalysis, CBC, BMP with mag. Urinalysis negative for any acute findings, no blood, infection or signs of problems. Discharged home with referral to pain clinic. Patient/Guardian requesting discharge. Patient/Guardian was given written and verbal instructions prior to discharge. Patient/Guardian understood and agreed. Patient/Guardian had no further questions.        RADIOLOGY:  No orders to display         EKG  None    All EKG's are interpreted by the Emergency Department Physician who either signs or Co-signs this chart in the absence of a cardiologist.      PROCEDURES:  None    CONSULTS:  None        FINAL IMPRESSION      1.  Right flank pain          DISPOSITION / PLAN     DISPOSITION Decision To Discharge 08/04/2021 12:11:53 AM      PATIENT REFERREDTO:  Surinder Cruz MD  [facility]. [address]  [address] [facility] [phone]    Schedule an appointment as soon as possible for a visit       [facility] ED  Mike Cummings [address]  [address] [address]  [phone]    If symptoms worsen    [address], [place] [address]  [facility] [place] [facility] [address]  [phone]  Schedule an appointment as soon as possible for a visit         DISCHARGE MEDICATIONS:  New Prescriptions    No medications on file       Thalia Rosario MD  PGY 2  Resident Physician Emergency Medicine  08/04/21 12:14 AM        (Please note that portions of this note were completed with a voice recognition program.Efforts were made to edit the dictations but occasionally words are mis-transcribed.)       Thalia Rosario MD  Resident  08/04/21 1031

## 2021-08-04 NOTE — ED PROVIDER NOTES
EMERGENCY DEPARTMENT ENCOUNTER   ATTENDING ATTESTATION     Pt Name: Jayme Diaz  MRN: [id number]  [place] 1945  Date of evaluation: 8/3/21       Jayme Diaz is a 67 y.o. male who presents with Back Pain      MDM:   This is a 65-year-old male who is complaining of right groin pain this is been going on for the last 3 months he has an inferior right renal collecting system punctate calculus this could be etiology of his symptoms but the pain is worse when he moves to suspect musculoskeletal the patient has had work-ups in the past I recommend that he follow-up as an outpatient he is requesting \"stronger pain medication \"we will also provide him pain medicine referral. Urinanalysis is negative for infection    Vitals:   Vitals:    08/03/21 2150 08/03/21 2200 08/03/21 2300   BP: (!) 163/82 (!) 156/80 (!) 142/76   Pulse: 107 63 64   Resp: 18     Temp: 98.1 °F (36.7 °C)     TempSrc: Oral     SpO2: 97% 95% 94%   Weight: 273 lb (123.8 kg)           I personally evaluated and examined the patient in conjunction with the resident and agree with the assessment, treatment plan, and disposition of the patient as recorded by the resident. I performed a history and physical examination of the patient and discussed management with the resident. I reviewed the residents note and agree with the documented findings and plan of care. Any areas of disagreement are noted on the chart. I was personally present for the key portions of any procedures. I have documented in the chart those procedures where I was not present during the key portions. I have personally reviewed all images and agree with the resident's interpretation. I have reviewed the emergency nurses triage note. I agree with the chief complaint, past medical history, past surgical history, allergies, medications, social and family history as documented unless otherwise noted.     Edilberto Small MD  Attending Emergency Physician            Edilberto Small, MD  08/03/21 8926

## 2021-08-04 NOTE — ED NOTES
Patient is sleeping with family at bedside. Ordered labs sent for testing.       Abhijit Chan RN  08/04/21 0537

## 2021-08-04 NOTE — ED TRIAGE NOTES
Mode of arrival (squad #, walk in, police, etc) : walk in        Chief complaint(s): Back pain        Arrival Note (brief scenario, treatment PTA, etc). : Pt states he has been seen by us and his PCP multiple times for his back pain and he has not had relief with what he has been given. C= \"Have you ever felt that you should Cut down on your drinking? \"  No  A= \"Have people Annoyed you by criticizing your drinking? \"  No  G= \"Have you ever felt bad or Guilty about your drinking? \"  No  E= \"Have you ever had a drink as an Eye-opener first thing in the morning to steady your nerves or to help a hangover? \"  No      Deferred []      Reason for deferring: N/A    *If yes to two or more: probable alcohol abuse. *

## 2021-08-04 NOTE — TELEPHONE ENCOUNTER
ND-Patient's spouse called to get sooner appointment with Dr. John Kidd. Patient was in ED last night for right flank pain and is still having a radiating pain and would like to be seen as soon as possible. Patient is scheduled to see PCP tomorrow.

## 2021-08-05 ENCOUNTER — OFFICE VISIT (OUTPATIENT)
Dept: FAMILY MEDICINE CLINIC | Age: 76
End: 2021-08-05
Payer: MEDICARE

## 2021-08-05 VITALS
WEIGHT: 273 LBS | OXYGEN SATURATION: 98 % | HEART RATE: 71 BPM | DIASTOLIC BLOOD PRESSURE: 60 MMHG | SYSTOLIC BLOOD PRESSURE: 128 MMHG | HEIGHT: 70 IN | TEMPERATURE: 97 F | BODY MASS INDEX: 39.08 KG/M2

## 2021-08-05 DIAGNOSIS — E78.2 MIXED HYPERLIPIDEMIA: ICD-10-CM

## 2021-08-05 DIAGNOSIS — I10 ESSENTIAL HYPERTENSION: Primary | ICD-10-CM

## 2021-08-05 DIAGNOSIS — R73.01 IMPAIRED FASTING GLUCOSE: ICD-10-CM

## 2021-08-05 DIAGNOSIS — R10.31 RIGHT LOWER QUADRANT ABDOMINAL PAIN: ICD-10-CM

## 2021-08-05 DIAGNOSIS — R10.31 RIGHT LOWER QUADRANT ABDOMINAL PAIN: Primary | ICD-10-CM

## 2021-08-05 PROCEDURE — 1036F TOBACCO NON-USER: CPT | Performed by: FAMILY MEDICINE

## 2021-08-05 PROCEDURE — 99214 OFFICE O/P EST MOD 30 MIN: CPT | Performed by: FAMILY MEDICINE

## 2021-08-05 PROCEDURE — 1123F ACP DISCUSS/DSCN MKR DOCD: CPT | Performed by: FAMILY MEDICINE

## 2021-08-05 PROCEDURE — G8417 CALC BMI ABV UP PARAM F/U: HCPCS | Performed by: FAMILY MEDICINE

## 2021-08-05 PROCEDURE — 4040F PNEUMOC VAC/ADMIN/RCVD: CPT | Performed by: FAMILY MEDICINE

## 2021-08-05 PROCEDURE — G8427 DOCREV CUR MEDS BY ELIG CLIN: HCPCS | Performed by: FAMILY MEDICINE

## 2021-08-05 ASSESSMENT — ENCOUNTER SYMPTOMS
CHEST TIGHTNESS: 0
BLOOD IN STOOL: 0
ABDOMINAL PAIN: 1
SHORTNESS OF BREATH: 0

## 2021-08-05 NOTE — PROGRESS NOTES
Subjective:      Patient ID: Marcel Pemberton is a 67 y.o. male. Visit Information    Have you changed or started any medications since your last visit including any over-the-counter medicines, vitamins, or herbal medicines? no   Have you stopped taking any of your medications? Is so, why? -  no  Are you having any side effects from any of your medications? - no    Have you seen any other physician or provider since your last visit?  no   Have you had any other diagnostic tests since your last visit?  no   Have you been seen in the emergency room and/or had an admission in a hospital since we last saw you?  yes - [facility]   Have you had your routine dental cleaning in the past 6 months?  no states he has dentures     Do you have an active MyChart account? If no, what is the barrier? No: decline at this time     Patient Care Team:  Leo Lee MD as PCP - General (Family Medicine)  eLo Lee MD as PCP - [facility] Provider    Medical History Review  Past Medical, Family, and Social History reviewed and does contribute to the patient presenting condition    Health Maintenance   Topic Date Due    Hepatitis C screen  Never done    DTaP/Tdap/Td vaccine (1 - Tdap) Never done    Shingles Vaccine (1 of 2) Never done    Flu vaccine (1) 09/01/2021    Lipid screen  03/25/2022    Annual Wellness Visit (AWV)  07/16/2022    Potassium monitoring  08/04/2022    Creatinine monitoring  08/04/2022    Pneumococcal 65+ years Vaccine  Completed    COVID-19 Vaccine  Completed    Hepatitis A vaccine  Aged Out    Hepatitis B vaccine  Aged Out    Hib vaccine  Aged Out    Meningococcal (ACWY) vaccine  Aged Out               HPI  Patient is a 67-year-old obese white male who presents for hypertension, hyperlipidemia, impaired fasting glucose. He also presents with complaint of right lower abdominal pain with radiation of pain around to his back.  On 7/1/2021 he was seen in the ER for right flank pain and CT of abdomen showed inferior right renal collecting system punctate calculus. He states that he again went to the ER yesterday and CT of abdomen was repeated and showed no evidence of acute intra-abdominal or intrapelvic process. Patient states that today the pain is mainly in his right lower abdomen and radiates to his back. He denies any fever, chills, chest pain or shortness of breath, dysuria or hematuria. He states he has an appointment with his urologist, Dr. Gabi Rosado, in 4 days. Review of Systems   Constitutional: Negative for chills and fever. HENT: Negative for congestion. Respiratory: Negative for chest tightness and shortness of breath. Cardiovascular: Negative for chest pain. Gastrointestinal: Positive for abdominal pain (  Right lower quadrant). Negative for blood in stool. Genitourinary: Positive for flank pain (  Right). Negative for dysuria and hematuria. Skin: Negative for rash. Neurological: Negative for dizziness. Psychiatric/Behavioral: Negative for dysphoric mood. Objective:   Physical Exam  Vitals and nursing note reviewed. Constitutional:       Appearance: He is well-developed. He is obese. HENT:      Head: Normocephalic and atraumatic. Right Ear: External ear normal.      Left Ear: External ear normal.      Nose: Nose normal.      Mouth/Throat:      Mouth: Mucous membranes are moist.      Pharynx: Oropharynx is clear. Eyes:      General: No scleral icterus. Right eye: No discharge. Left eye: No discharge. Conjunctiva/sclera: Conjunctivae normal.   Cardiovascular:      Rate and Rhythm: Normal rate and regular rhythm. Heart sounds: Normal heart sounds. Pulmonary:      Effort: Pulmonary effort is normal. No respiratory distress. Breath sounds: Normal breath sounds. No wheezing. Abdominal:      General: There is no distension. Palpations: Abdomen is soft. Tenderness:  There is abdominal tenderness (  Mild right lower quadrant tenderness). There is no right CVA tenderness. Musculoskeletal:      Cervical back: Neck supple. Skin:     General: Skin is warm and dry. Findings: No rash. Neurological:      Mental Status: He is alert and oriented to person, place, and time. Psychiatric:         Mood and Affect: Mood normal.         Behavior: Behavior normal.         Assessment:       Diagnosis Orders   1. Essential hypertension     2. Mixed hyperlipidemia     3. Impaired fasting glucose     4.  Right lower quadrant abdominal pain             Plan:         Continue routine medications   Keep appointment in 4 days with urologist  Patient also has appointment with general surgeon 8/16/2021  Continue Norco for pain as prescribed by ER physician  Follow-up in 1 to 2 months or sooner if needed

## 2021-08-09 ENCOUNTER — OFFICE VISIT (OUTPATIENT)
Dept: UROLOGY | Age: 76
End: 2021-08-09
Payer: MEDICARE

## 2021-08-09 VITALS
DIASTOLIC BLOOD PRESSURE: 72 MMHG | BODY MASS INDEX: 39.08 KG/M2 | HEIGHT: 70 IN | HEART RATE: 70 BPM | SYSTOLIC BLOOD PRESSURE: 129 MMHG | TEMPERATURE: 97.5 F | WEIGHT: 273 LBS

## 2021-08-09 DIAGNOSIS — R39.12 WEAK URINARY STREAM: ICD-10-CM

## 2021-08-09 DIAGNOSIS — N20.0 RIGHT RENAL STONE: ICD-10-CM

## 2021-08-09 DIAGNOSIS — R35.0 FREQUENCY OF URINATION: ICD-10-CM

## 2021-08-09 DIAGNOSIS — N40.1 BPH WITH OBSTRUCTION/LOWER URINARY TRACT SYMPTOMS: ICD-10-CM

## 2021-08-09 DIAGNOSIS — R10.9 RIGHT FLANK PAIN: Primary | ICD-10-CM

## 2021-08-09 DIAGNOSIS — N13.8 BPH WITH OBSTRUCTION/LOWER URINARY TRACT SYMPTOMS: ICD-10-CM

## 2021-08-09 PROCEDURE — G8427 DOCREV CUR MEDS BY ELIG CLIN: HCPCS | Performed by: UROLOGY

## 2021-08-09 PROCEDURE — 4040F PNEUMOC VAC/ADMIN/RCVD: CPT | Performed by: UROLOGY

## 2021-08-09 PROCEDURE — G8417 CALC BMI ABV UP PARAM F/U: HCPCS | Performed by: UROLOGY

## 2021-08-09 PROCEDURE — 99204 OFFICE O/P NEW MOD 45 MIN: CPT | Performed by: UROLOGY

## 2021-08-09 PROCEDURE — 1123F ACP DISCUSS/DSCN MKR DOCD: CPT | Performed by: UROLOGY

## 2021-08-09 PROCEDURE — 1036F TOBACCO NON-USER: CPT | Performed by: UROLOGY

## 2021-08-09 RX ORDER — TAMSULOSIN HYDROCHLORIDE 0.4 MG/1
0.4 CAPSULE ORAL DAILY
Qty: 30 CAPSULE | Refills: 5 | Status: SHIPPED | OUTPATIENT
Start: 2021-08-09

## 2021-08-09 ASSESSMENT — ENCOUNTER SYMPTOMS
BACK PAIN: 1
WHEEZING: 0
COUGH: 0
SHORTNESS OF BREATH: 0
ABDOMINAL PAIN: 1
DIARRHEA: 0
VOMITING: 0
EYE PAIN: 0
NAUSEA: 0
CONSTIPATION: 0
EYE REDNESS: 0

## 2021-08-09 NOTE — PROGRESS NOTES
[address] [address]  Dept: [phone]  Dept Fax: [phone] [facility] Office Note - New patient    Patient:  Sandhya Gama  YOB: 1945  Date: 8/9/2021    The patient is a 67 y.o. male who presents todayfor evaluation of the following problems:   Chief Complaint   Patient presents with   [facility]Right [facility] pain, renal calculus right    referred by Say Ferrera MD.      HPI  Cam  is a 40-year-old gentleman who was in the emergency department recently with right flank and lower quadrant pain. This started about 2 weeks ago. It has progressively gotten worse. He did have a CT scan last month which did demonstrate a punctate nonobstructing right renal stone. He has never had stones before. He does also complain of bothersome urinary symptoms including a weak stream.  He does also have urgency and frequency. He does sometimes have to push to get his urine expelled. (Patient's old records have been requested, reviewed and summarized in today's note.)    Summary of old records: N/A    Additional History: N/A    Procedures Today: N/A    Last several PSA's:  Lab Results   Component Value Date    PSA 1.22 03/25/2021    PSA 0.95 07/27/2018    PSA 1.24 03/14/2015     Last total testosterone:  No results found for: TESTOSTERONE  Urinalysis today:  No results found for this visit on 08/09/21. AUA Symptom Score (8/9/2021):   INCOMPLETE EMPTYING: How often have you had the sensation of not emptying your bladder?: Not at all  FREQUENCY: How often do you have to urinate less than every two hours?: Not at all  INTERMITTENCY: How often have you found you stopped and started again several times when you urinated?: Not at all  URGENCY: How often have you found it difficult to postpone urination?: Not at all  WEAK STREAM: How often have you had a weak urinary stream?: About Half the time  STRAINING: How often have you had to strain to start  urination?: Not at all  NOCTURIA: How many times did you typically get up at night to uriniate?: 1 Time  TOTAL I-PSS SCORE[de-identified] 4       Last BUN and creatinine:  Lab Results   Component Value Date    BUN 16 08/04/2021     Lab Results   Component Value Date    CREATININE 0.65 (L) 08/04/2021       Additional Lab/Culture results: none    Imaging Reviewed during this Office Visit: none  (results were independently reviewed by physician and radiology report verified)    PAST MEDICAL, FAMILY AND SOCIAL HISTORY:  Past Medical History:   Diagnosis Date    Anxiety     Bronchitis with asthma, acute 11/24/2015    Carotid stenosis, left 2/2/2016    Diabetes mellitus ([facility]     borderline patient off metformin    GERD (gastroesophageal reflux disease)     Heart attack ([facility] [facility] 08/05/2019    Hyperlipidemia     Hypertension     Osteoarthritis     Stroke, hemorrhagic ([facility] 08/18/2019     Past Surgical History:   Procedure Laterality Date    CAROTID ENDARTERECTOMY Left 02/02/16    COLONOSCOPY      CORONARY ANGIOPLASTY WITH STENT PLACEMENT      JOINT REPLACEMENT Left     knee    SHOULDER SURGERY Right     collar bone    TONSILLECTOMY AND ADENOIDECTOMY       Family History   Problem Relation Age of Onset    Cancer Mother     Cancer Father      Outpatient Medications Marked as Taking for the 8/9/21 encounter (Office Visit) with Eugenia Brizuela MD   Medication Sig Dispense Refill    tamsulosin (FLOMAX) 0.4 MG capsule Take 1 capsule by mouth daily Take in evening after meal 30 capsule 5    metFORMIN (GLUCOPHAGE-XR) 500 MG extended release tablet Take 1 tablet by mouth once daily with breakfast 90 tablet 0    tiZANidine (ZANAFLEX) 4 MG tablet Take 1 tablet by mouth 3 times daily as needed (For muscle spasm) 30 tablet 0    docusate sodium (COLACE) 100 MG capsule Take one cap po daily 30 capsule 3    lisinopril (PRINIVIL;ZESTRIL) 20 MG tablet Take 1 tablet by mouth once daily 90 tablet 1    pantoprazole (PROTONIX) 40 MG tablet Take 1 tablet by mouth every morning (before breakfast) 90 tablet 0    metoprolol succinate (TOPROL XL) 50 MG extended release tablet TAKE 1 TABLET BY MOUTH ONCE DAILY      Multiple Vitamins-Minerals (MULTIVITAMIN ADULTS 50+) TABS Take by mouth daily      aspirin 81 MG chewable tablet Take 1 tablet by mouth daily 30 tablet 3    nitroGLYCERIN (NITROSTAT) 0.4 MG SL tablet up to max of 3 total doses. If no relief after 1 dose, call 911. 25 tablet 3    atorvastatin (LIPITOR) 80 MG tablet Take 1 tablet by mouth nightly 30 tablet 3    clopidogrel (PLAVIX) 75 MG tablet Take 1 tablet by mouth daily 30 tablet 3        Patient has no known allergies. Social History     Tobacco Use   Smoking Status Never Smoker   Smokeless Tobacco Never Used      (If patient a smoker, smoking cessation counseling offered)   Social History     Substance and Sexual Activity   Alcohol Use Not Currently       REVIEW OF SYSTEMS:  Review of Systems    Physical Exam:    This a 67 y.o. male   Vitals:    08/09/21 0810   BP: 129/72   Pulse: 70   Temp: 97.5 °F (36.4 °C)     Body mass index is 39.17 kg/m². Physical Exam  Constitutional: Patient in no acute distress. Neuro: Alert and oriented to person, place and time. Psych: Mood normal, affect normal  Skin: No rash noted  HEENT: Head: Normocephalic and atraumatic  Conjunctivae and EOM are normal. Pupils are equal, round  Nose: Normal  Right External Ear: Normal; Left External Ear: Normal  Mouth: Mucosa Moist  Neck: Supple  Lungs:Respiratory effort is normal  Cardiovascular: Warm & Pink  Abdomen: Soft, non-tender, non-distendedwith no CVA,  No flank tenderness,  Orhepatosplenomegaly   Lymphatics: No palpable lymphadenopathy. Bladder non-tender and not distended. Musculoskeletal: Normal gait and station  Penis normal and circumcised  Urethral meatus normal  Scrotal exam normal        Assessment and Plan      1. Right flank pain    2. Right renal stone    3. Frequency of urination    4. Weak urinary stream    5. BPH with obstruction/lower urinary tract symptoms           Plan:  Pain unlikely renal in etiology. Patient has punctate stone which is nonobstructing on CT from July. His urinary symptoms are likely related to BPH. We will start him on tamsulosin 0.4 mg daily. Encouraged him to follow-up with general surgery as he has an appointment in 1 week. Prescriptions Ordered:  Orders Placed This Encounter   Medications    tamsulosin (FLOMAX) 0.4 MG capsule     Sig: Take 1 capsule by mouth daily Take in evening after meal     Dispense:  30 capsule     Refill:  5      Orders Placed:  No orders of the defined types were placed in this encounter. Maria Elena Castellanos MD    Agree with the ROS entered by the MA.

## 2021-08-11 ENCOUNTER — TELEPHONE (OUTPATIENT)
Dept: FAMILY MEDICINE CLINIC | Age: 76
End: 2021-08-11

## 2021-08-11 DIAGNOSIS — R10.9 FLANK PAIN: ICD-10-CM

## 2021-08-11 RX ORDER — HYDROCODONE BITARTRATE AND ACETAMINOPHEN 5; 325 MG/1; MG/1
1 TABLET ORAL 2 TIMES DAILY PRN
Qty: 6 TABLET | Refills: 0 | Status: SHIPPED | OUTPATIENT
Start: 2021-08-11 | End: 2021-08-19 | Stop reason: ALTCHOICE

## 2021-08-11 NOTE — TELEPHONE ENCOUNTER
The patient saw Dr Jessica Amin for RLQ pain and is scheduled to see Dr Lolly Escobar on 08/16/21 but would like to know if he can get some pain medications until he can see her.   Please advise

## 2021-08-11 NOTE — TELEPHONE ENCOUNTER
Patient's wife called asking if Patient is able to get some pain pills for patients pain until Monday when they see the surgeon.

## 2021-08-19 ENCOUNTER — TELEPHONE (OUTPATIENT)
Dept: FAMILY MEDICINE CLINIC | Age: 76
End: 2021-08-19

## 2021-08-19 DIAGNOSIS — M54.50 CHRONIC RIGHT-SIDED LOW BACK PAIN WITHOUT SCIATICA: Primary | ICD-10-CM

## 2021-08-19 DIAGNOSIS — G89.29 CHRONIC RIGHT-SIDED LOW BACK PAIN WITHOUT SCIATICA: Primary | ICD-10-CM

## 2021-08-19 RX ORDER — OXYCODONE HYDROCHLORIDE AND ACETAMINOPHEN 5; 325 MG/1; MG/1
1 TABLET ORAL EVERY 6 HOURS PRN
Qty: 20 TABLET | Refills: 0 | Status: SHIPPED | OUTPATIENT
Start: 2021-08-19 | End: 2021-08-24

## 2021-08-19 NOTE — TELEPHONE ENCOUNTER
Message conveyed to the patient's wife , Luciana Angulo and will place referral to Dr Luis Carlos Perez for follow up.

## 2021-08-19 NOTE — TELEPHONE ENCOUNTER
----- Message from Irma Valdes sent at 8/19/2021  1:52 PM EDT -----  Subject: Message to Provider    QUESTIONS  Information for Provider? Patient is calling because he states he would   need a new referral to a spine doctor that takes his insurance which is   [employer] and also he would like a stronger pain medicine. Please   advise  ---------------------------------------------------------------------------  --------------  CALL BACK INFO  What is the best way for the office to contact you? OK to leave message on   voicemail  Preferred Call Back Phone Number? [phone]  ---------------------------------------------------------------------------  --------------  SCRIPT ANSWERS  Relationship to Patient?  Self

## 2021-08-19 NOTE — TELEPHONE ENCOUNTER
The patient's wife said that the general surgeon, Dr Daniel Arambula that the patient saw on 08/16/21 is recommending a referral to a spine surgeon(progress note requested) and she also is requesting a stronger pain medication for the patient since [address] [facility] is not strong enough. Please advise.

## 2021-08-28 ENCOUNTER — TELEPHONE ENCOUNTER (OUTPATIENT)
Dept: URBAN - METROPOLITAN AREA CLINIC 13 | Facility: CLINIC | Age: 76
End: 2021-08-28

## 2021-08-28 RX ORDER — SUCRALFATE 1 G/1
TABLET ORAL
OUTPATIENT
End: 2018-05-14

## 2021-08-28 RX ORDER — TRAMADOL HYDROCHLORIDE 50 MG/1
TABLET, FILM COATED ORAL
OUTPATIENT
End: 2018-05-14

## 2021-08-29 ENCOUNTER — TELEPHONE ENCOUNTER (OUTPATIENT)
Dept: URBAN - METROPOLITAN AREA CLINIC 13 | Facility: CLINIC | Age: 76
End: 2021-08-29

## 2021-08-29 RX ORDER — PANTOPRAZOLE SODIUM 40 MG/1
TABLET, DELAYED RELEASE ORAL
Status: ACTIVE | COMMUNITY

## 2021-08-29 RX ORDER — AMLODIPINE BESYLATE 2.5 MG/1
TABLET ORAL
Status: ACTIVE | COMMUNITY

## 2021-08-29 RX ORDER — DRONABINOL 5 MG/1
CAPSULE ORAL
Status: ACTIVE | COMMUNITY

## 2021-08-29 RX ORDER — LISINOPRIL 40 MG/1
TABLET ORAL
Status: ACTIVE | COMMUNITY

## 2021-08-29 RX ORDER — DULOXETINE 30 MG/1
CAPSULE, DELAYED RELEASE PELLETS ORAL
Status: ACTIVE | COMMUNITY

## 2021-08-29 RX ORDER — ATORVASTATIN CALCIUM 40 MG/1
TABLET ORAL
Status: ACTIVE | COMMUNITY

## 2021-08-29 RX ORDER — PANCRELIPASE 36000; 180000; 114000 [USP'U]/1; [USP'U]/1; [USP'U]/1
CAPSULE, DELAYED RELEASE PELLETS ORAL
Status: ACTIVE | COMMUNITY
Start: 2019-03-18

## 2021-08-29 RX ORDER — ALLOPURINOL 100 MG/1
TABLET ORAL
Status: ACTIVE | COMMUNITY

## 2021-08-29 RX ORDER — CLOPIDOGREL 75 MG/1
TABLET ORAL
Status: ACTIVE | COMMUNITY

## 2021-08-29 RX ORDER — METOPROLOL TARTRATE 25 MG/1
TABLET, FILM COATED ORAL
Status: ACTIVE | COMMUNITY

## 2021-08-29 RX ORDER — ONDANSETRON 8 MG/1
TABLET ORAL
Status: ACTIVE | COMMUNITY

## 2021-08-29 RX ORDER — PROMETHAZINE HYDROCHLORIDE 25 MG/1
TABLET ORAL
Status: ACTIVE | COMMUNITY

## 2021-08-29 RX ORDER — OXYCODONE AND ACETAMINOPHEN 10; 325 MG/1; MG/1
TABLET ORAL
Status: ACTIVE | COMMUNITY

## 2021-08-29 RX ORDER — GABAPENTIN 300 MG/1
CAPSULE ORAL
Status: ACTIVE | COMMUNITY

## 2021-08-29 RX ORDER — ASPIRIN 81 MG/1
TABLET, COATED ORAL
Status: ACTIVE | COMMUNITY

## 2021-08-29 RX ORDER — METOCLOPRAMIDE HYDROCHLORIDE 10 MG/1
TABLET ORAL
Status: ACTIVE | COMMUNITY
Start: 2018-05-14

## 2021-08-29 RX ORDER — LUBIPROSTONE 24 UG/1
CAPSULE, GELATIN COATED ORAL
Status: ACTIVE | COMMUNITY

## 2021-08-29 RX ORDER — ZOLPIDEM TARTRATE 10 MG/1
TABLET, FILM COATED ORAL
Status: ACTIVE | COMMUNITY

## 2021-08-29 RX ORDER — ONDANSETRON HYDROCHLORIDE 4 MG/1
TABLET, FILM COATED ORAL
Status: ACTIVE | COMMUNITY

## 2021-08-29 RX ORDER — COLCHICINE 0.6 MG/1
TABLET ORAL
Status: ACTIVE | COMMUNITY

## 2021-08-29 RX ORDER — LACTULOSE 10 G/15ML
SOLUTION ORAL
Status: ACTIVE | COMMUNITY

## 2021-09-07 ENCOUNTER — OFFICE VISIT (OUTPATIENT)
Dept: ORTHOPEDIC SURGERY | Age: 76
End: 2021-09-07
Payer: MEDICARE

## 2021-09-07 VITALS — HEIGHT: 70 IN | RESPIRATION RATE: 16 BRPM | BODY MASS INDEX: 39.08 KG/M2 | WEIGHT: 273 LBS

## 2021-09-07 DIAGNOSIS — M54.9 CHRONIC BACK PAIN, UNSPECIFIED BACK LOCATION, UNSPECIFIED BACK PAIN LATERALITY: Primary | ICD-10-CM

## 2021-09-07 DIAGNOSIS — M48.062 SPINAL STENOSIS OF LUMBAR REGION WITH NEUROGENIC CLAUDICATION: ICD-10-CM

## 2021-09-07 DIAGNOSIS — M54.16 LUMBAR RADICULAR PAIN: ICD-10-CM

## 2021-09-07 DIAGNOSIS — M43.10 ACQUIRED SPONDYLOLISTHESIS: ICD-10-CM

## 2021-09-07 DIAGNOSIS — G89.29 CHRONIC BACK PAIN, UNSPECIFIED BACK LOCATION, UNSPECIFIED BACK PAIN LATERALITY: Primary | ICD-10-CM

## 2021-09-07 DIAGNOSIS — M48.10 DIFFUSE IDIOPATHIC SKELETAL HYPEROSTOSIS: ICD-10-CM

## 2021-09-07 PROCEDURE — 1036F TOBACCO NON-USER: CPT | Performed by: ORTHOPAEDIC SURGERY

## 2021-09-07 PROCEDURE — 99203 OFFICE O/P NEW LOW 30 MIN: CPT | Performed by: ORTHOPAEDIC SURGERY

## 2021-09-07 PROCEDURE — G8417 CALC BMI ABV UP PARAM F/U: HCPCS | Performed by: ORTHOPAEDIC SURGERY

## 2021-09-07 PROCEDURE — 1123F ACP DISCUSS/DSCN MKR DOCD: CPT | Performed by: ORTHOPAEDIC SURGERY

## 2021-09-07 PROCEDURE — G8427 DOCREV CUR MEDS BY ELIG CLIN: HCPCS | Performed by: ORTHOPAEDIC SURGERY

## 2021-09-07 PROCEDURE — 4040F PNEUMOC VAC/ADMIN/RCVD: CPT | Performed by: ORTHOPAEDIC SURGERY

## 2021-09-07 NOTE — PROGRESS NOTES
Patient ID: Hood Ponce is a 67 y.o. male    Chief Compliant:  No chief complaint on file. Diagnostic imaging:  CT abdomen pelvis reviewed for lumbar spine patient with retrolisthesis L2-3 with at least moderate stenosis likely severe stenosis at L4-5 diffuse idiopathic skeletal hyperostosis with multilevel ankylosis and moderate left hip arthritis      Assessment and Plan:  1. Chronic back pain, unspecified back location, unspecified back pain laterality      CT myelogram lumbar spine    Follow up after CT myelogram    HPI:  This is a 67 y.o. male who presents to the clinic today as a new patient for lower back evaluation    Patient with lower abdominal pain radiating to the lower back, pain is worst on his right side. He admits nocturia about 4 times per night, he has already been evaluated by urology    Hx of brain surgery    Review of Systems   All other systems reviewed and are negative.       Past History:    Current Outpatient Medications:     tamsulosin (FLOMAX) 0.4 MG capsule, Take 1 capsule by mouth daily Take in evening after meal, Disp: 30 capsule, Rfl: 5    metFORMIN (GLUCOPHAGE-XR) 500 MG extended release tablet, Take 1 tablet by mouth once daily with breakfast, Disp: 90 tablet, Rfl: 0    tiZANidine (ZANAFLEX) 4 MG tablet, Take 1 tablet by mouth 3 times daily as needed (For muscle spasm), Disp: 30 tablet, Rfl: 0    docusate sodium (COLACE) 100 MG capsule, Take one cap po daily, Disp: 30 capsule, Rfl: 3    lisinopril (PRINIVIL;ZESTRIL) 20 MG tablet, Take 1 tablet by mouth once daily, Disp: 90 tablet, Rfl: 1    pantoprazole (PROTONIX) 40 MG tablet, Take 1 tablet by mouth every morning (before breakfast), Disp: 90 tablet, Rfl: 0    metoprolol succinate (TOPROL XL) 50 MG extended release tablet, TAKE 1 TABLET BY MOUTH ONCE DAILY, Disp: , Rfl:     Multiple Vitamins-Minerals (MULTIVITAMIN ADULTS 50+) TABS, Take by mouth daily, Disp: , Rfl:     gabapentin (NEURONTIN) 400 MG capsule, Take 1 capsule by mouth 3 times daily for 30 days. , Disp: 90 capsule, Rfl: 0    aspirin 81 MG chewable tablet, Take 1 tablet by mouth daily, Disp: 30 tablet, Rfl: 3    nitroGLYCERIN (NITROSTAT) 0.4 MG SL tablet, up to max of 3 total doses. If no relief after 1 dose, call 911., Disp: 25 tablet, Rfl: 3    atorvastatin (LIPITOR) 80 MG tablet, Take 1 tablet by mouth nightly, Disp: 30 tablet, Rfl: 3    clopidogrel (PLAVIX) 75 MG tablet, Take 1 tablet by mouth daily, Disp: 30 tablet, Rfl: 3  No Known Allergies  Social History     Socioeconomic History    Marital status:      Spouse name: Not on file    Number of children: Not on file    Years of education: Not on file    Highest education level: Not on file   Occupational History    Not on file   Tobacco Use    Smoking status: Never Smoker    Smokeless tobacco: Never Used   Vaping Use    Vaping Use: Never used   Substance and Sexual Activity    Alcohol use: Not Currently    Drug use: No    Sexual activity: Not on file   Other Topics Concern    Not on file   Social History Narrative    Not on file     Social Determinants of Health     Financial Resource Strain: Low Risk     Difficulty of Paying Living Expenses: Not hard at all   Food Insecurity: No Food Insecurity    Worried About Running Out of Food in the Last Year: Never true    Ivette of Food in the Last Year: Never true   Transportation Needs:     Lack of Transportation (Medical):      Lack of Transportation (Non-Medical):    Physical Activity:     Days of Exercise per Week:     Minutes of Exercise per Session:    Stress:     Feeling of Stress :    Social Connections:     Frequency of Communication with Friends and Family:     Frequency of Social Gatherings with Friends and Family:     Attends Voodoo Services:     Active Member of Clubs or Organizations:     Attends Club or Organization Meetings:     Marital Status:    Intimate Partner Violence:     Fear of Current or Ex-Partner:  Emotionally Abused:     Physically Abused:     Sexually Abused:      Past Medical History:   Diagnosis Date    Anxiety     Bronchitis with asthma, acute 11/24/2015    Carotid stenosis, left 2/2/2016    Diabetes mellitus ([facility]     borderline patient off metformin    GERD (gastroesophageal reflux disease)     Heart attack ([facility] 08/05/2019    Hyperlipidemia     Hypertension     Osteoarthritis     Stroke, hemorrhagic ([facility] [facility] 08/18/2019     Past Surgical History:   Procedure Laterality Date    CAROTID ENDARTERECTOMY Left 02/02/16    COLONOSCOPY      CORONARY ANGIOPLASTY WITH STENT PLACEMENT      JOINT REPLACEMENT Left     knee    SHOULDER SURGERY Right     collar bone    TONSILLECTOMY AND ADENOIDECTOMY       Family History   Problem Relation Age of Onset    Cancer Mother     Cancer Father         Physical Exam:  Vitals signs and nursing note reviewed. Constitutional:       Appearance: well-developed. HENT:      Head: Normocephalic and atraumatic. Nose: Nose normal.   Eyes:      Conjunctiva/sclera: Conjunctivae normal.   Neck:      Musculoskeletal: Normal range of motion and neck supple. Pulmonary:      Effort: Pulmonary effort is normal. No respiratory distress. Musculoskeletal:      Comments: Normal gait     Skin:     General: Skin is warm and dry. Neurological:      Mental Status: Alert and oriented to person, place, and time. Sensory: No sensory deficit. Psychiatric:         Behavior: Behavior normal.         Thought Content: Thought content normal.    Right hip normal range of motion without aggravation of pain     nontender abdomen soft and protuberant    Provider Attestation:  Marisa Frank, personally performed the services described in this documentation. All medical record entries made by the scribe were at my direction and in my presence.  I have reviewed the chart and discharge instructions and agree that the records reflect my personal performance and is accurate and complete. Irene Dumont MD 9/7/21     Scribe Attestation:  By signing my name below, Nakul Singh, attest that this documentation has been prepared under the direction and in the presence of Dr. Capri Syed. Electronically signed: Castro Cedeno, 9/7/21     Please note that this chart was generated using voice recognition Dragon dictation software. Although every effort was made to ensure the accuracy of this automated transcription, some errors in transcription may have occurred.

## 2021-09-09 ENCOUNTER — TELEPHONE (OUTPATIENT)
Dept: INTERVENTIONAL RADIOLOGY/VASCULAR | Age: 76
End: 2021-09-09

## 2021-09-20 ENCOUNTER — OFFICE VISIT (OUTPATIENT)
Dept: UROLOGY | Age: 76
End: 2021-09-20
Payer: MEDICARE

## 2021-09-20 VITALS
TEMPERATURE: 97.3 F | RESPIRATION RATE: 17 BRPM | SYSTOLIC BLOOD PRESSURE: 134 MMHG | HEART RATE: 82 BPM | HEIGHT: 70 IN | BODY MASS INDEX: 39.08 KG/M2 | DIASTOLIC BLOOD PRESSURE: 86 MMHG | WEIGHT: 273 LBS

## 2021-09-20 DIAGNOSIS — N13.8 BPH WITH OBSTRUCTION/LOWER URINARY TRACT SYMPTOMS: ICD-10-CM

## 2021-09-20 DIAGNOSIS — N40.1 BPH WITH OBSTRUCTION/LOWER URINARY TRACT SYMPTOMS: ICD-10-CM

## 2021-09-20 DIAGNOSIS — R39.12 WEAK URINARY STREAM: ICD-10-CM

## 2021-09-20 DIAGNOSIS — R35.0 FREQUENCY OF URINATION: ICD-10-CM

## 2021-09-20 DIAGNOSIS — R10.9 FLANK PAIN: Primary | ICD-10-CM

## 2021-09-20 PROCEDURE — G8427 DOCREV CUR MEDS BY ELIG CLIN: HCPCS | Performed by: UROLOGY

## 2021-09-20 PROCEDURE — 1123F ACP DISCUSS/DSCN MKR DOCD: CPT | Performed by: UROLOGY

## 2021-09-20 PROCEDURE — 99213 OFFICE O/P EST LOW 20 MIN: CPT | Performed by: UROLOGY

## 2021-09-20 PROCEDURE — 1036F TOBACCO NON-USER: CPT | Performed by: UROLOGY

## 2021-09-20 PROCEDURE — G8417 CALC BMI ABV UP PARAM F/U: HCPCS | Performed by: UROLOGY

## 2021-09-20 PROCEDURE — 4040F PNEUMOC VAC/ADMIN/RCVD: CPT | Performed by: UROLOGY

## 2021-09-20 PROCEDURE — 51798 US URINE CAPACITY MEASURE: CPT | Performed by: UROLOGY

## 2021-09-20 ASSESSMENT — ENCOUNTER SYMPTOMS
COUGH: 0
WHEEZING: 0
ABDOMINAL PAIN: 1
VOMITING: 0
BACK PAIN: 1
SHORTNESS OF BREATH: 0
CONSTIPATION: 0
DIARRHEA: 0
NAUSEA: 0
EYE PAIN: 0

## 2021-09-20 NOTE — PROGRESS NOTES
[address] [address]  Dept: [address] [facility] Office Note - Established    Patient:  Josue Garcia  YOB: 1945  Date: 9/20/2021    The patient is a 67 y.o. male who presents todayfor evaluation of the following problems:   Chief Complaint   Patient presents with    Follow-up     6 weeks w/ pvr       HPI  Monica Thomas is a 25-year-old gentleman who we had seen in 6 weeks ago with lower urinary tract symptoms and a small kidney stone. We had deduced that his pain was not from his kidney stone. He was complaining of a weak stream and frequency. We had given him prescription for Flomax. He is not sure that he is taking it right now. However, his symptoms have improved dramatically since his last visit. Summary of old records: N/A    Additional History: N/A    Procedures Today: N/A    Urinalysis today:  No results found for this visit on 09/20/21. Last several PSA's:  Lab Results   Component Value Date    PSA 1.22 03/25/2021    PSA 0.95 07/27/2018    PSA 1.24 03/14/2015     Last total testosterone:  No results found for: TESTOSTERONE    AUA Symptom Score (9/20/2021):   INCOMPLETE EMPTYING: How often have you had the sensation of not emptying your bladder?: Not at all  FREQUENCY: How often do you have to urinate less than every two hours?: Not at all  INTERMITTENCY: How often have you found you stopped and started again several times when you urinated?: Not at all  URGENCY: How often have you found it difficult to postpone urination?: Not at all  WEAK STREAM: How often have you had a weak urinary stream?: Not at all  STRAINING: How often have you had to strain to start  urination?: Not at all  NOCTURIA: How many times did you typically get up at night to uriniate?: 2 Times  TOTAL I-PSS SCORE[de-identified] 2       Last BUN and creatinine:  Lab Results   Component Value Date    BUN 16 08/04/2021     Lab Results ADULTS 50+) TABS Take by mouth daily      aspirin 81 MG chewable tablet Take 1 tablet by mouth daily 30 tablet 3    nitroGLYCERIN (NITROSTAT) 0.4 MG SL tablet up to max of 3 total doses. If no relief after 1 dose, call 911. 25 tablet 3    atorvastatin (LIPITOR) 80 MG tablet Take 1 tablet by mouth nightly 30 tablet 3    clopidogrel (PLAVIX) 75 MG tablet Take 1 tablet by mouth daily 30 tablet 3       Patient has no known allergies. Social History     Tobacco Use   Smoking Status Never Smoker   Smokeless Tobacco Never Used     (Ifpatient a smoker, smoking cessation counseling offered)    Social History     Substance and Sexual Activity   Alcohol Use Not Currently       REVIEW OF SYSTEMS:  Review of Systems    Physical Exam:      Vitals:    09/20/21 0823   BP: 134/86   Pulse: 82   Resp: 17   Temp: 97.3 °F (36.3 °C)     Body mass index is 39.17 kg/m². Patient is a 67 y.o. male in no acute distress and alert and oriented to person, place and time. Physical Exam  Constitutional: Patient in no acute distress. Neuro: Alert and oriented to person, place and time. Psych: Mood normal, affect normal      Assessment and Plan      1. Flank pain    2. BPH with obstruction/lower urinary tract symptoms    3. Frequency of urination    4. Weak urinary stream           Plan:   Cont flomax  F/u 6 mo      Return in about 6 months (around 3/20/2022) for bladder scan. Prescriptions Ordered:  No orders of the defined types were placed in this encounter. Orders Placed:  Orders Placed This Encounter   Procedures   Onur Erickson MD    Agree with the ROS entered by the MA.

## 2021-09-20 NOTE — PROGRESS NOTES
Review of Systems   Constitutional: Negative for appetite change, chills, fatigue and fever. Eyes: Negative for pain and visual disturbance. Respiratory: Negative for cough, shortness of breath and wheezing. Cardiovascular: Negative for chest pain and leg swelling. Gastrointestinal: Positive for abdominal pain. Negative for constipation, diarrhea, nausea and vomiting. Genitourinary: Negative for difficulty urinating, dysuria, frequency, hematuria, penile pain and testicular pain. Musculoskeletal: Positive for back pain. Negative for myalgias. Neurological: Negative for dizziness, tremors, weakness, light-headedness, numbness and headaches. Hematological: Negative for adenopathy. Does not bruise/bleed easily.    patient states he gets hot and cold flashes

## 2021-09-21 ENCOUNTER — HOSPITAL ENCOUNTER (OUTPATIENT)
Dept: INTERVENTIONAL RADIOLOGY/VASCULAR | Age: 76
Discharge: HOME | End: 2021-09-23
Payer: MEDICARE

## 2021-09-21 ENCOUNTER — HOSPITAL ENCOUNTER (OUTPATIENT)
Dept: CT IMAGING | Age: 76
Discharge: HOME | End: 2021-09-23
Payer: MEDICARE

## 2021-09-21 VITALS
HEIGHT: 70 IN | WEIGHT: 170 LBS | BODY MASS INDEX: 24.34 KG/M2 | RESPIRATION RATE: 18 BRPM | OXYGEN SATURATION: 100 % | HEART RATE: 72 BPM | TEMPERATURE: 96.8 F | SYSTOLIC BLOOD PRESSURE: 157 MMHG | DIASTOLIC BLOOD PRESSURE: 96 MMHG

## 2021-09-21 DIAGNOSIS — M54.9 CHRONIC BACK PAIN, UNSPECIFIED BACK LOCATION, UNSPECIFIED BACK PAIN LATERALITY: ICD-10-CM

## 2021-09-21 DIAGNOSIS — G89.29 CHRONIC BACK PAIN, UNSPECIFIED BACK LOCATION, UNSPECIFIED BACK PAIN LATERALITY: ICD-10-CM

## 2021-09-21 DIAGNOSIS — M48.062 SPINAL STENOSIS OF LUMBAR REGION WITH NEUROGENIC CLAUDICATION: ICD-10-CM

## 2021-09-21 DIAGNOSIS — M48.10 DIFFUSE IDIOPATHIC SKELETAL HYPEROSTOSIS: ICD-10-CM

## 2021-09-21 DIAGNOSIS — M43.10 ACQUIRED SPONDYLOLISTHESIS: ICD-10-CM

## 2021-09-21 DIAGNOSIS — M54.16 LUMBAR RADICULAR PAIN: ICD-10-CM

## 2021-09-21 LAB
INR BLD: 1
PARTIAL THROMBOPLASTIN TIME: 35.6 SEC (ref 24–36)
PLATELET # BLD: 160 K/UL (ref 150–450)
PROTHROMBIN TIME: 13.7 SEC (ref 11.8–14.6)

## 2021-09-21 PROCEDURE — 2709999900 IR MYELOGRAM LUMBOSACRAL

## 2021-09-21 PROCEDURE — 7100000010 HC PHASE II RECOVERY - FIRST 15 MIN

## 2021-09-21 PROCEDURE — 7100000031 HC ASPR PHASE II RECOVERY - ADDTL 15 MIN

## 2021-09-21 PROCEDURE — 85730 THROMBOPLASTIN TIME PARTIAL: CPT

## 2021-09-21 PROCEDURE — 85610 PROTHROMBIN TIME: CPT

## 2021-09-21 PROCEDURE — 7100000030 HC ASPR PHASE II RECOVERY - FIRST 15 MIN

## 2021-09-21 PROCEDURE — 7100000011 HC PHASE II RECOVERY - ADDTL 15 MIN

## 2021-09-21 PROCEDURE — 62304 MYELOGRAPHY LUMBAR INJECTION: CPT

## 2021-09-21 PROCEDURE — 36415 COLL VENOUS BLD VENIPUNCTURE: CPT

## 2021-09-21 PROCEDURE — 72132 CT LUMBAR SPINE W/DYE: CPT

## 2021-09-21 PROCEDURE — 85049 AUTOMATED PLATELET COUNT: CPT

## 2021-09-21 PROCEDURE — 6360000004 HC RX CONTRAST MEDICATION: Performed by: RADIOLOGY

## 2021-09-21 RX ORDER — SODIUM CHLORIDE 9 MG/ML
INJECTION, SOLUTION INTRAVENOUS CONTINUOUS
Status: DISCONTINUED | OUTPATIENT
Start: 2021-09-21 | End: 2021-09-24 | Stop reason: HOSPADM

## 2021-09-21 RX ADMIN — IOPAMIDOL 15 ML: 408 INJECTION, SOLUTION INTRATHECAL at 12:16

## 2021-09-21 ASSESSMENT — PAIN - FUNCTIONAL ASSESSMENT: PAIN_FUNCTIONAL_ASSESSMENT: 0-10

## 2021-09-21 NOTE — FLOWSHEET NOTE
Contacted Yazmin in IR. Notified of unsuccessful attempts of IV access. Awaiting for lab to draw blood work at this time.

## 2021-09-21 NOTE — PROGRESS NOTES
LOW BACK PREPPED DRAPED. NUMBED AND ACCESSED BY DR SANTACRUZ  INJECTED WITH  16 ML ISOVUE- M 200 NEEDLE REMOVED AND BAND AID APPLIED REPORT CALLED TO [facility] AND  TAKEN TO CT SCAN.

## 2021-09-21 NOTE — H&P
HISTORY and [address]       NAME:  Luisana Cage  MRN: [id number]   YOB: 1945   Date: 9/21/2021   Age: 67 y.o. Gender: male       COMPLAINT AND PRESENT HISTORY:     Luisana Cage is a 67 y.o.,  male, here today for CT Lumbar spine w contrast IR Myelogram Lumbosacral.     Patient reports history of chronic pain in the back pain that has been present for the past 6 months ago. Patient denies previous history of surgery to the spine. Pain is described as achy  (2/10),  pt has pain to  lower abdominal pain radiating to the lower back, pain is worst on his right side. Patient denies any weakness/numbness in the upper or lower extremities. Pain is worse with fast movement such as rising from lying. Pain is improved with muscle relaxant and oxycontin. Pt states takes edge off. Patient has had cardiac rehab and physical therapy. Pt denies having any injections for his back. Patient denies any incontinence of bowels/bladder, no saddle anesthesia. Patient uses a walker for gait control, he reports some unsteadiness. Significant medical history:   CAD- 4-5 stents, MI, brain bleed resulting in hemorrhagic stroke- [facility] [place], HTN, HLD, DM  Patient is on Plavix and aspirin. Pt states he believes he stopped his aspirin week ago. Patient denies any chest pain, palpitations or sob . pt has a cough. Pt denies any RODRIGUEZ or dizziness. No fever or chills. Patient is otherwise feeling well today.      Lab Results   Component Value Date    INR 0.9 07/01/2021    INR 1.0 01/22/2016    PROTIME 12.6 07/01/2021    PROTIME 10.7 01/22/2016       PAST MEDICAL HISTORY     Past Medical History:   Diagnosis Date    Anxiety     Bronchitis with asthma, acute 11/24/2015    Carotid stenosis, left 2/2/2016    Diabetes mellitus ([facility]     borderline patient off metformin    GERD (gastroesophageal reflux disease)     Heart attack ([facility] 08/05/2019    Hyperlipidemia     Hypertension     Osteoarthritis     [facility]       SURGICAL HISTORY       Past Surgical History:   Procedure Laterality Date    CAROTID ENDARTERECTOMY Left 02/02/16    COLONOSCOPY      CORONARY ANGIOPLASTY WITH STENT PLACEMENT      JOINT REPLACEMENT Left     knee    SHOULDER SURGERY Right     collar bone    TONSILLECTOMY AND ADENOIDECTOMY         FAMILY HISTORY       Family History   Problem Relation Age of Onset    Cancer Mother     Cancer Father        SOCIAL HISTORY       Social History     Socioeconomic History    Marital status:      Spouse name: None    Number of children: None    Years of education: None    Highest education level: None   Occupational History    None   Tobacco Use    Smoking status: Never Smoker    Smokeless tobacco: Never Used   Vaping Use    Vaping Use: Never used   Substance and Sexual Activity    Alcohol use: Not Currently    Drug use: No    Sexual activity: None   Other Topics Concern    None   Social History Narrative    None     Social Determinants of Health     Financial Resource Strain: Low Risk     Difficulty of Paying Living Expenses: Not hard at all   Food Insecurity: No Food Insecurity    Worried About Running Out of Food in the Last Year: Never true    Ivette of Food in the Last Year: Never true   Transportation Needs:     Lack of Transportation (Medical):      Lack of Transportation (Non-Medical):    Physical Activity:     Days of Exercise per Week:     Minutes of Exercise per Session:    Stress:     Feeling of Stress :    Social Connections:     Frequency of Communication with Friends and Family:     Frequency of Social Gatherings with Friends and Family:     Attends Mormonism Services:     Active Member of Clubs or Organizations:     Attends Club or Organization Meetings:     Marital Status:    Intimate Partner Violence:     Fear of Current or Ex-Partner:     Emotionally Abused:     Physically Abused:     Sexually Abused:            REVIEW OF SYSTEMS      No Known Allergies    Current Outpatient Medications on File Prior to Encounter   Medication Sig Dispense Refill    tamsulosin (FLOMAX) 0.4 MG capsule Take 1 capsule by mouth daily Take in evening after meal 30 capsule 5    metFORMIN (GLUCOPHAGE-XR) 500 MG extended release tablet Take 1 tablet by mouth once daily with breakfast 90 tablet 0    tiZANidine (ZANAFLEX) 4 MG tablet Take 1 tablet by mouth 3 times daily as needed (For muscle spasm) 30 tablet 0    docusate sodium (COLACE) 100 MG capsule Take one cap po daily 30 capsule 3    lisinopril (PRINIVIL;ZESTRIL) 20 MG tablet Take 1 tablet by mouth once daily 90 tablet 1    pantoprazole (PROTONIX) 40 MG tablet Take 1 tablet by mouth every morning (before breakfast) 90 tablet 0    metoprolol succinate (TOPROL XL) 50 MG extended release tablet TAKE 1 TABLET BY MOUTH ONCE DAILY      Multiple Vitamins-Minerals (MULTIVITAMIN ADULTS 50+) TABS Take by mouth daily      aspirin 81 MG chewable tablet Take 1 tablet by mouth daily 30 tablet 3    atorvastatin (LIPITOR) 80 MG tablet Take 1 tablet by mouth nightly 30 tablet 3    clopidogrel (PLAVIX) 75 MG tablet Take 1 tablet by mouth daily 30 tablet 3    gabapentin (NEURONTIN) 400 MG capsule Take 1 capsule by mouth 3 times daily for 30 days. 90 capsule 0    nitroGLYCERIN (NITROSTAT) 0.4 MG SL tablet up to max of 3 total doses. If no relief after 1 dose, call 911. 25 tablet 3     No current facility-administered medications on file prior to encounter. Negative except for what is mentioned in the HPI. GENERAL PHYSICAL EXAM     Vitals: BP (!) 183/91   Pulse 76   Temp 96.8 °F (36 °C) (Infrared)   Resp 18   Ht 5' 10\" (1.778 m)   Wt 170 lb (77.1 kg)   SpO2 98%   BMI 24.39 kg/m²  Body mass index is 24.39 kg/m². GENERAL APPEARANCE:   Lamar Phillips is 67 y.o.,  male, moderately obese, nourished, conscious, alert.   Does not 11/20/2020    Ataxia following nontraumatic intracerebral hemorrhage     Neuropathy     Diplopia 07/15/2019    Spontaneous intracranial hemorrhage (HCC) 07/03/2019    Chest pain 12/10/2018    Myocardiopathy ([facility] 07/19/2018    Abdominal mass, RUQ (right upper quadrant) 07/19/2018    Unstable angina ([facility] 06/10/2018    Ventral hernia without obstruction or gangrene 11/15/2016    Thyroid nodule 05/19/2016    H/O carotid endarterectomy 04/18/2016    Carotid stenosis, left 02/02/2016    Bronchitis with asthma, acute 11/24/2015    Lymphadenopathy of left cervical region 11/24/2015    Impaired fasting glucose 07/13/2015    Patient overweight 07/13/2015    Insomnia 07/13/2015    Hypertension 07/13/2015    OA (osteoarthritis) of knee 07/13/2015    S/P total knee arthroplasty 07/13/2015           YOBANY STARK, APRN - CNP on 9/21/2021 at 11:01 AM

## 2021-09-23 ENCOUNTER — OFFICE VISIT (OUTPATIENT)
Dept: FAMILY MEDICINE CLINIC | Age: 76
End: 2021-09-23
Payer: MEDICARE

## 2021-09-23 VITALS
SYSTOLIC BLOOD PRESSURE: 114 MMHG | BODY MASS INDEX: 39.37 KG/M2 | HEIGHT: 70 IN | TEMPERATURE: 96.8 F | DIASTOLIC BLOOD PRESSURE: 68 MMHG | WEIGHT: 275 LBS | HEART RATE: 78 BPM

## 2021-09-23 DIAGNOSIS — R73.01 IMPAIRED FASTING GLUCOSE: Primary | ICD-10-CM

## 2021-09-23 DIAGNOSIS — I69.193 ATAXIA FOLLOWING NONTRAUMATIC INTRACEREBRAL HEMORRHAGE: ICD-10-CM

## 2021-09-23 DIAGNOSIS — E78.2 MIXED HYPERLIPIDEMIA: ICD-10-CM

## 2021-09-23 DIAGNOSIS — Z86.73 HISTORY OF STROKE: Primary | ICD-10-CM

## 2021-09-23 DIAGNOSIS — M54.50 CHRONIC RIGHT-SIDED LOW BACK PAIN WITHOUT SCIATICA: ICD-10-CM

## 2021-09-23 DIAGNOSIS — G89.29 CHRONIC RIGHT-SIDED LOW BACK PAIN WITHOUT SCIATICA: ICD-10-CM

## 2021-09-23 DIAGNOSIS — I10 ESSENTIAL HYPERTENSION: ICD-10-CM

## 2021-09-23 DIAGNOSIS — R26.89 ABNORMALITY OF GAIT DUE TO IMPAIRMENT OF BALANCE: ICD-10-CM

## 2021-09-23 PROBLEM — R07.9 CHEST PAIN: Status: RESOLVED | Noted: 2018-12-10 | Resolved: 2021-09-23

## 2021-09-23 LAB — HBA1C MFR BLD: 6.3 %

## 2021-09-23 PROCEDURE — G8427 DOCREV CUR MEDS BY ELIG CLIN: HCPCS | Performed by: NURSE PRACTITIONER

## 2021-09-23 PROCEDURE — 99214 OFFICE O/P EST MOD 30 MIN: CPT | Performed by: NURSE PRACTITIONER

## 2021-09-23 PROCEDURE — 1123F ACP DISCUSS/DSCN MKR DOCD: CPT | Performed by: NURSE PRACTITIONER

## 2021-09-23 PROCEDURE — 4040F PNEUMOC VAC/ADMIN/RCVD: CPT | Performed by: NURSE PRACTITIONER

## 2021-09-23 PROCEDURE — G8417 CALC BMI ABV UP PARAM F/U: HCPCS | Performed by: NURSE PRACTITIONER

## 2021-09-23 PROCEDURE — 1036F TOBACCO NON-USER: CPT | Performed by: NURSE PRACTITIONER

## 2021-09-23 PROCEDURE — 83036 HEMOGLOBIN GLYCOSYLATED A1C: CPT | Performed by: NURSE PRACTITIONER

## 2021-09-23 ASSESSMENT — ENCOUNTER SYMPTOMS
BACK PAIN: 1
SHORTNESS OF BREATH: 0
COUGH: 0
NAUSEA: 0
ABDOMINAL PAIN: 0

## 2021-09-23 NOTE — PROGRESS NOTES
Subjective:      Patient ID: Isabel Luu is a 67 y.o. male. Visit Information    Have you changed or started any medications since your last visit including any over-the-counter medicines, vitamins, or herbal medicines? no   Are you having any side effects from any of your medications? -  no  Have you stopped taking any of your medications? Is so, why? -  no    Have you seen any other physician or provider since your last visit? No  Have you had any other diagnostic tests since your last visit? No  Have you been seen in the emergency room and/or had an admission to a hospital since we last saw you? No  Have you had your routine dental cleaning in the past 6 months? no    Have you activated your [employer] account? If not, what are your barriers? Yes     Patient Care Team:  Apoorva Steinberg MD as PCP - General (Family Medicine)  Apoorva Steinberg MD as PCP - [facility] Provider    Medical History Review  Past Medical, Family, and Social History reviewed and does not contribute to the patient presenting condition    Health Maintenance   Topic Date Due    Hepatitis C screen  Never done    DTaP/Tdap/Td vaccine (1 - Tdap) Never done    Shingles Vaccine (1 of 2) Never done    Flu vaccine (1) 09/01/2021    Lipid screen  03/25/2022    Annual Wellness Visit (AWV)  07/16/2022    Potassium monitoring  08/04/2022    Creatinine monitoring  08/04/2022    Pneumococcal 65+ years Vaccine  Completed    COVID-19 Vaccine  Completed    Hepatitis A vaccine  Aged Out    Hepatitis B vaccine  Aged Out    Hib vaccine  Aged Out    Meningococcal (ACWY) vaccine  Aged Out     HPI     67year old male accompanied by significant other presents with management of prediabete, HTN HlD, chronic right sided back pain and abnormal gait due to impairment of balance. Currently is on low dose metformin and a1c is 6.3 today ( was 6.5 in march). bp is stable with dual therapy. Is compliant with statin therapy and tolerates it well. Has had right sided back pain for a while and it is getting worse recently. Recent CT lumbar showed multilevel DDD with moderate to severe spinal canal narrowing. Pt has an appointment with Dr. Marleen Porter for surgical consultation. Hx of stroke 2 years ago resulting left sided weakness. has impaired balance and abnormal gait which causes frequent falls. Pt follows up with neurologist at [facility] but hasn't seen them for a while due to the distance. Hx of cardiomyopathy and CAD managed by Dr. Chandra Trujillo. aReview of Systems   Constitutional: Negative for chills and fever. Respiratory: Negative for cough and shortness of breath. Cardiovascular: Negative for chest pain and palpitations. Gastrointestinal: Negative for abdominal pain and nausea. Musculoskeletal: Positive for back pain and gait problem. Neurological: Negative for dizziness, weakness and numbness. Psychiatric/Behavioral: Negative for agitation and behavioral problems. Objective:   Physical Exam  Vitals and nursing note reviewed. Constitutional:       General: He is not in acute distress. Appearance: Normal appearance. He is obese. HENT:      Nose: Nose normal.   Eyes:      Conjunctiva/sclera: Conjunctivae normal.   Cardiovascular:      Rate and Rhythm: Regular rhythm. Heart sounds: Normal heart sounds. Pulmonary:      Effort: Pulmonary effort is normal. No respiratory distress. Breath sounds: Normal breath sounds. Abdominal:      Palpations: Abdomen is soft. Tenderness: There is no abdominal tenderness. Musculoskeletal:         General: No tenderness or deformity. Cervical back: Neck supple. Lymphadenopathy:      Cervical: No cervical adenopathy. Skin:     General: Skin is warm and dry. Neurological:      Mental Status: He is alert. Cranial Nerves: No cranial nerve deficit. Motor: Weakness present.       Coordination: Coordination abnormal.      Gait: Gait abnormal.      Comments: Ambulates with a walker, gait unsteady   Psychiatric:         Mood and Affect: Mood normal.         Behavior: Behavior normal.         Assessment:      1. Impaired fasting glucose    2. Essential hypertension    3. Mixed hyperlipidemia    4. Chronic right-sided low back pain without sciatica    5. Ataxia following nontraumatic intracerebral hemorrhage    6. Abnormality of gait due to impairment of balance            Plan:      BP Readings from Last 3 Encounters:   09/23/21 114/68   09/21/21 (!) 157/96   09/20/21 134/86     /68   Pulse 78   Temp 96.8 °F (36 °C) (Infrared)   Ht 5' 10\" (1.778 m)   Wt 275 lb (124.7 kg)   BMI 39.46 kg/m²   Lab Results   Component Value Date    WBC 5.8 08/04/2021    HGB 13.9 08/04/2021    HCT 40.8 (L) 08/04/2021     09/21/2021    CHOL 167 03/25/2021    TRIG 148 03/25/2021    HDL 37 (L) 03/25/2021    ALT 28 08/04/2021    AST 25 08/04/2021     08/04/2021    K 4.8 08/04/2021     08/04/2021    CREATININE 0.65 (L) 08/04/2021    BUN 16 08/04/2021    CO2 28 08/04/2021    TSH 0.58 07/27/2018    PSA 1.22 03/25/2021    INR 1.0 09/21/2021    LABA1C 6.3 09/23/2021    LABMICR CANNOT BE CALCULATED 07/27/2018     Lab Results   Component Value Date    CALCIUM 8.6 08/04/2021    PHOS 3.7 02/03/2016     Lab Results   Component Value Date    LDLCHOLESTEROL 100 03/25/2021         1. Impaired fasting glucose  - stable. Cont metformin   - POCT glycosylated hemoglobin (Hb A1C)    2. Essential hypertension  - stable. No therapy change     3. Mixed hyperlipidemia  - cont statin therapy    4. Chronic right-sided low back pain without sciatica  - cont current pain medications   [facility]  - follow up with Dr. Jan Phillip for surgical consultation     5. Ataxia following nontraumatic intracerebral hemorrhage/6.  Abnormality of gait due to impairment of balance  - refer pt to [address] for gait training  - refer pt neurology for further management Requested Prescriptions      No prescriptions requested or ordered in this encounter       There are no discontinued medications. Discussed use, benefit, and side effects of prescribed medications. Barriers to medication compliance addressed. All patient questions answered. Pt voiced understanding. Return in about 15 weeks (around 1/6/2022) for diabetes, HTN, HLD, chronic back pain, gait disturbance .

## 2021-10-05 ENCOUNTER — OFFICE VISIT (OUTPATIENT)
Dept: ORTHOPEDIC SURGERY | Age: 76
End: 2021-10-05
Payer: MEDICARE

## 2021-10-05 VITALS — WEIGHT: 275 LBS | RESPIRATION RATE: 16 BRPM | HEIGHT: 70 IN | BODY MASS INDEX: 39.37 KG/M2

## 2021-10-05 DIAGNOSIS — M48.062 SPINAL STENOSIS OF LUMBAR REGION WITH NEUROGENIC CLAUDICATION: ICD-10-CM

## 2021-10-05 DIAGNOSIS — M54.9 CHRONIC BACK PAIN, UNSPECIFIED BACK LOCATION, UNSPECIFIED BACK PAIN LATERALITY: Primary | ICD-10-CM

## 2021-10-05 DIAGNOSIS — M43.10 ACQUIRED SPONDYLOLISTHESIS: ICD-10-CM

## 2021-10-05 DIAGNOSIS — M48.10 DIFFUSE IDIOPATHIC SKELETAL HYPEROSTOSIS: ICD-10-CM

## 2021-10-05 DIAGNOSIS — G89.29 CHRONIC BACK PAIN, UNSPECIFIED BACK LOCATION, UNSPECIFIED BACK PAIN LATERALITY: Primary | ICD-10-CM

## 2021-10-05 PROCEDURE — 4040F PNEUMOC VAC/ADMIN/RCVD: CPT | Performed by: ORTHOPAEDIC SURGERY

## 2021-10-05 PROCEDURE — 1036F TOBACCO NON-USER: CPT | Performed by: ORTHOPAEDIC SURGERY

## 2021-10-05 PROCEDURE — 1123F ACP DISCUSS/DSCN MKR DOCD: CPT | Performed by: ORTHOPAEDIC SURGERY

## 2021-10-05 PROCEDURE — 99213 OFFICE O/P EST LOW 20 MIN: CPT | Performed by: ORTHOPAEDIC SURGERY

## 2021-10-05 PROCEDURE — G8427 DOCREV CUR MEDS BY ELIG CLIN: HCPCS | Performed by: ORTHOPAEDIC SURGERY

## 2021-10-05 PROCEDURE — G8417 CALC BMI ABV UP PARAM F/U: HCPCS | Performed by: ORTHOPAEDIC SURGERY

## 2021-10-05 PROCEDURE — G8484 FLU IMMUNIZE NO ADMIN: HCPCS | Performed by: ORTHOPAEDIC SURGERY

## 2021-10-05 NOTE — PROGRESS NOTES
Patient ID: Galindo Barros is a 67 y.o. male    Chief Compliant:  No chief complaint on file. Diagnostic imaging:  CT myelogram is reviewed although somewhat diminished due to intra and extra thecal contrast I believe the patient does have rather severe lumbar spinal stenosis at L4-5 associated with severe foraminal stenosis and a mild retrolisthesis    Patient with diffuse idiopathic skeletal hyperostosis resulting in ankylosis down to L2    Moderately severe stenosis at L2-3 and L3-4      Assessment and Plan:  1. Chronic back pain, unspecified back location, unspecified back pain laterality    2. Acquired spondylolisthesis    3. Spinal stenosis of lumbar region with neurogenic claudication    4. Diffuse idiopathic skeletal hyperostosis      PT    Follow up in 6 weeks    At some point can consider L4-5 PLIF as I think this is his major pain generator        HPI:  This is a 67 y.o. male who presents to the clinic today for lower back pain. Patient is here for CT myelogram results. Patient with ongoing lower abdominal pain radiating to the right lower back. Patient notes his pain is aggravated with walking long distances. Patient has been ambulating via walker due to balance issues secondary to CVA    Review of Systems   All other systems reviewed and are negative.       Past History:    Current Outpatient Medications:     tamsulosin (FLOMAX) 0.4 MG capsule, Take 1 capsule by mouth daily Take in evening after meal, Disp: 30 capsule, Rfl: 5    metFORMIN (GLUCOPHAGE-XR) 500 MG extended release tablet, Take 1 tablet by mouth once daily with breakfast, Disp: 90 tablet, Rfl: 0    tiZANidine (ZANAFLEX) 4 MG tablet, Take 1 tablet by mouth 3 times daily as needed (For muscle spasm), Disp: 30 tablet, Rfl: 0    docusate sodium (COLACE) 100 MG capsule, Take one cap po daily, Disp: 30 capsule, Rfl: 3    lisinopril (PRINIVIL;ZESTRIL) 20 MG tablet, Take 1 tablet by mouth once daily, Disp: 90 tablet, Rfl: 1   pantoprazole (PROTONIX) 40 MG tablet, Take 1 tablet by mouth every morning (before breakfast), Disp: 90 tablet, Rfl: 0    metoprolol succinate (TOPROL XL) 50 MG extended release tablet, TAKE 1 TABLET BY MOUTH ONCE DAILY, Disp: , Rfl:     Multiple Vitamins-Minerals (MULTIVITAMIN ADULTS 50+) TABS, Take by mouth daily, Disp: , Rfl:     gabapentin (NEURONTIN) 400 MG capsule, Take 1 capsule by mouth 3 times daily for 30 days. , Disp: 90 capsule, Rfl: 0    aspirin 81 MG chewable tablet, Take 1 tablet by mouth daily, Disp: 30 tablet, Rfl: 3    nitroGLYCERIN (NITROSTAT) 0.4 MG SL tablet, up to max of 3 total doses. If no relief after 1 dose, call 911., Disp: 25 tablet, Rfl: 3    atorvastatin (LIPITOR) 80 MG tablet, Take 1 tablet by mouth nightly, Disp: 30 tablet, Rfl: 3    clopidogrel (PLAVIX) 75 MG tablet, Take 1 tablet by mouth daily, Disp: 30 tablet, Rfl: 3  No Known Allergies  Social History     Socioeconomic History    Marital status:      Spouse name: Not on file    Number of children: Not on file    Years of education: Not on file    Highest education level: Not on file   Occupational History    Not on file   Tobacco Use    Smoking status: Never Smoker    Smokeless tobacco: Never Used   Vaping Use    Vaping Use: Never used   Substance and Sexual Activity    Alcohol use: Not Currently    Drug use: No    Sexual activity: Not on file   Other Topics Concern    Not on file   Social History Narrative    Not on file     Social Determinants of Health     Financial Resource Strain: Low Risk     Difficulty of Paying Living Expenses: Not hard at all   Food Insecurity: No Food Insecurity    Worried About Running Out of Food in the Last Year: Never true    Ivette of Food in the Last Year: Never true   Transportation Needs:     Lack of Transportation (Medical):      Lack of Transportation (Non-Medical):    Physical Activity:     Days of Exercise per Week:     Minutes of Exercise per Session: Stress:     Feeling of Stress :    Social Connections:     Frequency of Communication with Friends and Family:     Frequency of Social Gatherings with Friends and Family:     Attends Confucianism Services:     Active Member of Clubs or Organizations:     Attends Club or Organization Meetings:     Marital Status:    Intimate Partner Violence:     Fear of Current or Ex-Partner:     Emotionally Abused:     Physically Abused:     Sexually Abused:      Past Medical History:   Diagnosis Date    Anxiety     Bronchitis with asthma, acute 11/24/2015    Carotid stenosis, left 2/2/2016    Diabetes mellitus ([facility]     borderline patient off metformin    GERD (gastroesophageal reflux disease)     Heart attack ([facility] 08/05/2019    Hyperlipidemia     Hypertension     Osteoarthritis     Stroke[facility] hemorrhagic ([facility] 08/18/2019     Past Surgical History:   Procedure Laterality Date    CAROTID ENDARTERECTOMY Left 02/02/16    COLONOSCOPY      CORONARY ANGIOPLASTY WITH STENT PLACEMENT      JOINT REPLACEMENT Left     knee    SHOULDER SURGERY Right     collar bone    TONSILLECTOMY AND ADENOIDECTOMY       Family History   Problem Relation Age of Onset    Cancer Mother     Cancer Father         Physical Exam:  Vitals signs and nursing note reviewed. Constitutional:       Appearance: well-developed. HENT:      Head: Normocephalic and atraumatic. Nose: Nose normal.   Eyes:      Conjunctiva/sclera: Conjunctivae normal.   Neck:      Musculoskeletal: Normal range of motion and neck supple. Pulmonary:      Effort: Pulmonary effort is normal. No respiratory distress. Musculoskeletal:      Comments: Normal gait     Skin:     General: Skin is warm and dry. Neurological:      Mental Status: Alert and oriented to person, place, and time. Sensory: No sensory deficit. Psychiatric:         Behavior: Behavior normal.         Thought Content: Thought content normal.        Provider Attestation:  Avis Levin Zac, personally performed the services described in this documentation. All medical record entries made by the scribe were at my direction and in my presence. I have reviewed the chart and discharge instructions and agree that the records reflect my personal performance and is accurate and complete. Atif Salazar MD 10/5/21     Scribe Attestation:  By signing my name below, Cris Mcmillan, attest that this documentation has been prepared under the direction and in the presence of Dr. Cade Cruz. Electronically signed: Castro Hurd, 10/5/21     Please note that this chart was generated using voice recognition Dragon dictation software. Although every effort was made to ensure the accuracy of this automated transcription, some errors in transcription may have occurred.

## 2021-10-07 ENCOUNTER — TELEPHONE (OUTPATIENT)
Dept: FAMILY MEDICINE CLINIC | Age: 76
End: 2021-10-07

## 2021-10-07 DIAGNOSIS — M54.50 ACUTE RIGHT-SIDED LOW BACK PAIN WITHOUT SCIATICA: ICD-10-CM

## 2021-10-07 RX ORDER — TIZANIDINE 4 MG/1
4 TABLET ORAL 3 TIMES DAILY PRN
Qty: 30 TABLET | Refills: 0 | Status: SHIPPED | OUTPATIENT
Start: 2021-10-07 | End: 2022-10-13

## 2021-10-07 RX ORDER — LIDOCAINE 50 MG/G
1 PATCH TOPICAL DAILY
Qty: 10 PATCH | Refills: 0 | Status: SHIPPED | OUTPATIENT
Start: 2021-10-07 | End: 2021-10-17

## 2021-10-07 NOTE — TELEPHONE ENCOUNTER
PT WIFE CALLED STATING PT IS HAVING BACK PAIN.  [address] ON 10/5/21 AND WILL NOT PRESCRIBE SOMETHING FOR PAIN AND TO ASK IF WE CAN CALL SOMETHING IN FOR PAIN. PLEASE ADVISE.

## 2021-11-30 ENCOUNTER — OFFICE VISIT (OUTPATIENT)
Dept: ORTHOPEDIC SURGERY | Age: 76
End: 2021-11-30
Payer: MEDICARE

## 2021-11-30 VITALS — HEIGHT: 70 IN | BODY MASS INDEX: 39.37 KG/M2 | WEIGHT: 275 LBS | RESPIRATION RATE: 14 BRPM

## 2021-11-30 DIAGNOSIS — G89.29 CHRONIC BACK PAIN, UNSPECIFIED BACK LOCATION, UNSPECIFIED BACK PAIN LATERALITY: ICD-10-CM

## 2021-11-30 DIAGNOSIS — M54.16 LUMBAR RADICULAR PAIN: ICD-10-CM

## 2021-11-30 DIAGNOSIS — M48.062 SPINAL STENOSIS OF LUMBAR REGION WITH NEUROGENIC CLAUDICATION: Primary | ICD-10-CM

## 2021-11-30 DIAGNOSIS — M43.10 ACQUIRED SPONDYLOLISTHESIS: ICD-10-CM

## 2021-11-30 DIAGNOSIS — M54.9 CHRONIC BACK PAIN, UNSPECIFIED BACK LOCATION, UNSPECIFIED BACK PAIN LATERALITY: ICD-10-CM

## 2021-11-30 PROCEDURE — G8484 FLU IMMUNIZE NO ADMIN: HCPCS | Performed by: ORTHOPAEDIC SURGERY

## 2021-11-30 PROCEDURE — 99213 OFFICE O/P EST LOW 20 MIN: CPT | Performed by: ORTHOPAEDIC SURGERY

## 2021-11-30 PROCEDURE — 1123F ACP DISCUSS/DSCN MKR DOCD: CPT | Performed by: ORTHOPAEDIC SURGERY

## 2021-11-30 PROCEDURE — 1036F TOBACCO NON-USER: CPT | Performed by: ORTHOPAEDIC SURGERY

## 2021-11-30 PROCEDURE — G8417 CALC BMI ABV UP PARAM F/U: HCPCS | Performed by: ORTHOPAEDIC SURGERY

## 2021-11-30 PROCEDURE — 4040F PNEUMOC VAC/ADMIN/RCVD: CPT | Performed by: ORTHOPAEDIC SURGERY

## 2021-11-30 PROCEDURE — G8427 DOCREV CUR MEDS BY ELIG CLIN: HCPCS | Performed by: ORTHOPAEDIC SURGERY

## 2021-11-30 NOTE — PROGRESS NOTES
Patient ID: Katelyn Davison is a 67 y.o. male    Chief Compliant:  No chief complaint on file. Diagnostic imaging:  CT myelogram is again reviewed patient with multilevel congenital developmental lumbar spinal stenosis most significant at L4-5 which I believe the patient has severe lumbar spinal stenosis and contradiction to the radiologist report as he reported moderate      Assessment and Plan:  1. Chronic back pain, unspecified back location, unspecified back pain laterality    2. Acquired spondylolisthesis    3. Spinal stenosis of lumbar region with neurogenic claudication    4. Lumbar radicular pain      Patient is a candidate for an L4-5 PLIF however is reluctant due to his age and would like to consider proceeding conservatively. Refer to pain management for lumbar epidural steroid injections    Follow up 12 weeks    HPI:  This is a 67 y.o. male who presents to the clinic today for low back pain. Patient has not started PT. Patient reports ongoing severe lower back pain with neurogenic claudication. He is unable to stand for longer than a few minutes. He has not had LESI with pain management    Review of Systems   All other systems reviewed and are negative.       Past History:    Current Outpatient Medications:     tiZANidine (ZANAFLEX) 4 MG tablet, Take 1 tablet by mouth 3 times daily as needed (For muscle spasm), Disp: 30 tablet, Rfl: 0    tamsulosin (FLOMAX) 0.4 MG capsule, Take 1 capsule by mouth daily Take in evening after meal, Disp: 30 capsule, Rfl: 5    metFORMIN (GLUCOPHAGE-XR) 500 MG extended release tablet, Take 1 tablet by mouth once daily with breakfast, Disp: 90 tablet, Rfl: 0    docusate sodium (COLACE) 100 MG capsule, Take one cap po daily, Disp: 30 capsule, Rfl: 3    lisinopril (PRINIVIL;ZESTRIL) 20 MG tablet, Take 1 tablet by mouth once daily, Disp: 90 tablet, Rfl: 1    pantoprazole (PROTONIX) 40 MG tablet, Take 1 tablet by mouth every morning (before breakfast), Disp: Connections:     Frequency of Communication with Friends and Family: Not on file    Frequency of Social Gatherings with Friends and Family: Not on file    Attends [employer] Services: Not on file    Active Member of Clubs or Organizations: Not on file    Attends Club or Organization Meetings: Not on file    Marital Status: Not on file   Intimate Partner Violence:     Fear of Current or Ex-Partner: Not on file    Emotionally Abused: Not on file    Physically Abused: Not on file    Sexually Abused: Not on file   Housing Stability:     Unable to Pay for Housing in the Last Year: Not on file    Number of [place] in the Last Year: Not on file    Unstable Housing in the Last Year: Not on file     Past Medical History:   Diagnosis Date    Anxiety     Bronchitis with asthma, acute 11/24/2015    Carotid stenosis, left 2/2/2016    Diabetes mellitus ([facility] [address]     borderline patient off metformin    GERD (gastroesophageal reflux disease)     Heart attack ([facility] [address] 08/05/2019    Hyperlipidemia     Hypertension     Osteoarthritis     Stroke, hemorrhagic ([facility] [address] 08/18/2019     Past Surgical History:   Procedure Laterality Date    CAROTID ENDARTERECTOMY Left 02/02/16    COLONOSCOPY      CORONARY ANGIOPLASTY WITH STENT PLACEMENT      JOINT REPLACEMENT Left     knee    SHOULDER SURGERY Right     collar bone    TONSILLECTOMY AND ADENOIDECTOMY       Family History   Problem Relation Age of Onset    Cancer Mother     Cancer Father         Physical Exam:  Vitals signs and nursing note reviewed. Constitutional:       Appearance: well-developed. HENT:      Head: Normocephalic and atraumatic. Nose: Nose normal.   Eyes:      Conjunctiva/sclera: Conjunctivae normal.   Neck:      Musculoskeletal: Normal range of motion and neck supple. Pulmonary:      Effort: Pulmonary effort is normal. No respiratory distress. Musculoskeletal:      Comments: Normal gait     Skin:     General: Skin is warm and dry. Neurological:      Mental Status: Alert and oriented to person, place, and time. Sensory: No sensory deficit. Psychiatric:         Behavior: Behavior normal.         Thought Content: Thought content normal.        Provider Attestation:  Tejas Frank, personally performed the services described in this documentation. All medical record entries made by the scribe were at my direction and in my presence. I have reviewed the chart and discharge instructions and agree that the records reflect my personal performance and is accurate and complete. Elias Tejada MD 11/30/21     Scribe Attestation:  By signing my name below, Jone Richardson, attest that this documentation has been prepared under the direction and in the presence of Dr. Hasmukh Aguilar. Electronically signed: Castro Merino, 11/30/21     Please note that this chart was generated using voice recognition Dragon dictation software. Although every effort was made to ensure the accuracy of this automated transcription, some errors in transcription may have occurred.

## 2021-12-08 ENCOUNTER — TELEPHONE (OUTPATIENT)
Dept: FAMILY MEDICINE CLINIC | Age: 76
End: 2021-12-08

## 2021-12-08 NOTE — TELEPHONE ENCOUNTER
Lisa Rick,    ----- Message from Lan Coy sent at 12/8/2021 11:40 AM EST -----  Subject: Message to Provider    QUESTIONS  Information for Provider? Pt wife Luz Adkins called to inquire about setting   their dog up as an emotional support animal. They also would like to know   how to go about getting a walker for Jewel Mcdaniel, they are looking for one with   larger wheels. Please contact Luz Adkins.   ---------------------------------------------------------------------------  --------------  Heather Perales INFO  What is the best way for the office to contact you? OK to leave message on   voicemail  Preferred Call Back Phone Number? [phone]  ---------------------------------------------------------------------------  --------------  SCRIPT ANSWERS  Relationship to Patient? Other  Representative Name? Candida-wife  Is the Representative on the appropriate HIPAA document in Epic?  Yes

## 2021-12-16 ENCOUNTER — OFFICE VISIT (OUTPATIENT)
Dept: FAMILY MEDICINE CLINIC | Age: 76
End: 2021-12-16
Payer: MEDICARE

## 2021-12-16 VITALS
WEIGHT: 276.6 LBS | SYSTOLIC BLOOD PRESSURE: 132 MMHG | BODY MASS INDEX: 39.6 KG/M2 | TEMPERATURE: 97 F | OXYGEN SATURATION: 96 % | DIASTOLIC BLOOD PRESSURE: 64 MMHG | HEART RATE: 66 BPM | HEIGHT: 70 IN

## 2021-12-16 DIAGNOSIS — Z74.09 IMPAIRED MOBILITY: ICD-10-CM

## 2021-12-16 DIAGNOSIS — Z91.81 AT HIGH RISK FOR FALLS: ICD-10-CM

## 2021-12-16 DIAGNOSIS — R73.01 IMPAIRED FASTING GLUCOSE: Primary | ICD-10-CM

## 2021-12-16 DIAGNOSIS — Z23 FLU VACCINE NEED: ICD-10-CM

## 2021-12-16 DIAGNOSIS — I10 ESSENTIAL HYPERTENSION: ICD-10-CM

## 2021-12-16 DIAGNOSIS — E78.2 MIXED HYPERLIPIDEMIA: ICD-10-CM

## 2021-12-16 DIAGNOSIS — R26.89 IMPAIRMENT OF BALANCE: ICD-10-CM

## 2021-12-16 PROCEDURE — G0008 ADMIN INFLUENZA VIRUS VAC: HCPCS | Performed by: NURSE PRACTITIONER

## 2021-12-16 PROCEDURE — G8427 DOCREV CUR MEDS BY ELIG CLIN: HCPCS | Performed by: NURSE PRACTITIONER

## 2021-12-16 PROCEDURE — 1123F ACP DISCUSS/DSCN MKR DOCD: CPT | Performed by: NURSE PRACTITIONER

## 2021-12-16 PROCEDURE — G8482 FLU IMMUNIZE ORDER/ADMIN: HCPCS | Performed by: NURSE PRACTITIONER

## 2021-12-16 PROCEDURE — 1036F TOBACCO NON-USER: CPT | Performed by: NURSE PRACTITIONER

## 2021-12-16 PROCEDURE — 4040F PNEUMOC VAC/ADMIN/RCVD: CPT | Performed by: NURSE PRACTITIONER

## 2021-12-16 PROCEDURE — 99214 OFFICE O/P EST MOD 30 MIN: CPT | Performed by: NURSE PRACTITIONER

## 2021-12-16 PROCEDURE — 90674 CCIIV4 VAC NO PRSV 0.5 ML IM: CPT | Performed by: NURSE PRACTITIONER

## 2021-12-16 PROCEDURE — G8417 CALC BMI ABV UP PARAM F/U: HCPCS | Performed by: NURSE PRACTITIONER

## 2021-12-16 NOTE — PROGRESS NOTES
Subjective:      Patient ID: Lynn Richardson is a 67 y.o. male. Visit Information    Have you changed or started any medications since your last visit including any over-the-counter medicines, vitamins, or herbal medicines? no   Are you having any side effects from any of your medications? -  no  Have you stopped taking any of your medications? Is so, why? -  no    Have you seen any other physician or provider since your last visit? Yes - Records Requested  Have you had any other diagnostic tests since your last visit? No  Have you been seen in the emergency room and/or had an admission to a hospital since we last saw you? No  Have you had your routine dental cleaning in the past 6 months? no    Have you activated your [employer] account? If not, what are your barriers? Yes     Patient Care Team:  Kelly Garcia MD as PCP - General (Family Medicine)  Kelly Garcia MD as PCP - [facility] Provider    Medical History Review  Past Medical, Family, and Social History reviewed and does not contribute to the patient presenting condition    Health Maintenance   Topic Date Due    Hepatitis C screen  Never done    DTaP/Tdap/Td vaccine (1 - Tdap) Never done    Shingles Vaccine (1 of 2) Never done    COVID-19 Vaccine (3 - Booster for Moderna series) 2021    Lipid screen  2022    Annual Wellness Visit (AWV)  2022    Potassium monitoring  2022    Creatinine monitoring  2022    Flu vaccine  Completed    Pneumococcal 65+ years Vaccine  Completed    Hepatitis A vaccine  Aged Out    Hepatitis B vaccine  Aged Out    Hib vaccine  Aged Out    Meningococcal (ACWY) vaccine  Aged Out     HPI  67year old male presents with management of followin. prediabete, HTN HlD-  on low dose metformin and a1c was 6.3 in sept. Gabby Rolon bp is stable with dual therapy. Is compliant with statin therapy and routine medications and tolerates them well.    2. Impaired mobility/ balance and risk for falls - Hx of stroke 2 years ago resulting left sided weakness. Pt has impaired mobility and balance which causes frequent falls. Currently uses his wife's walker and would like to have a walker helping him ambulate. Hx of cardiomyopathy and CAD managed by Dr. Carolyn Calloway. Review of Systems   Constitutional: Negative for chills, diaphoresis and fever. Eyes: Negative for visual disturbance. Respiratory: Negative for cough and shortness of breath. Cardiovascular: Negative for chest pain and palpitations. Gastrointestinal: Negative for abdominal pain and nausea. Musculoskeletal: Positive for arthralgias and gait problem. Skin: Negative for wound. Neurological: Negative for dizziness, weakness and numbness. Psychiatric/Behavioral: Negative for agitation and behavioral problems. Objective:   Physical Exam  Vitals and nursing note reviewed. Constitutional:       General: He is not in acute distress. Appearance: Normal appearance. He is obese. HENT:      Nose: Nose normal.   Eyes:      Conjunctiva/sclera: Conjunctivae normal.   Cardiovascular:      Rate and Rhythm: Normal rate and regular rhythm. Heart sounds: Normal heart sounds. Pulmonary:      Effort: Pulmonary effort is normal. No respiratory distress. Breath sounds: Normal breath sounds. Abdominal:      Palpations: Abdomen is soft. Tenderness: There is no abdominal tenderness. Musculoskeletal:         General: Tenderness (in LS region) present. No deformity. Cervical back: Neck supple. Lymphadenopathy:      Cervical: No cervical adenopathy. Skin:     General: Skin is warm and dry. Neurological:      Mental Status: He is alert and oriented to person, place, and time. Cranial Nerves: No cranial nerve deficit. Motor: Weakness ( left sided) present.       Coordination: Coordination abnormal.      Gait: Gait abnormal.      Comments: Ambulates with a walker, gait unsteady   Psychiatric:         Mood and Affect: Mood normal.         Behavior: Behavior normal.         Assessment:      1. Impaired fasting glucose    2. Essential hypertension    3. Mixed hyperlipidemia    4. Impaired mobility    5. Impairment of balance    6. At high risk for falls    7. Flu vaccine need            Plan:      BP Readings from Last 3 Encounters:   12/16/21 132/64   09/21/21 (!) 157/96   09/23/21 114/68     /64   Pulse 66   Temp 97 °F (36.1 °C) (Infrared)   Ht 5' 10\" (1.778 m)   Wt 276 lb 9.6 oz (125.5 kg)   SpO2 96%   BMI 39.69 kg/m²   Lab Results   Component Value Date    WBC 5.8 08/04/2021    HGB 13.9 08/04/2021    HCT 40.8 (L) 08/04/2021     09/21/2021    CHOL 167 03/25/2021    TRIG 148 03/25/2021    HDL 37 (L) 03/25/2021    ALT 28 08/04/2021    AST 25 08/04/2021     08/04/2021    K 4.8 08/04/2021     08/04/2021    CREATININE 0.65 (L) 08/04/2021    BUN 16 08/04/2021    CO2 28 08/04/2021    TSH 0.58 07/27/2018    PSA 1.22 03/25/2021    INR 1.0 09/21/2021    LABA1C 6.3 09/23/2021    LABMICR CANNOT BE CALCULATED 07/27/2018     Lab Results   Component Value Date    CALCIUM 8.6 08/04/2021    PHOS 3.7 02/03/2016     Lab Results   Component Value Date    LDLCHOLESTEROL 100 03/25/2021         1. Impaired fasting glucose  - cont current therapy   - Basic Metabolic Panel; Future  - Hemoglobin A1C; Future  - CBC; Future  - Magnesium; Future    2. Essential hypertension   - stable. No therapy change     3. Mixed hyperlipidemia  - cont statin therapy   - Lipid Panel; Future  - ALT; Future    4. Impaired mobility /5. Impairment of balance/ 6. At high risk for falls  - a walker with seat ordered for pt   - home safety tips provided     7. Flu vaccine need  - INFLUENZA, MDCK QUADV, 2 YRS AND OLDER, IM, PF, PREFILL SYR OR SDV, 0.5ML (FLUCELVAX QUADV, PF)        Requested Prescriptions      No prescriptions requested or ordered in this encounter       There are no discontinued medications.       Discussed use, benefit, and side effects of prescribed medications. Barriers to medication compliance addressed. All patient questions answered. Pt voiced understanding. Return in about 15 weeks (around 3/31/2022) for prediabetes, HTN, HLD, hx of stroke .

## 2021-12-16 NOTE — PROGRESS NOTES
On the basis of positive falls risk screening, assessment and plan is as follows: in-office gait and balance testing performed using The Timed Up and Go Test was positive for increased falls risk.

## 2021-12-17 NOTE — PROGRESS NOTES
Speech Language Pathology  Speech Language Pathology  [facility]    Speech Language Treatment Note    Date: 7/16/2019  Patients Name: Rubén Marques  MRN: [id number]  Diagnosis:   Patient Active Problem List   Diagnosis Code    Impaired fasting glucose R73.01    Patient overweight E66.3    Insomnia G47.00    Hypertension I10    OA (osteoarthritis) of knee M17.10    S/P total knee arthroplasty Z96.659    Bronchitis with asthma, acute J20.9, J45.909    Lymphadenopathy of left cervical region R59.0    Carotid stenosis, left I65.22    H/O carotid endarterectomy Z98.890    Thyroid nodule E04.1    Ventral hernia without obstruction or gangrene K43.9    Unstable angina (HCC) I20.0    Myocardiopathy (HCC) I42.9    Abdominal mass, RUQ (right upper quadrant) R19.01    Chest pain R07.9    Spontaneous intracranial hemorrhage (HCC) I62.9    Diplopia H53.2       Pain: 0/10    Speech and Language Treatment  Treatment time: 4805-5117    Subjective: [x] Alert [x] Cooperative     [] Confused     [] Agitated    [] Lethargic    Objective/Assessment:    cognition:    Paragraph recall- 67%, 3 word alphabetical order- 40%, 60% c cues. Missing equipment- 100%. Speech:   Pt. Needed very min A to use compensatory dysarthria strategies. Other:     Plan:  [x] Continue ST services    [] Discharge from ST:      Discharge recommendations: [] Inpatient Rehab   [] [place]   [] Outpatient Therapy  [] Follow up at trauma clinic   [] Other:       Treatment completed by: Garima Cuevas A.CCC/SLP Complex Repair And Rhombic Flap Text: The defect edges were debeveled with a #15 scalpel blade.  The primary defect was closed partially with a complex linear closure.  Given the location of the remaining defect, shape of the defect and the proximity to free margins a rhombic flap was deemed most appropriate for complete closure of the defect.  Using a sterile surgical marker, an appropriate advancement flap was drawn incorporating the defect and placing the expected incisions within the relaxed skin tension lines where possible.    The area thus outlined was incised deep to adipose tissue with a #15 scalpel blade.  The skin margins were undermined to an appropriate distance in all directions utilizing iris scissors.

## 2021-12-19 ASSESSMENT — ENCOUNTER SYMPTOMS
NAUSEA: 0
COUGH: 0
SHORTNESS OF BREATH: 0
ABDOMINAL PAIN: 0

## 2022-01-06 ENCOUNTER — OFFICE VISIT (OUTPATIENT)
Dept: FAMILY MEDICINE CLINIC | Age: 77
End: 2022-01-06
Payer: MEDICARE

## 2022-01-06 ENCOUNTER — TELEPHONE (OUTPATIENT)
Dept: FAMILY MEDICINE CLINIC | Age: 77
End: 2022-01-06

## 2022-01-06 ENCOUNTER — HOSPITAL ENCOUNTER (OUTPATIENT)
Age: 77
Setting detail: SPECIMEN
Discharge: HOME | End: 2022-01-06

## 2022-01-06 VITALS
TEMPERATURE: 97 F | WEIGHT: 277 LBS | OXYGEN SATURATION: 97 % | DIASTOLIC BLOOD PRESSURE: 82 MMHG | HEART RATE: 76 BPM | BODY MASS INDEX: 39.75 KG/M2 | RESPIRATION RATE: 16 BRPM | SYSTOLIC BLOOD PRESSURE: 150 MMHG

## 2022-01-06 DIAGNOSIS — R05.9 COUGH: ICD-10-CM

## 2022-01-06 DIAGNOSIS — Z11.52 ENCOUNTER FOR SCREENING FOR COVID-19: Primary | ICD-10-CM

## 2022-01-06 PROCEDURE — 1123F ACP DISCUSS/DSCN MKR DOCD: CPT | Performed by: PHYSICIAN ASSISTANT

## 2022-01-06 PROCEDURE — 4040F PNEUMOC VAC/ADMIN/RCVD: CPT | Performed by: PHYSICIAN ASSISTANT

## 2022-01-06 PROCEDURE — G8428 CUR MEDS NOT DOCUMENT: HCPCS | Performed by: PHYSICIAN ASSISTANT

## 2022-01-06 PROCEDURE — G8417 CALC BMI ABV UP PARAM F/U: HCPCS | Performed by: PHYSICIAN ASSISTANT

## 2022-01-06 PROCEDURE — 1036F TOBACCO NON-USER: CPT | Performed by: PHYSICIAN ASSISTANT

## 2022-01-06 PROCEDURE — G8482 FLU IMMUNIZE ORDER/ADMIN: HCPCS | Performed by: PHYSICIAN ASSISTANT

## 2022-01-06 PROCEDURE — 99213 OFFICE O/P EST LOW 20 MIN: CPT | Performed by: PHYSICIAN ASSISTANT

## 2022-01-06 RX ORDER — LORATADINE 10 MG/1
10 TABLET ORAL DAILY
Qty: 30 TABLET | Refills: 0 | Status: SHIPPED | OUTPATIENT
Start: 2022-01-06 | End: 2022-02-07 | Stop reason: SDUPTHER

## 2022-01-06 RX ORDER — FLUTICASONE PROPIONATE 50 MCG
2 SPRAY, SUSPENSION (ML) NASAL DAILY
Qty: 1 EACH | Refills: 0 | Status: SHIPPED | OUTPATIENT
Start: 2022-01-06 | End: 2022-09-02

## 2022-01-06 ASSESSMENT — ENCOUNTER SYMPTOMS
SORE THROAT: 1
RHINORRHEA: 1
COUGH: 1

## 2022-01-06 NOTE — PROGRESS NOTES
[address] [facility][note 3]  [address] [facility] [address]  Phone: [phone]  Fax: [phone] [address] [facility] Name: Rebeka Keen  MRN: [id number]  [place] 1945  Date of evaluation: 1/6/2022  Provider: Thor Cano, [facility]       Chief Complaint   Patient presents with    Cough     symptoms started last night    Nasal Congestion    Other     his wife has coovid at home he is concerned           HISTORY OF PRESENT ILLNESS  (Location/Symptom, Timing/Onset, Context/Setting, Quality, Duration, Modifying Factors, Severity.)   Rebeka Keen is a 67 y.o. White (non-) [1] male who presents to the office for evaluation of      Cough  This is a new problem. The current episode started in the past 7 days. Associated symptoms include chills, nasal congestion, postnasal drip, rhinorrhea and a sore throat. Pertinent negatives include no ear pain or myalgias. There is no history of asthma. Nursing Notes were reviewed. REVIEW OF SYSTEMS    (2-9 systems for level 4, 10 or more for level 5)     Review of Systems   Constitutional: Positive for chills and fatigue. HENT: Positive for congestion, postnasal drip, rhinorrhea and sore throat. Negative for ear discharge and ear pain. Respiratory: Positive for cough. Musculoskeletal: Negative for myalgias. Except as noted above the remainder of the review of systems was reviewed andnegative. PAST MEDICAL HISTORY   History reviewed. Past Medical History:   Diagnosis Date    Anxiety     Bronchitis with asthma, acute 11/24/2015    Carotid stenosis, left 2/2/2016    Diabetes mellitus ([facility]     borderline patient off metformin    GERD (gastroesophageal reflux disease)     Heart attack ([facility] 08/05/2019    Hyperlipidemia     Hypertension     Osteoarthritis     Stroke, hemorrhagic ([facility] 08/18/2019         SURGICAL HISTORY     History reviewed.     Past Surgical History: Procedure Laterality Date    CAROTID ENDARTERECTOMY Left 02/02/16    COLONOSCOPY      CORONARY ANGIOPLASTY WITH STENT PLACEMENT      JOINT REPLACEMENT Left     knee    SHOULDER SURGERY Right     collar bone    TONSILLECTOMY AND ADENOIDECTOMY           CURRENT MEDICATIONS       Current Outpatient Medications   Medication Sig Dispense Refill    loratadine (CLARITIN) 10 MG tablet Take 1 tablet by mouth daily 30 tablet 0    fluticasone (FLONASE) 50 MCG/ACT nasal spray 2 sprays by Nasal route daily 1 each 0    tiZANidine (ZANAFLEX) 4 MG tablet Take 1 tablet by mouth 3 times daily as needed (For muscle spasm) 30 tablet 0    tamsulosin (FLOMAX) 0.4 MG capsule Take 1 capsule by mouth daily Take in evening after meal 30 capsule 5    metFORMIN (GLUCOPHAGE-XR) 500 MG extended release tablet Take 1 tablet by mouth once daily with breakfast 90 tablet 0    docusate sodium (COLACE) 100 MG capsule Take one cap po daily 30 capsule 3    lisinopril (PRINIVIL;ZESTRIL) 20 MG tablet Take 1 tablet by mouth once daily 90 tablet 1    pantoprazole (PROTONIX) 40 MG tablet Take 1 tablet by mouth every morning (before breakfast) 90 tablet 0    metoprolol succinate (TOPROL XL) 50 MG extended release tablet TAKE 1 TABLET BY MOUTH ONCE DAILY      Multiple Vitamins-Minerals (MULTIVITAMIN ADULTS 50+) TABS Take by mouth daily      gabapentin (NEURONTIN) 400 MG capsule Take 1 capsule by mouth 3 times daily for 30 days. 90 capsule 0    aspirin 81 MG chewable tablet Take 1 tablet by mouth daily 30 tablet 3    nitroGLYCERIN (NITROSTAT) 0.4 MG SL tablet up to max of 3 total doses. If no relief after 1 dose, call 911. 25 tablet 3    atorvastatin (LIPITOR) 80 MG tablet Take 1 tablet by mouth nightly 30 tablet 3    clopidogrel (PLAVIX) 75 MG tablet Take 1 tablet by mouth daily 30 tablet 3     No current facility-administered medications for this visit. ALLERGIES     Patient has no known allergies.     FAMILY HISTORY Problem Relation Age of Onset    Cancer Mother     Cancer Father      Family Status   Relation Name Status    Mother  (Not Specified)    Father  (Not Specified)          SOCIAL HISTORY      reports that he has never smoked. He has never used smokeless tobacco. He reports previous alcohol use. He reports that he does not use drugs. PHYSICAL EXAM    (up to 7 for level 4, 8 or more for level 5)     Vitals:    01/06/22 1805   BP: (!) 150/82   Pulse: 76   Resp: 16   Temp: 97 °F (36.1 °C)   TempSrc: Temporal   SpO2: 97%   Weight: 277 lb (125.6 kg)         Physical Exam  Vitals and nursing note reviewed. Constitutional:       General: He is not in acute distress. Appearance: Normal appearance. He is not ill-appearing. HENT:      Head: Normocephalic and atraumatic. Right Ear: External ear normal.      Left Ear: External ear normal.      Nose: Congestion present. Neurological:      Mental Status: He is alert. DIFFERENTIAL DIAGNOSIS:         Thor Page reviewed the disposition diagnosis with the patient and or their family/guardian. I have answered their questions and given discharge instructions. They voiced understanding of these instructions and did not have anyfurther questions or complaints. PROCEDURES:  Orders Placed This Encounter   Procedures    XR CHEST STANDARD (2 VW)     Standing Status:   Future     Standing Expiration Date:   1/6/2023    COVID-19     Standing Status:   Future     Standing Expiration Date:   1/6/2023     Scheduling Instructions:      1) Due to current limited availability of the COVID-19 test, tests will be prioritized based on responses to questions above. Testing may be delayed due to volume. 2) Print and instruct patient to adhere to [facility] home isolation program. (Link Above)              3) Set up or refer patient for a monitoring program.              4) Have patient sign up for and leverage MyChart (if not previously done).      Order Specific COVID-19 may have no symptoms, mild symptoms, such as fever, cough, and shortness of breath or they may have more severe illness, developing severe and fatal pneumonia. As a result, Advance Care Planning with attention to naming a health care decision maker (someone you trust to make healthcare decisions for you if you could not speak for yourself) and sharing other health care preferences is important BEFORE a possible health crisis. Please contact your Primary Care Provider to discuss Advance Care Planning. Preventing the Spread of Coronavirus Disease 2019 in Homes and Residential Communities  For the most recent information go to [email or web address]    Prevention steps for People with confirmed or suspected COVID-19 (including persons under investigation) who do not need to be hospitalized  and   People with confirmed COVID-19 who were hospitalized and determined to be medically stable to go home    Your healthcare provider and public health staff will evaluate whether you can be cared for at home. If it is determined that you do not need to be hospitalized and can be isolated at home, you will be monitored by staff from your local or state health department. You should follow the prevention steps below until a healthcare provider or local or state health department says you can return to your normal activities. Stay home except to get medical care  People who are mildly ill with COVID-19 are able to isolate at home during their illness. You should restrict activities outside your home, except for getting medical care. Do not go to work, school, or public areas. Avoid using public transportation, ride-sharing, or taxis. Separate yourself from other people and animals in your home  People: As much as possible, you should stay in a specific room and away from other people in your home. Also, you should use a separate bathroom, if available. Animals:  You should restrict contact with pets and other animals while you are sick with COVID-19, just like you would around other people. Although there have not been reports of pets or other animals becoming sick with COVID-19, it is still recommended that people sick with COVID-19 limit contact with animals until more information is known about the virus. When possible, have another member of your household care for your animals while you are sick. If you are sick with COVID-19, avoid contact with your pet, including petting, snuggling, being kissed or licked, and sharing food. If you must care for your pet or be around animals while you are sick, wash your hands before and after you interact with pets and wear a facemask. Call ahead before visiting your doctor  If you have a medical appointment, call the healthcare provider and tell them that you have or may have COVID-19. This will help the healthcare providers office take steps to keep other people from getting infected or exposed. Wear a facemask  You should wear a facemask when you are around other people (e.g., sharing a room or vehicle) or pets and before you enter a healthcare providers office. If you are not able to wear a facemask (for example, because it causes trouble breathing), then people who live with you should not stay in the same room with you, or they should wear a facemask if they enter your room. Cover your coughs and sneezes  Cover your mouth and nose with a tissue when you cough or sneeze. Throw used tissues in a lined trash can. Immediately wash your hands with soap and water for at least 20 seconds or, if soap and water are not available, clean your hands with an alcohol-based hand  that contains at least 60% alcohol. Clean your hands often  Wash your hands often with soap and water for at least 20 seconds, especially after blowing your nose, coughing, or sneezing; going to the bathroom; and before eating or preparing food.  If soap and water are not readily available, use an alcohol-based hand  with at least 60% alcohol, covering all surfaces of your hands and rubbing them together until they feel dry. Soap and water are the best option if hands are visibly dirty. Avoid touching your eyes, nose, and mouth with unwashed hands. Avoid sharing personal household items  You should not share dishes, drinking glasses, cups, eating utensils, towels, or bedding with other people or pets in your home. After using these items, they should be washed thoroughly with soap and water. Clean all high-touch surfaces everyday  High touch surfaces include counters, tabletops, doorknobs, bathroom fixtures, toilets, phones, keyboards, tablets, and bedside tables. Also, clean any surfaces that may have blood, stool, or body fluids on them. Use a household cleaning spray or wipe, according to the label instructions. Labels contain instructions for safe and effective use of the cleaning product including precautions you should take when applying the product, such as wearing gloves and making sure you have good ventilation during use of the product. Monitor your symptoms  Seek prompt medical attention if your illness is worsening (e.g., difficulty breathing). Before seeking care, call your healthcare provider and tell them that you have, or are being evaluated for, COVID-19. Put on a facemask before you enter the facility. These steps will help the healthcare providers office to keep other people in the office or waiting room from getting infected or exposed. Ask your healthcare provider to call the local or state health department. Persons who are placed under active monitoring or facilitated self-monitoring should follow instructions provided by their local health department or occupational health professionals, as appropriate. When working with your local health department check their available hours.   If you have a medical emergency and need to call 911, notify the dispatch personnel that you have, or are being evaluated for COVID-19. If possible, put on a facemask before emergency medical services arrive. Discontinuing home isolation  Patients with confirmed COVID-19 should remain under home isolation precautions until the risk of secondary transmission to others is thought to be low. The decision to discontinue home isolation precautions should be made on a case-by-case basis, in consultation with healthcare providers and state and local health departments. Patient instructed to return to the office if symptoms worsen, return, or have any other concerns. Patient understands and is agreeable.          Maura Pena PA-C 1/6/2022 6:23 PM

## 2022-01-06 NOTE — TELEPHONE ENCOUNTER
JUANI: Wife called stating she tested POSITIVE for Covid today,   exposed to her, coughing, runny nose. She was given [place] [employer] phone to contact for testing.

## 2022-01-07 DIAGNOSIS — Z11.52 ENCOUNTER FOR SCREENING FOR COVID-19: ICD-10-CM

## 2022-01-08 LAB
SARS-COV-2: ABNORMAL
SARS-COV-2: DETECTED
SOURCE: ABNORMAL

## 2022-01-10 ENCOUNTER — TELEPHONE (OUTPATIENT)
Dept: FAMILY MEDICINE CLINIC | Age: 77
End: 2022-01-10

## 2022-01-10 NOTE — TELEPHONE ENCOUNTER
----- Message from Adriana Earl sent at 4/09/6090  2:23 PM EST -----  Subject: Results Request    QUESTIONS  Which lab or imaging result is the patient calling about? Covid - 19  Which provider ordered the test? Eloy Pabon   [facility]  Date the test was performed? 2022-01-06  Additional Information for Provider?   ---------------------------------------------------------------------------  --------------  [address]  What is the best way for the office to contact you? OK to leave message on   voicemail  Preferred Call Back Phone Number?  [phone]

## 2022-01-13 DIAGNOSIS — R73.01 IMPAIRED FASTING GLUCOSE: ICD-10-CM

## 2022-01-13 RX ORDER — METFORMIN HYDROCHLORIDE 500 MG/1
TABLET, EXTENDED RELEASE ORAL
Qty: 90 TABLET | Refills: 0 | Status: SHIPPED | OUTPATIENT
Start: 2022-01-13 | End: 2022-04-28

## 2022-01-13 NOTE — TELEPHONE ENCOUNTER
----- Message from [address] sent at 1/13/2022  1:15 PM EST -----  Subject: Refill Request    QUESTIONS  Name of Medication? metFORMIN (GLUCOPHAGE-XR) 500 MG extended release   tablet  Patient-reported dosage and instructions? 1X a day 500 mg  How many days do you have left? 1  Preferred Pharmacy? [facility]  Pharmacy phone number (if available)? [phone]  ---------------------------------------------------------------------------  --------------  CALL BACK INFO  What is the best way for the office to contact you? OK to leave message on   voicemail  Preferred Call Back Phone Number?  [phone]

## 2022-02-07 DIAGNOSIS — I10 ESSENTIAL HYPERTENSION: ICD-10-CM

## 2022-02-07 RX ORDER — LISINOPRIL 20 MG/1
TABLET ORAL
Qty: 90 TABLET | Refills: 1 | Status: SHIPPED | OUTPATIENT
Start: 2022-02-07 | End: 2022-09-06

## 2022-02-07 RX ORDER — LORATADINE 10 MG/1
10 TABLET ORAL DAILY
Qty: 90 TABLET | Refills: 1 | Status: SHIPPED | OUTPATIENT
Start: 2022-02-07 | End: 2022-09-02

## 2022-02-07 NOTE — TELEPHONE ENCOUNTER
----- Message from Pinky Layton sent at 2/4/2022  9:42 AM EST -----  Subject: Refill Request    QUESTIONS  Name of Medication? loratadine (CLARITIN) 10 MG tablet  Patient-reported dosage and instructions? 1 - 10 mg tablet 1 time per day   How many days do you have left? 0  [id number] [facility]  Pharmacy phone number (if available)? [phone]  ---------------------------------------------------------------------------  --------------,  Name of Medication? lisinopril (PRINIVIL;ZESTRIL) 20 MG tablet  Patient-reported dosage and instructions? 1 - 20 mg tablet - 1 time per   day   How many days do you have left? 0  Preferred Pharmacy? [id number] [facility]  Pharmacy phone number (if available)? [phone]  ---------------------------------------------------------------------------  --------------  CALL BACK INFO  What is the best way for the office to contact you? OK to leave message on   voicemail  Preferred Call Back Phone Number?  [phone]

## 2022-03-31 ENCOUNTER — OFFICE VISIT (OUTPATIENT)
Dept: FAMILY MEDICINE CLINIC | Age: 77
End: 2022-03-31
Payer: MEDICARE

## 2022-03-31 VITALS
OXYGEN SATURATION: 98 % | DIASTOLIC BLOOD PRESSURE: 88 MMHG | SYSTOLIC BLOOD PRESSURE: 138 MMHG | HEIGHT: 70 IN | BODY MASS INDEX: 39.08 KG/M2 | WEIGHT: 273 LBS | HEART RATE: 73 BPM | TEMPERATURE: 97.3 F

## 2022-03-31 DIAGNOSIS — I73.9 PVD (PERIPHERAL VASCULAR DISEASE) (HCC): ICD-10-CM

## 2022-03-31 DIAGNOSIS — I10 ESSENTIAL HYPERTENSION: ICD-10-CM

## 2022-03-31 DIAGNOSIS — Z74.09 IMPAIRED MOBILITY: ICD-10-CM

## 2022-03-31 DIAGNOSIS — I25.118 CORONARY ARTERY DISEASE OF NATIVE ARTERY OF NATIVE HEART WITH STABLE ANGINA PECTORIS (HCC): ICD-10-CM

## 2022-03-31 DIAGNOSIS — E66.01 SEVERE OBESITY (BMI 35.0-39.9) WITH COMORBIDITY (HCC): ICD-10-CM

## 2022-03-31 DIAGNOSIS — I42.8 OTHER CARDIOMYOPATHY (HCC): ICD-10-CM

## 2022-03-31 DIAGNOSIS — R73.03 PREDIABETES: Primary | ICD-10-CM

## 2022-03-31 DIAGNOSIS — E78.2 MIXED HYPERLIPIDEMIA: ICD-10-CM

## 2022-03-31 PROCEDURE — 4040F PNEUMOC VAC/ADMIN/RCVD: CPT | Performed by: NURSE PRACTITIONER

## 2022-03-31 PROCEDURE — G8427 DOCREV CUR MEDS BY ELIG CLIN: HCPCS | Performed by: NURSE PRACTITIONER

## 2022-03-31 PROCEDURE — G8482 FLU IMMUNIZE ORDER/ADMIN: HCPCS | Performed by: NURSE PRACTITIONER

## 2022-03-31 PROCEDURE — 1036F TOBACCO NON-USER: CPT | Performed by: NURSE PRACTITIONER

## 2022-03-31 PROCEDURE — 99214 OFFICE O/P EST MOD 30 MIN: CPT | Performed by: NURSE PRACTITIONER

## 2022-03-31 PROCEDURE — 1123F ACP DISCUSS/DSCN MKR DOCD: CPT | Performed by: NURSE PRACTITIONER

## 2022-03-31 PROCEDURE — G8417 CALC BMI ABV UP PARAM F/U: HCPCS | Performed by: NURSE PRACTITIONER

## 2022-03-31 RX ORDER — CLOPIDOGREL BISULFATE 75 MG/1
75 TABLET ORAL DAILY
Qty: 90 TABLET | Refills: 1 | Status: SHIPPED | OUTPATIENT
Start: 2022-03-31 | End: 2022-10-13

## 2022-03-31 RX ORDER — ATORVASTATIN CALCIUM 80 MG/1
80 TABLET, FILM COATED ORAL NIGHTLY
Qty: 90 TABLET | Refills: 1 | Status: SHIPPED | OUTPATIENT
Start: 2022-03-31 | End: 2022-10-13

## 2022-03-31 ASSESSMENT — PATIENT HEALTH QUESTIONNAIRE - PHQ9
10. IF YOU CHECKED OFF ANY PROBLEMS, HOW DIFFICULT HAVE THESE PROBLEMS MADE IT FOR YOU TO DO YOUR WORK, TAKE CARE OF THINGS AT HOME, OR GET ALONG WITH OTHER PEOPLE: 1
1. LITTLE INTEREST OR PLEASURE IN DOING THINGS: 0
4. FEELING TIRED OR HAVING LITTLE ENERGY: 3
5. POOR APPETITE OR OVEREATING: 0
7. TROUBLE CONCENTRATING ON THINGS, SUCH AS READING THE NEWSPAPER OR WATCHING TELEVISION: 1
9. THOUGHTS THAT YOU WOULD BE BETTER OFF DEAD, OR OF HURTING YOURSELF: 2
SUM OF ALL RESPONSES TO PHQ QUESTIONS 1-9: 14
3. TROUBLE FALLING OR STAYING ASLEEP: 1
6. FEELING BAD ABOUT YOURSELF - OR THAT YOU ARE A FAILURE OR HAVE LET YOURSELF OR YOUR FAMILY DOWN: 3
SUM OF ALL RESPONSES TO PHQ QUESTIONS 1-9: 12
SUM OF ALL RESPONSES TO PHQ QUESTIONS 1-9: 14
SUM OF ALL RESPONSES TO PHQ9 QUESTIONS 1 & 2: 1
8. MOVING OR SPEAKING SO SLOWLY THAT OTHER PEOPLE COULD HAVE NOTICED. OR THE OPPOSITE, BEING SO FIGETY OR RESTLESS THAT YOU HAVE BEEN MOVING AROUND A LOT MORE THAN USUAL: 3
2. FEELING DOWN, DEPRESSED OR HOPELESS: 1
SUM OF ALL RESPONSES TO PHQ QUESTIONS 1-9: 14

## 2022-03-31 ASSESSMENT — ENCOUNTER SYMPTOMS
COUGH: 0
NAUSEA: 0
SHORTNESS OF BREATH: 0
ABDOMINAL PAIN: 0

## 2022-03-31 ASSESSMENT — COLUMBIA-SUICIDE SEVERITY RATING SCALE - C-SSRS
2. HAVE YOU ACTUALLY HAD ANY THOUGHTS OF KILLING YOURSELF?: NO
6. HAVE YOU EVER DONE ANYTHING, STARTED TO DO ANYTHING, OR PREPARED TO DO ANYTHING TO END YOUR LIFE?: NO
1. WITHIN THE PAST MONTH, HAVE YOU WISHED YOU WERE DEAD OR WISHED YOU COULD GO TO SLEEP AND NOT WAKE UP?: YES

## 2022-03-31 NOTE — PROGRESS NOTES
Subjective:      Patient ID: Isabel Luu is a 67 y.o. male. Visit Information    Have you changed or started any medications since your last visit including any over-the-counter medicines, vitamins, or herbal medicines? no   Have you stopped taking any of your medications? Is so, why? -  no  Are you having any side effects from any of your medications? - no    Have you seen any other physician or provider since your last visit?  no   Have you had any other diagnostic tests since your last visit?  no   Have you been seen in the emergency room and/or had an admission in a hospital since we last saw you?  no   Have you had your routine dental cleaning in the past 6 months?  no     Do you have an active MyChart account? If no, what is the barrier? No: decline at this time     Patient Care Team:  Apoorva Steinberg MD as PCP - General (Family Medicine)  Apoorva Steinberg MD as PCP - [facility]    Medical History Review  Past Medical, Family, and Social History reviewed and does contribute to the patient presenting condition    Health Maintenance   Topic Date Due    Hepatitis C screen  Never done    DTaP/Tdap/Td vaccine (1 - Tdap) Never done    Shingles Vaccine (1 of 2) Never done    COVID-19 Vaccine (3 - Booster for Moderna series) 08/04/2021    Lipid screen  03/25/2022    Annual Wellness Visit (AWV)  07/16/2022    Potassium monitoring  08/04/2022    Creatinine monitoring  08/04/2022    Depression Monitoring  03/31/2023    Flu vaccine  Completed    Pneumococcal 65+ years Vaccine  Completed    Hepatitis A vaccine  Aged Out    Hepatitis B vaccine  Aged Out    Hib vaccine  Aged Out    Meningococcal (ACWY) vaccine  Aged Out       HPI     64 year old male presents with management of prediabete, HTN HlD and severe obesity with medication refills. Currently is on low dose metformin and a1c was 6.3 in sept. bp is stable with dual therapy. Is compliant with statin therapy and tolerates it well. Denies fever chills cough sbo cp or nv ab pain, denies sores ulcers or paresthesias in bLEs. Pt follows up with Dr. Sydnie Castaneda for cardiomyopathy and CAD but hasn't followed up with him for a while. States he stopped plavix . Hx of PVD and follows up with Dr. Stuart David. States the condition is stable. Also states he is not steady on his feet due to stroke and falls frequently. Review of Systems   Constitutional: Negative for chills, diaphoresis and fever. Eyes: Negative for visual disturbance. Respiratory: Negative for cough and shortness of breath. Cardiovascular: Negative for chest pain and palpitations. Gastrointestinal: Negative for abdominal pain and nausea. Musculoskeletal: Positive for gait problem. Skin: Negative for wound. Neurological: Negative for dizziness, weakness and numbness. Psychiatric/Behavioral: Negative for agitation and behavioral problems. Objective:   Physical Exam  Vitals and nursing note reviewed. Constitutional:       General: He is not in acute distress. Appearance: Normal appearance. He is obese. HENT:      Nose: Nose normal.   Eyes:      Conjunctiva/sclera: Conjunctivae normal.   Cardiovascular:      Rate and Rhythm: Regular rhythm. Heart sounds: Normal heart sounds. Pulmonary:      Effort: Pulmonary effort is normal. No respiratory distress. Breath sounds: Normal breath sounds. Abdominal:      Palpations: Abdomen is soft. Tenderness: There is no abdominal tenderness. Musculoskeletal:         General: No deformity. Cervical back: Neck supple. Lymphadenopathy:      Cervical: No cervical adenopathy. Skin:     General: Skin is warm and dry. Neurological:      General: No focal deficit present. Mental Status: He is alert and oriented to person, place, and time. Cranial Nerves: No cranial nerve deficit. Motor: Weakness present.       Coordination: Coordination abnormal.      Gait: Gait abnormal.   Psychiatric: Mood and Affect: Mood normal.         Behavior: Behavior normal.         Assessment:      1. Prediabetes    2. Essential hypertension    3. Mixed hyperlipidemia    4. Severe obesity (BMI 35.0-39. 9) with comorbidity ([facility]    5. Other cardiomyopathy ([facility]    6. Coronary artery disease of native artery of native heart with stable angina pectoris ([facility]    7. PVD (peripheral vascular disease) ([facility]    8. Impaired mobility            Plan:      BP Readings from Last 3 Encounters:   03/31/22 138/88   01/06/22 (!) 150/82   12/16/21 132/64     /88 (Site: Left Upper Arm, Position: Sitting, Cuff Size: Large Adult)   Pulse 73   Temp 97.3 °F (36.3 °C) (Temporal)   Ht 5' 10\" (1.778 m)   Wt 273 lb (123.8 kg)   SpO2 98%   BMI 39.17 kg/m²   Lab Results   Component Value Date    WBC 5.8 08/04/2021    HGB 13.9 08/04/2021    HCT 40.8 (L) 08/04/2021     09/21/2021    CHOL 167 03/25/2021    TRIG 148 03/25/2021    HDL 37 (L) 03/25/2021    ALT 28 08/04/2021    AST 25 08/04/2021     08/04/2021    K 4.8 08/04/2021     08/04/2021    CREATININE 0.65 (L) 08/04/2021    BUN 16 08/04/2021    CO2 28 08/04/2021    TSH 0.58 07/27/2018    PSA 1.22 03/25/2021    INR 1.0 09/21/2021    LABA1C 6.3 09/23/2021    LABMICR CANNOT BE CALCULATED 07/27/2018     Lab Results   Component Value Date    CALCIUM 8.6 08/04/2021    PHOS 3.7 02/03/2016     Lab Results   Component Value Date    LDLCHOLESTEROL 100 03/25/2021         1. Prediabetes  - check A1c and cont metformin     2. Essential hypertension  - cont current bp therapy     3. Mixed hyperlipidemia  - cont statin therapy   - atorvastatin (LIPITOR) 80 MG tablet; Take 1 tablet by mouth nightly  Dispense: 90 tablet; Refill: 1    4. Severe obesity (BMI 35.0-39. 9) with comorbidity ([address]  - discussion with pt about his current diet and exercise habits, and personalized advice was provided regarding recommended lifestyle changes, diet and exercise.      5. Other cardiomyopathy ([facility] 6. Coronary artery disease of native artery of native heart with stable angina pectoris ([facility]  - resume  clopidogrel (PLAVIX) 75 MG tablet; Take 1 tablet by mouth daily  Dispense: 90 tablet; Refill: 1  - advised to follow up with cardiologist as scheduled    7. PVD (peripheral vascular disease) ([facility])  - stable. Cont to monitor   - follow up with vascular as schedule     8. Impaired mobility  - refer [address] for gait training     Requested Prescriptions     Signed Prescriptions Disp Refills    atorvastatin (LIPITOR) 80 MG tablet 90 tablet 1     Sig: Take 1 tablet by mouth nightly    clopidogrel (PLAVIX) 75 MG tablet 90 tablet 1     Sig: Take 1 tablet by mouth daily       Medications Discontinued During This Encounter   Medication Reason    atorvastatin (LIPITOR) 80 MG tablet REORDER    clopidogrel (PLAVIX) 75 MG tablet REORDER       Discussed use, benefit, and side effects of prescribed medications. Barriers to medication compliance addressed. All patient questions answered. Pt voiced understanding. Return in about 18 weeks (around 8/4/2022) for diabetes, HTN, HLD stroke .           MARKUS Sears - CNP

## 2022-04-19 ENCOUNTER — OFFICE VISIT (OUTPATIENT)
Dept: NEUROLOGY | Age: 77
End: 2022-04-19
Payer: MEDICARE

## 2022-04-19 VITALS
DIASTOLIC BLOOD PRESSURE: 80 MMHG | SYSTOLIC BLOOD PRESSURE: 133 MMHG | WEIGHT: 273 LBS | HEIGHT: 70 IN | HEART RATE: 72 BPM | BODY MASS INDEX: 39.08 KG/M2

## 2022-04-19 DIAGNOSIS — I61.4 CEREBELLAR HEMORRHAGE (HCC): Primary | ICD-10-CM

## 2022-04-19 PROCEDURE — 99204 OFFICE O/P NEW MOD 45 MIN: CPT | Performed by: PSYCHIATRY & NEUROLOGY

## 2022-04-19 PROCEDURE — 4040F PNEUMOC VAC/ADMIN/RCVD: CPT | Performed by: PSYCHIATRY & NEUROLOGY

## 2022-04-19 PROCEDURE — 1036F TOBACCO NON-USER: CPT | Performed by: PSYCHIATRY & NEUROLOGY

## 2022-04-19 PROCEDURE — G8417 CALC BMI ABV UP PARAM F/U: HCPCS | Performed by: PSYCHIATRY & NEUROLOGY

## 2022-04-19 PROCEDURE — 1123F ACP DISCUSS/DSCN MKR DOCD: CPT | Performed by: PSYCHIATRY & NEUROLOGY

## 2022-04-19 PROCEDURE — G8427 DOCREV CUR MEDS BY ELIG CLIN: HCPCS | Performed by: PSYCHIATRY & NEUROLOGY

## 2022-04-19 NOTE — PROGRESS NOTES
[facility], [address], [address] [place] [address]  Ph: [phone] or [phone]  FAX: [phone]    Chief Complaint: stroke     Dear Jossy Diaz MD     I had the pleasure of seeing your patient today in neurology consultation for his symptoms. As you would recall Aristeo Strickland is a 67 y.o. male. He presented to [facility] care on 4/19/22 regarding his history of stroke. Patient was accompanied by his wife for this visit. On 6/18/2019, patient underwent craniectomy of the suboccipital with cervical laminectomy for decompression of intracerebral hemorrhage via the [facility]. Per report, the event was secondary to hypertension. Since, he has experienced left-sided weakness and ataxic gait. He has been clinically stable since. Patient's wife reports that the patient has history of cardiac problems and had also had a stent placed via [facility]. The patient's wife states that the patient was being seen by [facility] for his cardiac follow ups, and was later seen by neuro after the stroke. Patient's report from 2019 advised to discontinue aspirin, however the patient reports that he is still taking aspirin. Patient has been taking Plavix and Lipitor for stroke prophylaxis. The patient's wife reports that the patient complains of foot pain upon walking. CT Head WO Contrast on 09/07/2017: No acute intracranial abnormality. CT Head WO Contrast on 10/19/2020: Previous suboccipital craniotomy with encephalomalacia within the left cerebellum and some resultant enlargement of the 4th ventricle. No acute intracranial abnormality. 6/18/19 [facility] Course:  Surgical Service: Hospital Summary: The patient was emergently admitted through the Emergency Department to the [facility] with left cerebellar intracranial hemorrhage.  After being optimized for surgery by the NICU teams, Aristeo Strickland was identified and brought into the Operating Room by the anesthesia and nursing teams. Prior to surgery the patient was treated with antibiotics and continued with antibiotics postoperatively. The patient underwent a suboccipital craniectomy and left cerebellar ICH evacuation on 6/18. Patient was hemodynamically stable postoperatively. The patient tolerated the procedure and was taken to ICU, and then to the hospital surgical floor for further post operative management after the patient was stable. Patient DVT prophylaxis includes compression stockings and Heparin. Patient's pain was well controlled with Oral medication(s). Physical Therapy was started on POD # 1. Patient progressed satisfactorily through physical therapy until discharge. Based upon the appropriate milestones the patient met during the hospital course, Physical Therapy recommended patient be discharged to Rehabilitation facility. EVD placement on 6/18, removed 6/27. Resatrted on Plavix for cardiac stent on 6/28.      Past Medical History:   Diagnosis Date    Anxiety     Bronchitis with asthma, acute 11/24/2015    Carotid stenosis, left 2/2/2016    Diabetes mellitus ([facility] [address]     borderline patient off metformin    GERD (gastroesophageal reflux disease)     Heart attack ([facility] [address] 08/05/2019    Hyperlipidemia     Hypertension     Osteoarthritis     Stroke, hemorrhagic ([facility] [address] 08/18/2019     Past Surgical History:   Procedure Laterality Date    CAROTID ENDARTERECTOMY Left 02/02/16    COLONOSCOPY      CORONARY ANGIOPLASTY WITH STENT PLACEMENT      JOINT REPLACEMENT Left     knee    SHOULDER SURGERY Right     collar bone    TONSILLECTOMY AND ADENOIDECTOMY       No Known Allergies  Family History   Problem Relation Age of Onset    Cancer Mother     Cancer Father       Social History     Socioeconomic History    Marital status:      Spouse name: Not on file    Number of children: Not on file    Years of education: Not on file    Highest education level: Not on file   Occupational History    Not on file   Tobacco Use    Smoking status: Never Smoker    Smokeless tobacco: Never Used   Vaping Use    Vaping Use: Never used   Substance and Sexual Activity    Alcohol use: Not Currently    Drug use: No    Sexual activity: Not on file   Other Topics Concern    Not on file   Social History Narrative    Not on file     Social Determinants of Health     Financial Resource Strain: Low Risk     Difficulty of Paying Living Expenses: Not hard at all   Food Insecurity: No Food Insecurity    Worried About [facility] in the Last Year: Never true    [facility] in the Last Year: Never true   Transportation Needs:     Lack of Transportation (Medical): Not on file    Lack of Transportation (Non-Medical):  Not on file   Physical Activity:     Days of Exercise per Week: Not on file    Minutes of Exercise per Session: Not on file   Stress:     Feeling of Stress : Not on file   Social Connections:     Frequency of Communication with Friends and Family: Not on file    Frequency of Social Gatherings with Friends and Family: Not on file    Attends Druze Services: Not on file    Active Member of [address] [place] [address] or Organizations: Not on file    Attends Club or Organization Meetings: Not on file    Marital Status: Not on file   Intimate Partner Violence:     Fear of Current or Ex-Partner: Not on file    Emotionally Abused: Not on file    Physically Abused: Not on file    Sexually Abused: Not on file   Housing Stability:     Unable to Pay for Housing in the Last Year: Not on file    Number of [place] in the Last Year: Not on file    Unstable Housing in the Last Year: Not on file      /80 (Site: Right Upper Arm, Position: Sitting, Cuff Size: Large Adult)   Pulse 72   Ht 5' 10\" (1.778 m)   Wt 273 lb (123.8 kg)   BMI 39.17 kg/m²      HEENT [] Hearing Loss  [] Visual Disturbance  [] Tinnitus  [] Eye pain   Respiratory [] Shortness of Breath  [] Cough  [] Snoring   Cardiovascular [] Chest Pain  [] Palpitations  [] Lightheaded   GI [] Constipation  [] Diarrhea  [] Swallowing change  [] Nausea/vomiting    [] Urinary Frequency  [] Urinary Urgency   Musculoskeletal [] Neck pain  [] Back pain  [] Muscle pain  [] Restless legs   Dermatologic [] Skin changes   Neurologic [] Memory loss/confusion  [] Seizures  [x] Trouble walking or imbalance  [] Dizziness  [] Sleep disturbance  [] Weakness  [] Numbness  [] Tremors  [] Speech Difficulty  [] Headaches  [] Light Sensitivity  [] Sound Sensitivity   Endocrinology []Excessive thirst  []Excessive hunger   Psychiatric [] Anxiety/Depression  [] Hallucination   Allergy/immunology []Hives/environmental allergies   Hematologic/lymph [] Abnormal bleeding  [] Abnormal bruising     General examination:    Head: Normocephalic, atraumatic  Eyes: Extraocular movements intact  Lungs: Respirations unlabored, chest wall no deformity  ENT: Normal external ear canals, no sinus tenderness  Heart: Regular rate rhythm  Abdomen: No masses, tenderness  Extremities: No cyanosis or edema, 2+ pulses  Skin: Intact, normal skin color    Neurological examination:  Mental status   Alert and oriented; intact memory with no confusion, speech or language problems; no hallucinations or delusions     Cranial nerves   II - visual fields intact to confrontation                                                III, IV, VI - extra-ocular muscles full: no pupillary defect; no LAUREN, no nystagmus, no ptosis   V - normal facial sensation                                                               VII - normal facial symmetry                                                             VIII - intact hearing                                                                             IX, X - symmetrical palate                                                                  XI - symmetrical shoulder shrug                                                       XII - midline tongue without atrophy or fasciculation     Motor function  Normal muscle bulk and tone; normal power 5/5, including fine motor movements     Sensory function Intact to touch, pin, vibration, proprioception     Cerebellar Intact fine motor movement. No involuntary movements or tremors     Reflex function Intact 2+ DTR and symmetric. Negative Babinski     Gait                  Mild gait ataxia       Neurological Work-up:   CT Head WO Contrast on 09/07/2017: No acute intracranial abnormality. CT Head WO Contrast on 10/19/2020: Previous suboccipital craniotomy with encephalomalacia within the left  cerebellum and some resultant enlargement of the 4th ventricle. No acute intracranial abnormality. Assessment Recommendations:  Left cerebellar hypertensive hemorrhage, likely hypertensive s/p crniotomy 06/2019  The patient currently takes Plavix and Lipitor for stroke prophylaxis. Patient also has been taking aspirin for cardiac conditions. I advised the patient to consult with his cardiologist to discontinue aspirin at this time. For most patients with stroke, the combined long-term use of aspirin and clopidogrel does not offer greater benefit for stroke prevention than either agent alone but does substantially increase the risk of bleeding complications. Thus from neurological standpoint, aspirin can be discontinued unless needed from patient's cardiac reasons. Patient has experiences ataxic gait since his stroke in 2019. I stressed the importance of ambulance with a walker to minimize the risk of falls. The patient also complained of foot pain upon walking, for which I recommend he consult his PCP to investigate plantar fasciitis. Patient also inquired about physical therapy. Patient to follow up in 1 year or sooner if symptoms worsen.     Scribe Attestation:   By signing my name below, I, CRIS BRITT, attest that this documentation has been prepared under the direction and in the presence of Rhonda Wheat MD.    Electronically Signed: Azra Davis. 04/19/22. 11:21 AM     I, Noe Zuniga MD, personally performed the services described in this documentation. All medical record entries made by the scribe were at my direction and in my presence. I have reviewed the chart and discharge instructions (if applicable) and agree that the record reflects my personal performance and is accurate and complete.     Electronically Signed: Noe Zuniga 5/3/2022 11:34 PM    Diplomate, American Board of Psychiatry and Neurology  Diplomate, American Board of Clinical Neurophysiology  Diplomate, American Board of Epilepsy

## 2022-04-28 DIAGNOSIS — R73.01 IMPAIRED FASTING GLUCOSE: ICD-10-CM

## 2022-04-28 RX ORDER — METFORMIN HYDROCHLORIDE 500 MG/1
TABLET, EXTENDED RELEASE ORAL
Qty: 90 TABLET | Refills: 0 | Status: SHIPPED | OUTPATIENT
Start: 2022-04-28 | End: 2022-08-15

## 2022-05-03 ENCOUNTER — TELEPHONE (OUTPATIENT)
Dept: FAMILY MEDICINE CLINIC | Age: 77
End: 2022-05-03

## 2022-05-03 NOTE — TELEPHONE ENCOUNTER
RUTH ANN:  Patient's wife called. She was requesting refill on Zoloft. The last time he had this was 11/2020. He is depressed, can't walk, taking a lot of medications. She then stated he is saying \"he wants to end his life\". She was told to take him to ER or call 911 to get him help.

## 2022-05-04 ENCOUNTER — HOSPITAL ENCOUNTER (OUTPATIENT)
Dept: PHYSICAL THERAPY | Age: 77
Setting detail: THERAPIES SERIES
Discharge: HOME | End: 2022-05-04
Payer: MEDICARE

## 2022-05-04 PROCEDURE — 97162 PT EVAL MOD COMPLEX 30 MIN: CPT

## 2022-05-04 NOTE — CONSULTS
[x] [facility] ([facility] - [facility] Therapy  [address]  [place]: [phone]   F: [phone]        Physical Therapy Neurological Evaluation    Date:  2022  Patient: Vin Hines  :   MRN: [id number]  Physician: MARKUS Monroe - CNP   Ins[place]: [place] [facility]   Medical Diagnosis: Z74.09 (ICD-10-CM) - Impaired mobility   Rehab Codes:  R53.1 weakness, R29.6 impaired balance, R42 dizziness, H55.89 irregular eye movements   Date of symptom onset: 19 -- underwent craniectomy of the suboccipital with cervical laminectomy for decompression of intracerebral hemorrhage    Next 's appt.: sees optometry later today (22); 8/10/22 with PCP    Precautions: on blood thinner     Subjective:   Pt arrives to clinic ambulatory with rollator. Pt is agreeable to PT evaluation. Pt notes he is here due to feeling off balanced at home with standing and walking. He reports in 2019 he had a brain surgery due to hemorrhagic CVA that affected the L side of the body. Feels that his LLE has come back but still having some ataxia in the LUE. Notes he is using the rollator for his mobility in & out of the home. Pt feels he is just very unstable. Has goals to progress to a cane. Notes no recent falls but feels he stumbles a lot and typically \"falls\" into the couch. Patient stated Goals: Pt notes he wants to be able to walk and be more balanced. Pain:  [x] Yes  [] No -- not true pain, just describes he is more uncomfortable as he is depressed.         Previous PT hx:   - seen in this clinic in Aug 2020-- completed 17 visits but then had to cardiac rehab prior to returning to physical therapy   -completed IPR immediately after hospitalization from CVA        PMHx: [] Unremarkable [x] Diabetes [x] HTN  [] Pacemaker   [x] MI/Heart Problems(stents) [] Cancer [x] Arthritis [x] Other: L TKA               [x] Refer to full medical chart  In EPIC Comorbidities:   [x] Obesity [] Dialysis  [] Other:   [] Asthma/COPD [] Dementia [] Other:   [x] Stroke (June 2019)  [] Sleep apnea [] Other:   [] Vascular disease [] Rheumatic disease [] Other:     Tests/Imaging: none at this time       Medications: [x] Refer to full medical record [] None [] Other:  Allergies:       [x] Refer to full medical record [] None [] Other:      Function:  Hand Dominance  [x] Right  [] Left    Home Environment:   Patient lives with:  grandson, wife living there (thery are )    In what type of home [x]  One story- Senior living apt    [] Two story   [] Split level   Number of stairs to enter  0        Handrail:    []  Right to enter   [] Left to enter    Stairs within the home   0         Handrails: []  Right to ascending  [] Left to ascending   Bathroom has a []  Tub only  [] Tub/shower combo   [x] Walk in shower    [x]  Grab bars           Washing machine is on []  Main level   [] Second level   [] Basement   Employer/Job Retired    [employer]  Working cars       Durable Medical Equipment:  Current DME available: rollator     ADL/IADL Previous level of function -- neuro baseline Current level of function Who currently assists the patient with task/Comments   Bathing  [] Independent  [x] Assist [] Independent  [x] Assist Family assists, using shower chair    Dress/grooming [x] Independent  [] Assist [x] Independent  [] Assist Increased time    Transfer/ bed mobility [x] Independent  [] Assist [x] Independent  [] Assist Needs to be able to use hands    Feeding [x] Independent  [] Assist [x] Independent  [] Assist    Toileting [x] Independent  [] Assist [x] Independent  [] Assist    Driving [x] Independent  [] Assist [x] Independent  [] Assist    Housekeeping [x] Independent  [] Assist [] Independent  [x] Assist Does his own house keeping    Grocery shop/meal prep [] Independent  [x] Assist [] Independent  [x] Assist Family goes to store for him; he cooks    Ambulation [x] Independent  [] Assist  [x] Independent  [] Assist  Using rollator but feels unbalanced   -  Standing -- limited tolerance due to back pain      Objective:    POSTURE No deficit Deficit Not Tested Comments   Kyphosis [] [x] []    Scoliosis [] [] []    Forward Head [] [x] []    Rounded Shldrs [] [x] []    Slumped Sitting [] [x] []    Skin Integrity [] [x] [] Discoloration in LEs indicative of PVD   Pelvic alignment x      Hip alignment x      Knee alignment x      Ankle alignment x             NEUROLOGICAL       Reflexes [] [] [x]    Sensation [x] [] []    Bladder/Bowel [x] [] []    Coordination [] [x] [] RUE affected  Limited reactive balance     Tone [x] [] []    Clonus [x] [] []    Vision   x  Readers    Cognition  x  Disoriented to dates when giving hx    Tremors [x] [] []    - notes some dizziness that occurs daily      OCULOMOTOR SCREEN:       05/04/22 1004   Visual/Occulomotor Tests   Visual/Occulomotor Assessed Yes   Visual / Occulomotor Assessed Smooth Pursuits; Eye Movement Range; Saccades   Corrective Lenses Reading Glasses   Gaze Holding Nystagmus No   Eye Movement Range Normal   Smooth pursuits horizontal Requires cues, head turbs, or add eye shifts to track; Other (comment)  (jerky movements greater than vertica; 2-3 beat to correct)   Smooth pursuits vertical Other (comment); Requires cues, head turns, or add eye shifts to track  (jerky)   Saccades horizontal Decreased speed of saccadic movement   Saccades vertical Decreased speed of saccadic movement        05/04/22 1008   VOR Tests   Horizontal Option Impaired  (difficulty with eyes staying on target)   Cancellation Horizontal Option Impaired  (increased symptoms-- visible jumping of eyes)   Head Thrust Intact      JOINT MEASURES     Strength  Left Strength  Right   Hip Flexion 4+ 4+   Hip Abduction 4 4   Hip Adduction 5 5   Hip Extension 4 4   Knee Flexion 5 5   Knee Extension 5 5   Dorsiflexion 5 5   Plantar flexion 5 5         FUNCTION Level of assistance Comments/observations   Bed Mobility  Not assessed this date    -rolling     -sit to supine     -supine to sit     Transfers     -sit to stand  Tends to steady with legs on table indicating some proprioceptive deficit; sits with leveraging on table    -sliding board     -pivot     Balance     -sitting static IND     - sitting dynamic IND     -standing static supervision Without AD tends to stand with wide JOSE    - standing dynamic SBA to CGA  Apprehensive of higher level balance without rollator; with rollator more CGA   Limited balance reactions    Ambulation SBA  Forward flexed posture, need for rollator, decreased step height, narrow JOSE    No significant path deviation     Functional outcome measures:     Functional Patient Reported Outcome Assessment Used: Not collected this date -- will collect ABC scale at next session and set goal       Outcome Measures:Balance/Gait Assessment(s) Performed: 5/4/22  Dasie Skiff Balance Score: 34/56 -- increased fall risk   5 TIMES SIT TO STAND: 11.71 seconds - not a fall risk   Timed Up and Go: 15.8 (using a rollator with SBA)-- not a fall risk         Assessment:    Pt presents for evaluation due to imbalance with standing and walking after multiple medical conditions but most notably his hemorrhagic CVA (2019). Upon further questioning pt is also reporting dizziness which lead to screening of vestibulo-occular system. Pt has deficits of gaze stability. Has jerky eye movements more in lateral directions vs vertical. Has a positive VOR cancellation test. Feel these deficits are contributing to dizziness and imbalance as he is having a delay in visualizing a stable object affecting his proprioceptive. He demos good strength need for stability in standing and walking but do not that he lacks balance reactions that could be leading to unsteadiness. This is evident with VASQUEZ balance test. Scores a 34/56 which is indicative of high falls risk.  Also has a fear of falling that also contributes to increase fall risk. Will assess this further next sessions. With gait he is stable with use of his rollator with typical walking. Feel with more dynamic tasks in an open environment would lead to instability due to the previously mentioned deficits. Feel this patient has a good prognosis for improvement given his recovery thus far from CVA and motivation to improve. Feel with a POC addressing gaze stability, proprioceptive/neuromuscualr control training, and dynamic/static balance training he will be able to decrease his fall risk and feel more stable with community level mobility. Problems:    [] ? Pain:  [] ? ROM:  [] ? Strength:  [x] ? Function: impaired balance   [x] Other: impaired gaze stability        STG: (to be met in 10 treatments)  1. Pt will improve score on VASQUEZ balance scale by >/= 45/56 points  to improve overall balance stability and decrease fall risk with mobility. 2.  Pt will increase strength of all major muscle groups of bilateral hips to 5/5 or greater demonstrating improved strength needed to maximize proximal stability with mobility & transfers. 3. Pt will demonstrate appropriate balance reactions 75% of time when a perturbations are present with no LOB thus improving stability if her were to encounter a bump in the community. 4. Pt will have no jerky movements with smooth pursuits showing improve gaze stability needed to navigate at a community level. LTG: (to be met in 20 treatments)  1. Pt will report no falls for x6 weeks and verbalize ways to reduce falls at home demonstrating improved safety with mobility and implementation of fall prevention strategies. 2. Pt will be independent with home program addressing strength, flexibility and balance to maximize gains made in therapy to improve overall functional capacity for mobility.    3. Pt will improve self confidence of balance per the ABC scale to >65% to demonstrate greater confidence that correlates with decreased fall risk. 4. Pt will be able to navigate curbs, uneven surfaces, and inclines/declines with LRAD with distant supervision to ensure he can safely navigate the community & get outdoors more. 5. Pt will be able to ambulate with LRAD while visually scanning without a LOB or pathway deviation . Rehab Potential:  [x] Good  [] Fair  [] Poor   Suggested Professional Referral:  [x] No  [] Yes:  Barriers to Goal Achievement[de-identified]  [x] No  [] Yes:  Domestic Concerns:  [x] No  [] Yes:    Pt. Education:  [x] Plans/Goals, Risks/Benefits discussed  [] Home exercise program  Method of Education: [x] Verbal  [x] Demo  [] Written  Comprehension of Education:  [x] Verbalizes understanding. [x] Demonstrates understanding. [] Needs Review. [] Demonstrates/verbalizes understanding of HEP/Ed previously given. Treatment Plan:  [x] Therapeutic Exercise   92173  [] Iontophoresis: 4 mg/mL Dexamethasone Sodium Phosphate  mAmin  02864   [x] Therapeutic Activity  70327 [] Vasopneumatic cold with compression  10547    [x] Gait Training   49142 [] Ultrasound   66215   [x] Neuromuscular Re-education  69001 [] Electrical Stimulation Unattended  03649   [] Manual Therapy  61638 [] Electrical Stimulation Attended  02687   [x] Instruction in HEP  [] Dry Needling   [] Aquatic Therapy   31600 [] Cold/hotpack    [] Massage   19077      [] Lumbar/Cervical Traction  86439     []  Medication allergies reviewed for use of    Dexamethasone Sodium Phosphate 4mg/ml     with iontophoresis treatments. Pt is not allergic.       Frequency: 2 x/weeks for 20 visits    Todays Treatment:  None this date due to time    Specific Instructions for next treatment: complete ABC scale, provide with initial HEP, Gaze stability interventions and balance     Evaluation Complexity:  History (Personal factors, comorbidities) [] 0 [] 1-2 [x] 3+   Exam (limitations, restrictions) [] 1-2 [] 3 [x] 4+   Clinical presentation (progression) [] Stable [x] Evolving  [] Unstable   Decision Making [] Low [x] Moderate [] High    [] Low Complexity [x] Moderate Complexity [] High Complexity       Treatment Charges: Mins Units   [x] Evaluation       []  Low       [x]  Moderate       []  High 49 1   []  Modalities     []  Ther Exercise     []  Manual Therapy     []  Ther Activities     []  Aquatics     []  Vasocompression     []  Other       TOTAL TREATMENT TIME: 49     (Billed timed treatment:0)     Time In: 5755    Time Out: 5935    Electronically signed by:   Danita Deutsch, PT, DPT   #[id number]

## 2022-05-12 NOTE — FLOWSHEET NOTE
[facility] Outpatient Physical Therapy              [address]              Phone: [phone]              Fax: [phone]     Physical Therapy Daily Treatment Note        Date:  22  Patient Name:  Ernestina Holliday                    :  [birth year]                     MRN: [id number]  Physician: Amarilis Barriga, [address] needed from [facility]  **Kaylaflorjoshua 1268 20 VISITS 22-22  [facility] # [id number]**  Medical Diagnosis: Z74.09 (ICD-10-CM) - Impaired mobility            Rehab Codes:  R53.1 weakness, R29.6 impaired balance, R42 dizziness, H55.89 irregular eye movements   Date of symptom onset: 19 -- underwent craniectomy of the suboccipital with cervical laminectomy for decompression of intracerebral hemorrhage    Next 's appt.: sees optometry later today (22); 8/10/22 with PCP  Visit# / total visits:                     Cancels/No Shows: 0/1     Subjective:  Denies falls since last visit. R knee pain once in a while along with ongoing stomach pain. Reports he is always \"dizzy\" especially when sitting back and leans forward and with standing. States when he gets up in AM he sits edge of bed before walking due to imbalance/lightheadedness. Pain:  []? Yes  [x]? No   Location:  N/A         Pain Rating: (0-10 scale) 0/10  Pain altered Tx:  [x]? No  []?  Yes  Action:  Comments: Initiated exercise for imbalance and improved VOR/occulomotor control     Objective:  Modalities:   Precautions: Fall Risk- Gait belt with standing    Exercises:  Exercise Reps/ Time Weight/ Level Completed  22   Comments   Week 1 Gaze Stabilization Program: Seated  Gaze Stab Holding Target Horiz/Vert     Gaze stab Target on Wall Horiz/Vert  1' each    X *HEP issued 22   Seated VOR Cancellation Horiz/Vert  1' each    X *HEP Issued 22; cues to slow pace to keep target in focus   Seated Smooth Pursuit Horiz/Vert  1' each    X *HEP Issued 22; cues to slow pace as mild nystagmus/jump to correct observed if patient goes too fast with horiz    Sacaades Add                                             Squats EO/EC           Normal Base of Support Head turns 4-ways 10x each   X *HEP Issued 22; most difficulty with vertical; to perform at home in front of chair with walker                                       Hallway Ambulation Fwd/Retro: EC, 4-way Head Motion, GST Horiz/Vert; VOR Cancellation Horiz/Vert              Orthostatic BPs/HR:  1. Seated: 155/93  HR 79 bpm  2. Sit-Standin/84 HR 84 bpm'  3. Sit-Supine: 155/89 HR 77 bpm  4. Supine- Sit: 146/87 HR 75 bpm  (increased dizziness 5/10-describes lightheaded vs vertigo)    Other: ABC Scale: 37.5 % of self confidence (see hard copy in chart)        Specific Instructions for next treatment: Progress with Gaze stability activity, Proprioceptive training for both static and dynamic balance; review HEP      Assessment:  [x]? Progressing toward goals. Initiated exercise as noted above. Performed positional BPs due to patient complaints with positional changes- no major fluctuations this date however pressure slightly elevated. Patient was symptomatic with sit to supine/supine to sit describing symptoms more as lightheaded Patient reports he takes meds in AM with breakfast. Issued patient HEP of Gaze stability, smooth pursuit and VOR cancellation exercise seated for home to build occulomotor control to assist with decreasing dizziness; mild saccaadic movements noted with smooth pursuit and VOR cancellation- patient encouraged to slow speed and be sure target remains in focus. Also initiated standing firm surface head turns- patient requires close CGA especially with cervical extension- tends to lose balance posteriorly. Patient educated on safety with HEP- Eye exercise to be performed seated 3x a day along with standing head movements at walker with chair behind.  Patient verbalizes understanding. []? No change. []? Other:                          []? Patient would continue to benefit from skilled physical therapy services in order to address gaze stability deficits, proprioceptive/neuromuscular training and dynamic/static balance training to decrease fall risk and improve stability with community level mobility.       STG: (to be met in 10 treatments)  1. Pt will improve score on VASQUEZ balance scale by >/= 45/56 points  to improve overall balance stability and decrease fall risk with mobility. 2.  Pt will increase strength of all major muscle groups of bilateral hips to 5/5 or greater demonstrating improved strength needed to maximize proximal stability with mobility & transfers. 3. Pt will demonstrate appropriate balance reactions 75% of time when a perturbations are present with no LOB thus improving stability if her were to encounter a bump in the community. 4. Pt will have no jerky movements with smooth pursuits showing improve gaze stability needed to navigate at a community level.   LTG: (to be met in 20 treatments)  1. Pt will report no falls for x6 weeks and verbalize ways to reduce falls at home demonstrating improved safety with mobility and implementation of fall prevention strategies. 2. Pt will be independent with home program addressing strength, flexibility and balance to maximize gains made in therapy to improve overall functional capacity for mobility.   3. Pt will improve self confidence of balance per the ABC scale to >65% to demonstrate greater confidence that correlates with decreased fall risk.   4. Pt will be able to navigate curbs, uneven surfaces, and inclines/declines with LRAD with distant supervision to ensure he can safely navigate the community & get outdoors more. 5. Pt will be able to ambulate with LRAD while visually scanning without a LOB or pathway deviation .        Pt. Education:  [x]? Yes  []?  No [x]? Reviewed Prior HEP/Ed  Method of Education: [x]? Verbal  [x]? Demo  [x]? Written- Gaze Stabilization Week 1- copy in chart along with VOR cancellation. Comprehension of Education:  [x]? Verbalizes understanding. [x]? Demonstrates understanding. [x]? Needs review. []? Demonstrates/verbalizes HEP/Ed previously given.              Plan:    [x]? Continue per plan of care. []? Other:                            Treatment Charges: Mins Units   []? Modalities       []? Ther Exercise       []? Manual Therapy       []? Ther Activities       []? Aquatics       [x]? Neuromuscular 42 3   []? Vasocompression       []? Gait Training       []? Dry needling        []? 1 or 2 muscles        []? 3 or more muscles       []?   Other       Total Treatment time 42 3      Time In: 145 AM         Time Out: 852 AM     Electronically signed by:  Mini Neves PTA

## 2022-05-17 ENCOUNTER — HOSPITAL ENCOUNTER (OUTPATIENT)
Dept: PHYSICAL THERAPY | Age: 77
Setting detail: THERAPIES SERIES
Discharge: HOME | End: 2022-05-17
Payer: MEDICARE

## 2022-05-17 PROCEDURE — 97112 NEUROMUSCULAR REEDUCATION: CPT

## 2022-05-24 ENCOUNTER — HOSPITAL ENCOUNTER (OUTPATIENT)
Dept: PHYSICAL THERAPY | Age: 77
Setting detail: THERAPIES SERIES
Discharge: HOME | End: 2022-05-24
Payer: MEDICARE

## 2022-05-24 PROCEDURE — 97112 NEUROMUSCULAR REEDUCATION: CPT

## 2022-05-24 NOTE — FLOWSHEET NOTE
[address] [facility] Outpatient Physical Therapy              [address]              Phone: [phone]              Fax: [phone]     Physical Therapy Daily Treatment Note        Date:  22  Patient Name:  Radha Melendrez                    :  [birth year]                     MRN: [id number]  Physician: Amarilis Chen, [address] needed from [facility]  **Olvin 1268 20 VISITS 22-22  [facility]og[facility] Grater # [id number]**  Medical Diagnosis: Z74.09 (ICD-10-CM) - Impaired mobility            Rehab Codes:  R53.1 weakness, R29.6 impaired balance, R42 dizziness, H55.89 irregular eye movements   Date of symptom onset: 19 -- underwent craniectomy of the suboccipital with cervical laminectomy for decompression of intracerebral hemorrhage    Next 's appt.: sees optometry later today (22); 8/10/22 with PCP  Visit# / total visits: 3/20                    Cancels/No Shows: 0/2     Subjective:  Reports compliance with eye exercise \"a couple times a day. \" Right knee bothersome - knows he needs a replacement but wants to improve balance and strength. Denies any changes in lightheadedness and dizziness since beginning PT- denies recent falls just occasional loss of balance. Pain:  []? Yes  [x]? No   Location:  R knee        Pain Rating: (0-10 scale) 5/10  Pain altered Tx:  [x]? No  []?  Yes  Action:  Comments:      Objective:  Modalities:   Precautions: Fall Risk- Gait belt with standing    Exercises:  Exercise Reps/ Time Weight/ Level Completed  22   Comments   Week 1 Gaze Stabilization Program: Standing semi tandem  Gaze Stab Holding Target Horiz/Vert     Gaze stab Target on Wall Horiz/Vert Standing Feet Together  1' each    X    Standing feet together VOR Cancellation Horiz/Vert  1' each    X    Standing normal stance on FOAM Smooth Pursuit Horiz/Vert  1' each    X    Saccades- eyes only standing Semi Tandem H/V 1' each    X                             Slow Controlled High Knee Marching- Alternating 10x   X     Squats EO/EC  5x/10x   X     Feet Close Head turns 4-ways 30\" each   X    Feet Close EC 2x30\"    X                 Alternating Step Taps  10x 6\" step  X     Forced WB 1' each 6\" step  X          Other: Reviewed safety with sit to stand transfers/pivots with use of walker and avoid trying to sit before he has chair directly behind him- impulsivity increases as he fatigues.         Specific Instructions for next treatment: Progress with Gaze stability activity, Proprioceptive training for both static and dynamic balance     Assessment:  [x]? Progressing toward goals. Reviewed previously issued HEP and progressed while in therapy with narrow base of support/uneven surfaces. Also progressed with various Eyes closed activity to increase somatosensory input for improved balance. Patient is easily fatigued requiring brief seated rest breaks. 1 episode of feeling lightheaded at end of program after squats eyes open/eyes closed however patient also fatigue. Reviewed importance of increasing activity at home and to attempt HEP 3-5x/day with increased head speed. Also progressed with standing static and dynamic balance as tolerated. Patient noted mild Right knee soreness post exercise. []? No change. []? Other:                          []? Patient would continue to benefit from skilled physical therapy services in order to address gaze stability deficits, proprioceptive/neuromuscular training and dynamic/static balance training to decrease fall risk and improve stability with community level mobility.       STG: (to be met in 10 treatments)  1. Pt will improve score on VASQUEZ balance scale by >/= 45/56 points  to improve overall balance stability and decrease fall risk with mobility.    2.  Pt will increase strength of all major muscle groups of bilateral hips to 5/5 or greater demonstrating improved strength needed to maximize proximal stability with mobility & transfers. 3. Pt will demonstrate appropriate balance reactions 75% of time when a perturbations are present with no LOB thus improving stability if her were to encounter a bump in the community. 4. Pt will have no jerky movements with smooth pursuits showing improve gaze stability needed to navigate at a community level.   LTG: (to be met in 20 treatments)  1. Pt will report no falls for x6 weeks and verbalize ways to reduce falls at home demonstrating improved safety with mobility and implementation of fall prevention strategies. 2. Pt will be independent with home program addressing strength, flexibility and balance to maximize gains made in therapy to improve overall functional capacity for mobility.   3. Pt will improve self confidence of balance per the ABC scale to >65% to demonstrate greater confidence that correlates with decreased fall risk.   4. Pt will be able to navigate curbs, uneven surfaces, and inclines/declines with LRAD with distant supervision to ensure he can safely navigate the community & get outdoors more. 5. Pt will be able to ambulate with LRAD while visually scanning without a LOB or pathway deviation .        Pt. Education:  [x]? Yes  []? No  [x]? Reviewed Prior HEP/Ed  Method of Education: [x]? Verbal  [x]? Demo  [x]? Written- Gaze Stabilization Week 1- copy in chart along with VOR cancellation. Comprehension of Education:  [x]? Verbalizes understanding. [x]? Demonstrates understanding. [x]? Needs review. []? Demonstrates/verbalizes HEP/Ed previously given.              Plan:    [x]? Continue per plan of care. []? Other:                            Treatment Charges: Mins Units   []? Modalities       []? Ther Exercise       []? Manual Therapy       []? Ther Activities       []? Aquatics       [x]? Neuromuscular 40 3   []? Vasocompression       []? Gait Training       []? Dry needling        []?  1 or 2 muscles        []? 3 or more muscles       []?   Other       Total Treatment time 40 3      Time In: 208 AM         Time Out: 848 AM     Electronically signed by:  Tatiana Griffith PTA

## 2022-05-31 ENCOUNTER — HOSPITAL ENCOUNTER (OUTPATIENT)
Dept: PHYSICAL THERAPY | Age: 77
Setting detail: THERAPIES SERIES
Discharge: HOME | End: 2022-05-31
Payer: MEDICARE

## 2022-05-31 PROCEDURE — 97112 NEUROMUSCULAR REEDUCATION: CPT

## 2022-05-31 NOTE — FLOWSHEET NOTE
[address] [facility] Outpatient Physical Therapy              [address]              Phone: [phone]              Fax: [phone]     Physical Therapy Daily Treatment Note        Date:  22  Patient Name:  Frida Crenshaw                    :  [birth year]                     MRN: [id number]  Physician: Amarilis Madrid, [address] needed from [facility]  **Kaylaflorcobandar 1268 20 VISITS 22-22  [facility] # [id number]**  Medical Diagnosis: Z74.09 (ICD-10-CM) - Impaired mobility            Rehab Codes:  R53.1 weakness, R29.6 impaired balance, R42 dizziness, H55.89 irregular eye movements   Date of symptom onset: 19 -- underwent craniectomy of the suboccipital with cervical laminectomy for decompression of intracerebral hemorrhage    Next 's appt.: sees optometry later today (22); 8/10/22 with PCP  Visit# / total visits:                     Cancels/No Shows: 0/3     Subjective:  Reports he has not had any falls. Still notes some dizziness with change of positions. Notes compliance with home program. Pt wonders if headaches related to gaze stability HEP. Pain:  []? Yes  [x]? No   Location:  R knee        Pain Rating: (0-10 scale) 5/10  Pain altered Tx:  [x]? No  []?  Yes  Action:  Comments:      Objective:  Modalities:   Precautions: Fall Risk- Gait belt with standing    Exercises:  Exercise Reps/ Time Weight/ Level Completed  22   Comments   Week 1 Gaze Stabilization Program: Standing semi tandem  Gaze Stab Holding Target Horiz/Vert     Gaze stab Target on Wall Horiz/Vert Standing Feet Together  1' each    X CGA with gait belt    Standing feet together VOR Cancellation Horiz/Vert  2x30 each    x    Standing normal stance on FOAM Smooth Pursuit Horiz/Vert  1' each        Saccades H/V- eyes only on foam  1' each    x                  sit to stands on foam   2x7    x Unilateral UE support to arise    Slow Controlled High Knee Marching- Alternating 10x        Squats EC  10x2   x  tends to lean to the R side    Feet Close Head turns 4-ways 30\" each       Feet Close EC 2x30\"   x     Toe taps to cones 2x20  x Alternating; Donald to CGA for balance; cues for standing tall    F/B weight shifts on foam  2x20  x CGA    High/low rotational ball passes  2x10ea     4# ball  x  cues for more rotation    Alternating Step Taps  10x 6\" step      Forced WB 2x30s each 6\" step x      Other:         Specific Instructions for next treatment: Progress with Gaze stability activity, Proprioceptive training for both static and dynamic balance     Assessment:  [x]? Progressing toward goals. Continued to work on gaze stability interventions due to dizziness. No increase in dizziness reported but some sway observed with these activities. Continued to progress proprioceptive challenge with use of foam, various stances, and limiting vision. Tendency for a retro alignment as pt is fearful of falling forward. Cued for pushing the toes down to bring alignment more forward. Also added weight shifting on foam for knowing limits of stability. Observed pt tends to just reach back to find chair vs turning and looking at where it is as he goes to sit. Added in weighted ball passes to improve on this weight shift and rotation for this movement. Reminded pt throughout about reaching for UE support and discussed about needing to improve balance confidence to reduce fall risk. Overall pt does well with intermittent rest breaks. No significant LOBs and CGA to Donald needed for safety with more dynamic and challenging tasks. Will continue to progress balance challenges to ensure he is safer with mobility. []? No change. []? Other:                          [x]?  Patient would continue to benefit from skilled physical therapy services in order to address gaze stability deficits, proprioceptive/neuromuscular training and dynamic/static balance training to decrease fall risk and improve stability with community level mobility.       STG: (to be met in 10 treatments)  1. Pt will improve score on VASQUEZ balance scale by >/= 45/56 points  to improve overall balance stability and decrease fall risk with mobility. 2.  Pt will increase strength of all major muscle groups of bilateral hips to 5/5 or greater demonstrating improved strength needed to maximize proximal stability with mobility & transfers. 3. Pt will demonstrate appropriate balance reactions 75% of time when a perturbations are present with no LOB thus improving stability if her were to encounter a bump in the community. 4. Pt will have no jerky movements with smooth pursuits showing improve gaze stability needed to navigate at a community level.   LTG: (to be met in 20 treatments)  1. Pt will report no falls for x6 weeks and verbalize ways to reduce falls at home demonstrating improved safety with mobility and implementation of fall prevention strategies. 2. Pt will be independent with home program addressing strength, flexibility and balance to maximize gains made in therapy to improve overall functional capacity for mobility.   3. Pt will improve self confidence of balance per the ABC scale to >65% to demonstrate greater confidence that correlates with decreased fall risk.   4. Pt will be able to navigate curbs, uneven surfaces, and inclines/declines with LRAD with distant supervision to ensure he can safely navigate the community & get outdoors more. 5. Pt will be able to ambulate with LRAD while visually scanning without a LOB or pathway deviation .        Pt. Education:  [x]? Yes  []? No  [x]? Reviewed Prior HEP/Ed  Method of Education: [x]? Verbal  [x]? Demo  [x]? Written- Gaze Stabilization Week 1- copy in chart along with VOR cancellation. Comprehension of Education:  [x]? Verbalizes understanding. [x]? Demonstrates understanding. [x]? Needs review.   []? Demonstrates/verbalizes HEP/Ed previously given.              Plan:    [x]? Continue per plan of care. []? Other:                            Treatment Charges: Mins Units   []? Modalities       []? Ther Exercise       []? Manual Therapy       []? Ther Activities       []? Aquatics       [x]? Neuromuscular 57 4   []? Vasocompression       []? Gait Training       []? Dry needling        []? 1 or 2 muscles        []? 3 or more muscles       []?   Other       Total Treatment time 57 4      Time In: 0815 AM         Time Out: 3606 AM     Electronically signed by:  Sergio Eric PT, DPT   #[id number]

## 2022-06-02 ENCOUNTER — HOSPITAL ENCOUNTER (OUTPATIENT)
Dept: PHYSICAL THERAPY | Age: 77
Setting detail: THERAPIES SERIES
Discharge: HOME | End: 2022-06-02
Payer: MEDICARE

## 2022-06-02 PROCEDURE — 97112 NEUROMUSCULAR REEDUCATION: CPT

## 2022-06-02 PROCEDURE — 97110 THERAPEUTIC EXERCISES: CPT

## 2022-06-02 NOTE — FLOWSHEET NOTE
[facility] Outpatient Physical Therapy              [address] [facility] [address]              Phone: [phone]              Fax: [phone]     Physical Therapy Daily Treatment Note        Date:  22  Patient Name:  Penny Sim                    :  [birth year]                     MRN: [id number]  Physician: Amarilis Jose, [address] needed from [facility]  **Olvin 1268 20 VISITS 22-22  Esther Mendoza # [id number]**  Medical Diagnosis: Z74.09 (ICD-10-CM) - Impaired mobility            Rehab Codes:  R53.1 weakness, R29.6 impaired balance, R42 dizziness, H55.89 irregular eye movements   Date of symptom onset: 19 -- underwent craniectomy of the suboccipital with cervical laminectomy for decompression of intracerebral hemorrhage    Next 's appt.: sees optometry later today (22); 8/10/22 with PCP  Visit# / total visits:                     Cancels/No Shows: 0/3     Subjective:  Reports he has not had any falls since last visit. He noted some dizziness after last session but fees overall well today. Pain:  []? Yes  [x]? No   Location:  R knee        Pain Rating: (0-10 scale) does not rate -- just says it hurts /10  Pain altered Tx:  [x]? No  []?  Yes  Action:  Comments:      Objective:  Modalities:   Precautions: Fall Risk- Gait belt with standing    Exercises:  Exercise Reps/ Time Weight/ Level Completed  22   Comments   Week 1 Gaze Stabilization Program: Standing semi tandem  Gaze Stab Holding Target Horiz/Vert     Gaze stab Target on Wall Horiz/Vert Standing Feet Together  1' each    CGA with gait belt    Standing feet together VOR Cancellation Horiz/Vert  2x30 each       Standing normal stance on FOAM Smooth Pursuit Horiz/Vert  1' each        Saccades H/V- eyes only on foam  1' each                    sit to stands on foam   2x7    Unilateral UE support to arise    Slow Controlled High Knee Marching- Alternating 10x      Tandem stance  2x30s ea   x CGA with intermittent touch down    Squats EC  10x2     tends to lean to the R side    Feet Close Head turns 4-ways on foam 30\" each   x Up & down, side to side, and diagonals   CGA    Feet Close EC on foam  2x30\"   x  CGA to Donald with excess excursion    Toe taps to cones 2x20  x Alternating; Donald to CGA for balance; cues for standing tall    F/B weight shifts on foam  2x20  x CGA    High/low rotational ball passes on foam 2x10ea     4# ball  x  CGA    Alternating Step Taps  2x20 6\" step x  CGA   Forced WB with foam 2x30s each 8\" step x  CGA          STRENGTHENING        Step ups fwd  2x10 ea  6\" step  x Unilateral UE support    3 way hip  2x10 ea  Red  x Unilateral UE support    Resisted side stepping  5x10ft  red x In // bars; band at thigh lvl     Other:  6/2: provided with HEP of march holds & F/B wt shifts for limits of stability training.          Specific Instructions for next treatment: Progress with Gaze stability activity as see fit, Proprioceptive training for both static and dynamic balance, begin to incorporate strengthening and dynamic walking balance (initially with rollator and progress to no rollator       Assessment:  [x]? Progressing toward goals. Incorporated more strengthening into session to improve on proximal control to help with stability with balance and power with mobility and transfers. Added foam to several exercises to increase proprioceptive awareness and balance reactions. Tending to shift to R and posterior but can correct with cues. Pt does well with progressions with some instability but no significant. Just needing occasional Donald to correct when swaying too far. Provided pt with new HEP involving march holds for SL stability and weight shift to improve awareness of limits of stability and balance corrections. Overall pt does well with session.   Will return to adding gaze stability as see fit but feel that dizziness might be a central cause and can continue to incorporate into balance work. []? No change. []? Other:                          [x]? Patient would continue to benefit from skilled physical therapy services in order to address gaze stability deficits, proprioceptive/neuromuscular training and dynamic/static balance training to decrease fall risk and improve stability with community level mobility.       STG: (to be met in 10 treatments)  1. Pt will improve score on VASQUEZ balance scale by >/= 45/56 points  to improve overall balance stability and decrease fall risk with mobility. 2.  Pt will increase strength of all major muscle groups of bilateral hips to 5/5 or greater demonstrating improved strength needed to maximize proximal stability with mobility & transfers. 3. Pt will demonstrate appropriate balance reactions 75% of time when a perturbations are present with no LOB thus improving stability if her were to encounter a bump in the community. 4. Pt will have no jerky movements with smooth pursuits showing improve gaze stability needed to navigate at a community level.   LTG: (to be met in 20 treatments)  1. Pt will report no falls for x6 weeks and verbalize ways to reduce falls at home demonstrating improved safety with mobility and implementation of fall prevention strategies. 2. Pt will be independent with home program addressing strength, flexibility and balance to maximize gains made in therapy to improve overall functional capacity for mobility.   3. Pt will improve self confidence of balance per the ABC scale to >65% to demonstrate greater confidence that correlates with decreased fall risk.   4. Pt will be able to navigate curbs, uneven surfaces, and inclines/declines with LRAD with distant supervision to ensure he can safely navigate the community & get outdoors more.   5. Pt will be able to ambulate with LRAD while visually scanning without a LOB or pathway deviation .        Pt. Education:  [x]? Yes  []? No  [x]? Reviewed Prior HEP/Ed  Method of Education: [x]? Verbal  [x]? Demo  [x]? Written- Gaze Stabilization Week 1- copy in chart along with VOR cancellation. Comprehension of Education:  [x]? Verbalizes understanding. [x]? Demonstrates understanding. [x]? Needs review. []? Demonstrates/verbalizes HEP/Ed previously given.              Plan:    [x]? Continue per plan of care. []? Other:                            Treatment Charges: Mins Units   []? Modalities       [x]? Ther Exercise  14 1   []? Manual Therapy       []? Ther Activities       []? Aquatics       [x]? Neuromuscular 40 3   []? Vasocompression       []? Gait Training       []? Dry needling        []? 1 or 2 muscles        []? 3 or more muscles       []?   Other       Total Treatment time 54 4      Time In: 0801 AM         Time Out: 7829  AM     Electronically signed by:  Jordon Daley, PT, DPT   #[id number]

## 2022-06-07 ENCOUNTER — HOSPITAL ENCOUNTER (OUTPATIENT)
Dept: PHYSICAL THERAPY | Age: 77
Setting detail: THERAPIES SERIES
Discharge: HOME | End: 2022-06-07
Payer: MEDICARE

## 2022-06-07 PROCEDURE — 97112 NEUROMUSCULAR REEDUCATION: CPT

## 2022-06-07 NOTE — FLOWSHEET NOTE
[facility] Outpatient Physical Therapy              [address]              Phone: [phone]              Fax: [phone]     Physical Therapy Daily Treatment Note        Date:  22  Patient Name:  Mary Almendarez   \"Andrea\"                 :  1945                     MRN: [id number]  Physician: Amarilis Keita, [address] needed from [facility]  **Kay[address]or[address]oshua 1268 20 VISITS 22-22  [facility] # [id number]**  Medical Diagnosis: Z74.09 (ICD-10-CM) - Impaired mobility            Rehab Codes:  R53.1 weakness, R29.6 impaired balance, R42 dizziness, H55.89 irregular eye movements   Date of symptom onset: 19 -- underwent craniectomy of the suboccipital with cervical laminectomy for decompression of intracerebral hemorrhage    Next 's appt.: sees optometry later today (22); 8/10/22 with PCP  Visit# / total visits:                     Cancels/No Shows: 0/3     Subjective: Rreports R knee is sore- at rest pain is 3/10; with activity 8-9/10. Denies falls since last visit. Continued complaints of \"dizziness\" with position changes- when getting out of bed or standing from prolonged sitting. Still feels his coordination is off. Reports good compliance with HEP/VOR program daily and feels he is continuing to increase his head speed. Knows he needs to have his right knee replaced but does not feel secure enough with his balance and endurance      Pain:  []? Yes  [x]? No   Location:  R knee        Pain Rating: (0-10 scale) 3 /10 at rest; 8/10 step ups  Pain altered Tx:  [x]? No  []?  Yes  Action:  Comments:      Objective:  Modalities:   Precautions: Fall Risk- Gait belt with standing    Exercises:  Exercise Reps/ Time Weight/ Level Completed  22   Comments   Week 1 Gaze Stabilization Program: Standing semi tandem  Gaze Stab Holding Target Horiz/Vert     Gaze stab Target on Wall Horiz/Vert Standing Feet Together  1' each    CGA with gait belt    Standing foam normal stance VOR Cancellation Horiz/Vert 10x each 2# ball X    Standing normal stance on FOAM Smooth Pursuit Horiz/Vert  1' each        Saccades H/V- eyes only on foam  1' each                    sit to stands on foam   2x7    Unilateral UE support to arise    Slow Controlled High Knee Marching- Alternating 10x        Semi Tandem stance on foam 2x30s ea   x CGA; Minimal Sway    Squats EC  10x2     tends to lean to the R side    Feet Close Head turns 4-ways on foam 30\" each   x Up & down, side to side, and diagonals   CGA ; no hands; Try Semi Tandem stance   Feet Close EC on foam  2x1'   x  CGA/Min A; moderate sway; no hands   Toe taps to cones 2x20   Alternating; Donald to CGA for balance; cues for standing tall    F/B weight shifts on foam  2x20   CGA    High/low rotational ball passes on foam 2x10ea     4# ball    CGA    Step Taps standing foam 10x 8\" step x  CGA; try no hands   Toes Up on Slant board EC 2x30\"  x Moderate posterior sway/loss of balance; Min A needed to correct balance   Toes Down Slant Board EC 2x30\"  x Minimal to no sway; ADD head movement next visit   Stepping over Mariza Add      Forced WB standing on foam 1' each 8\" step x  CGA- no hands          STRENGTHENING        Step ups fwd/lat  10x each 6\" step  x Unilateral UE support; added lateral 6/7/22    3 way hip  15x each  Red  x Unilateral UE support; slow/controlled movement   Resisted side stepping  5x10ft  red Resume next visit In // bars; band at thigh lvl           Gait with Rollator with head turns Add       Other:        Specific Instructions for next treatment: Incorporate gaze stability/head movement as able with balance program, Progress Proprioceptive training with narrow base of support, uneven surfaces and dynamic balance/walking balance (initially with rollator and progress to no rollator)      Assessment:  [x]? Progressing toward goals.  Continued with program emphasizing LE strength and stability; improve gaze stability needed to navigate at a community level.   LTG: (to be met in 20 treatments)  1. Pt will report no falls for x6 weeks and verbalize ways to reduce falls at home demonstrating improved safety with mobility and implementation of fall prevention strategies. 2. Pt will be independent with home program addressing strength, flexibility and balance to maximize gains made in therapy to improve overall functional capacity for mobility.   3. Pt will improve self confidence of balance per the ABC scale to >65% to demonstrate greater confidence that correlates with decreased fall risk.   4. Pt will be able to navigate curbs, uneven surfaces, and inclines/declines with LRAD with distant supervision to ensure he can safely navigate the community & get outdoors more. 5. Pt will be able to ambulate with LRAD while visually scanning without a LOB or pathway deviation .        Pt. Education:  []? Yes  []? No  [x]? Reviewed Prior HEP/Ed  Method of Education: [x]? Verbal  [x]? Demo  []? Written  Comprehension of Education:  [x]? Verbalizes understanding. [x]? Demonstrates understanding. [x]? Needs review. []? Demonstrates/verbalizes HEP/Ed previously given.              Plan:    [x]? Continue per plan of care. []? Other:                            Treatment Charges: Mins Units   []? Modalities       []? Ther Exercise      []? Manual Therapy       []? Ther Activities       []? Aquatics       [x]? Neuromuscular 48 3   []? Vasocompression       []? Gait Training       []? Dry needling        []? 1 or 2 muscles        []? 3 or more muscles       []?   Other       Total Treatment time 48 3      Time In: 1104 AM         Time Out: 1155 AM     Nicole Ugarte PTA

## 2022-06-09 ENCOUNTER — HOSPITAL ENCOUNTER (OUTPATIENT)
Dept: PHYSICAL THERAPY | Age: 77
Setting detail: THERAPIES SERIES
Discharge: HOME | End: 2022-06-09
Payer: MEDICARE

## 2022-06-09 PROCEDURE — 97110 THERAPEUTIC EXERCISES: CPT

## 2022-06-09 PROCEDURE — 97112 NEUROMUSCULAR REEDUCATION: CPT

## 2022-06-16 ENCOUNTER — HOSPITAL ENCOUNTER (OUTPATIENT)
Dept: PHYSICAL THERAPY | Age: 77
Setting detail: THERAPIES SERIES
Discharge: HOME | End: 2022-06-16
Payer: MEDICARE

## 2022-06-16 NOTE — FLOWSHEET NOTE
[] [facility] - [facility] & Therapy  [address]  [place]: [phone]   F: [phone]     Physical Therapy Cancel/No Show note    Date: 2022  Patient: Velma Mcgee  : 1945  MRN: [id number]    Visit Count:   Cancels/No Shows to date:     For today's appointment patient:    [x]  Cancelled    [] Rescheduled appointment    [] No-show     Reason given by patient:    []  Patient ill    [x]  Conflicting appointment    [] No transportation      [] Conflict with work    [] No reason given    [] Weather related    [] COVID-19    [] Other:      Comments: Patient called at [address] [facility] [place] [facility], appt time, and stated he had a dentist appt and cant make it.         [] Next appointment was confirmed    Electronically signed by: Kelli Chandra PTA

## 2022-06-21 ENCOUNTER — HOSPITAL ENCOUNTER (OUTPATIENT)
Dept: PHYSICAL THERAPY | Age: 77
Setting detail: THERAPIES SERIES
Discharge: HOME | End: 2022-06-21
Payer: MEDICARE

## 2022-06-21 PROCEDURE — 97112 NEUROMUSCULAR REEDUCATION: CPT

## 2022-06-21 PROCEDURE — 97116 GAIT TRAINING THERAPY: CPT

## 2022-06-21 NOTE — FLOWSHEET NOTE
[address] [facility]              [address]              Phone: [phone]              Fax: [phone]     Physical Therapy Daily Treatment Note        Date:  22  Patient Name:  Radha Melendrez   \"Andrea\"                 :  1945                     MRN: [id number]  Physician: MARKUS Estrada - KEARA                          Insurance: [place] needed from [employer]   **AUTH 20 VISITS 22-22  auth # [id number]**  Medical Diagnosis: Z74.09 (ICD-10-CM) - Impaired mobility            Rehab Codes:  R53.1 weakness, R29.6 impaired balance, R42 dizziness, H55.89 irregular eye movements   Date of symptom onset: 19 -- underwent craniectomy of the suboccipital with cervical laminectomy for decompression of intracerebral hemorrhage    Next 's appt.: sees optometry later today (22); 8/10/22 with PCP  Visit# / total visits:                     Cancels/No Shows:      Subjective: Patient reports missing the past few appointments due to a schedule mix up, but states he has no changes to any symptoms since his last visit here.        Pain:  [] Yes  [x] No   Location:  R knee        Pain Rating: (0-10 scale) 3/10 at rest, 8/10 with activities  Pain altered Tx:  [x] No  [] Yes  Action:  Comments:      Objective:  Modalities:   Precautions: Fall Risk- Gait belt with standing    Exercises:  Exercise Reps/ Time Weight/ Level Completed  22   Comments   Week 1 Gaze Stabilization Program: Standing semi tandem  Gaze Stab Holding Target Horiz/Vert     Gaze stab Target on Wall Horiz/Vert Standing Feet Together  1' each    CGA with gait belt    Standing foam normal stance VOR Cancellation Horiz/Vert 10x each 2# ball     Standing normal stance on FOAM Smooth Pursuit Horiz/Vert  1' each        Saccades H/V- eyes only on foam  1' each                    sit to stands on foam   2x7    Unilateral UE support to arise    Slow Controlled High Knee Marching- Alternating 10x  foam x CGA, occasional Donald   Semi Tandem stance on foam 2x30s ea    CGA; Minimal Sway    Squats EC  10x2     tends to lean to the R side    Semi tandem Head turns 4-ways on foam 30\" each foot leading   x Up & down, side to side, and diagonals   CGA ; no hands;    Feet Close EC on foam  3x30'   x  CGA; moderate sway; no hands   Toe taps to cones 2x20  x Alternating; Donald to CGA for balance; cues for standing tall    F/B weight shifts on foam  2x20  x CGA    High/low rotational ball passes on foam 2x10ea     4# ball  x  CGA    Step Taps standing foam 2x20 altn 8\" step x  CGA; no hands   Toes Up on Slant board EC 2x30\"  x Moderate posterior sway/loss of balance; Min A needed to correct balance   Toes Down Slant Board EC 2x30\"  x Minimal to no sway   Toes down SB EC head turns side to side 2x30''  x CGA; minimal sway, occasional UE support at bars for balance correction   Stepping over Mariza 10x ea   No UE support; CGA to Donald -- has x1 mod LOB.  More difficulty stepping L foot over due to pain limiting SL weight bearing tolerance on R    Forced WB standing on foam 1' each 8\" step   CGA- no hands          Strengthening       Step ups fwd/lat  10x each 6\" step   Unilateral UE support; added lateral 6/7/22    3 way hip  15x each  Red   Unilateral UE support; slow/controlled movement   Resisted side stepping  4x10ft  red  In // bars; band at thigh lvl           Gait Training       Gait w/o AD in front of mirror 30ftx2  x    Gait w/o AD in front of mirror w/ obstacle 30ftx2  x Maneuver around 3 cones and step over 2 hurdles, CGA   Gait with Rollator with head turns 45ftx2  x    Gait w/o AD 45ft  x     Other:        Specific Instructions for next treatment: Incorporate gaze stability/head movement as able with balance program, Progress Proprioceptive training with narrow base of support, uneven surfaces and dynamic balance/walking balance (initially with rollator and progress to no rollator) Assessment:  [x] Progressing toward goals. Focused on stability and balance exercises today, with focus on gait training without AD as pt tolerated. Began session with ambulating in hallway with rollator and addition of head turns from side to side. Patient tolerated well without noting any dizziness, so progression was made to ambulate without AD. Patient had no LOB but did have minimal postural sway with pt correcting without assistance. Added gait activities with mirror for pt awareness to gait abnormalities, such as the lateral excursion, decreased R LE stance, and decreased heel strike and toe off of L LE. CGA for all ambulation and standing activities. Patient had 1 minor LOB with obstacle, when turning around last cone. Patient had a lateral lean to the R with the sharp turn and required modA to correct. Educated patient on slower turns in order to decrease chances of dizziness or LOB. Patient understood and was able to demonstrate understanding with second turn around at cone. [] No change. [] Other:                          [x] Patient would continue to benefit from skilled physical therapy services in order to address gaze stability deficits, proprioceptive/neuromuscular training and dynamic/static balance training to decrease fall risk and improve stability with community level mobility. STG: (to be met in 10 treatments)  Pt will improve score on VASQUEZ balance scale by >/= 45/56 points  to improve overall balance stability and decrease fall risk with mobility. Pt will increase strength of all major muscle groups of bilateral hips to 5/5 or greater demonstrating improved strength needed to maximize proximal stability with mobility & transfers. Pt will demonstrate appropriate balance reactions 75% of time when a perturbations are present with no LOB thus improving stability if her were to encounter a bump in the community.    Pt will have no jerky movements with smooth pursuits showing improve gaze stability needed to navigate at a community level. LTG: (to be met in 20 treatments)  Pt will report no falls for x6 weeks and verbalize ways to reduce falls at home demonstrating improved safety with mobility and implementation of fall prevention strategies. Pt will be independent with home program addressing strength, flexibility and balance to maximize gains made in therapy to improve overall functional capacity for mobility. Pt will improve self confidence of balance per the ABC scale to >65% to demonstrate greater confidence that correlates with decreased fall risk. Pt will be able to navigate curbs, uneven surfaces, and inclines/declines with LRAD with distant supervision to ensure he can safely navigate the community & get outdoors more. Pt will be able to ambulate with LRAD while visually scanning without a LOB or pathway deviation . Pt. Education:  [] Yes  [] No  [x] Reviewed Prior HEP/Ed  Method of Education: [x] Verbal  [x] Demo  [] Written  Comprehension of Education:  [x] Verbalizes understanding. [x] Demonstrates understanding. [x] Needs review. [] Demonstrates/verbalizes HEP/Ed previously given. Plan:    [x] Continue per plan of care.               [] Other:                            Treatment Charges: Mins Units   []  Modalities       []  Ther Exercise     []  Manual Therapy       []  Ther Activities       []  Aquatics       [x]  Neuromuscular 58 4   [] Vasocompression       [] Gait Training       [] Dry needling        [] 1 or 2 muscles        [] 3 or more muscles       []  Other       Total Treatment time 58 4      Time In: 9:15 AM         Time Out: 10:13  AM     Mariusz Kapadia PTA

## 2022-06-23 ENCOUNTER — HOSPITAL ENCOUNTER (OUTPATIENT)
Dept: PHYSICAL THERAPY | Age: 77
Setting detail: THERAPIES SERIES
Discharge: HOME | End: 2022-06-23
Payer: MEDICARE

## 2022-06-23 PROCEDURE — 97112 NEUROMUSCULAR REEDUCATION: CPT

## 2022-06-23 NOTE — FLOWSHEET NOTE
[facility]              [address] [place] [address] [address]              Phone: [phone]              Fax: [phone]     Physical Therapy Daily Treatment Note        Date:  22  Patient Name:  Liane Villarreal   \"Andrea\"                 :  1945                     MRN: [id number]  Physician: MARKUS Tuttle - KEARA                          Insurance: [place] needed from [employer]   **AUTH 20 VISITS 22-22  auth # [id number]**  Medical Diagnosis: Z74.09 (ICD-10-CM) - Impaired mobility            Rehab Codes:  R53.1 weakness, R29.6 impaired balance, R42 dizziness, H55.89 irregular eye movements   Date of symptom onset: 19 -- underwent craniectomy of the suboccipital with cervical laminectomy for decompression of intracerebral hemorrhage    Next 's appt.: sees optometry later today (22); 8/10/22 with PCP  Visit# / total visits:                     Cancels/No Shows:      Subjective: Denies no falls since last visit. Occasional \"room spin\" when he first gets up along with nausea- still pursuing testing for his stomach with doctor. Patient reports once he is up in the AM the dizziness subsides but he gets weaker as the day goes on. Uses rollator at all times- however admits to wall walking in the middle of the night to use restroom      Pain:  [x] Yes  [] No   Location:  R knee        Pain Rating: (0-10 scale) 4/10 at rest, 6/10 with activities  Pain altered Tx:  [x] No  [] Yes  Action:  Comments:      Objective:  Modalities:   Precautions: Fall Risk- Gait belt with standing    Exercises:  Exercise Reps/ Time Weight/ Level Completed  22   Comments              sit to stands on foam   2x7    Unilateral UE support to arise    Slow Controlled High Knee Marching- Alternating 10x  foam  CGA, occasional Donald   Semi Tandem stance on foam 2x30s ea    CGA;  Minimal Sway    Foam Squats EO/EC  5x/10x   x    Semi tandem Head turns 4-ways on foam 30\" each foot leading   x Up & down, side to side, and diagonals   CGA ; no hands;    Feet Close EC on foam  3x30'   x  CGA; moderate sway; no hands   Toe taps to cones 2x20   Alternating; Donald to CGA for balance; cues for standing tall    F/B weight shifts on foam  30\" each  X    S/S weight shifts on foam eyes closed 30\"   x CGA    High/low rotational ball passes on foam 2x10ea     4# ball    CGA    Step Taps standing foam 2x20 altn 8\" step   CGA; no hands   Toes Up on Slant board EC Head Movement Horiz/Vert 30\" each  x Minimal to no Sway   Toes Down Slant Board EC 2x30\"      Toes down SB EC Head Movement Diagonals 30\" each  X Mild Sway   Stepping over Mariza 10x ea      Forced WB standing on foam 1' each 8\" step            Foam Step up: Fwd & Lateral Step Up and Over 10x each  X Cues to increase step heigth; occasional touch support; CGA/Min A   Strengthening       Step ups fwd/lat  10x each 6\" step   Unilateral UE support; added lateral 6/7/22    3 way hip  15x each  Red   Unilateral UE support; slow/controlled movement   Resisted side stepping  4x10ft  red  In // bars; band at thigh lvl           Gait Training       Gait w/o AD in front of mirror 30ftx2      Gait w/o AD in front of mirror w/ obstacles 15ftx4  X Maneuver around 4 cones and tap half balls; Maneuver step over 3 cones in a row (3 sets)   Gait with Rollator with head turns Horiz/Vert; Gait with Rollator EC 45ftx2 each  x CGA   Gait w/o AD Fwd/Retro/Side Stepping 15' x 3 each  X CGA    Other:        Specific Instructions for next treatment:  Progress proprioceptive training with narrow base of support, uneven surfaces and dynamic balance/walking balance without assistive device as tolerated. Assessment:  [x] Progressing toward goals. Challenged LE stability and balance as noted above. Challenged patient with foam surfaces, progressing narrow base of support with eyes closed and eyes open with head movements.  Improvement noted with forced limits of stability anterior and posterior this date as compared to previous visits; patient able to self correct loss of balance and minimal to no sway noted. Added side to side weight shifting on foam surface with eyes closed to challenge somatosensory input with some difficulty; continued difficulty with weight shifting staggered forward/backward on foam- unable to perform with eyes closed safely at this time without UE support. Encouraged little to no UE support throughout program with CGA. Added step ups forward and lateral up and over onto foam cushion- verbal cues needed to increase step height to clear foam and for proper foot placement/sequence. Continued to work on gait with head movements horizontal and vertical with use of rollator CGA; occasional disruption in gait pattern with head movements. Also performed dynamic balance/gait activities walking without assistive device including step over obstacles, around cones and tapping surfaces- close CGA required. Performed ambulation without device forward, retro and lateral with emphasis on posture, equal step lengths and improved step heigth- patient demonstrates more difficulty retro vs forward with maintaining fluent steps. Patient demonstrates good effort throughout program- moderate fatigue noted post treatment. [] No change. [] Other:                          [x] Patient would continue to benefit from skilled physical therapy services in order to address gaze stability deficits, proprioceptive/neuromuscular training and dynamic/static balance training to decrease fall risk and improve stability with community level mobility. STG: (to be met in 10 treatments)  1. Pt will improve score on VASQUEZ balance scale by >/= 45/56 points  to improve overall balance stability and decrease fall risk with mobility.    2.  Pt will increase strength of all major muscle groups of bilateral hips to 5/5 or greater demonstrating improved strength needed to maximize proximal stability with mobility & transfers. 3. Pt will demonstrate appropriate balance reactions 75% of time when a perturbations are present with no LOB thus improving stability if her were to encounter a bump in the community. 4. Pt will have no jerky movements with smooth pursuits showing improve gaze stability needed to navigate at a community level. LTG: (to be met in 20 treatments)  1. Pt will report no falls for x6 weeks and verbalize ways to reduce falls at home demonstrating improved safety with mobility and implementation of fall prevention strategies. 2. Pt will be independent with home program addressing strength, flexibility and balance to maximize gains made in therapy to improve overall functional capacity for mobility. 3. Pt will improve self confidence of balance per the ABC scale to >65% to demonstrate greater confidence that correlates with decreased fall risk. 4. Pt will be able to navigate curbs, uneven surfaces, and inclines/declines with LRAD with distant supervision to ensure he can safely navigate the community & get outdoors more. 5. Pt will be able to ambulate with LRAD while visually scanning without a LOB or pathway deviation . Pt. Education:  [] Yes  [] No  [x] Reviewed Prior HEP/Ed  Method of Education: [x] Verbal  [x] Demo  [] Written  Comprehension of Education:  [x] Verbalizes understanding. [x] Demonstrates understanding. [x] Needs review. [] Demonstrates/verbalizes HEP/Ed previously given. Plan:    [x] Continue per plan of care.               [] Other:                            Treatment Charges: Mins Units   []  Modalities       []  Ther Exercise     []  Manual Therapy       []  Ther Activities       []  Aquatics       [x]  Neuromuscular 50 3   [] Vasocompression       [] Gait Training       [] Dry needling        [] 1 or 2 muscles        [] 3 or more muscles       []  Other       Total

## 2022-06-28 ENCOUNTER — HOSPITAL ENCOUNTER (OUTPATIENT)
Dept: PHYSICAL THERAPY | Age: 77
Setting detail: THERAPIES SERIES
Discharge: HOME | End: 2022-06-28
Payer: MEDICARE

## 2022-06-28 PROCEDURE — 97112 NEUROMUSCULAR REEDUCATION: CPT

## 2022-06-28 NOTE — FLOWSHEET NOTE
[facility]              [address]              Phone: [phone]              Fax: [phone]     Physical Therapy Daily Treatment Note        Date:  22  Patient Name:  Aristeo Strickland   \"Andrea\"                 :  1945                     MRN: [id number]  Physician: MARKUS Richards - KEARA                          Insurance: [place] needed from [employer]   **AUTH 20 VISITS 22-22  auth # [id number]**  Medical Diagnosis: Z74.09 (ICD-10-CM) - Impaired mobility            Rehab Codes:  R53.1 weakness, R29.6 impaired balance, R42 dizziness, H55.89 irregular eye movements   Date of symptom onset: 19 -- underwent craniectomy of the suboccipital with cervical laminectomy for decompression of intracerebral hemorrhage    Next 's appt.: sees optometry later today (22); 8/10/22 with PCP  Visit# / total visits: 10/20                    Cancels/No Shows:      Subjective: Patient arrives with rollator and reports still having episodes of dizziness. Patient states his R knee pain is increasing and he's having a hard time weight bearing due to the bone on bone pain. Pain:  [x] Yes  [] No   Location:  R knee        Pain Rating: (0-10 scale) 6/10 at rest, 8/10 with activities  Pain altered Tx:  [x] No  [] Yes  Action:  Comments:      Objective:  Modalities:   Precautions: Fall Risk- Gait belt with standing    Exercises:  Exercise Reps/ Time Weight/ Level Completed  22   Comments              sit to stands on foam   2x7    Unilateral UE support to arise    Slow Controlled High Knee Marching- Alternating 10x  foam  CGA, occasional Donald   Semi Tandem stance on foam 2x30s ea    CGA;  Minimal Sway    Foam Squats EO/EC  5x/10x       Semi tandem Head turns 4-ways on foam 30\" each foot leading    Up & down, side to side, and diagonals   CGA ; no hands;    Feet Close EC on foam  3x30'     CGA; moderate sway; no hands   Toe taps to cones 2x20   Alternating; Donald to CGA for balance; cues for standing tall    F/B weight shifts on foam  20x ea  x    S/S weight shifts on foam eyes closed 20x ea  x CGA    High/low rotational ball passes on foam 2x10ea    6# ball  x CGA - in front of mirror 6/28   Step Taps standing foam 2x20 altn 8\" step x  CGA; no hands - in front of mirror 6/28   Toes Up on Slant board EC Head Movement Horiz/Vert 30\" each  x Minimal to no Sway   Toes Down Slant Board EC 2x30\"  x    Toes down SB EC Head Movement Diagonals 30\" each   Mild Sway   Stepping over Mariza 5x ea  x Decreased amount due to weakness 6/28   Forced WB standing on foam 1' each 8\" step     DL stance on dynadisk 2x30''  x CGA/Donald   Resisted STS 2x10 green x           Foam Step up: Fwd & Lateral Step Up and Over 10x each   Cues to increase step heigth; occasional touch support; CGA/Min A   Strengthening       Step ups fwd/lat  10x each 6\" step   Unilateral UE support; added lateral 6/7/22    3 way hip  15x each  Red   Unilateral UE support; slow/controlled movement   Resisted side stepping  4x10ft  red  In // bars; band at thigh lvl           Gait Training       Gait w/o AD throughout gym 100ft  x CGA   Gait w/o AD in front of mirror 30ftx2      Gait w/o AD in front of mirror w/ obstacles 15ftx4   Maneuver around 4 cones and tap half balls; Maneuver step over 3 cones in a row (3 sets)   Gait with Rollator with head turns Horiz/Vert/diagnol; Gait with Rollator EC 45ftx2 each  x CGA   Gait w/o AD Fwd/Retro/Side Stepping 15' x 3 each   CGA    Other:    Specific Instructions for next treatment:  Progress proprioceptive training with narrow base of support, uneven surfaces and dynamic balance/walking balance without assistive device as tolerated. Assessment:  [x] Progressing toward goals. Continued with challenging LE stability and balance with previously completed activities, as well as addition of dynadisk and resisted STS.  Patient required Donald mostly for dynadisk activity due to postural sway on the unstable surface, but did extremely well with resisted STS, not requiring assistance or UE support. Patient became visibly fatigued and noted LE weakness post step taps. Patient noted \"my legs feel like jello and will give at any moment\". Seated rest breaks given as needed and continued with session as per pt tolerance. [] No change. [] Other:                          [x] Patient would continue to benefit from skilled physical therapy services in order to address gaze stability deficits, proprioceptive/neuromuscular training and dynamic/static balance training to decrease fall risk and improve stability with community level mobility. STG: (to be met in 10 treatments)  1. Pt will improve score on VASQUEZ balance scale by >/= 45/56 points  to improve overall balance stability and decrease fall risk with mobility. 2.  Pt will increase strength of all major muscle groups of bilateral hips to 5/5 or greater demonstrating improved strength needed to maximize proximal stability with mobility & transfers. 3. Pt will demonstrate appropriate balance reactions 75% of time when a perturbations are present with no LOB thus improving stability if her were to encounter a bump in the community. 4. Pt will have no jerky movements with smooth pursuits showing improve gaze stability needed to navigate at a community level. LTG: (to be met in 20 treatments)  1. Pt will report no falls for x6 weeks and verbalize ways to reduce falls at home demonstrating improved safety with mobility and implementation of fall prevention strategies. 2. Pt will be independent with home program addressing strength, flexibility and balance to maximize gains made in therapy to improve overall functional capacity for mobility.    3. Pt will improve self confidence of balance per the ABC scale to >65% to demonstrate greater confidence that correlates with decreased fall risk. 4. Pt will be able to navigate curbs, uneven surfaces, and inclines/declines with LRAD with distant supervision to ensure he can safely navigate the community & get outdoors more. 5. Pt will be able to ambulate with LRAD while visually scanning without a LOB or pathway deviation . Pt. Education:  [] Yes  [] No  [x] Reviewed Prior HEP/Ed  Method of Education: [x] Verbal  [x] Demo  [] Written  Comprehension of Education:  [x] Verbalizes understanding. [x] Demonstrates understanding. [x] Needs review. [] Demonstrates/verbalizes HEP/Ed previously given. Plan:    [x] Continue per plan of care.               [] Other:                            Treatment Charges: Mins Units   []  Modalities       []  Ther Exercise     []  Manual Therapy       []  Ther Activities       []  Aquatics       [x]  Neuromuscular 56 4   [] Vasocompression       [] Gait Training       [] Dry needling        [] 1 or 2 muscles        [] 3 or more muscles       []  Other       Total Treatment time 56 4      Time In: 8288 AM         Time Out: 1484  AM     Lyndle Shirts, PTA

## 2022-07-05 ENCOUNTER — HOSPITAL ENCOUNTER (OUTPATIENT)
Dept: PHYSICAL THERAPY | Age: 77
Setting detail: THERAPIES SERIES
Discharge: HOME | End: 2022-07-05
Payer: MEDICARE

## 2022-07-05 PROCEDURE — 97530 THERAPEUTIC ACTIVITIES: CPT

## 2022-07-05 PROCEDURE — 97112 NEUROMUSCULAR REEDUCATION: CPT

## 2022-07-05 NOTE — PROGRESS NOTES
[facility]              [address]              Phone: [phone]              Fax: [phone]     Physical Therapy Daily Treatment Note/Progress Note         Date:  22  Patient Name:  Jamshid Stearns   \"Andrea\"                 :  1945                     MRN: [id number]  Physician: Nick Andrade, MARKUS - CNP                          Insurance: [place] needed from [employer]   **AUTH 20 VISITS 22-22  auth # [id number]**  Medical Diagnosis: Z74.09 (ICD-10-CM) - Impaired mobility            Rehab Codes:  R53.1 weakness, R29.6 impaired balance, R42 dizziness, H55.89 irregular eye movements   Date of symptom onset: 19 -- underwent craniectomy of the suboccipital with cervical laminectomy for decompression of intracerebral hemorrhage    Next 's appt.: sees optometry later today (22); 8/10/22 with PCP  Visit# / total visits:                     Cancels/No Shows:   Date of initial visit: 22        Date of PN: 22 (visit 11)  Formal progress note reporting period:  22 - 22     Subjective: Patient arrives with rollator. Overall he notes it is helping with function. Feels he is able to rest better. He still feels rocking feeling with getting up in the middle of the night or when he takes his dog outside. Denies any falls with these activities. Does occasionally lose his balance. When he gets up at night he feels dizzy getting up fast. With his dog he tends to push the rollator with 1 hand and hold her leash in the other that sometimes throws him off balanced. Feels that he needs to continue to work on his balance to be more stable. Notes he is feeling more light headed when he feels dizzy. He notes it is always there.        Pain:  [] Yes  [x] No   Location:  R knee        Pain Rating: (0-10 scale) 6/10 at rest, 8/10 with activities  Pain altered Tx:  [x] No  [] Yes  Action:  Comments: Other:       Specific Instructions for next treatment:  work on [address] strengthening with good mornings and resisted sit to stands, work on reactive balance stepping with bounce passing while stepping laterally and forward, can work on cross over stepping too, work on balance with eyes closed on firm to build somatosensory system. Work reactive balance while walking (try bad dog exercise while walking), work on various walking tasks. Assessment:  [x] Progressing toward goals. Pt was initially evaluated due to impaired balance and mobility from 2019 CVA. He has now completed a total of 11 visits in his POC focusing on his balance and mobility. Overall he is improving. Strength had improved. At times still limited by knee pain which is a known issue. Discussed he would be appropriate to follow up with his orthopedic about knee pain as this could be affecting his balance and mobility. On outcome measures he demos some good improvements. Increases VASQUEZ by 5 points but would still be considered a fall risk. Has very limited ankle & hip/trunk strategies that could be further improved. On the 5xSTS completed from a low chair without UE support with increased time. Felt this was limited progress due to knee pain. oN the TUG he does show improvement to 14.5s with rollator. He demos much better stability despite still being dizzy/lightheaded at all times. He denies any falls but does stumble when balance is challenged. Feel that somatosensory system is the most affected at this time and can further be improved with continued therapy. Really focused on control of balance and building more awareness with the eyes closed activities. Feel at this time he is making progress toward all his goals and is appropriate to continue per his original POC. Will re-assess closer to the end of auth to determine next steps. Will be dependent of if/when he follows with orthopedics in regards to knee pain.                          [] No change. [] Other:                          [x] Patient would continue to benefit from skilled physical therapy services in order to address gaze stability deficits, proprioceptive/neuromuscular training and dynamic/static balance training to decrease fall risk and improve stability with community level mobility. GOALS - assessed and updated 7/5/22    STG: (to be met in 10 treatments)  1. Pt will improve score on VASQUEZ balance scale by >/= 45/56 points  to improve overall balance stability and decrease fall risk with mobility. - 7/5: progress -- 5pt improvement to 49/56  2. Pt will increase strength of all major muscle groups of bilateral hips to 5/5 or greater demonstrating improved strength needed to maximize proximal stability with mobility & transfers.    -7/5: progressing -- still limited in hip extension strength. Will continue to work on   3. Pt will demonstrate appropriate balance reactions 75% of time when a perturbations are present with no LOB thus improving stability if her were to encounter a bump in the community. - 7/5: progressing -- in treatments able to recover about 50% of time with stepping strategies without assistance   4. Pt will have no jerky movements with smooth pursuits showing improve gaze stability needed to navigate at a community level. - 7/5: progressing   LTG: (to be met in 20 treatments)  1. Pt will report no falls for x6 weeks and verbalize ways to reduce falls at home demonstrating improved safety with mobility and implementation of fall prevention strategies. -7/5: Meeting -- no falls noted as pt is careful   2. Pt will be independent with home program addressing strength, flexibility and balance to maximize gains made in therapy to improve overall functional capacity for mobility.    -7/5: progressing -- continuing to update as needed   3.  Pt will improve self confidence of balance per the ABC scale to >65% to demonstrate greater confidence that correlates with decreased fall risk. - 7/5: will re-assess at later session   4. Pt will be able to navigate curbs, uneven surfaces, and inclines/declines with LRAD with distant supervision to ensure he can safely navigate the community & get outdoors more. - 7/5: progressing   5. Pt will be able to ambulate with LRAD while visually scanning without a LOB or pathway deviation . - 7/5: progressing   6. NEW GOAL7/5: Pt will be able ambulate with unilateral UE support while holding a leash with moderate perturbations without a LOB or significant sway showing improved stability to be able to walk his dog with better safety. Pt. Education:  [] Yes  [] No  [x] Reviewed Prior HEP/Ed  Method of Education: [x] Verbal  [x] Demo  [] Written  Comprehension of Education:  [x] Verbalizes understanding. [x] Demonstrates understanding. [x] Needs review. [] Demonstrates/verbalizes HEP/Ed previously given. Plan:    [x] Continue per plan of care.               [] Other:                            Treatment Charges: Mins Units   []  Modalities       []  Ther Exercise     []  Manual Therapy       [x]  Ther Activities  10  1   []  Aquatics       [x]  Neuromuscular 44 3   [] Vasocompression       [] Gait Training       [] Dry needling        [] 1 or 2 muscles        [] 3 or more muscles       []  Other       Total Treatment time 54 4      Time In: 1100 AM         Time Out: 2252  AM     Electronically signed by:   Walt Marrero PT

## 2022-07-07 ENCOUNTER — HOSPITAL ENCOUNTER (OUTPATIENT)
Dept: PHYSICAL THERAPY | Age: 77
Setting detail: THERAPIES SERIES
Discharge: HOME | End: 2022-07-07
Payer: MEDICARE

## 2022-07-07 PROCEDURE — 97530 THERAPEUTIC ACTIVITIES: CPT

## 2022-07-07 PROCEDURE — 97112 NEUROMUSCULAR REEDUCATION: CPT

## 2022-07-07 NOTE — PROGRESS NOTES
[facility]patient Physical Therapy              [address]              Phone: [phone]              Fax: [phone]     Physical Therapy Daily Treatment Note         Date:  22  Patient Name:  Heike Gloria   \"Andrea\"                 :  1945                     MRN: [id number]  Physician: Konrad Hugo, MARKUS - CNP                          Insurance: [place] needed from [employer]   **AUTH 20 VISITS 22-22  auth # [id number]**  Medical Diagnosis: Z74.09 (ICD-10-CM) - Impaired mobility            Rehab Codes:  R53.1 weakness, R29.6 impaired balance, R42 dizziness, H55.89 irregular eye movements   Date of symptom onset: 19 -- underwent craniectomy of the suboccipital with cervical laminectomy for decompression of intracerebral hemorrhage    Next 's appt.: Sees ortho 2022; 8/10/22 with PCP  Visit# / total visits:                     Cancels/No Shows:   Date of initial visit: 22        Date of PN: 22 (visit 11)       Subjective: Patient arrives with rollator and states his R knee is really limiting his ability to progress with PT. Patient states his surgeon is on vacation, but was able to schedule an appt to see him at the end of the month in hopes of being scheduled for a TKA.       Pain:  [] Yes  [x] No   Location:  R knee        Pain Rating: (0-10 scale) 4/10 at rest, 7/10 with activities  Pain altered Tx:  [x] No  [] Yes  Action:  Comments:      Objective:  Modalities:   Precautions: Fall Risk- Gait belt with standing    Exercises:  Exercise Reps/ Time Weight/ Level Completed  22   Comments   BALANCE WORK        Normal JOSE EC with head turns  2x30s   x    Normal JOSE F/B wt shifts-- EO to EC  x20 ea   x Sensing limits of stability    Low to high twists to targets  2x10 ea    6\" step to just over shoulder height target to facilitate rotation; x1 LOB with Donald to correct           Reactive balance:        Pt holds ball and safety. Pt. Education:  [] Yes  [] No  [x] Reviewed Prior HEP/Ed  Method of Education: [x] Verbal  [x] Demo  [] Written  Comprehension of Education:  [x] Verbalizes understanding. [x] Demonstrates understanding. [x] Needs review. [] Demonstrates/verbalizes HEP/Ed previously given. Plan:    [x] Continue per plan of care.               [] Other:                            Treatment Charges: Mins Units   []  Modalities       []  Ther Exercise     []  Manual Therapy       [x]  Ther Activities 10  1   []  Aquatics       [x]  Neuromuscular 30 2   [] Vasocompression       [] Gait Training       [] Dry needling        [] 1 or 2 muscles        [] 3 or more muscles       []  Other       Total Treatment time 40 3      Time In: 9813 AM         Time Out: 12:10  PM     Electronically signed by:   Elaina Morse PTA

## 2022-07-12 ENCOUNTER — HOSPITAL ENCOUNTER (OUTPATIENT)
Dept: PHYSICAL THERAPY | Age: 77
Setting detail: THERAPIES SERIES
Discharge: HOME | End: 2022-07-12
Payer: MEDICARE

## 2022-07-12 PROCEDURE — 97116 GAIT TRAINING THERAPY: CPT

## 2022-07-12 PROCEDURE — 97112 NEUROMUSCULAR REEDUCATION: CPT

## 2022-07-12 NOTE — PROGRESS NOTES
[facility]              [address]              Phone: [phone]              Fax: [phone]     Physical Therapy Daily Treatment Note         Date:  22  Patient Name:  Shane Griggs   \"Andrea\"                 :  1945                     MRN: [id number]  Physician: MARKUS Huffman - CNP                          Insurance: [place] needed from [employer]   **AUTH 20 VISITS 22-22  auth # [id number]**  Medical Diagnosis: Z74.09 (ICD-10-CM) - Impaired mobility            Rehab Codes:  R53.1 weakness, R29.6 impaired balance, R42 dizziness, H55.89 irregular eye movements   Date of symptom onset: 19 -- underwent craniectomy of the suboccipital with cervical laminectomy for decompression of intracerebral hemorrhage    Next 's appt.: Sees ortho 2022; 8/10/22 with PCP  Visit# / total visits:                     Cancels/No Shows:   Date of initial visit: 22        Date of PN: 22 (visit 11)       Subjective: Patient reports he continues with dizziness when getting out of bed (supine to sit) and with sit to stand from bed- states he has to stand a short amount of time to get his balance. States his right knee remains an issue with strength and pain. Uses rollator at all times in home and community.       Pain:  [] Yes  [x] No   Location:  R knee        Pain Rating: (0-10 scale) 4/10 at rest, 7/10 with activities  Pain altered Tx:  [x] No  [] Yes  Action:  Comments:      Objective:  Modalities:   Precautions: Fall Risk- Gait belt with standing    Exercises:  Exercise Reps/ Time Weight/ Level Completed  22   Comments   BALANCE WORK        Normal JOSE Foam EC 2x30\"  x    Feet Together Floor EC with head turns H/V/D 30\" each  x    Normal JOSE F/B wt shifts-- EO Foam to EC Floor 30\" each  x Sensing limits of stability    Low to high twists to targets/cones- normal base of support on floor 2x12 ea   x 6\" step to just over shoulder height target to facilitate rotation; x1 LOB with Donald to correct    Resisted Sit-Stand Add      Sit to Stand with Hover just above mat table - 2\" hold 10x  X Encouraged glut squeeze with return to stand   Ambulation NO UE Support Fwd/Retro with head movement H/V/D 15'x3 each  X Wider base of support; cues to slow head movement to stabilize balance   Amb Fwd/Retro carrying Milk Crate with forward gaze 15'x3 each  X    Navigating Curbs Add                           Reactive balance:        Pt holds ball outstretched in front of self and perturbations applied normal JOSE 2x30\"   x    Braiding/Cross Over Stepping Add      Punches to ball   2x20 ea    x  R arm, L arm, alternating cross body    Stepping bounce passes Fwd x15 ea LE   x CGA/Min A for loss of balance; Add laterally   Bad dog without device walking  25'x2  x Perturbations multiple directions   Bad dog with rollator walking (left hand on rollator; right hand holding towel) 25'x2  x Perturbations multiple directions   Bad Dog static with normal base of support- pull towel multiple directions 1'  x Perturbations multiple directions          Good mornings w/ cane at chest 2x10  x Emphasis on glut activation with return to stand    Other:       Specific Instructions for next treatment:  work on glute strengthening with resisted sit to stands, challenge balance with cross over stepping, strengthening somatosensory input with EC/Firm surface and reactive balance/various tasks while walking. Assessment:  [x] Progressing toward goals. Completed program as above with emphasis on LE stability, trunk mobility and reactive balance. Challenged patient's somatosensory input working eyes closed firm surface with head movement and eyes closed uneven surface encouraging decreased base of support.  Also added activity challenging gait working reactive balance and multi tasking with ambulation with/without device encouraging decreased base of support as patient tends to walk with wider base of support and limited toe off/knee mobility and heel strike. Improved endurance noted overall- minimal to no rest breaks needed this date. Moderate challenge with step/ball bounce requiring Min assist at times to correct balance. Remains rigid with trunk and cervical mobility- continued to work on in varied positions reaching for cones at varying angles. Patient offers good effort throughout program.                         [] No change. [] Other:                          [x] Patient would continue to benefit from skilled physical therapy services in order to address gaze stability deficits, proprioceptive/neuromuscular training and dynamic/static balance training to decrease fall risk and improve stability with community level mobility. GOALS - assessed and updated 7/5/22    STG: (to be met in 10 treatments)  1. Pt will improve score on VASQUEZ balance scale by >/= 45/56 points  to improve overall balance stability and decrease fall risk with mobility. - 7/5: progress -- 5pt improvement to 49/56  2. Pt will increase strength of all major muscle groups of bilateral hips to 5/5 or greater demonstrating improved strength needed to maximize proximal stability with mobility & transfers.    -7/5: progressing -- still limited in hip extension strength. Will continue to work on   3. Pt will demonstrate appropriate balance reactions 75% of time when a perturbations are present with no LOB thus improving stability if her were to encounter a bump in the community. - 7/5: progressing -- in treatments able to recover about 50% of time with stepping strategies without assistance   4. Pt will have no jerky movements with smooth pursuits showing improve gaze stability needed to navigate at a community level. - 7/5: progressing   LTG: (to be met in 20 treatments)  1.  Pt will report no falls for x6 weeks and verbalize ways to reduce falls at home demonstrating improved safety with mobility and implementation of fall prevention strategies. -7/5: Meeting -- no falls noted as pt is careful   2. Pt will be independent with home program addressing strength, flexibility and balance to maximize gains made in therapy to improve overall functional capacity for mobility.    -7/5: progressing -- continuing to update as needed   3. Pt will improve self confidence of balance per the ABC scale to >65% to demonstrate greater confidence that correlates with decreased fall risk. - 7/5: will re-assess at later session   4. Pt will be able to navigate curbs, uneven surfaces, and inclines/declines with LRAD with distant supervision to ensure he can safely navigate the community & get outdoors more. - 7/5: progressing   5. Pt will be able to ambulate with LRAD while visually scanning without a LOB or pathway deviation . - 7/5: progressing   6. NEW GOAL7/5: Pt will be able ambulate with unilateral UE support while holding a leash with moderate perturbations without a LOB or significant sway showing improved stability to be able to walk his dog with better safety. Pt. Education:  [] Yes  [] No  [x] Reviewed Prior HEP/Ed  Method of Education: [x] Verbal  [x] Demo  [] Written  Comprehension of Education:  [x] Verbalizes understanding. [x] Demonstrates understanding. [x] Needs review. [] Demonstrates/verbalizes HEP/Ed previously given. Plan:    [x] Continue per plan of care.               [] Other:                            Treatment Charges: Mins Units   []  Modalities       []  Ther Exercise     []  Manual Therapy       []  Ther Activities      []  Aquatics       [x]  Neuromuscular 36 2   [] Vasocompression       [x] Gait Training 11 1   [] Dry needling        [] 1 or 2 muscles        [] 3 or more muscles       []  Other       Total Treatment time 47 3      Time In: 1100 AM         Time Out: 0783  AM     Electronically signed by:   Chelo Casillas PTA

## 2022-07-14 ENCOUNTER — HOSPITAL ENCOUNTER (OUTPATIENT)
Dept: PHYSICAL THERAPY | Age: 77
Setting detail: THERAPIES SERIES
Discharge: HOME | End: 2022-07-14
Payer: MEDICARE

## 2022-07-14 PROCEDURE — 97112 NEUROMUSCULAR REEDUCATION: CPT

## 2022-07-14 NOTE — PROGRESS NOTES
[facility] Outpatient Physical Therapy              [address]              Phone: [phone]              Fax: [phone]     Physical Therapy Daily Treatment Note         Date:  22  Patient Name:  Veronique Good   \"Andrea\"                 :  1945                     MRN: [id number]  Physician: Blanche Lombard, APRN - CNP                          Insurance: [place] needed from [email or web address]   **AUTH 20 VISITS 22-22  auth # [id number]**  Medical Diagnosis: Z74.09 (ICD-10-CM) - Impaired mobility            Rehab Codes:  R53.1 weakness, R29.6 impaired balance, R42 dizziness, H55.89 irregular eye movements   Date of symptom onset: 19 -- underwent craniectomy of the suboccipital with cervical laminectomy for decompression of intracerebral hemorrhage    Next 's appt.: Sees ortho 2022; 8/10/22 with PCP  Visit# / total visits:                     Cancels/No Shows:   Date of initial visit: 22        Date of PN: 22 (visit 11)       Subjective: Plans to call Dr Abigail Shankar (Ortho surgeon) after - per patient  is on vacation. Reports fatigue after last visit but denies any new issues. Denies recent falls.        Pain:  [x] Yes  [] No   Location:  R knee  occasionally to R ankle     Pain Rating: (0-10 scale) 5/10 at rest  Pain altered Tx:  [x] No  [] Yes  Action:  Comments:      Objective:  Modalities:   Precautions: Fall Risk- Gait belt with standing    Exercises:  Exercise Reps/ Time Weight/ Level Completed  22   Comments   BALANCE WORK        Normal JOSE Foam EC 2x30\"      Staggered Stance EC on floor 2x30\" each  X    Feet Together Floor EC with head turns H/V/D 30\" each      Normal JOSE F/B wt shifts-- EO Foam to EC Floor 30\" each   Sensing limits of stability    Low to high twists to targets/cones- normal base of support on floor 2x12 ea    6\" step to just over shoulder height target to facilitate rotation; x1 LOB with Donald to correct Resisted Sit-Stand 10x Blue T-Tube x Cues for glute activation/controlled lowering   Sit to Stand with Hover just above mat table - 3\" hold 10x  X Encouraged wt through heels with glut squeeze with return to stand   Ambulation NO UE Support Fwd/Retro with head movement H/V/D 15'x3 each   Wider base of support; cues to slow head movement to stabilize balance   Amb Fwd/Retro carrying Milk Crate with forward gaze 15'x3 each      Navigating Curbs Add                           Reactive balance:        Lateral Step up and over on Foam 10x  X 2 finger support; Min A- difficulty with foot placement   Pt holding 3# wt outstretched in front of body and perturbations applied normal JOSE 1'  X    Resisted Walking 4 ways (holding Band at waist) 5x Green T Tube X Mod Assist   Resisted Step Balance 3-ways (holding band at waist) 10x each Green T Tube X Min Assist   Cross Over Stepping near rail 20'x2  X 1 Lap without UE support MOD A- unable to cross to midline with foot without significant loss of balance; 1 Lap with 1 UE Support CGA- able to cross over   Punches to ball   2x20 ea      R arm, L arm, alternating cross body    Stepping bounce passes Fwd x15 ea LE  Yellow Ball; Assist of 2nd person X CGA/Min A for loss of balance; Add laterally   Bad dog without device walking  25'x2   Perturbations multiple directions   Bad dog with rollator walking (left hand on rollator; right hand holding towel) 25'x4  X Perturbations multiple directions   Bad Dog static with normal base of support- pull towel multiple directions 1'  X Perturbations multiple directions          Good mornings w/ cane at chest 2x10  X Emphasis on glut activation with return to stand    Other:       Specific Instructions for next treatment: Continue to challenge glute strengthening, challenge reactive balance,  Ands trengthening of somatosensory input with EC/Firm surface      Assessment:  [x] Progressing toward goals. Completed program as above with emphasis on LE stability, strength and reactive balance. Progressed challenging patient's reactive balance and multi tasking with ambulation with/without device. Encouraged decreased base of support during gait as patient tends to walk with wider base of support and limited toe off/knee flexion and heel strike. Added resisted walking and step balance holding T-Tubing while maintaining neutral posture to challenge balance, reaction and endurance requires Minimal to Moderate assist throughout activity for safety. Continues with moderate challenge with step/ball bounce requiring Min assist at times to correct balance. Patient offers good effort throughout program with moderate fatigue noted post session. [] No change. [] Other:                          [x] Patient would continue to benefit from skilled physical therapy services in order to address gaze stability deficits, proprioceptive/neuromuscular training and dynamic/static balance training to decrease fall risk and improve stability with community level mobility. GOALS - assessed and updated 7/5/22    STG: (to be met in 10 treatments)  1. Pt will improve score on VASQUEZ balance scale by >/= 45/56 points  to improve overall balance stability and decrease fall risk with mobility. - 7/5: progress -- 5pt improvement to 49/56  2. Pt will increase strength of all major muscle groups of bilateral hips to 5/5 or greater demonstrating improved strength needed to maximize proximal stability with mobility & transfers.    -7/5: progressing -- still limited in hip extension strength. Will continue to work on   3. Pt will demonstrate appropriate balance reactions 75% of time when a perturbations are present with no LOB thus improving stability if her were to encounter a bump in the community. - 7/5: progressing -- in treatments able to recover about 50% of time with stepping strategies without assistance   4.  Pt will have no jerky movements with smooth pursuits showing improve gaze stability needed to navigate at a community level. - 7/5: progressing   LTG: (to be met in 20 treatments)  1. Pt will report no falls for x6 weeks and verbalize ways to reduce falls at home demonstrating improved safety with mobility and implementation of fall prevention strategies. -7/5: Meeting -- no falls noted as pt is careful   2. Pt will be independent with home program addressing strength, flexibility and balance to maximize gains made in therapy to improve overall functional capacity for mobility.    -7/5: progressing -- continuing to update as needed   3. Pt will improve self confidence of balance per the ABC scale to >65% to demonstrate greater confidence that correlates with decreased fall risk. - 7/5: will re-assess at later session   4. Pt will be able to navigate curbs, uneven surfaces, and inclines/declines with LRAD with distant supervision to ensure he can safely navigate the community & get outdoors more. - 7/5: progressing   5. Pt will be able to ambulate with LRAD while visually scanning without a LOB or pathway deviation . - 7/5: progressing   6. NEW GOAL7/5: Pt will be able ambulate with unilateral UE support while holding a leash with moderate perturbations without a LOB or significant sway showing improved stability to be able to walk his dog with better safety. Pt. Education:  [] Yes  [] No  [x] Reviewed Prior HEP/Ed  Method of Education: [x] Verbal  [x] Demo  [] Written  Comprehension of Education:  [x] Verbalizes understanding. [x] Demonstrates understanding. [x] Needs review. [] Demonstrates/verbalizes HEP/Ed previously given. Plan:    [x] Continue per plan of care.               [] Other:                            Treatment Charges: Mins Units   []  Modalities       []  Ther Exercise     []  Manual Therapy       []  Ther Activities      []  Aquatics       [x]  Neuromuscular 43 3   [] Vasocompression [] Gait Training     [] Dry needling        [] 1 or 2 muscles        [] 3 or more muscles       []  Other       Total Treatment time 43 3      Time In: 1101 AM         Time Out: 6903  AM     Electronically signed by:   Maday Peterson PTA

## 2022-07-19 ENCOUNTER — HOSPITAL ENCOUNTER (OUTPATIENT)
Dept: PHYSICAL THERAPY | Age: 77
Setting detail: THERAPIES SERIES
Discharge: HOME | End: 2022-07-19
Payer: MEDICARE

## 2022-07-19 PROCEDURE — 97112 NEUROMUSCULAR REEDUCATION: CPT

## 2022-07-19 NOTE — PROGRESS NOTES
[facility] Outpatient Physical Therapy              [id number]              Phone: [phone]              Fax: [phone]     Physical Therapy Daily Treatment Note         Date:  22  Patient Name:  Lauri Nicole   \"Andrea\"                 :  1945                     MRN: [id number]  Physician: Faina Segura, APRN - CNP                          Insurance: [place] needed from [employer]   **AUTH 20 VISITS 22-22  auth # [id number]**  Medical Diagnosis: Z74.09 (ICD-10-CM) - Impaired mobility            Rehab Codes:  R53.1 weakness, R29.6 impaired balance, R42 dizziness, H55.89 irregular eye movements   Date of symptom onset: 19 -- underwent craniectomy of the suboccipital with cervical laminectomy for decompression of intracerebral hemorrhage    Next 's appt.: Sees ortho 2022; 8/10/22 with PCP  Visit# / total visits: 15/20                    Cancels/No Shows:   Date of initial visit: 22        Date of PN: 22 (visit 11)       Subjective: Plans to call Dr Lexy Morales (Ortho surgeon) after - per patient  is on vacation. Denies recent falls- Continues with dizziness upon awakening/getting out of bed- describes as \"foggy headed\".  Patient reports he needs out a little early today as spouse has appt across town      Pain:  [x] Yes  [] No   Location:  R knee/ankle   Pain Rating: (0-10 scale) 5/10 at rest  Pain altered Tx:  [x] No  [] Yes  Action:  Comments:      Objective:  Modalities:   Precautions: Fall Risk- Gait belt with standing    Exercises:  Exercise Reps/ Time Weight/ Level Completed  22   Comments   BALANCE WORK        Normal JOSE Foam EC 2x30\"      Staggered Stance EC on floor 2x30\" each  X    Feet Together Floor EC with head turns H/V/D 30\" each      Normal JSOE F/B wt shifts--  EC Floor 30\" x2 each  x Sensing limits of stability    Low to high twists to targets/cones- normal base of support on floor 2x12 ea    6\" step to just over shoulder height target to facilitate rotation; x1 LOB with Donald to correct    Resisted Sit-Stand 10x Blue T-Tube  Cues for glute activation/controlled lowering   EC Marching Holding 2# Ball 10x  X    3-Way Hip EC 10x each  X           Sit to Stand with Hover just above mat table - 3\" hold 10x  X Encouraged wt through heels with glut squeeze with return to stand   Ambulation NO UE Support Fwd/Retro with head movement H/V/D 15'x3 each   Wider base of support; cues to slow head movement to stabilize balance   Amb Fwd/Retro carrying Milk Crate with forward gaze 15'x3 each      Navigating Curbs Add                           Reactive balance:        Lateral Step up and over on Foam 10x   2 finger support; Min A- difficulty with foot placement   Pt holding 3# wt outstretched in front of body and perturbations applied normal JOSE 1'      Resisted Walking 4 ways (holding Band at waist) 5x each Green T Tube X Min Assist    Resisted Step Balance 3-ways (holding band at waist) F/F each and Side to side 10x each Green T Tube X Min Assist with one instance of Mod A when WB R LE stepping Fwd/Retro   Cross Over Stepping near rail 20'x2   1 Lap without UE support MOD A- unable to cross to midline with foot without significant loss of balance; 1 Lap with 1 UE Support CGA- able to cross over   Punches to ball   2x20 ea      R arm, L arm, alternating cross body    Stepping bounce passes Fwd x15 ea LE  Yellow Ball; Assist of 2nd person  CGA/Min A for loss of balance; Add laterally   [place]/Bean bag toss with step Add?       Bad dog without device walking  25'x2   Perturbations multiple directions   Bad dog with rollator walking (left hand on rollator; right hand holding towel) 25'x4   Perturbations multiple directions   Bad Dog static with normal base of support- pull towel multiple directions 1'x2  X Perturbations multiple directions   Holding Large Ball- Trunk Flex/Ext and Rotation 10x each  X    Good mornings w/ cane at chest improving stability if her were to encounter a bump in the community. - 7/5: progressing -- in treatments able to recover about 50% of time with stepping strategies without assistance   Pt will have no jerky movements with smooth pursuits showing improve gaze stability needed to navigate at a community level. - 7/5: progressing   LTG: (to be met in 20 treatments)  Pt will report no falls for x6 weeks and verbalize ways to reduce falls at home demonstrating improved safety with mobility and implementation of fall prevention strategies. -7/5: Meeting -- no falls noted as pt is careful   Pt will be independent with home program addressing strength, flexibility and balance to maximize gains made in therapy to improve overall functional capacity for mobility.    -7/5: progressing -- continuing to update as needed   Pt will improve self confidence of balance per the ABC scale to >65% to demonstrate greater confidence that correlates with decreased fall risk. - 7/5: will re-assess at later session   Pt will be able to navigate curbs, uneven surfaces, and inclines/declines with LRAD with distant supervision to ensure he can safely navigate the community & get outdoors more. - 7/5: progressing   Pt will be able to ambulate with LRAD while visually scanning without a LOB or pathway deviation . - 7/5: progressing   6. NEW GOAL7/5: Pt will be able ambulate with unilateral UE support while holding a leash with moderate perturbations without a LOB or significant sway showing improved stability to be able to walk his dog with better safety. Pt. Education:  [] Yes  [] No  [x] Reviewed Prior HEP/Ed  Method of Education: [x] Verbal  [x] Demo  [] Written  Comprehension of Education:  [x] Verbalizes understanding. [x] Demonstrates understanding. [x] Needs review. [] Demonstrates/verbalizes HEP/Ed previously given. Plan:    [x] Continue per plan of care.               [] Other: Treatment Charges: Mins Units   []  Modalities       []  Ther Exercise     []  Manual Therapy       []  Ther Activities      []  Aquatics       [x]  Neuromuscular 40 3   [] Vasocompression       [] Gait Training     [] Dry needling        [] 1 or 2 muscles        [] 3 or more muscles       []  Other       Total Treatment time 40 3      Time In: 1101 AM         Time Out: 2387  AM     Electronically signed by:   Columbus Leyden, PTA

## 2022-07-20 ENCOUNTER — NURSE TRIAGE (OUTPATIENT)
Dept: OTHER | Facility: CLINIC | Age: 77
End: 2022-07-20

## 2022-07-20 ENCOUNTER — TELEPHONE (OUTPATIENT)
Dept: FAMILY MEDICINE CLINIC | Age: 77
End: 2022-07-20

## 2022-07-20 NOTE — TELEPHONE ENCOUNTER
Received call from [facility] with [employer]. Wife, Olya Rubin, is calling, patient is nearby. Subjective: Caller states \"pain under chest, lightheaded, dizzy\"     Current Symptoms: see above, pressure under his chest, has stents in heart, no symptoms at the moment    Onset: 1 week ago; unchanged    Associated Symptoms: reduced activity    Pain Severity: 6/10; pressure; intermittent    Temperature: denies fever  n/a    What has been tried: nothing    LMP: NA Pregnant: NA    Recommended disposition: See in Office Today    Care advice provided, patient verbalizes understanding; denies any other questions or concerns; instructed to call back for any new or worsening symptoms. Patient/Caller agrees with recommended disposition; writer provided warm transfer to [facility] for appointment scheduling     Attention Provider: Thank you for allowing me to participate in the care of your patient. The patient was connected to triage in response to information provided to the ECC/PSC. Please do not respond through this encounter as the response is not directed to a shared pool.        Reason for Disposition   Chest pain(s) lasting a few seconds persists > 3 days    Protocols used: Chest Pain-ADULT-[place]

## 2022-07-20 NOTE — TELEPHONE ENCOUNTER
FYI:  Nurse Triage sent Wellington Manrique (wife) to speak with office. Patient is having chest pressure x 3 days. Patient was told to go to ER and agreed.

## 2022-07-21 ENCOUNTER — HOSPITAL ENCOUNTER (OUTPATIENT)
Dept: PHYSICAL THERAPY | Age: 77
Setting detail: THERAPIES SERIES
Discharge: HOME | End: 2022-07-21
Payer: MEDICARE

## 2022-07-21 PROCEDURE — 97530 THERAPEUTIC ACTIVITIES: CPT

## 2022-07-21 NOTE — PROGRESS NOTES
[facility] Outpatient Physical Therapy              [address]              Phone: [phone]              Fax: [phone]     Physical Therapy Daily Treatment Note /Progress Note/discharge note        Date:  22  Patient Name:  Jamshid Stearns   \"Andrea\"                 :  1945                     MRN: [id number]  Physician: MARKUS Ortiz - KEARA                          Insurance: [place] needed from [employer]   **AUTH 20 VISITS 22-22  auth # [id number]**  Medical Diagnosis: Z74.09 (ICD-10-CM) - Impaired mobility            Rehab Codes:  R53.1 weakness, R29.6 impaired balance, R42 dizziness, H55.89 irregular eye movements   Date of symptom onset: 19 -- underwent craniectomy of the suboccipital with cervical laminectomy for decompression of intracerebral hemorrhage    Next 's appt.: Sees ortho 2022; 8/10/22 with PCP  Visit# / total visits:                     Cancels/No Shows:   Date of initial visit: 22        Date of PN: 22 (visit 11); 22 (visit 16)    Formal PN reporting period 22 -22       Subjective: Pt notes his R knee is giving him the most pain that is limiting mobility. He plans to call Dr. Tess Licona soon. Pt notes overall feeling more balanced. He thinks continuing therapy would help him. He notes each morning and at night still feeling unbalanced upon arising. He continues to use his rollator at most times. He reports having strategies in place to ensure he reduces fall risk at home. Pain:  [x] Yes  [] No   Location:  R knee/ankle   Pain Rating: (0-10 scale) 5/10 at rest  Pain altered Tx:  [x] No  [] Yes  Action:  Comments:      Objective:  Test and Measures 22:   -- ABC Scale = 29.38 % balance confidence   -- VASQUEZ 44/56 (5 pt improvement)     Reviewed below the home exercise program to ensure pt is safe with exercises and that they are appropriate.  Dicussed the need to complete with a counter and a chair behind him for safety. Reviewed challenges of going to unilateral UE support or bilateral fingertip support. Access Code: [facility]  URL: [email or web address]  Date: 07/21/2022  Prepared by: Yumiko Brown    Exercises  Standing March with Counter Support - 1 x daily - 7 x weekly - 3 sets - 10 reps - 3s hold  Standing Hip Abduction with Counter Support - 1 x daily - 7 x weekly - 3 sets - 10 reps  Standing Hip Extension with Counter Support - 1 x daily - 7 x weekly - 3 sets - 10 reps  Heel Toe Raises with Counter Support - 1 x daily - 7 x weekly - 3 sets - 15 reps  Tandem Stance - 1 x daily - 7 x weekly - 3 sets - 30s hold           Assessment:  Pt was initially evaluated on 5/4/22 to work on imbalance he was having with mobility. Overall he has shown some good improvements in balance and is appropriately using an AD. HE does well with catching himself with an UE or stepping strategy as his hip and ankle strategies are reduced. Most notably he improved his VASQUEZ balance score to 44/56 which is a total of 10 pt improvement and just shy of the fall risk cut off score. His balance confidence is low but feel that is inaccurate with his performance with activities during the session. He has been having difficulties with R knee pain in recent visits with increased standing work. Discussed that at this time it would be beneficial to follow up with ortho and transition to a home program for balance to allow him to figure out a plan to address pain that will improve his tolerance for standing balance work. Pt agrees with plan. Reviewed extensively a home program and ensured he can safely set him self up and complete since he is home alone. At this time will close this episode of care to allow the patient to focus on his knee pain.                            [x] Patient would continue to benefit from skilled physical therapy services in order to address gaze stability deficits, proprioceptive/neuromuscular training and dynamic/static balance training to decrease fall risk and improve stability with community level mobility. GOALS - assessed and updated 7/5/22  & 7/21/22  STG: (to be met in 10 treatments)  Pt will improve score on VASQUEZ balance scale by >/= 45/56 points  to improve overall balance stability and decrease fall risk with mobility. - 7/5: progress -- 5pt improvement to 39/56   -7/21: progress made as he improves to 44/56    Pt will increase strength of all major muscle groups of bilateral hips to 5/5 or greater demonstrating improved strength needed to maximize proximal stability with mobility & transfers.    -7/5: progressing -- still limited in hip extension strength. Will continue to work on    -7/21: progressing but should improve with HEP   Pt will demonstrate appropriate balance reactions 75% of time when a perturbations are present with no LOB thus improving stability if her were to encounter a bump in the community. - 7/5: progressing -- in treatments able to recover about 50% of time with stepping strategies without assistance    - 7/21 -- similar to above comments   Pt will have no jerky movements with smooth pursuits showing improve gaze stability needed to navigate at a community level. - 7/5: progressing    -7/21: improvements noted but still needs to work gaze stability   LTG: (to be met in 20 treatments)  Pt will report no falls for x6 weeks and verbalize ways to reduce falls at home demonstrating improved safety with mobility and implementation of fall prevention strategies.     -7/5: Meeting -- no falls noted as pt is careful    - 7/21: MET - no falls noted   Pt will be independent with home program addressing strength, flexibility and balance to maximize gains made in therapy to improve overall functional capacity for mobility.    -7/5: progressing -- continuing to update as needed    - 7/21: MET -- reviewed this session   Pt will improve self confidence of balance per the ABC scale to >65% to demonstrate greater confidence that correlates with decreased fall risk. - 7/5: will re-assess at later session    -7/21: decreased in self confidence but do not feel this is accurate with self performance   Pt will be able to navigate curbs, uneven surfaces, and inclines/declines with LRAD with distant supervision to ensure he can safely navigate the community & get outdoors more. - 7/5: progressing    -7/21: MET -- per pt report he is getting outside more and walking with rollator  Pt will be able to ambulate with LRAD while visually scanning without a LOB or pathway deviation . - 7/5: progressing    -7/21: progressing -- mild instability   6. NEW GOAL7/5: Pt will be able ambulate with unilateral UE support while holding a leash with moderate perturbations without a LOB or significant sway showing improved stability to be able to walk his dog with better safety.    -7/21: progressing towards goal but was limited by knee pain         Pt. Education:  [] Yes  [] No  [x] Reviewed Prior HEP/Ed  Method of Education: [x] Verbal  [x] Demo  [] Written  Comprehension of Education:  [x] Verbalizes understanding. [x] Demonstrates understanding. [] Needs review. [x] Demonstrates/verbalizes HEP/Ed previously given. Plan:    [] Continue per plan of care.               [x] Other: DISCHARGE                             Treatment Charges: Mins Units   []  Modalities       []  Ther Exercise     []  Manual Therapy       [x]  Ther Activities 40  3   []  Aquatics       []  Neuromuscular     [] Vasocompression       [] Gait Training     [] Dry needling        [] 1 or 2 muscles        [] 3 or more muscles       []  Other       Total Treatment time 40 3      Time In: 1255 AM         Time Out: 4951 AM     Electronically signed by:   Wes Rodriguez, PT

## 2022-08-10 ENCOUNTER — TELEPHONE (OUTPATIENT)
Dept: FAMILY MEDICINE CLINIC | Age: 77
End: 2022-08-10

## 2022-08-15 DIAGNOSIS — R73.01 IMPAIRED FASTING GLUCOSE: ICD-10-CM

## 2022-08-15 RX ORDER — METFORMIN HYDROCHLORIDE 500 MG/1
TABLET, EXTENDED RELEASE ORAL
Qty: 90 TABLET | Refills: 0 | Status: SHIPPED | OUTPATIENT
Start: 2022-08-15

## 2022-09-01 PROBLEM — I31.4 PERICARDIAL TAMPONADE: Status: ACTIVE | Noted: 2018-12-20

## 2022-09-01 PROBLEM — E66.9 OBESITY (BMI 35.0-39.9 WITHOUT COMORBIDITY): Status: ACTIVE | Noted: 2019-03-07

## 2022-09-01 PROBLEM — Z95.5 HX OF HEART ARTERY STENT: Status: ACTIVE | Noted: 2021-08-16

## 2022-09-02 ENCOUNTER — OFFICE VISIT (OUTPATIENT)
Dept: FAMILY MEDICINE CLINIC | Age: 77
End: 2022-09-02
Payer: MEDICARE

## 2022-09-02 ENCOUNTER — CARE COORDINATION (OUTPATIENT)
Dept: CARE COORDINATION | Age: 77
End: 2022-09-02

## 2022-09-02 VITALS
WEIGHT: 262.2 LBS | RESPIRATION RATE: 20 BRPM | SYSTOLIC BLOOD PRESSURE: 114 MMHG | BODY MASS INDEX: 37.62 KG/M2 | DIASTOLIC BLOOD PRESSURE: 60 MMHG | TEMPERATURE: 98.8 F | HEART RATE: 80 BPM

## 2022-09-02 DIAGNOSIS — R51.9 NONINTRACTABLE HEADACHE, UNSPECIFIED CHRONICITY PATTERN, UNSPECIFIED HEADACHE TYPE: ICD-10-CM

## 2022-09-02 DIAGNOSIS — R73.03 PREDIABETES: Primary | ICD-10-CM

## 2022-09-02 DIAGNOSIS — F32.A DEPRESSION, UNSPECIFIED DEPRESSION TYPE: ICD-10-CM

## 2022-09-02 DIAGNOSIS — I10 ESSENTIAL HYPERTENSION: ICD-10-CM

## 2022-09-02 DIAGNOSIS — R13.14 PHARYNGOESOPHAGEAL DYSPHAGIA: ICD-10-CM

## 2022-09-02 DIAGNOSIS — E78.2 MIXED HYPERLIPIDEMIA: ICD-10-CM

## 2022-09-02 DIAGNOSIS — R13.14 PHARYNGOESOPHAGEAL DYSPHAGIA: Primary | ICD-10-CM

## 2022-09-02 LAB — HBA1C MFR BLD: 6.3 %

## 2022-09-02 PROCEDURE — 99214 OFFICE O/P EST MOD 30 MIN: CPT | Performed by: NURSE PRACTITIONER

## 2022-09-02 PROCEDURE — G8427 DOCREV CUR MEDS BY ELIG CLIN: HCPCS | Performed by: NURSE PRACTITIONER

## 2022-09-02 PROCEDURE — 83036 HEMOGLOBIN GLYCOSYLATED A1C: CPT | Performed by: NURSE PRACTITIONER

## 2022-09-02 PROCEDURE — G8417 CALC BMI ABV UP PARAM F/U: HCPCS | Performed by: NURSE PRACTITIONER

## 2022-09-02 PROCEDURE — 1036F TOBACCO NON-USER: CPT | Performed by: NURSE PRACTITIONER

## 2022-09-02 PROCEDURE — 1123F ACP DISCUSS/DSCN MKR DOCD: CPT | Performed by: NURSE PRACTITIONER

## 2022-09-02 RX ORDER — SERTRALINE HYDROCHLORIDE 25 MG/1
25 TABLET, FILM COATED ORAL DAILY
Qty: 90 TABLET | Refills: 0 | Status: SHIPPED | OUTPATIENT
Start: 2022-09-02

## 2022-09-02 SDOH — ECONOMIC STABILITY: FOOD INSECURITY: WITHIN THE PAST 12 MONTHS, YOU WORRIED THAT YOUR FOOD WOULD RUN OUT BEFORE YOU GOT MONEY TO BUY MORE.: SOMETIMES TRUE

## 2022-09-02 SDOH — ECONOMIC STABILITY: FOOD INSECURITY: WITHIN THE PAST 12 MONTHS, THE FOOD YOU BOUGHT JUST DIDN'T LAST AND YOU DIDN'T HAVE MONEY TO GET MORE.: SOMETIMES TRUE

## 2022-09-02 ASSESSMENT — ENCOUNTER SYMPTOMS
NAUSEA: 0
ABDOMINAL PAIN: 0
TROUBLE SWALLOWING: 1
SHORTNESS OF BREATH: 0
COUGH: 0

## 2022-09-02 ASSESSMENT — PATIENT HEALTH QUESTIONNAIRE - PHQ9
SUM OF ALL RESPONSES TO PHQ QUESTIONS 1-9: 21
SUM OF ALL RESPONSES TO PHQ QUESTIONS 1-9: 21
4. FEELING TIRED OR HAVING LITTLE ENERGY: 3
SUM OF ALL RESPONSES TO PHQ QUESTIONS 1-9: 21
3. TROUBLE FALLING OR STAYING ASLEEP: 3
SUM OF ALL RESPONSES TO PHQ QUESTIONS 1-9: 21
8. MOVING OR SPEAKING SO SLOWLY THAT OTHER PEOPLE COULD HAVE NOTICED. OR THE OPPOSITE, BEING SO FIGETY OR RESTLESS THAT YOU HAVE BEEN MOVING AROUND A LOT MORE THAN USUAL: 3
7. TROUBLE CONCENTRATING ON THINGS, SUCH AS READING THE NEWSPAPER OR WATCHING TELEVISION: 3
SUM OF ALL RESPONSES TO PHQ9 QUESTIONS 1 & 2: 6
6. FEELING BAD ABOUT YOURSELF - OR THAT YOU ARE A FAILURE OR HAVE LET YOURSELF OR YOUR FAMILY DOWN: 3
10. IF YOU CHECKED OFF ANY PROBLEMS, HOW DIFFICULT HAVE THESE PROBLEMS MADE IT FOR YOU TO DO YOUR WORK, TAKE CARE OF THINGS AT HOME, OR GET ALONG WITH OTHER PEOPLE: 2
5. POOR APPETITE OR OVEREATING: 0
9. THOUGHTS THAT YOU WOULD BE BETTER OFF DEAD, OR OF HURTING YOURSELF: 0
2. FEELING DOWN, DEPRESSED OR HOPELESS: 3
1. LITTLE INTEREST OR PLEASURE IN DOING THINGS: 3

## 2022-09-02 ASSESSMENT — SOCIAL DETERMINANTS OF HEALTH (SDOH): HOW HARD IS IT FOR YOU TO PAY FOR THE VERY BASICS LIKE FOOD, HOUSING, MEDICAL CARE, AND HEATING?: HARD

## 2022-09-02 NOTE — PROGRESS NOTES
Subjective:      Patient ID: Linda Gonzalez is a 65 y.o. male. Visit Information    Have you changed or started any medications since your last visit including any over-the-counter medicines, vitamins, or herbal medicines? no   Are you having any side effects from any of your medications? -  no  Have you stopped taking any of your medications? Is so, why? -  no    Have you seen any other physician or provider since your last visit? Yes - Records Obtained  Have you had any other diagnostic tests since your last visit? No  Have you been seen in the emergency room and/or had an admission to a hospital since we last saw you? No  Have you had your routine dental cleaning in the past 6 months? no    Have you activated your [employer] account? If not, what are your barriers? No: patient unable to navigate     Patient Care Team:  MARKUS Velázquez CNP as PCP - General (Family Nurse Practitioner)  MARKUS Velázquez CNP as PCP - [facility] Provider    Medical History Review  Past Medical, Family, and Social History reviewed and does contribute to the patient presenting condition    Health Maintenance   Topic Date Due    DTaP/Tdap/Td vaccine (1 - Tdap) Never done    Shingles vaccine (1 of 2) Never done    COVID-19 Vaccine (3 - Booster for Moderna series) 08/04/2021    Lipids  03/25/2022    Annual Wellness Visit (AWV)  07/16/2022    Flu vaccine (1) 09/01/2022    Depression Monitoring  03/31/2023    Pneumococcal 65+ years Vaccine  Completed    Hepatitis C screen  Completed    Hepatitis A vaccine  Aged Out    Hepatitis B vaccine  Aged Out    Hib vaccine  Aged Out    Meningococcal (ACWY) vaccine  Aged Out       HPI  65year old male accompanied by significant other presents with management of following:  Prediabetes, HTN HlD and depression with medication refills- currently is on metformin and a1c is 6.3 today. Bp is stable with dual therapy. Used to take zoloft but stopped it a while back.  States he feels depressed due to inability to do anything , denies homicidal or suicidal ideation  Headache - occipital area and throbbing worse recently. States he has had pain ever since craniotomy 3 years ago due to intracranial bleeding. Currently follows up with neurology. Dysphagia - for a year. States he has hard time swallowing and aspirates on food. Hx of cervical spinal stenosis     Review of Systems   Constitutional:  Negative for chills, diaphoresis and fever. HENT:  Positive for trouble swallowing. Eyes:  Negative for visual disturbance. Respiratory:  Negative for cough and shortness of breath. Cardiovascular:  Negative for chest pain and palpitations. Gastrointestinal:  Negative for abdominal pain and nausea. Skin:  Negative for wound. Neurological:  Positive for weakness and headaches. Negative for dizziness and numbness. Psychiatric/Behavioral:  Positive for dysphoric mood. Negative for self-injury and suicidal ideas. The patient is nervous/anxious. Objective:   Physical Exam  Vitals and nursing note reviewed. Constitutional:       General: He is not in acute distress. Appearance: Normal appearance. He is obese. Eyes:      Extraocular Movements: Extraocular movements intact. Conjunctiva/sclera: Conjunctivae normal.   Cardiovascular:      Rate and Rhythm: Regular rhythm. Heart sounds: Normal heart sounds. Pulmonary:      Effort: Pulmonary effort is normal. No respiratory distress. Breath sounds: Normal breath sounds. Abdominal:      Palpations: Abdomen is soft. Tenderness: There is no abdominal tenderness. Musculoskeletal:         General: No tenderness or deformity. Cervical back: Neck supple. Lymphadenopathy:      Cervical: No cervical adenopathy. Skin:     General: Skin is warm and dry. Neurological:      Mental Status: He is alert and oriented to person, place, and time. Cranial Nerves: No cranial nerve deficit. Motor: Weakness present. Coordination: Coordination abnormal.      Gait: Gait abnormal.   Psychiatric:         Behavior: Behavior normal.      Comments: Appears anxious with dysphoric mood        Assessment:      1. Prediabetes    2. Essential hypertension    3. Mixed hyperlipidemia    4. Depression, unspecified depression type    5. Nonintractable headache, unspecified chronicity pattern, unspecified headache type    6. Pharyngoesophageal dysphagia            Plan:      BP Readings from Last 3 Encounters:   09/02/22 114/60   04/19/22 133/80   03/31/22 138/88     /60 (Site: Left Upper Arm, Position: Sitting, Cuff Size: Large Adult)   Pulse 80   Temp 98.8 °F (37.1 °C) (Infrared)   Resp 20   Wt 262 lb 3.2 oz (118.9 kg)   BMI 37.62 kg/m²   Lab Results   Component Value Date    WBC 5.8 08/04/2021    HGB 13.9 08/04/2021    HCT 40.8 (L) 08/04/2021     09/21/2021    CHOL 167 03/25/2021    TRIG 148 03/25/2021    HDL 37 (L) 03/25/2021    ALT 28 08/04/2021    AST 25 08/04/2021     08/04/2021    K 4.8 08/04/2021     08/04/2021    CREATININE 0.65 (L) 08/04/2021    BUN 16 08/04/2021    CO2 28 08/04/2021    TSH 0.58 07/27/2018    PSA 1.22 03/25/2021    INR 1.0 09/21/2021    LABA1C 6.3 09/02/2022    LABMICR CANNOT BE CALCULATED 07/27/2018     Lab Results   Component Value Date    CALCIUM 8.6 08/04/2021    PHOS 3.7 02/03/2016     Lab Results   Component Value Date    LDLCHOLESTEROL 100 03/25/2021         1. Prediabetes  - cont metformin at current dose  - POCT glycosylated hemoglobin (Hb A1C)    2. Essential hypertension  - cont current bp therapy     3. Mixed hyperlipidemia  - cont statin therapy     4. Depression, unspecified depression type  - resume zoloft   - sertraline (ZOLOFT) 25 MG tablet; Take 1 tablet by mouth daily  Dispense: 90 tablet; Refill: 0    5. Nonintractable headache, unspecified chronicity pattern, unspecified headache type  - CT HEAD WO CONTRAST;  Future  - tylenol as needed and follow up with Dr. Argentina Aguiar as scheduled     6. Pharyngoesophageal dysphagia  - refer pt to GI for swallow study    Requested Prescriptions     Signed Prescriptions Disp Refills    sertraline (ZOLOFT) 25 MG tablet 90 tablet 0     Sig: Take 1 tablet by mouth daily       Medications Discontinued During This Encounter   Medication Reason    aspirin 81 MG chewable tablet DISCONTINUED BY ANOTHER CLINICIAN    fluticasone (FLONASE) 50 MCG/ACT nasal spray Patient Choice    loratadine (CLARITIN) 10 MG tablet Patient Choice    gabapentin (NEURONTIN) 400 MG capsule Therapy completed     Discussed use, benefit, and side effects of prescribed medications. Barriers to medication compliance addressed. All patient questions answered. Pt voiced understanding. No follow-ups on file.

## 2022-09-02 NOTE — CARE COORDINATION
Patient referred, by PCP, for social needs. Difficulty paying bills and buying food. Attempted to reach patient to discuss needs. Left a message, with call back number, on his voicemail. Referral sent to HC/LSW for assistance. Will try to reach him next Wednesday.

## 2022-09-05 DIAGNOSIS — I10 ESSENTIAL HYPERTENSION: ICD-10-CM

## 2022-09-06 ENCOUNTER — CARE COORDINATION (OUTPATIENT)
Dept: CARE COORDINATION | Age: 77
End: 2022-09-06

## 2022-09-06 RX ORDER — LISINOPRIL 20 MG/1
TABLET ORAL
Qty: 90 TABLET | Refills: 0 | Status: SHIPPED | OUTPATIENT
Start: 2022-09-06

## 2022-09-06 NOTE — CARE COORDINATION
Patients' spouse Kristian Bay returned the call, and stated that they have a  lot of bills to pay and need information for financial help.  provided Kristian Bay with the phone number to [facility], and also completed a referral to [employer] for food resources. Plan of Care  [facility] [place], [facility]. will follow up with patient/spouse for results of contacts.

## 2022-09-09 ENCOUNTER — CARE COORDINATION (OUTPATIENT)
Dept: CARE COORDINATION | Age: 77
End: 2022-09-09

## 2022-09-09 NOTE — CARE COORDINATION
HC attempted to contact the patients' spouse to follow up on the progress with contacting [employer] and also regarding food resources from [employer]. There was no answer, HC left a message and provided contact information regarding patients' social needs. Plan of Care  [facility]. will follow up with patient regarding social needs.

## 2022-09-14 ENCOUNTER — CARE COORDINATION (OUTPATIENT)
Dept: CARE COORDINATION | Age: 77
End: 2022-09-14

## 2022-09-14 NOTE — CARE COORDINATION
Ambulatory Care Coordination Note  9/14/2022    ACC: Lm Bustillo, RN    Summary Note: Attempted to reach patient for follow up. Left a message on his voicemail with call back number. Will try to reach him next week if no call back. Lab Results       None            Care Coordination Interventions    Referral from Primary Care Provider: Yes  Suggested Interventions and Community Resources  Social Work: In Process          Goals Addressed    None         Prior to Admission medications    Medication Sig Start Date End Date Taking?  Authorizing Provider   lisinopril (PRINIVIL;ZESTRIL) 20 MG tablet Take 1 tablet by mouth once daily 9/6/22   MARKUS Crespo CNP   sertraline (ZOLOFT) 25 MG tablet Take 1 tablet by mouth daily 9/2/22   MARKUS Crespo CNP   metFORMIN (GLUCOPHAGE-XR) 500 MG extended release tablet Take 1 tablet by mouth once daily with breakfast 8/15/22   MARKUS Crespo CNP   atorvastatin (LIPITOR) 80 MG tablet Take 1 tablet by mouth nightly 3/31/22   MARKUS Crespo CNP   clopidogrel (PLAVIX) 75 MG tablet Take 1 tablet by mouth daily 3/31/22   MARKUS Crespo CNP   tiZANidine (ZANAFLEX) 4 MG tablet Take 1 tablet by mouth 3 times daily as needed (For muscle spasm)  Patient not taking: No sig reported 10/7/21   MARKUS Crespo CNP   tamsulosin (FLOMAX) 0.4 MG capsule Take 1 capsule by mouth daily Take in evening after meal 8/9/21   Jules Deleon MD   docusate sodium (COLACE) 100 MG capsule Take one cap po daily 5/20/21   MARKUS Crespo CNP   pantoprazole (PROTONIX) 40 MG tablet Take 1 tablet by mouth every morning (before breakfast)  Patient not taking: No sig reported 2/15/21   MARKUS Crespo CNP   metoprolol succinate (TOPROL XL) 50 MG extended release tablet TAKE 1 TABLET BY MOUTH ONCE DAILY 9/10/20   Historical Provider, MD   Multiple Vitamins-Minerals (MULTIVITAMIN ADULTS 50+) TABS Take by mouth daily    Historical Provider, MD   nitroGLYCERIN (NITROSTAT) 0.4 MG SL tablet up to max of 3 total doses. If no relief after 1 dose, call 911. Patient not taking: Reported on 9/2/2022 7/26/19   Светлана Hays MD       No future appointments.

## 2022-09-16 ENCOUNTER — CARE COORDINATION (OUTPATIENT)
Dept: CARE COORDINATION | Age: 77
End: 2022-09-16

## 2022-09-16 NOTE — CARE COORDINATION
HC attempted to contact the patients' spouse to follow up on receiving groceries from [employer] and making contact with [employer] for utility assistance. There was no answer, HC left her contact information for a return call. Plan of Care  [facility]. will reach out if no response with a week.

## 2022-09-22 ENCOUNTER — CARE COORDINATION (OUTPATIENT)
Dept: CARE COORDINATION | Age: 77
End: 2022-09-22

## 2022-09-22 NOTE — CARE COORDINATION
Patients spouse contacted the [facility]. to get the phone number for [id number].  shared the phone number for patients spouse. She also stated that they received food from [address] [place] [address] Dr. Plan of Care  [facility]. will follow up with patient/spouse for future social needs.

## 2022-09-23 ENCOUNTER — HOSPITAL ENCOUNTER (OUTPATIENT)
Dept: CT IMAGING | Age: 77
Discharge: HOME | End: 2022-09-25
Payer: MEDICARE

## 2022-09-23 DIAGNOSIS — R51.9 NONINTRACTABLE HEADACHE, UNSPECIFIED CHRONICITY PATTERN, UNSPECIFIED HEADACHE TYPE: ICD-10-CM

## 2022-09-23 PROCEDURE — 70450 CT HEAD/BRAIN W/O DYE: CPT

## 2022-09-30 ENCOUNTER — CARE COORDINATION (OUTPATIENT)
Dept: CARE COORDINATION | Age: 77
End: 2022-09-30

## 2022-09-30 NOTE — CARE COORDINATION
Ambulatory Care Coordination Note  9/30/2022    ACC: Sai Espinosa, RN  Jayme Prows for follow up and spoke with his wife. She states he was napping right now but was doing good. They have been working with Nathen Marin, [facility] [employer]. on resources for food and other social needs. She said he just had a CT of the head done and they called and told them it was negative. She denies any needs. She states he has all of his meds and are able to afford them. Will follow up in 2 weeks. Offered patient enrollment in the Remote Patient Monitoring (RPM) program for in-home monitoring: NA. Lab Results       None            Care Coordination Interventions    Referral from Primary Care Provider: Yes  Suggested Interventions and Community Resources  Social Work: In Process          Goals Addressed    None         Prior to Admission medications    Medication Sig Start Date End Date Taking?  Authorizing Provider   lisinopril (PRINIVIL;ZESTRIL) 20 MG tablet Take 1 tablet by mouth once daily 9/6/22   Claritza Mcfadden APRN - CNP   sertraline (ZOLOFT) 25 MG tablet Take 1 tablet by mouth daily 9/2/22   Claritza Mcfadden APRN - CNP   metFORMIN (GLUCOPHAGE-XR) 500 MG extended release tablet Take 1 tablet by mouth once daily with breakfast 8/15/22   Claritza Mcfadden APRN - CNP   atorvastatin (LIPITOR) 80 MG tablet Take 1 tablet by mouth nightly 3/31/22   Claritza Mcfadden APRN - CNP   clopidogrel (PLAVIX) 75 MG tablet Take 1 tablet by mouth daily 3/31/22   Claritza Mcfadden APRN - CNP   tiZANidine (ZANAFLEX) 4 MG tablet Take 1 tablet by mouth 3 times daily as needed (For muscle spasm)  Patient not taking: No sig reported 10/7/21   Claritza Mcfadden APRN - CNP   tamsulosin (FLOMAX) 0.4 MG capsule Take 1 capsule by mouth daily Take in evening after meal 8/9/21   Bassem Mack MD   docusate sodium (COLACE) 100 MG capsule Take one cap po daily 5/20/21   Claritza Mcfadden APRN - CNP   pantoprazole (PROTONIX) 40 MG tablet Take 1 tablet by mouth every morning (before breakfast)  Patient not taking: No sig reported 2/15/21   MARKUS Yeh - CNP   metoprolol succinate (TOPROL XL) 50 MG extended release tablet TAKE 1 TABLET BY MOUTH ONCE DAILY 9/10/20   Historical Provider, MD   Multiple Vitamins-Minerals (MULTIVITAMIN ADULTS 50+) TABS Take by mouth daily    Historical Provider, MD   nitroGLYCERIN (NITROSTAT) 0.4 MG SL tablet up to max of 3 total doses. If no relief after 1 dose, call 911. Patient not taking: Reported on 9/2/2022 7/26/19   Esperanza Green MD       No future appointments.

## 2022-10-11 ENCOUNTER — CARE COORDINATION (OUTPATIENT)
Dept: CARE COORDINATION | Age: 77
End: 2022-10-11

## 2022-10-11 NOTE — CARE COORDINATION
HC phoned and spoke with the patients' spouse today. She stated that things are well and  [employer] will deliver food today. HC encouraged patients' spouse to call if they have other social needs. Plan of Care  [facility] [place], [facility]. will follow up with patient/ spouse for social needs. If nothing additional needed, will sign off.

## 2022-10-12 DIAGNOSIS — I42.8 OTHER CARDIOMYOPATHY (HCC): ICD-10-CM

## 2022-10-12 DIAGNOSIS — E78.2 MIXED HYPERLIPIDEMIA: ICD-10-CM

## 2022-10-12 DIAGNOSIS — I25.118 CORONARY ARTERY DISEASE OF NATIVE ARTERY OF NATIVE HEART WITH STABLE ANGINA PECTORIS (HCC): ICD-10-CM

## 2022-10-12 NOTE — TELEPHONE ENCOUNTER
Spouse needs refills on plavix and atorvastatin until they can establish with new pcp.     She was given the other providers information for the 3rd time and stated will call today to schedule new patient appt

## 2022-10-13 ENCOUNTER — OFFICE VISIT (OUTPATIENT)
Dept: FAMILY MEDICINE CLINIC | Age: 77
End: 2022-10-13
Payer: MEDICARE

## 2022-10-13 VITALS
HEART RATE: 82 BPM | WEIGHT: 259.8 LBS | DIASTOLIC BLOOD PRESSURE: 64 MMHG | HEIGHT: 70 IN | SYSTOLIC BLOOD PRESSURE: 128 MMHG | TEMPERATURE: 97.6 F | OXYGEN SATURATION: 97 % | BODY MASS INDEX: 37.19 KG/M2

## 2022-10-13 DIAGNOSIS — M51.36 LUMBAR DEGENERATIVE DISC DISEASE: ICD-10-CM

## 2022-10-13 DIAGNOSIS — I63.441 CEREBROVASCULAR ACCIDENT (CVA) DUE TO EMBOLISM OF RIGHT CEREBELLAR ARTERY (HCC): ICD-10-CM

## 2022-10-13 DIAGNOSIS — Z91.81 AT HIGH RISK FOR FALLS: ICD-10-CM

## 2022-10-13 DIAGNOSIS — F32.A DEPRESSION, UNSPECIFIED DEPRESSION TYPE: ICD-10-CM

## 2022-10-13 DIAGNOSIS — I25.118 CORONARY ARTERY DISEASE OF NATIVE ARTERY OF NATIVE HEART WITH STABLE ANGINA PECTORIS (HCC): ICD-10-CM

## 2022-10-13 DIAGNOSIS — E11.9 TYPE 2 DIABETES MELLITUS WITHOUT COMPLICATION, WITHOUT LONG-TERM CURRENT USE OF INSULIN (HCC): ICD-10-CM

## 2022-10-13 DIAGNOSIS — I10 ESSENTIAL HYPERTENSION: Primary | ICD-10-CM

## 2022-10-13 DIAGNOSIS — E04.1 THYROID NODULE: ICD-10-CM

## 2022-10-13 DIAGNOSIS — Z23 NEED FOR INFLUENZA VACCINATION: ICD-10-CM

## 2022-10-13 DIAGNOSIS — R63.4 WEIGHT LOSS: ICD-10-CM

## 2022-10-13 DIAGNOSIS — E78.2 MIXED HYPERLIPIDEMIA: ICD-10-CM

## 2022-10-13 DIAGNOSIS — R13.13 PHARYNGEAL DYSPHAGIA: ICD-10-CM

## 2022-10-13 PROBLEM — I20.0 UNSTABLE ANGINA (HCC): Status: RESOLVED | Noted: 2018-06-10 | Resolved: 2022-10-13

## 2022-10-13 PROBLEM — M51.369 LUMBAR DEGENERATIVE DISC DISEASE: Status: ACTIVE | Noted: 2022-10-13

## 2022-10-13 PROBLEM — I63.442 CEREBROVASCULAR ACCIDENT (CVA) DUE TO EMBOLISM OF LEFT CEREBELLAR ARTERY (HCC): Status: ACTIVE | Noted: 2022-10-13

## 2022-10-13 PROBLEM — R19.01 ABDOMINAL MASS, RUQ (RIGHT UPPER QUADRANT): Status: RESOLVED | Noted: 2018-07-19 | Resolved: 2022-10-13

## 2022-10-13 PROCEDURE — 1123F ACP DISCUSS/DSCN MKR DOCD: CPT | Performed by: FAMILY MEDICINE

## 2022-10-13 PROCEDURE — 90694 VACC AIIV4 NO PRSRV 0.5ML IM: CPT | Performed by: FAMILY MEDICINE

## 2022-10-13 PROCEDURE — 99215 OFFICE O/P EST HI 40 MIN: CPT | Performed by: FAMILY MEDICINE

## 2022-10-13 PROCEDURE — 1036F TOBACCO NON-USER: CPT | Performed by: FAMILY MEDICINE

## 2022-10-13 PROCEDURE — G8484 FLU IMMUNIZE NO ADMIN: HCPCS | Performed by: FAMILY MEDICINE

## 2022-10-13 PROCEDURE — G8427 DOCREV CUR MEDS BY ELIG CLIN: HCPCS | Performed by: FAMILY MEDICINE

## 2022-10-13 PROCEDURE — G8417 CALC BMI ABV UP PARAM F/U: HCPCS | Performed by: FAMILY MEDICINE

## 2022-10-13 PROCEDURE — 3044F HG A1C LEVEL LT 7.0%: CPT | Performed by: FAMILY MEDICINE

## 2022-10-13 PROCEDURE — G0008 ADMIN INFLUENZA VIRUS VAC: HCPCS | Performed by: FAMILY MEDICINE

## 2022-10-13 RX ORDER — ATORVASTATIN CALCIUM 80 MG/1
80 TABLET, FILM COATED ORAL NIGHTLY
Qty: 90 TABLET | Refills: 0 | OUTPATIENT
Start: 2022-10-13

## 2022-10-13 RX ORDER — CLOPIDOGREL BISULFATE 75 MG/1
TABLET ORAL
Qty: 90 TABLET | Refills: 0 | Status: SHIPPED | OUTPATIENT
Start: 2022-10-13

## 2022-10-13 RX ORDER — ATORVASTATIN CALCIUM 80 MG/1
80 TABLET, FILM COATED ORAL NIGHTLY
Qty: 90 TABLET | Refills: 0 | Status: SHIPPED | OUTPATIENT
Start: 2022-10-13

## 2022-10-13 RX ORDER — METOPROLOL SUCCINATE 50 MG/1
TABLET, EXTENDED RELEASE ORAL
Qty: 90 TABLET | Refills: 1 | Status: SHIPPED | OUTPATIENT
Start: 2022-10-13

## 2022-10-13 RX ORDER — CLOPIDOGREL BISULFATE 75 MG/1
75 TABLET ORAL DAILY
Qty: 90 TABLET | Refills: 0 | OUTPATIENT
Start: 2022-10-13

## 2022-10-13 ASSESSMENT — ENCOUNTER SYMPTOMS
EYE REDNESS: 0
WHEEZING: 0
VOMITING: 0
CHEST TIGHTNESS: 0
NAUSEA: 1
DIARRHEA: 0
CONSTIPATION: 0
STRIDOR: 0
SHORTNESS OF BREATH: 1
BACK PAIN: 1
ABDOMINAL DISTENTION: 1
SORE THROAT: 0
SINUS PRESSURE: 0
TROUBLE SWALLOWING: 0
COUGH: 0
COLOR CHANGE: 0
ABDOMINAL PAIN: 0
RHINORRHEA: 0
RECTAL PAIN: 0
BLOOD IN STOOL: 0

## 2022-10-13 NOTE — PATIENT INSTRUCTIONS
New Updates for [facility] MyChart/ [facility]look ([facility]) IGNACIO    Thank you for choosing US to give you the best care! [employer] ([facility]) is always trying to think of new ways to help their patients. We are asking all patients to try out the new digital registration that is now available through your [facility] account or the new IGNACIO, [facility]look ([facility]). Via the ignacio you're now able to update your personal and registration information prior to your upcoming appointment. This will save you time once you arrive at the office to check-in, not to mention your information remains safe!! Many other perks come from signing up for an account, such as:  Requesting refills  Scheduling an appointment  Completing an E-Visit  Sending a message to the office/provider  Having access to your medication list  Paying your bill/copay prior to your appointment  Scheduling your yearly mammogram  Review your test results    If you are not familiar with [facility] or the olook ([facility]) IGNACIO, please ask one of us and we will be happy to answer any questions or help you set-up your account.       Your [facility] office,  [place]

## 2022-10-13 NOTE — PROGRESS NOTES
Visit Information    Have you changed or started any medications since your last visit including any over-the-counter medicines, vitamins, or herbal medicines? no   Have you stopped taking any of your medications? Is so, why? -  no  Are you having any side effects from any of your medications? - no    Have you seen any other physician or provider since your last visit?  no   Have you had any other diagnostic tests since your last visit?  no   Have you been seen in the emergency room and/or had an admission in a hospital since we last saw you?  no   Have you had your routine dental cleaning in the past 6 months?  no     Do you have an active MyChart account? If no, what is the barrier?   NO PENDING     Patient Care Team:  Angel Choudhary MD as PCP - General (Family Medicine)  Almira Rinne, APRN - CNP as PCP - [facility]  Preeti Richardson RN as Ambulatory Care Manager  Virgen Cardoza as Health     Medical History Review  Past Medical, Family, and Social History reviewed and does contribute to the patient presenting condition    Health Maintenance   Topic Date Due    DTaP/Tdap/Td vaccine (1 - Tdap) Never done    Shingles vaccine (1 of 2) Never done    COVID-19 Vaccine (3 - Booster for Marino Daubs series) 08/04/2021    Lipids  03/25/2022    Annual Wellness Visit (AWV)  07/16/2022    Flu vaccine (1) 08/01/2022    Depression Monitoring  09/02/2023    Pneumococcal 65+ years Vaccine  Completed    Hepatitis C screen  Completed    Hepatitis A vaccine  Aged Out    Hib vaccine  Aged Out    Meningococcal (ACWY) vaccine  Aged Out

## 2022-10-13 NOTE — PROGRESS NOTES
Chief Complaint   Patient presents with    Establish Care    Hypertension    Abdominal Pain     THE PAST 2 WEEKS/ GOING TO SEE GI SPECIALIST          Shereen Smith  here today for follow up on chronic medical problems, go over labs and/or diagnostic studies, and medication refills. Establish Care, Hypertension, and Abdominal Pain (THE PAST 2 WEEKS/ GOING TO SEE GI SPECIALIST )      HPI: Patient is scheduled to establish transfer from another office. Patient has history of hypertension, controlled is on lisinopril and metoprolol patient is not sure about the medications. Patient denies any chest pain shortness of breath. Patient has history of coronary artery disease with stent placed also has history of cerebrovascular accident in 2019. Patient had stent placed in [facility] clinic has seen Dr. Kirby Eric 1 time in town. Patient reports the symptoms are stable he is on statins beta-blockers and lisinopril denies any chest pain dyspnea on exertion or palpitations. Cerebrovascular accident, with residual weakness on left side. Patient has more weakness on left upper extremity, is high risk for falls and has balance problems uses walker for walking. Patient has history of diabetes is taking metformin A1c stable done in September. He monitors his blood sugars at home. Patient's main concern is dysphagia and weight loss, patient has lost about 20 pounds in last 6 months. Patient reports he has difficulty in swallowing was referred to GI. He has endoscopy and colonoscopy scheduled. Patient reports he is not able to eat and this happened since he had stroke but recently it got worse. He had ultrasound of the thyroid done in 2016 that showed thyroid nodule, that needed follow-up ultrasound but never had that done. Ultrasound thyroid in 2016. Suboptimal examination demonstrating normal sized thyroid gland with 2 right-sided nodules larger inferiorly as measured above.  Consider correlation with nuclear thyroid scan or alternatively followup examination in 3-6 months    Hyperlipidemia is on statins 80 mg. Patient reports compliance denies any side effects. Patient was started on Zoloft by previous PCP for anxiety and depression, reports he is not taking that. Patient has severe  depression denies any suicidal thoughts or attempts. Encouraged continue taking that medication. Lumbar degenerative disc disease follows with orthopedic.        /64   Pulse 82   Temp 97.6 °F (36.4 °C)   Ht 5' 10\" (1.778 m)   Wt 259 lb 12.8 oz (117.8 kg)   SpO2 97%   BMI 37.28 kg/m²    Body mass index is 37.28 kg/m². Wt Readings from Last 3 Encounters:   10/13/22 259 lb 12.8 oz (117.8 kg)   09/02/22 262 lb 3.2 oz (118.9 kg)   04/19/22 273 lb (123.8 kg)        []Negative depression screening. PHQ Scores 9/2/2022 3/31/2022 7/15/2021 5/13/2021 2/15/2021 12/8/2020 11/20/2020   PHQ2 Score 6 1 0 0 2 1 3   PHQ9 Score 21 14 0 0 2 1 11      []1-4 = Minimal depression   []5-9 = Milddepression   []10-14 = Moderate depression   []15-19 = Moderately severe depression   [x]20-27 = Severe depression    Discussed testing with the patient and all questions fully answered.     Office Visit on 09/02/2022   Component Date Value Ref Range Status    Hemoglobin A1C 09/02/2022 6.3  % Final         Most recent labs reviewed:     Lab Results   Component Value Date    WBC 5.8 08/04/2021    HGB 13.9 08/04/2021    HCT 40.8 (L) 08/04/2021    MCV 90.6 08/04/2021     09/21/2021       @BRIEFLAB(NA,K,CL,CO2,BUN,CREATININE,GLUCOSE,CALCIUM)@     Lab Results   Component Value Date    ALT 28 08/04/2021    AST 25 08/04/2021    ALKPHOS 116 08/04/2021    BILITOT 0.65 08/04/2021       Lab Results   Component Value Date    TSH 0.58 07/27/2018       Lab Results   Component Value Date    CHOL 167 03/25/2021    CHOL 179 07/27/2018    CHOL 234 (H) 06/11/2018     Lab Results   Component Value Date    TRIG 148 03/25/2021    TRIG 122 07/27/2018    TRIG 313 (H) 06/11/2018     Lab Results   Component Value Date    HDL 37 (L) 03/25/2021    HDL 48 07/27/2018    HDL 35 (L) 06/11/2018     Lab Results   Component Value Date    LDLCHOLESTEROL 100 03/25/2021    LDLCHOLESTEROL 107 07/27/2018    LDLCHOLESTEROL 136 (H) 06/11/2018     Lab Results   Component Value Date    VLDL NOT REPORTED 03/25/2021    VLDL NOT REPORTED 07/27/2018    VLDL NOT REPORTED 06/11/2018     Lab Results   Component Value Date    CHOLHDLRATIO 4.5 03/25/2021    CHOLHDLRATIO 3.7 07/27/2018    CHOLHDLRATIO 6.7 (H) 06/11/2018       Lab Results   Component Value Date    LABA1C 6.3 09/02/2022       No results found for: [id number]    No results found for: FOLATE    No results found for: IRON, TIBC, FERRITIN    Lab Results   Component Value Date    VITD25 25.2 (L) 07/27/2018             Current Outpatient Medications   Medication Sig Dispense Refill    atorvastatin (LIPITOR) 80 MG tablet Take 1 tablet by mouth nightly 90 tablet 0    clopidogrel (PLAVIX) 75 MG tablet Take 1 tablet by mouth once daily 90 tablet 0    metoprolol succinate (TOPROL XL) 50 MG extended release tablet Take 1 tab daily 90 tablet 1    lisinopril (PRINIVIL;ZESTRIL) 20 MG tablet Take 1 tablet by mouth once daily 90 tablet 0    metFORMIN (GLUCOPHAGE-XR) 500 MG extended release tablet Take 1 tablet by mouth once daily with breakfast 90 tablet 0    tamsulosin (FLOMAX) 0.4 MG capsule Take 1 capsule by mouth daily Take in evening after meal 30 capsule 5    pantoprazole (PROTONIX) 40 MG tablet Take 1 tablet by mouth every morning (before breakfast) 90 tablet 0    Multiple Vitamins-Minerals (MULTIVITAMIN ADULTS 50+) TABS Take by mouth daily      nitroGLYCERIN (NITROSTAT) 0.4 MG SL tablet up to max of 3 total doses.  If no relief after 1 dose, call 911. 25 tablet 3    sertraline (ZOLOFT) 25 MG tablet Take 1 tablet by mouth daily (Patient not taking: Reported on 10/13/2022) 90 tablet 0     No current facility-administered medications for this visit. Social History     Socioeconomic History    Marital status:      Spouse name: Not on file    Number of children: Not on file    Years of education: Not on file    Highest education level: Not on file   Occupational History    Not on file   Tobacco Use    Smoking status: Never    Smokeless tobacco: Never   Vaping Use    Vaping Use: Never used   Substance and Sexual Activity    Alcohol use: Not Currently    Drug use: No    Sexual activity: Not on file   Other Topics Concern    Not on file   Social History Narrative    Not on file     Social Determinants of Health     Financial Resource Strain: High Risk    Difficulty of Paying Living Expenses: Hard   Food Insecurity: Food Insecurity Present    Worried About Running Out of Food in the Last Year: Sometimes true    Ran Out of Food in the Last Year: Sometimes true   Transportation Needs: Not on file   Physical Activity: Not on file   Stress: Not on file   Social Connections: Not on file   Intimate Partner Violence: Not on file   Housing Stability: Not on file     Counseling given: Not Answered        Family History   Problem Relation Age of Onset    Cancer Mother     Cancer Father              -rest of complaints with corresponding details per ROS    The patient's past medical, surgical, social, and family history as well as his current medications and allergies were reviewed as documented intoday's encounter. Review of Systems   Constitutional:  Positive for activity change and unexpected weight change. Negative for appetite change, fatigue and fever. HENT:  Negative for congestion, ear pain, postnasal drip, rhinorrhea, sinus pressure, sore throat and trouble swallowing. Eyes:  Positive for visual disturbance. Negative for redness. Respiratory:  Positive for shortness of breath. Negative for cough, chest tightness, wheezing and stridor. Cardiovascular:  Negative for chest pain, palpitations and leg swelling.    Gastrointestinal: Positive for abdominal distention and nausea. Negative for abdominal pain, blood in stool, constipation, diarrhea, rectal pain and vomiting. Endocrine: Negative for polydipsia, polyphagia and polyuria. Genitourinary:  Negative for difficulty urinating, flank pain, frequency and urgency. Musculoskeletal:  Positive for arthralgias, back pain, gait problem and myalgias. Negative for neck pain. Skin:  Negative for color change, rash and wound. Allergic/Immunologic: Negative for food allergies and immunocompromised state. Neurological:  Positive for weakness and numbness. Negative for dizziness, speech difficulty, light-headedness and headaches. Psychiatric/Behavioral:  Positive for decreased concentration and dysphoric mood. Negative for agitation, behavioral problems, hallucinations, sleep disturbance and suicidal ideas. The patient is nervous/anxious. Physical Exam  Vitals and nursing note reviewed. Constitutional:       General: He is not in acute distress. Appearance: Normal appearance. He is well-developed. He is obese. He is not diaphoretic. HENT:      Head: Normocephalic and atraumatic. Left Ear: Tympanic membrane normal.      Nose: Nose normal.   Eyes:      General:         Right eye: No discharge. Left eye: No discharge. Extraocular Movements: Extraocular movements intact. Conjunctiva/sclera: Conjunctivae normal.      Pupils: Pupils are equal, round, and reactive to light. Neck:      Thyroid: No thyromegaly. Cardiovascular:      Rate and Rhythm: Normal rate and regular rhythm. Heart sounds: Normal heart sounds. No murmur heard. Pulmonary:      Effort: Pulmonary effort is normal. No respiratory distress. Breath sounds: Normal breath sounds. No wheezing or rhonchi. Abdominal:      General: Bowel sounds are normal. There is no distension. Palpations: Abdomen is soft. There is no mass. Tenderness: There is no abdominal tenderness. Musculoskeletal:         General: No tenderness. Cervical back: Normal range of motion and neck supple. Spasms present. No rigidity. Thoracic back: No spasms. Normal range of motion. Lumbar back: Spasms present. Decreased range of motion. Lymphadenopathy:      Cervical: No cervical adenopathy. Skin:     Coloration: Skin is not jaundiced or pale. Findings: No bruising, erythema or rash. Neurological:      General: No focal deficit present. Mental Status: He is alert and oriented to person, place, and time. Cranial Nerves: Cranial nerve deficit present. Sensory: Sensory deficit present. Motor: Weakness present. No tremor. Coordination: Coordination normal.      Gait: Gait abnormal. Tandem walk normal.      Deep Tendon Reflexes: Reflexes are normal and symmetric. Psychiatric:         Attention and Perception: Attention and perception normal. He is attentive. Mood and Affect: Mood is anxious and depressed. Affect is not tearful. Speech: He is communicative. Speech is slurred. Speech is not rapid and pressured or delayed. Behavior: Behavior normal. Behavior is not agitated or slowed. Thought Content: Thought content normal. Thought content is not paranoid or delusional.         Judgment: Judgment normal.           ASSESSMENT AND PLAN      1. Essential hypertension  Controlled, discussed about the medications continue metoprolol lisinopril update his medication list.  Recheck blood work, may consider cardiology referral at next appointment  - TSH; Future  - Urinalysis with Reflex to Culture; Future  - metoprolol succinate (TOPROL XL) 50 MG extended release tablet; Take 1 tab daily  Dispense: 90 tablet; Refill: 1    2. Type 2 diabetes mellitus without complication, without long-term current use of insulin (HCC)  Stable continue metformin, check blood work monitor blood sugars  - Magnesium;  Future  - Microalbumin / Creatinine Urine Ratio; Future  - TSH; Future  - Urinalysis with Reflex to Culture; Future    3. Coronary artery disease of native artery of native heart with stable angina pectoris ([address]    Stable continue statins ACE inhibitor's beta-blockers  - Magnesium; Future  - metoprolol succinate (TOPROL XL) 50 MG extended release tablet; Take 1 tab daily  Dispense: 90 tablet; Refill: 1    4. 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 5. Pharyngeal dysphagia  Worsening patient referred to GI planning for EGD and colonoscopy    6. Weight loss  Significant weight loss recheck blood work follow-up with GI  - CBC with Auto Differential; Future  - Comprehensive Metabolic Panel; Future  - Lipid Panel; Future    7. Mixed hyperlipidemia  Continue statins recheck lipid panel continue to work on diet  - Lipid Panel; Future  - Lipid Panel; Future    8. Thyroid nodule  No recent ultrasound, recheck thyroid nodule  - Magnesium; Future  - Vitamin D 25 Hydroxy; Future  - TSH; Future  - US THYROID; Future    9. Lumbar degenerative disc disease  Fairly stable follow-up with orthopedic    10. At high risk for falls  Home safety discussed continue using walker  - Uric Acid; Future    11. Depression, unspecified depression type  Discussed with patient starting Zoloft. 12. Body mass index (BMI) 37.0-37.9, adult     - Vitamin D 25 Hydroxy; Future    13. Need for influenza vaccination    - Influenza, FLUAD, (age 72 y+), IM, Preservative Free, 0.5 mL      Orders Placed This Encounter   Procedures    US THYROID     This procedure can be scheduled via [facility]. Access your [facility] account by visiting [email or web address].      Standing Status:   Future     Standing Expiration Date:   10/13/2023    Influenza, FLUAD, (age 72 y+), IM, Preservative Free, 0.5 mL    Lipid Panel     Standing Status:   Future     Standing Expiration Date:   10/13/2023     Order Specific Question: Is Patient Fasting?/# of Hours     Answer:   No    CBC with Auto Differential     Standing Status:   Future     Standing Expiration Date:   10/14/2023    Comprehensive Metabolic Panel     Fasting 8 hrs     Standing Status:   Future     Standing Expiration Date:   10/13/2023    Lipid Panel     Standing Status:   Future     Standing Expiration Date:   10/13/2023     Order Specific Question:   Is Patient Fasting?/# of Hours     Answer:   yes, 8-10 hours    Magnesium     Standing Status:   Future     Standing Expiration Date:   10/13/2023    Microalbumin / Creatinine Urine Ratio     Standing Status:   Future     Standing Expiration Date:   10/13/2023    Vitamin D 25 Hydroxy     Standing Status:   Future     Standing Expiration Date:   10/13/2023    TSH     Standing Status:   Future     Standing Expiration Date:   10/13/2023    Uric Acid     Standing Status:   Future     Standing Expiration Date:   10/13/2023    Urinalysis with Reflex to Culture     Standing Status:   Future     Standing Expiration Date:   10/13/2023     Order Specific Question:   SPECIFY(EX-CATH,MIDSTREAM,CYSTO,ETC)? Answer:   midstream         Medications Discontinued During This Encounter   Medication Reason    docusate sodium (COLACE) 100 MG capsule Therapy completed    tiZANidine (ZANAFLEX) 4 MG tablet LIST CLEANUP    metoprolol succinate (TOPROL XL) 50 MG extended release tablet [place] received counseling on the following healthy behaviors: nutrition, exercise, and medication adherence  Reviewed prior labs and health maintenance  Continue current medications, diet and exercise. Discussed use, benefit, and side effects of prescribed medications. Barriers to medication compliance addressed. Patient given educational materials - see patient instructions  Was a self-tracking handout given in paper form or via [employer]?  Yes    Requested Prescriptions     Signed Prescriptions Disp Refills    metoprolol succinate (TOPROL XL) 50 MG extended release tablet 90 tablet 1     Sig: Take 1 tab daily       All patient questions answered. Patient voiced understanding. Quality Measures    Body mass index is 37.28 kg/m². Elevated. Weight control planned discussed daily exercise regimen and Healthy diet and regular exercise. BP: 128/64. Blood pressure is Normal. Treatment plan consists of Weight Reduction, DASH Eating Plan, Dietary Sodium Restriction, and No treatment change needed. Fall Risk 3/31/2022 7/15/2021 11/20/2020 8/22/2019   2 or more falls in past year? yes yes yes no   Fall with injury in past year? no yes yes no     The patient does not have a history of falls. I did , complete a risk assessment for falls. A plan of care for falls in-office gait and balance testing performed using The 30 Second Chair Stand Test was positive for increased falls risk, home safety tips provided, No Treatment plan indicated    Lab Results   Component Value Date    LDLCHOLESTEROL 100 03/25/2021    (goal LDL reduction with dx if diabetes is 50% LDL reduction)    PHQ Scores 9/2/2022 3/31/2022 7/15/2021 5/13/2021 2/15/2021 12/8/2020 11/20/2020   PHQ2 Score 6 1 0 0 2 1 3   PHQ9 Score 21 14 0 0 2 1 11     Interpretation of Total Score Depression Severity: 1-4 = Minimal depression, 5-9 = Mild depression, 10-14 = Moderate depression, 15-19 = Moderately severe depression, 20-27 = Severe depression    The patient'spast medical, surgical, social, and family history as well as his   current medications and allergies were reviewed as documented in today's encounter. Medications, labs, diagnostic studies, consultations andfollow-up as documented in this encounter. Return in about 1 month (around 11/13/2022) for 30min,always chronic conditons. Patient wasseen with total face to face time of 45 minutes. More than 50% of this visit was counseling and education.        Future Appointments   Date Time Provider Nay Arreola   10/20/2022  1:15 PM Isam MD [email or web address]   11/23/2022  9:00 AM [facility]     This note was completed by using the assistance of a speech-recognition program. However, inadvertent computerized transcription errors may be present. Althoughevery effort was made to ensure accuracy, no guarantees can be provided that every mistake has been identified and corrected by editing.   Electronically signed by oJny Campbell MD on 10/13/2022  3:25 PM

## 2022-10-17 ENCOUNTER — HOSPITAL ENCOUNTER (OUTPATIENT)
Age: 77
Discharge: HOME | End: 2022-10-17
Payer: MEDICARE

## 2022-10-17 ENCOUNTER — HOSPITAL ENCOUNTER (OUTPATIENT)
Dept: ULTRASOUND IMAGING | Age: 77
Discharge: HOME | End: 2022-10-19
Payer: MEDICARE

## 2022-10-17 DIAGNOSIS — E11.9 TYPE 2 DIABETES MELLITUS WITHOUT COMPLICATION, WITHOUT LONG-TERM CURRENT USE OF INSULIN (HCC): ICD-10-CM

## 2022-10-17 DIAGNOSIS — E04.1 THYROID NODULE: ICD-10-CM

## 2022-10-17 DIAGNOSIS — I25.118 CORONARY ARTERY DISEASE OF NATIVE ARTERY OF NATIVE HEART WITH STABLE ANGINA PECTORIS (HCC): ICD-10-CM

## 2022-10-17 DIAGNOSIS — E78.2 MIXED HYPERLIPIDEMIA: ICD-10-CM

## 2022-10-17 DIAGNOSIS — I10 ESSENTIAL HYPERTENSION: ICD-10-CM

## 2022-10-17 DIAGNOSIS — R13.13 PHARYNGEAL DYSPHAGIA: ICD-10-CM

## 2022-10-17 DIAGNOSIS — R63.4 WEIGHT LOSS: ICD-10-CM

## 2022-10-17 LAB
ABSOLUTE EOS #: 0.1 K/UL (ref 0–0.4)
ABSOLUTE LYMPH #: 1.6 K/UL (ref 1–4.8)
ABSOLUTE MONO #: 0.4 K/UL (ref 0.1–1.3)
ALBUMIN SERPL-MCNC: 4.5 G/DL (ref 3.5–5.2)
ALP BLD-CCNC: 111 U/L (ref 40–129)
ALT SERPL-CCNC: 22 U/L (ref 5–41)
ANION GAP SERPL CALCULATED.3IONS-SCNC: 10 MMOL/L (ref 9–17)
AST SERPL-CCNC: 23 U/L
BACTERIA: NORMAL
BASOPHILS # BLD: 0 % (ref 0–2)
BASOPHILS ABSOLUTE: 0 K/UL (ref 0–0.2)
BILIRUB SERPL-MCNC: 0.8 MG/DL (ref 0.3–1.2)
BILIRUBIN URINE: NEGATIVE
BUN BLDV-MCNC: 14 MG/DL (ref 8–23)
CALCIUM SERPL-MCNC: 9.8 MG/DL (ref 8.6–10.4)
CASTS UA: NORMAL /LPF
CHLORIDE BLD-SCNC: 103 MMOL/L (ref 98–107)
CHOLESTEROL/HDL RATIO: 3.4
CHOLESTEROL: 131 MG/DL
CO2: 27 MMOL/L (ref 20–31)
COLOR: YELLOW
CREAT SERPL-MCNC: 0.61 MG/DL (ref 0.7–1.2)
CREATININE URINE: 118.2 MG/DL (ref 39–259)
EOSINOPHILS RELATIVE PERCENT: 1 % (ref 0–4)
EPITHELIAL CELLS UA: NORMAL /HPF
GFR SERPL CREATININE-BSD FRML MDRD: >60 ML/MIN/1.73M2
GLUCOSE BLD-MCNC: 112 MG/DL (ref 70–99)
GLUCOSE URINE: NEGATIVE
HCT VFR BLD CALC: 40 % (ref 41–53)
HDLC SERPL-MCNC: 39 MG/DL
HEMOGLOBIN: 13.7 G/DL (ref 13.5–17.5)
KETONES, URINE: NEGATIVE
LDL CHOLESTEROL: 72 MG/DL (ref 0–130)
LEUKOCYTE ESTERASE, URINE: ABNORMAL
LYMPHOCYTES # BLD: 31 % (ref 24–44)
MAGNESIUM: 1.9 MG/DL (ref 1.6–2.6)
MCH RBC QN AUTO: 31.1 PG (ref 26–34)
MCHC RBC AUTO-ENTMCNC: 34.4 G/DL (ref 31–37)
MCV RBC AUTO: 90.4 FL (ref 80–100)
MICROALBUMIN/CREAT 24H UR: <12 MG/L
MICROALBUMIN/CREAT UR-RTO: NORMAL MCG/MG CREAT
MONOCYTES # BLD: 7 % (ref 1–7)
NITRITE, URINE: NEGATIVE
PDW BLD-RTO: 13.2 % (ref 11.5–14.9)
PH UA: 5 (ref 5–8)
PLATELET # BLD: 170 K/UL (ref 150–450)
PMV BLD AUTO: 8.9 FL (ref 6–12)
POTASSIUM SERPL-SCNC: 4.4 MMOL/L (ref 3.7–5.3)
PROTEIN UA: NEGATIVE
RBC # BLD: 4.43 M/UL (ref 4.5–5.9)
RBC UA: NORMAL /HPF
SEG NEUTROPHILS: 61 % (ref 36–66)
SEGMENTED NEUTROPHILS ABSOLUTE COUNT: 3 K/UL (ref 1.3–9.1)
SODIUM BLD-SCNC: 140 MMOL/L (ref 135–144)
SPECIFIC GRAVITY UA: 1.02 (ref 1–1.03)
TOTAL PROTEIN: 7.2 G/DL (ref 6.4–8.3)
TRIGL SERPL-MCNC: 102 MG/DL
TSH SERPL DL<=0.05 MIU/L-ACNC: 0.25 UIU/ML (ref 0.3–5)
TURBIDITY: CLEAR
URIC ACID: 5.2 MG/DL (ref 3.4–7)
URINE HGB: NEGATIVE
UROBILINOGEN, URINE: NORMAL
VITAMIN D 25-HYDROXY: 24.3 NG/ML
WBC # BLD: 5.1 K/UL (ref 3.5–11)
WBC UA: NORMAL /HPF

## 2022-10-17 PROCEDURE — 80061 LIPID PANEL: CPT

## 2022-10-17 PROCEDURE — 87086 URINE CULTURE/COLONY COUNT: CPT

## 2022-10-17 PROCEDURE — 82306 VITAMIN D 25 HYDROXY: CPT

## 2022-10-17 PROCEDURE — 82570 ASSAY OF URINE CREATININE: CPT

## 2022-10-17 PROCEDURE — 83735 ASSAY OF MAGNESIUM: CPT

## 2022-10-17 PROCEDURE — 36415 COLL VENOUS BLD VENIPUNCTURE: CPT

## 2022-10-17 PROCEDURE — 82043 UR ALBUMIN QUANTITATIVE: CPT

## 2022-10-17 PROCEDURE — 86403 PARTICLE AGGLUT ANTBDY SCRN: CPT

## 2022-10-17 PROCEDURE — 80053 COMPREHEN METABOLIC PANEL: CPT

## 2022-10-17 PROCEDURE — 84550 ASSAY OF BLOOD/URIC ACID: CPT

## 2022-10-17 PROCEDURE — 85025 COMPLETE CBC W/AUTO DIFF WBC: CPT

## 2022-10-17 PROCEDURE — 84443 ASSAY THYROID STIM HORMONE: CPT

## 2022-10-17 PROCEDURE — 76536 US EXAM OF HEAD AND NECK: CPT

## 2022-10-17 PROCEDURE — 81001 URINALYSIS AUTO W/SCOPE: CPT

## 2022-10-18 LAB
CULTURE: ABNORMAL
SPECIMEN DESCRIPTION: ABNORMAL

## 2022-10-19 DIAGNOSIS — N30.00 ACUTE CYSTITIS WITHOUT HEMATURIA: ICD-10-CM

## 2022-10-19 DIAGNOSIS — E04.1 THYROID NODULE: Primary | ICD-10-CM

## 2022-10-19 DIAGNOSIS — E55.9 VITAMIN D DEFICIENCY: ICD-10-CM

## 2022-10-19 DIAGNOSIS — E06.9 THYROIDITIS: ICD-10-CM

## 2022-10-19 RX ORDER — INDOMETHACIN 25 MG/1
25 CAPSULE ORAL 2 TIMES DAILY WITH MEALS
Qty: 30 CAPSULE | Refills: 0 | Status: SHIPPED | OUTPATIENT
Start: 2022-10-19 | End: 2022-10-26

## 2022-10-19 RX ORDER — CEPHALEXIN 500 MG/1
500 CAPSULE ORAL 2 TIMES DAILY
Qty: 20 CAPSULE | Refills: 0 | Status: SHIPPED | OUTPATIENT
Start: 2022-10-19

## 2022-10-19 RX ORDER — ERGOCALCIFEROL 1.25 MG/1
50000 CAPSULE ORAL WEEKLY
Qty: 12 CAPSULE | Refills: 1 | Status: SHIPPED | OUTPATIENT
Start: 2022-10-19

## 2022-10-20 ENCOUNTER — OFFICE VISIT (OUTPATIENT)
Dept: GASTROENTEROLOGY | Age: 77
End: 2022-10-20
Payer: MEDICARE

## 2022-10-20 ENCOUNTER — TELEPHONE (OUTPATIENT)
Dept: GASTROENTEROLOGY | Age: 77
End: 2022-10-20

## 2022-10-20 ENCOUNTER — TELEPHONE (OUTPATIENT)
Dept: INTERVENTIONAL RADIOLOGY/VASCULAR | Age: 77
End: 2022-10-20

## 2022-10-20 VITALS
WEIGHT: 257 LBS | HEART RATE: 67 BPM | SYSTOLIC BLOOD PRESSURE: 147 MMHG | TEMPERATURE: 97.1 F | DIASTOLIC BLOOD PRESSURE: 79 MMHG | BODY MASS INDEX: 36.88 KG/M2

## 2022-10-20 DIAGNOSIS — Z12.11 COLON CANCER SCREENING: ICD-10-CM

## 2022-10-20 DIAGNOSIS — R13.19 ESOPHAGEAL DYSPHAGIA: Primary | ICD-10-CM

## 2022-10-20 PROCEDURE — G8484 FLU IMMUNIZE NO ADMIN: HCPCS | Performed by: INTERNAL MEDICINE

## 2022-10-20 PROCEDURE — 99204 OFFICE O/P NEW MOD 45 MIN: CPT | Performed by: INTERNAL MEDICINE

## 2022-10-20 PROCEDURE — G8417 CALC BMI ABV UP PARAM F/U: HCPCS | Performed by: INTERNAL MEDICINE

## 2022-10-20 PROCEDURE — G8427 DOCREV CUR MEDS BY ELIG CLIN: HCPCS | Performed by: INTERNAL MEDICINE

## 2022-10-20 PROCEDURE — 1123F ACP DISCUSS/DSCN MKR DOCD: CPT | Performed by: INTERNAL MEDICINE

## 2022-10-20 PROCEDURE — 1036F TOBACCO NON-USER: CPT | Performed by: INTERNAL MEDICINE

## 2022-10-20 ASSESSMENT — ENCOUNTER SYMPTOMS
COUGH: 0
DIARRHEA: 0
NAUSEA: 0
VOMITING: 0
SHORTNESS OF BREATH: 0
TROUBLE SWALLOWING: 1
BLOOD IN STOOL: 0
WHEEZING: 0
CHOKING: 0
RECTAL PAIN: 0
ANAL BLEEDING: 0
ABDOMINAL DISTENTION: 0
CONSTIPATION: 0
ABDOMINAL PAIN: 1

## 2022-10-20 NOTE — TELEPHONE ENCOUNTER
Pt called back and states they have a cardio appt 10/27/22, writer advised pt to call me after appt with update to see if cardio wants any additional testing, after these steps procedures can be scheduled.

## 2022-10-20 NOTE — TELEPHONE ENCOUNTER
Writer saw Neeta William today and colon/EGD was ordered, writer notes pt has significant cardiac history and is also on plavix. Pt has not seen a cardio in 3 years, last cardio appt was at [address] [place] [address], pt states he has not est with a local cardio doctor even though he was supposed to. Writer advised pt we are going to neem cardiac clearance, PCP cannot clear pt for cardio. Pt wife states she will call local cardio office and schedule pt OV, they will then call us to notify who they are seeing and date, once writer has that information colon/EGD will be scheduled and cardio clearance will be sent accordingly, pt voices understanding of all plans.

## 2022-10-20 NOTE — PROGRESS NOTES
Reason for Referral:   Gabrielle Norris, APRN - CNP  [address] [facility],  [address] [place] [address]    Chief Complaint   Patient presents with    Dysphagia     Patient c/o trouble swallowing for about 6 months that has increasingly gotten worse. Abdominal Pain     Patient c/o upper abd pain. Other     Pt states brother was recently dx with liver and esophageal CA           HISTORY OF PRESENT ILLNESS: Rosalba Bell is a 65 y.o. male , referred for evaluation of  Dysphagia, abd pain   Cad stent ( Plavix/ASA), 3 years ago   Dysphagia to solids , with food impaction in the last 6 months   Lost wt 20 LBs   Not eating well   Last 2 weeks gained back 2 lbs  No bleeding     Upper stomach pain also been going on for few months. Denied any fever or chills denied any bleeding    Colonoscopy 20 years ago   No change in bowel     Labs 10/17/2022:   Normal cbc /LFTs  US thyroid : nodule  followed with PCP Dr Yen Echavarria abd 8/2021 :  As below      Impression   1. No CT evidence of an acute intra-abdominal or intrapelvic process. 2.  Diverticulosis coli without CT evidence of acute diverticulitis. 3. Coronary artery calcific atherosclerosis, status post PTCA. 4.  No findings to suggest acute appendicitis; no ureter calculus or   hydronephrosis. Past Medical,Family, and Social History reviewed and does contribute to the patient presentingcondition. Patient's PMH/PSH,SH,PSYCH Hx, MEDs, ALLERGIES, and ROS were all reviewed and updated in the appropriate sections.     PAST MEDICAL HISTORY:  Past Medical History:   Diagnosis Date    Anxiety     Bronchitis with asthma, acute 11/24/2015    Carotid stenosis, left 2/2/2016    Diabetes mellitus ([address]     borderline patient off metformin    GERD (gastroesophageal reflux disease)     Heart attack ([address] 08/05/2019    Hyperlipidemia     Hypertension     Osteoarthritis     Stroke, hemorrhagic ([address] 08/18/2019       Past Surgical History:   Procedure Laterality Date    CAROTID ENDARTERECTOMY Left 02/02/16    COLONOSCOPY      CORONARY ANGIOPLASTY WITH STENT PLACEMENT      JOINT REPLACEMENT Left     knee    SHOULDER SURGERY Right     collar bone    TONSILLECTOMY AND ADENOIDECTOMY         CURRENT MEDICATIONS:    Current Outpatient Medications:     vitamin D (ERGOCALCIFEROL) 1.25 MG (42318 UT) CAPS capsule, Take 1 capsule by mouth once a week, Disp: 12 capsule, Rfl: 1    indomethacin (INDOCIN) 25 MG capsule, Take 1 capsule by mouth 2 times daily (with meals) for 7 days, Disp: 30 capsule, Rfl: 0    cephALEXin (KEFLEX) 500 MG capsule, Take 1 capsule by mouth 2 times daily, Disp: 20 capsule, Rfl: 0    atorvastatin (LIPITOR) 80 MG tablet, Take 1 tablet by mouth nightly, Disp: 90 tablet, Rfl: 0    clopidogrel (PLAVIX) 75 MG tablet, Take 1 tablet by mouth once daily, Disp: 90 tablet, Rfl: 0    metoprolol succinate (TOPROL XL) 50 MG extended release tablet, Take 1 tab daily, Disp: 90 tablet, Rfl: 1    lisinopril (PRINIVIL;ZESTRIL) 20 MG tablet, Take 1 tablet by mouth once daily, Disp: 90 tablet, Rfl: 0    sertraline (ZOLOFT) 25 MG tablet, Take 1 tablet by mouth daily (Patient not taking: Reported on 10/13/2022), Disp: 90 tablet, Rfl: 0    metFORMIN (GLUCOPHAGE-XR) 500 MG extended release tablet, Take 1 tablet by mouth once daily with breakfast, Disp: 90 tablet, Rfl: 0    tamsulosin (FLOMAX) 0.4 MG capsule, Take 1 capsule by mouth daily Take in evening after meal, Disp: 30 capsule, Rfl: 5    pantoprazole (PROTONIX) 40 MG tablet, Take 1 tablet by mouth every morning (before breakfast), Disp: 90 tablet, Rfl: 0    Multiple Vitamins-Minerals (MULTIVITAMIN ADULTS 50+) TABS, Take by mouth daily, Disp: , Rfl:     nitroGLYCERIN (NITROSTAT) 0.4 MG SL tablet, up to max of 3 total doses.  If no relief after 1 dose, call 911., Disp: 25 tablet, Rfl: 3    ALLERGIES:   No Known Allergies    FAMILY HISTORY:       Problem Relation Age of Onset    Cancer Mother     Cancer Father     Liver Cancer Brother     Esophageal Cancer Brother          SOCIAL HISTORY:   Social History     Socioeconomic History    Marital status:      Spouse name: Not on file    Number of children: Not on file    Years of education: Not on file    Highest education level: Not on file   Occupational History    Not on file   Tobacco Use    Smoking status: Never    Smokeless tobacco: Never   Vaping Use    Vaping Use: Never used   Substance and Sexual Activity    Alcohol use: Not Currently    Drug use: No    Sexual activity: Not on file   Other Topics Concern    Not on file   Social History Narrative    Not on file     Social Determinants of Health     Financial Resource Strain: High Risk    Difficulty of Paying Living Expenses: Hard   Food Insecurity: Food Insecurity Present    Worried About Running Out of Food in the Last Year: Sometimes true    Ran Out of Food in the Last Year: Sometimes true   Transportation Needs: Not on file   Physical Activity: Not on file   Stress: Not on file   Social Connections: Not on file   Intimate Partner Violence: Not on file   Housing Stability: Not on file       REVIEW OF SYSTEMS: A 12-point review of systemswas obtained and pertinent positives and negatives were enumerated above in the history of present illness. All other reviewed systems / symptoms were negative. Review of Systems   Constitutional:  Negative for appetite change, fatigue and unexpected weight change. HENT:  Positive for trouble swallowing. Respiratory:  Negative for cough, choking, shortness of breath and wheezing. Cardiovascular:  Negative for chest pain, palpitations and leg swelling. Gastrointestinal:  Positive for abdominal pain. Negative for abdominal distention, anal bleeding, blood in stool, constipation, diarrhea, nausea, rectal pain and vomiting. Genitourinary:  Negative for difficulty urinating. Allergic/Immunologic: Negative for environmental allergies and food allergies.    Neurological: Negative for dizziness, weakness, light-headedness, numbness and headaches. Hematological:  Does not bruise/bleed easily. Psychiatric/Behavioral:  Negative for sleep disturbance. The patient is not nervous/anxious. LABORATORY DATA: Reviewed  Lab Results   Component Value Date    WBC 5.1 10/17/2022    HGB 13.7 10/17/2022    HCT 40.0 (L) 10/17/2022    MCV 90.4 10/17/2022     10/17/2022     10/17/2022    K 4.4 10/17/2022     10/17/2022    CO2 27 10/17/2022    BUN 14 10/17/2022    CREATININE 0.61 (L) 10/17/2022    LABALBU 4.5 10/17/2022    BILITOT 0.8 10/17/2022    ALKPHOS 111 10/17/2022    AST 23 10/17/2022    ALT 22 10/17/2022    INR 1.0 09/21/2021         Lab Results   Component Value Date    RBC 4.43 (L) 10/17/2022    HGB 13.7 10/17/2022    MCV 90.4 10/17/2022    MCH 31.1 10/17/2022    MCHC 34.4 10/17/2022    RDW 13.2 10/17/2022    MPV 8.9 10/17/2022    BASOPCT 0 10/17/2022    LYMPHSABS 1.60 10/17/2022    MONOSABS 0.40 10/17/2022    NEUTROABS 3.00 10/17/2022    EOSABS 0.10 10/17/2022    BASOSABS 0.00 10/17/2022         DIAGNOSTIC TESTING:     CT HEAD WO CONTRAST    Result Date: 9/24/2022  EXAMINATION: CT OF THE HEAD WITHOUT CONTRAST  9/23/2022 6:46 am TECHNIQUE: CT of the head was performed without the administration of intravenous contrast. Automated exposure control, iterative reconstruction, and/or weight based adjustment of the mA/kV was utilized to reduce the radiation dose to as low as reasonably achievable. COMPARISON: 10/19/2020 HISTORY: ORDERING SYSTEM PROVIDED HISTORY: Nonintractable headache, unspecified chronicity pattern, unspecified headache type TECHNOLOGIST PROVIDED HISTORY: headache Reason for Exam: headache, Nonintractable headache, unspecified chronicity pattern, unspecified headache type Additional signs and symptoms: c/o posterior headaches, intermittent, for 1 year. Also has balance issues and vision problems.  Relevant Medical/Surgical History: Hemorrhagic stroke - left sided deficits, brain bleed with surgery to posterior skull to remove blood products. FINDINGS: BRAIN/VENTRICLES: The ventricles and sulci are diffusely enlarged. Low attenuation is seen in the periventricular and subcortical white matter. No acute intracranial hemorrhage or acute infarct is identified. Postoperative encephalomalacia in the cerebellum. ORBITS: The visualized portion of the orbits demonstrate no acute abnormality. SINUSES: The visualized paranasal sinuses and mastoid air cells demonstrate no acute abnormality. SOFT TISSUES/SKULL:  Previous occipital craniotomy. No acute intracranial abnormality. US THYROID    Result Date: 10/17/2022  EXAMINATION: THYROID ULTRASOUND 10/17/2022 COMPARISON: None. HISTORY: ORDERING SYSTEM PROVIDED HISTORY: Thyroid nodule TECHNOLOGIST PROVIDED HISTORY: This procedure can be scheduled via CAPPTURE. Access your CAPPTURE account by visiting [email or web address]. FINDINGS: Right thyroid lobe:  58 x 28 x 18 mm Left thyroid lobe:  45 x 22 x 20 mm Isthmus:  8 mm Thyroid Gland:  Thyroid is heterogeneous throughout with normal vascularity. Nodules: NODULE: 1 Size: 25 x 22 x 18 mm mm Location: Inferior right thyroid 1. Composition:  Solid (2) 2. Echogenicity:  Hypoechoic (2) 3. Shape: Wider-than-tall (0) 4. Margins:  Smooth (0) 5. Echogenic foci:  None (0) ACR TI-RADS total points:  4 ACR TI-RADS risk category: TR4 Cervical lymphadenopathy: No abnormal lymph nodes in the imaged portions of the neck. Nonspecific heterogeneous echotexture thyroid. TI-RADS 4 nodule inferior right thyroid meets criteria for FNA RECOMMENDATIONS: NODULE 1:  ACR TI-RADS TR4: Recommend:  Ultrasound-guided fine needle aspiration.  ACR TI-RADS recommendations: TR5 (>= 7 points):  FNA if >= 1 cm; follow-up if 0.5-0.9 cm in 1, 2, 3, 4, and 5 years TR4 (4-6 points):  FNA if >= 1.5 cm; follow-up if 1.0-1.4 cm in 1, 2, 3, and 5 years TR3 (3 points):  FNA if >= 2.5 cm; follow-up if 1.5-2.4 cm in 1, 3, and 5 years TR2 (2 points):  No FNA or follow-up TR1 (0 points):  No FNA or follow-up ACR TI-RADS recommends that no more than two nodules with the highest ACR TI-RADS point total should be biopsied and no more than four nodules should be followed. BP (!) 147/79   Pulse 67   Temp 97.1 °F (36.2 °C)   Wt 257 lb (116.6 kg)   BMI 36.88 kg/m²     PHYSICAL EXAMINATION: Vital signs reviewed per the nursing documentation. Body mass index is 36.88 kg/m². Physical Exam  Vitals and nursing note reviewed. Constitutional:       General: He is not in acute distress. Appearance: He is well-developed. He is not diaphoretic. HENT:      Head: Normocephalic and atraumatic. Eyes:      General: No scleral icterus. Pupils: Pupils are equal, round, and reactive to light. Neck:      Thyroid: No thyromegaly. Vascular: No JVD. Trachea: No tracheal deviation. Cardiovascular:      Rate and Rhythm: Normal rate and regular rhythm. Heart sounds: Normal heart sounds. No murmur heard. Pulmonary:      Effort: Pulmonary effort is normal. No respiratory distress. Breath sounds: Normal breath sounds. No wheezing. Abdominal:      General: Bowel sounds are normal. There is no distension. Palpations: Abdomen is soft. There is no mass. Tenderness: There is no abdominal tenderness. There is no guarding or rebound. Musculoskeletal:         General: No tenderness. Normal range of motion. Cervical back: Normal range of motion and neck supple. Skin:     General: Skin is warm. Coloration: Skin is not pale. Findings: No erythema or rash. Comments: He is not diaphoretic   Neurological:      Mental Status: He is alert and oriented to person, place, and time. Deep Tendon Reflexes: Reflexes are normal and symmetric. Psychiatric:         Behavior: Behavior normal.         Thought Content:  Thought content normal.         Judgment: Judgment normal. IMPRESSION: Mr. Lilia Westbrook is a 65 y.o. male with    Diagnosis Orders   1. Esophageal dysphagia  EGD      2. Colon cancer screening  COLONOSCOPY W/ OR W/O BIOPSY        Continue Protonix   Although this patient had stroke and possibility of oropharyngeal dysphagia is there but he is describing food impaction where he has to drink water to get it down sometime he is to bring it up. For which we will proceed with an EGD and possible dilatation as a first step. Will consider continuing PPI after the EGD based on the result. With his weight loss although recently has been gaining he said with also proceed with colonoscopy  In the same time    Diet/life style/natural hx /complication of the dx were all explained in details   Past medical, past surgical, social history, psychiatric history, medications or allergies, all reviewed and  updated        Thank you for allowing me to participate in the care of Mr. Lilia Westbrook. For any further questions please do not hesitate to contact me. I have reviewed and agree with the MA/ALBERT ROS. Note is dictated utilizing voice recognition software. Unfortunately this leads to occasional typographical errors. Please contact our office if you have any questions.     Fany Mccarthy MD  [facility]  O: #[phone]

## 2022-11-07 ENCOUNTER — TELEPHONE (OUTPATIENT)
Dept: FAMILY MEDICINE CLINIC | Age: 77
End: 2022-11-07

## 2022-11-07 NOTE — TELEPHONE ENCOUNTER
Spoke with wife regarding US thyroid and gave her scheduling number so patient can get the biopsy scheduled. Patient also saw cardiologist on 11/04/22 for clearance. I have scanned and attached in patients chart. He needs medical clearance for colonoscopy. Please advise.

## 2022-11-10 ENCOUNTER — CARE COORDINATION (OUTPATIENT)
Dept: CARE COORDINATION | Age: 77
End: 2022-11-10

## 2022-11-11 ENCOUNTER — CARE COORDINATION (OUTPATIENT)
Dept: CARE COORDINATION | Age: 77
End: 2022-11-11

## 2022-11-11 NOTE — CARE COORDINATION
HC attempted to contact the patient, there   was no answer. HC left contact information   or a return call for assistance.     Plan of Care  [facility] [place], [facility]. will await a return call if patient needs assitsance

## 2022-11-12 NOTE — CARE COORDINATION
Ambulatory Care Coordination Note  11/12/2022    ACC: Sandra Earl, RN  Attempted to reach patient for follow up. Left a message on his voicemail with call back number. Will try to reach him on Friday. Offered patient enrollment in the Remote Patient Monitoring (RPM) program for in-home monitoring: NA. Lab Results       None            Care Coordination Interventions    Referral from Primary Care Provider: Yes  Suggested Interventions and Community Resources  Social Work: In Process          Goals Addressed    None         Prior to Admission medications    Medication Sig Start Date End Date Taking?  Authorizing Provider   vitamin D (ERGOCALCIFEROL) 1.25 MG (99959 UT) CAPS capsule Take 1 capsule by mouth once a week 10/19/22   Merlinda Common, MD   indomethacin (INDOCIN) 25 MG capsule Take 1 capsule by mouth 2 times daily (with meals) for 7 days 10/19/22 10/26/22  Merlinda Common, MD   cephALEXin (KEFLEX) 500 MG capsule Take 1 capsule by mouth 2 times daily 10/19/22   Merlinda Common, MD   atorvastatin (LIPITOR) 80 MG tablet Take 1 tablet by mouth nightly 10/13/22   Hortencia Chaidez MD   clopidogrel (PLAVIX) 75 MG tablet Take 1 tablet by mouth once daily 10/13/22   Hortencia Chaidez MD   metoprolol succinate (TOPROL XL) 50 MG extended release tablet Take 1 tab daily 10/13/22   Merlinda Common, MD   lisinopril (PRINIVIL;ZESTRIL) 20 MG tablet Take 1 tablet by mouth once daily 9/6/22   MARKUS Odom CNP   sertraline (ZOLOFT) 25 MG tablet Take 1 tablet by mouth daily  Patient not taking: Reported on 10/13/2022 9/2/22   MARKUS Odom CNP   metFORMIN (GLUCOPHAGE-XR) 500 MG extended release tablet Take 1 tablet by mouth once daily with breakfast 8/15/22   MARKUS Odom CNP   tamsulosin (FLOMAX) 0.4 MG capsule Take 1 capsule by mouth daily Take in evening after meal 8/9/21   Jesika Rodriguez MD   pantoprazole (PROTONIX) 40 MG tablet Take 1 tablet by mouth every morning (before breakfast) 2/15/21   Alonzo Alaniz Ghassan Quiroga, APRN - CNP   Multiple Vitamins-Minerals (MULTIVITAMIN ADULTS 50+) TABS Take by mouth daily    Historical Provider, MD   nitroGLYCERIN (NITROSTAT) 0.4 MG SL tablet up to max of 3 total doses.  If no relief after 1 dose, call 911. 7/26/19   Lucy Pierce MD       Future Appointments   Date Time Provider Nay Arreola   11/14/2022  9:00 AM [facility] IR  [facility] SPECIAL [facility]   11/23/2022  9:00 AM Noel Villareal MD fp sc [id number]

## 2022-11-14 ENCOUNTER — HOSPITAL ENCOUNTER (OUTPATIENT)
Dept: INTERVENTIONAL RADIOLOGY/VASCULAR | Age: 77
Discharge: HOME | End: 2022-11-16
Payer: MEDICARE

## 2022-11-14 DIAGNOSIS — E04.1 THYROID NODULE: ICD-10-CM

## 2022-11-14 PROCEDURE — 10005 FNA BX W/US GDN 1ST LES: CPT

## 2022-11-14 PROCEDURE — 88305 TISSUE EXAM BY PATHOLOGIST: CPT

## 2022-11-14 PROCEDURE — 2709999900 IR BIOPSY THYROID PERC CORE NEEDLE

## 2022-11-14 PROCEDURE — 88172 CYTP DX EVAL FNA 1ST EA SITE: CPT

## 2022-11-14 PROCEDURE — 88173 CYTOPATH EVAL FNA REPORT: CPT

## 2022-11-14 NOTE — PROGRESS NOTES
Patient tolerated right thyroid biopsy without distress. Dressing to site. Discharge instructions given, no questions at this time. Patient discharged home with family.

## 2022-11-14 NOTE — BRIEF OP NOTE
Brief Postoperative Note    Dawna Jerry  YOB: 1945  [id number]    Pre-operative Diagnosis: Right thyroid nodule    Post-operative Diagnosis: Same    Procedure: FNA    Anesthesia: Local    Surgeons/Assistants: Jolene Calderon    Estimated Blood Loss: less than 50     Complications: None    Specimens: Was Obtained    Findings: U/S guided right inferior thyroid nodule FNA, 4 passes.      Electronically signed by Vivian Amador MD on 11/14/2022 at 9:25 AM

## 2022-11-16 LAB — SURGICAL PATHOLOGY REPORT: NORMAL

## 2022-11-18 ENCOUNTER — CARE COORDINATION (OUTPATIENT)
Dept: CARE COORDINATION | Age: 77
End: 2022-11-18

## 2022-11-18 NOTE — CARE COORDINATION
HC spoke to the patients' spouse and inquired if the   family has adequate food resources. Spouse stated  that she forgot what she was supposed to do in order  to get the foods from [employer]. HC provided the   Patient with the phone number for [employer] . HC also   inquired about the patients' utilities and their involvement  with [employer]. Spouse stated that she was required  to submit more information, stated that she did and then  received a letter and needs to respond, she's not sure where  she put the letter. She stated that she called and left a   Message at [employer] today.     Plan of Care  [place] [facility] [place]. will follow up with the patients' spouse by mid week

## 2022-11-22 ENCOUNTER — CARE COORDINATION (OUTPATIENT)
Dept: CARE COORDINATION | Age: 77
End: 2022-11-22

## 2022-11-22 NOTE — CARE COORDINATION
HC phoned the patient and spoke with patients' spouse. She stated that she hasn't  called [employer] yet, she's  been busy with therapy. HC also inquired about her   finding the needed paperwork for PIPP. She reported  that she is going to send her bank statement and hopes  that it will suffice.     Plan of Care  [facility] [place], [facility]. will follow up with patient regarding these concerns

## 2022-11-23 ENCOUNTER — OFFICE VISIT (OUTPATIENT)
Dept: FAMILY MEDICINE CLINIC | Age: 77
End: 2022-11-23
Payer: MEDICARE

## 2022-11-23 VITALS
DIASTOLIC BLOOD PRESSURE: 72 MMHG | BODY MASS INDEX: 36.97 KG/M2 | HEIGHT: 70 IN | TEMPERATURE: 97.8 F | SYSTOLIC BLOOD PRESSURE: 128 MMHG | OXYGEN SATURATION: 95 % | HEART RATE: 60 BPM | WEIGHT: 258.2 LBS

## 2022-11-23 DIAGNOSIS — Z00.00 MEDICARE ANNUAL WELLNESS VISIT, SUBSEQUENT: Primary | ICD-10-CM

## 2022-11-23 DIAGNOSIS — Z71.89 ACP (ADVANCE CARE PLANNING): ICD-10-CM

## 2022-11-23 DIAGNOSIS — F33.1 MODERATE EPISODE OF RECURRENT MAJOR DEPRESSIVE DISORDER (HCC): ICD-10-CM

## 2022-11-23 DIAGNOSIS — G93.32 CHRONIC FATIGUE SYNDROME: ICD-10-CM

## 2022-11-23 DIAGNOSIS — Z13.6 SCREENING FOR CARDIOVASCULAR CONDITION: ICD-10-CM

## 2022-11-23 PROCEDURE — 99497 ADVNCD CARE PLAN 30 MIN: CPT | Performed by: FAMILY MEDICINE

## 2022-11-23 PROCEDURE — 3078F DIAST BP <80 MM HG: CPT | Performed by: FAMILY MEDICINE

## 2022-11-23 PROCEDURE — G0446 INTENS BEHAVE THER CARDIO DX: HCPCS | Performed by: FAMILY MEDICINE

## 2022-11-23 PROCEDURE — 1123F ACP DISCUSS/DSCN MKR DOCD: CPT | Performed by: FAMILY MEDICINE

## 2022-11-23 PROCEDURE — 3074F SYST BP LT 130 MM HG: CPT | Performed by: FAMILY MEDICINE

## 2022-11-23 PROCEDURE — G0447 BEHAVIOR COUNSEL OBESITY 15M: HCPCS | Performed by: FAMILY MEDICINE

## 2022-11-23 PROCEDURE — G8484 FLU IMMUNIZE NO ADMIN: HCPCS | Performed by: FAMILY MEDICINE

## 2022-11-23 PROCEDURE — G0439 PPPS, SUBSEQ VISIT: HCPCS | Performed by: FAMILY MEDICINE

## 2022-11-23 RX ORDER — AMLODIPINE BESYLATE 5 MG/1
TABLET ORAL
COMMUNITY
Start: 2019-04-01

## 2022-11-23 RX ORDER — GABAPENTIN 400 MG/1
CAPSULE ORAL 3 TIMES DAILY
COMMUNITY
Start: 2019-08-28 | End: 2022-11-23 | Stop reason: ALTCHOICE

## 2022-11-23 RX ORDER — DULOXETIN HYDROCHLORIDE 30 MG/1
30 CAPSULE, DELAYED RELEASE ORAL DAILY
Qty: 90 CAPSULE | Refills: 1 | Status: SHIPPED | OUTPATIENT
Start: 2022-11-23

## 2022-11-23 ASSESSMENT — PATIENT HEALTH QUESTIONNAIRE - PHQ9
9. THOUGHTS THAT YOU WOULD BE BETTER OFF DEAD, OR OF HURTING YOURSELF: 0
SUM OF ALL RESPONSES TO PHQ QUESTIONS 1-9: 18
SUM OF ALL RESPONSES TO PHQ9 QUESTIONS 1 & 2: 3
1. LITTLE INTEREST OR PLEASURE IN DOING THINGS: 0
7. TROUBLE CONCENTRATING ON THINGS, SUCH AS READING THE NEWSPAPER OR WATCHING TELEVISION: 3
8. MOVING OR SPEAKING SO SLOWLY THAT OTHER PEOPLE COULD HAVE NOTICED. OR THE OPPOSITE, BEING SO FIGETY OR RESTLESS THAT YOU HAVE BEEN MOVING AROUND A LOT MORE THAN USUAL: 3
3. TROUBLE FALLING OR STAYING ASLEEP: 3
10. IF YOU CHECKED OFF ANY PROBLEMS, HOW DIFFICULT HAVE THESE PROBLEMS MADE IT FOR YOU TO DO YOUR WORK, TAKE CARE OF THINGS AT HOME, OR GET ALONG WITH OTHER PEOPLE: 1
2. FEELING DOWN, DEPRESSED OR HOPELESS: 3
5. POOR APPETITE OR OVEREATING: 0
6. FEELING BAD ABOUT YOURSELF - OR THAT YOU ARE A FAILURE OR HAVE LET YOURSELF OR YOUR FAMILY DOWN: 3
SUM OF ALL RESPONSES TO PHQ QUESTIONS 1-9: 18
4. FEELING TIRED OR HAVING LITTLE ENERGY: 3

## 2022-11-23 ASSESSMENT — LIFESTYLE VARIABLES
HOW OFTEN DO YOU HAVE A DRINK CONTAINING ALCOHOL: NEVER
HOW MANY STANDARD DRINKS CONTAINING ALCOHOL DO YOU HAVE ON A TYPICAL DAY: PATIENT DOES NOT DRINK

## 2022-11-23 NOTE — PROGRESS NOTES
Medicare Annual Wellness Visit    Kalyn West is here for Medicare AWV and Hypertension    Assessment & Plan   Medicare annual wellness visit, subsequent  Body mass index (BMI) 37.0-37.9, adult   -     [place] Behavior  obesity 15m []  ACP (advance care planning)  -     [place] ADVANCED CARE PLAN FACE TO [address], [address] [id number]  Screening for cardiovascular condition  -     [place] Intens behave ther cardio dx, 15 minutes []  Moderate episode of recurrent major depressive disorder ([facility] [address]  Discontinue Zoloft start on Cymbalta that will also help with chronic pain, patient refused behavioral therapy. -     DULoxetine (CYMBALTA) 30 MG extended release capsule; Take 1 capsule by mouth daily, Disp-90 capsule, R-1Normal  Chronic fatigue syndrome  -     DULoxetine (CYMBALTA) 30 MG extended release capsule; Take 1 capsule by mouth daily, Disp-90 capsule, R-1Normal    Recommendations for Preventive Services Due: see orders and patient instructions/AVS.  Recommended screening schedule for the next 5-10 years is provided to the patient in written form: see Patient Instructions/AVS.     Return for Medicare Annual Wellness Visit in 1 year. Subjective   The following acute and/or chronic problems were also addressed today:    Patient has history of depression which is not under good control, he has loss of multiple family members. Patient is currently on Zoloft 25 mg reports that started helping. He still has crying spells, lives with his wife who is a good social support. He has kids were out of town reports he had conversations with them. Patient's complete Health Risk Assessment and screening values have been reviewed and are found in Flowsheets. The following problems were reviewed today and where indicated follow up appointments were made and/or referrals ordered.     Positive Risk Factor Screenings with Interventions:    Fall Risk:  Do you feel unsteady or are you worried about falling? : (!) yes  2 or more falls in past year?: no  Fall with injury in past year?: no   Fall Risk Interventions:    Home safety tips provided   Uses walker at home , has stroke with residual weakness . Depression:  PHQ-2 Score: 3  PHQ-9 Total Score: 18    Severity:1-4 = minimal depression, 5-9 = mild depression, 10-14 = moderate depression, 15-19 = moderately severe depression, 20-27 = severe depression  Depression Interventions: Will increase medication zoloft and watches TV . Kids are out of town .            General Health and ACP:  General  In general, how would you say your health is?: (!) Poor  In the past 7 days, have you experienced any of the following: New or Increased Pain, New or Increased Fatigue, Loneliness, Social Isolation, Stress or Anger?: (!) Yes  Select all that apply: (!) Loneliness  Do you get the social and emotional support that you need?: Yes  Do you have a Living Will?: (!) No    Advance Directives       Power of [address] [place] [address] Will ACP-Advance Directive ACP-Power of     Not on File Not on File Not on File Not on File        General Health Risk Interventions:  Pain issues: home exercises provided, chronic pain syndrome, start on Cymbalta  No Living Will: Advance Care Planning addressed with patient today    Health Habits/Nutrition:  Physical Activity: Insufficiently Active    Days of Exercise per Week: 7 days    Minutes of Exercise per Session: 10 min     Have you lost any weight without trying in the past 3 months?: (!) Yes  Body mass index: (!) 37.04  Have you seen the dentist within the past year?: Yes  Health Habits/Nutrition Interventions:  Dental exam overdue:  patient encouraged to make appointment with his/her dentist    Hearing/Vision:  Do you or your family notice any trouble with your hearing that hasn't been managed with hearing aids?: (!) Yes  Do you have difficulty driving, watching TV, or doing any of your daily activities because of your eyesight?: (!) Yes  Have you had an eye exam within the past year?: Yes  No results found. Hearing/Vision Interventions:  Hearing concerns:  left ear trauma when he was kid, never had hearing test done. ADLs:  In the past 7 days, did you need help from others to perform any of the following everyday activities: Eating, dressing, grooming, bathing, toileting, or walking/balance?: (!) Yes  Select all that apply: (!) Grooming, Bathing, Toileting, Walking/Balance  In the past 7 days, did you need help from others to take care of any of the following: Laundry, housekeeping, banking/finances, shopping, telephone use, food preparation, transportation, or taking medications?: (!) Yes  Select all that apply: [employer], Housekeeping, Banking/Finances, Shopping, Telephone Use, Food Preparation, Transportation, Taking Medications  ADL Interventions:  Pt has wife who helps and he laos does some chores at home . Objective   Vitals:    11/23/22 0900   BP: 128/72   Pulse: 60   Temp: 97.8 °F (36.6 °C)   SpO2: 95%   Weight: 258 lb 3.2 oz (117.1 kg)   Height: 5' 10\" (1.778 m)      Body mass index is 37.05 kg/m². No Known Allergies  Prior to Visit Medications    Medication Sig Taking?  Authorizing Provider   amLODIPine (NORVASC) 5 MG tablet Take by mouth Daily with lunch Yes Historical Provider, MD   DULoxetine (CYMBALTA) 30 MG extended release capsule Take 1 capsule by mouth daily Yes Edwige Fernandez MD   vitamin D (ERGOCALCIFEROL) 1.25 MG (04126 UT) CAPS capsule Take 1 capsule by mouth once a week Yes Edwige Fernandez MD   atorvastatin (LIPITOR) 80 MG tablet Take 1 tablet by mouth nightly Yes Janette York MD   clopidogrel (PLAVIX) 75 MG tablet Take 1 tablet by mouth once daily Yes Janette York MD   metoprolol succinate (TOPROL XL) 50 MG extended release tablet Take 1 tab daily Yes Edwige Fernandez MD   lisinopril (PRINIVIL;ZESTRIL) 20 MG tablet Take 1 tablet by mouth once daily Yes Claritza Mcfadden, APRN - CNP   metFORMIN (GLUCOPHAGE-XR) 500 MG extended release tablet Take 1 tablet by mouth once daily with breakfast Yes MARKUS Vegas CNP   tamsulosin (FLOMAX) 0.4 MG capsule Take 1 capsule by mouth daily Take in evening after meal Yes Luci Garcia MD   pantoprazole (PROTONIX) 40 MG tablet Take 1 tablet by mouth every morning (before breakfast) Yes MARKUS Vegas CNP   Multiple Vitamins-Minerals (MULTIVITAMIN ADULTS 50+) TABS Take by mouth daily Yes Historical Provider, MD   nitroGLYCERIN (NITROSTAT) 0.4 MG SL tablet up to max of 3 total doses. If no relief after 1 dose, call 911. Yes Jade Hyman MD   indomethacin (INDOCIN) 25 MG capsule Take 1 capsule by mouth 2 times daily (with meals) for 7 days  Refugio Fletcher MD       [facility] (Including outside providers/suppliers regularly involved in providing care):   Patient Care Team:  Refugio Fletcher MD as PCP - General (Family Medicine)  Refugio Fletcher MD [facility]  Sean Horner RN as Ambulatory Care Manager  Luz Carreno as Alberto Graff MD as Consulting Physician (Gastroenterology)     Reviewed and updated this visit:  Allergies  Meds  Problems              Advance Care Planning   Advanced Care Planning: Discussed the patients choices for care and treatment in case of a health event that adversely affects decision-making abilities. Also discussed the patients long-term treatment options. Reviewed with the patient the appropriate state-specific advance directive documents. Reviewed the process of designating a competent adult as an Agent (or -in-fact) that could take make health care decisions for the patient if incompetent. Patient was asked to complete the declaration forms, either acknowledge the forms by a public notary or an eligible witness and provide a signed copy to the practice office.   Time spent (minutes): 30     Cardiovascular Disease Risk Counseling: Assessed the patient's risk to develop cardiovascular disease and reviewed main risk factors. Reviewed steps to reduce disease risk including:   Quitting tobacco use, reducing amount smoked, or not starting the habit  Making healthy food choices  Being physically active and gradualy increasing activity levels   Reduce weight and determine a healthy BMI goal  Monitor blood pressure and treat if higher than 140/90 mmHg  Maintain blood total cholesterol levels under 5 mmol/l or 190 mg/dl  Maintain LDL cholesterol levels under 3.0 mmol/l or 115 mg/dl   Control blood glucose levels  Consider taking aspirin (75 mg daily), once blood pressure is controlled   Provided a follow up plan. Time spent (minutes): 10  Obesity Counseling: Assessed behavioral health risks and factors affecting choice of behavior. Suggested weight control approaches, including dietary changes behavioral modification and follow up plan. Provided educational and support documentation.   Time spent (minutes): 10

## 2022-11-23 NOTE — PATIENT INSTRUCTIONS
Advance Directives: Care Instructions  Overview  An advance directive is a legal way to state your wishes at the end of your life. It tells your family and your doctor what to do if you can't say what you want. There are two main types of advance directives. You can change them any time your wishes change. Living will. This form tells your family and your doctor your wishes about life support and other treatment. The form is also called a declaration. Medical power of . This form lets you name a person to make treatment decisions for you when you can't speak for yourself. This person is called a health care agent (health care proxy, health care surrogate). The form is also called a durable power of  for health care. If you do not have an advance directive, decisions about your medical care may be made by a family member, or by a doctor or a  who doesn't know you. It may help to think of an advance directive as a gift to the people who care for you. If you have one, they won't have to make tough decisions by themselves. Follow-up care is a key part of your treatment and safety. Be sure to make and go to all appointments, and call your doctor if you are having problems. It's also a good idea to know your test results and keep a list of the medicines you take. What should you include in an advance directive? Many states have a unique advance directive form. (It may ask you to address specific issues.) Or you might use a universal form that's approved by many states. If your form doesn't tell you what to address, it may be hard to know what to include in your advance directive. Use the questions below to help you get started. Who do you want to make decisions about your medical care if you are not able to? What life-support measures do you want if you have a serious illness that gets worse over time or can't be cured? What are you most afraid of that might happen?  (Maybe you're afraid of having pain, losing your independence, or being kept alive by machines.)  Where would you prefer to die? (Your home? A hospital? A nursing home?)  Do you want to donate your organs when you die? Do you want certain Mosque practices performed before you die? When should you call for help? Be sure to contact your doctor if you have any questions. Where can you learn more? Go to [email or web address] and sign in to your [employer] account. Enter R264 in the Nanotion box to learn more about \"Advance Directives: Care Instructions. \"     If you do not have an account, please click on the \"Sign Up Now\" link. Current as of: June 16, 2022               Content Version: 13.4  © 4072-3085 [employer]. Care instructions adapted under license by [facility] ([facility]). If you have questions about a medical condition or this instruction, always ask your healthcare professional. [place] [address] any warranty or liability for your use of this information. Learning About Medical Power of   What is a medical power of ? A medical power of , also called a durable power of  for health care, is one type of the legal forms called advance directives. It lets you name the person you want to make treatment decisions for you if you can't speak or decide for yourself. The person you choose is called your health care agent. This person is also called a health care proxy or health care surrogate. A medical power of  may be called something else in your state. How do you choose a health care agent? Choose your health care agent carefully. This person may or may not be a family member. Talk to the person before you make your final decision. Make sure he or she is comfortable with this responsibility. It's a good idea to choose someone who:  Is at least 25years old.   Knows you well and understands what makes life [email or web address] and sign in to your [employer] account. Enter [id number] in the [facility] box to learn more about \"Learning About [address]. \"     If you do not have an account, please click on the \"Sign Up Now\" link. Current as of: October 6, 2021               Content Version: 13.4  © 2006-2022 Healthwise, [employer]. Care instructions adapted under license by [facility] ([facility]). If you have questions about a medical condition or this instruction, always ask your healthcare professional. [address] any warranty or liability for your use of this information. Learning About Living Mag Ao  What is a living will? A living will, also called a declaration, is a legal form. It tells your family and your doctor your wishes when you can't speak for yourself. It's used by the health professionals who will treat you as you near the end of your life or if you get seriously hurt or ill. If you put your wishes in writing, your loved ones and others will know what kind of care you want. They won't need to guess. This can ease your mind and be helpful to others. And you can change or cancel your living will at any time. A living will is not the same as an estate or property will. An estate will explains what you want to happen with your money and property after you die. How do you use it? Keep these facts in mind about how a living will is used. Your living will is used only if you can't speak or make decisions for yourself. Most often, one or more doctors must certify that you can't speak or decide for yourself before your living will takes effect. If you get better and can speak for yourself again, you can accept or refuse any treatment. It doesn't matter what you said in your living will. Some states may limit your right to refuse treatment in certain cases.  For example, you may need to clearly state in your living will that you don't want artificial hydration and nutrition, such as being fed through a tube. Is a living will a legal document? A living will is a legal document. Each state has its own laws about living dewitt. And a living will may be called something else in your state. Here are some things to know about living dewitt. You don't need an  to complete a living will. But legal advice can be helpful if your state's laws are unclear. It can also help if your health history is complicated or your family can't agree on what should be in your living will. You can change your living will at any time. Some people find that their wishes about end-of-life care change as their health changes. If you make big changes to your living will, complete a new form. If you move to another state, make sure that your living will is legal in the state where you now live. In most cases, doctors will respect your wishes even if you have a form from a different state. You might use a universal form that has been approved by many states. This kind of form can sometimes be filled out and stored online. Your digital copy will then be available wherever you have a connection to the internet. The doctors and nurses who need to treat you can find it right away. Your state may offer an online registry. This is another place where you can store your living will online. It's a good idea to get your living will notarized. This means using a person called a  to watch two people sign, or witness, your living will. What should you know when you create a living will? Here are some questions to ask yourself as you make your living will. Do you know enough about life support methods that might be used? If not, talk to your doctor so you know what might be done if you can't breathe on your own, your heart stops, or you can't swallow. What things would you still want to be able to do after you receive life-support methods?  Would you want to be able to walk? To speak? To eat on your own? To live without the help of machines? Do you want certain Jehovah's witness practices performed if you become very ill? If you have a choice, where do you want to be cared for? In your home? At a hospital or nursing home? If you have a choice at the end of your life, where would you prefer to die? At home? In a hospital or nursing home? Somewhere else? Would you prefer to be buried or cremated? Do you want your organs to be donated after you die? What should you do with your living will? Make sure that your family members and your health care agent have copies of your living will (also called a declaration). Give your doctor a copy of your living will. Ask to have it kept as part of your medical record. If you have more than one doctor, make sure that each one has a copy. Put a copy of your living will where it can be easily found. For example, some people may put a copy on their refrigerator door. If you are using a digital copy, be sure your doctor, family members, and health care agent know how to find and access it. Where can you learn more? Go to [email or web address] and sign in to your [employer] account. Enter X395 in the U[employer]ense box to learn more about \"Learning About Living Perrorobbi. \"     If you do not have an account, please click on the \"Sign Up Now\" link. Current as of: June 16, 2022               Content Version: 13.4  © 9534-1292 Healthwise, Incorporated. Care instructions adapted under license by [facility] ([facility]). If you have questions about a medical condition or this instruction, always ask your healthcare professional. [place] [address] any warranty or liability for your use of this information. Personalized Preventive Plan for Orie Kehr - 11/23/2022  Medicare offers a range of preventive health benefits.  Some of the tests and screenings are paid in full while other may be subject to a deductible, co-insurance, and/or copay. Some of these benefits include a comprehensive review of your medical history including lifestyle, illnesses that may run in your family, and various assessments and screenings as appropriate. After reviewing your medical record and screening and assessments performed today your provider may have ordered immunizations, labs, imaging, and/or referrals for you. A list of these orders (if applicable) as well as your Preventive Care list are included within your After Visit Summary for your review. Other Preventive Recommendations:    A preventive eye exam performed by an eye specialist is recommended every 1-2 years to screen for glaucoma; cataracts, macular degeneration, and other eye disorders. A preventive dental visit is recommended every 6 months. Try to get at least 150 minutes of exercise per week or 10,000 steps per day on a pedometer . Order or download the FREE \"Exercise & Physical Activity: Your Everyday Guide\" from [employer]. Call 3-509.536.6509 or search [employer] online. You need 9350-8099 mg of calcium and 2197-5955 IU of vitamin D per day. It is possible to meet your calcium requirement with diet alone, but a vitamin D supplement is usually necessary to meet this goal.  When exposed to the sun, use a sunscreen that protects against both UVA and UVB radiation with an SPF of 30 or greater. Reapply every 2 to 3 hours or after sweating, drying off with a towel, or swimming. Always wear a seat belt when traveling in a car. Always wear a helmet when riding a bicycle or motorcycle.

## 2022-11-23 NOTE — PROGRESS NOTES
Visit Information    Have you changed or started any medications since your last visit including any over-the-counter medicines, vitamins, or herbal medicines? no   Have you stopped taking any of your medications? Is so, why? -  no  Are you having any side effects from any of your medications? - no    Have you seen any other physician or provider since your last visit? yes -    Have you had any other diagnostic tests since your last visit?  no   Have you been seen in the emergency room and/or had an admission in a hospital since we last saw you?  no   Have you had your routine dental cleaning in the past 6 months?  no     Do you have an active MyChart account? If no, what is the barrier?   Yes    Patient Care Team:  Angel Choudhary MD as PCP - General (Family Medicine)  Angel Choudhary MD as PCP - [facility]  Preeti Richardson RN as Ambulatory Care Manager  Virgen Cardoza as Faith Cheng MD as Consulting Physician (Gastroenterology)    Medical History Review  Past Medical, Family, and Social History reviewed and does contribute to the patient presenting condition    Health Maintenance   Topic Date Due    DTaP/Tdap/Td vaccine (1 - Tdap) Never done    Shingles vaccine (1 of 2) Never done    COVID-19 Vaccine (3 - Booster for Marino Daubs series) 04/29/2021    Annual Wellness Visit (AWV)  07/16/2022    Depression Monitoring  09/02/2023    Lipids  10/17/2023    Flu vaccine  Completed    Pneumococcal 65+ years Vaccine  Completed    Hepatitis C screen  Completed    Hepatitis A vaccine  Aged Out    Hib vaccine  Aged Out    Meningococcal (ACWY) vaccine  Aged Out

## 2022-11-29 ENCOUNTER — TELEPHONE (OUTPATIENT)
Dept: FAMILY MEDICINE CLINIC | Age: 77
End: 2022-11-29

## 2022-11-29 NOTE — TELEPHONE ENCOUNTER
Subjective:       Patient ID: Angela Trascher Lejeune is a 45 y.o. female.    Chief Complaint: crohns dz, part of intestines removed, pt  had severe jeffrey hgb of 4.0 had 2 iron infusions before then prbc in hospital bleeding pud scope done by DR de la cruz   she had menorrhagia in thepast.had fractures of femur after a seizure in the past   was hospitalized  For hypokalemia needed iron iv again and she has no IV access requierd a PICC  Patient had repeat iron levels done but not a CBC for whatever reason today here to discuss results    HPI:     Social History     Socioeconomic History    Marital status:      Spouse name: Not on file    Number of children: Not on file    Years of education: Not on file    Highest education level: Not on file   Occupational History    Not on file   Social Needs    Financial resource strain: Not on file    Food insecurity:     Worry: Not on file     Inability: Not on file    Transportation needs:     Medical: Not on file     Non-medical: Not on file   Tobacco Use    Smoking status: Former Smoker     Packs/day: 0.50     Years: 6.00     Pack years: 3.00     Types: Cigarettes     Last attempt to quit: 5/10/2011     Years since quittin.3    Smokeless tobacco: Never Used   Substance and Sexual Activity    Alcohol use: Not Currently    Drug use: Never    Sexual activity: Yes     Partners: Male   Lifestyle    Physical activity:     Days per week: Not on file     Minutes per session: Not on file    Stress: Not on file   Relationships    Social connections:     Talks on phone: Not on file     Gets together: Not on file     Attends Church service: Not on file     Active member of club or organization: Not on file     Attends meetings of clubs or organizations: Not on file     Relationship status: Not on file   Other Topics Concern    Not on file   Social History Narrative    Not on file     Family History   Adopted: Yes     Past Surgical History:   Procedure  We can get him letter Laterality Date    APPENDECTOMY      BRAIN SURGERY  2009    fx skull due to seizure with 2 pins    CHOLECYSTECTOMY      COLONOSCOPY Left 7/10/2019    Procedure: COLONOSCOPY;  Surgeon: Papi Martinez Jr., MD;  Location[facility];  Service: Endoscopy;  Laterality: Left;    ESOPHAGOGASTRODUODENOSCOPY Left 6/5/2019    Procedure: EGD (ESOPHAGOGASTRODUODENOSCOPY);  Surgeon: ROLA Villagran MD;  Location: [facility] ENDO;  Service: Endoscopy;  Laterality: Left;    FEMUR FRACTURE SURGERY Right 2006    HYSTERECTOMY      INSERTION OF TUNNELED CENTRAL VENOUS CATHETER (CVC) WITH SUBCUTANEOUS PORT Left 8/14/2019    Procedure: [id number];  Surgeon: Miquel Sargent MD;  Location[facility] OR;  Service: General;  Laterality: Left;    laparoscopy for endometriosis      x5    OOPHORECTOMY      right ovary removed only    TONSILLECTOMY       Past Medical History:   Diagnosis Date    Abnormal Pap smear     + HPV    Bipolar 1 disorder     Crohn's disease     Encounter for blood transfusion 2010    Fatigue     H/O ETOH abuse     Iron deficiency anemia 2019    Seizures        Current Outpatient Medications:     ARIPiprazole (ABILIFY) 5 MG Tab, TAKE ONE TABLET BY MOUTH ONCE DAILY, Disp: 30 tablet, Rfl: 0    ibuprofen (ADVIL,MOTRIN) 200 MG tablet, Take 400 mg by mouth daily as needed for Pain. For hip pain, Disp: , Rfl:     lamotrigine (LAMICTAL) 150 MG Tab, Take 150 mg by mouth once daily. , Disp: , Rfl:     lidocaine-prilocaine (EMLA) cream, Apply topically as needed. Apply to area as directed 30 minutes prior to access, Disp: 30 g, Rfl: 2    pantoprazole (PROTONIX) 40 MG tablet, Take 1 tablet (40 mg total) by mouth 2 (two) times daily., Disp: 60 tablet, Rfl: 1    propranolol (INDERAL) 20 MG tablet, Take 20 mg by mouth once daily. , Disp: , Rfl:   Review of patient's allergies indicates:   Allergen Reactions    Demerol [meperidine]      Seizures      Ultram [tramadol] Other (See Comments)     Seizures            REVIEW OF SYSTEMS:     CONSTITUTIONAL: The patient  Has lost about 30 lbs.  There is no apparent    change in appetite, fever, night sweats, headaches, fatigue , dizziness, and   Weakness are better     SKIN: Denies rash, issues with nails, non-healing sores, bleeding, blotching    skin or abnormal bruising. Denies new moles or changes to existing moles. Hair has fallen out over time     BREASTS: There is no swelling around breasts or nipple discharge.    EYES: Denies eye pain, blurred vision, swelling, redness or discharge.      ENT AND MOUTH: Denies runny nose, stuffiness, sinus trouble or sores. Denies    nosebleeds. Denies, hoarseness, change in voice or swelling in front of the    neck.      CARDIOVASCULAR: Denies chest pain, discomfort or palpitations. Denies neck    swelling or episodes of passing out.      RESPIRATORY: Denies cough, sputum production, blood in sputum, and denies    shortness of breath.      GI: Denies trouble swallowing, indigestion, heartburn, abdominal pain, nausea,    vomiting, diarrhea, altered bowel habits, blood in stool, discoloration of    stools, change in nature of stool, bloating, increased abdominal girth.      GENITOURINARY: No discharge. No pelvic pain or lumps. No rash around groin or  lesions. No urinary frequency, hesitation, painful urination or blood in    urine. Denies incontinence. No problems with intercourse.      MUSCULOSKELETAL: Denies neck or back pain. Denies weakness in arms or legs,    joint problems or distended inflamed veins in legs. Denies swelling or abnormal  glands.      NEUROLOGICAL: Denies tingling, numbness, altered mentation changes to nerve    function in the face, weakness to one or both of the body. Denies changes to    gait and denies multiple falls or accidents.          PHYSICAL EXAM:     Vitals:    09/26/19 1314   BP: 116/77   Pulse: 109   Resp: 16   Temp: 98.3 °F (36.8 °C)       GENERAL: Comfortable looking patient. Patient is in no  distress.  Awake, alert and oriented to time, person and place.  No anxiety, or agitation.      HEENT: Normal conjunctivae and eyelids. WNL.  PERRLA 3 to 4 mm. No icterus, no pallor, no congestion, and no discharge noted.     NECK:  Supple. Trachea is central.  No crepitus.  No JVD or masses.    RESPIRATORY:  No intercostal retractions.  No dullness to percussion.  Chest is clear to auscultation.  No rales, rhonchi or wheezes.  No crepitus.  Good air entry bilaterally.    CARDIOVASCULAR:  S1 and S2 are normally heard without murmurs or gallops.  All peripheral pulses are present.    ABDOMEN:  Normal abdomen.  No hepatosplenomegaly.  No free fluid.  Bowel sounds are present.  No hernia noted. No masses.  No rebound or tenderness.  No guarding or rigidity.  Umbilicus is midline.    LYMPHATICS:  No axillary, cervical, supraclavicular, submental, or inguinal lymphadenopathy.    SKIN/MUSCULOSKELETAL:  There is no evidence of excoriation marks or ecchmosis.  No rashes.  No cyanosis.  No clubbing.  No joint or skeletal deformities noted.  Normal range of motion.    NEUROLOGIC:  Higher functions are appropriate.  No cranial nerve deficits.  Normal emil.  Normal strength.  Motor and sensory functions are normal.  Deep tendon reflexes are normal.    GENITAL/RECTAL:  Exams are deferred.      Laboratory:     CBC:  Lab Results   Component Value Date    WBC 9.10 08/13/2019    RBC 3.19 (L) 08/13/2019    HGB 9.2 (L) 08/13/2019    HCT 28.6 (L) 08/13/2019    MCV 90 08/13/2019    MCH 28.8 08/13/2019    MCHC 32.2 08/13/2019    RDW 14.8 (H) 08/13/2019     (H) 08/13/2019    MPV 10.7 08/13/2019    GRAN 6.1 08/13/2019    GRAN 66.8 08/13/2019    LYMPH 1.9 08/13/2019    LYMPH 21.0 08/13/2019    MONO 0.8 08/13/2019    MONO 8.8 08/13/2019    EOS 0.2 08/13/2019    BASO 0.08 08/13/2019    EOSINOPHIL 2.5 08/13/2019    BASOPHIL 0.9 08/13/2019       BMP: BMP  Lab Results   Component Value Date     08/13/2019    K 3.8 08/13/2019    CL  111 (H) 08/13/2019    CO2 16 (L) 08/13/2019    BUN 20 08/13/2019    CREATININE 1.2 08/13/2019    CALCIUM 9.0 08/13/2019    ANIONGAP 11 08/13/2019    ESTGFRAFRICA >60 08/13/2019    EGFRNONAA 54 (A) 08/13/2019       LFT:   Lab Results   Component Value Date    ALT 8 (L) 08/13/2019    AST 16 08/13/2019    ALKPHOS 80 08/13/2019    BILITOT 0.1 08/13/2019     Lab Results   Component Value Date    IRON 114 09/23/2019    TIBC 202 (L) 09/23/2019    FERRITIN 190 (H) 09/23/2019       Assessment/Plan:       Severe STEPHAN hgb of 4.0 s/p crohn's sx, and pud bleeding.   Recurrent will give 2 more doses of injectafer as this is recurrent and she is a poor access has port placed no CBC has been done today will recheck stat CBC today and phone review results   has recd iron prior to hospital stay and transfusion last hemoglobin was only in the 9 g range    b 12 def :supplements SL b 12 continue, she admits to non compliance   discuss bone density and other vitamin/ mineral testing with pcp due to her prior GI sx   cont seizure precautions   weight loss possibly a result of her past GI sx and PUD   have the GI f/u   renal ref. ckd ? reason this may also lead to anemia   Advance Care Planning     Living Will  Discussed and documented a previous visit

## 2022-11-29 NOTE — TELEPHONE ENCOUNTER
Pts wife came in stating that they live in an apartment building and they need a letter to provide to the complex stating pt needs to be on the first floor due to his medical conditions. Wife states she forgot to discuss this with provider at last appointment.

## 2022-11-30 NOTE — TELEPHONE ENCOUNTER
Patient called back to ask about letter, did make her aware letter if fine.   She will call back with fax number

## 2022-12-13 NOTE — TELEPHONE ENCOUNTER
Writer called back patient to schedule colon/egd procedure, Writer verified HIPPA, patient wife not on HIPPA form. Writer asked wife to have patient contact office back to schedule colon/egd procedure.

## 2022-12-14 RX ORDER — BISACODYL 5 MG
TABLET, DELAYED RELEASE (ENTERIC COATED) ORAL
Qty: 4 TABLET | Refills: 0 | Status: SHIPPED | OUTPATIENT
Start: 2022-12-14

## 2022-12-14 RX ORDER — SODIUM, POTASSIUM,MAG SULFATES 17.5-3.13G
SOLUTION, RECONSTITUTED, ORAL ORAL
Qty: 1 EACH | Refills: 0 | Status: SHIPPED | OUTPATIENT
Start: 2022-12-14

## 2022-12-14 NOTE — TELEPHONE ENCOUNTER
Writer called pt to schedule procedures, pt states he had cardio appt 10/27/22, procedures scheduled as follows;    [address] [facility] [place] Wednesday [email or web address] plavix clearance sent to Dr. Margarette Acevedo. Reviewed bowel prep instructions with patient over phone and mailed to home address.

## 2022-12-15 NOTE — TELEPHONE ENCOUNTER
Rec'd plavix clearance pt is to hold x 5 days start holding 12/30/22, writer notified of instructions, pt voices understanding.

## 2022-12-16 ENCOUNTER — CARE COORDINATION (OUTPATIENT)
Dept: CARE COORDINATION | Age: 77
End: 2022-12-16

## 2022-12-16 ENCOUNTER — HOSPITAL ENCOUNTER (OUTPATIENT)
Dept: PREADMISSION TESTING | Age: 77
Discharge: HOME | End: 2022-12-20

## 2022-12-16 VITALS — WEIGHT: 263 LBS | HEIGHT: 70 IN | BODY MASS INDEX: 37.65 KG/M2

## 2022-12-16 DIAGNOSIS — R73.01 IMPAIRED FASTING GLUCOSE: ICD-10-CM

## 2022-12-16 DIAGNOSIS — I10 ESSENTIAL HYPERTENSION: ICD-10-CM

## 2022-12-16 RX ORDER — METFORMIN HYDROCHLORIDE 500 MG/1
TABLET, EXTENDED RELEASE ORAL
Qty: 90 TABLET | Refills: 0 | Status: SHIPPED | OUTPATIENT
Start: 2022-12-16

## 2022-12-16 RX ORDER — LISINOPRIL 20 MG/1
TABLET ORAL
Qty: 90 TABLET | Refills: 0 | Status: SHIPPED | OUTPATIENT
Start: 2022-12-16

## 2022-12-16 NOTE — TELEPHONE ENCOUNTER
Needs to schedule his appointment for the next year, with PCP, in 1 or 2 months, for A1c and blood pressure check

## 2022-12-16 NOTE — PROGRESS NOTES

## 2022-12-22 ENCOUNTER — CARE COORDINATION (OUTPATIENT)
Dept: CARE COORDINATION | Age: 77
End: 2022-12-22

## 2022-12-22 NOTE — CARE COORDINATION
Ambulatory Care Coordination Note  12/21/2022    ACC: Hoda Liang, RN  Summary  Date Care Coordination Episode Started:  September 2, 2022    Reason for Call Today:     CC Follow Up    Reason patient is in Care Coordination:     PCP Referral for social needs    Topics Discussed Today:     General- spoke with patient and his wife. She states that he is doing ok. He continues to do his daily exercises, using his Cubii. Denies any falls. Denies any symptoms. He is scheduled for an EGD and colonoscopy after the first of the year. Denies any needs. Interventions completed today:    Assessments completed today:     Fall risk  Initial assessment- completed  Medication reconciliation  General Health Assessment    Care Coordinator plan of care:     Continue medications as prescribed  Continue to do strengthening exercises daily  Complete lab work before next appt  Will follow up in 2 weeks    Offered patient enrollment in the Remote Patient Monitoring (RPM) program for in-home monitoring: Patient declined. Lab Results       None            Care Coordination Interventions    Referral from Primary Care Provider: Yes  Suggested Interventions and Community Resources  Social Work: In Process          Goals Addressed    None         Prior to Admission medications    Medication Sig Start Date End Date Taking?  Authorizing Provider   lisinopril (PRINIVIL;ZESTRIL) 20 MG tablet Take 1 tablet by mouth once daily 12/16/22   Carmenza Pineda MD   metFORMIN (GLUCOPHAGE-XR) 500 MG extended release tablet Take 1 tablet by mouth once daily with breakfast 12/16/22   Carmenza Pineda MD   sodium-potassium-mag sulfate (SUPREP BOWEL PREP KIT) 17.5-3.13-1.6 GM/177ML SOLN solution Please follow instructions given to you by your provider 12/14/22   Monty Bolivar MD   bisacodyl 5 MG EC tablet Please follow instructions given to you by your provider 12/14/22   Monty Bolivar MD   amLODIPine (NORVASC) 5 MG tablet Take by mouth Daily with lunch 4/1/19   Historical Provider, MD   DULoxetine (CYMBALTA) 30 MG extended release capsule Take 1 capsule by mouth daily 11/23/22   Jamila Devries MD   vitamin D (ERGOCALCIFEROL) 1.25 MG (85856 UT) CAPS capsule Take 1 capsule by mouth once a week  Patient taking differently: Take 50,000 Units by mouth once a week Fridays 10/19/22   Jamila Devries MD   indomethacin (INDOCIN) 25 MG capsule Take 1 capsule by mouth 2 times daily (with meals) for 7 days 10/19/22 10/26/22  Jamila Devries MD   atorvastatin (LIPITOR) 80 MG tablet Take 1 tablet by mouth nightly 10/13/22   Hue Reaves MD   clopidogrel (PLAVIX) 75 MG tablet Take 1 tablet by mouth once daily 10/13/22   Hue Reaves MD   metoprolol succinate (TOPROL XL) 50 MG extended release tablet Take 1 tab daily 10/13/22   Jamila Devries MD   tamsulosin (FLOMAX) 0.4 MG capsule Take 1 capsule by mouth daily Take in evening after meal 8/9/21   Lloyd Tracey MD   pantoprazole (PROTONIX) 40 MG tablet Take 1 tablet by mouth every morning (before breakfast) 2/15/21   Olivia Nguyen APRN - CNP   Multiple Vitamins-Minerals (MULTIVITAMIN ADULTS 50+) TABS Take by mouth daily    Historical Provider, MD   nitroGLYCERIN (NITROSTAT) 0.4 MG SL tablet up to max of 3 total doses.  If no relief after 1 dose, call 911. 7/26/19   Emelina Wilks MD       Future Appointments   Date Time Provider Nay Arreola   11/27/2023 10:30 AM Jamila Devries MD [facility]

## 2022-12-22 NOTE — CARE COORDINATION
HC phoned patient's spouse and she denied any current social needs,  at this time.     Plan of Care  [facility] [place]. will follow up with patient and spouse for social needs

## 2023-01-03 ENCOUNTER — TELEPHONE (OUTPATIENT)
Dept: GASTROENTEROLOGY | Age: 78
End: 2023-01-03

## 2023-01-03 ENCOUNTER — ANESTHESIA EVENT (OUTPATIENT)
Dept: ENDOSCOPY | Age: 78
End: 2023-01-03
Payer: MEDICARE

## 2023-01-03 RX ORDER — POLYETHYLENE GLYCOL 3350 17 G/17G
POWDER, FOR SOLUTION ORAL
Qty: 238 G | Refills: 0 | Status: SHIPPED | OUTPATIENT
Start: 2023-01-03

## 2023-01-04 ENCOUNTER — ANESTHESIA (OUTPATIENT)
Dept: ENDOSCOPY | Age: 78
End: 2023-01-04
Payer: MEDICARE

## 2023-01-04 ENCOUNTER — HOSPITAL ENCOUNTER (OUTPATIENT)
Age: 78
Setting detail: OUTPATIENT SURGERY
Discharge: HOME | End: 2023-01-04
Attending: INTERNAL MEDICINE | Admitting: INTERNAL MEDICINE
Payer: MEDICARE

## 2023-01-04 VITALS
HEART RATE: 62 BPM | RESPIRATION RATE: 18 BRPM | DIASTOLIC BLOOD PRESSURE: 76 MMHG | SYSTOLIC BLOOD PRESSURE: 140 MMHG | OXYGEN SATURATION: 95 % | TEMPERATURE: 98.1 F

## 2023-01-04 DIAGNOSIS — R13.10 DYSPHAGIA, UNSPECIFIED TYPE: ICD-10-CM

## 2023-01-04 LAB
GLUCOSE BLD-MCNC: 110 MG/DL (ref 75–110)
GLUCOSE BLD-MCNC: 111 MG/DL (ref 75–110)

## 2023-01-04 PROCEDURE — 7100000011 HC PHASE II RECOVERY - ADDTL 15 MIN: Performed by: INTERNAL MEDICINE

## 2023-01-04 PROCEDURE — 3609012400 HC EGD TRANSORAL BIOPSY SINGLE/MULTIPLE: Performed by: INTERNAL MEDICINE

## 2023-01-04 PROCEDURE — 7100000010 HC PHASE II RECOVERY - FIRST 15 MIN: Performed by: INTERNAL MEDICINE

## 2023-01-04 PROCEDURE — 2709999900 HC NON-CHARGEABLE SUPPLY: Performed by: INTERNAL MEDICINE

## 2023-01-04 PROCEDURE — 6360000002 HC RX W HCPCS: Performed by: NURSE ANESTHETIST, CERTIFIED REGISTERED

## 2023-01-04 PROCEDURE — 82947 ASSAY GLUCOSE BLOOD QUANT: CPT

## 2023-01-04 PROCEDURE — 2580000003 HC RX 258: Performed by: ANESTHESIOLOGY

## 2023-01-04 PROCEDURE — 3609010600 HC COLONOSCOPY POLYPECTOMY SNARE/COLD BIOPSY: Performed by: INTERNAL MEDICINE

## 2023-01-04 PROCEDURE — 2500000003 HC RX 250 WO HCPCS: Performed by: ANESTHESIOLOGY

## 2023-01-04 PROCEDURE — 2500000003 HC RX 250 WO HCPCS: Performed by: NURSE ANESTHETIST, CERTIFIED REGISTERED

## 2023-01-04 PROCEDURE — 3700000000 HC ANESTHESIA ATTENDED CARE: Performed by: INTERNAL MEDICINE

## 2023-01-04 PROCEDURE — 88305 TISSUE EXAM BY PATHOLOGIST: CPT

## 2023-01-04 PROCEDURE — 3700000001 HC ADD 15 MINUTES (ANESTHESIA): Performed by: INTERNAL MEDICINE

## 2023-01-04 RX ORDER — ACETAMINOPHEN 325 MG/1
650 TABLET ORAL
Status: DISCONTINUED | OUTPATIENT
Start: 2023-01-04 | End: 2023-01-04 | Stop reason: HOSPADM

## 2023-01-04 RX ORDER — PROPOFOL 10 MG/ML
INJECTION, EMULSION INTRAVENOUS CONTINUOUS PRN
Status: DISCONTINUED | OUTPATIENT
Start: 2023-01-04 | End: 2023-01-04 | Stop reason: SDUPTHER

## 2023-01-04 RX ORDER — FENTANYL CITRATE 0.05 MG/ML
25 INJECTION, SOLUTION INTRAMUSCULAR; INTRAVENOUS EVERY 5 MIN PRN
Status: DISCONTINUED | OUTPATIENT
Start: 2023-01-04 | End: 2023-01-04 | Stop reason: HOSPADM

## 2023-01-04 RX ORDER — SODIUM CHLORIDE 0.9 % (FLUSH) 0.9 %
5-40 SYRINGE (ML) INJECTION EVERY 12 HOURS SCHEDULED
Status: DISCONTINUED | OUTPATIENT
Start: 2023-01-04 | End: 2023-01-04 | Stop reason: HOSPADM

## 2023-01-04 RX ORDER — PROPOFOL 10 MG/ML
INJECTION, EMULSION INTRAVENOUS PRN
Status: DISCONTINUED | OUTPATIENT
Start: 2023-01-04 | End: 2023-01-04 | Stop reason: SDUPTHER

## 2023-01-04 RX ORDER — SODIUM CHLORIDE 0.9 % (FLUSH) 0.9 %
5-40 SYRINGE (ML) INJECTION PRN
Status: DISCONTINUED | OUTPATIENT
Start: 2023-01-04 | End: 2023-01-04 | Stop reason: HOSPADM

## 2023-01-04 RX ORDER — HYDRALAZINE HYDROCHLORIDE 20 MG/ML
10 INJECTION INTRAMUSCULAR; INTRAVENOUS
Status: DISCONTINUED | OUTPATIENT
Start: 2023-01-04 | End: 2023-01-04 | Stop reason: HOSPADM

## 2023-01-04 RX ORDER — MEPERIDINE HYDROCHLORIDE 25 MG/ML
12.5 INJECTION INTRAMUSCULAR; INTRAVENOUS; SUBCUTANEOUS EVERY 5 MIN PRN
Status: DISCONTINUED | OUTPATIENT
Start: 2023-01-04 | End: 2023-01-04 | Stop reason: HOSPADM

## 2023-01-04 RX ORDER — SODIUM CHLORIDE 9 MG/ML
INJECTION, SOLUTION INTRAVENOUS CONTINUOUS
Status: DISCONTINUED | OUTPATIENT
Start: 2023-01-04 | End: 2023-01-04 | Stop reason: HOSPADM

## 2023-01-04 RX ORDER — LIDOCAINE HYDROCHLORIDE 10 MG/ML
1 INJECTION, SOLUTION EPIDURAL; INFILTRATION; INTRACAUDAL; PERINEURAL
Status: COMPLETED | OUTPATIENT
Start: 2023-01-04 | End: 2023-01-04

## 2023-01-04 RX ORDER — METOCLOPRAMIDE HYDROCHLORIDE 5 MG/ML
10 INJECTION INTRAMUSCULAR; INTRAVENOUS
Status: DISCONTINUED | OUTPATIENT
Start: 2023-01-04 | End: 2023-01-04 | Stop reason: HOSPADM

## 2023-01-04 RX ORDER — LABETALOL HYDROCHLORIDE 5 MG/ML
10 INJECTION, SOLUTION INTRAVENOUS
Status: DISCONTINUED | OUTPATIENT
Start: 2023-01-04 | End: 2023-01-04 | Stop reason: HOSPADM

## 2023-01-04 RX ORDER — SODIUM CHLORIDE 9 MG/ML
INJECTION, SOLUTION INTRAVENOUS PRN
Status: DISCONTINUED | OUTPATIENT
Start: 2023-01-04 | End: 2023-01-04 | Stop reason: HOSPADM

## 2023-01-04 RX ORDER — ONDANSETRON 2 MG/ML
4 INJECTION INTRAMUSCULAR; INTRAVENOUS
Status: DISCONTINUED | OUTPATIENT
Start: 2023-01-04 | End: 2023-01-04 | Stop reason: HOSPADM

## 2023-01-04 RX ORDER — LIDOCAINE HYDROCHLORIDE 20 MG/ML
INJECTION, SOLUTION EPIDURAL; INFILTRATION; INTRACAUDAL; PERINEURAL PRN
Status: DISCONTINUED | OUTPATIENT
Start: 2023-01-04 | End: 2023-01-04 | Stop reason: SDUPTHER

## 2023-01-04 RX ORDER — DIPHENHYDRAMINE HYDROCHLORIDE 50 MG/ML
12.5 INJECTION INTRAMUSCULAR; INTRAVENOUS
Status: DISCONTINUED | OUTPATIENT
Start: 2023-01-04 | End: 2023-01-04 | Stop reason: HOSPADM

## 2023-01-04 RX ADMIN — LIDOCAINE HYDROCHLORIDE 80 MG: 20 INJECTION, SOLUTION EPIDURAL; INFILTRATION; INTRACAUDAL; PERINEURAL at 09:46

## 2023-01-04 RX ADMIN — PROPOFOL 80 MG: 10 INJECTION, EMULSION INTRAVENOUS at 09:46

## 2023-01-04 RX ADMIN — LIDOCAINE HYDROCHLORIDE 1 ML: 10 INJECTION, SOLUTION EPIDURAL; INFILTRATION; INTRACAUDAL; PERINEURAL at 09:14

## 2023-01-04 RX ADMIN — SODIUM CHLORIDE: 9 INJECTION, SOLUTION INTRAVENOUS at 09:14

## 2023-01-04 RX ADMIN — PROPOFOL 125 MCG/KG/MIN: 10 INJECTION, EMULSION INTRAVENOUS at 09:46

## 2023-01-04 ASSESSMENT — PAIN SCALES - GENERAL
PAINLEVEL_OUTOF10: 0

## 2023-01-04 ASSESSMENT — ENCOUNTER SYMPTOMS
NAUSEA: 1
BACK PAIN: 1
COUGH: 1
ABDOMINAL PAIN: 1
VOMITING: 0
STRIDOR: 0
SHORTNESS OF BREATH: 0

## 2023-01-04 ASSESSMENT — LIFESTYLE VARIABLES: SMOKING_STATUS: 0

## 2023-01-04 ASSESSMENT — PAIN - FUNCTIONAL ASSESSMENT: PAIN_FUNCTIONAL_ASSESSMENT: NONE - DENIES PAIN

## 2023-01-04 NOTE — H&P
HISTORY and [facility] [address]       NAME:  Ben Tracey  MRN: [id number]   YOB: 1945   Date: 1/4/2023   Age: 65 y.o. Gender: male       COMPLAINT AND PRESENT HISTORY:     Ben Tracey is 65 y.o.,  male, presents for EGD BIOPSY, COLONOSCOPY DIAGNOSTIC   Primary dx: DYSPHAGIA.    HPI:  Ben Tracey is 65 y.o.,  male, will be having a Colonoscopy and EGD. Prior Colonoscopy done 25 years ago, No Prior EGD was done before  Patient has no hx of Colon Polyps or Diverticulosis. Patient not sure what kind of cancer his father had   Patient reports no changes in bowel habits. No diarrhea or constipation. No GI /Rectal bleeding, experiencing red/ black/ BRBPR stools. Patient has a history of intermittent abd. pain in the epigastric area, started years ago with nausea and dizziness. Pt states he has history of hiatal hernia. Patient reports he is not able to eat and this happened since he had stroke but recently it got worse in the last 6 months  He has difficulty swallowing  solid food or big pills which getting stuck in his throat. Pt has to resolve to drinking more fluids to assist with swallowing. Pt states he lost about 20 pounds in the  last 6 months. Pt has hx of GERD Pt is on  Protonix , that is helping to control symptoms. consistency of the stools. No fever or chills. Review of additional significant medical hx:  (See chart for additional detail, including current medications /see ROS for current S/S):     CAD, CAROTID STENOSIS LEFT , STROKE 2019 HE HAD X5 STENTS WITH RESIDUAL WEAKNESS ON LEFT SIDE   HLD,HTN, INTERMITTENT LIGHT HEADEDNESS. MYOCARDIOPATHY. PT DENIES FEVER/CHILLS, CHEST PAIN OR SOB, NO SWELLING LEGS, NO PALPITATION OR HEADACHE . PT STATES EVERY DAY HE HAS DIZZINESS AND HE FEEL UNSTEADY WALKING . PT STATES HE HAS MORE WEAKNESS ON LEFT UPPER EXTREMITY .    HE STATES H HAD STENTS PLACED IN [facility] AND HE SEEN DR. Duyen Bui TIME.    CURRENT MEDICATION R/T CONDITION :  NORVASC, LIPITOR, PLAVIX TOPROL XL, LISINOPRIL    BP Readings from Last 3 Encounters:   11/23/22 128/72   10/20/22 (!) 147/79   10/13/22 128/64     Cardiac [id number]    Narrative & Impression    Cardiac Diagnostic Report      Demographics      Patient    Jaren Helm Date of Study           12/11/2018   Name       H      Date of    1945  Gender                  Male   Birth      Age        75 year(s)  Race                          Room                   Height:                 70 inch, 177.8 cm   Number      Corporate  [id number]  Weight:                 270 pounds, 122.5 kg   ID #      Patient    [id number]   BSA:        2.37 m^2    BMI:        38.74 kg/m^2   Acct #      MR #       [de-identified]     Performing Physician    Dexter Kerr      Accession  [id number]   Referring Physician   #      Case ID #  [id number]       Assisting Physician     Additional Comments  H&P reviewed and patient examined by performing physician prior to the  procedure on 12/10/18 at  No changes noted. If changes, see note below. Mallampati Classification 2 / ASA Classification II : per Physician . Procedure  Procedure Type:      Diagnostic procedure: Lt Heart, Coronary Angio, LVgram      Conclusions      Procedure Summary      Mild to moderate nonobstructive CAD of LAD and LCX   Proximal total occlusion of RCA, with L to R collateralization   LV gram with EF = 55%      Recommendations      Optimize medical therapy      Signature      ----------------------------------------------------------------   Electronically signed by Dexter Kerr(Performing Physician) on   12/11/2018 14:53   ----------------------------------------------------------------      Angiographic Findings      Cardiac Arteries and Lesion Findings     LAD:       Lesion on Prox LAD: 15% stenosis 58 mm length. LCx:       Lesion on Prox CX: 55% stenosis 27 mm length.      RCA:       Lesion on Prox RCA: 100% stenosis 6 mm length. Coronary Tree      Dominance: Right     LV Analysis  LV function assessed as:Normal.  Ejection Fraction  +----------------------------------------------------------------------+---+  ! Method                                                                ! EF%! +----------------------------------------------------------------------+---+  ! LV gram                                                               !55 !  +----------------------------------------------------------------------+---+      LV Segment Contractility      1 - Normal    3 - Mild         5 - Severe       7 - Dyskinesis   hypokinesis      hypokinesis      2 -           4 - Moderate     6 - Akinesis     8 - Aneurysm   Hypokinesis   hypokinesis     Procedure Data  Procedure Start Time: 12/11/2018 14:15. Procedure End Time: 12/11/2018  14:38. The procedure was explained in detail to the patient. Risks, complications  and alternative treatments were reviewed. Written consent was obtained. Entry Locations    - Retrograde Percutaneous access was performed through the Right Radial      artery. A 6 Fr sheath was inserted. Hemostasis was successfully obtained      using TR Band. Procedure Medications: - Versed I.V. 2 mg.       - Fentanyl I.V. 50 mcg.       - Lidocaine HCl 1% 10mg/ml S.Q. 5 ml.       - Nitroglycerin I.A. 200 mcg.       - Verapamil I. A. 5 mg.       - Heparin I.V. bolus 4000 units. Catheters and Wires:    - 6 Fr. Radial TIG 4.0 was used for Left coronary angiography. - 6 Fr. Radial TIG 4.0 was used for Right coronary angiography. - 6F Catheter Angled Pigtail was used for Left ventriculography. Contrast Material:    - Optiray [id number] ml     Fluoroscopy Time: Diagnostic: 6:14 minutes. Total: 6:14 minutes. Estimated Blood Loss: 5 ml.       Medical History      Risk Factors      The patient risk factors include:diet-treated diabetes mellitus, last   creatinine: 0.6 mg/dl and creatinine clearance: 187.11 ml/min. Admission Data  Admission Date: 12/11/2018     Admission Status: Outpatient. Cardiac Status:         -The patient's anginal syndrome was assessed as Unstable angina with cCS      III of the [place] clinical classification. Hemodynamics      Condition: Baseline Room Air      Estimated: 267. 91Heart Rate: 65 bpm     Pressure  +-----+--------------------------------------------------------------------+  ! Site ! Pressure                                                            ! +-----+--------------------------------------------------------------------+  ! AO   !118/48 (85)                                                         !  +-----+--------------------------------------------------------------------+  ! AO   !108/62 (84)                                                         !  +-----+--------------------------------------------------------------------+  ! LV   !130/7 ,13                                                           ! +-----+--------------------------------------------------------------------+  ! LV   !120/1 ,11                                                           ! +-----+--------------------------------------------------------------------+     Valve Gradients and Areas  +-----------+---------+---------+---------+----------+---------+-----------+  ! Valve      ! Peak     ! Mean     ! Area     ! Index     ! Flow     ! Source     ! +-----------+---------+---------+---------+----------+---------+-----------+  ! Aortic     !25       !0        !         !          !         !           !  +-----------+---------+---------+---------+----------+---------+-----------+  ! Aortic     !25       !0        !         !          !         !           !  +-----------+---------+---------+---------+----------+---------+-----------+     Shunts      Oxygen Values      O2 [id number]         Specimen Collected: 12/11/18 14:15 EST        EKG 12 Lead  Sinus bradycardia with 1st degree A-V block  Otherwise normal ECG  When compared with ECG of 09-DEC-2018 21:42, (unconfirmed)  No significant change was found    DMII  CURRENT MEDICATION R/T CONDITION : METFORMIN     Hemoglobin A1C   Date Value Ref Range Status   09/02/2022 6.3 % Final       NPO status: Pt NPO since the past midnight   Medications taken TODAY (with sip of water): pt took all his am medication today with sip of water  Anticoagulation status: Plavix, last dose two days ago   Prep fully completed: YES. Pt reports his last BM is liquid     Pt has hx of blood clots. Denies personal hx of MRSA infection. Denies any personal or family hx of previous complications w/anesthesia.     PAST MEDICAL HISTORY     Past Medical History:   Diagnosis Date    Acquired skull defect     Angina pectoris ([address]     Anxiety     Anxiety     At high risk for falls     Ataxia following nontraumatic intracerebral hemorrhage     Bronchitis with asthma, acute 11/24/2015    CAD (coronary artery disease)     Carotid stenosis     Left    Carotid stenosis, left 02/02/2016    Chest pain     Compression of brainstem (HCC)     Diabetes mellitus ([address]     borderline patient off metformin    Diplopia     Dysphagia     Gait instability     GERD (gastroesophageal reflux disease)     H/O heart artery stent     x5    Heart attack ([address] 08/05/2019    Hyperlipidemia     Hypertension     Hyponatremia     Intermittent lightheadedness     Myocardiopathy (HCC)     Neuropathy     Obesity     Osteoarthritis     both knees    Pericardial tamponade     Spontaneous intracranial hemorrhage ([address]     Stroke, hemorrhagic ([address] 08/18/2019       SURGICAL HISTORY       Past Surgical History:   Procedure Laterality Date    CAROTID ENDARTERECTOMY Left 02/02/2016    COLONOSCOPY      CORONARY ANGIOPLASTY WITH STENT PLACEMENT      \"total 5 stents\"    CRANIOTOMY      IR BIOPSY THYROID PERC CORE NEEDLE  11/14/2022    IR BIOPSY THYROID PERC CORE NEEDLE 11/14/2022 [facility] SPECIAL PROCEDURES    JOINT REPLACEMENT Left     knee    SHOULDER SURGERY Right     collar bone    TONSILLECTOMY AND ADENOIDECTOMY         FAMILY HISTORY       Family History   Problem Relation Age of Onset    Cancer Mother     Cancer Father     Liver Cancer Brother     Esophageal Cancer Brother        SOCIAL HISTORY       Social History     Socioeconomic History    Marital status:      Spouse name: None    Number of children: None    Years of education: None    Highest education level: None   Tobacco Use    Smoking status: Never    Smokeless tobacco: Never   Vaping Use    Vaping Use: Never used   Substance and Sexual Activity    Alcohol use: Not Currently    Drug use: No     Social Determinants of Health     Financial Resource Strain: High Risk    Difficulty of Paying Living Expenses: Hard   Food Insecurity: Food Insecurity Present    Worried About Running Out of Food in the Last Year: Sometimes true    Ran Out of Food in the Last Year: Sometimes true   Physical Activity: Insufficiently Active    Days of Exercise per Week: 7 days    Minutes of Exercise per Session: 10 min           REVIEW OF SYSTEMS      No Known Allergies    No current facility-administered medications on file prior to encounter.      Current Outpatient Medications on File Prior to Encounter   Medication Sig Dispense Refill    sodium-potassium-mag sulfate (SUPREP BOWEL PREP KIT) 17.5-3.13-1.6 GM/177ML SOLN solution Please follow instructions given to you by your provider 1 each 0    bisacodyl 5 MG EC tablet Please follow instructions given to you by your provider 4 tablet 0    amLODIPine (NORVASC) 5 MG tablet Take by mouth Daily with lunch      DULoxetine (CYMBALTA) 30 MG extended release capsule Take 1 capsule by mouth daily 90 capsule 1    vitamin D (ERGOCALCIFEROL) 1.25 MG (63872 UT) CAPS capsule Take 1 capsule by mouth once a week (Patient taking differently: Take 50,000 Units by mouth once a week Fridays) 12 capsule 1    indomethacin (INDOCIN) 25 MG capsule Take 1 capsule by mouth 2 times daily (with meals) for 7 days 30 capsule 0    atorvastatin (LIPITOR) 80 MG tablet Take 1 tablet by mouth nightly 90 tablet 0    clopidogrel (PLAVIX) 75 MG tablet Take 1 tablet by mouth once daily 90 tablet 0    metoprolol succinate (TOPROL XL) 50 MG extended release tablet Take 1 tab daily 90 tablet 1    tamsulosin (FLOMAX) 0.4 MG capsule Take 1 capsule by mouth daily Take in evening after meal 30 capsule 5    pantoprazole (PROTONIX) 40 MG tablet Take 1 tablet by mouth every morning (before breakfast) 90 tablet 0    Multiple Vitamins-Minerals (MULTIVITAMIN ADULTS 50+) TABS Take by mouth daily      nitroGLYCERIN (NITROSTAT) 0.4 MG SL tablet up to max of 3 total doses. If no relief after 1 dose, call 911. 25 tablet 3       Review of Systems   Constitutional:  Positive for activity change and fatigue. HENT:          Left ear pain, he has no teeth upper and lower   Eyes:  Positive for visual disturbance. Respiratory:  Positive for cough. Y coughing    Cardiovascular:  Negative for chest pain, palpitations and leg swelling. Gastrointestinal:  Positive for abdominal pain and nausea. Negative for vomiting. Genitourinary:  Positive for decreased urine volume and frequency. Negative for hematuria. Musculoskeletal:  Positive for arthralgias, back pain and gait problem. Right knee pain    Skin: Negative. Neurological:  Positive for dizziness, tremors, weakness and light-headedness. Adama Ace in the left arm from the strok 2019    Hematological: Negative. Psychiatric/Behavioral: Negative. GENERAL PHYSICAL EXAM     Vitals:see nursing flow sheet for vital signs     GENERAL APPEARANCE:   Colt Sousa is 65 y.o.,  male, mildly obese, nourished, conscious, alert. Does not appear to be distress or pain at this time. Physical Exam  Constitutional:       General: He is not in acute distress. Appearance: Normal appearance. He is obese. He is not ill-appearing. HENT:      Head: Normocephalic. Right Ear: External ear normal.      Left Ear: External ear normal.      Nose: Nose normal.      Mouth/Throat:      Mouth: Mucous membranes are moist.      Comments: Pt has full denture   Eyes:      General:         Right eye: No discharge. Left eye: No discharge. Cardiovascular:      Rate and Rhythm: Normal rate and regular rhythm. Pulses: Normal pulses. Radial pulses are 2+ on the right side and 2+ on the left side. Dorsalis pedis pulses are 2+ on the right side and 2+ on the left side. Posterior tibial pulses are 2+ on the right side and 2+ on the left side. Heart sounds: Normal heart sounds. No murmur heard. No gallop. Pulmonary:      Effort: Pulmonary effort is normal.      Breath sounds: Examination of the right-upper field reveals decreased breath sounds. Examination of the left-upper field reveals decreased breath sounds. Examination of the right-middle field reveals decreased breath sounds. Examination of the left-middle field reveals decreased breath sounds. Examination of the right-lower field reveals decreased breath sounds. Examination of the left-lower field reveals decreased breath sounds. Decreased breath sounds present. No wheezing. Abdominal:      General: Bowel sounds are normal. There is no distension. Palpations: Abdomen is soft. There is no mass. Tenderness: There is no abdominal tenderness. Musculoskeletal:         General: No swelling or tenderness. Normal range of motion. Cervical back: Normal range of motion and neck supple. Right lower leg: No edema. Left lower leg: No edema. Skin:     General: Skin is warm and dry. Findings: No bruising, erythema or lesion. Neurological:      General: No focal deficit present. Mental Status: He is alert and oriented to person, place, and time.       Motor: Weakness present.       Gait: Gait abnormal.      Comments: Pt USE walker for walking   Psychiatric:         Mood and Affect: Mood normal.         Behavior: Behavior normal.                 PROVISIONAL DIAGNOSES / SURGERY:      DYSPHAGIA    EGD BIOPSY, COLONOSCOPY DIAGNOSTIC     Patient Active Problem List    Diagnosis Date Noted    Moderate episode of recurrent major depressive disorder ([address] 11/23/2022    Chronic fatigue syndrome 11/23/2022    Type 2 diabetes mellitus without complication, without long-term current use of insulin ([address] 10/13/2022    Pharyngeal dysphagia 10/13/2022    Weight loss 10/13/2022    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 ([address] 10/13/2022    Lumbar degenerative disc disease 10/13/2022    Hx of heart artery stent 08/16/2021    Obesity (BMI 35.0-39.9 without comorbidity) 03/07/2019    Pericardial tamponade 12/20/2018    Prediabetes 03/31/2022    Mixed hyperlipidemia 02/15/2021    Coronary artery disease of native artery of native heart with stable angina pectoris ([address] 11/20/2020    PVD (peripheral vascular disease) ([address] 11/20/2020    Morbidly obese ([address] 11/20/2020    At high risk for falls 11/20/2020    Ataxia following nontraumatic intracerebral hemorrhage     Neuropathy     Diplopia 07/15/2019    Spontaneous intracranial hemorrhage ([address] 07/03/2019    Myocardiopathy ([address] 07/19/2018    Ventral hernia without obstruction or gangrene 11/15/2016    Thyroid nodule 05/19/2016    H/O carotid endarterectomy 04/18/2016    Carotid stenosis, left 02/02/2016    Lymphadenopathy of left cervical region 11/24/2015    Impaired fasting glucose 07/13/2015    Patient overweight 07/13/2015    Insomnia 07/13/2015    Essential hypertension 07/13/2015    OA (osteoarthritis) of knee 07/13/2015    S/P total knee arthroplasty 07/13/2015           MARKUS Floyd - CNP on 1/4/2023 at 8:13 AM

## 2023-01-04 NOTE — DISCHARGE INSTRUCTIONS
EGD DISCHARGE INSTRUCTIONS    Activity:  Rest today. No driving, operating machinery, or making any important decisions today. May resume normal activity tomorrow. Diet:  Following EGD eat slowly, chew food well, cut food into small pieces-Avoid greasy, spicy, \"crunchy\" foods X 48 hours. Call your Doctor if you have any of the following:  -Passing blood rectally or vomiting blood (it may be red or black). -Persistent nausea/vomiting  -Severe abdominal or chest pain not relieved with passing gas  -Fever of 100 degrees or more  -Redness or swelling at the IV site  -Severe sore throat or neck pain       Gastritis     Definition   Gastritis is an inflammation of the stomach lining. In some cases, gastritis can lead to ulcers in the lining of the stomach.  Gastritis can be:   Acutecomes on suddenly and lasts briefly   Chroniceither long lasting or recurrent   Causes   Causes of gastritis include:   Drugs (such as aspirin and other nonsteroidal anti-inflammatory medications [NSAIDs], as well as steroid drugs)   Alcohol   Smoking   Severe illness, which can occur from:   Surgery   Burns   Liver or kidney disease   Shock   Respiratory failure   Head injury   Sepsis   Viral infection (for example, herpes or cytomegalovirus )   Bacterial infection, such as  Helicobacter pylori   Fungal infection   Injury to the blood vessels that bring blood to the stomach   Excess production of stomach acid   Reflux of bile into the stomach, especially after surgery of the bile system   Crohn's disease   Atrophy of the lining of the stomach (atrophic gastritis), usually associated with older age   Pernicious anemia (causes autoimmune gastritis)   Syphilis   Sarcoidosis   Radiation treatment   Swallowing caustic substances   Risk Factors   Factors that increase your chance of gastritis include:   Age 61 and older   NSAID use   Heavy alcohol use   Pernicious anemia   Diseases of the lymph system   Severe illness, such as can occur with: Surgery   Head injury   Respiratory failure   Kidney failure   Liver failure   Symptoms   Symptoms include:   Stomach pain   Indigestion   Burping   Hiccuping   Loss of appetite   Nausea and vomiting   Bloody or black vomit   Dark black, tarry stools   Diagnosis   The doctor will ask about your symptoms and medical history. A physical exam will be done. Tests may include:   Upper GI Series (Barium Swallow) a series of x-rays of the upper digestive system taken after drinking a barium solution    Endoscopy a thin, lighted tube inserted down the throat and into the stomach to examine the inside of the stomach    Biopsy removal of a sample of stomach tissue to examine in a lab    Blood, breath, or stool teststo check for infection with the bacteria  Helicobacter pylori        Upper GI Endoscopy        2011 [facility].   Treatment   Treatment may include:   Medications    These include:   Antacids   H  2  blockers   Proton pump inhibitors   Antibiotics to treat  Helicobacter pylori  infection   If you are diagnosed with gastritis, follow your doctor's instructions . Prevention   To help prevent gastritis:   Avoid alcohol . If you smoke, quit . Ask your doctor if any of the medications you are taking might be irritating your stomach. You might need to change your current medicines. You may need to take another medication to coat and protect your stomach lining. If you notice that certain foods are irritating, stop eating them. Spicy food may cause irritation. Some people feel better when they eat a bland diet. Last Reviewed: September 2010 Julio Cesar Alcala MD   Updated: 9/20/2010        Colonoscopy: What to Expect at [address] [place] [address]  After you have a colonoscopy, you will stay at the clinic for 1 to 2 hours until the medicines wear off. Then you can go home, but you will need to arrange for a ride. Your doctor will tell you when you can eat and do your other usual activities.   Your doctor will talk to you about when you will need your next colonoscopy. The results of your test and your risk for colorectal cancer will help your doctor decide how often you need to be checked.  After the test, you may be bloated or have gas pains. You may need to pass gas. If a biopsy was done or a polyp was removed, you may have streaks of blood in your stool (feces) for a few days.  This care sheet gives you a general idea about how long it will take for you to recover. But each person recovers at a different pace. Follow the steps below to get better as quickly as possible.  How can you care for yourself at home?  Activity  Rest as much as you need to after you go home.  You should be able to go back to your usual activities the day after the test.  Diet  Follow your doctor’s directions for eating.  Drink plenty of fluids (unless your doctor has told you not to) to replace the fluids that were lost during the colon prep.  Do not drink alcohol.  Medicines  If polyps were removed or a biopsy was done during the test, your doctor may tell you not to take aspirin or other anti-inflammatory medicines, such as ibuprofen (Advil, Motrin) and naproxen (Aleve), for a few days.  Other instructions  For your safety, you should not drive or operate machinery until the medicine effects are gone and you can think clearly. Your doctor may tell you not to drive or operate machinery until the day after your test.  Do not sign legal documents or make major decisions until the medicine effects are gone and you can think clearly. The anesthesia medicine can make it hard for you to fully understand what you are agreeing to.  Follow-up care is a key part of your treatment and safety. Be sure to make and go to all appointments, and call your doctor if you are having problems. It's also a good idea to know your test results and keep a list of the medicines you take.    Call your Doctor if you have any of the following:             Passing  blood rectally or vomiting blood (it may be red or black). Persistent nausea or vomiting. Severe abdominal or chest pain, not relieved by passing gas. Fever of 100 or more, chills or excessive sweating. Redness or swelling at the IV site. If you experience shortness of breath or severe chest pain, call 911. Where can you learn more? Go to [email or web address] and sign in to your [email or web address] account. Enter E264 in the [facility] box to learn more about Colonoscopy: What to Expect at Home.     If you do not have an account, please click on the Sign Up Now link. © 0304-9006 Healthwise, [employer]. Care instructions adapted under license by [facility]. This care instruction is for use with your licensed healthcare professional. If you have questions about a medical condition or this instruction, always ask your healthcare professional. [address] any warranty or liability for your use of this information. Content Version: 9.5.700702; Last Revised: February 20, 2013         Colon Polypectomy   (Colon Polyp Removal)       Definition   A colon polypectomy is the removal of polyps from the inside lining of the colon (large intestine). A polyp is a mass of tissue. Some types of polyps have the potential to develop into cancer. Most polyps can be removed during a colonoscopy or sigmoidoscopy . A Colon Polyp        2011 [facility].   Reasons for Procedure   The purpose of the surgery is to remove a polyp. It is done for cancer prevention. In rare cases, larger polyps can cause troublesome symptoms, such as rectal bleeding, abdominal pain, and bowel irregularities. A polyp removal will relieve these symptoms. Possible Complications   Complications are rare, but no procedure is completely free of risk.  If you are planning to have a polypectomy, your doctor will review a list of possible complications, which may include:   Damage to the colon wall   Bleeding   Infection   Adverse reaction to the sedative   Factors that may increase the risk of complications include:   Type, size, and location of the polyp   Patient factors, such as blood-clotting disorders, substance abuse, or other diseases (eg, obesity , diabetes )   What to Expect   Prior to Procedure    Your doctor will likely do the following:   Physical exam and health history   Review of medicines   Test your stool for hidden blood (called \"occult blood\")   X-rays an exam that uses small amounts of radiation to make a picture of the inside of the body   Barium enema x-ray exam that uses contrast to help better see the colon   Diagnostic colonoscopy or sigmoidoscopyexamination of the inside of the intestine with an endoscope   Your colon must be completely cleaned before the procedure. Any stool left in the intestine will block the view. This preparation may start several days before the procedure. Follow your doctor's instructions, which may include any of the following cleansing methods:   Enemas fluid introduced into the rectum to stimulate a bowel movement   Laxativesmedicines that cause you to have soft bowel movements   A clear-liquid diet   Oral cathartic medicinesa large container of fluid to drink, which stimulates a bowel movement   Leading up to your procedure:   Talk to your doctor about your medicines. You may be asked to stop taking some medicines up to one week before the procedure, like:   Anti-inflammatory drugs (eg, aspirin)   Blood thinners, like clopidogrel (Plavix) or warfarin (Coumadin)   Iron supplements or vitamins containing iron. The night before, eat a light meal. Do not eat or drink anything after midnight. Wear comfortable clothing. If you have diabetes, ask your doctor if you need to adjust your insulin dose. Arrange for a ride home after the procedure. Anesthesia    You will receive a sedative.  This will help you relax. You will be drowsy but awake. Description of the Procedure    You will be asked to lie on your side or on your back. A scope, a long flexible tube with a camera on the end, will be inserted through the anus. It will be slowly pushed through the rectum to the colon. The scope will also add air to open the colon. Using the scope, the doctor will locate the polyp. The polyp will be snipped off with a wire snare from the scope. In some cases, the polyp may be destroyed with an electric current. The electric current is also used to close the wound and stop bleeding. The polyps will then be removed for lab testing. When the doctor is finished, the scope will be slowly removed. For larger polyps, a laparoscopic surgical procedure may be needed. Special surgical tools will be inserted through small incisions in the abdomen. The tools will be used to locate and remove the polyp. How Long Will It Take? 30-60 minutes   Will It Hurt? The special cleaning solution, laxatives, and/or enemas often cause discomfort. During and following the procedure, there is little or no pain. You may feel pressure, bloating, and/or cramping because of the air passed into the colon. This discomfort will go away with the passing of gas. Your doctor may prescribe pain medicine. If not, you can take non-prescription pain relievers for discomfort. Post-procedure Care   At the [facility]    The polyps will be sent to a lab for testing. At Home    Expect a complete recovery within two weeks. To ensure a smooth recovery, be sure to follow your doctor's instructions , which may include: The sedative will make you drowsy. Do not drive, operate machinery, or make important decisions the day of the procedure. Return to your normal diet the same or next day. Avoid tea, coffee, cola drinks, alcohol, and spicy foods for at least 2-3 days following surgery. These can irritate the digestive system.    To speed healing, resume normal activities as soon as you feel able. Most people feel well enough by the next day. Ask your doctor when you can participate in any rigorous exercise. Ask your doctor about when it is safe to shower, bathe, or soak in water. You will be scheduled for a follow-up colonoscopy in the future. It will be important to check for recurrence of polyps. Your doctor will discuss the results with you either the day of surgery or the following day. Call Your Doctor   After arriving home, contact your doctor if any of the following occurs:   Signs of infection, including fever and chills   Redness, swelling, increasing pain, excessive bleeding, or discharge from the rectum (Up to cup of blood per day can be expected for up to 3-4 days following your polypectomy.)   Black, tarry stools   Severe abdominal pain   Hard, swollen abdomen   Inability to pass gas or stool   Cough , shortness of breath, chest pain, or severe nausea or vomiting   New, unexplained symptoms   In case of emergency,  CALL 911  . Last Reviewed: December 2010 Armenta Soulier, MD   Updated: 4/7/2011             Hemorrhoids: Care Instructions  Overview     Hemorrhoids are swollen veins that develop in the anal canal. Bleeding during bowel movements, itching, and rectal pain are the most common symptoms. Hemorrhoids can be uncomfortable at times, but rarely are they a serious problem. Most of the time, you can treat them with simple changes to your diet and bowel habits. These changes include eating more fiber and not straining to pass stools. Most hemorrhoids don't need surgery or other treatment unless they are very large and painful or bleed a lot. Follow-up care is a key part of your treatment and safety. Be sure to make and go to all appointments, and call your doctor if you are having problems. It's also a good idea to know your test results and keep a list of the medicines you take. How can you care for yourself at home?   Sit in a few inches of warm water (sitz bath) 3 times a day and after bowel movements. The warm water helps with pain and itching. Put ice on your anal area several times a day for 10 minutes at a time. Put a thin cloth between the ice and your skin. Follow this by placing a warm, wet towel on the area for another 10 to 20 minutes. Take pain medicines exactly as directed. If the doctor gave you a prescription medicine for pain, take it as prescribed. If you are not taking a prescription pain medicine, ask your doctor if you can take an over-the-counter medicine. Keep the anal area clean, but be gentle. Use water and a fragrance-free soap, or use baby wipes or medicated pads such as Tucks. Wear cotton underwear and loose clothing to decrease moisture in the anal area. Eat more fiber. Include foods such as whole-grain breads and cereals, raw vegetables, raw and dried fruits, and beans. Drink plenty of fluids. If you have kidney, heart, or liver disease and have to limit fluids, talk with your doctor before you increase the amount of fluids you drink. Use a stool softener that contains bran or psyllium. You can save money by buying bran or psyllium (available in bulk at most health food stores) and sprinkling it on foods or stirring it into fruit juice. Or you can use a product such as Metamucil or Hydrocil. Practice healthy bowel habits. Go to the bathroom as soon as you have the urge. Avoid straining to pass stools. Relax and give yourself time to let things happen naturally. Do not hold your breath while passing stools. Do not read while sitting on the toilet. Get off the toilet as soon as you have finished. Take your medicines exactly as prescribed. Call your doctor if you think you are having a problem with your medicine. When should you call for help? Call 911 anytime you think you may need emergency care. For example, call if:    You pass maroon or very bloody stools.    Call your doctor now or seek immediate medical care if:    You have increased pain. You have increased bleeding. Watch closely for changes in your health, and be sure to contact your doctor if:    Your symptoms have not improved after 3 or 4 days. Where can you learn more? Go to http://www.woods.com/ and enter F228 to learn more about \"Hemorrhoids: Care Instructions. \"  Current as of: June 6, 2022               Content Version: 13.5  © 2006-2022 [employer]. Care instructions adapted under license by [facility] ([facility]). If you have questions about a medical condition or this instruction, always ask your healthcare professional. [place] [address] any warranty or liability for your use of this information. Sedation or General Anesthesia, Adult  Care After  Refer to this sheet in the next 24 hours. These instructions provide you with information on caring for yourself after your procedure. Your caregiver may also give you more specific instructions. Your treatment has been planned according to current medical practices, but problems sometimes occur. Call your caregiver if you have any problems or questions after your procedure. HOME CARE INSTRUCTIONS   Do not participate in any activities that require you to be alert or coordinated. Do not:  Drive. Swim. Ride a bicycle. Operate heavy machinery. Rosie Lasso. Use power tools. Climb ladders. Work at [place]. Take a bath. Do not drink alcohol. Do not make any important decisions or sign legal documents. Stay with an adult. The first meal following your procedure should be light and small. Avoid solid foods if you feel sick to your stomach (nauseous) or if you throw up (vomit). Drink enough fluids to keep your urine clear or pale yellow. Only take your usual medicines or new medicines if your caregiver approves them. Only take over-the-counter or prescription medicines for pain, discomfort, or fever as directed by your caregiver.   Keep all follow-up appointments as directed by your caregiver. SEEK IMMEDIATE MEDICAL CARE IF:   You are not feeling normal or behaving normally after 24 hours. You have persistent nausea and vomiting. You are unable to drink fluids or eat food. You have difficulty urinating. You have difficulty breathing or speaking. You have blue or gray skin. There is difficulty waking or you cannot be woken up. You have heavy bleeding, redness, or a lot of swelling where the sedative or anesthesia entered your skin (intravenous site). You have a rash. MAKE SURE YOU:  Understand these instructions. Will watch your condition. Will get help right away if you are not doing well or get worse. Document Released: 12/18/2006 Document Revised: 06/18/2013 Document Reviewed: 04/17/2013  ROLANDO E. KARY [facility] Patient Information ©2013 [employer].

## 2023-01-04 NOTE — OP NOTE
EGD procedure note    DATE OF PROCEDURE: 1/4/2023     SURGEON: Frida Arzate MD  Facility: [facility]  ASSISTANT: None  Anesthesia: MAC  PREOPERATIVE DIAGNOSIS:   Dysphagia    Diagnosis:  No stricture or ring to explain the patient dysphagia  Mild irregularity to the Z-line biopsy gently were taken      Gastritis biopsies were taken        POSTOPERATIVE DIAGNOSIS: As described below    OPERATION: Upper GI endoscopy with Biopsy    ANESTHESIA: Moderate Sedation     ESTIMATED BLOOD LOSS: Less than 50 ml    COMPLICATIONS: None. SPECIMENS:  Was Obtained:     As above     HISTORY: The patient is a 61y.o. year old male with history of above preop diagnosis. I recommended esophagogastroduodenoscopy with possible biopsy and I explained the risk, benefits, expected outcome, and alternatives to the procedure. Risks included but are not limited to bleeding, infection, respiratory distress, hypotension, and perforation of the esophagus, stomach, or duodenum. Patient understands and is in agreement. The patient was counseled at length about the risks of doug Covid-19 during their perioperative period and any recovery window from their procedure. The patient was made aware that doug Covid-19  may worsen their prognosis for recovering from their procedure  and lend to a higher morbidity and/or mortality risk. All material risks, benefits, and reasonable alternatives including postponing the procedure were discussed. The patient does wish to proceed with the procedure at this time. PROCEDURE: The patient was given IV conscious sedation. The patient's SPO2 remained above 90% throughout the procedure. The gastroscope was inserted orally and advanced under direct vision through the esophagus, through the stomach, through the pylorus, and into the descending duodenum. Post sedation note : The patient's SPO2 remained above 90% throughout the procedure. the vital signs remained stable , and no immediate complication form the procedure noted, patient will be ready for d/c when criteria is met . Findings:    Retropharyngeal area was grossly normal appearing    Esophagus: abnormal: No stricture or ring to explain the patient dysphagia  Mild irregularity to the Z-line biopsy gently were taken        Stomach:    Gastritis biopsies were taken    Duodenum:     Descending: normal    Bulb: normal    The scope was removed and the patient tolerated the procedure well.      Recommendations/Plan:   F/U Biopsies  F/U In Office in 3-4 weeks  Discussed with the family    Electronically signed by Manas Huertas MD  on 1/4/2023 at 9:53 AM

## 2023-01-04 NOTE — ANESTHESIA POSTPROCEDURE EVALUATION
POST- ANESTHESIA EVALUATION       Pt Name: Selvin Vora  MRN: [id number]  YOB: 1945  Date of evaluation: 1/4/2023  Time:  1:44 PM      BP (!) 140/76   Pulse 62   Temp 98.1 °F (36.7 °C)   Resp 18   SpO2 95%      Consciousness Level  Awake  Cardiopulmonary Status  Stable  Pain Adequately Treated YES  Nausea / Vomiting  NO  Adequate Hydration  YES  Anesthesia Related Complications NONE      Electronically signed by Rosalee Galvan MD on 1/4/2023 at 1:44 PM       Department of Anesthesiology  Postprocedure Note    Patient: Selvin Vora  MRN: [id number]  [place]: 1945  Date of evaluation: 1/4/2023      Procedure Summary     Date: 01/04/23 Room / Location: [place] [address] / [facility]    Anesthesia Start: 0941 Anesthesia Stop: 9680    Procedures:       EGD BIOPSY (Esophagus)      COLONOSCOPY POLYPECTOMY HOT BIOPSY Diagnosis:       Dysphagia, unspecified type      (DYSPHAGIA)    Surgeons: Venita Griffin MD Responsible Provider: Rosalee Galvan MD    Anesthesia Type: general ASA Status: 3          Anesthesia Type: No value filed.     Cyndie Phase I:      Cyndie Phase II: Cyndie Score: 10      Anesthesia Post Evaluation

## 2023-01-04 NOTE — OP NOTE
PROCEDURE NOTE    DATE OF PROCEDURE: 1/4/2023    SURGEON: Jax Puri MD  Facility : [facility]  ASSISTANT: None  Anesthesia: MAC  PREOPERATIVE DIAGNOSIS:   Screening    POSTOPERATIVE DIAGNOSIS: as described below    OPERATION: Total colonoscopy     ANESTHESIA: Moderate Sedation    ESTIMATED BLOOD LOSS: less than 50     COMPLICATIONS: None. SPECIMENS:  Was Obtained:     Multiple polyps in the transverse and the right colon but in jar #1 we removed 5 on the largest of which was 12 mm    2 large polyps in the sigmoid colon around 25 cm from the anal verge, one of them has a stalk and removed easily and was measuring 1.5 cm, the other 1 was larger in flat, I went ahead and removed it in 2 pieces, burned the borders of the mucosal defect, and tattooed the area      Rectal 2 cm polyp  Also was removed in 2 pieces  And we tattooed the area      Diverticulosis    The prep was very poor    Internal and external hemorrhoids        HISTORY: The patient is a 65y.o. year old male with history of above preop diagnosis. I recommended colonoscopy with possible biopsy or polypectomy and I explained the risk, benefits, expected outcome, and alternatives to the procedure. Risks included but are not limited to bleeding, infection, respiratory distress, hypotension, and perforation of the colon and possibility of missing a lesion. The patient understands and is in agreement. The patient was counseled at length about the risks of doug Covid-19 during their perioperative period and any recovery window from their procedure. The patient was made aware that doug Covid-19  may worsen their prognosis for recovering from their procedure  and lend to a higher morbidity and/or mortality risk. All material risks, benefits, and reasonable alternatives including postponing the procedure were discussed. The patient does wish to proceed with the procedure at this time.        PROCEDURE: The patient was given IV conscious sedation. The patient's SPO2 remained above 90% throughout the procedure. The colonoscope was inserted per rectum and advanced under direct vision to the cecum without difficulty. Post sedation note : The patient's SPO2 remained above 90% throughout the procedure. the vital signs remained stable , and no immediate complication form the procedure noted, patient will be ready for d/c when criteria is met . The prep was poor. Findings:  Terminal ileum: normal      Multiple polyps in the transverse and the right colon but in jar #1 we removed 5 on the largest of which was 12 mm    2 large polyps in the sigmoid colon around 25 cm from the anal verge, one of them has a stalk and removed easily and was measuring 1.5 cm, the other 1 was larger in flat, I went ahead and removed it in 2 pieces, burned the borders of the mucosal defect, and tattooed the area      Rectal 2 cm polyp  Also was removed in 2 pieces  And we tattooed the area      Diverticulosis    The prep was very poor    Internal and external hemorrhoids        Withdrawal Time was (minutes): 28    The colon was decompressed and the scope was removed. The patient tolerated the procedure well.      Recommendations/Plan:   Lifestyle and dietary modifications as discussed  F/U Biopsies  F/U In OfficeYes  Discussed with the family  Repeat colonoscopy in3-6 months with 2-day prep to follow on the polypectomy sites and to make sure there is no tiny polyps left      Electronically signed by Manas Huertas MD  on 1/4/2023 at 10:58 AM

## 2023-01-04 NOTE — ANESTHESIA PRE PROCEDURE
Department of Anesthesiology  Preprocedure Note       Name:  Paulo Caruso   Age:  65 y.o.  :  1945                                          MRN:  [id number]         Date:  2023      Surgeon: Della Query):  Jagdish García MD    Procedure: Procedure(s):  EGD BIOPSY  COLONOSCOPY DIAGNOSTIC    Medications prior to admission:   Prior to Admission medications    Medication Sig Start Date End Date Taking?  Authorizing Provider   polyethylene glycol (GLYCOLAX) 17 GM/SCOOP powder Please follow instructions given to you by your provider 1/3/23   Jagdish García MD   lisinopril (PRINIVIL;ZESTRIL) 20 MG tablet Take 1 tablet by mouth once daily 22   Zechariah Georges MD   metFORMIN (GLUCOPHAGE-XR) 500 MG extended release tablet Take 1 tablet by mouth once daily with breakfast 22   Zechariah Georges MD   sodium-potassium-mag sulfate (SUPREP BOWEL PREP KIT) 17.5-3.13-1.6 GM/177ML SOLN solution Please follow instructions given to you by your provider 22   Jagdish García MD   bisacodyl 5 MG EC tablet Please follow instructions given to you by your provider 22   Jagdish García MD   amLODIPine (NORVASC) 5 MG tablet Take by mouth Daily with lunch 19   Historical Provider, MD   DULoxetine (CYMBALTA) 30 MG extended release capsule Take 1 capsule by mouth daily 22   Munir Ivory MD   vitamin D (ERGOCALCIFEROL) 1.25 MG (42877 UT) CAPS capsule Take 1 capsule by mouth once a week  Patient taking differently: Take 50,000 Units by mouth once a week Fridays 10/19/22   Munir Ivory MD   indomethacin (INDOCIN) 25 MG capsule Take 1 capsule by mouth 2 times daily (with meals) for 7 days 10/19/22 10/26/22  Munir Ivory MD   atorvastatin (LIPITOR) 80 MG tablet Take 1 tablet by mouth nightly 10/13/22   Celena Doyle MD   clopidogrel (PLAVIX) 75 MG tablet Take 1 tablet by mouth once daily 10/13/22   Celena Doyle MD   metoprolol succinate (TOPROL XL) 50 MG extended release tablet Take 1 tab daily 10/13/22   Stephane Galvez MD   tamsulosin (FLOMAX) 0.4 MG capsule Take 1 capsule by mouth daily Take in evening after meal 8/9/21   Evaristo Ryan MD   pantoprazole (PROTONIX) 40 MG tablet Take 1 tablet by mouth every morning (before breakfast) 2/15/21   Peggy Cuevas, APRN - CNP   Multiple Vitamins-Minerals (MULTIVITAMIN ADULTS 50+) TABS Take by mouth daily    Historical Provider, MD   nitroGLYCERIN (NITROSTAT) 0.4 MG SL tablet up to max of 3 total doses.  If no relief after 1 dose, call 911. 7/26/19   Jorge Yanes MD       Current medications:    Current Facility-Administered Medications   Medication Dose Route Frequency Provider Last Rate Last Admin    lidocaine PF 1 % injection 1 mL  1 mL IntraDERmal Once PRN Viktor Gomez MD        sodium chloride flush 0.9 % injection 5-40 mL  5-40 mL IntraVENous 2 times per day Viktor Gomez MD        sodium chloride flush 0.9 % injection 5-40 mL  5-40 mL IntraVENous PRN Viktor Gomez MD        0.9 % sodium chloride infusion   IntraVENous PRN Viktor Gomez MD        0.9 % sodium chloride infusion   IntraVENous Continuous Viktor Gomez MD           Allergies:  No Known Allergies    Problem List:    Patient Active Problem List   Diagnosis Code    Impaired fasting glucose R73.01    Patient overweight E66.3    Insomnia G47.00    Essential hypertension I10    OA (osteoarthritis) of knee M17.9    S/P total knee arthroplasty Z96.659    Lymphadenopathy of left cervical region R59.0    Carotid stenosis, left I65.22    H/O carotid endarterectomy Z98.890    Thyroid nodule E04.1    Ventral hernia without obstruction or gangrene K43.9    Myocardiopathy ([address] I42.9    Spontaneous intracranial hemorrhage (HCC) I62.9    Diplopia H53.2    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    Neuropathy G62.9    Coronary artery disease of native artery of native heart with stable angina pectoris ([address] I25.118    PVD (peripheral vascular disease) ([address] I73.9  Morbidly obese (HCC) E66.01    At high risk for falls Z91.81    Mixed hyperlipidemia E78.2    Prediabetes R73.03    Pericardial tamponade I31.4    Obesity (BMI 35.0-39.9 without comorbidity) E66.9    Hx of heart artery stent Z95.5    Type 2 diabetes mellitus without complication, without long-term current use of insulin (HCC) E11.9    Pharyngeal dysphagia R13.13    Weight loss R63.4    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 ([facility]) I63.442    Lumbar degenerative disc disease M51.36    Moderate episode of recurrent major depressive disorder ([facility]) F33.1    Chronic fatigue syndrome G93.32       Past Medical History:        Diagnosis Date    Acquired skull defect     Angina pectoris ([facility])     Anxiety     Anxiety     At high risk for falls     Ataxia following nontraumatic intracerebral hemorrhage     Bronchitis with asthma, acute 11/24/2015    CAD (coronary artery disease)     Carotid stenosis     Left    Carotid stenosis, left 02/02/2016    Chest pain     Compression of brainstem ([facility])     Diabetes mellitus ([facility])     borderline patient off metformin    Diplopia     Dysphagia     Gait instability     GERD (gastroesophageal reflux disease)     H/O heart artery stent     x5    Heart attack ([address] 08/05/2019    Hyperlipidemia     Hypertension     Hyponatremia     Intermittent lightheadedness     Myocardiopathy ([facility])     Neuropathy     Obesity     Osteoarthritis     both knees    Pericardial tamponade     Spontaneous intracranial hemorrhage (HCC)     Stroke, hemorrhagic ([address] 08/18/2019       Past Surgical History:        Procedure Laterality Date    CAROTID ENDARTERECTOMY Left 02/02/2016    COLONOSCOPY      CORONARY ANGIOPLASTY WITH STENT PLACEMENT      \"total 5 stents\"    CRANIOTOMY      IR BIOPSY THYROID PERC CORE NEEDLE  11/14/2022    IR BIOPSY THYROID PERC CORE NEEDLE 11/14/2022 [facility]    JOINT REPLACEMENT Left     knee  SHOULDER SURGERY Right     collar bone    TONSILLECTOMY AND ADENOIDECTOMY         Social History:    Social History     Tobacco Use    Smoking status: Never    Smokeless tobacco: Never   Substance Use Topics    Alcohol use: Not Currently                                Counseling given: Not Answered      Vital Signs (Current): There were no vitals filed for this visit. BP Readings from Last 3 Encounters:   11/23/22 128/72   10/20/22 (!) 147/79   10/13/22 128/64       NPO Status:                                                                                 BMI:   Wt Readings from Last 3 Encounters:   12/16/22 263 lb (119.3 kg)   11/23/22 258 lb 3.2 oz (117.1 kg)   10/20/22 257 lb (116.6 kg)     There is no height or weight on file to calculate BMI.    CBC:   Lab Results   Component Value Date/Time    WBC 5.1 10/17/2022 09:48 AM    RBC 4.43 10/17/2022 09:48 AM    HGB 13.7 10/17/2022 09:48 AM    HCT 40.0 10/17/2022 09:48 AM    MCV 90.4 10/17/2022 09:48 AM    RDW 13.2 10/17/2022 09:48 AM     10/17/2022 09:48 AM     LR    CMP:   Lab Results   Component Value Date/Time     10/17/2022 09:48 AM    K 4.4 10/17/2022 09:48 AM     10/17/2022 09:48 AM    CO2 27 10/17/2022 09:48 AM    BUN 14 10/17/2022 09:48 AM    CREATININE 0.61 10/17/2022 09:48 AM    GFRAA >60 08/04/2021 05:07 PM    LABGLOM >60 10/17/2022 09:48 AM    GLUCOSE 112 10/17/2022 09:48 AM    PROT 7.2 10/17/2022 09:48 AM    CALCIUM 9.8 10/17/2022 09:48 AM    BILITOT 0.8 10/17/2022 09:48 AM    ALKPHOS 111 10/17/2022 09:48 AM    AST 23 10/17/2022 09:48 AM    ALT 22 10/17/2022 09:48 AM       POC Tests: No results for input(s): POCGLU, POCNA, POCK, POCCL, POCBUN, POCHEMO, POCHCT in the last 72 hours.     Coags:   Lab Results   Component Value Date/Time    PROTIME 13.7 09/21/2021 10:55 AM    INR 1.0 09/21/2021 10:55 AM    APTT 35.6 09/21/2021 10:55 AM       HCG (If Applicable): No results found for: PREGTESTUR, PREGSERUM, HCG, HCGQUANT     ABGs: No results found for: PHART, PO2ART, TCF3HAX, QZR1MVA, BEART, V7TPZFFS     Type & Screen (If Applicable):  No results found for: LABABO, LABRH    Drug/Infectious Status (If Applicable):  No results found for: HIV, HEPCAB    COVID-19 Screening (If Applicable):   Lab Results   Component Value Date/Time    COVID19 DETECTED 01/06/2022 08:48 AM           Anesthesia Evaluation  Patient summary reviewed and Nursing notes reviewed no history of anesthetic complications:   Airway: Mallampati: III  TM distance: >3 FB   Neck ROM: full  Mouth opening: > = 3 FB   Dental:    (+) upper dentures and lower dentures      Pulmonary:normal exam  breath sounds clear to auscultation  (+) asthma: allergic asthma,     (-) pneumonia, COPD, shortness of breath, recent URI, sleep apnea, rhonchi, wheezes, rales, stridor, not a current smoker and no decreased breath sounds          Patient did not smoke on day of surgery. Cardiovascular:  Exercise tolerance: good (>4 METS),   (+) hypertension: no interval change, angina: no interval change, past MI: no interval change, CAD:,     (-) pacemaker, valvular problems/murmurs, CABG/stent, dysrhythmias,  CHF, orthopnea, PND,  REYNOLDS, murmur, weak pulses,  friction rub, systolic click, carotid bruit,  JVD, peripheral edema, no pulmonary hypertension and no hyperlipidemia    ECG reviewed  Rhythm: regular  Rate: normal  Echocardiogram reviewed         Beta Blocker:  Dose within 24 Hrs         Neuro/Psych:   (+) CVA:, psychiatric history: stable with treatmentdepression/anxiety    (-) seizures, neuromuscular disease, TIA and headaches           GI/Hepatic/Renal:   (+) GERD: no interval change,      (-) hiatal hernia, PUD, hepatitis, liver disease, no renal disease, bowel prep and no morbid obesity       Endo/Other:    (+) DiabetesType II DM, no interval change, , no malignancy/cancer.     (-) hypothyroidism, hyperthyroidism, blood dyscrasia, arthritis, no electrolyte abnormalities, no malignancy/cancer               Abdominal:             Vascular: negative vascular ROS. - PVD and DVT. Other Findings:           Anesthesia Plan      general     ASA 3       Induction: intravenous. MIPS: Postoperative opioids intended and Prophylactic antiemetics administered. Anesthetic plan and risks discussed with patient. Plan discussed with CRNA.                     Lindsey Stringer MD   1/4/2023

## 2023-01-05 LAB — SURGICAL PATHOLOGY REPORT: NORMAL

## 2023-01-10 ENCOUNTER — OFFICE VISIT (OUTPATIENT)
Dept: UROLOGY | Age: 78
End: 2023-01-10
Payer: MEDICARE

## 2023-01-10 VITALS
DIASTOLIC BLOOD PRESSURE: 63 MMHG | TEMPERATURE: 98.6 F | BODY MASS INDEX: 37.65 KG/M2 | WEIGHT: 263 LBS | HEART RATE: 72 BPM | HEIGHT: 70 IN | SYSTOLIC BLOOD PRESSURE: 141 MMHG

## 2023-01-10 DIAGNOSIS — N40.1 BPH WITH OBSTRUCTION/LOWER URINARY TRACT SYMPTOMS: Primary | ICD-10-CM

## 2023-01-10 DIAGNOSIS — R39.12 WEAK URINARY STREAM: ICD-10-CM

## 2023-01-10 DIAGNOSIS — Z80.42 FAMILY HISTORY OF PROSTATE CANCER: ICD-10-CM

## 2023-01-10 DIAGNOSIS — Z12.5 PROSTATE CANCER SCREENING: ICD-10-CM

## 2023-01-10 DIAGNOSIS — N13.8 BPH WITH OBSTRUCTION/LOWER URINARY TRACT SYMPTOMS: Primary | ICD-10-CM

## 2023-01-10 PROCEDURE — 3077F SYST BP >= 140 MM HG: CPT | Performed by: NURSE PRACTITIONER

## 2023-01-10 PROCEDURE — G8417 CALC BMI ABV UP PARAM F/U: HCPCS | Performed by: NURSE PRACTITIONER

## 2023-01-10 PROCEDURE — 99213 OFFICE O/P EST LOW 20 MIN: CPT | Performed by: NURSE PRACTITIONER

## 2023-01-10 PROCEDURE — 3078F DIAST BP <80 MM HG: CPT | Performed by: NURSE PRACTITIONER

## 2023-01-10 PROCEDURE — G8427 DOCREV CUR MEDS BY ELIG CLIN: HCPCS | Performed by: NURSE PRACTITIONER

## 2023-01-10 PROCEDURE — G8484 FLU IMMUNIZE NO ADMIN: HCPCS | Performed by: NURSE PRACTITIONER

## 2023-01-10 PROCEDURE — 1123F ACP DISCUSS/DSCN MKR DOCD: CPT | Performed by: NURSE PRACTITIONER

## 2023-01-10 PROCEDURE — 51798 US URINE CAPACITY MEASURE: CPT | Performed by: NURSE PRACTITIONER

## 2023-01-10 PROCEDURE — 1036F TOBACCO NON-USER: CPT | Performed by: NURSE PRACTITIONER

## 2023-01-10 RX ORDER — TAMSULOSIN HYDROCHLORIDE 0.4 MG/1
0.4 CAPSULE ORAL DAILY
Qty: 30 CAPSULE | Refills: 5 | Status: SHIPPED | OUTPATIENT
Start: 2023-01-10

## 2023-01-10 ASSESSMENT — ENCOUNTER SYMPTOMS
NAUSEA: 0
VOMITING: 0
WHEEZING: 0
COUGH: 0
EYE REDNESS: 0
CONSTIPATION: 0
BACK PAIN: 0
EYE PAIN: 0
SHORTNESS OF BREATH: 0
ABDOMINAL PAIN: 0
DIARRHEA: 0

## 2023-01-10 NOTE — PROGRESS NOTES
Review of Systems   Constitutional:  Negative for chills, fatigue and fever. Eyes:  Negative for pain, redness and visual disturbance. Respiratory:  Negative for cough, shortness of breath and wheezing. Cardiovascular:  Negative for chest pain and leg swelling. Gastrointestinal:  Negative for abdominal pain, constipation, diarrhea, nausea and vomiting. Genitourinary:  Negative for difficulty urinating, dysuria, flank pain, frequency, hematuria, scrotal swelling, testicular pain and urgency (Has the urge to urinate, then it stops). Musculoskeletal:  Negative for back pain, joint swelling and myalgias. Skin:  Negative for rash and wound. Neurological:  Negative for dizziness, tremors, weakness and numbness. Hematological:  Does not bruise/bleed easily.

## 2023-01-10 NOTE — PROGRESS NOTES
[address] [facility] [id number]  Dept: 92 [address] Mina [facility] Urology Office Note - Established    Patient:  Sally Islas  YOB: 1945  Date: 1/10/2023    The patient is a 65 y.o. male who presents todayfor evaluation of the following problems:   Chief Complaint   Patient presents with    Other     Troubles urinating        HPI  Patient is presenting for difficulty with urination. He did have similar complaints in the past, see 8/9/21 office visit. He was started on flomax in the past but admits non-compliance. Symptoms started about 2 weeks ago. AUA SS is 4. Urinary frequency, every 30mins -2 hours. Urine stream is weak and intermittent. He does not feel he empties bladder entirely. He denies any UTI symptoms. Denies perineal discomfort. PSA 3/2021= 1.22, father had prostate ca. Summary of old records: N/A    Additional History: N/A    Procedures Today: N/A    Urinalysis today:  No results found for this visit on 01/10/23. Last several PSA's:  Lab Results   Component Value Date    PSA 1.22 03/25/2021    PSA 0.95 07/27/2018    PSA 1.24 03/14/2015     Last total testosterone:  No results found for: TESTOSTERONE    AUA Symptom Score (1/10/2023):   INCOMPLETE EMPTYING: How often have you had the sensation of not emptying your bladder?: Less than 1 to 5 times  FREQUENCY: How often do you have to urinate less than every two hours?: Less than 1 to 5 times  INTERMITTENCY: How often have you found you stopped and started again several times when you urinated?: Not at all  URGENCY: How often have you found it difficult to postpone urination?: Not at all  WEAK STREAM: How often have you had a weak urinary stream?: Not at all  STRAINING: How often have you had to strain to start  urination?: Not at all  NOCTURIA: How many times did you typically get up at night to uriniate?: 2 Times  TOTAL I-PSS SCORE[de-identified] 4  How would you feel if you were to spend the rest of your life with your urinary condition?: Unhappy    Last BUN and creatinine:  Lab Results   Component Value Date    BUN 14 10/17/2022     Lab Results   Component Value Date    CREATININE 0.61 (L) 10/17/2022       Additional Lab/Culture results: none    Imaging Reviewed during this Office Visit: none  (results were independently reviewed by physician and radiology report verified)    PAST MEDICAL, FAMILY AND SOCIAL HISTORY UPDATE:  Past Medical History:   Diagnosis Date    Acquired skull defect     Angina pectoris ([address]     Anxiety     Anxiety     At high risk for falls     Ataxia following nontraumatic intracerebral hemorrhage     Bronchitis with asthma, acute 11/24/2015    CAD (coronary artery disease)     Carotid stenosis     Left    Carotid stenosis, left 02/02/2016    Chest pain     Compression of brainstem (HCC)     Diabetes mellitus ([address]     borderline patient off metformin    Diplopia     Dysphagia     Gait instability     GERD (gastroesophageal reflux disease)     H/O heart artery stent     x5    Heart attack ([address] 08/05/2019    Hyperlipidemia     Hypertension     Hyponatremia     Intermittent lightheadedness     Myocardiopathy (HCC)     Neuropathy     Obesity     Osteoarthritis     both knees    Pericardial tamponade     Spontaneous intracranial hemorrhage ([address]     Stroke, hemorrhagic ([address] 08/18/2019     Past Surgical History:   Procedure Laterality Date    CAROTID ENDARTERECTOMY Left 02/02/2016    COLONOSCOPY      COLONOSCOPY N/A 1/4/2023    COLONOSCOPY POLYPECTOMY SNARE/COLD BIOPSY performed by Ace Calderon MD at [address]      \"total 5 stents\"    CRANIOTOMY      IR BIOPSY THYROID PERC CORE NEEDLE  11/14/2022    IR BIOPSY THYROID PERC CORE NEEDLE 11/14/2022 [facility]    JOINT REPLACEMENT Left     knee    SHOULDER SURGERY Right     collar bone    TONSILLECTOMY AND ADENOIDECTOMY UPPER GASTROINTESTINAL ENDOSCOPY N/A 1/4/2023    EGD BIOPSY performed by Vik Pham MD at [facility] AND [facility]     Family History   Problem Relation Age of Onset    Cancer Mother     Cancer Father     Liver Cancer Brother     Esophageal Cancer Brother      Outpatient Medications Marked as Taking for the 1/10/23 encounter (Office Visit) with MARKUS Murry CNP   Medication Sig Dispense Refill    lisinopril (PRINIVIL;ZESTRIL) 20 MG tablet Take 1 tablet by mouth once daily 90 tablet 0    metFORMIN (GLUCOPHAGE-XR) 500 MG extended release tablet Take 1 tablet by mouth once daily with breakfast 90 tablet 0    DULoxetine (CYMBALTA) 30 MG extended release capsule Take 1 capsule by mouth daily 90 capsule 1    vitamin D (ERGOCALCIFEROL) 1.25 MG (91143 UT) CAPS capsule Take 1 capsule by mouth once a week 12 capsule 1    atorvastatin (LIPITOR) 80 MG tablet Take 1 tablet by mouth nightly 90 tablet 0    clopidogrel (PLAVIX) 75 MG tablet Take 1 tablet by mouth once daily 90 tablet 0    metoprolol succinate (TOPROL XL) 50 MG extended release tablet Take 1 tab daily 90 tablet 1    pantoprazole (PROTONIX) 40 MG tablet Take 1 tablet by mouth every morning (before breakfast) 90 tablet 0    Multiple Vitamins-Minerals (MULTIVITAMIN ADULTS 50+) TABS Take by mouth daily      nitroGLYCERIN (NITROSTAT) 0.4 MG SL tablet up to max of 3 total doses. If no relief after 1 dose, call 911. 25 tablet 3       Patient has no known allergies. Social History     Tobacco Use   Smoking Status Never   Smokeless Tobacco Never     (Ifpatient a smoker, smoking cessation counseling offered)    Social History     Substance and Sexual Activity   Alcohol Use Not Currently       REVIEW OF SYSTEMS:  Review of Systems    Physical Exam:      Vitals:    01/10/23 1355   BP: (!) 141/63   Pulse: 72   Temp: 98.6 °F (37 °C)     Body mass index is 37.74 kg/m². Patient is a 65 y.o. male in no acute distress and alert and oriented to person, place and time.   Physical Exam  Constitutional: Patient in no acute distress. Neuro: Alert and oriented to person, place and time. Slurred speech, baseline since stroke per pt and wife. Psych: Mood normal, affect normal  Skin: No rash noted  Lungs: Respiratory effort is normal  Cardiovascular: Warm & Pink  Abdomen: Soft, non-tender, non-distended   Bladder non-tender and not distended. PVR 97cc  Musculoskeletal: walker      Assessment and Plan      1. BPH with obstruction/lower urinary tract symptoms    2. Weak urinary stream    3. Family history of prostate cancer    4. Prostate cancer screening           Plan:   LUTS most likely related to BPH. PVR 97cc. He has been non-compliant with flomax in the past.   Discussed importance of medication and symptom management. Will reorder flomax, start today. Discussed timed voiding every 2 hours, double voiding. Avoid irritants as discussed. PSA normal 2 years ago, he will think about repeating given fhx.  F/u 4 weeks with PVR  If no better, Cysto. Return in about 4 weeks (around 2/7/2023) for PVR. Prescriptions Ordered:  Orders Placed This Encounter   Medications    tamsulosin (FLOMAX) 0.4 MG capsule     Sig: Take 1 capsule by mouth daily Take in evening after meal     Dispense:  30 capsule     Refill:  5     Orders Placed:  Orders Placed This Encounter   Procedures    PSA Screening     Standing Status:   Future     Standing Expiration Date:   1/10/2024    TN OMARI POST-VOIDING RESIDUAL URINE&/BLADDER CAP           MARKUS Booth CNP    Reviewed and agree with the ROS entered by the MA.

## 2023-01-17 NOTE — PROGRESS NOTES
GI CLINIC FOLLOW UP    INTERVAL HISTORY:   No referring provider defined for this encounter. Chief Complaint   Patient presents with    Dysphagia     Patient is f/u on EGD/colon. He states he is still having trouble swallowing. He states when he blows his nose there is usually food in there. HISTORY OF PRESENT ILLNESS: Scarlett Ashley is a 65 y.o. male , referred for evaluation of dysphagia, abdominal pain weight loss which has resolved    Patient is here for follow-up please refer to the previous notes we have seen him for the above schedule him for EGD and colonoscopy, results are as below  The patient still has some dysphagia but mainly in the oropharyngeal area,  His colonoscopy was poorly prepped and he was supposed to repeated with 2 days prep which he did not do yet. Denied any lower GI symptoms    Labs from October 2022 showed normal liver enzymes and normal CBC  CAT scan from August 2021 showed diverticulosis and calcification of the abdominal arteries, no active acute issues    The result of his scopes are as below      Findings:       Retropharyngeal area was grossly normal appearing       Esophagus: abnormal: No stricture or ring to explain the patient dysphagia   Mild irregularity to the Z-line biopsy gently were taken        Stomach:       Gastritis biopsies were taken       Duodenum:     Descending: normal     Bulb: normal       The scope was removed and the patient tolerated the procedure well. Recommendations/Plan:   1. F/U Biopsies   2. F/U In Office in 3-4 weeks   3. Discussed with the family   The prep was poor.          Findings:   Terminal ileum: normal        Multiple polyps in the transverse and the right colon but in jar #1 we removed 5 on the largest of which was 12 mm       2 large polyps in the sigmoid colon around 25 cm from the anal verge, one of them has a stalk and removed easily and was measuring 1.5 cm, the other 1 was larger in flat, I went ahead and removed it in 2 pieces, burned the borders of the mucosal defect, and tattooed the area        Rectal 2 cm polyp   Also was removed in 2 pieces   And we tattooed the area        Diverticulosis       The prep was very poor       Internal and external hemorrhoids        Withdrawal Time was (minutes): 28       The colon was decompressed and the scope was removed. The patient tolerated the procedure well. Recommendations/Plan:   1. Lifestyle and dietary modifications as discussed   2. F/U Biopsies   3. F/U In OfficeYes   4. Discussed with the family   5. Repeat colonoscopy in3-6 months with 2-day prep to follow on the polypectomy sites and to make sure there is no tiny polyps left   6. Electronically signed by Warren Tony MD  on 1/4/2023 at 10:58 AM     -- Diagnosis --   A. STOMACH, BIOPSY:GASTRIC OXYNTIC GASTRIC MUCOSA WITH MILD CHRONIC   INACTIVE GASTRITIS. NO HELICOBACTER ORGANISMS BY H&E STAIN,   INTESTINAL METAPLASIA, DYSPLASIA OR CARCINOMA. B.  GE JUNCTION, BIOPSY:SQUAMOCOLUMNAR JUNCTIONAL MUCOSA WITH SLIGHT   REACTIVE CHANGE. NO INTESTINAL METAPLASIA, DYSPLASIA OR CARCINOMA. C.  POLYP, RIGHT COLON, POLYPECTOMY:FRAGMENTS OF TRADITIONAL SERRATED   ADENOMA. NO HIGH-GRADE DYSPLASIA OR CARCINOMA. D.  POLYP, SIGMOID COLON, POLYPECTOMY:FRAGMENTS OF TUBULOVILLOUS   ADENOMA. NO HIGH-GRADE DYSPLASIA OR CARCINOMA. Delberta Alexey, RECTUM, POLYPECTOMY:PENDING EXPERT CONSULTATION. SEE   COMMENT     -- Diagnosis Comment --   PART E HAS BEEN SENT TO [facility] FOR FURTHER EVALUATION   OF INVASION. THE SPECIMEN SHOWS ATLEAST TUBULAR ADENOMA WITH HIGH   GRADE DYSPLASIA. POSSIBILITY OF INVASION CANNOT BE EXCLUDED. THESE   FINDINGS ARE AGREEABLE ON INTRADEPARTMENTAL CONSULTATION WITH JACIEL DAVIS, JOVAN HILL AND Natasha Jhaveri.      Samanta Paul   **Electronically Signed Out**         ag/1/5/2023                  Past Medical,Family, and Social History reviewed and does contribute to the patient presentingcondition. Patient's PMH/PSH,SH,PSYCH Hx, MEDs, ALLERGIES, and ROS were all reviewed and updated in the appropriate sections.     PAST MEDICAL HISTORY:  Past Medical History:   Diagnosis Date    Acquired skull defect     Angina pectoris ([address]     Anxiety     Anxiety     At high risk for falls     Ataxia following nontraumatic intracerebral hemorrhage     Bronchitis with asthma, acute 11/24/2015    CAD (coronary artery disease)     Carotid stenosis     Left    Carotid stenosis, left 02/02/2016    Chest pain     Compression of brainstem (HCC)     Diabetes mellitus (HCC)     borderline patient off metformin    Diplopia     Dysphagia     Gait instability     GERD (gastroesophageal reflux disease)     H/O heart artery stent     x5    Heart attack ([address] 08/05/2019    Hyperlipidemia     Hypertension     Hyponatremia     Intermittent lightheadedness     Myocardiopathy (HCC)     Neuropathy     Obesity     Osteoarthritis     both knees    Pericardial tamponade     Spontaneous intracranial hemorrhage ([address]     Stroke, hemorrhagic ([address] 08/18/2019       Past Surgical History:   Procedure Laterality Date    CAROTID ENDARTERECTOMY Left 02/02/2016    COLONOSCOPY      COLONOSCOPY N/A 1/4/2023    COLONOSCOPY POLYPECTOMY SNARE/COLD BIOPSY performed by Lorena Soliz MD at [address] [place] [address]      \"total 5 stents\"    CRANIOTOMY      IR BIOPSY THYROID PERC CORE NEEDLE  11/14/2022    IR BIOPSY THYROID PERC CORE NEEDLE 11/14/2022 [facility] PROCED[facility]    JOINT REPLACEMENT Left     knee    SHOULDER SURGERY Right     collar bone    TONSILLECTOMY AND ADENOIDECTOMY      UPPER GASTROINTESTINAL ENDOSCOPY N/A 1/4/2023    EGD BIOPSY performed by Lorena Soliz MD at [address]:    Current Outpatient Medications:     tamsulosin (FLOMAX) 0.4 MG capsule, Take 1 capsule by mouth daily Take in evening after meal (Patient not taking: Reported on 1/10/2023), Disp: 30 capsule, Rfl: 5    lisinopril (PRINIVIL;ZESTRIL) 20 MG tablet, Take 1 tablet by mouth once daily, Disp: 90 tablet, Rfl: 0    metFORMIN (GLUCOPHAGE-XR) 500 MG extended release tablet, Take 1 tablet by mouth once daily with breakfast, Disp: 90 tablet, Rfl: 0    amLODIPine (NORVASC) 5 MG tablet, Take by mouth Daily with lunch, Disp: , Rfl:     DULoxetine (CYMBALTA) 30 MG extended release capsule, Take 1 capsule by mouth daily, Disp: 90 capsule, Rfl: 1    vitamin D (ERGOCALCIFEROL) 1.25 MG (73492 UT) CAPS capsule, Take 1 capsule by mouth once a week, Disp: 12 capsule, Rfl: 1    indomethacin (INDOCIN) 25 MG capsule, Take 1 capsule by mouth 2 times daily (with meals) for 7 days, Disp: 30 capsule, Rfl: 0    atorvastatin (LIPITOR) 80 MG tablet, Take 1 tablet by mouth nightly, Disp: 90 tablet, Rfl: 0    clopidogrel (PLAVIX) 75 MG tablet, Take 1 tablet by mouth once daily, Disp: 90 tablet, Rfl: 0    metoprolol succinate (TOPROL XL) 50 MG extended release tablet, Take 1 tab daily, Disp: 90 tablet, Rfl: 1    pantoprazole (PROTONIX) 40 MG tablet, Take 1 tablet by mouth every morning (before breakfast), Disp: 90 tablet, Rfl: 0    Multiple Vitamins-Minerals (MULTIVITAMIN ADULTS 50+) TABS, Take by mouth daily, Disp: , Rfl:     nitroGLYCERIN (NITROSTAT) 0.4 MG SL tablet, up to max of 3 total doses.  If no relief after 1 dose, call 911., Disp: 25 tablet, Rfl: 3    ALLERGIES:   No Known Allergies    FAMILY HISTORY:       Problem Relation Age of Onset    Cancer Mother     Cancer Father     Liver Cancer Brother     Esophageal Cancer Brother          SOCIAL HISTORY:   Social History     Socioeconomic History    Marital status:      Spouse name: Not on file    Number of children: Not on file    Years of education: Not on file    Highest education level: Not on file   Occupational History    Not on file   Tobacco Use    Smoking status: Never    Smokeless tobacco: Never   Vaping Use    Vaping Use: Never used   Substance and Sexual Activity Alcohol use: Not Currently    Drug use: No    Sexual activity: Not on file   Other Topics Concern    Not on file   Social History Narrative    Not on file     Social Determinants of Health     Financial Resource Strain: High Risk    Difficulty of Paying Living Expenses: Hard   Food Insecurity: Food Insecurity Present    Worried About Running Out of Food in the Last Year: Sometimes true    Ran Out of Food in the Last Year: Sometimes true   Transportation Needs: Not on file   Physical Activity: Insufficiently Active    Days of Exercise per Week: 7 days    Minutes of Exercise per Session: 10 min   Stress: Not on file   Social Connections: Not on file   Intimate Partner Violence: Not on file   Housing Stability: Not on file       REVIEW OF SYSTEMS: A 12-point review of systemswas obtained and pertinent positives and negatives were enumerated above in the history of present illness. All other reviewed systems / symptoms were negative. Review of Systems   Constitutional:  Negative for appetite change, fatigue and unexpected weight change. HENT:  Positive for trouble swallowing. Respiratory:  Negative for cough, choking, shortness of breath and wheezing. Cardiovascular:  Negative for chest pain, palpitations and leg swelling. Gastrointestinal:  Positive for abdominal pain. Negative for abdominal distention, anal bleeding, blood in stool, constipation, diarrhea, nausea, rectal pain and vomiting. Genitourinary:  Negative for difficulty urinating. Allergic/Immunologic: Negative for environmental allergies and food allergies. Neurological:  Negative for dizziness, weakness, light-headedness, numbness and headaches. Hematological:  Does not bruise/bleed easily. Psychiatric/Behavioral:  Negative for sleep disturbance. The patient is not nervous/anxious.           LABORATORY DATA: Reviewed  Lab Results   Component Value Date    WBC 5.1 10/17/2022    HGB 13.7 10/17/2022    HCT 40.0 (L) 10/17/2022    MCV 90.4 10/17/2022     10/17/2022     10/17/2022    K 4.4 10/17/2022     10/17/2022    CO2 27 10/17/2022    BUN 14 10/17/2022    CREATININE 0.61 (L) 10/17/2022    LABALBU 4.5 10/17/2022    BILITOT 0.8 10/17/2022    ALKPHOS 111 10/17/2022    AST 23 10/17/2022    ALT 22 10/17/2022    INR 1.0 09/21/2021         Lab Results   Component Value Date    RBC 4.43 (L) 10/17/2022    HGB 13.7 10/17/2022    MCV 90.4 10/17/2022    MCH 31.1 10/17/2022    MCHC 34.4 10/17/2022    RDW 13.2 10/17/2022    MPV 8.9 10/17/2022    BASOPCT 0 10/17/2022    LYMPHSABS 1.60 10/17/2022    MONOSABS 0.40 10/17/2022    NEUTROABS 3.00 10/17/2022    EOSABS 0.10 10/17/2022    BASOSABS 0.00 10/17/2022         DIAGNOSTIC TESTING:     No results found. PHYSICAL EXAMINATION: Vital signs reviewed per the nursing documentation. /74   Pulse 66   Temp 97.3 °F (36.3 °C)   Wt 260 lb (117.9 kg)   BMI 37.31 kg/m²   Body mass index is 37.31 kg/m². Physical Exam  Vitals and nursing note reviewed. Constitutional:       General: He is not in acute distress. Appearance: He is well-developed. He is not diaphoretic. HENT:      Head: Normocephalic and atraumatic. Eyes:      General: No scleral icterus. Pupils: Pupils are equal, round, and reactive to light. Neck:      Thyroid: No thyromegaly. Vascular: No JVD. Trachea: No tracheal deviation. Cardiovascular:      Rate and Rhythm: Normal rate and regular rhythm. Heart sounds: Normal heart sounds. No murmur heard. Pulmonary:      Effort: Pulmonary effort is normal. No respiratory distress. Breath sounds: Normal breath sounds. No wheezing. Abdominal:      General: Bowel sounds are normal. There is no distension. Palpations: Abdomen is soft. There is no mass. Tenderness: There is no abdominal tenderness. There is no guarding or rebound. Musculoskeletal:         General: No tenderness.       Cervical back: Normal range of motion and neck supple. Comments: Left sided waekness   Skin:     General: Skin is warm. Coloration: Skin is not pale. Findings: No erythema or rash. Comments: He is not diaphoretic   Neurological:      Mental Status: He is alert and oriented to person, place, and time. Deep Tendon Reflexes: Reflexes are normal and symmetric. Psychiatric:         Behavior: Behavior normal.         Thought Content: Thought content normal.         Judgment: Judgment normal.         IMPRESSION: Mr. Shayan Hinton is a 65 y.o. male with      Diagnosis Orders   1. Esophageal dysphagia  SLP videofluoroscopic swallow study      2. Adenoma of sigmoid colon  COLONOSCOPY W/ OR W/O BIOPSY        Patient had a stroke and he had a left-sided weakness we will make sure he does not have issues with oropharyngeal dysphagia from that with modified barium swallow test  As the EGD was not very significant  Will proceed with the colonoscopy repeat with 2-day prep    Diet/life style/natural hx /complication of the dx were all explained in details   Past medical, past surgical, social history, psychiatric history, medications or allergies, all reviewed and  updated    Thank you for allowing me to participate in the care of Mr. Shayan Hinton. For any further questions please do not hesitate to contact me. I have reviewed and agree with the ROS entered by the MA/RN. Note is dictated utilizing voice recognition software. Unfortunately this leads to occasional typographical errors. Please contact our office if you have any questions.       Nani Lloyd MD  [facility]  O: #[phone]

## 2023-01-18 ENCOUNTER — OFFICE VISIT (OUTPATIENT)
Dept: GASTROENTEROLOGY | Age: 78
End: 2023-01-18
Payer: MEDICARE

## 2023-01-18 VITALS
BODY MASS INDEX: 37.31 KG/M2 | SYSTOLIC BLOOD PRESSURE: 133 MMHG | DIASTOLIC BLOOD PRESSURE: 74 MMHG | HEART RATE: 66 BPM | TEMPERATURE: 97.3 F | WEIGHT: 260 LBS

## 2023-01-18 DIAGNOSIS — E78.2 MIXED HYPERLIPIDEMIA: ICD-10-CM

## 2023-01-18 DIAGNOSIS — R13.19 ESOPHAGEAL DYSPHAGIA: Primary | ICD-10-CM

## 2023-01-18 DIAGNOSIS — D12.5 ADENOMA OF SIGMOID COLON: ICD-10-CM

## 2023-01-18 PROCEDURE — G8417 CALC BMI ABV UP PARAM F/U: HCPCS | Performed by: INTERNAL MEDICINE

## 2023-01-18 PROCEDURE — 3075F SYST BP GE 130 - 139MM HG: CPT | Performed by: INTERNAL MEDICINE

## 2023-01-18 PROCEDURE — 99214 OFFICE O/P EST MOD 30 MIN: CPT | Performed by: INTERNAL MEDICINE

## 2023-01-18 PROCEDURE — 1123F ACP DISCUSS/DSCN MKR DOCD: CPT | Performed by: INTERNAL MEDICINE

## 2023-01-18 PROCEDURE — 1036F TOBACCO NON-USER: CPT | Performed by: INTERNAL MEDICINE

## 2023-01-18 PROCEDURE — G8427 DOCREV CUR MEDS BY ELIG CLIN: HCPCS | Performed by: INTERNAL MEDICINE

## 2023-01-18 PROCEDURE — G8484 FLU IMMUNIZE NO ADMIN: HCPCS | Performed by: INTERNAL MEDICINE

## 2023-01-18 PROCEDURE — 3078F DIAST BP <80 MM HG: CPT | Performed by: INTERNAL MEDICINE

## 2023-01-18 ASSESSMENT — ENCOUNTER SYMPTOMS
CONSTIPATION: 0
SHORTNESS OF BREATH: 0
ABDOMINAL DISTENTION: 0
BLOOD IN STOOL: 0
DIARRHEA: 0
TROUBLE SWALLOWING: 1
CHOKING: 0
RECTAL PAIN: 0
WHEEZING: 0
ABDOMINAL PAIN: 1
VOMITING: 0
NAUSEA: 0
COUGH: 0
ANAL BLEEDING: 0

## 2023-01-19 DIAGNOSIS — Z12.11 COLON CANCER SCREENING: Primary | ICD-10-CM

## 2023-01-19 RX ORDER — ATORVASTATIN CALCIUM 80 MG/1
80 TABLET, FILM COATED ORAL NIGHTLY
Qty: 90 TABLET | Refills: 0 | OUTPATIENT
Start: 2023-01-19

## 2023-01-20 ENCOUNTER — TELEPHONE (OUTPATIENT)
Dept: GASTROENTEROLOGY | Age: 78
End: 2023-01-20

## 2023-01-20 RX ORDER — SODIUM, POTASSIUM,MAG SULFATES 17.5-3.13G
SOLUTION, RECONSTITUTED, ORAL ORAL
Qty: 177 ML | Refills: 0 | Status: SHIPPED | OUTPATIENT
Start: 2023-01-20

## 2023-01-20 RX ORDER — BISACODYL 5 MG
TABLET, DELAYED RELEASE (ENTERIC COATED) ORAL
Qty: 4 TABLET | Refills: 0 | Status: SHIPPED | OUTPATIENT
Start: 2023-01-20

## 2023-01-20 NOTE — TELEPHONE ENCOUNTER
Writer rec'd same encounter from cardio 11/4/22 OV that is giving pt same clearance to hold plavix x 5 days, cardio did not need to see pt for 6 mo if he was doing ok. Pt was instructed to hold plavix x 5 days start holding 1/26/23, pt voices understanding.

## 2023-01-23 ENCOUNTER — CARE COORDINATION (OUTPATIENT)
Dept: CARE COORDINATION | Age: 78
End: 2023-01-23

## 2023-01-23 NOTE — CARE COORDINATION
HC phoned and spoke to the patient's spouse, and found that they are doing   well with their social resources.     Plan of Care  [facility] [place], [facility]. will sign off of patient

## 2023-01-24 ENCOUNTER — HOSPITAL ENCOUNTER (OUTPATIENT)
Dept: PREADMISSION TESTING | Age: 78
Discharge: HOME | End: 2023-01-28

## 2023-01-24 VITALS — WEIGHT: 263 LBS | BODY MASS INDEX: 37.65 KG/M2 | HEIGHT: 70 IN

## 2023-01-24 RX ORDER — TAMSULOSIN HYDROCHLORIDE 0.4 MG/1
0.4 CAPSULE ORAL DAILY
COMMUNITY

## 2023-01-24 NOTE — PROGRESS NOTES

## 2023-01-26 DIAGNOSIS — E78.2 MIXED HYPERLIPIDEMIA: ICD-10-CM

## 2023-01-26 RX ORDER — ATORVASTATIN CALCIUM 80 MG/1
80 TABLET, FILM COATED ORAL NIGHTLY
Qty: 90 TABLET | Refills: 0 | OUTPATIENT
Start: 2023-01-26

## 2023-01-27 NOTE — PRE-PROCEDURE INSTRUCTIONS
Have you received your Prep? Any questions with prep instructions? INSURANCE WILL NOT COVER. WIFE SAID NIRANJAN'S OFFICE GAVE THEM INSTRUCTIONS ON GETTING PREP OVER THE COUNTER. Only Clear Liquid Diet day before. Y  Nothing to eat after midnight day before procedure. Y  Are you taking any blood thinners? If so, you need to Stop. STOPPED  Remove any jewelry and body piercings. Y  Do you wear glasses? If so, please bring a case to store them in. Are you having any Covid symptoms? N  Do you have any new rashes, infections, etc. that we should be aware of? N  Do you have a ride home the day of surgery? It cannot be a cab or medical transportation.  Y  Verify surgery time/date and what time to arrive at hospital. [id number]

## 2023-01-30 ENCOUNTER — ANESTHESIA EVENT (OUTPATIENT)
Dept: ENDOSCOPY | Age: 78
End: 2023-01-30
Payer: MEDICARE

## 2023-01-31 ENCOUNTER — ANESTHESIA (OUTPATIENT)
Dept: ENDOSCOPY | Age: 78
End: 2023-01-31
Payer: MEDICARE

## 2023-01-31 ENCOUNTER — HOSPITAL ENCOUNTER (OUTPATIENT)
Age: 78
Setting detail: OUTPATIENT SURGERY
Discharge: HOME | End: 2023-01-31
Attending: INTERNAL MEDICINE | Admitting: INTERNAL MEDICINE
Payer: MEDICARE

## 2023-01-31 VITALS
WEIGHT: 263 LBS | HEART RATE: 68 BPM | BODY MASS INDEX: 37.65 KG/M2 | DIASTOLIC BLOOD PRESSURE: 70 MMHG | SYSTOLIC BLOOD PRESSURE: 114 MMHG | HEIGHT: 70 IN | TEMPERATURE: 97.9 F | OXYGEN SATURATION: 97 % | RESPIRATION RATE: 20 BRPM

## 2023-01-31 DIAGNOSIS — D12.5 ADENOMA OF SIGMOID COLON: ICD-10-CM

## 2023-01-31 LAB
GLUCOSE BLD-MCNC: 100 MG/DL (ref 75–110)
GLUCOSE BLD-MCNC: 92 MG/DL (ref 75–110)

## 2023-01-31 PROCEDURE — 2500000003 HC RX 250 WO HCPCS: Performed by: NURSE ANESTHETIST, CERTIFIED REGISTERED

## 2023-01-31 PROCEDURE — 2500000003 HC RX 250 WO HCPCS: Performed by: ANESTHESIOLOGY

## 2023-01-31 PROCEDURE — 3609010600 HC COLONOSCOPY POLYPECTOMY SNARE/COLD BIOPSY: Performed by: INTERNAL MEDICINE

## 2023-01-31 PROCEDURE — 45380 COLONOSCOPY AND BIOPSY: CPT | Performed by: INTERNAL MEDICINE

## 2023-01-31 PROCEDURE — 88305 TISSUE EXAM BY PATHOLOGIST: CPT

## 2023-01-31 PROCEDURE — 7100000011 HC PHASE II RECOVERY - ADDTL 15 MIN: Performed by: INTERNAL MEDICINE

## 2023-01-31 PROCEDURE — 2580000003 HC RX 258: Performed by: ANESTHESIOLOGY

## 2023-01-31 PROCEDURE — 3700000001 HC ADD 15 MINUTES (ANESTHESIA): Performed by: INTERNAL MEDICINE

## 2023-01-31 PROCEDURE — 82947 ASSAY GLUCOSE BLOOD QUANT: CPT

## 2023-01-31 PROCEDURE — 7100000010 HC PHASE II RECOVERY - FIRST 15 MIN: Performed by: INTERNAL MEDICINE

## 2023-01-31 PROCEDURE — 3700000000 HC ANESTHESIA ATTENDED CARE: Performed by: INTERNAL MEDICINE

## 2023-01-31 PROCEDURE — 45385 COLONOSCOPY W/LESION REMOVAL: CPT | Performed by: INTERNAL MEDICINE

## 2023-01-31 PROCEDURE — 2709999900 HC NON-CHARGEABLE SUPPLY: Performed by: INTERNAL MEDICINE

## 2023-01-31 PROCEDURE — 6360000002 HC RX W HCPCS: Performed by: NURSE ANESTHETIST, CERTIFIED REGISTERED

## 2023-01-31 RX ORDER — SODIUM CHLORIDE 0.9 % (FLUSH) 0.9 %
5-40 SYRINGE (ML) INJECTION PRN
Status: DISCONTINUED | OUTPATIENT
Start: 2023-01-31 | End: 2023-01-31 | Stop reason: HOSPADM

## 2023-01-31 RX ORDER — ONDANSETRON 2 MG/ML
4 INJECTION INTRAMUSCULAR; INTRAVENOUS
Status: DISCONTINUED | OUTPATIENT
Start: 2023-01-31 | End: 2023-01-31 | Stop reason: HOSPADM

## 2023-01-31 RX ORDER — SODIUM CHLORIDE 9 MG/ML
INJECTION, SOLUTION INTRAVENOUS CONTINUOUS
Status: DISCONTINUED | OUTPATIENT
Start: 2023-01-31 | End: 2023-01-31 | Stop reason: HOSPADM

## 2023-01-31 RX ORDER — SODIUM CHLORIDE 9 MG/ML
INJECTION, SOLUTION INTRAVENOUS PRN
Status: DISCONTINUED | OUTPATIENT
Start: 2023-01-31 | End: 2023-01-31 | Stop reason: HOSPADM

## 2023-01-31 RX ORDER — PROPOFOL 10 MG/ML
INJECTION, EMULSION INTRAVENOUS PRN
Status: DISCONTINUED | OUTPATIENT
Start: 2023-01-31 | End: 2023-01-31 | Stop reason: SDUPTHER

## 2023-01-31 RX ORDER — FENTANYL CITRATE 0.05 MG/ML
25 INJECTION, SOLUTION INTRAMUSCULAR; INTRAVENOUS EVERY 5 MIN PRN
Status: DISCONTINUED | OUTPATIENT
Start: 2023-01-31 | End: 2023-01-31 | Stop reason: HOSPADM

## 2023-01-31 RX ORDER — SODIUM CHLORIDE 0.9 % (FLUSH) 0.9 %
5-40 SYRINGE (ML) INJECTION EVERY 12 HOURS SCHEDULED
Status: DISCONTINUED | OUTPATIENT
Start: 2023-01-31 | End: 2023-01-31 | Stop reason: HOSPADM

## 2023-01-31 RX ORDER — LIDOCAINE HYDROCHLORIDE 10 MG/ML
INJECTION, SOLUTION EPIDURAL; INFILTRATION; INTRACAUDAL; PERINEURAL PRN
Status: DISCONTINUED | OUTPATIENT
Start: 2023-01-31 | End: 2023-01-31 | Stop reason: SDUPTHER

## 2023-01-31 RX ORDER — DIPHENHYDRAMINE HYDROCHLORIDE 50 MG/ML
12.5 INJECTION INTRAMUSCULAR; INTRAVENOUS
Status: DISCONTINUED | OUTPATIENT
Start: 2023-01-31 | End: 2023-01-31 | Stop reason: HOSPADM

## 2023-01-31 RX ORDER — PROPOFOL 10 MG/ML
INJECTION, EMULSION INTRAVENOUS CONTINUOUS PRN
Status: DISCONTINUED | OUTPATIENT
Start: 2023-01-31 | End: 2023-01-31 | Stop reason: SDUPTHER

## 2023-01-31 RX ORDER — FENTANYL CITRATE 0.05 MG/ML
50 INJECTION, SOLUTION INTRAMUSCULAR; INTRAVENOUS EVERY 5 MIN PRN
Status: DISCONTINUED | OUTPATIENT
Start: 2023-01-31 | End: 2023-01-31 | Stop reason: HOSPADM

## 2023-01-31 RX ORDER — LIDOCAINE HYDROCHLORIDE 10 MG/ML
1 INJECTION, SOLUTION EPIDURAL; INFILTRATION; INTRACAUDAL; PERINEURAL
Status: COMPLETED | OUTPATIENT
Start: 2023-01-31 | End: 2023-01-31

## 2023-01-31 RX ADMIN — LIDOCAINE HYDROCHLORIDE 40 MG: 10 INJECTION, SOLUTION EPIDURAL; INFILTRATION; INTRACAUDAL; PERINEURAL at 10:30

## 2023-01-31 RX ADMIN — SODIUM CHLORIDE: 9 INJECTION, SOLUTION INTRAVENOUS at 08:16

## 2023-01-31 RX ADMIN — PROPOFOL 50 MG: 10 INJECTION, EMULSION INTRAVENOUS at 10:30

## 2023-01-31 RX ADMIN — PROPOFOL 150 MCG/KG/MIN: 10 INJECTION, EMULSION INTRAVENOUS at 10:30

## 2023-01-31 RX ADMIN — LIDOCAINE HYDROCHLORIDE 1 ML: 10 INJECTION, SOLUTION EPIDURAL; INFILTRATION; INTRACAUDAL; PERINEURAL at 08:16

## 2023-01-31 ASSESSMENT — ENCOUNTER SYMPTOMS
SORE THROAT: 0
BACK PAIN: 1
COUGH: 0
WHEEZING: 0
SHORTNESS OF BREATH: 0

## 2023-01-31 ASSESSMENT — PAIN - FUNCTIONAL ASSESSMENT: PAIN_FUNCTIONAL_ASSESSMENT: 0-10

## 2023-01-31 NOTE — DISCHARGE INSTRUCTIONS
Colonoscopy: What to Expect at [address] [place] [address]  After you have a colonoscopy, you will stay at the clinic for 1 to 2 hours until the medicines wear off. Then you can go home, but you will need to arrange for a ride. Your doctor will tell you when you can eat and do your other usual activities. Your doctor will talk to you about when you will need your next colonoscopy. The results of your test and your risk for colorectal cancer will help your doctor decide how often you need to be checked. After the test, you may be bloated or have gas pains. You may need to pass gas. If a biopsy was done or a polyp was removed, you may have streaks of blood in your stool (feces) for a few days. This care sheet gives you a general idea about how long it will take for you to recover. But each person recovers at a different pace. Follow the steps below to get better as quickly as possible. How can you care for yourself at home? Activity  Rest as much as you need to after you go home. You should be able to go back to your usual activities the day after the test.  Diet  Follow your doctors directions for eating. Drink plenty of fluids (unless your doctor has told you not to) to replace the fluids that were lost during the colon prep. Do not drink alcohol. Medicines  If polyps were removed or a biopsy was done during the test, your doctor may tell you not to take aspirin or other anti-inflammatory medicines, such as ibuprofen (Advil, Motrin) and naproxen (Aleve), for a few days. Other instructions  For your safety, you should not drive or operate machinery until the medicine effects are gone and you can think clearly. Your doctor may tell you not to drive or operate machinery until the day after your test.  Do not sign legal documents or make major decisions until the medicine effects are gone and you can think clearly.  The anesthesia medicine can make it hard for you to fully understand what you are agreeing to.  Follow-up care is a key part of your treatment and safety. Be sure to make and go to all appointments, and call your doctor if you are having problems. It's also a good idea to know your test results and keep a list of the medicines you take. Call your Doctor if you have any of the following:             Passing blood rectally or vomiting blood (it may be red or black). Persistent nausea or vomiting. Severe abdominal or chest pain, not relieved by passing gas. Fever of 100 or more, chills or excessive sweating. Redness or swelling at the IV site. If you experience shortness of breath or severe chest pain, call 911. Where can you learn more? Go to https://[employer]". org and sign in to your [employer] account. Enter E264 in the [employer] box to learn more about Colonoscopy: What to Expect at Home.     If you do not have an account, please click on the Sign Up Now link. © 5028-1930 [employer]wise, [employer]. Care instructions adapted under license by [facility]. This care instruction is for use with your licensed healthcare professional. If you have questions about a medical condition or this instruction, always ask your healthcare professional. [place] [address] any warranty or liability for your use of this information. Content Version: 9.7.547461; Last Revised: February 20, 2013             Colon Polypectomy   (Colon Polyp Removal)       Definition   A colon polypectomy is the removal of polyps from the inside lining of the colon (large intestine). A polyp is a mass of tissue. Some types of polyps have the potential to develop into cancer. Most polyps can be removed during a colonoscopy or sigmoidoscopy . A Colon Polyp        2011 [address] [place] [address].   Reasons for Procedure   The purpose of the surgery is to remove a polyp. It is done for cancer prevention.    In rare cases, larger polyps can cause troublesome symptoms, such as rectal bleeding, abdominal pain, and bowel irregularities. A polyp removal will relieve these symptoms. Possible Complications   Complications are rare, but no procedure is completely free of risk. If you are planning to have a polypectomy, your doctor will review a list of possible complications, which may include:   Damage to the colon wall   Bleeding   Infection   Adverse reaction to the sedative   Factors that may increase the risk of complications include:   Type, size, and location of the polyp   Patient factors, such as blood-clotting disorders, substance abuse, or other diseases (eg, obesity , diabetes )   What to Expect   Prior to Procedure    Your doctor will likely do the following:   Physical exam and health history   Review of medicines   Test your stool for hidden blood (called \"occult blood\")   X-rays an exam that uses small amounts of radiation to make a picture of the inside of the body   Barium enema x-ray exam that uses contrast to help better see the colon   Diagnostic colonoscopy or sigmoidoscopyexamination of the inside of the intestine with an endoscope   Your colon must be completely cleaned before the procedure. Any stool left in the intestine will block the view. This preparation may start several days before the procedure. Follow your doctor's instructions, which may include any of the following cleansing methods:   Enemas fluid introduced into the rectum to stimulate a bowel movement   Laxativesmedicines that cause you to have soft bowel movements   A clear-liquid diet   Oral cathartic medicinesa large container of fluid to drink, which stimulates a bowel movement   Leading up to your procedure:   Talk to your doctor about your medicines.  You may be asked to stop taking some medicines up to one week before the procedure, like:   Anti-inflammatory drugs (eg, aspirin)   Blood thinners, like clopidogrel (Plavix) or warfarin (Coumadin)   Iron supplements or vitamins containing iron. The night before, eat a light meal. Do not eat or drink anything after midnight. Wear comfortable clothing. If you have diabetes, ask your doctor if you need to adjust your insulin dose. Arrange for a ride home after the procedure. Anesthesia    You will receive a sedative. This will help you relax. You will be drowsy but awake. Description of the Procedure    You will be asked to lie on your side or on your back. A scope, a long flexible tube with a camera on the end, will be inserted through the anus. It will be slowly pushed through the rectum to the colon. The scope will also add air to open the colon. Using the scope, the doctor will locate the polyp. The polyp will be snipped off with a wire snare from the scope. In some cases, the polyp may be destroyed with an electric current. The electric current is also used to close the wound and stop bleeding. The polyps will then be removed for lab testing. When the doctor is finished, the scope will be slowly removed. For larger polyps, a laparoscopic surgical procedure may be needed. Special surgical tools will be inserted through small incisions in the abdomen. The tools will be used to locate and remove the polyp. How Long Will It Take? 30-60 minutes   Will It Hurt? The special cleaning solution, laxatives, and/or enemas often cause discomfort. During and following the procedure, there is little or no pain. You may feel pressure, bloating, and/or cramping because of the air passed into the colon. This discomfort will go away with the passing of gas. Your doctor may prescribe pain medicine. If not, you can take non-prescription pain relievers for discomfort. Post-procedure Care   At the [facility]    The polyps will be sent to a lab for testing. At Home    Expect a complete recovery within two weeks. To ensure a smooth recovery, be sure to follow your doctor's instructions , which may include:    The sedative will make you drowsy. Do not drive, operate machinery, or make important decisions the day of the procedure. Return to your normal diet the same or next day. Avoid tea, coffee, cola drinks, alcohol, and spicy foods for at least 2-3 days following surgery. These can irritate the digestive system. To speed healing, resume normal activities as soon as you feel able. Most people feel well enough by the next day. Ask your doctor when you can participate in any rigorous exercise. Ask your doctor about when it is safe to shower, bathe, or soak in water. You will be scheduled for a follow-up colonoscopy in the future. It will be important to check for recurrence of polyps. Your doctor will discuss the results with you either the day of surgery or the following day. Call Your Doctor   After arriving home, contact your doctor if any of the following occurs:   Signs of infection, including fever and chills   Redness, swelling, increasing pain, excessive bleeding, or discharge from the rectum (Up to cup of blood per day can be expected for up to 3-4 days following your polypectomy.)   Black, tarry stools   Severe abdominal pain   Hard, swollen abdomen   Inability to pass gas or stool   Cough , shortness of breath, chest pain, or severe nausea or vomiting   New, unexplained symptoms   In case of emergency,  CALL 911  . Last Reviewed: December 2010 Fatoumata Finch MD   Updated: 4/7/2011            High-Fiber Diet     What Is Fiber? Dietary fiber is a form of carbohydrate found in plants that cannot be digested by humans. All plants contain fiber, including fruits, vegetables, grains, and legumes. Fiber is often classified into two categories: soluble and insoluble. Soluble fiber draws water into the bowel and can help slow digestion. Examples of foods that are high in soluble fiber include oatmeal, oat bran, barley, legumes (eg, beans and peas), apples, and strawberries.    Insoluble fiber speeds digestion and can add bulk to the stool. Examples of foods that are high in insoluble fiber include whole-wheat products, wheat bran, cauliflower, green beans, and potatoes. Why Follow a High-Fiber Diet? A high-fiber diet is often recommended to prevent and treat constipation , hemorrhoids , diverticulitis , and irritable bowel syndrome . Eating a high-fiber diet can also help improve your cholesterol levels, lower your risk of coronary heart disease , reduce your risk of type 2 diabetes , and lower your weight. For people with type 1 or 2 diabetes, a high-fiber diet can also help stabilize blood sugar levels. How Much Fiber Should I Eat? A high-fiber diet should contain  20-35 grams  of fiber a day. This is actually the amount recommended for the general adult population; however, most Americans eat only 15 grams of fiber per day. Digestion of Fiber   Eating a higher fiber diet than usual can take some getting used to by your body's digestive system. To avoid the side effects of sudden increases in dietary fiber (eg, gas, cramping, bloating, and diarrhea), increase fiber gradually and be sure to drink plenty of fluids every day. Tips for Increasing Fiber Intake   Whenever possible, choose whole grains over refined grains (eg, brown rice instead of white rice, whole-wheat bread instead of white bread). Include a variety of grains in your diet, such as wheat, rye, barley, oats, quinoa, and bulgur. Eat more vegetarian-based meals. Here are some ideas: black bean burgers, eggplant lasagna, and veggie tofu stir-davalos. Choose high-fiber snacks, such as fruits, popcorn, whole-grain crackers, and nuts. Make whole-grain cereal or whole-grain toast part of your daily breakfast regime. When eating out, whether ordering a sandwich or dinner, ask for extra vegetables. When baking, replace part of the white flour with whole-wheat flour. Whole-wheat flour is particularly easy to incorporate into a recipe. High-Fiber Diet Eating Guide   Food Category   Foods Recommended   Notes   Grains   Whole-grain breads, muffins, bagels, or anish bread Rye bread Whole-wheat crackers or crisp breads Whole-grain or bran cereals Oatmeal, oat bran, or grits Wheat germ Whole-wheat pasta and brown rice   Read the ingredients list on food labels. Look for products that list \"whole\" as the first ingredient (eg, whole-wheat, whole oats). Choose cereals with at least 2 grams of fiber per serving. Vegetables   All vegetables, especially asparagus, bean sprouts, broccoli, [place] sprouts, cabbage, carrots, cauliflower, celery, corn, greens, green beans, green pepper, onions, peas, potatoes (with skin), snow peas, spinach, squash, sweet potatoes, tomatoes, zucchini   For maximum fiber intake, eat the peels of fruits and vegetablesjust be sure to wash them well first.   Fruits   All fruits, especially apples, berries, grapefruits, mangoes, nectarines, oranges, peaches, pears, dried fruits (figs, dates, prunes, raisins)   Choose raw fruits and vegetables over juice, cooked, or cannedraw fruit has more fiber. Dried fruit is also a good source of fiber. Milk   With the exception of yogurt containing inulin (a type of fiber), dairy foods provide little fiber. Add more fiber by topping your yogurt or cottage cheese with fresh fruit, whole grain or bran cereals, nuts, or seeds. Meats and Beans   All beans and peas, especially Garbanzo beans, kidney beans, lentils, lima beans, split peas, and martinez beans All nuts and seeds, especially almonds, peanuts, [place] nuts, cashews, peanut butter, walnuts, sesame and sunflower seeds All meat, poultry, fish, and eggs   Increase fiber in meat dishes by adding martinez beans, kidney beans, black-eyed peas, bran, or oatmeal. If you are following a low-fat diet, use nuts and seeds only in moderation.    Fats and Oils   All in moderation   Fats and oils do not provide fiber   Snacks, Sweets, and Condiments Fruit Nuts Popcorn, whole-wheat pretzels, or trail mix made with dried fruits, nuts, and seeds Cakes, breads, and cookies made with oatmeal or whole-wheat flour   Most snack foods do not provide much fiber. Choose snacks with at least 2 grams of fiber per serving.      Last Reviewed: March 2011 Abiola Ricardo MS, MPH, RD   Updated: 3/29/2011

## 2023-01-31 NOTE — H&P
HISTORY and [id number]       NAME:  Moody Adams  MRN: [id number]   YOB: 1945   Date: 1/31/2023   Age: 65 y.o. Gender: male       COMPLAINT AND PRESENT HISTORY:     Moody Adams is 65 y.o. male, having a diagnostic colonoscopy. Pt s/p colonoscopy and EGD on 01/04/2023 but with inadequate prep. Pt did follow up with Dr. Savannah Cain in office 01/18/2023-see note below. Below italics a portion of GI office visit note by Dr. Savannah Cain dated 01/18/2023: Reviewed with Pt:  HISTORY OF PRESENT ILLNESS: Isamar Padilla is a 65 y.o. male , referred for evaluation of dysphagia, abdominal pain weight loss which has resolved     Patient is here for follow-up please refer to the previous notes we have seen him for the above schedule him for EGD and colonoscopy, results are as below  The patient still has some dysphagia but mainly in the oropharyngeal area,  His colonoscopy was poorly prepped and he was supposed to repeated with 2 days prep which he did not do yet. Denied any lower GI symptoms     Labs from October 2022 showed normal liver enzymes and normal CBC  CAT scan from August 2021 showed diverticulosis and calcification of the abdominal arteries, no active acute issues     The result of his scopes are as below        Findings:       Retropharyngeal area was grossly normal appearing       Esophagus: abnormal: No stricture or ring to explain the patient dysphagia   Mild irregularity to the Z-line biopsy gently were taken        Stomach:       Gastritis biopsies were taken       Duodenum:     Descending: normal     Bulb: normal       The scope was removed and the patient tolerated the procedure well. Recommendations/Plan:   1. F/U Biopsies   2. F/U In Office in 3-4 weeks   3. Discussed with the family   The prep was poor.          Findings:   Terminal ileum: normal        Multiple polyps in the transverse and the right colon but in jar #1 we removed 5 on the largest of which was 12 mm       2 large polyps in the sigmoid colon around 25 cm from the anal verge, one of them has a stalk and removed easily and was measuring 1.5 cm, the other 1 was larger in flat, I went ahead and removed it in 2 pieces, burned the borders of the mucosal defect, and tattooed the area        Rectal 2 cm polyp   Also was removed in 2 pieces   And we tattooed the area        Diverticulosis       The prep was very poor       Internal and external hemorrhoids        Withdrawal Time was (minutes): 28       The colon was decompressed and the scope was removed. The patient tolerated the procedure well. UPDATE: Pt did notice blood in stool after colonoscopy in early January. Pt denies abdominal pain, nausea, vomiting, diarrhea, constipation. Denies changes in appetite. Reports his weight has stabilized. Review of additional significant medical hx:  (See chart for additional detail, including current medications /see ROS for current S/S):      CAD, CAROTID STENOSIS LEFT , STROKE 2019 WITH RESIDUAL WEAKNESS ON LEFT SIDE   HLD,HTN, INTERMITTENT LIGHT HEADEDNESS. MYOCARDIOPATHY. PT STATES EVERY DAY HE HAS DIZZINESS AND HE FEEL UNSTEADY WALKING . PT STATES HE HAS MORE WEAKNESS ON LEFT UPPER EXTREMITY. HE STATES H HAD STENTS PLACED IN [facility] AND HE HAS SEEN  [facility]. CURRENT MEDICATION R/T CONDITION :  NORVASC, LIPITOR, PLAVIX TOPROL XL, LISINOPRIL    BP Readings from Last 3 Encounters:   01/18/23 133/74   01/10/23 (!) 141/63   01/04/23 (!) 140/76     Completed and followed prescribed prep. NPO p MN. No medications taken this am. Last plavix and vitamin dose was two days ago. Denies taking any other anticoagulants or blood thinning medications. Denies recent or current chest pain/pressure, palpitations, SOB, recent URI, fever or chills.        RECENT LABS, IMAGING AND TESTING     Lab Results   Component Value Date    WBC 5.1 10/17/2022    RBC 4.43 (L) 10/17/2022    HGB 13.7 10/17/2022 HCT 40.0 (L) 10/17/2022    MCV 90.4 10/17/2022    MCH 31.1 10/17/2022    MCHC 34.4 10/17/2022    RDW 13.2 10/17/2022     10/17/2022    MPV 8.9 10/17/2022        Lab Results   Component Value Date     10/17/2022    K 4.4 10/17/2022     10/17/2022    CO2 27 10/17/2022    BUN 14 10/17/2022    CREATININE 0.61 (L) 10/17/2022    GLUCOSE 112 (H) 10/17/2022    CALCIUM 9.8 10/17/2022    PROT 7.2 10/17/2022    LABALBU 4.5 10/17/2022    BILITOT 0.8 10/17/2022    ALKPHOS 111 10/17/2022    AST 23 10/17/2022    ALT 22 10/17/2022         PAST MEDICAL HISTORY     Past Medical History:   Diagnosis Date    Acquired skull defect     Angina pectoris (HCC)     Anxiety     Anxiety     At high risk for falls     Ataxia following nontraumatic intracerebral hemorrhage     Bronchitis with asthma, acute 11/24/2015    CAD (coronary artery disease)     Carotid stenosis     Left    Carotid stenosis, left 02/02/2016    Chest pain     Compression of brainstem (HCC)     Diabetes mellitus ([address]     borderline patient off metformin    Diplopia     Dysphagia     Gait instability     GERD (gastroesophageal reflux disease)     H/O heart artery stent     x5    Heart attack ([address] 08/05/2019    Hyperlipidemia     Hypertension     Hyponatremia     Intermittent lightheadedness     Myocardiopathy (HCC)     Neuropathy     Obesity     Osteoarthritis     both knees    Pericardial tamponade     Spontaneous intracranial hemorrhage ([address]     Stroke, hemorrhagic ([address] 08/18/2019       SURGICAL HISTORY       Past Surgical History:   Procedure Laterality Date    CAROTID ENDARTERECTOMY Left 02/02/2016    COLONOSCOPY      COLONOSCOPY N/A 1/4/2023    COLONOSCOPY POLYPECTOMY SNARE/COLD BIOPSY performed by Tamia Castro MD at [address]      \"total 5 stents\"    CRANIOTOMY      IR BIOPSY THYROID PERC CORE NEEDLE  11/14/2022    IR BIOPSY THYROID PERC CORE NEEDLE 11/14/2022 [facility] PROCED[facility]    JOINT REPLACEMENT Left     knee    SHOULDER SURGERY Right     collar bone    TONSILLECTOMY AND ADENOIDECTOMY      UPPER GASTROINTESTINAL ENDOSCOPY N/A 1/4/2023    EGD BIOPSY performed by Libby Sharma MD at [address] [place] [address]       Family History   Problem Relation Age of Onset    Cancer Mother     Cancer Father     Liver Cancer Brother     Esophageal Cancer Brother        SOCIAL HISTORY       Social History     Socioeconomic History    Marital status:    Tobacco Use    Smoking status: Never    Smokeless tobacco: Never   Vaping Use    Vaping Use: Never used   Substance and Sexual Activity    Alcohol use: Not Currently    Drug use: No     Social Determinants of Health     Financial Resource Strain: High Risk    Difficulty of Paying Living Expenses: Hard   Food Insecurity: Food Insecurity Present    Worried About Running Out of Food in the Last Year: Sometimes true    Ran Out of Food in the Last Year: Sometimes true   Physical Activity: Insufficiently Active    Days of Exercise per Week: 7 days    Minutes of Exercise per Session: 10 min        REVIEW OF SYSTEMS      No Known Allergies    No current facility-administered medications on file prior to encounter. Current Outpatient Medications on File Prior to Encounter   Medication Sig Dispense Refill    lisinopril (PRINIVIL;ZESTRIL) 20 MG tablet Take 1 tablet by mouth once daily 90 tablet 0    metFORMIN (GLUCOPHAGE-XR) 500 MG extended release tablet Take 1 tablet by mouth once daily with breakfast 90 tablet 0    amLODIPine (NORVASC) 5 MG tablet Take 5 mg by mouth daily \"Not taking at this needs refill. \"      DULoxetine (CYMBALTA) 30 MG extended release capsule Take 1 capsule by mouth daily 90 capsule 1    vitamin D (ERGOCALCIFEROL) 1.25 MG (48445 UT) CAPS capsule Take 1 capsule by mouth once a week 12 capsule 1    indomethacin (INDOCIN) 25 MG capsule Take 1 capsule by mouth 2 times daily (with meals) for 7 days 30 capsule 0    atorvastatin (LIPITOR) 80 MG tablet Take 1 tablet by mouth nightly 90 tablet 0    clopidogrel (PLAVIX) 75 MG tablet Take 1 tablet by mouth once daily 90 tablet 0    metoprolol succinate (TOPROL XL) 50 MG extended release tablet Take 1 tab daily 90 tablet 1    pantoprazole (PROTONIX) 40 MG tablet Take 1 tablet by mouth every morning (before breakfast) 90 tablet 0    Multiple Vitamins-Minerals (MULTIVITAMIN ADULTS 50+) TABS Take by mouth daily      nitroGLYCERIN (NITROSTAT) 0.4 MG SL tablet up to max of 3 total doses. If no relief after 1 dose, call 911. 25 tablet 3       Review of Systems   Constitutional:  Negative for appetite change, chills, fever and unexpected weight change. HENT:  Positive for dental problem (upper and lower dentures). Negative for congestion, hearing loss and sore throat. Eyes:  Negative for visual disturbance. Respiratory:  Negative for cough, shortness of breath and wheezing. Cardiovascular:  Negative for chest pain and palpitations. Gastrointestinal:         See HPI   Genitourinary:  Negative for dysuria and hematuria. Follows with urology   Musculoskeletal:  Positive for back pain (with standing for prolonged periods) and gait problem (Ambulates with walker). Negative for neck pain. Skin:  Negative for rash and wound. Neurological:  Positive for dizziness (Occasional), speech difficulty (Slight slurring of speech since stroke), weakness (Left upper extremity), light-headedness (Occasional) and headaches (Occasional). Hematological:  Bruises/bleeds easily. Psychiatric/Behavioral: Negative. GENERAL PHYSICAL EXAM     Vitals: Review vitals per RN flowsheet. Physical Exam  Constitutional:       General: He is not in acute distress. Appearance: He is well-developed. He is obese. He is not ill-appearing. HENT:      Head: Normocephalic and atraumatic.       Nose: Nose normal.      Mouth/Throat:      Mouth: Mucous membranes are moist.      Pharynx: Oropharynx is clear. No oropharyngeal exudate or posterior oropharyngeal erythema. Eyes:      General: No scleral icterus. Right eye: No discharge. Left eye: No discharge. Pupils: Pupils are equal, round, and reactive to light. Neck:      Trachea: No tracheal deviation. Cardiovascular:      Rate and Rhythm: Normal rate and regular rhythm. Heart sounds: Normal heart sounds. No murmur heard. No friction rub. No gallop. Pulmonary:      Effort: Pulmonary effort is normal. No respiratory distress. Breath sounds: Normal breath sounds. No wheezing, rhonchi or rales. Abdominal:      General: Bowel sounds are normal. There is no distension. Palpations: Abdomen is soft. Tenderness: There is no abdominal tenderness. There is no guarding. Musculoskeletal:      Cervical back: Neck supple. Right lower leg: Edema (Trace) present. Left lower leg: Edema (Trace) present. Skin:     General: Skin is warm and dry. Coloration: Skin is not jaundiced. Findings: No bruising, erythema or rash. Neurological:      General: No focal deficit present. Mental Status: He is alert and oriented to person, place, and time. Cranial Nerves: No cranial nerve deficit. Gait: Gait abnormal (Ambulates with walker).    Psychiatric:         Mood and Affect: Mood normal.      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 ([address] 10/13/2022    Lumbar degenerative disc disease 10/13/2022    Hx of heart artery stent 08/16/2021    Obesity (BMI 35.0-39.9 without comorbidity) 03/07/2019    Pericardial tamponade 12/20/2018    Prediabetes 03/31/2022    Mixed hyperlipidemia 02/15/2021    Coronary artery disease of native artery of native heart with stable angina pectoris ([address] 11/20/2020    PVD (peripheral vascular disease) ([address] 11/20/2020    Morbidly obese ([address] 11/20/2020    At high risk for falls 11/20/2020    Ataxia following nontraumatic intracerebral hemorrhage     Neuropathy     Diplopia 07/15/2019    Spontaneous intracranial hemorrhage ([address] 07/03/2019    Myocardiopathy ([address] 07/19/2018    Ventral hernia without obstruction or gangrene 11/15/2016    Thyroid nodule 05/19/2016    H/O carotid endarterectomy 04/18/2016    Carotid stenosis, left 02/02/2016    Lymphadenopathy of left cervical region 11/24/2015    Impaired fasting glucose 07/13/2015    Patient overweight 07/13/2015    Insomnia 07/13/2015    Essential hypertension 07/13/2015    OA (osteoarthritis) of knee 07/13/2015    S/P total knee arthroplasty 07/13/2015           MARKUS Phelps - CNP on 1/31/2023 at 7:33 AM

## 2023-01-31 NOTE — ANESTHESIA POSTPROCEDURE EVALUATION
Department of Anesthesiology  Postprocedure Note    Patient: Wayne Atkinson  MRN: [id number]  YOB: 1945  Date of evaluation: 1/31/2023      Procedure Summary     Date: 01/31/23 Room / Location: [facility] ENDO 03 / [facility] ENDO    Anesthesia Start: 1027 Anesthesia Stop: 4043    Procedure: COLONOSCOPY POLYPECTOMY SNARE/ HOT BIOPSY (Rectum) Diagnosis:       Adenoma of sigmoid colon      (ADENOMA OF SIGMOID COLON)    Surgeons: Libby Sharma MD Responsible Provider: Larissa Brown MD    Anesthesia Type: general ASA Status: 3          Anesthesia Type: No value filed.     Cyndie Phase I:      Cyndie Phase II: Cyndie Score: 9      Anesthesia Post Evaluation    Comments: POST- ANESTHESIA EVALUATION       Pt Name: Wayne Atkinson  MRN: [id number]  YOB: 1945  Date of evaluation: 1/31/2023  Time:  3:45 PM      /70   Pulse 68   Temp 97.9 °F (36.6 °C) (Infrared)   Resp 20   Ht 5' 10\" (1.778 m)   Wt 263 lb (119.3 kg)   SpO2 97%   BMI 37.74 kg/m²      Consciousness Level  Awake  Cardiopulmonary Status  Stable  Pain Adequately Treated YES  Nausea / Vomiting  NO  Adequate Hydration  YES  Anesthesia Related Complications NONE      Electronically signed by Larissa Brown MD on 1/31/2023 at 3:45 PM

## 2023-01-31 NOTE — OP NOTE
PROCEDURE NOTE    DATE OF PROCEDURE: 1/31/2023    SURGEON: Bernardo Jones MD  Facility : [facility]  ASSISTANT: None  Anesthesia: MAC  PREOPERATIVE DIAGNOSIS:   Dysplastic polyp in the rectum on the previous exam this is a follow-up  Also few polyps were left for this second look to be removed as below    POSTOPERATIVE DIAGNOSIS: as described below    OPERATION: Total colonoscopy     ANESTHESIA: Moderate Sedation    ESTIMATED BLOOD LOSS: less than 50     COMPLICATIONS: None. SPECIMENS:  Was Obtained:     Rectosigmoid polyps were removed 3 of them the largest one was 9 mm  The removed with cold snare but in 1 jar    Is a polyp at this 60 cm from the anal verge measuring around 1 cm which was removed with a hot snare. The ileocecal valve has what it looks to be a lipoma with submucosal bulging in one of the lips, I went ahead and took multiple biopsies from that to confirm      The polypectomy site which was inked, and located in the sigmoid colon, where the site of the sigmoid polyp removed in the last visit a 25 cm looks very clean but we biopsied the ulcer area      The inked area in the rectum where the polypectomy site recognized with the ulcer measuring around 7 mm looks very clean to me nevertheless there is a converge of folds the worse it, but they are all look soft and benign I went ahead and biopsied around this ulcer. HISTORY: The patient is a 65y.o. year old male with history of above preop diagnosis. I recommended colonoscopy with possible biopsy or polypectomy and I explained the risk, benefits, expected outcome, and alternatives to the procedure. Risks included but are not limited to bleeding, infection, respiratory distress, hypotension, and perforation of the colon and possibility of missing a lesion. The patient understands and is in agreement.         The patient was counseled at length about the risks of doug Covid-19 during their perioperative period and any recovery window from their procedure. The patient was made aware that doug Covid-19  may worsen their prognosis for recovering from their procedure  and lend to a higher morbidity and/or mortality risk. All material risks, benefits, and reasonable alternatives including postponing the procedure were discussed. The patient does wish to proceed with the procedure at this time. PROCEDURE: The patient was given IV conscious sedation. The patient's SPO2 remained above 90% throughout the procedure. The colonoscope was inserted per rectum and advanced under direct vision to the cecum without difficulty. Post sedation note : The patient's SPO2 remained above 90% throughout the procedure. the vital signs remained stable , and no immediate complication form the procedure noted, patient will be ready for d/c when criteria is met . The prep was good. Findings:  Terminal ileum: normal  Rectosigmoid polyps were removed 3 of them the largest one was 9 mm  The removed with cold snare but in 1 jar    Is a polyp at this 60 cm from the anal verge measuring around 1 cm which was removed with a hot snare. The ileocecal valve has what it looks to be a lipoma with submucosal bulging in one of the lips, I went ahead and took multiple biopsies from that to confirm      The polypectomy site which was inked, and located in the sigmoid colon, where the site of the sigmoid polyp removed in the last visit a 25 cm looks very clean but we biopsied the ulcer area      The inked area in the rectum where the polypectomy site recognized with the ulcer measuring around 7 mm looks very clean to me nevertheless there is a converge of folds the worse it, but they are all look soft and benign I went ahead and biopsied around this ulcer. Large hemorrhoids        Withdrawal Time was (minutes): 19    The colon was decompressed and the scope was removed. The patient tolerated the procedure well. Recommendations/Plan:   Lifestyle and dietary modifications as discussed  F/U Biopsies  F/U In OfficeYes  Discussed with the family  Repeat flexible sigmoidoscopy [id number] and follow on the rectal area    Electronically signed by Kristie Griffin MD  on 1/31/2023 at 11:25 AM

## 2023-01-31 NOTE — ANESTHESIA PRE PROCEDURE
Department of Anesthesiology  Preprocedure Note       Name:  Della Mills   Age:  65 y.o.  :  1945                                          MRN:  [id number]         Date:  2023      Surgeon: Izabela Olmstead):  Milagro Davis MD    Procedure: Procedure(s):  COLONOSCOPY DIAGNOSTIC    Medications prior to admission:   Prior to Admission medications    Medication Sig Start Date End Date Taking? Authorizing Provider   tamsulosin (FLOMAX) 0.4 MG capsule Take 0.4 mg by mouth daily    Historical Provider, MD   lisinopril (PRINIVIL;ZESTRIL) 20 MG tablet Take 1 tablet by mouth once daily 22   Fani Higgins MD   metFORMIN (GLUCOPHAGE-XR) 500 MG extended release tablet Take 1 tablet by mouth once daily with breakfast 22   Fani Higgins MD   amLODIPine (NORVASC) 5 MG tablet Take 5 mg by mouth daily \"Not taking at this needs refill. \" 19   Historical Provider, MD   DULoxetine (CYMBALTA) 30 MG extended release capsule Take 1 capsule by mouth daily 22   Jamila Devries MD   vitamin D (ERGOCALCIFEROL) 1.25 MG (88449 UT) CAPS capsule Take 1 capsule by mouth once a week 10/19/22   Jamila Devries MD   indomethacin (INDOCIN) 25 MG capsule Take 1 capsule by mouth 2 times daily (with meals) for 7 days 10/19/22 10/26/22  Jamila Devries MD   atorvastatin (LIPITOR) 80 MG tablet Take 1 tablet by mouth nightly 10/13/22   Hue Reaves MD   clopidogrel (PLAVIX) 75 MG tablet Take 1 tablet by mouth once daily 10/13/22   Hue Reaves MD   metoprolol succinate (TOPROL XL) 50 MG extended release tablet Take 1 tab daily 10/13/22   Jamila Devries MD   pantoprazole (PROTONIX) 40 MG tablet Take 1 tablet by mouth every morning (before breakfast) 2/15/21   MARKUS Huang - CNP   Multiple Vitamins-Minerals (MULTIVITAMIN ADULTS 50+) TABS Take by mouth daily    Historical Provider, MD   nitroGLYCERIN (NITROSTAT) 0.4 MG SL tablet up to max of 3 total doses.  If no relief after 1 dose, call 911. 19   Nimo Samaniego Melanie Breen MD       Current medications:    Current Facility-Administered Medications   Medication Dose Route Frequency Provider Last Rate Last Admin    sodium chloride flush 0.9 % injection 5-40 mL  5-40 mL IntraVENous 2 times per day Larisa Dos Santos MD        sodium chloride flush 0.9 % injection 5-40 mL  5-40 mL IntraVENous PRN Larisa Dos Santos MD        0.9 % sodium chloride infusion   IntraVENous PRN Larisa Dos Santos MD        0.9 % sodium chloride infusion   IntraVENous Continuous Larisa Dos Santos  mL/hr at 01/31/23 0816 New Bag at 01/31/23 0816       Allergies:  No Known Allergies    Problem List:    Patient Active Problem List   Diagnosis Code    Impaired fasting glucose R73.01    Patient overweight E66.3    Insomnia G47.00    Essential hypertension I10    OA (osteoarthritis) of knee M17.9    S/P total knee arthroplasty Z96.659    Lymphadenopathy of left cervical region R59.0    Carotid stenosis, left I65.22    H/O carotid endarterectomy Z98.890    Thyroid nodule E04.1    Ventral hernia without obstruction or gangrene K43.9    Myocardiopathy ([facility] I42.9    Spontaneous intracranial hemorrhage (HCC) I62.9    Diplopia H53.2    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    Neuropathy G62.9    Coronary artery disease of native artery of native heart with stable angina pectoris ([facility]) I25.118    PVD (peripheral vascular disease) ([facility] I73.9    Morbidly obese ([facility] E66.01    At high risk for falls Z91.81    Mixed hyperlipidemia E78.2    Prediabetes R73.03    Pericardial tamponade I31.4    Obesity (BMI 35.0-39.9 without comorbidity) E66.9    Hx of heart artery stent Z95.5    Type 2 diabetes mellitus without complication, without long-term current use of insulin (HCC) E11.9    Pharyngeal dysphagia R13.13    Weight loss R63.4    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 ([facility]) M21.018    Lumbar degenerative disc disease M51.36    Moderate episode of recurrent major depressive disorder ([facility] F33.1    Chronic fatigue syndrome G93.32       Past Medical History:        Diagnosis Date    Acquired skull defect     Angina pectoris (HCC)     Anxiety     Anxiety     At high risk for falls     Ataxia following nontraumatic intracerebral hemorrhage     Bronchitis with asthma, acute 11/24/2015    CAD (coronary artery disease)     Carotid stenosis     Left    Carotid stenosis, left 02/02/2016    Chest pain     Compression of brainstem ([facility]     Diabetes mellitus ([facility] [address] [facility]     borderline patient off metformin    Diplopia     Dysphagia     Gait instability     GERD (gastroesophageal reflux disease)     H/O heart artery stent     x5    Heart attack ([facility] 08/05/2019    Hyperlipidemia     Hypertension     Hyponatremia     Intermittent lightheadedness     Myocardiopathy (HCC)     Neuropathy     Obesity     Osteoarthritis     both knees    Pericardial tamponade     Spontaneous intracranial hemorrhage (HCC)     Stroke, hemorrhagic ([facility] [facility] 08/18/2019       Past Surgical History:        Procedure Laterality Date    CAROTID ENDARTERECTOMY Left 02/02/2016    COLONOSCOPY      COLONOSCOPY N/A 1/4/2023    COLONOSCOPY POLYPECTOMY SNARE/COLD BIOPSY performed by Ambrosio Preston MD at [address]      \"total 5 stents\"    CRANIOTOMY      IR BIOPSY THYROID PERC CORE NEEDLE  11/14/2022    IR BIOPSY THYROID PERC CORE NEEDLE 11/14/2022 [facility]    JOINT REPLACEMENT Left     knee    SHOULDER SURGERY Right     collar bone    TONSILLECTOMY AND ADENOIDECTOMY      UPPER GASTROINTESTINAL ENDOSCOPY N/A 1/4/2023    EGD BIOPSY performed by Ambrosio Preston MD at [facility]       Social History:    Social History     Tobacco Use    Smoking status: Never    Smokeless tobacco: Never   Substance Use Topics    Alcohol use: Not Currently                                Counseling given: Not Answered      Vital Signs (Current):   Vitals:    01/31/23 0752 01/31/23 0754   BP:  (!) 142/74   Pulse:  78   Resp:  18   Temp:  97.3 °F (36.3 °C)   TempSrc:  Infrared   SpO2:  98%   Weight: 263 lb (119.3 kg)    Height: 5' 10\" (1.778 m)                                               BP Readings from Last 3 Encounters:   01/31/23 (!) 142/74   01/18/23 133/74   01/10/23 (!) 141/63       NPO Status: Time of last liquid consumption: 2200                        Time of last solid consumption: 2359                        Date of last liquid consumption: 01/30/23                        Date of last solid food consumption: 01/29/23    BMI:   Wt Readings from Last 3 Encounters:   01/31/23 263 lb (119.3 kg)   01/24/23 263 lb (119.3 kg)   01/18/23 260 lb (117.9 kg)     Body mass index is 37.74 kg/m².     CBC:   Lab Results   Component Value Date/Time    WBC 5.1 10/17/2022 09:48 AM    RBC 4.43 10/17/2022 09:48 AM    HGB 13.7 10/17/2022 09:48 AM    HCT 40.0 10/17/2022 09:48 AM    MCV 90.4 10/17/2022 09:48 AM    RDW 13.2 10/17/2022 09:48 AM     10/17/2022 09:48 AM       CMP:   Lab Results   Component Value Date/Time     10/17/2022 09:48 AM    K 4.4 10/17/2022 09:48 AM     10/17/2022 09:48 AM    CO2 27 10/17/2022 09:48 AM    BUN 14 10/17/2022 09:48 AM    CREATININE 0.61 10/17/2022 09:48 AM    GFRAA >60 08/04/2021 05:07 PM    LABGLOM >60 10/17/2022 09:48 AM    GLUCOSE 112 10/17/2022 09:48 AM    PROT 7.2 10/17/2022 09:48 AM    CALCIUM 9.8 10/17/2022 09:48 AM    BILITOT 0.8 10/17/2022 09:48 AM    ALKPHOS 111 10/17/2022 09:48 AM    AST 23 10/17/2022 09:48 AM    ALT 22 10/17/2022 09:48 AM       POC Tests:   Recent Labs     01/31/23  0818   POCGLU 100       Coags:   Lab Results   Component Value Date/Time    PROTIME 13.7 09/21/2021 10:55 AM    INR 1.0 09/21/2021 10:55 AM    APTT 35.6 09/21/2021 10:55 AM       HCG (If Applicable): No results found for: PREGTESTUR, PREGSERUM, HCG, HCGQUANT     ABGs: No results found for: PHART, PO2ART, OJL5UKJ, ZNP2CDK, BEART, N0YFMYBH     Type & Screen (If Applicable):  No results found for: LABABO, LABRH    Drug/Infectious Status (If Applicable):  No results found for: HIV, HEPCAB    COVID-19 Screening (If Applicable):   Lab Results   Component Value Date/Time    COVID19 DETECTED 01/06/2022 08:48 AM           Anesthesia Evaluation  Patient summary reviewed and Nursing notes reviewed no history of anesthetic complications:   Airway: Mallampati: III  TM distance: >3 FB   Neck ROM: full  Mouth opening: > = 3 FB   Dental:    (+) upper dentures and lower dentures      Pulmonary:normal exam  breath sounds clear to auscultation  (+) asthma:                            Cardiovascular:    (+) hypertension:, angina:, past MI:, CAD:,         Rhythm: regular  Rate: normal                    Neuro/Psych:   (+) CVA:, neuromuscular disease:, psychiatric history:depression/anxiety              ROS comment: Lumbar DDD  Ataxia following Intra cerebellar bleed GI/Hepatic/Renal:   (+) GERD:, morbid obesity          Endo/Other:    (+) DiabetesType II DM, , : arthritis: OA., .                 Abdominal:             Vascular:   + PVD, aortic or cerebral, . Other Findings:           Anesthesia Plan      general     ASA 3     (TIVA)  Induction: intravenous. MIPS: Prophylactic antiemetics administered. Anesthetic plan and risks discussed with patient. Plan discussed with CRNA.                     Chris Ahuja MD   1/31/2023

## 2023-02-02 LAB — SURGICAL PATHOLOGY REPORT: NORMAL

## 2023-02-04 DIAGNOSIS — I42.8 OTHER CARDIOMYOPATHY (HCC): ICD-10-CM

## 2023-02-04 DIAGNOSIS — E78.2 MIXED HYPERLIPIDEMIA: ICD-10-CM

## 2023-02-04 DIAGNOSIS — I25.118 CORONARY ARTERY DISEASE OF NATIVE ARTERY OF NATIVE HEART WITH STABLE ANGINA PECTORIS (HCC): ICD-10-CM

## 2023-02-06 RX ORDER — CLOPIDOGREL BISULFATE 75 MG/1
TABLET ORAL
Qty: 90 TABLET | Refills: 0 | OUTPATIENT
Start: 2023-02-06

## 2023-02-06 RX ORDER — ATORVASTATIN CALCIUM 80 MG/1
80 TABLET, FILM COATED ORAL NIGHTLY
Qty: 90 TABLET | Refills: 0 | OUTPATIENT
Start: 2023-02-06

## 2023-02-07 ENCOUNTER — OFFICE VISIT (OUTPATIENT)
Dept: UROLOGY | Age: 78
End: 2023-02-07
Payer: MEDICARE

## 2023-02-07 VITALS
DIASTOLIC BLOOD PRESSURE: 78 MMHG | WEIGHT: 264 LBS | SYSTOLIC BLOOD PRESSURE: 132 MMHG | TEMPERATURE: 97.8 F | HEART RATE: 84 BPM | BODY MASS INDEX: 37.8 KG/M2 | HEIGHT: 70 IN

## 2023-02-07 DIAGNOSIS — N40.1 BPH WITH OBSTRUCTION/LOWER URINARY TRACT SYMPTOMS: Primary | ICD-10-CM

## 2023-02-07 DIAGNOSIS — Z80.42 FAMILY HISTORY OF PROSTATE CANCER: ICD-10-CM

## 2023-02-07 DIAGNOSIS — Z86.73 HISTORY OF STROKE: ICD-10-CM

## 2023-02-07 DIAGNOSIS — N13.8 BPH WITH OBSTRUCTION/LOWER URINARY TRACT SYMPTOMS: Primary | ICD-10-CM

## 2023-02-07 LAB — POST VOID RESIDUAL (PVR): NORMAL

## 2023-02-07 PROCEDURE — G8417 CALC BMI ABV UP PARAM F/U: HCPCS | Performed by: NURSE PRACTITIONER

## 2023-02-07 PROCEDURE — 1123F ACP DISCUSS/DSCN MKR DOCD: CPT | Performed by: NURSE PRACTITIONER

## 2023-02-07 PROCEDURE — 99213 OFFICE O/P EST LOW 20 MIN: CPT | Performed by: NURSE PRACTITIONER

## 2023-02-07 PROCEDURE — 1036F TOBACCO NON-USER: CPT | Performed by: NURSE PRACTITIONER

## 2023-02-07 PROCEDURE — 3075F SYST BP GE 130 - 139MM HG: CPT | Performed by: NURSE PRACTITIONER

## 2023-02-07 PROCEDURE — G8484 FLU IMMUNIZE NO ADMIN: HCPCS | Performed by: NURSE PRACTITIONER

## 2023-02-07 PROCEDURE — 51798 US URINE CAPACITY MEASURE: CPT | Performed by: NURSE PRACTITIONER

## 2023-02-07 PROCEDURE — 3078F DIAST BP <80 MM HG: CPT | Performed by: NURSE PRACTITIONER

## 2023-02-07 PROCEDURE — G8427 DOCREV CUR MEDS BY ELIG CLIN: HCPCS | Performed by: NURSE PRACTITIONER

## 2023-02-07 RX ORDER — TAMSULOSIN HYDROCHLORIDE 0.4 MG/1
0.4 CAPSULE ORAL DAILY
Qty: 90 CAPSULE | Refills: 3 | Status: SHIPPED | OUTPATIENT
Start: 2023-02-07

## 2023-02-07 ASSESSMENT — ENCOUNTER SYMPTOMS
EYE REDNESS: 0
ABDOMINAL PAIN: 0
DIARRHEA: 0
CONSTIPATION: 0
SHORTNESS OF BREATH: 0
WHEEZING: 0
NAUSEA: 0
EYE PAIN: 0
COUGH: 0
VOMITING: 0
BACK PAIN: 0

## 2023-02-07 NOTE — PROGRESS NOTES
[address] [address]  Dept: [address] [facility] Urology Office Note - Established    Patient:  Deyanira Maldonado  YOB: 1945  Date: 2/7/2023    The patient is a 65 y.o. male who presents todayfor evaluation of the following problems:   Chief Complaint   Patient presents with    Benign Prostatic Hypertrophy     4 week check up       HPI  Patient is presenting for f/u BPH. He was seen about 1 month ago for this issue. He does also have hx of stroke in the past.   He was started on flomax and has been compliant with it. Mr. Francine Palmer has noticed an improvement in his urinary symptoms  He reports less urinary frequency and a stronger stream.  Still does not feel he empties bladder entirely, but PVR has improved to 55cc (improved from 97cc)  Overall, he is satisfied with his current LUTS. PSA 1.22 approximately 2 years ago, does have fhx prostate ca. He was thinking about repeating it, but has not. He denies any other  concerns today. Summary of old records: N/A    Additional History: N/A    Procedures Today: N/A    Urinalysis today:  No results found for this visit on 02/07/23.   Last several PSA's:  Lab Results   Component Value Date    PSA 1.22 03/25/2021    PSA 0.95 07/27/2018    PSA 1.24 03/14/2015     Last total testosterone:  No results found for: TESTOSTERONE      Last BUN and creatinine:  Lab Results   Component Value Date    BUN 14 10/17/2022     Lab Results   Component Value Date    CREATININE 0.61 (L) 10/17/2022       Additional Lab/Culture results: none    Imaging Reviewed during this Office Visit: none  (results were independently reviewed by physician and radiology report verified)    PAST MEDICAL, FAMILY AND SOCIAL HISTORY UPDATE:  Past Medical History:   Diagnosis Date    Acquired skull defect     Angina pectoris ([address]     Anxiety     Anxiety     At high risk for falls Ataxia following nontraumatic intracerebral hemorrhage     Bronchitis with asthma, acute 11/24/2015    CAD (coronary artery disease)     Carotid stenosis     Left    Carotid stenosis, left 02/02/2016    Chest pain     Compression of brainstem (HCC)     Diabetes mellitus ([address]     borderline patient off metformin    Diplopia     Dysphagia     Gait instability     GERD (gastroesophageal reflux disease)     H/O heart artery stent     x5    Heart attack ([address] 08/05/2019    Hyperlipidemia     Hypertension     Hyponatremia     Intermittent lightheadedness     Myocardiopathy (HCC)     Neuropathy     Obesity     Osteoarthritis     both knees    Pericardial tamponade     Spontaneous intracranial hemorrhage ([address]     Stroke, hemorrhagic ([facility] [address] 08/18/2019     Past Surgical History:   Procedure Laterality Date    CAROTID ENDARTERECTOMY Left 02/02/2016    COLONOSCOPY      COLONOSCOPY N/A 1/4/2023    COLONOSCOPY POLYPECTOMY SNARE/COLD BIOPSY performed by Ingrid Del Toro MD [address] 1/31/2023    COLONOSCOPY POLYPECTOMY SNARE/ HOT BIOPSY performed by Ingrid Del Toro MD at [address]      \"total 5 stents\"    CRANIOTOMY      IR BIOPSY THYROID PERC CORE NEEDLE  11/14/2022    IR BIOPSY THYROID PERC CORE NEEDLE 11/14/2022 [facility]    JOINT REPLACEMENT Left     knee    SHOULDER SURGERY Right     collar bone    TONSILLECTOMY AND ADENOIDECTOMY      UPPER GASTROINTESTINAL ENDOSCOPY N/A 1/4/2023    EGD BIOPSY performed by Ingrid Del Toro MD at [address] [facility]     Family History   Problem Relation Age of Onset    Cancer Mother     Cancer Father     Liver Cancer Brother     Esophageal Cancer Brother      Outpatient Medications Marked as Taking for the 2/7/23 encounter (Office Visit) with MARKUS Phelps CNP   Medication Sig Dispense Refill    tamsulosin (FLOMAX) 0.4 MG capsule Take 1 capsule by mouth daily 90 capsule 3    lisinopril (PRINIVIL;ZESTRIL) 20 MG tablet Take 1 tablet by mouth once daily 90 tablet 0    metFORMIN (GLUCOPHAGE-XR) 500 MG extended release tablet Take 1 tablet by mouth once daily with breakfast 90 tablet 0    amLODIPine (NORVASC) 5 MG tablet Take 5 mg by mouth daily \"Not taking at this needs refill. \"      DULoxetine (CYMBALTA) 30 MG extended release capsule Take 1 capsule by mouth daily 90 capsule 1    vitamin D (ERGOCALCIFEROL) 1.25 MG (85674 UT) CAPS capsule Take 1 capsule by mouth once a week 12 capsule 1    atorvastatin (LIPITOR) 80 MG tablet Take 1 tablet by mouth nightly 90 tablet 0    clopidogrel (PLAVIX) 75 MG tablet Take 1 tablet by mouth once daily 90 tablet 0    metoprolol succinate (TOPROL XL) 50 MG extended release tablet Take 1 tab daily 90 tablet 1    pantoprazole (PROTONIX) 40 MG tablet Take 1 tablet by mouth every morning (before breakfast) 90 tablet 0    Multiple Vitamins-Minerals (MULTIVITAMIN ADULTS 50+) TABS Take by mouth daily      nitroGLYCERIN (NITROSTAT) 0.4 MG SL tablet up to max of 3 total doses. If no relief after 1 dose, call 911. 25 tablet 3       Patient has no known allergies. Social History     Tobacco Use   Smoking Status Never   Smokeless Tobacco Never     (Ifpatient a smoker, smoking cessation counseling offered)    Social History     Substance and Sexual Activity   Alcohol Use Not Currently       REVIEW OF SYSTEMS:  Review of Systems    Physical Exam:      Vitals:    02/07/23 1049   BP: 132/78   Pulse: 84   Temp: 97.8 °F (36.6 °C)     Body mass index is 37.88 kg/m². Patient is a 65 y.o. male in no acute distress and alert and oriented to person, place and time. Physical Exam  Constitutional: Patient in no acute distress. Neuro: Alert and oriented to person, place and time. Psych: Mood normal, affect normal  Lungs: Respiratory effort is normal  Cardiovascular: Warm & Pink  Abdomen: Soft, non-tender, non-distended   Bladder non-tender and not distended.   Musculoskeletal: Normal gait and station      Assessment and Plan      1. BPH with obstruction/lower urinary tract symptoms    2. Family history of prostate cancer    3. History of stroke           Plan:     He is doing better on flomax, LUTS and PVR improved- will continue. Encouraged continuation of conservative measures as well (timed and double voiding, avoiding irritants and constipation). Patient will f/u in 4 mos, if no improvement in urinary symptoms or worsening- would do UDS and cysto. He wanted to repeat a PSA given fhx prostate cancer, order was placed but he did not complete. PSA was normal 2 years ago. Return in 4 mos or sooner if needed. Return in about 4 months (around 6/7/2023) for PVR. Prescriptions Ordered:  Orders Placed This Encounter   Medications    tamsulosin (FLOMAX) 0.4 MG capsule     Sig: Take 1 capsule by mouth daily     Dispense:  90 capsule     Refill:  3       Orders Placed:  Orders Placed This Encounter   Procedures    OH OMARI POST-VOIDING RESIDUAL URINE&/BLADDER CAP           MARKUS Michael - CNP    Reviewed and agree with the ROS entered by the MA.

## 2023-02-09 DIAGNOSIS — I42.8 OTHER CARDIOMYOPATHY (HCC): ICD-10-CM

## 2023-02-09 DIAGNOSIS — I25.118 CORONARY ARTERY DISEASE OF NATIVE ARTERY OF NATIVE HEART WITH STABLE ANGINA PECTORIS (HCC): ICD-10-CM

## 2023-02-09 RX ORDER — CLOPIDOGREL BISULFATE 75 MG/1
TABLET ORAL
Qty: 90 TABLET | Refills: 0 | Status: SHIPPED | OUTPATIENT
Start: 2023-02-09

## 2023-02-09 NOTE — TELEPHONE ENCOUNTER
----- Message from Kylah Choudhary sent at 2/9/2023  1:32 PM EST -----  Subject: Refill Request    QUESTIONS  Name of Medication? clopidogrel (PLAVIX) 75 MG tablet  Patient-reported dosage and instructions? 1 per day  How many days do you have left? 0  Preferred Pharmacy? [address] [facility]  Pharmacy phone number (if available)? [phone]  Additional Information for Provider? 90 day supply  ---------------------------------------------------------------------------  --------------  CALL BACK INFO  What is the best way for the office to contact you? OK to leave message on   voicemail  Preferred Call Back Phone Number? [phone]  ---------------------------------------------------------------------------  --------------  SCRIPT ANSWERS  Relationship to Patient? Other  Representative Name? Zackery Amaral  Is the Representative on the appropriate HIPAA document in Epic?  Yes

## 2023-02-09 NOTE — TELEPHONE ENCOUNTER
Please Approve or Refuse.   Send to Pharmacy per Pt's Request: [facility]     Next Visit Date:  Visit date not found   Last Visit Date: 11/23/2022    Hemoglobin A1C (%)   Date Value   09/02/2022 6.3   09/23/2021 6.3   03/25/2021 6.5 (H)             ( goal A1C is < 7)   BP Readings from Last 3 Encounters:   02/07/23 132/78   01/31/23 114/70   01/18/23 133/74          (goal 120/80)  BUN   Date Value Ref Range Status   10/17/2022 14 8 - 23 mg/dL Final     Creatinine   Date Value Ref Range Status   10/17/2022 0.61 (L) 0.70 - 1.20 mg/dL Final     Potassium   Date Value Ref Range Status   10/17/2022 4.4 3.7 - 5.3 mmol/L Final

## 2023-02-16 NOTE — PROGRESS NOTES
GI CLINIC FOLLOW UP    INTERVAL HISTORY:   No referring provider defined for this encounter. Chief Complaint   Patient presents with    Dysphagia     Patient c/o trouble swallowing that is getting worse. He also c/o indigestion. He is currently taking Protonix 40mg daily    Colon Polyps     Patient is f/u on colonoscopy for polyps with high grade dysplasia                       HISTORY OF PRESENT ILLNESS: Sophia Mo is a 65 y.o. male , referred for evaluation of dysphagia, abdominal pain weight loss which has resolved, and now with dysplastic colon polyp. Here for f/u , seen last visit with above   Patient had a stroke and he had a left-sided weakness   Last visit   Ordered Video esophagogram, somehow was not done, and the patient unfortunately still having issues he said when he tried to take his pills he started having hard time with that and sometimes with his liquid he will choke, the patient had left-sided stroke and we want to make sure he does not have any oropharyngeal dysphagia. Also we did   Colon   The first colonoscopy in January 4, showed polyps in the rectum, which was removed and is showing high-grade dysplasia on the biopsy. In addition to that there was multiple other polyps as described in the colonoscopy report. But none of them have any dysplasia except the one in the rectum,, for which I went ahead and repeated his colonoscopy removed all the other polyps, and followed on that rectal polyp area, the second colonoscopy in January 31, showed a very nice ulcer which has healed and no remanent of the polyp was seen all, nevertheless because of the folds are converging into this ulcer I went ahead and took multiple biopsies from them, they were very soft to biopsy, and the biopsy showing no adenoma or dysplasia for which the rectal polypectomy is complete.     During the second colonoscopy the patient ileocecal valve showed a little bit of inflammation I took biopsies from that and that is coming back negative. The patient had no abdominal pain no weight loss no bleeding no nausea no vomiting  The only complaint he has is that dysphagia as stated above  EGD was done please refer to the result  He is on Protonix he has no acid reflux exacerbation at this time          The prep was good. Findings:  Terminal ileum: normal  Rectosigmoid polyps were removed 3 of them the largest one was 9 mm  The removed with cold snare but in 1 jar     Is a polyp at this 60 cm from the anal verge measuring around 1 cm which was removed with a hot snare. The ileocecal valve has what it looks to be a lipoma with submucosal bulging in one of the lips, I went ahead and took multiple biopsies from that to confirm      The polypectomy site which was inked, and located in the sigmoid colon, where the site of the sigmoid polyp removed in the last visit a 25 cm looks very clean but we biopsied the ulcer area      The inked area in the rectum where the polypectomy site recognized with the ulcer measuring around 7 mm looks very clean to me nevertheless there is a converge of folds the worse it, but they are all look soft and benign I went ahead and biopsied around this ulcer. Large hemorrhoids      Withdrawal Time was (minutes): 19     The colon was decompressed and the scope was removed. The patient tolerated the procedure well. Recommendations/Plan:   Lifestyle and dietary modifications as discussed  F/U Biopsies  F/U In OfficeYes  Discussed with the family  Repeat flexible sigmoidoscopy [id number] and follow on the rectal area     Electronically signed by Debbie Rice MD  on 1/31/2023 at 11:25 AM   -- Diagnosis --   A. Colon, rectosigmoid, colonoscopic polypectomy:   Fragments of adenomatous polyp with mixed tubular and serrated   features.      B.  Colon, ileocecal valve submucosal lesion, colonoscopic biopsy:   Superficial small intestinal and colonic mucosa fragments, negative   for dysplasia or neoplasm. C.  Colon, sigmoid at 60 cm, colonoscopic polypectomy:   Fragments of adenomatous polyp, tubular type. D.  Colon, sigmoid polypectomy site, colonoscopic biopsy:   Hyperplastic polyp. D.  Rectum, polypectomy site, colonoscopic biopsy:   Benign colorectal mucosa, negative for dysplasia. Mauro Saldana   **Electronically Signed Out**         sf/2/2/2023     Past Medical,Family, and Social History reviewed and does contribute to the patient presentingcondition. Patient's PMH/PSH,SH,PSYCH Hx, MEDs, ALLERGIES, and ROS were all reviewed and updated in the appropriate sections.     PAST MEDICAL HISTORY:  Past Medical History:   Diagnosis Date    Acquired skull defect     Angina pectoris ([address]     Anxiety     Anxiety     At high risk for falls     Ataxia following nontraumatic intracerebral hemorrhage     Bronchitis with asthma, acute 11/24/2015    CAD (coronary artery disease)     Carotid stenosis     Left    Carotid stenosis, left 02/02/2016    Chest pain     Compression of brainstem (HCC)     Diabetes mellitus (HCC)     borderline patient off metformin    Diplopia     Dysphagia     Gait instability     GERD (gastroesophageal reflux disease)     H/O heart artery stent     x5    Heart attack ([address] 08/05/2019    Hyperlipidemia     Hypertension     Hyponatremia     Intermittent lightheadedness     Myocardiopathy (HCC)     Neuropathy     Obesity     Osteoarthritis     both knees    Pericardial tamponade     Spontaneous intracranial hemorrhage ([address]     Stroke, hemorrhagic ([address] 08/18/2019       Past Surgical History:   Procedure Laterality Date    CAROTID ENDARTERECTOMY Left 02/02/2016    COLONOSCOPY      COLONOSCOPY N/A 1/4/2023    COLONOSCOPY POLYPECTOMY SNARE/COLD BIOPSY performed by Walt Pabon MD [address] 1/31/2023    COLONOSCOPY POLYPECTOMY SNARE/ HOT BIOPSY performed by Walt Pabon MD at [facility]      \"total 5 stents\"    CRANIOTOMY      IR BIOPSY THYROID PERC CORE NEEDLE  11/14/2022    IR BIOPSY THYROID PERC CORE NEEDLE 11/14/2022 [facility] PROCED[facility]    JOINT REPLACEMENT Left     knee    SHOULDER SURGERY Right     collar bone    TONSILLECTOMY AND ADENOIDECTOMY      UPPER GASTROINTESTINAL ENDOSCOPY N/A 1/4/2023    EGD BIOPSY performed by Todd Diaz MD at [address]:    Current Outpatient Medications:     clopidogrel (PLAVIX) 75 MG tablet, Take 1 tablet by mouth once daily, Disp: 90 tablet, Rfl: 0    tamsulosin (FLOMAX) 0.4 MG capsule, Take 1 capsule by mouth daily, Disp: 90 capsule, Rfl: 3    lisinopril (PRINIVIL;ZESTRIL) 20 MG tablet, Take 1 tablet by mouth once daily, Disp: 90 tablet, Rfl: 0    metFORMIN (GLUCOPHAGE-XR) 500 MG extended release tablet, Take 1 tablet by mouth once daily with breakfast, Disp: 90 tablet, Rfl: 0    amLODIPine (NORVASC) 5 MG tablet, Take 5 mg by mouth daily \"Not taking at this needs refill. \", Disp: , Rfl:     DULoxetine (CYMBALTA) 30 MG extended release capsule, Take 1 capsule by mouth daily, Disp: 90 capsule, Rfl: 1    vitamin D (ERGOCALCIFEROL) 1.25 MG (83960 UT) CAPS capsule, Take 1 capsule by mouth once a week, Disp: 12 capsule, Rfl: 1    indomethacin (INDOCIN) 25 MG capsule, Take 1 capsule by mouth 2 times daily (with meals) for 7 days, Disp: 30 capsule, Rfl: 0    atorvastatin (LIPITOR) 80 MG tablet, Take 1 tablet by mouth nightly, Disp: 90 tablet, Rfl: 0    metoprolol succinate (TOPROL XL) 50 MG extended release tablet, Take 1 tab daily, Disp: 90 tablet, Rfl: 1    pantoprazole (PROTONIX) 40 MG tablet, Take 1 tablet by mouth every morning (before breakfast), Disp: 90 tablet, Rfl: 0    Multiple Vitamins-Minerals (MULTIVITAMIN ADULTS 50+) TABS, Take by mouth daily, Disp: , Rfl:     nitroGLYCERIN (NITROSTAT) 0.4 MG SL tablet, up to max of 3 total doses.  If no relief after 1 dose, call 911., Disp: 25 tablet, Rfl: 3    ALLERGIES:   No Known Allergies    FAMILY HISTORY:       Problem Relation Age of Onset    Cancer Mother     Cancer Father     Liver Cancer Brother     Esophageal Cancer Brother          SOCIAL HISTORY:   Social History     Socioeconomic History    Marital status:      Spouse name: Not on file    Number of children: Not on file    Years of education: Not on file    Highest education level: Not on file   Occupational History    Not on file   Tobacco Use    Smoking status: Never    Smokeless tobacco: Never   Vaping Use    Vaping Use: Never used   Substance and Sexual Activity    Alcohol use: Not Currently    Drug use: No    Sexual activity: Not on file   Other Topics Concern    Not on file   Social History Narrative    Not on file     Social Determinants of Health     Financial Resource Strain: High Risk    Difficulty of Paying Living Expenses: Hard   Food Insecurity: Food Insecurity Present    Worried About Running Out of Food in the Last Year: Sometimes true    Ran Out of Food in the Last Year: Sometimes true   Transportation Needs: Not on file   Physical Activity: Insufficiently Active    Days of Exercise per Week: 7 days    Minutes of Exercise per Session: 10 min   Stress: Not on file   Social Connections: Not on file   Intimate Partner Violence: Not on file   Housing Stability: Not on file       REVIEW OF SYSTEMS: A 12-point review of systemswas obtained and pertinent positives and negatives were enumerated above in the history of present illness. All other reviewed systems / symptoms were negative. Review of Systems   Constitutional:  Positive for fatigue. Negative for appetite change and unexpected weight change. HENT:  Positive for trouble swallowing. Respiratory:  Negative for cough, choking, shortness of breath and wheezing. Cardiovascular:  Negative for chest pain, palpitations and leg swelling. Gastrointestinal:  Positive for abdominal pain (indigestion).  Negative for abdominal distention, anal bleeding, blood in stool, constipation, diarrhea, nausea, rectal pain and vomiting. Genitourinary:  Negative for difficulty urinating. Allergic/Immunologic: Negative for environmental allergies and food allergies. Neurological:  Negative for dizziness, weakness, light-headedness, numbness and headaches. Hematological:  Bruises/bleeds easily. Psychiatric/Behavioral:  Positive for sleep disturbance. The patient is not nervous/anxious. Depressed         LABORATORY DATA: Reviewed  Lab Results   Component Value Date    WBC 5.1 10/17/2022    HGB 13.7 10/17/2022    HCT 40.0 (L) 10/17/2022    MCV 90.4 10/17/2022     10/17/2022     10/17/2022    K 4.4 10/17/2022     10/17/2022    CO2 27 10/17/2022    BUN 14 10/17/2022    CREATININE 0.61 (L) 10/17/2022    LABALBU 4.5 10/17/2022    BILITOT 0.8 10/17/2022    ALKPHOS 111 10/17/2022    AST 23 10/17/2022    ALT 22 10/17/2022    INR 1.0 09/21/2021         Lab Results   Component Value Date    RBC 4.43 (L) 10/17/2022    HGB 13.7 10/17/2022    MCV 90.4 10/17/2022    MCH 31.1 10/17/2022    MCHC 34.4 10/17/2022    RDW 13.2 10/17/2022    MPV 8.9 10/17/2022    BASOPCT 0 10/17/2022    LYMPHSABS 1.60 10/17/2022    MONOSABS 0.40 10/17/2022    NEUTROABS 3.00 10/17/2022    EOSABS 0.10 10/17/2022    BASOSABS 0.00 10/17/2022         DIAGNOSTIC TESTING:     No results found. PHYSICAL EXAMINATION: Vital signs reviewed per the nursing documentation. /68   Pulse 69   Temp 97.3 °F (36.3 °C)   Wt 259 lb (117.5 kg)   BMI 37.16 kg/m²   Body mass index is 37.16 kg/m². Physical Exam  Vitals and nursing note reviewed. Constitutional:       General: He is not in acute distress. Appearance: He is well-developed. He is not diaphoretic. HENT:      Head: Normocephalic and atraumatic. Eyes:      General: No scleral icterus. Pupils: Pupils are equal, round, and reactive to light. Neck:      Thyroid: No thyromegaly. Vascular: No JVD.       Trachea: No tracheal deviation. Cardiovascular:      Rate and Rhythm: Normal rate and regular rhythm. Heart sounds: Normal heart sounds. No murmur heard. Pulmonary:      Effort: Pulmonary effort is normal. No respiratory distress. Breath sounds: Normal breath sounds. No wheezing. Abdominal:      General: Bowel sounds are normal. There is no distension. Palpations: Abdomen is soft. There is no mass. Tenderness: There is no abdominal tenderness. There is no guarding or rebound. Musculoskeletal:         General: No tenderness. Normal range of motion. Cervical back: Normal range of motion and neck supple. Skin:     General: Skin is warm. Coloration: Skin is not pale. Findings: No erythema or rash. Comments: He is not diaphoretic   Neurological:      Mental Status: He is alert and oriented to person, place, and time. Deep Tendon Reflexes: Reflexes are normal and symmetric. Psychiatric:         Behavior: Behavior normal.         Thought Content: Thought content normal.         Judgment: Judgment normal.         IMPRESSION: Mr. Sena Page is a 65 y.o. male with      Diagnosis Orders   1. Dysplastic polyp of colon        2. Esophageal dysphagia  SLP videofluoroscopic swallow study      3. Pharyngeal dysphagia        4.  Weight loss          As far as the dysplastic polyp in the rectum seems to be completely resected  Follow-up exam was very good  We will repeat his colonoscopy in 1 year especially with a number of polyps he has and the large polyps he had    As far as the dysphagia we will get have to rule out oropharyngeal dysphagia related to his previous stroke for which unguinal reorder the video esophagram on him  Meanwhile we will continue with the PPI    Diet/life style/natural hx /complication of the dx were all explained in details   Past medical, past surgical, social history, psychiatric history, medications or allergies, all reviewed and  updated    Thank you for allowing me to participate in the care of Mr. Daniel Fernandez. For any further questions please do not hesitate to contact me. I have reviewed and agree with the ROS entered by the MA/RN. Note is dictated utilizing voice recognition software. Unfortunately this leads to occasional typographical errors. Please contact our office if you have any questions.       Yuridia Henderson MD  [facility]  O: #[phone]

## 2023-02-22 ENCOUNTER — OFFICE VISIT (OUTPATIENT)
Dept: GASTROENTEROLOGY | Age: 78
End: 2023-02-22
Payer: MEDICARE

## 2023-02-22 VITALS
HEART RATE: 69 BPM | SYSTOLIC BLOOD PRESSURE: 128 MMHG | WEIGHT: 259 LBS | BODY MASS INDEX: 37.16 KG/M2 | TEMPERATURE: 97.3 F | DIASTOLIC BLOOD PRESSURE: 68 MMHG

## 2023-02-22 DIAGNOSIS — R13.19 ESOPHAGEAL DYSPHAGIA: ICD-10-CM

## 2023-02-22 DIAGNOSIS — R13.13 PHARYNGEAL DYSPHAGIA: ICD-10-CM

## 2023-02-22 DIAGNOSIS — R63.4 WEIGHT LOSS: ICD-10-CM

## 2023-02-22 DIAGNOSIS — K63.5 DYSPLASTIC POLYP OF COLON: Primary | ICD-10-CM

## 2023-02-22 PROCEDURE — 99214 OFFICE O/P EST MOD 30 MIN: CPT | Performed by: INTERNAL MEDICINE

## 2023-02-22 PROCEDURE — 3078F DIAST BP <80 MM HG: CPT | Performed by: INTERNAL MEDICINE

## 2023-02-22 PROCEDURE — 3074F SYST BP LT 130 MM HG: CPT | Performed by: INTERNAL MEDICINE

## 2023-02-22 PROCEDURE — G8484 FLU IMMUNIZE NO ADMIN: HCPCS | Performed by: INTERNAL MEDICINE

## 2023-02-22 PROCEDURE — 1036F TOBACCO NON-USER: CPT | Performed by: INTERNAL MEDICINE

## 2023-02-22 PROCEDURE — 1123F ACP DISCUSS/DSCN MKR DOCD: CPT | Performed by: INTERNAL MEDICINE

## 2023-02-22 PROCEDURE — G8417 CALC BMI ABV UP PARAM F/U: HCPCS | Performed by: INTERNAL MEDICINE

## 2023-02-22 PROCEDURE — G8427 DOCREV CUR MEDS BY ELIG CLIN: HCPCS | Performed by: INTERNAL MEDICINE

## 2023-02-22 ASSESSMENT — ENCOUNTER SYMPTOMS
SHORTNESS OF BREATH: 0
ABDOMINAL DISTENTION: 0
BLOOD IN STOOL: 0
CHOKING: 0
ABDOMINAL PAIN: 1
ANAL BLEEDING: 0
RECTAL PAIN: 0
COUGH: 0
NAUSEA: 0
TROUBLE SWALLOWING: 1
VOMITING: 0
CONSTIPATION: 0
WHEEZING: 0
DIARRHEA: 0

## 2023-03-07 ENCOUNTER — CARE COORDINATION (OUTPATIENT)
Dept: CARE COORDINATION | Age: 78
End: 2023-03-07

## 2023-03-07 NOTE — CARE COORDINATION
Ambulatory Care Coordination Note  3/7/2023    Patient [place] Location[place] [facility] [place] [address] [facility] [address] contacted the family by telephone. Verified name and  with family as identifiers. Provided introduction to self, and explanation of the ACM role. Challenges to be reviewed by the provider   Additional needs identified to be addressed with provider: No  none               Method of communication with provider: none. ACM: Maris Pond RN  Natalia Pacheco for follow up and spoke with his wife, Carol Tanner. She states that Nabila Romo is doing ok. He has not had any falls. She said he is still doing exercises with the [address]. She denies that he has any symptoms. He had his colonoscopy and had multiple polyps. They removed several. He will need a follow up colonoscopy in 3-6 months. He had f/u with urology. He has all of his medications and is able to manage the copays. She denies any needs, questions, or concerns, but did say his walker is getting wobbly and wanted to know if his provider would write a prescription for a new 4-wheeled walker, with big wheels. She has no preference on DME company. Will send request to provider. Will graduate from [employer]. She has contact number for any future needs. Offered patient enrollment in the Remote Patient Monitoring (RPM) program for in-home monitoring: NA.     Lab Results       None            Care Coordination Interventions    Referral from Primary Care Provider: Yes  Suggested Interventions and Community Resources  Physical Therapy: Completed  Social Work: Completed  Zone Management Tools: Completed          Goals Addressed    None         Future Appointments   Date Time Provider Nay Arreola   2023  2:45 PM MD john Hawkins [facility]   2023 10:30 AM MARKUS Guzmán - CNP [facility]   2023 10:30 AM Michelle Samuel MD [facility]

## 2023-03-08 ENCOUNTER — TELEPHONE (OUTPATIENT)
Dept: FAMILY MEDICINE CLINIC | Age: 78
End: 2023-03-08

## 2023-03-08 DIAGNOSIS — Z91.81 AT HIGH RISK FOR FALLS: ICD-10-CM

## 2023-03-08 DIAGNOSIS — M51.36 LUMBAR DEGENERATIVE DISC DISEASE: Primary | ICD-10-CM

## 2023-03-08 NOTE — TELEPHONE ENCOUNTER
Birgit Vigil' wife, Charla Ramirez, states his walker is getting wobbly. She is requesting an order for a new walker. She said he needs a 4-wheeled walker with big wheels to accommodate his weight. No preference in DME provider. Thank you. Health Maintenance   Topic Date Due    DTaP/Tdap/Td vaccine (1 - Tdap) Never done    Shingles vaccine (1 of 2) Never done    COVID-19 Vaccine (3 - Booster for Moderna series) 04/29/2021    Lipids  10/17/2023    Depression Monitoring  11/23/2023    Annual Wellness Visit (AWV)  11/24/2023    Flu vaccine  Completed    Pneumococcal 65+ years Vaccine  Completed    Hepatitis C screen  Completed    Hepatitis A vaccine  Aged Out    Hib vaccine  Aged Out    Meningococcal (ACWY) vaccine  Aged Out             (applicable per patient's age: Cancer Screenings, Depression Screening, Fall Risk Screening, Immunizations)    Hemoglobin A1C (%)   Date Value   09/02/2022 6.3   09/23/2021 6.3   03/25/2021 6.5 (H)     Microalb/Crt.  Ratio (mcg/mg creat)   Date Value   10/17/2022 Can not be calculated     LDL Cholesterol (mg/dL)   Date Value   10/17/2022 72     AST (U/L)   Date Value   10/17/2022 23     ALT (U/L)   Date Value   10/17/2022 22     BUN (mg/dL)   Date Value   10/17/2022 14      (goal A1C is < 7)   (goal LDL is <100) need 30-50% reduction from baseline     BP Readings from Last 3 Encounters:   02/22/23 128/68   02/07/23 132/78   01/31/23 114/70    (goal /80)      All Future Testing planned in [facility]:  Lab Frequency Next Occurrence   Uric Acid Once 10/13/2022   Thyroid Peroxidase Antibody Once 10/19/2022   Thyroid Stimulating Receptor Antibody Once 10/19/2022   Thyroglobulin Once 10/19/2022   PSA Screening Once 01/10/2023   COLONOSCOPY W/ OR W/O BIOPSY Once 01/18/2023   SLP videofluoroscopic swallow study Once 01/18/2023   SLP videofluoroscopic swallow study Once 02/22/2023       Next Visit Date:  Future Appointments   Date Time Provider Nay Arreola   4/4/2023  2:45 PM [facility]   6/13/2023 10:30 AM Claudette Fleming, APRN - CNP [facility]   11/27/2023 10:30 AM [facility]            Patient Active Problem List:     Impaired fasting glucose     Patient overweight     Insomnia     Essential hypertension     OA (osteoarthritis) of knee     S/P total knee arthroplasty     Lymphadenopathy of left cervical region     Carotid stenosis, left     H/O carotid endarterectomy     Thyroid nodule     Ventral hernia without obstruction or gangrene     Myocardiopathy (HCC)     Spontaneous intracranial hemorrhage (HCC)     Diplopia     Ataxia following nontraumatic intracerebral hemorrhage     Neuropathy     Coronary artery disease of native artery of native heart with stable angina pectoris (HCC)     PVD (peripheral vascular disease) [address]     Morbidly obese (HCC)     At high risk for falls     Mixed hyperlipidemia     Prediabetes     Pericardial tamponade     Obesity (BMI 35.0-39.9 without comorbidity)     Hx of heart artery stent     Type 2 diabetes mellitus without complication, without long-term current use of insulin (HCC)     Pharyngeal dysphagia     Weight loss     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 (HCC)     Lumbar degenerative disc disease     Moderate episode of recurrent major depressive disorder (HCC)     Chronic fatigue syndrome

## 2023-03-28 DIAGNOSIS — I10 ESSENTIAL HYPERTENSION: ICD-10-CM

## 2023-03-28 RX ORDER — LISINOPRIL 20 MG/1
TABLET ORAL
Qty: 90 TABLET | Refills: 0 | Status: SHIPPED | OUTPATIENT
Start: 2023-03-28

## 2023-03-28 NOTE — TELEPHONE ENCOUNTER
Please Approve or Refuse.   Send to Pharmacy per Pt's Request: [facility]      Next Visit Date:  Visit date not found   Last Visit Date: 11/23/2022    Hemoglobin A1C (%)   Date Value   09/02/2022 6.3   09/23/2021 6.3   03/25/2021 6.5 (H)             ( goal A1C is < 7)   BP Readings from Last 3 Encounters:   02/22/23 128/68   02/07/23 132/78   01/31/23 114/70          (goal 120/80)  BUN   Date Value Ref Range Status   10/17/2022 14 8 - 23 mg/dL Final     Creatinine   Date Value Ref Range Status   10/17/2022 0.61 (L) 0.70 - 1.20 mg/dL Final     Potassium   Date Value Ref Range Status   10/17/2022 4.4 3.7 - 5.3 mmol/L Final

## 2023-04-05 DIAGNOSIS — R73.01 IMPAIRED FASTING GLUCOSE: ICD-10-CM

## 2023-04-05 RX ORDER — METFORMIN HYDROCHLORIDE 500 MG/1
TABLET, EXTENDED RELEASE ORAL
Qty: 90 TABLET | Refills: 0 | Status: SHIPPED | OUTPATIENT
Start: 2023-04-05

## 2023-04-05 NOTE — TELEPHONE ENCOUNTER
Please Approve or Refuse.   Send to Pharmacy per Pt's Request:      Next Visit Date:  Visit date not found   Last Visit Date: 11/23/2022    Hemoglobin A1C (%)   Date Value   09/02/2022 6.3   09/23/2021 6.3   03/25/2021 6.5 (H)             ( goal A1C is < 7)   BP Readings from Last 3 Encounters:   02/22/23 128/68   02/07/23 132/78   01/31/23 114/70          (goal 120/80)  BUN   Date Value Ref Range Status   10/17/2022 14 8 - 23 mg/dL Final     Creatinine   Date Value Ref Range Status   10/17/2022 0.61 (L) 0.70 - 1.20 mg/dL Final     Potassium   Date Value Ref Range Status   10/17/2022 4.4 3.7 - 5.3 mmol/L Final

## 2023-04-27 DIAGNOSIS — D12.5 ADENOMA OF SIGMOID COLON: ICD-10-CM

## 2023-05-01 DIAGNOSIS — I10 ESSENTIAL HYPERTENSION: ICD-10-CM

## 2023-05-01 DIAGNOSIS — I25.118 CORONARY ARTERY DISEASE OF NATIVE ARTERY OF NATIVE HEART WITH STABLE ANGINA PECTORIS (HCC): ICD-10-CM

## 2023-05-01 RX ORDER — METOPROLOL SUCCINATE 50 MG/1
TABLET, EXTENDED RELEASE ORAL
Qty: 90 TABLET | Refills: 0 | Status: SHIPPED | OUTPATIENT
Start: 2023-05-01

## 2023-06-01 ENCOUNTER — TELEPHONE (OUTPATIENT)
Dept: NEUROLOGY | Age: 78
End: 2023-06-01

## 2023-06-01 NOTE — TELEPHONE ENCOUNTER
Mrs. Tammie Dupont din today. She said that her  dveloped numbness in his hand and fingers about one week ago. She said it is not any better and has persisted. He has had a hx. of stroke. Advised her that it would be best for him to go to eD. He may have had another stroke. He is scheduled for a follow up with Dr. Zack Quiñones in Sept. and have put him on the cancellation list. He was last seen in April of 2022 over one year ago. She voiced understanding.

## 2023-06-09 DIAGNOSIS — I42.8 OTHER CARDIOMYOPATHY (HCC): ICD-10-CM

## 2023-06-09 DIAGNOSIS — I25.118 CORONARY ARTERY DISEASE OF NATIVE ARTERY OF NATIVE HEART WITH STABLE ANGINA PECTORIS (HCC): ICD-10-CM

## 2023-06-09 RX ORDER — CLOPIDOGREL BISULFATE 75 MG/1
TABLET ORAL
Qty: 90 TABLET | Refills: 0 | Status: SHIPPED | OUTPATIENT
Start: 2023-06-09

## 2023-06-09 NOTE — TELEPHONE ENCOUNTER
Please Approve or Refuse.   Send to Pharmacy per Pt's Request: Toney Estevezon      Next Visit Date:  11/27/2023   Last Visit Date: 11/23/2022    Hemoglobin A1C (%)   Date Value   09/02/2022 6.3   09/23/2021 6.3   03/25/2021 6.5 (H)             ( goal A1C is < 7)   BP Readings from Last 3 Encounters:   02/22/23 128/68   02/07/23 132/78   01/31/23 114/70          (goal 120/80)  BUN   Date Value Ref Range Status   10/17/2022 14 8 - 23 mg/dL Final     Creatinine   Date Value Ref Range Status   10/17/2022 0.61 (L) 0.70 - 1.20 mg/dL Final     Potassium   Date Value Ref Range Status   10/17/2022 4.4 3.7 - 5.3 mmol/L Final

## 2023-06-20 DIAGNOSIS — I25.118 CORONARY ARTERY DISEASE OF NATIVE ARTERY OF NATIVE HEART WITH STABLE ANGINA PECTORIS (HCC): ICD-10-CM

## 2023-06-20 DIAGNOSIS — E55.9 VITAMIN D DEFICIENCY: ICD-10-CM

## 2023-06-20 DIAGNOSIS — I42.8 OTHER CARDIOMYOPATHY (HCC): ICD-10-CM

## 2023-06-20 DIAGNOSIS — R73.01 IMPAIRED FASTING GLUCOSE: ICD-10-CM

## 2023-06-20 DIAGNOSIS — I10 ESSENTIAL HYPERTENSION: ICD-10-CM

## 2023-06-20 RX ORDER — METOPROLOL SUCCINATE 50 MG/1
TABLET, EXTENDED RELEASE ORAL
Qty: 90 TABLET | Refills: 0 | Status: SHIPPED | OUTPATIENT
Start: 2023-06-20

## 2023-06-20 RX ORDER — METFORMIN HYDROCHLORIDE 500 MG/1
TABLET, EXTENDED RELEASE ORAL
Qty: 90 TABLET | Refills: 0 | Status: SHIPPED | OUTPATIENT
Start: 2023-06-20

## 2023-06-20 RX ORDER — LISINOPRIL 20 MG/1
TABLET ORAL
Qty: 90 TABLET | Refills: 0 | Status: SHIPPED | OUTPATIENT
Start: 2023-06-20

## 2023-06-20 RX ORDER — ERGOCALCIFEROL 1.25 MG/1
50000 CAPSULE ORAL WEEKLY
Qty: 12 CAPSULE | Refills: 0 | Status: SHIPPED | OUTPATIENT
Start: 2023-06-20

## 2023-06-20 NOTE — TELEPHONE ENCOUNTER
Please Approve or Refuse.   Send to Pharmacy per Pt's Request: Jama Cline      Next Visit Date:  6/21/2023   Last Visit Date: 11/23/2022    Hemoglobin A1C (%)   Date Value   09/02/2022 6.3   09/23/2021 6.3   03/25/2021 6.5 (H)             ( goal A1C is < 7)   BP Readings from Last 3 Encounters:   06/15/23 (!) 166/80   02/22/23 128/68   02/07/23 132/78          (goal 120/80)  BUN   Date Value Ref Range Status   10/17/2022 14 8 - 23 mg/dL Final     Creatinine   Date Value Ref Range Status   10/17/2022 0.61 (L) 0.70 - 1.20 mg/dL Final     Potassium   Date Value Ref Range Status   10/17/2022 4.4 3.7 - 5.3 mmol/L Final

## 2023-06-20 NOTE — TELEPHONE ENCOUNTER
Please Approve or Refuse.   Send to Pharmacy per Pt's Request: [facility]      Next Visit Date:  6/20/2023   Last Visit Date: 11/23/2022    Hemoglobin A1C (%)   Date Value   09/02/2022 6.3   09/23/2021 6.3   03/25/2021 6.5 (H)             ( goal A1C is < 7)   BP Readings from Last 3 Encounters:   06/15/23 (!) 166/80   02/22/23 128/68   02/07/23 132/78          (goal 120/80)  BUN   Date Value Ref Range Status   10/17/2022 14 8 - 23 mg/dL Final     Creatinine   Date Value Ref Range Status   10/17/2022 0.61 (L) 0.70 - 1.20 mg/dL Final     Potassium   Date Value Ref Range Status   10/17/2022 4.4 3.7 - 5.3 mmol/L Final

## 2023-06-21 ENCOUNTER — OFFICE VISIT (OUTPATIENT)
Dept: FAMILY MEDICINE CLINIC | Age: 78
End: 2023-06-21
Payer: MEDICARE

## 2023-06-21 VITALS
SYSTOLIC BLOOD PRESSURE: 116 MMHG | BODY MASS INDEX: 37.05 KG/M2 | HEART RATE: 72 BPM | DIASTOLIC BLOOD PRESSURE: 62 MMHG | HEIGHT: 70 IN | TEMPERATURE: 97.6 F | OXYGEN SATURATION: 94 % | WEIGHT: 258.8 LBS

## 2023-06-21 DIAGNOSIS — E66.01 SEVERE OBESITY (BMI 35.0-39.9) WITH COMORBIDITY (HCC): ICD-10-CM

## 2023-06-21 DIAGNOSIS — E11.9 TYPE 2 DIABETES MELLITUS WITHOUT COMPLICATION, WITHOUT LONG-TERM CURRENT USE OF INSULIN (HCC): Primary | ICD-10-CM

## 2023-06-21 DIAGNOSIS — I10 ESSENTIAL HYPERTENSION: ICD-10-CM

## 2023-06-21 DIAGNOSIS — E55.9 VITAMIN D DEFICIENCY: ICD-10-CM

## 2023-06-21 DIAGNOSIS — R53.83 OTHER FATIGUE: ICD-10-CM

## 2023-06-21 DIAGNOSIS — Z11.59 NEED FOR HEPATITIS C SCREENING TEST: ICD-10-CM

## 2023-06-21 DIAGNOSIS — M19.041 OSTEOARTHRITIS OF RIGHT HAND, UNSPECIFIED OSTEOARTHRITIS TYPE: ICD-10-CM

## 2023-06-21 DIAGNOSIS — E78.2 MIXED HYPERLIPIDEMIA: ICD-10-CM

## 2023-06-21 DIAGNOSIS — M79.641 RIGHT HAND PAIN: ICD-10-CM

## 2023-06-21 LAB — HBA1C MFR BLD: 6.1 %

## 2023-06-21 PROCEDURE — 1123F ACP DISCUSS/DSCN MKR DOCD: CPT | Performed by: NURSE PRACTITIONER

## 2023-06-21 PROCEDURE — 3078F DIAST BP <80 MM HG: CPT | Performed by: NURSE PRACTITIONER

## 2023-06-21 PROCEDURE — 99214 OFFICE O/P EST MOD 30 MIN: CPT | Performed by: NURSE PRACTITIONER

## 2023-06-21 PROCEDURE — 3044F HG A1C LEVEL LT 7.0%: CPT | Performed by: NURSE PRACTITIONER

## 2023-06-21 PROCEDURE — 83037 HB GLYCOSYLATED A1C HOME DEV: CPT | Performed by: NURSE PRACTITIONER

## 2023-06-21 PROCEDURE — 3074F SYST BP LT 130 MM HG: CPT | Performed by: NURSE PRACTITIONER

## 2023-06-21 RX ORDER — CLOPIDOGREL BISULFATE 75 MG/1
TABLET ORAL
Qty: 90 TABLET | Refills: 0 | OUTPATIENT
Start: 2023-06-21

## 2023-06-21 SDOH — ECONOMIC STABILITY: INCOME INSECURITY: HOW HARD IS IT FOR YOU TO PAY FOR THE VERY BASICS LIKE FOOD, HOUSING, MEDICAL CARE, AND HEATING?: SOMEWHAT HARD

## 2023-06-21 SDOH — ECONOMIC STABILITY: FOOD INSECURITY: WITHIN THE PAST 12 MONTHS, THE FOOD YOU BOUGHT JUST DIDN'T LAST AND YOU DIDN'T HAVE MONEY TO GET MORE.: NEVER TRUE

## 2023-06-21 SDOH — ECONOMIC STABILITY: FOOD INSECURITY: WITHIN THE PAST 12 MONTHS, YOU WORRIED THAT YOUR FOOD WOULD RUN OUT BEFORE YOU GOT MONEY TO BUY MORE.: NEVER TRUE

## 2023-06-21 SDOH — ECONOMIC STABILITY: HOUSING INSECURITY
IN THE LAST 12 MONTHS, WAS THERE A TIME WHEN YOU DID NOT HAVE A STEADY PLACE TO SLEEP OR SLEPT IN A SHELTER (INCLUDING NOW)?: NO

## 2023-06-21 ASSESSMENT — PATIENT HEALTH QUESTIONNAIRE - PHQ9
SUM OF ALL RESPONSES TO PHQ QUESTIONS 1-9: 14
4. FEELING TIRED OR HAVING LITTLE ENERGY: 1
SUM OF ALL RESPONSES TO PHQ QUESTIONS 1-9: 14
10. IF YOU CHECKED OFF ANY PROBLEMS, HOW DIFFICULT HAVE THESE PROBLEMS MADE IT FOR YOU TO DO YOUR WORK, TAKE CARE OF THINGS AT HOME, OR GET ALONG WITH OTHER PEOPLE: 0
SUM OF ALL RESPONSES TO PHQ QUESTIONS 1-9: 14
SUM OF ALL RESPONSES TO PHQ9 QUESTIONS 1 & 2: 6
9. THOUGHTS THAT YOU WOULD BE BETTER OFF DEAD, OR OF HURTING YOURSELF: 0
7. TROUBLE CONCENTRATING ON THINGS, SUCH AS READING THE NEWSPAPER OR WATCHING TELEVISION: 2
8. MOVING OR SPEAKING SO SLOWLY THAT OTHER PEOPLE COULD HAVE NOTICED. OR THE OPPOSITE, BEING SO FIGETY OR RESTLESS THAT YOU HAVE BEEN MOVING AROUND A LOT MORE THAN USUAL: 2
2. FEELING DOWN, DEPRESSED OR HOPELESS: 3
1. LITTLE INTEREST OR PLEASURE IN DOING THINGS: 3
SUM OF ALL RESPONSES TO PHQ QUESTIONS 1-9: 14
5. POOR APPETITE OR OVEREATING: 0
3. TROUBLE FALLING OR STAYING ASLEEP: 0
6. FEELING BAD ABOUT YOURSELF - OR THAT YOU ARE A FAILURE OR HAVE LET YOURSELF OR YOUR FAMILY DOWN: 3

## 2023-06-21 ASSESSMENT — ENCOUNTER SYMPTOMS
VOMITING: 0
ABDOMINAL DISTENTION: 0
BACK PAIN: 0
CONSTIPATION: 0
NAUSEA: 0
CHEST TIGHTNESS: 0
DIARRHEA: 0
ABDOMINAL PAIN: 0
WHEEZING: 0
SHORTNESS OF BREATH: 0
COUGH: 0

## 2023-06-21 NOTE — RESULT ENCOUNTER NOTE
Addressed during office visit today by Jh Velazquez, A1c 6.1, improved diabetes, continue treatment recommended during the office visit.    POC A1c 6.5 on 3/25/2021

## 2023-06-21 NOTE — PROGRESS NOTES
Owen Ross (:  1945) is a 65 y.o. male,Established patient, here for evaluation of the following chief complaint(s): Tingling (RIGHT HAND/THUMB; 1-3RD DIGIT/TINGLING ), Hypertension, and Diabetes      ASSESSMENT/PLAN:    Sherita Grover received counseling on the following healthy behaviors: nutrition, exercise, and medication adherence  Reviewed prior labs and health maintenance  Discussed use, benefit, and side effects of prescribed medications. Barriers to medication compliance addressed. Patient given educational materials - see patient instructions  All patient questions answered. Patient voiced understanding. The patient's past medical,surgical, social, and family history as well as his current medications and allergies were reviewed as documented in today's encounter. Medications, labs, diagnostic studies, consultations and follow-up as documented in this encounter. Sherita Grover was seen today for tingling, hypertension and diabetes. Diagnoses and all orders for this visit:    Type 2 diabetes mellitus without complication, without long-term current use of insulin (HCC)  -Improving  -Improved A1c of 6.1  -     POCT glycosylated hemoglobin (Hb A1C)  -     Lipid Panel; Future    Mixed hyperlipidemia  -Improving  -We will recheck lipids and continue plan of care  -     Lipid Panel; Future    Essential hypertension  -Improving  -Continue plan of care  -     Uric Acid; Future    Vitamin D deficiency  -Worsening  -Continue plan of care recheck vitamin D levels  -     Vitamin D 25 Hydroxy; Future    Need for hepatitis C screening test  -     Hepatitis C Antibody; Future    Right hand pain  -Worsening  -Likely osteoarthritis of the right hand  -X-ray obtained and Voltaren cream called in  -     XR HAND RIGHT (MIN 3 VIEWS); Future  -     diclofenac sodium (VOLTAREN) 1 % GEL;  Apply 4 g topically 4 times daily    Other fatigue  -Stable  -     CBC with Auto Differential; Future  -     Comprehensive Metabolic

## 2023-06-21 NOTE — PROGRESS NOTES
Visit Information    Have you changed or started any medications since your last visit including any over-the-counter medicines, vitamins, or herbal medicines? no   Have you stopped taking any of your medications? Is so, why? -  no  Are you having any side effects from any of your medications? - no    Have you seen any other physician or provider since your last visit? yes -    Have you had any other diagnostic tests since your last visit?  no   Have you been seen in the emergency room and/or had an admission in a hospital since we last saw you?  no   Have you had your routine dental cleaning in the past 6 months?  no     Do you have an active MyChart account? If no, what is the barrier?   No:     Patient Care Team:  Ade Roman MD as PCP - General (Family Medicine)  Ade Roman MD as PCP - Empaneled Provider  Stephani Shone, MD as Consulting Physician (Gastroenterology)    Medical History Review  Past Medical, Family, and Social History reviewed and does contribute to the patient presenting condition    Health Maintenance   Topic Date Due    Hepatitis C screen  Never done    DTaP/Tdap/Td vaccine (1 - Tdap) Never done    Shingles vaccine (1 of 2) Never done    COVID-19 Vaccine (3 - Booster for Jah Sluder series) 04/29/2021    Lipids  10/17/2023    Depression Monitoring  11/23/2023    Annual Wellness Visit (AWV)  11/24/2023    Colorectal Cancer Screen  01/31/2029    Flu vaccine  Completed    Pneumococcal 65+ years Vaccine  Completed    Hepatitis A vaccine  Aged Out    Hib vaccine  Aged Out    Meningococcal (ACWY) vaccine  Aged Out    A1C test (Diabetic or Prediabetic)  Discontinued

## 2023-06-23 DIAGNOSIS — G93.32 CHRONIC FATIGUE SYNDROME: ICD-10-CM

## 2023-06-23 DIAGNOSIS — F33.1 MODERATE EPISODE OF RECURRENT MAJOR DEPRESSIVE DISORDER (HCC): ICD-10-CM

## 2023-06-23 RX ORDER — DULOXETIN HYDROCHLORIDE 30 MG/1
CAPSULE, DELAYED RELEASE ORAL
Qty: 90 CAPSULE | Refills: 0 | Status: SHIPPED | OUTPATIENT
Start: 2023-06-23

## 2023-06-23 NOTE — TELEPHONE ENCOUNTER
Please Approve or Refuse.   Send to Pharmacy per Pt's Request: [id number]      Next Visit Date:  7/5/2023   Last Visit Date: 6/21/2023    Hemoglobin A1C (%)   Date Value   06/21/2023 6.1   09/02/2022 6.3   09/23/2021 6.3             ( goal A1C is < 7)   BP Readings from Last 3 Encounters:   06/21/23 116/62   06/15/23 (!) 166/80   02/22/23 128/68          (goal 120/80)  BUN   Date Value Ref Range Status   10/17/2022 14 8 - 23 mg/dL Final     Creatinine   Date Value Ref Range Status   10/17/2022 0.61 (L) 0.70 - 1.20 mg/dL Final     Potassium   Date Value Ref Range Status   10/17/2022 4.4 3.7 - 5.3 mmol/L Final

## 2023-06-27 ENCOUNTER — HOSPITAL ENCOUNTER (OUTPATIENT)
Dept: GENERAL RADIOLOGY | Age: 78
Discharge: HOME | End: 2023-06-29
Payer: MEDICARE

## 2023-06-27 ENCOUNTER — HOSPITAL ENCOUNTER (OUTPATIENT)
Age: 78
Discharge: HOME | End: 2023-06-29
Payer: MEDICARE

## 2023-06-27 ENCOUNTER — HOSPITAL ENCOUNTER (OUTPATIENT)
Age: 78
Discharge: HOME | End: 2023-06-27
Payer: MEDICARE

## 2023-06-27 DIAGNOSIS — Z11.59 NEED FOR HEPATITIS C SCREENING TEST: ICD-10-CM

## 2023-06-27 DIAGNOSIS — R53.83 OTHER FATIGUE: ICD-10-CM

## 2023-06-27 DIAGNOSIS — E78.2 MIXED HYPERLIPIDEMIA: ICD-10-CM

## 2023-06-27 DIAGNOSIS — M79.641 RIGHT HAND PAIN: ICD-10-CM

## 2023-06-27 DIAGNOSIS — E55.9 VITAMIN D DEFICIENCY: ICD-10-CM

## 2023-06-27 DIAGNOSIS — E11.9 TYPE 2 DIABETES MELLITUS WITHOUT COMPLICATION, WITHOUT LONG-TERM CURRENT USE OF INSULIN (HCC): ICD-10-CM

## 2023-06-27 DIAGNOSIS — I10 ESSENTIAL HYPERTENSION: ICD-10-CM

## 2023-06-27 LAB
25(OH)D3 SERPL-MCNC: 37.8 NG/ML
ALBUMIN SERPL-MCNC: 4.2 G/DL (ref 3.5–5.2)
ALP SERPL-CCNC: 83 U/L (ref 40–129)
ALT SERPL-CCNC: 13 U/L (ref 5–41)
ANION GAP SERPL CALCULATED.3IONS-SCNC: 9 MMOL/L (ref 9–17)
AST SERPL-CCNC: 17 U/L
BASOPHILS # BLD: 0 K/UL (ref 0–0.2)
BASOPHILS NFR BLD: 0 % (ref 0–2)
BILIRUB SERPL-MCNC: 0.6 MG/DL (ref 0.3–1.2)
BUN SERPL-MCNC: 16 MG/DL (ref 8–23)
CALCIUM SERPL-MCNC: 9.7 MG/DL (ref 8.6–10.4)
CHLORIDE SERPL-SCNC: 100 MMOL/L (ref 98–107)
CHOLEST SERPL-MCNC: 214 MG/DL
CHOLESTEROL/HDL RATIO: 5.8
CO2 SERPL-SCNC: 30 MMOL/L (ref 20–31)
CREAT SERPL-MCNC: 0.64 MG/DL (ref 0.7–1.2)
EOSINOPHIL # BLD: 0.1 K/UL (ref 0–0.4)
EOSINOPHILS RELATIVE PERCENT: 2 % (ref 0–4)
ERYTHROCYTE [DISTWIDTH] IN BLOOD BY AUTOMATED COUNT: 13.7 % (ref 11.5–14.9)
FOLATE SERPL-MCNC: >20 NG/ML
GFR SERPL CREATININE-BSD FRML MDRD: >60 ML/MIN/1.73M2
GLUCOSE SERPL-MCNC: 112 MG/DL (ref 70–99)
HCT VFR BLD AUTO: 41 % (ref 41–53)
HCV AB SERPL QL IA: NONREACTIVE
HDLC SERPL-MCNC: 37 MG/DL
HGB BLD-MCNC: 13.7 G/DL (ref 13.5–17.5)
LDLC SERPL CALC-MCNC: 148 MG/DL (ref 0–130)
LYMPHOCYTES # BLD: 30 % (ref 24–44)
LYMPHOCYTES NFR BLD: 1.7 K/UL (ref 1–4.8)
MAGNESIUM SERPL-MCNC: 1.9 MG/DL (ref 1.6–2.6)
MCH RBC QN AUTO: 30.3 PG (ref 26–34)
MCHC RBC AUTO-ENTMCNC: 33.4 G/DL (ref 31–37)
MCV RBC AUTO: 90.7 FL (ref 80–100)
MONOCYTES NFR BLD: 0.4 K/UL (ref 0.1–1.3)
MONOCYTES NFR BLD: 8 % (ref 1–7)
NEUTROPHILS NFR BLD: 60 % (ref 36–66)
NEUTS SEG NFR BLD: 3.3 K/UL (ref 1.3–9.1)
PHOSPHATE SERPL-MCNC: 3.1 MG/DL (ref 2.5–4.5)
PLATELET # BLD AUTO: 165 K/UL (ref 150–450)
PMV BLD AUTO: 9.2 FL (ref 6–12)
POTASSIUM SERPL-SCNC: 4.4 MMOL/L (ref 3.7–5.3)
PROT SERPL-MCNC: 7 G/DL (ref 6.4–8.3)
RBC # BLD AUTO: 4.52 M/UL (ref 4.5–5.9)
SODIUM SERPL-SCNC: 139 MMOL/L (ref 135–144)
TRIGL SERPL-MCNC: 147 MG/DL
URATE SERPL-MCNC: 5.1 MG/DL (ref 3.4–7)
VIT B12 SERPL-MCNC: 606 PG/ML (ref 232–1245)
WBC OTHER # BLD: 5.5 K/UL (ref 3.5–11)

## 2023-06-27 PROCEDURE — 36415 COLL VENOUS BLD VENIPUNCTURE: CPT

## 2023-06-27 PROCEDURE — 80061 LIPID PANEL: CPT

## 2023-06-27 PROCEDURE — 82306 VITAMIN D 25 HYDROXY: CPT

## 2023-06-27 PROCEDURE — 85027 COMPLETE CBC AUTOMATED: CPT

## 2023-06-27 PROCEDURE — 83735 ASSAY OF MAGNESIUM: CPT

## 2023-06-27 PROCEDURE — 82607 VITAMIN B-12: CPT

## 2023-06-27 PROCEDURE — 84100 ASSAY OF PHOSPHORUS: CPT

## 2023-06-27 PROCEDURE — 82746 ASSAY OF FOLIC ACID SERUM: CPT

## 2023-06-27 PROCEDURE — 73130 X-RAY EXAM OF HAND: CPT

## 2023-06-27 PROCEDURE — 80053 COMPREHEN METABOLIC PANEL: CPT

## 2023-06-27 PROCEDURE — 84550 ASSAY OF BLOOD/URIC ACID: CPT

## 2023-06-27 PROCEDURE — 86803 HEPATITIS C AB TEST: CPT

## 2023-07-04 ENCOUNTER — APPOINTMENT (OUTPATIENT)
Dept: GENERAL RADIOLOGY | Age: 78
End: 2023-07-04
Payer: MEDICARE

## 2023-07-04 ENCOUNTER — HOSPITAL ENCOUNTER (EMERGENCY)
Age: 78
Discharge: HOME | End: 2023-07-04
Attending: EMERGENCY MEDICINE
Payer: MEDICARE

## 2023-07-04 VITALS
DIASTOLIC BLOOD PRESSURE: 68 MMHG | OXYGEN SATURATION: 96 % | HEART RATE: 74 BPM | SYSTOLIC BLOOD PRESSURE: 130 MMHG | TEMPERATURE: 98.6 F | BODY MASS INDEX: 37.22 KG/M2 | HEIGHT: 70 IN | WEIGHT: 260 LBS | RESPIRATION RATE: 20 BRPM

## 2023-07-04 DIAGNOSIS — S20.212A CONTUSION OF LEFT CHEST WALL, INITIAL ENCOUNTER: Primary | ICD-10-CM

## 2023-07-04 PROCEDURE — 99283 EMERGENCY DEPT VISIT LOW MDM: CPT

## 2023-07-04 PROCEDURE — 71101 X-RAY EXAM UNILAT RIBS/CHEST: CPT

## 2023-07-04 ASSESSMENT — ENCOUNTER SYMPTOMS
CHEST TIGHTNESS: 0
SORE THROAT: 0
EYE PAIN: 0
COUGH: 0
RHINORRHEA: 0
DIARRHEA: 0
VOMITING: 0
CONSTIPATION: 0
SINUS PRESSURE: 0
EYE REDNESS: 0
SHORTNESS OF BREATH: 0
ABDOMINAL PAIN: 0
NAUSEA: 0
COLOR CHANGE: 0
BLOOD IN STOOL: 0
BACK PAIN: 0
TROUBLE SWALLOWING: 0
WHEEZING: 0
FACIAL SWELLING: 0
EYE DISCHARGE: 0

## 2023-07-04 NOTE — ED NOTES
Mode of arrival (squad #, walk in, police, etc) : walk-in        Chief complaint(s): fall/ left sided rib pain         Arrival Note (brief scenario, treatment PTA, etc). : Patient was walking his dog today when he fell in the grass and injured his left side of his rib. Pt did not hit his head with no LOC. C= \"Have you ever felt that you should Cut down on your drinking? \"  No  A= \"Have people Annoyed you by criticizing your drinking? \"  No  G= \"Have you ever felt bad or Guilty about your drinking? \"  No  E= \"Have you ever had a drink as an Eye-opener first thing in the morning to steady your nerves or to help a hangover? \"  No      Deferred []      Reason for deferring: N/A    *If yes to two or more: probable alcohol abuse. *      Jose Newsome RN  07/04/23 [phone]

## 2023-07-05 ENCOUNTER — OFFICE VISIT (OUTPATIENT)
Dept: FAMILY MEDICINE CLINIC | Age: 78
End: 2023-07-05
Payer: MEDICARE

## 2023-07-05 VITALS
BODY MASS INDEX: 36.65 KG/M2 | HEART RATE: 84 BPM | DIASTOLIC BLOOD PRESSURE: 72 MMHG | OXYGEN SATURATION: 96 % | HEIGHT: 70 IN | SYSTOLIC BLOOD PRESSURE: 122 MMHG | WEIGHT: 256 LBS

## 2023-07-05 DIAGNOSIS — I42.8 OTHER CARDIOMYOPATHY (HCC): ICD-10-CM

## 2023-07-05 DIAGNOSIS — S20.219S: Primary | ICD-10-CM

## 2023-07-05 DIAGNOSIS — E78.2 MIXED HYPERLIPIDEMIA: ICD-10-CM

## 2023-07-05 DIAGNOSIS — I25.118 CORONARY ARTERY DISEASE OF NATIVE ARTERY OF NATIVE HEART WITH STABLE ANGINA PECTORIS (HCC): ICD-10-CM

## 2023-07-05 DIAGNOSIS — F33.1 MODERATE EPISODE OF RECURRENT MAJOR DEPRESSIVE DISORDER (HCC): ICD-10-CM

## 2023-07-05 DIAGNOSIS — E04.1 THYROID NODULE: ICD-10-CM

## 2023-07-05 DIAGNOSIS — Z91.81 AT HIGH RISK FOR FALLS: ICD-10-CM

## 2023-07-05 DIAGNOSIS — I73.9 PVD (PERIPHERAL VASCULAR DISEASE) (HCC): ICD-10-CM

## 2023-07-05 DIAGNOSIS — M19.049 ARTHRITIS OF HAND: ICD-10-CM

## 2023-07-05 PROCEDURE — 3078F DIAST BP <80 MM HG: CPT | Performed by: FAMILY MEDICINE

## 2023-07-05 PROCEDURE — G8427 DOCREV CUR MEDS BY ELIG CLIN: HCPCS | Performed by: FAMILY MEDICINE

## 2023-07-05 PROCEDURE — 3074F SYST BP LT 130 MM HG: CPT | Performed by: FAMILY MEDICINE

## 2023-07-05 PROCEDURE — G8417 CALC BMI ABV UP PARAM F/U: HCPCS | Performed by: FAMILY MEDICINE

## 2023-07-05 PROCEDURE — 1123F ACP DISCUSS/DSCN MKR DOCD: CPT | Performed by: FAMILY MEDICINE

## 2023-07-05 PROCEDURE — 99214 OFFICE O/P EST MOD 30 MIN: CPT | Performed by: FAMILY MEDICINE

## 2023-07-05 PROCEDURE — 1036F TOBACCO NON-USER: CPT | Performed by: FAMILY MEDICINE

## 2023-07-05 RX ORDER — ROSUVASTATIN CALCIUM 40 MG/1
40 TABLET, COATED ORAL DAILY
Qty: 90 TABLET | Refills: 1 | Status: SHIPPED | OUTPATIENT
Start: 2023-07-05

## 2023-07-05 ASSESSMENT — ENCOUNTER SYMPTOMS
SORE THROAT: 0
SINUS PRESSURE: 0
RHINORRHEA: 0
RECTAL PAIN: 0
TROUBLE SWALLOWING: 0
COUGH: 0
BACK PAIN: 1
COLOR CHANGE: 0
BLOOD IN STOOL: 0
WHEEZING: 0
NAUSEA: 0
ABDOMINAL DISTENTION: 0
DIARRHEA: 0
ABDOMINAL PAIN: 0
STRIDOR: 0
SHORTNESS OF BREATH: 0
CHEST TIGHTNESS: 0
EYE REDNESS: 0
VOMITING: 0
CONSTIPATION: 0

## 2023-07-05 NOTE — PATIENT INSTRUCTIONS
Thank you for choosing [facility]) as your healthcare provider as your care is important to us. Please arrive at your appointment on time. If you are unable to make your appointment, please call our office as soon as possible so that we may reschedule your appointment. Missing 3 appointments in a calendar year without notifying the office may lead to dismissal from the practice. We appreciate you calling at least 24 hours in advance, when possible. Thank you. New Updates for [facility]    Thank you for choosing US to give you the best care! [facility]) is always trying to think of new ways to help their patients. We are asking all patients to try out the new digital registration that is now available through your [facility] account Via the ignacio you're now able to update your personal and registration information prior to your upcoming appointment. This will save you time once you arrive at the office to check-in, not to mention your information remains safe!! Many other perks come from signing up for an account, such as:  Requesting refills  Scheduling an appointment  Completing an E-Visit  Sending a message to the office/provider  Having access to your medication list  Paying your bill/copay prior to your appointment  Scheduling your yearly mammogram  Review your test results    If you are not familiar with [facility] please ask one of us and we will be happy to answer any questions or help you set-up your account.       Your [employer],

## 2023-07-10 NOTE — PROGRESS NOTES
[facility] [address]   ACUTE REHABILITATION OCCUPATIONAL THERAPY  DAILY NOTE    Date: 19  Patient Name: Rubén Marques      Room: 2521/3062-45    MRN: [id number]   : 1945  (70 y.o.)  Gender: male      Diagnosis: left cerebellar ICH. Pt is s/p suboccipital craniotomy for cerebellar ICH on 19. L side ataxia, double vision - eye patch change q 2 hours, dizzy - to begin meds, L hip nerve pain, to begin neurontin, injury at [address] [place] [address] Road clinic to L shld, pain patch  Additional Pertinent Hx: underwent cardiac catheterization , s/p RCA stenting 17, complicated by perforation and tamponade s/p pericardiocentesis with a covered stent through the perforation. history L TKA, R collarbone sx    Restrictions  Restrictions/Precautions: Cardiac, Fall Risk  Implants present? : Metal implants(L TKA)  Other position/activity restrictions: Alternate eye patch q2h for diplopia from [facility] notes. Subjective  Subjective: Pt states his head hurts, and he didn't sleep well last night. Pt states he should get more stitches removed today. Pt states that he wears eye patch. Comments: OT attempt to see pt in p..m at 15:20 and pt & family report that he is tired and needs to rest. Pt states that MD said that he should rest.   Patient Currently in Pain: Yes  Pain Level: 5  Pain Location: Head  Pain Orientation: Posterior  Restrictions/Precautions: Cardiac; Fall Risk  Overall Orientation Status: Within Functional Limits     Pain Assessment  Pain Assessment: 0-10  Pain Level: 5  Patient's Stated Pain Goal: No pain  Pain Type: Acute pain  Pain Location: Head  Pain Orientation: Posterior  Pain Descriptors: Headache, Aching  Pain Frequency: Intermittent    Objective     Balance  Sitting Balance: Stand by assistance(Pt sat at EOB)  Standing Balance:  Moderate assistance  Bed mobility  Supine to Sit: Stand by assistance(HOB raised up)  Scooting: Stand by assistance  Comment: Pt sat at EOB for Boom Goddard(Attending)

## 2023-08-04 ENCOUNTER — TELEPHONE (OUTPATIENT)
Dept: NEUROLOGY | Age: 78
End: 2023-08-04

## 2023-08-16 ENCOUNTER — OFFICE VISIT (OUTPATIENT)
Dept: ORTHOPEDIC SURGERY | Age: 78
End: 2023-08-16
Payer: MEDICARE

## 2023-08-16 VITALS — BODY MASS INDEX: 36.65 KG/M2 | HEIGHT: 70 IN | WEIGHT: 256 LBS | RESPIRATION RATE: 12 BRPM

## 2023-08-16 DIAGNOSIS — M25.561 RIGHT KNEE PAIN, UNSPECIFIED CHRONICITY: Primary | ICD-10-CM

## 2023-08-16 PROCEDURE — 1123F ACP DISCUSS/DSCN MKR DOCD: CPT | Performed by: ORTHOPAEDIC SURGERY

## 2023-08-16 PROCEDURE — 99203 OFFICE O/P NEW LOW 30 MIN: CPT | Performed by: ORTHOPAEDIC SURGERY

## 2023-08-16 PROCEDURE — 1036F TOBACCO NON-USER: CPT | Performed by: ORTHOPAEDIC SURGERY

## 2023-08-16 PROCEDURE — G8427 DOCREV CUR MEDS BY ELIG CLIN: HCPCS | Performed by: ORTHOPAEDIC SURGERY

## 2023-08-16 PROCEDURE — G8417 CALC BMI ABV UP PARAM F/U: HCPCS | Performed by: ORTHOPAEDIC SURGERY

## 2023-08-16 NOTE — PROGRESS NOTES
Andres Phillip M.D.            [address] [facility], [address] [facility], [address] [place] [address] [address]           Dept Phone: [phone]           Dept Fax:  [address]          Dept Phone: [phone]           Dept Fax:  [phone]      Chief Compliant:  Chief Complaint   Patient presents with    Knee Pain     right        History of Present Illness: This is a 65 y.o. male who presents to the clinic today for evaluation / follow up of right knee pain. Patient is 79-year-old gentleman who had a previous left total knee arthroplasty performed by me. He states this was at least 15 years ago but have not seen him for quite some time. He has had injections to his right knee down out side of his right knee. He is going to the point that was difficult for him to ambulate on his right knee denies any recent major trauma. He did have a recent fall where he just cannot bruise the anterior aspect of his knee. 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 He does utilize a walker. .       Review of Systems   Constitutional: Negative for fever, chills, sweats. Eyes: Negative for changes in vision, or pain. HENT: Negative for ear ache, epistaxis, or sore throat. Respiratory/Cardio: Negative for Chest pain, palpitations, SOB, or cough. Gastrointestinal: Negative for abdominal pain, N/V/D. Genitourinary: Negative for dysuria, frequency, urgency, or hematuria. Neurological: Negative for headache, numbness, or weakness. Integumentary: Negative for rash, itching, laceration, or abrasion. Musculoskeletal: Positive for Knee Pain (right)       Physical Exam:  Constitutional: Patient is oriented to person, place, and time. Patient appears well-developed and well nourished.    HENT: Negative otherwise noted  Head:

## 2023-08-29 DIAGNOSIS — I42.8 OTHER CARDIOMYOPATHY (HCC): ICD-10-CM

## 2023-08-29 DIAGNOSIS — I25.118 CORONARY ARTERY DISEASE OF NATIVE ARTERY OF NATIVE HEART WITH STABLE ANGINA PECTORIS (HCC): ICD-10-CM

## 2023-08-29 RX ORDER — CLOPIDOGREL BISULFATE 75 MG/1
TABLET ORAL
Qty: 90 TABLET | Refills: 3 | Status: SHIPPED | OUTPATIENT
Start: 2023-08-29

## 2023-09-07 ENCOUNTER — OFFICE VISIT (OUTPATIENT)
Dept: NEUROLOGY | Age: 78
End: 2023-09-07
Payer: MEDICARE

## 2023-09-07 VITALS
WEIGHT: 254.4 LBS | SYSTOLIC BLOOD PRESSURE: 137 MMHG | BODY MASS INDEX: 36.42 KG/M2 | DIASTOLIC BLOOD PRESSURE: 75 MMHG | HEIGHT: 70 IN | HEART RATE: 66 BPM

## 2023-09-07 DIAGNOSIS — G31.84 MILD COGNITIVE IMPAIRMENT: Primary | ICD-10-CM

## 2023-09-07 PROCEDURE — 3078F DIAST BP <80 MM HG: CPT | Performed by: PSYCHIATRY & NEUROLOGY

## 2023-09-07 PROCEDURE — 99214 OFFICE O/P EST MOD 30 MIN: CPT | Performed by: PSYCHIATRY & NEUROLOGY

## 2023-09-07 PROCEDURE — G8427 DOCREV CUR MEDS BY ELIG CLIN: HCPCS | Performed by: PSYCHIATRY & NEUROLOGY

## 2023-09-07 PROCEDURE — 3075F SYST BP GE 130 - 139MM HG: CPT | Performed by: PSYCHIATRY & NEUROLOGY

## 2023-09-07 PROCEDURE — G8417 CALC BMI ABV UP PARAM F/U: HCPCS | Performed by: PSYCHIATRY & NEUROLOGY

## 2023-09-07 PROCEDURE — 1036F TOBACCO NON-USER: CPT | Performed by: PSYCHIATRY & NEUROLOGY

## 2023-09-07 PROCEDURE — 1123F ACP DISCUSS/DSCN MKR DOCD: CPT | Performed by: PSYCHIATRY & NEUROLOGY

## 2023-09-07 RX ORDER — DONEPEZIL HYDROCHLORIDE 5 MG/1
5 TABLET, FILM COATED ORAL NIGHTLY
Qty: 30 TABLET | Refills: 3 | Status: SHIPPED | OUTPATIENT
Start: 2023-09-07

## 2023-09-07 NOTE — PROGRESS NOTES
Mervat Fears
III, IV, VI - extra-ocular muscles full: no pupillary defect; no LAUREN, no nystagmus, no ptosis   V - normal facial sensation                                                               VII - normal facial symmetry                                                             VIII - intact hearing                                                                             IX, X - symmetrical palate                                                                  XI - symmetrical shoulder shrug                                                       XII - midline tongue without atrophy or fasciculation     Motor function  Normal muscle bulk and tone; normal power 5/5, including fine motor movements     Sensory function Intact to touch, pin, vibration, proprioception     Cerebellar Intact fine motor movement. No involuntary movements or tremors     Reflex function Intact 2+ DTR and symmetric. Negative Babinski     Gait                  Mild gait ataxia       Neurological Work-up:   CT Head WO Contrast on 09/07/2017: No acute intracranial abnormality. CT Head WO Contrast on 10/19/2020: Previous suboccipital craniotomy with encephalomalacia within the left  cerebellum and some resultant enlargement of the 4th ventricle. No acute intracranial abnormality. 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 He continues to report gait ataxia for which he has been using walker. He denies having any worsening of symptoms but he does not report any improvement either. Fall precautions were discussed with the patient. He is currently taking Plavix 75 mg a day from his cardiac standpoint. He has been off aspirin. Mild cognitive impairment: The wife who accompanied the patient reports patient has been having progressive decline in short-term memory.   On my

## 2023-09-14 ENCOUNTER — TELEPHONE (OUTPATIENT)
Dept: FAMILY MEDICINE CLINIC | Age: 78
End: 2023-09-14

## 2023-09-14 NOTE — TELEPHONE ENCOUNTER
Patient came into office to drop off [address] [place] [address] forms. To be completed by provider. Patient has/had appointment on 11/27/2023. He is needing form to be filled out to get a loft on the first floor due to his medical conditions and he is currently using a walker so it is hard to go up and down to 3rd floor,    Patient will like a call back. When paper work has been completed/faxed and ready to be picked up in office. If no answer, patient gives verbal permission to leave a detail message. Also blank forms have been scanned into media. Also placed in provider basket. Thank you! Also called out to apartments to get fax number for patient to fax forms once completed.  FAX number is [phone]    Future Appointments   Date Time Provider [address] [place] [address]   11/27/2023 10:30 AM Nathalia Segura MD [id number]   1/17/2024  8:45 AM Paolo Torrez MD [facility]

## 2023-09-19 DIAGNOSIS — I10 ESSENTIAL HYPERTENSION: ICD-10-CM

## 2023-09-19 RX ORDER — LISINOPRIL 20 MG/1
TABLET ORAL
Qty: 90 TABLET | Refills: 0 | Status: SHIPPED | OUTPATIENT
Start: 2023-09-19

## 2023-09-19 NOTE — TELEPHONE ENCOUNTER
Please Approve or Refuse.   Send to Pharmacy per Pt's Request:      Next Visit Date:  11/27/2023   Last Visit Date: 7/5/2023    Hemoglobin A1C (%)   Date Value   06/21/2023 6.1   09/02/2022 6.3   09/23/2021 6.3             ( goal A1C is < 7)   BP Readings from Last 3 Encounters:   09/07/23 137/75   07/05/23 122/72   07/04/23 130/68          (goal 120/80)  BUN   Date Value Ref Range Status   06/27/2023 16 8 - 23 mg/dL Final     Creatinine   Date Value Ref Range Status   06/27/2023 0.64 (L) 0.70 - 1.20 mg/dL Final     Potassium   Date Value Ref Range Status   06/27/2023 4.4 3.7 - 5.3 mmol/L Final

## 2023-09-26 ENCOUNTER — TELEPHONE (OUTPATIENT)
Dept: UROLOGY | Age: 78
End: 2023-09-26

## 2023-09-26 DIAGNOSIS — G93.32 CHRONIC FATIGUE SYNDROME: ICD-10-CM

## 2023-09-26 DIAGNOSIS — F33.1 MODERATE EPISODE OF RECURRENT MAJOR DEPRESSIVE DISORDER (HCC): ICD-10-CM

## 2023-09-26 RX ORDER — DULOXETIN HYDROCHLORIDE 30 MG/1
CAPSULE, DELAYED RELEASE ORAL
Qty: 90 CAPSULE | Refills: 0 | Status: SHIPPED | OUTPATIENT
Start: 2023-09-26

## 2023-09-26 NOTE — TELEPHONE ENCOUNTER
Patient's spouse called in stating that he has been having issues with urinating. Not much is coming out, he gets up many times throughout the night. Patient is scheduled for an OV tomorrow with CORA Ramires. Patient also advised to go to the ER if he develops any fever, chills or pain.

## 2023-09-27 ENCOUNTER — OFFICE VISIT (OUTPATIENT)
Dept: UROLOGY | Age: 78
End: 2023-09-27
Payer: MEDICARE

## 2023-09-27 VITALS
DIASTOLIC BLOOD PRESSURE: 72 MMHG | OXYGEN SATURATION: 97 % | HEART RATE: 70 BPM | HEIGHT: 70 IN | RESPIRATION RATE: 16 BRPM | TEMPERATURE: 97.1 F | WEIGHT: 256.6 LBS | BODY MASS INDEX: 36.73 KG/M2 | SYSTOLIC BLOOD PRESSURE: 136 MMHG

## 2023-09-27 DIAGNOSIS — R73.01 IMPAIRED FASTING GLUCOSE: ICD-10-CM

## 2023-09-27 DIAGNOSIS — N40.1 BPH WITH OBSTRUCTION/LOWER URINARY TRACT SYMPTOMS: ICD-10-CM

## 2023-09-27 DIAGNOSIS — N13.8 BPH WITH OBSTRUCTION/LOWER URINARY TRACT SYMPTOMS: ICD-10-CM

## 2023-09-27 DIAGNOSIS — Z86.73 HISTORY OF STROKE: ICD-10-CM

## 2023-09-27 DIAGNOSIS — R32 URINARY INCONTINENCE, UNSPECIFIED TYPE: Primary | ICD-10-CM

## 2023-09-27 LAB — POST VOID RESIDUAL (PVR): 135 ML

## 2023-09-27 PROCEDURE — 1123F ACP DISCUSS/DSCN MKR DOCD: CPT | Performed by: NURSE PRACTITIONER

## 2023-09-27 PROCEDURE — 3078F DIAST BP <80 MM HG: CPT | Performed by: NURSE PRACTITIONER

## 2023-09-27 PROCEDURE — G8427 DOCREV CUR MEDS BY ELIG CLIN: HCPCS | Performed by: NURSE PRACTITIONER

## 2023-09-27 PROCEDURE — 3075F SYST BP GE 130 - 139MM HG: CPT | Performed by: NURSE PRACTITIONER

## 2023-09-27 PROCEDURE — 1036F TOBACCO NON-USER: CPT | Performed by: NURSE PRACTITIONER

## 2023-09-27 PROCEDURE — G8417 CALC BMI ABV UP PARAM F/U: HCPCS | Performed by: NURSE PRACTITIONER

## 2023-09-27 PROCEDURE — 51798 US URINE CAPACITY MEASURE: CPT | Performed by: NURSE PRACTITIONER

## 2023-09-27 PROCEDURE — 99214 OFFICE O/P EST MOD 30 MIN: CPT | Performed by: NURSE PRACTITIONER

## 2023-09-27 RX ORDER — METFORMIN HYDROCHLORIDE 500 MG/1
TABLET, EXTENDED RELEASE ORAL
Qty: 90 TABLET | Refills: 0 | Status: SHIPPED | OUTPATIENT
Start: 2023-09-27

## 2023-09-27 ASSESSMENT — ENCOUNTER SYMPTOMS
BACK PAIN: 0
SHORTNESS OF BREATH: 0
ABDOMINAL PAIN: 0
VOMITING: 0
EYE PAIN: 0
EYE REDNESS: 0
DIARRHEA: 0
WHEEZING: 0
COUGH: 0
NAUSEA: 0
CONSTIPATION: 0

## 2023-09-27 NOTE — PATIENT INSTRUCTIONS
Stop myrbetriq entirely. Start gemtesa 75mg po qd.    Urodynamics with rylee Blair with Dr. Lana Solano

## 2023-09-27 NOTE — PROGRESS NOTES
[address] [facility] [address]  Dept: [address] [place] [address] Urology Office Note - Established    Patient:  Larry Keen  YOB: 1945  Date: 9/27/2023    The patient is a 77 y.o. male who presents todayfor evaluation of the following problems:   Chief Complaint   Patient presents with    Urinary Frequency at Night     Waking up 2-3 times at night, wetting himself. Discuss Medications     Currently on Myrbetriq, does not think it is working. HPI  Patient is presenting for f/u urinary incontinence. He has hx of BPH and stroke in the past.   He takes flomax for his bph symptoms. Stream is steady and moderate. He feels he empties his bladder fairly well- PVR is 135cc. He complains of worsening urinary incontinence. He has urgency and urge incontinence throughout the day. He is wetting himself at night. Admits he does not limit his fluids before bed and he does snore heavily per family. Does not feel that myrbetriq is helping anymore. Denies hematuria or dysuria. Summary of old records: N/A    Additional History: N/A    Procedures Today: N/A    Urinalysis today:  No results found for this visit on 09/27/23.   Last several PSA's:  Lab Results   Component Value Date    PSA 1.22 03/25/2021    PSA 0.95 07/27/2018    PSA 1.24 03/14/2015     Last total testosterone:  No results found for: \"TESTOSTERONE\"      Last BUN and creatinine:  Lab Results   Component Value Date    BUN 16 06/27/2023     Lab Results   Component Value Date    CREATININE 0.64 (L) 06/27/2023       Additional Lab/Culture results: none    Imaging Reviewed during this Office Visit: none    PAST MEDICAL, FAMILY AND SOCIAL HISTORY UPDATE:  Past Medical History:   Diagnosis Date    Acquired skull defect     Angina pectoris ([address])     Anxiety     Anxiety     At high risk for falls     Ataxia following nontraumatic

## 2023-10-01 DIAGNOSIS — F33.1 MODERATE EPISODE OF RECURRENT MAJOR DEPRESSIVE DISORDER (HCC): ICD-10-CM

## 2023-10-01 DIAGNOSIS — G93.32 CHRONIC FATIGUE SYNDROME: ICD-10-CM

## 2023-10-02 RX ORDER — DULOXETIN HYDROCHLORIDE 30 MG/1
CAPSULE, DELAYED RELEASE ORAL
Qty: 90 CAPSULE | Refills: 0 | Status: SHIPPED | OUTPATIENT
Start: 2023-10-02

## 2023-10-02 NOTE — TELEPHONE ENCOUNTER
Please Approve or Refuse.   Send to Pharmacy per Pt's Request:      Next Visit Date:  11/27/2023   Last Visit Date: 7/5/2023    Hemoglobin A1C (%)   Date Value   06/21/2023 6.1   09/02/2022 6.3   09/23/2021 6.3             ( goal A1C is < 7)   BP Readings from Last 3 Encounters:   09/27/23 136/72   09/07/23 137/75   07/05/23 122/72          (goal 120/80)  BUN   Date Value Ref Range Status   06/27/2023 16 8 - 23 mg/dL Final     Creatinine   Date Value Ref Range Status   06/27/2023 0.64 (L) 0.70 - 1.20 mg/dL Final     Potassium   Date Value Ref Range Status   06/27/2023 4.4 3.7 - 5.3 mmol/L Final

## 2023-10-06 ENCOUNTER — TELEPHONE (OUTPATIENT)
Dept: UROLOGY | Age: 78
End: 2023-10-06

## 2023-10-06 NOTE — TELEPHONE ENCOUNTER
Called patient insurance, spoke to Abdi Rico, no PA is required for UDS procedure on 10/12/2023. Reference number [id number].

## 2023-10-27 DIAGNOSIS — I25.118 CORONARY ARTERY DISEASE OF NATIVE ARTERY OF NATIVE HEART WITH STABLE ANGINA PECTORIS (HCC): ICD-10-CM

## 2023-10-27 DIAGNOSIS — I10 ESSENTIAL HYPERTENSION: ICD-10-CM

## 2023-10-27 RX ORDER — METOPROLOL SUCCINATE 50 MG/1
50 TABLET, EXTENDED RELEASE ORAL DAILY
Qty: 90 TABLET | Refills: 0 | Status: SHIPPED | OUTPATIENT
Start: 2023-10-27

## 2023-11-22 ENCOUNTER — TELEPHONE (OUTPATIENT)
Dept: FAMILY MEDICINE CLINIC | Age: 78
End: 2023-11-22

## 2023-11-22 NOTE — TELEPHONE ENCOUNTER
11/27/2023 10:30 AM [address] [facility]  Shobha Austin MD    Appointment Notes:    Return for Medicare Annual Wellness Visit in 1 year. [facility] Clinical Support Pool  Subject: Message to Provider     QUESTIONS   Information for Provider? pt wants to know if the provider can hold the   clopidogrel 75 mg for 5 days prior to surgery for his eye   ---------------------------------------------------------------------------   --------------   Berenice Red Cloud Carly   [phone]; OK to leave message on voicemail   ---------------------------------------------------------------------------   --------------   SCRIPT ANSWERS   Relationship to Patient?  Self

## 2023-11-27 ENCOUNTER — OFFICE VISIT (OUTPATIENT)
Dept: FAMILY MEDICINE CLINIC | Age: 78
End: 2023-11-27
Payer: MEDICARE

## 2023-11-27 VITALS
SYSTOLIC BLOOD PRESSURE: 138 MMHG | TEMPERATURE: 97.3 F | DIASTOLIC BLOOD PRESSURE: 84 MMHG | WEIGHT: 252 LBS | BODY MASS INDEX: 36.08 KG/M2 | HEIGHT: 70 IN | HEART RATE: 74 BPM | OXYGEN SATURATION: 97 %

## 2023-11-27 DIAGNOSIS — M19.041 OSTEOARTHRITIS OF RIGHT HAND, UNSPECIFIED OSTEOARTHRITIS TYPE: ICD-10-CM

## 2023-11-27 DIAGNOSIS — E11.9 TYPE 2 DIABETES MELLITUS WITHOUT COMPLICATION, WITHOUT LONG-TERM CURRENT USE OF INSULIN (HCC): ICD-10-CM

## 2023-11-27 DIAGNOSIS — N13.8 BPH WITH OBSTRUCTION/LOWER URINARY TRACT SYMPTOMS: ICD-10-CM

## 2023-11-27 DIAGNOSIS — Z23 ENCOUNTER FOR IMMUNIZATION: ICD-10-CM

## 2023-11-27 DIAGNOSIS — G30.9 ALZHEIMER'S DISEASE, UNSPECIFIED (CODE) (HCC): ICD-10-CM

## 2023-11-27 DIAGNOSIS — G93.32 CHRONIC FATIGUE SYNDROME: ICD-10-CM

## 2023-11-27 DIAGNOSIS — I69.193 ATAXIA FOLLOWING NONTRAUMATIC INTRACEREBRAL HEMORRHAGE: ICD-10-CM

## 2023-11-27 DIAGNOSIS — F33.1 MODERATE EPISODE OF RECURRENT MAJOR DEPRESSIVE DISORDER (HCC): ICD-10-CM

## 2023-11-27 DIAGNOSIS — N40.1 BPH WITH OBSTRUCTION/LOWER URINARY TRACT SYMPTOMS: ICD-10-CM

## 2023-11-27 DIAGNOSIS — Z00.00 MEDICARE ANNUAL WELLNESS VISIT, SUBSEQUENT: Primary | ICD-10-CM

## 2023-11-27 DIAGNOSIS — Z71.89 ACP (ADVANCE CARE PLANNING): ICD-10-CM

## 2023-11-27 PROBLEM — E66.01 SEVERE OBESITY (BMI 35.0-39.9) WITH COMORBIDITY (HCC): Status: RESOLVED | Noted: 2023-06-21 | Resolved: 2023-11-27

## 2023-11-27 PROBLEM — S20.219S: Status: RESOLVED | Noted: 2023-07-05 | Resolved: 2023-11-27

## 2023-11-27 PROBLEM — R13.13 PHARYNGEAL DYSPHAGIA: Status: RESOLVED | Noted: 2022-10-13 | Resolved: 2023-11-27

## 2023-11-27 PROBLEM — R63.4 WEIGHT LOSS: Status: RESOLVED | Noted: 2022-10-13 | Resolved: 2023-11-27

## 2023-11-27 LAB — HBA1C MFR BLD: 6.2 %

## 2023-11-27 PROCEDURE — 3044F HG A1C LEVEL LT 7.0%: CPT | Performed by: FAMILY MEDICINE

## 2023-11-27 PROCEDURE — 3075F SYST BP GE 130 - 139MM HG: CPT | Performed by: FAMILY MEDICINE

## 2023-11-27 PROCEDURE — G8484 FLU IMMUNIZE NO ADMIN: HCPCS | Performed by: FAMILY MEDICINE

## 2023-11-27 PROCEDURE — G0439 PPPS, SUBSEQ VISIT: HCPCS | Performed by: FAMILY MEDICINE

## 2023-11-27 PROCEDURE — 99497 ADVNCD CARE PLAN 30 MIN: CPT | Performed by: FAMILY MEDICINE

## 2023-11-27 PROCEDURE — 83036 HEMOGLOBIN GLYCOSYLATED A1C: CPT | Performed by: FAMILY MEDICINE

## 2023-11-27 PROCEDURE — 1123F ACP DISCUSS/DSCN MKR DOCD: CPT | Performed by: FAMILY MEDICINE

## 2023-11-27 PROCEDURE — 3079F DIAST BP 80-89 MM HG: CPT | Performed by: FAMILY MEDICINE

## 2023-11-27 RX ORDER — TAMSULOSIN HYDROCHLORIDE 0.4 MG/1
0.4 CAPSULE ORAL DAILY
Qty: 90 CAPSULE | Refills: 3 | Status: SHIPPED | OUTPATIENT
Start: 2023-11-27

## 2023-11-27 RX ORDER — DULOXETIN HYDROCHLORIDE 60 MG/1
60 CAPSULE, DELAYED RELEASE ORAL DAILY
Qty: 90 CAPSULE | Refills: 1 | Status: SHIPPED | OUTPATIENT
Start: 2023-11-27

## 2023-11-27 RX ORDER — ACETAMINOPHEN 500 MG
500 TABLET ORAL
Qty: 90 TABLET | Refills: 1 | Status: SHIPPED | OUTPATIENT
Start: 2023-11-27

## 2023-11-27 SDOH — ECONOMIC STABILITY: INCOME INSECURITY: HOW HARD IS IT FOR YOU TO PAY FOR THE VERY BASICS LIKE FOOD, HOUSING, MEDICAL CARE, AND HEATING?: NOT HARD AT ALL

## 2023-11-27 SDOH — ECONOMIC STABILITY: FOOD INSECURITY: WITHIN THE PAST 12 MONTHS, THE FOOD YOU BOUGHT JUST DIDN'T LAST AND YOU DIDN'T HAVE MONEY TO GET MORE.: NEVER TRUE

## 2023-11-27 SDOH — ECONOMIC STABILITY: FOOD INSECURITY: WITHIN THE PAST 12 MONTHS, YOU WORRIED THAT YOUR FOOD WOULD RUN OUT BEFORE YOU GOT MONEY TO BUY MORE.: NEVER TRUE

## 2023-11-27 ASSESSMENT — PATIENT HEALTH QUESTIONNAIRE - PHQ9
SUM OF ALL RESPONSES TO PHQ QUESTIONS 1-9: 3
SUM OF ALL RESPONSES TO PHQ9 QUESTIONS 1 & 2: 2
SUM OF ALL RESPONSES TO PHQ QUESTIONS 1-9: 3
SUM OF ALL RESPONSES TO PHQ QUESTIONS 1-9: 13
10. IF YOU CHECKED OFF ANY PROBLEMS, HOW DIFFICULT HAVE THESE PROBLEMS MADE IT FOR YOU TO DO YOUR WORK, TAKE CARE OF THINGS AT HOME, OR GET ALONG WITH OTHER PEOPLE: 2
4. FEELING TIRED OR HAVING LITTLE ENERGY: 2
5. POOR APPETITE OR OVEREATING: 2
7. TROUBLE CONCENTRATING ON THINGS, SUCH AS READING THE NEWSPAPER OR WATCHING TELEVISION: 1
1. LITTLE INTEREST OR PLEASURE IN DOING THINGS: 1
3. TROUBLE FALLING OR STAYING ASLEEP: 3
SUM OF ALL RESPONSES TO PHQ QUESTIONS 1-9: 13
2. FEELING DOWN, DEPRESSED OR HOPELESS: 3
3. TROUBLE FALLING OR STAYING ASLEEP: 1
SUM OF ALL RESPONSES TO PHQ QUESTIONS 1-9: 3
9. THOUGHTS THAT YOU WOULD BE BETTER OFF DEAD, OR OF HURTING YOURSELF: 0
2. FEELING DOWN, DEPRESSED OR HOPELESS: 1
SUM OF ALL RESPONSES TO PHQ QUESTIONS 1-9: 3
SUM OF ALL RESPONSES TO PHQ QUESTIONS 1-9: 13
SUM OF ALL RESPONSES TO PHQ QUESTIONS 1-9: 13
8. MOVING OR SPEAKING SO SLOWLY THAT OTHER PEOPLE COULD HAVE NOTICED. OR THE OPPOSITE, BEING SO FIGETY OR RESTLESS THAT YOU HAVE BEEN MOVING AROUND A LOT MORE THAN USUAL: 0
6. FEELING BAD ABOUT YOURSELF - OR THAT YOU ARE A FAILURE OR HAVE LET YOURSELF OR YOUR FAMILY DOWN: 2

## 2023-11-27 ASSESSMENT — COLUMBIA-SUICIDE SEVERITY RATING SCALE - C-SSRS
3. HAVE YOU BEEN THINKING ABOUT HOW YOU MIGHT KILL YOURSELF?: NO
4. HAVE YOU HAD THESE THOUGHTS AND HAD SOME INTENTION OF ACTING ON THEM?: NO
5. HAVE YOU STARTED TO WORK OUT OR WORKED OUT THE DETAILS OF HOW TO KILL YOURSELF? DO YOU INTEND TO CARRY OUT THIS PLAN?: NO
7. DID THIS OCCUR IN THE LAST THREE MONTHS: NO

## 2023-11-27 NOTE — PROGRESS NOTES
Visit Information    Have you changed or started any medications since your last visit including any over-the-counter medicines, vitamins, or herbal medicines? no   Have you stopped taking any of your medications? Is so, why? -  no  Are you having any side effects from any of your medications? - no    Have you seen any other physician or provider since your last visit? Have you had any noother diagnostic tests since your last visit?  no   Have you been seen in the emergency room and/or had an admission in a hospital since we last saw you?  no   Have you had your routine dental cleaning in the past 6 months?  no     Do you have an active MyChart account? If no, what is the barrier?   no    Patient Care Team:  Ramone Lomas MD as PCP - General (Family Medicine)  Ramone Lomas MD as PCP - Empaneled Provider  Sharia Merlin, MD as Consulting Physician (Gastroenterology)    Medical History Review  Past Medical, Family, and Social History reviewed and does contribute to the patient presenting condition    Health Maintenance   Topic Date Due    Hepatitis B vaccine (1 of 3 - Risk 3-dose series) Never done    Flu vaccine (1) 08/01/2023    COVID-19 Vaccine (3 - 2023-24 season) 09/01/2023    Shingles vaccine (2 of 2) 09/30/2023    Annual Wellness Visit (AWV)  11/24/2023    Depression Monitoring  06/21/2024    Lipids  06/27/2024    Colorectal Cancer Screen  01/31/2029    DTaP/Tdap/Td vaccine (2 - Td or Tdap) 08/05/2033    Pneumococcal 65+ years Vaccine  Completed    Hepatitis C screen  Completed    Hepatitis A vaccine  Aged Out    Hib vaccine  Aged Out    Meningococcal (ACWY) vaccine  Aged Out    A1C test (Diabetic or Prediabetic)  Discontinued

## 2023-11-27 NOTE — PROGRESS NOTES
Medicare Annual Wellness Visit    Atif Talley is here for Medicare AWV, Hypertension, and Diabetes (a1c)    Assessment & Plan   Medicare annual wellness visit, subsequent  ACP (advance care planning)  -     [place] Advanced Care Planning (16-30 minutes) [80534]  Type 2 diabetes mellitus without complication, without long-term current use of insulin (HCC)  -     POCT glycosylated hemoglobin (Hb A1C)  Moderate episode of recurrent major depressive disorder (HCC)  Controlled, increase Cymbalta to 60 mg  -     DULoxetine (CYMBALTA) 60 MG extended release capsule; Take 1 capsule by mouth daily, Disp-90 capsule, R-1Normal  BPH with obstruction/lower urinary tract symptoms  -     tamsulosin (FLOMAX) 0.4 MG capsule; Take 1 capsule by mouth daily, Disp-90 capsule, R-3Normal  Alzheimer's disease, unspecified  Ataxia following nontraumatic intracerebral hemorrhage  -     DME Order for Walker as OP  -     acetaminophen (TYLENOL) 500 MG tablet; Take 1 tablet by mouth every 8-12 hours as needed for Pain, Disp-90 tablet, R-1Normal  Osteoarthritis of right hand, unspecified osteoarthritis type  -     diclofenac sodium (VOLTAREN) 1 % GEL; Apply 4 g topically 4 times daily, Topical, 4 TIMES DAILY Starting Mon 11/27/2023, Disp-150 g, R-0, Normal  -     acetaminophen (TYLENOL) 500 MG tablet; Take 1 tablet by mouth every 8-12 hours as needed for Pain, Disp-90 tablet, R-1Normal  Chronic fatigue syndrome  -     DULoxetine (CYMBALTA) 60 MG extended release capsule; Take 1 capsule by mouth daily, Disp-90 capsule, R-1Normal  Encounter for immunization    Recommendations for Preventive Services Due: see orders and patient instructions/AVS.  Recommended screening schedule for the next 5-10 years is provided to the patient in written form: see Patient Instructions/AVS.     Return in about 4 months (around 3/27/2024).      Subjective   The following acute and/or chronic problems were also addressed today:    Depression fairly controlled, is

## 2023-12-14 DIAGNOSIS — I10 ESSENTIAL HYPERTENSION: ICD-10-CM

## 2023-12-14 RX ORDER — LISINOPRIL 20 MG/1
TABLET ORAL
Qty: 90 TABLET | Refills: 0 | Status: SHIPPED | OUTPATIENT
Start: 2023-12-14

## 2023-12-14 NOTE — TELEPHONE ENCOUNTER
Please Approve or Refuse.   Send to Pharmacy per Pt's Request:      Next Visit Date:  3/27/2024   Last Visit Date: 11/27/2023    Hemoglobin A1C (%)   Date Value   11/27/2023 6.2   06/21/2023 6.1   09/02/2022 6.3             ( goal A1C is < 7)   BP Readings from Last 3 Encounters:   11/27/23 138/84   09/27/23 136/72   09/07/23 137/75          (goal 120/80)  BUN   Date Value Ref Range Status   06/27/2023 16 8 - 23 mg/dL Final     Creatinine   Date Value Ref Range Status   06/27/2023 0.64 (L) 0.70 - 1.20 mg/dL Final     Potassium   Date Value Ref Range Status   06/27/2023 4.4 3.7 - 5.3 mmol/L Final

## 2023-12-20 ENCOUNTER — TELEPHONE (OUTPATIENT)
Dept: FAMILY MEDICINE CLINIC | Age: 78
End: 2023-12-20

## 2023-12-20 NOTE — TELEPHONE ENCOUNTER
Patient's wife called asking the status of his walker. I notified her that it was sent to [facility] on November 27 and that we have tried sending it several times to them. She stated that they do not deal with walkers.   I called her back and lvm asking her to call her 's insurance to see where we can send the script and to call us back with that information

## 2024-01-03 ENCOUNTER — OFFICE VISIT (OUTPATIENT)
Dept: FAMILY MEDICINE CLINIC | Age: 79
End: 2024-01-03
Payer: MEDICARE

## 2024-01-03 VITALS
SYSTOLIC BLOOD PRESSURE: 130 MMHG | OXYGEN SATURATION: 98 % | HEIGHT: 70 IN | HEART RATE: 70 BPM | BODY MASS INDEX: 35.93 KG/M2 | WEIGHT: 251 LBS | DIASTOLIC BLOOD PRESSURE: 80 MMHG

## 2024-01-03 DIAGNOSIS — I63.442 CEREBROVASCULAR ACCIDENT (CVA) DUE TO EMBOLISM OF LEFT CEREBELLAR ARTERY (HCC): ICD-10-CM

## 2024-01-03 DIAGNOSIS — I25.118 CORONARY ARTERY DISEASE OF NATIVE ARTERY OF NATIVE HEART WITH STABLE ANGINA PECTORIS (HCC): ICD-10-CM

## 2024-01-03 DIAGNOSIS — E78.2 MIXED HYPERLIPIDEMIA: ICD-10-CM

## 2024-01-03 DIAGNOSIS — M70.22 OLECRANON BURSITIS, LEFT ELBOW: Primary | ICD-10-CM

## 2024-01-03 DIAGNOSIS — R25.1 TREMOR OF LEFT HAND: ICD-10-CM

## 2024-01-03 DIAGNOSIS — I73.9 PVD (PERIPHERAL VASCULAR DISEASE) (HCC): ICD-10-CM

## 2024-01-03 DIAGNOSIS — R26.81 GAIT INSTABILITY: ICD-10-CM

## 2024-01-03 DIAGNOSIS — F33.1 MODERATE EPISODE OF RECURRENT MAJOR DEPRESSIVE DISORDER (HCC): ICD-10-CM

## 2024-01-03 DIAGNOSIS — I42.8 OTHER CARDIOMYOPATHY (HCC): ICD-10-CM

## 2024-01-03 DIAGNOSIS — Z23 ENCOUNTER FOR IMMUNIZATION: ICD-10-CM

## 2024-01-03 DIAGNOSIS — E11.9 TYPE 2 DIABETES MELLITUS WITHOUT COMPLICATION, WITHOUT LONG-TERM CURRENT USE OF INSULIN (HCC): ICD-10-CM

## 2024-01-03 DIAGNOSIS — G30.9 ALZHEIMER'S DISEASE, UNSPECIFIED (CODE) (HCC): ICD-10-CM

## 2024-01-03 PROCEDURE — 99214 OFFICE O/P EST MOD 30 MIN: CPT | Performed by: FAMILY MEDICINE

## 2024-01-03 PROCEDURE — G0008 ADMIN INFLUENZA VIRUS VAC: HCPCS | Performed by: FAMILY MEDICINE

## 2024-01-03 PROCEDURE — 3075F SYST BP GE 130 - 139MM HG: CPT | Performed by: FAMILY MEDICINE

## 2024-01-03 PROCEDURE — 90694 VACC AIIV4 NO PRSRV 0.5ML IM: CPT | Performed by: FAMILY MEDICINE

## 2024-01-03 PROCEDURE — G8484 FLU IMMUNIZE NO ADMIN: HCPCS | Performed by: FAMILY MEDICINE

## 2024-01-03 PROCEDURE — G8417 CALC BMI ABV UP PARAM F/U: HCPCS | Performed by: FAMILY MEDICINE

## 2024-01-03 PROCEDURE — 3079F DIAST BP 80-89 MM HG: CPT | Performed by: FAMILY MEDICINE

## 2024-01-03 PROCEDURE — 1123F ACP DISCUSS/DSCN MKR DOCD: CPT | Performed by: FAMILY MEDICINE

## 2024-01-03 PROCEDURE — 1036F TOBACCO NON-USER: CPT | Performed by: FAMILY MEDICINE

## 2024-01-03 PROCEDURE — G8427 DOCREV CUR MEDS BY ELIG CLIN: HCPCS | Performed by: FAMILY MEDICINE

## 2024-01-03 ASSESSMENT — ENCOUNTER SYMPTOMS
ABDOMINAL PAIN: 0
CHEST TIGHTNESS: 0
RECTAL PAIN: 0
ABDOMINAL DISTENTION: 0
VOMITING: 0
TROUBLE SWALLOWING: 0
BACK PAIN: 1
SORE THROAT: 0
WHEEZING: 0
CONSTIPATION: 0
BLOOD IN STOOL: 0
NAUSEA: 0
RHINORRHEA: 0
EYE REDNESS: 0
COLOR CHANGE: 0
SHORTNESS OF BREATH: 0
COUGH: 0
STRIDOR: 0
DIARRHEA: 0
SINUS PRESSURE: 0

## 2024-01-03 ASSESSMENT — PATIENT HEALTH QUESTIONNAIRE - PHQ9
5. POOR APPETITE OR OVEREATING: 0
7. TROUBLE CONCENTRATING ON THINGS, SUCH AS READING THE NEWSPAPER OR WATCHING TELEVISION: 0
SUM OF ALL RESPONSES TO PHQ QUESTIONS 1-9: 0
SUM OF ALL RESPONSES TO PHQ9 QUESTIONS 1 & 2: 0
10. IF YOU CHECKED OFF ANY PROBLEMS, HOW DIFFICULT HAVE THESE PROBLEMS MADE IT FOR YOU TO DO YOUR WORK, TAKE CARE OF THINGS AT HOME, OR GET ALONG WITH OTHER PEOPLE: 0
SUM OF ALL RESPONSES TO PHQ QUESTIONS 1-9: 0
9. THOUGHTS THAT YOU WOULD BE BETTER OFF DEAD, OR OF HURTING YOURSELF: 0
1. LITTLE INTEREST OR PLEASURE IN DOING THINGS: 0
SUM OF ALL RESPONSES TO PHQ QUESTIONS 1-9: 0
3. TROUBLE FALLING OR STAYING ASLEEP: 0
6. FEELING BAD ABOUT YOURSELF - OR THAT YOU ARE A FAILURE OR HAVE LET YOURSELF OR YOUR FAMILY DOWN: 0
2. FEELING DOWN, DEPRESSED OR HOPELESS: 0
4. FEELING TIRED OR HAVING LITTLE ENERGY: 0
SUM OF ALL RESPONSES TO PHQ QUESTIONS 1-9: 0
8. MOVING OR SPEAKING SO SLOWLY THAT OTHER PEOPLE COULD HAVE NOTICED. OR THE OPPOSITE, BEING SO FIGETY OR RESTLESS THAT YOU HAVE BEEN MOVING AROUND A LOT MORE THAN USUAL: 0

## 2024-01-03 NOTE — PATIENT INSTRUCTIONS
Thank you for choosing [facility] as your healthcare provider as your care is important to us.  Please arrive at your appointment on time. If you are unable to make your appointment, please call our office as soon as possible so that we may reschedule your appointment.  Missing 3 appointments in a calendar year without notifying the office may lead to dismissal from the practice.  We appreciate you calling at least 24 hours in advance, when possible. Thank you.               New Updates for EPIS    Thank you for choosing [facility] to give you the best care! [facility] is always trying to think of new ways to help their patients. We are asking all patients to try out the new digital registration that is now available through your EPIS account Via the ignacio you're now able to update your personal and registration information prior to your upcoming appointment. This will save you time once you arrive at the office to check-in, not to mention your information remains safe!! Many other perks come from signing up for an account, such as:  Requesting refills  Scheduling an appointment  Completing an E-Visit  Sending a message to the office/provider  Having access to your medication list  Paying your bill/copay prior to your appointment  Scheduling your yearly mammogram  Review your test results    If you are not familiar with [facility] My[facility] please ask one of us and we will be happy to answer any questions or help you set-up your account.      Your [email or web address] office,

## 2024-01-03 NOTE — PROGRESS NOTES
Chief Complaint   Patient presents with    Fall     12/31/2023 DID NOT GO TO THE HOSPITAL     Elbow Injury     FROM FALLING ON L ELBOW HAS A LUMP AND IS MUSHY WITH DARK BRUISING          Dexter Campoverde  here today for follow up on chronic medical problems, go over labs and/or diagnostic studies, and medication refills. Fall (12/31/2023 DID NOT GO TO THE HOSPITAL ) and Elbow Injury (FROM FALLING ON L ELBOW HAS A LUMP AND IS MUSHY WITH DARK BRUISING )      HPI: Patient is scheduled for fall, and injured his left elbow.  Patient reports he was waking up during night to use the restroom, he tripped and fell on the floor hit his left elbow.  He has noticed swelling of the left elbow and bruise.  Patient reports the swelling is soft, it was initially bleeding but has gone down.  He did not try any medications he did not go to the ER.  The fall happened on December 31.    Patient denies any pain denies any difficulty in left arm movements.  Patient has history of gait instability and risk for fall due to chronic stroke.  He has weakness on the left side of the body, also has tremors on the left upper extremity.  Patient reports when he reaches out to something he gets tremors.  The right side of the body is strong.  He follows with neurologist and has recently seen him in September.    Patient denies any increasing weakness, he has difficulty in speech that has been stable.      Patient has some memory problems and was started on Aricept by neurologist and referred to neuro psychology evaluation.  Patient did not notice any change after taking that medication.      Currently patient is using walker for ambulation.      Type 2 diabetes, stable is currently on metformin not due for A1c check.      Depression stable is on Wellbutrin, denies any recent episode.  Patient enjoys his day-to-day activities.      Peripheral vascular disease stable, is currently on statins, lipid panel is uncontrolled and LDL was high.  Will recheck

## 2024-01-03 NOTE — PROGRESS NOTES
Visit Information    Have you changed or started any medications since your last visit including any over-the-counter medicines, vitamins, or herbal medicines? no   Are you having any side effects from any of your medications? -  no  Have you stopped taking any of your medications? Is so, why? -  no    Have you seen any other physician or provider since your last visit? No  Have you had any other diagnostic tests since your last visit? No  Have you been seen in the emergency room and/or had an admission to a hospital since we last saw you? No  Have you had your routine dental cleaning in the past 6 months? no    Have you activated your [employer] account? If not, what are your barriers? Yes     Patient Care Team:  Andie Glover MD as PCP - General (Family Medicine)  Andie Glover MD as PCP - Empaneled Provider  Valery Long MD as Consulting Physician (Gastroenterology)    Medical History Review  Past Medical, Family, and Social History reviewed and does contribute to the patient presenting condition    Health Maintenance   Topic Date Due    Respiratory Syncytial Virus (RSV) Pregnant or age 60 yrs+ (1 - 1-dose 60+ series) Never done    Flu vaccine (1) 08/01/2023    COVID-19 Vaccine (3 - 2023-24 season) 09/01/2023    Annual Wellness Visit (Medicare Advantage)  01/01/2024    Lipids  06/27/2024    Depression Monitoring  11/27/2024    Colorectal Cancer Screen  01/31/2029    DTaP/Tdap/Td vaccine (2 - Td or Tdap) 08/05/2033    Shingles vaccine  Completed    Pneumococcal 65+ years Vaccine  Completed    Hepatitis C screen  Completed    Hepatitis A vaccine  Aged Out    Hepatitis B vaccine  Aged Out    Hib vaccine  Aged Out    Polio vaccine  Aged Out    Meningococcal (ACWY) vaccine  Aged Out    A1C test (Diabetic or Prediabetic)  Discontinued

## 2024-01-05 NOTE — TELEPHONE ENCOUNTER
Please Approve or Refuse.  Send to Pharmacy per Pt's Request: [facility]     Next Visit Date:  3/27/2024   Last Visit Date: 1/3/2024    Hemoglobin A1C (%)   Date Value   11/27/2023 6.2   06/21/2023 6.1   09/02/2022 6.3             ( goal A1C is < 7)   BP Readings from Last 3 Encounters:   01/03/24 130/80   11/27/23 138/84   09/27/23 136/72          (goal 120/80)  BUN   Date Value Ref Range Status   06/27/2023 16 8 - 23 mg/dL Final     Creatinine   Date Value Ref Range Status   06/27/2023 0.64 (L) 0.70 - 1.20 mg/dL Final     Potassium   Date Value Ref Range Status   06/27/2023 4.4 3.7 - 5.3 mmol/L Final

## 2024-01-05 NOTE — TELEPHONE ENCOUNTER
----- Message from Alison Mclean sent at 1/5/2024  1:58 PM EST -----  Subject: Refill Request    QUESTIONS  Name of Medication? donepezil (ARICEPT) 5 MG tablet  Patient-reported dosage and instructions? one tablet nightly   How many days do you have left? 0  Preferred Pharmacy? [id number]  Pharmacy phone number (if available)? [phone]  ---------------------------------------------------------------------------  --------------  CALL BACK INFO  What is the best way for the office to contact you? OK to leave message on   voicemail  Preferred Call Back Phone Number? [phone]  ---------------------------------------------------------------------------  --------------  SCRIPT ANSWERS  Relationship to Patient? Self

## 2024-01-08 RX ORDER — DONEPEZIL HYDROCHLORIDE 5 MG/1
5 TABLET, FILM COATED ORAL NIGHTLY
Qty: 30 TABLET | Refills: 3 | Status: SHIPPED | OUTPATIENT
Start: 2024-01-08

## 2024-01-10 DIAGNOSIS — F32.A DEPRESSION, UNSPECIFIED DEPRESSION TYPE: ICD-10-CM

## 2024-01-10 DIAGNOSIS — E78.2 MIXED HYPERLIPIDEMIA: ICD-10-CM

## 2024-01-10 RX ORDER — ROSUVASTATIN CALCIUM 40 MG/1
40 TABLET, COATED ORAL DAILY
Qty: 90 TABLET | Refills: 3 | Status: SHIPPED | OUTPATIENT
Start: 2024-01-10

## 2024-01-10 RX ORDER — SERTRALINE HYDROCHLORIDE 25 MG/1
25 TABLET, FILM COATED ORAL DAILY
Qty: 90 TABLET | Refills: 0 | OUTPATIENT
Start: 2024-01-10

## 2024-02-06 DIAGNOSIS — I10 ESSENTIAL HYPERTENSION: ICD-10-CM

## 2024-02-06 DIAGNOSIS — I25.118 CORONARY ARTERY DISEASE OF NATIVE ARTERY OF NATIVE HEART WITH STABLE ANGINA PECTORIS (HCC): ICD-10-CM

## 2024-02-06 RX ORDER — METOPROLOL SUCCINATE 50 MG/1
50 TABLET, EXTENDED RELEASE ORAL DAILY
Qty: 90 TABLET | Refills: 3 | Status: SHIPPED | OUTPATIENT
Start: 2024-02-06

## 2024-02-10 ENCOUNTER — HOSPITAL ENCOUNTER (EMERGENCY)
Age: 79
Discharge: HOME | End: 2024-02-10
Attending: EMERGENCY MEDICINE
Payer: MEDICARE

## 2024-02-10 VITALS
OXYGEN SATURATION: 98 % | BODY MASS INDEX: 34.3 KG/M2 | SYSTOLIC BLOOD PRESSURE: 119 MMHG | RESPIRATION RATE: 18 BRPM | HEIGHT: 71 IN | TEMPERATURE: 98.3 F | WEIGHT: 245 LBS | DIASTOLIC BLOOD PRESSURE: 66 MMHG | HEART RATE: 76 BPM

## 2024-02-10 DIAGNOSIS — S39.012A STRAIN OF LUMBAR REGION, INITIAL ENCOUNTER: Primary | ICD-10-CM

## 2024-02-10 PROCEDURE — 99283 EMERGENCY DEPT VISIT LOW MDM: CPT

## 2024-02-10 PROCEDURE — 6370000000 HC RX 637 (ALT 250 FOR IP)

## 2024-02-10 RX ORDER — LIDOCAINE 4 G/G
1 PATCH TOPICAL DAILY
Qty: 3 EACH | Refills: 0 | Status: SHIPPED | OUTPATIENT
Start: 2024-02-10 | End: 2024-02-13

## 2024-02-10 RX ORDER — ACETAMINOPHEN 500 MG
1000 TABLET ORAL ONCE
Status: COMPLETED | OUTPATIENT
Start: 2024-02-10 | End: 2024-02-10

## 2024-02-10 RX ORDER — LIDOCAINE 4 G/G
1 PATCH TOPICAL ONCE
Status: DISCONTINUED | OUTPATIENT
Start: 2024-02-10 | End: 2024-02-10 | Stop reason: HOSPADM

## 2024-02-10 RX ADMIN — ACETAMINOPHEN 1000 MG: 500 TABLET ORAL at 16:36

## 2024-02-10 ASSESSMENT — PAIN - FUNCTIONAL ASSESSMENT
PAIN_FUNCTIONAL_ASSESSMENT: 0-10
PAIN_FUNCTIONAL_ASSESSMENT: ACTIVITIES ARE NOT PREVENTED

## 2024-02-10 ASSESSMENT — LIFESTYLE VARIABLES
HOW MANY STANDARD DRINKS CONTAINING ALCOHOL DO YOU HAVE ON A TYPICAL DAY: 1 OR 2
HOW OFTEN DO YOU HAVE A DRINK CONTAINING ALCOHOL: MONTHLY OR LESS
HOW OFTEN DO YOU HAVE A DRINK CONTAINING ALCOHOL: NEVER

## 2024-02-10 ASSESSMENT — PAIN DESCRIPTION - LOCATION
LOCATION: LEG
LOCATION: BACK

## 2024-02-10 ASSESSMENT — ENCOUNTER SYMPTOMS
ABDOMINAL PAIN: 0
BACK PAIN: 1
NAUSEA: 0
VOMITING: 0

## 2024-02-10 ASSESSMENT — PAIN SCALES - GENERAL
PAINLEVEL_OUTOF10: 9
PAINLEVEL_OUTOF10: 8

## 2024-02-10 ASSESSMENT — PAIN DESCRIPTION - ORIENTATION: ORIENTATION: RIGHT

## 2024-02-10 ASSESSMENT — PAIN DESCRIPTION - DESCRIPTORS: DESCRIPTORS: ACHING

## 2024-02-10 NOTE — ED TRIAGE NOTES
Mode of arrival (squad #, walk in, police, etc) : Walk in        Chief complaint(s): Lower back pain         Arrival Note (brief scenario, treatment PTA, etc).: Pt arrived to room B. Patient states he has had lower back pain near sciatic nerve on the right side for 4 days. Pt states that he has not taken his blood thinner (unable to identify) since Tuesday. He states his right shin has hurt as well.         C= \"Have you ever felt that you should Cut down on your drinking?\"  No  A= \"Have people Annoyed you by criticizing your drinking?\"  No  G= \"Have you ever felt bad or Guilty about your drinking?\"  No  E= \"Have you ever had a drink as an Eye-opener first thing in the morning to steady your nerves or to help a hangover?\"  No      Deferred []      Reason for deferring: N/A    *If yes to two or more: probable alcohol abuse.*

## 2024-02-10 NOTE — ED NOTES
C/o pain to right shin, no injury, no swelling, no difficulty moving.  C/O pain to right sciatica- gluteal area

## 2024-02-10 NOTE — ED PROVIDER NOTES
[facility] ED  Emergency Department Encounter  Emergency Medicine Resident     Pt Name:Dexter Campoverde  MRN: [id number]  Birthdate 1945  Date of evaluation: 2/10/24  PCP:  Andie Glover MD  Note Started: 3:50 PM EST      CHIEF COMPLAINT       Chief Complaint   Patient presents with    Back Pain     Lower       HISTORY OF PRESENT ILLNESS  (Location/Symptom, Timing/Onset, Context/Setting, Quality, Duration, Modifying Factors, Severity.)      Dexter Campoverde is a 78 y.o. male who presents with right lower back pain that started a few days ago.  Patient states he has had this pain before on the left side that went away but this pain has stayed.  The pain is worse when laying flat and is improved when sitting up.  He denies any numbness in his lower extremities.  He denies any saddle anesthesia, urinary incontinence, or fecal incontinence.  He denies any fevers or chills.  He denies any abdominal pain, nausea, or vomiting.  Patient also is complaining of soreness to the anterior right shin.  He denies any recent fall or trauma.  He has been taking Tylenol for his pain.  He also has ran out of his blood thinner but he does not remember the name of his blood thinner.  He states he ran out either Tuesday or Wednesday and does have an appointment on Monday with his doctor to have the prescription refilled.    PAST MEDICAL / SURGICAL / SOCIAL / FAMILY HISTORY      has a past medical history of Acquired skull defect, Angina pectoris (HCC), Anxiety, Anxiety, At high risk for falls, Ataxia following nontraumatic intracerebral hemorrhage, Bronchitis with asthma, acute, CAD (coronary artery disease), Carotid stenosis, Carotid stenosis, left, Chest pain, Compression of brainstem (HCC), Diabetes mellitus (HCC), Diplopia, Dysphagia, Gait instability, GERD (gastroesophageal reflux disease), H/O carotid endarterectomy, H/O heart artery stent, Heart attack (HCC), Hyperlipidemia, Hypertension, Hyponatremia, Impaired fasting

## 2024-02-10 NOTE — DISCHARGE INSTRUCTIONS
You were seen in the emergency department for right lower back pain.  Your vital signs were stable.  Your back pain is likely due to a musculoskeletal strain.  We applied a lidocaine patch and gave you Tylenol in the emergency department.    This patch may remain in place for 12 hours prior to removal.  You must then give yourself a 12-hour break before applying the next patch.  Continue to take Tylenol every 6 hours as needed for pain.    Please go to your appointment on Monday to have your blood thinner medication refilled.  You may also follow-up on your back pain at that visit as well.    Please return to the emergency department if you develop any numbness or weakness in your lower extremities, difficulty urinating, numbness in your genital region, abdominal pain, nausea, vomiting, fevers, or chills.

## 2024-02-10 NOTE — ED PROVIDER NOTES
[facility] ED  eMERGENCY dEPARTMENT eNCOUnter   Attending Attestation     Pt Name: Dexter Campoverde  MRN: [id number]  Birthdate 1945  Date of evaluation: 2/10/24       Dexter Campoverde is a 78 y.o. male who presents with Back Pain (Lower)      History:   Patient is here complaining of right lower back pain that radiates down his leg.  No numbness or tingling associated with this no weakness.  Patient is denying bowel or bladder incontinence or retention.  Patient states it is worse if he bends over and straightens up.    Exam: Vitals:   Vitals:    02/10/24 1557   BP: 119/66   Pulse: 76   Resp: 18   Temp: 98.3 °F (36.8 °C)   SpO2: 98%   Weight: 111.1 kg (245 lb)   Height: 1.803 m (5' 11\")     No midline tenderness with some tenderness to the right paraspinous muscles in the lumbar spine.    I performed a history and physical examination of the patient and discussed management with the resident. I reviewed the resident’s note and agree with the documented findings and plan of care. Any areas of disagreement are noted on the chart. I was personally present for the key portions of any procedures. I have documented in the chart those procedures where I was not present during the key portions. I have personally reviewed all images and agree with the resident's interpretation. I have reviewed the emergency nurses triage note. I agree with the chief complaint, past medical history, past surgical history, allergies, medications, social and family history as documented unless otherwise noted below. Documentation of the HPI, Physical Exam and Medical Decision Making performed by medical students or scribes is based on my personal performance of the HPI, PE and MDM. I personally evaluated and examined the patient in conjunction with the APC and agree with the assessment, treatment plan, and disposition of the patient as recorded by the APC. Additional findings are as noted.    Americo Fernandez MD  Attending Emergency   Physician             Americo Fernandez MD  02/10/24 0255

## 2024-02-10 NOTE — ED NOTES
Pt discharged in stable condition with prescriptions and dc instructions. Pt ambulates to door with wheeled walker without assistance.

## 2024-02-12 ENCOUNTER — TELEPHONE (OUTPATIENT)
Dept: FAMILY MEDICINE CLINIC | Age: 79
End: 2024-02-12

## 2024-02-12 NOTE — TELEPHONE ENCOUNTER
[facility] ED Follow up Call    Reason for ED visit:  BACK PAIN     LM WITH WIFE FOR PATIENT TO CALL BACK TO DISCUSS ED VISIT     FU appts/Provider:    Future Appointments   Date Time Provider Department Center   3/27/2024 10:00 AM Andie Glover MD fp [facility]   4/3/2024  2:45 PM Andie Glover MD [facility]

## 2024-02-20 ENCOUNTER — OFFICE VISIT (OUTPATIENT)
Dept: FAMILY MEDICINE CLINIC | Age: 79
End: 2024-02-20
Payer: MEDICARE

## 2024-02-20 VITALS
SYSTOLIC BLOOD PRESSURE: 130 MMHG | WEIGHT: 247 LBS | HEIGHT: 71 IN | TEMPERATURE: 97.6 F | HEART RATE: 76 BPM | DIASTOLIC BLOOD PRESSURE: 70 MMHG | BODY MASS INDEX: 34.58 KG/M2 | OXYGEN SATURATION: 98 %

## 2024-02-20 DIAGNOSIS — F33.1 MODERATE EPISODE OF RECURRENT MAJOR DEPRESSIVE DISORDER (HCC): ICD-10-CM

## 2024-02-20 DIAGNOSIS — M70.22 OLECRANON BURSITIS OF BOTH ELBOWS: ICD-10-CM

## 2024-02-20 DIAGNOSIS — R73.03 PREDIABETES: ICD-10-CM

## 2024-02-20 DIAGNOSIS — I25.118 CORONARY ARTERY DISEASE OF NATIVE ARTERY OF NATIVE HEART WITH STABLE ANGINA PECTORIS (HCC): ICD-10-CM

## 2024-02-20 DIAGNOSIS — L30.9 DERMATITIS: ICD-10-CM

## 2024-02-20 DIAGNOSIS — G93.32 CHRONIC FATIGUE SYNDROME: ICD-10-CM

## 2024-02-20 DIAGNOSIS — I10 ESSENTIAL HYPERTENSION: ICD-10-CM

## 2024-02-20 DIAGNOSIS — M47.27 LUMBOSACRAL RADICULOPATHY DUE TO DEGENERATIVE JOINT DISEASE OF SPINE: Primary | ICD-10-CM

## 2024-02-20 DIAGNOSIS — M70.21 OLECRANON BURSITIS OF BOTH ELBOWS: ICD-10-CM

## 2024-02-20 DIAGNOSIS — I42.8 OTHER CARDIOMYOPATHY (HCC): ICD-10-CM

## 2024-02-20 PROBLEM — I31.4 PERICARDIAL TAMPONADE: Status: RESOLVED | Noted: 2018-12-20 | Resolved: 2024-02-20

## 2024-02-20 PROCEDURE — 3075F SYST BP GE 130 - 139MM HG: CPT | Performed by: FAMILY MEDICINE

## 2024-02-20 PROCEDURE — 3078F DIAST BP <80 MM HG: CPT | Performed by: FAMILY MEDICINE

## 2024-02-20 PROCEDURE — 1036F TOBACCO NON-USER: CPT | Performed by: FAMILY MEDICINE

## 2024-02-20 PROCEDURE — 0518F FALL PLAN OF CARE DOCD: CPT | Performed by: FAMILY MEDICINE

## 2024-02-20 PROCEDURE — G8417 CALC BMI ABV UP PARAM F/U: HCPCS | Performed by: FAMILY MEDICINE

## 2024-02-20 PROCEDURE — 1123F ACP DISCUSS/DSCN MKR DOCD: CPT | Performed by: FAMILY MEDICINE

## 2024-02-20 PROCEDURE — 3288F FALL RISK ASSESSMENT DOCD: CPT | Performed by: FAMILY MEDICINE

## 2024-02-20 PROCEDURE — G8484 FLU IMMUNIZE NO ADMIN: HCPCS | Performed by: FAMILY MEDICINE

## 2024-02-20 PROCEDURE — G8427 DOCREV CUR MEDS BY ELIG CLIN: HCPCS | Performed by: FAMILY MEDICINE

## 2024-02-20 PROCEDURE — 99214 OFFICE O/P EST MOD 30 MIN: CPT | Performed by: FAMILY MEDICINE

## 2024-02-20 RX ORDER — LISINOPRIL 20 MG/1
20 TABLET ORAL DAILY
Qty: 90 TABLET | Refills: 1 | Status: SHIPPED | OUTPATIENT
Start: 2024-02-20

## 2024-02-20 RX ORDER — METFORMIN HYDROCHLORIDE 500 MG/1
500 TABLET, EXTENDED RELEASE ORAL
Qty: 90 TABLET | Refills: 1 | Status: SHIPPED | OUTPATIENT
Start: 2024-02-20

## 2024-02-20 RX ORDER — DULOXETIN HYDROCHLORIDE 60 MG/1
60 CAPSULE, DELAYED RELEASE ORAL DAILY
Qty: 90 CAPSULE | Refills: 1 | Status: SHIPPED | OUTPATIENT
Start: 2024-02-20

## 2024-02-20 RX ORDER — DONEPEZIL HYDROCHLORIDE 5 MG/1
5 TABLET, FILM COATED ORAL NIGHTLY
Qty: 90 TABLET | Refills: 1 | Status: SHIPPED | OUTPATIENT
Start: 2024-02-20

## 2024-02-20 RX ORDER — CLOTRIMAZOLE AND BETAMETHASONE DIPROPIONATE 10; .64 MG/G; MG/G
CREAM TOPICAL
Qty: 1 EACH | Refills: 0 | Status: SHIPPED | OUTPATIENT
Start: 2024-02-20

## 2024-02-20 RX ORDER — CLOPIDOGREL BISULFATE 75 MG/1
75 TABLET ORAL DAILY
Qty: 90 TABLET | Refills: 3 | Status: SHIPPED | OUTPATIENT
Start: 2024-02-20

## 2024-02-20 RX ORDER — TIZANIDINE 4 MG/1
4 TABLET ORAL NIGHTLY
Qty: 90 TABLET | Refills: 0 | Status: SHIPPED | OUTPATIENT
Start: 2024-02-20

## 2024-02-20 SDOH — ECONOMIC STABILITY: FOOD INSECURITY: WITHIN THE PAST 12 MONTHS, THE FOOD YOU BOUGHT JUST DIDN'T LAST AND YOU DIDN'T HAVE MONEY TO GET MORE.: NEVER TRUE

## 2024-02-20 SDOH — ECONOMIC STABILITY: INCOME INSECURITY: HOW HARD IS IT FOR YOU TO PAY FOR THE VERY BASICS LIKE FOOD, HOUSING, MEDICAL CARE, AND HEATING?: VERY HARD

## 2024-02-20 SDOH — ECONOMIC STABILITY: FOOD INSECURITY: WITHIN THE PAST 12 MONTHS, YOU WORRIED THAT YOUR FOOD WOULD RUN OUT BEFORE YOU GOT MONEY TO BUY MORE.: NEVER TRUE

## 2024-02-20 ASSESSMENT — PATIENT HEALTH QUESTIONNAIRE - PHQ9
SUM OF ALL RESPONSES TO PHQ9 QUESTIONS 1 & 2: 3
8. MOVING OR SPEAKING SO SLOWLY THAT OTHER PEOPLE COULD HAVE NOTICED. OR THE OPPOSITE, BEING SO FIGETY OR RESTLESS THAT YOU HAVE BEEN MOVING AROUND A LOT MORE THAN USUAL: 0
SUM OF ALL RESPONSES TO PHQ QUESTIONS 1-9: 6
10. IF YOU CHECKED OFF ANY PROBLEMS, HOW DIFFICULT HAVE THESE PROBLEMS MADE IT FOR YOU TO DO YOUR WORK, TAKE CARE OF THINGS AT HOME, OR GET ALONG WITH OTHER PEOPLE: 1
4. FEELING TIRED OR HAVING LITTLE ENERGY: 1
6. FEELING BAD ABOUT YOURSELF - OR THAT YOU ARE A FAILURE OR HAVE LET YOURSELF OR YOUR FAMILY DOWN: 1
SUM OF ALL RESPONSES TO PHQ QUESTIONS 1-9: 6
3. TROUBLE FALLING OR STAYING ASLEEP: 1
SUM OF ALL RESPONSES TO PHQ QUESTIONS 1-9: 6
1. LITTLE INTEREST OR PLEASURE IN DOING THINGS: 0
SUM OF ALL RESPONSES TO PHQ QUESTIONS 1-9: 6
2. FEELING DOWN, DEPRESSED OR HOPELESS: 3
9. THOUGHTS THAT YOU WOULD BE BETTER OFF DEAD, OR OF HURTING YOURSELF: 0
5. POOR APPETITE OR OVEREATING: 0
7. TROUBLE CONCENTRATING ON THINGS, SUCH AS READING THE NEWSPAPER OR WATCHING TELEVISION: 0

## 2024-02-20 ASSESSMENT — ENCOUNTER SYMPTOMS
STRIDOR: 0
WHEEZING: 0
TROUBLE SWALLOWING: 0
SINUS PRESSURE: 0
VOMITING: 0
RHINORRHEA: 0
RECTAL PAIN: 0
CONSTIPATION: 0
ABDOMINAL DISTENTION: 0
SHORTNESS OF BREATH: 0
SORE THROAT: 0
ABDOMINAL PAIN: 0
EYE REDNESS: 0
NAUSEA: 0
BACK PAIN: 1
COUGH: 0
COLOR CHANGE: 0
BLOOD IN STOOL: 0
DIARRHEA: 0
CHEST TIGHTNESS: 0

## 2024-02-20 NOTE — PROGRESS NOTES
Visit Information    Have you changed or started any medications since your last visit including any over-the-counter medicines, vitamins, or herbal medicines? yes -    Have you stopped taking any of your medications? Is so, why? -  no  Are you having any side effects from any of your medications? - no    Have you seen any other physician or provider since your last visit?  no   Have you had any other diagnostic tests since your last visit?  no   Have you been seen in the emergency room and/or had an admission in a hospital since we last saw you?  yes -    Have you had your routine dental cleaning in the past 6 months?  no     Do you have an active MyChart account? If no, what is the barrier?  No: PENDING    Patient Care Team:  Andie Glover MD as PCP - General (Family Medicine)  Andie Glover MD as PCP - Empaneled Provider  Valery Long MD as Consulting Physician (Gastroenterology)    Medical History Review  Past Medical, Family, and Social History reviewed and does contribute to the patient presenting condition    Health Maintenance   Topic Date Due    Respiratory Syncytial Virus (RSV) Pregnant or age 60 yrs+ (1 - 1-dose 60+ series) Never done    COVID-19 Vaccine (3 - 2023-24 season) 09/01/2023    Annual Wellness Visit (Medicare Advantage)  01/01/2024    Lipids  06/27/2024    Depression Monitoring  01/03/2025    Colorectal Cancer Screen  01/31/2029    DTaP/Tdap/Td vaccine (2 - Td or Tdap) 08/05/2033    Flu vaccine  Completed    Shingles vaccine  Completed    Pneumococcal 65+ years Vaccine  Completed    Hepatitis C screen  Completed    Hepatitis A vaccine  Aged Out    Hepatitis B vaccine  Aged Out    Hib vaccine  Aged Out    Polio vaccine  Aged Out    Meningococcal (ACWY) vaccine  Aged Out    A1C test (Diabetic or Prediabetic)  Discontinued             
Prediabetes  Improving, patient is not due for A1c repeat A1c next appointment  - metFORMIN (GLUCOPHAGE-XR) 500 MG extended release tablet; Take 1 tablet by mouth daily (with breakfast)  Dispense: 90 tablet; Refill: 1    8. Dermatitis  Started on Lotrisone discussed to use petroleum jelly  - clotrimazole-betamethasone (LOTRISONE) 1-0.05 % cream; Apply topically 2 times daily.  Dispense: 1 each; Refill: 0    9. Olecranon bursitis of both elbows  Referral placed to Ortho  - King Lawrence MD, Orthopaedic Surgery, [place]      Orders Placed This Encounter   Procedures    [facility]     Referral Priority:   Routine     Referral Type:   Eval and Treat     Referral Reason:   Specialty Services Required     Requested Specialty:   Pain Management     Number of Visits Requested:   1    King Lawrence MD, Orthopaedic Surgery, [place]     Referral Priority:   Routine     Referral Type:   Eval and Treat     Referral Reason:   Specialty Services Required     Referred to Provider:   King Guajardo MD     Requested Specialty:   Orthopedic Surgery     Number of Visits Requested:   1         Medications Discontinued During This Encounter   Medication Reason    vibegron (GEMTESA) 75 MG TABS tablet Alternate therapy    clopidogrel (PLAVIX) 75 MG tablet REORDER    metFORMIN (GLUCOPHAGE-XR) 500 MG extended release tablet REORDER    DULoxetine (CYMBALTA) 60 MG extended release capsule REORDER    lisinopril (PRINIVIL;ZESTRIL) 20 MG tablet REORDER    donepezil (ARICEPT) 5 MG tablet REORDER       Dexter received counseling on the following healthy behaviors: nutrition, exercise, and medication adherence  Reviewed prior labs and health maintenance  Continue current medications, diet and exercise.  Discussed use, benefit, and side effects of prescribed medications.  Barriers to medication compliance addressed.  Patient given educational materials - see patient instructions  Was a self-tracking handout given

## 2024-03-07 ENCOUNTER — OFFICE VISIT (OUTPATIENT)
Dept: ORTHOPEDIC SURGERY | Age: 79
End: 2024-03-07
Payer: MEDICARE

## 2024-03-07 VITALS — RESPIRATION RATE: 14 BRPM | WEIGHT: 249 LBS | BODY MASS INDEX: 34.86 KG/M2 | HEIGHT: 71 IN

## 2024-03-07 DIAGNOSIS — M70.22 OLECRANON BURSITIS OF LEFT ELBOW: ICD-10-CM

## 2024-03-07 DIAGNOSIS — M25.522 LEFT ELBOW PAIN: ICD-10-CM

## 2024-03-07 DIAGNOSIS — M25.521 RIGHT ELBOW PAIN: Primary | ICD-10-CM

## 2024-03-07 PROCEDURE — G8484 FLU IMMUNIZE NO ADMIN: HCPCS | Performed by: ORTHOPAEDIC SURGERY

## 2024-03-07 PROCEDURE — G8427 DOCREV CUR MEDS BY ELIG CLIN: HCPCS | Performed by: ORTHOPAEDIC SURGERY

## 2024-03-07 PROCEDURE — G8417 CALC BMI ABV UP PARAM F/U: HCPCS | Performed by: ORTHOPAEDIC SURGERY

## 2024-03-07 PROCEDURE — 0518F FALL PLAN OF CARE DOCD: CPT | Performed by: ORTHOPAEDIC SURGERY

## 2024-03-07 PROCEDURE — 3288F FALL RISK ASSESSMENT DOCD: CPT | Performed by: ORTHOPAEDIC SURGERY

## 2024-03-07 PROCEDURE — 1036F TOBACCO NON-USER: CPT | Performed by: ORTHOPAEDIC SURGERY

## 2024-03-07 PROCEDURE — 99203 OFFICE O/P NEW LOW 30 MIN: CPT | Performed by: ORTHOPAEDIC SURGERY

## 2024-03-07 PROCEDURE — 1123F ACP DISCUSS/DSCN MKR DOCD: CPT | Performed by: ORTHOPAEDIC SURGERY

## 2024-03-07 NOTE — PROGRESS NOTES
Orthopedic Elbow Encounter Note     Chief complaint: left elbow pain    HPI: Dexter Campoverde is a 78 y.o. right-hand-dominant gentleman here today for evaluation of his left elbow.  He indicates that couple months ago he lost his balance and fell hurting the left elbow as he landed on it.  Developed swelling and pain over the posterior aspect of the elbow.  Over time the swelling has improved but he continues to have pain localized to the posterior aspect of his elbow.  This is usually present anytime he bumps the elbow.  He denies having any fevers, chills, sweats or constitutional symptoms.    Previous treatment:    NSAIDs: None    Injections: None    Physical therapy: No    Surgeries: None    Review of Systems:     Constitution: no fever or chills   Pain level: 9/10  Musculoskeletal: As noted in the HPI   Neurologic: no neurologic symptoms    Past Medical Hx, Past Surgical Hx, Medications, Allergies, Social Hx: These were all reviewed. Please refer to Electronic Medical Records for details.     Physical Exam:     Resp 14   Ht 1.803 m (5' 10.98\")   Wt 112.9 kg (249 lb)   BMI 34.74 kg/m²    General Appearance: alert, well appearing, and in no distress  Mental Status: alert, oriented to person, place, and time  Gait: Ambulates with a walker    Elbows:    Skin: warm and dry, no rash or erythema.  Mild focal swelling over the posterior aspect of the left elbow  Vasculature: 2+ radial pulses bilaterally  Sensation: Intact to light touch in radial, ulnar, and median nerve distributions bilaterally    ROM: (Degrees)    Right   A  Left   A     Flexion   130  Flexion   135   Extension  15  Extension  0    Pronation  75  Pronaton  75   Supination  75  Supination  75     Crepitation  No  Crepitation  No    Muscle strength:    Right       Left    Biceps   5    Biceps   5  Triceps   5    Triceps   5  Wrist flexion  5    Wrist flexion  5  Wrist extension 5    Wrist extension 5  Intrinsics  5    Intrinsics  5    Tenderness:

## 2024-03-21 ENCOUNTER — HOSPITAL ENCOUNTER (OUTPATIENT)
Dept: PAIN MANAGEMENT | Age: 79
Discharge: HOME | End: 2024-03-21
Payer: MEDICARE

## 2024-03-21 VITALS — BODY MASS INDEX: 35.65 KG/M2 | WEIGHT: 249 LBS | HEIGHT: 70 IN

## 2024-03-21 DIAGNOSIS — R69 SEVERE COMORBID ILLNESS: ICD-10-CM

## 2024-03-21 DIAGNOSIS — R26.81 GAIT INSTABILITY: ICD-10-CM

## 2024-03-21 DIAGNOSIS — M51.36 LUMBAR DEGENERATIVE DISC DISEASE: Primary | ICD-10-CM

## 2024-03-21 PROCEDURE — 99204 OFFICE O/P NEW MOD 45 MIN: CPT | Performed by: ANESTHESIOLOGY

## 2024-03-21 PROCEDURE — 99203 OFFICE O/P NEW LOW 30 MIN: CPT

## 2024-03-21 ASSESSMENT — ENCOUNTER SYMPTOMS
SHORTNESS OF BREATH: 0
EYES NEGATIVE: 1
ABDOMINAL PAIN: 0
ALLERGIC/IMMUNOLOGIC NEGATIVE: 1
VOMITING: 0
DIARRHEA: 0
RESPIRATORY NEGATIVE: 1
CONSTIPATION: 0
BACK PAIN: 1
NAUSEA: 0
GASTROINTESTINAL NEGATIVE: 1

## 2024-03-21 ASSESSMENT — PAIN SCALES - GENERAL: PAINLEVEL_OUTOF10: 5

## 2024-03-21 NOTE — CONSULTS
Subjective:    Dexter Campoverde is a 78 y.o. male with  has a past medical history of Acquired skull defect, Angina pectoris (HCC), Anxiety, Anxiety, At high risk for falls, Ataxia following nontraumatic intracerebral hemorrhage, Bronchitis with asthma, acute, CAD (coronary artery disease), Carotid stenosis, Carotid stenosis, left, Chest pain, Compression of brainstem (HCC), Diabetes mellitus (HCC), Diplopia, Dysphagia, Gait instability, GERD (gastroesophageal reflux disease), H/O carotid endarterectomy, H/O heart artery stent, Heart attack (HCC), Hyperlipidemia, Hypertension, Hyponatremia, Impaired fasting glucose, Intermittent lightheadedness, Myocardiopathy (HCC), Neuropathy, Obesity, Osteoarthritis, Pericardial tamponade, S/P total knee arthroplasty, Spontaneous intracranial hemorrhage (HCC), and Stroke, hemorrhagic (HCC).    Presented to the office today for:  Chief Complaint   Patient presents with    New Patient     Back pain       HPI    Patient is a 77 yo M presenting to clinic with complaints of chronic low back pain.   He has a past medical history significant for CAD s/p stent in 2019, stroke, intracerebral hemorrhage also in 2019, hypertension.   He has been having a flare up of his back pain, for the last month.  Patient has very poor balance and is walker dependent.   Pain is located in his right lower side, with radiation to his right leg. Describes the pain a 10/10.  Pain is alleviated with tylenol and heating pad.  Its exacerbated with movement.  Denies any motor weakness on high right side, saddle anesthesia , urinary or fecal incontinence.    He is a fall risk, and falls around twice per month.  He states he fell 20 feet back in the 90s, and has been having pain ever since.  Last time he participated in physical therapy was one year ago with minimal relief.   Last imaging was 2 years ago, which showed degenerative changes, and foraminal narrowing severe at right L4-L5, moderate L4-5.      Review of

## 2024-03-21 NOTE — PROGRESS NOTES
placed in this encounter.     In today's visit , total time spent face to face with patient was  60 minutes in which  50% or more of the time was spent in counseling and coordinating care.  The patient was counseled about  1. Diagnostic Impression  2. Recommended Diagnostic Studies  3. Treatment options  4. Risks and benefits of treatment options  5. Importance of compliance with treatment options.  6. Follow up instructions.          Electronically signed by Efren Samuel MD on 3/21/2024 at 12:34 PM

## 2024-03-27 ENCOUNTER — OFFICE VISIT (OUTPATIENT)
Dept: FAMILY MEDICINE CLINIC | Age: 79
End: 2024-03-27
Payer: MEDICARE

## 2024-03-27 VITALS
DIASTOLIC BLOOD PRESSURE: 80 MMHG | HEIGHT: 70 IN | BODY MASS INDEX: 35.5 KG/M2 | HEART RATE: 74 BPM | OXYGEN SATURATION: 97 % | SYSTOLIC BLOOD PRESSURE: 130 MMHG | WEIGHT: 248 LBS

## 2024-03-27 DIAGNOSIS — E66.01 SEVERE OBESITY (BMI 35.0-39.9) WITH COMORBIDITY (HCC): ICD-10-CM

## 2024-03-27 DIAGNOSIS — R26.81 GAIT INSTABILITY: ICD-10-CM

## 2024-03-27 DIAGNOSIS — E11.9 TYPE 2 DIABETES MELLITUS WITHOUT COMPLICATION, WITHOUT LONG-TERM CURRENT USE OF INSULIN (HCC): ICD-10-CM

## 2024-03-27 DIAGNOSIS — Z00.00 MEDICARE ANNUAL WELLNESS VISIT, SUBSEQUENT: Primary | ICD-10-CM

## 2024-03-27 LAB — HBA1C MFR BLD: 6.1 %

## 2024-03-27 PROCEDURE — 3079F DIAST BP 80-89 MM HG: CPT | Performed by: FAMILY MEDICINE

## 2024-03-27 PROCEDURE — 3075F SYST BP GE 130 - 139MM HG: CPT | Performed by: FAMILY MEDICINE

## 2024-03-27 PROCEDURE — 3044F HG A1C LEVEL LT 7.0%: CPT | Performed by: FAMILY MEDICINE

## 2024-03-27 PROCEDURE — 83036 HEMOGLOBIN GLYCOSYLATED A1C: CPT | Performed by: FAMILY MEDICINE

## 2024-03-27 PROCEDURE — G0439 PPPS, SUBSEQ VISIT: HCPCS | Performed by: FAMILY MEDICINE

## 2024-03-27 PROCEDURE — 1123F ACP DISCUSS/DSCN MKR DOCD: CPT | Performed by: FAMILY MEDICINE

## 2024-03-27 PROCEDURE — G8484 FLU IMMUNIZE NO ADMIN: HCPCS | Performed by: FAMILY MEDICINE

## 2024-03-27 SDOH — ECONOMIC STABILITY: FOOD INSECURITY: WITHIN THE PAST 12 MONTHS, YOU WORRIED THAT YOUR FOOD WOULD RUN OUT BEFORE YOU GOT MONEY TO BUY MORE.: NEVER TRUE

## 2024-03-27 SDOH — ECONOMIC STABILITY: INCOME INSECURITY: HOW HARD IS IT FOR YOU TO PAY FOR THE VERY BASICS LIKE FOOD, HOUSING, MEDICAL CARE, AND HEATING?: NOT HARD AT ALL

## 2024-03-27 SDOH — ECONOMIC STABILITY: FOOD INSECURITY: WITHIN THE PAST 12 MONTHS, THE FOOD YOU BOUGHT JUST DIDN'T LAST AND YOU DIDN'T HAVE MONEY TO GET MORE.: NEVER TRUE

## 2024-03-27 ASSESSMENT — PATIENT HEALTH QUESTIONNAIRE - PHQ9
7. TROUBLE CONCENTRATING ON THINGS, SUCH AS READING THE NEWSPAPER OR WATCHING TELEVISION: NOT AT ALL
SUM OF ALL RESPONSES TO PHQ QUESTIONS 1-9: 2
4. FEELING TIRED OR HAVING LITTLE ENERGY: NOT AT ALL
SUM OF ALL RESPONSES TO PHQ9 QUESTIONS 1 & 2: 1
5. POOR APPETITE OR OVEREATING: NOT AT ALL
SUM OF ALL RESPONSES TO PHQ QUESTIONS 1-9: 2
9. THOUGHTS THAT YOU WOULD BE BETTER OFF DEAD, OR OF HURTING YOURSELF: NOT AT ALL
3. TROUBLE FALLING OR STAYING ASLEEP: NOT AT ALL
6. FEELING BAD ABOUT YOURSELF - OR THAT YOU ARE A FAILURE OR HAVE LET YOURSELF OR YOUR FAMILY DOWN: SEVERAL DAYS
1. LITTLE INTEREST OR PLEASURE IN DOING THINGS: NOT AT ALL
2. FEELING DOWN, DEPRESSED OR HOPELESS: SEVERAL DAYS
10. IF YOU CHECKED OFF ANY PROBLEMS, HOW DIFFICULT HAVE THESE PROBLEMS MADE IT FOR YOU TO DO YOUR WORK, TAKE CARE OF THINGS AT HOME, OR GET ALONG WITH OTHER PEOPLE: SOMEWHAT DIFFICULT
8. MOVING OR SPEAKING SO SLOWLY THAT OTHER PEOPLE COULD HAVE NOTICED. OR THE OPPOSITE, BEING SO FIGETY OR RESTLESS THAT YOU HAVE BEEN MOVING AROUND A LOT MORE THAN USUAL: NOT AT ALL
SUM OF ALL RESPONSES TO PHQ QUESTIONS 1-9: 2
SUM OF ALL RESPONSES TO PHQ QUESTIONS 1-9: 2

## 2024-03-27 NOTE — PROGRESS NOTES
Visit Information    Have you changed or started any medications since your last visit including any over-the-counter medicines, vitamins, or herbal medicines? no   Are you having any side effects from any of your medications? -  no  Have you stopped taking any of your medications? Is so, why? -  no    Have you seen any other physician or provider since your last visit? No  Have you had any other diagnostic tests since your last visit? No  Have you been seen in the emergency room and/or had an admission to a hospital since we last saw you? No  Have you had your routine dental cleaning in the past 6 months? no    Have you activated your [employer] account? If not, what are your barriers? Yes     Patient Care Team:  Andie Glover MD as PCP - General (Family Medicine)  Andie Glover MD as PCP - Empaneled Provider  Valery Long MD as Consulting Physician (Gastroenterology)    Medical History Review  Past Medical, Family, and Social History reviewed and does contribute to the patient presenting condition    Health Maintenance   Topic Date Due    Respiratory Syncytial Virus (RSV) Pregnant or age 60 yrs+ (1 - 1-dose 60+ series) Never done    COVID-19 Vaccine (3 - 2023-24 season) 09/01/2023    Annual Wellness Visit (Medicare Advantage)  01/01/2024    Lipids  06/27/2024    Depression Monitoring  02/20/2025    Colorectal Cancer Screen  01/31/2029    DTaP/Tdap/Td vaccine (2 - Td or Tdap) 08/05/2033    Flu vaccine  Completed    Shingles vaccine  Completed    Pneumococcal 65+ years Vaccine  Completed    Hepatitis C screen  Completed    Hepatitis A vaccine  Aged Out    Hepatitis B vaccine  Aged Out    Hib vaccine  Aged Out    Polio vaccine  Aged Out    Meningococcal (ACWY) vaccine  Aged Out    A1C test (Diabetic or Prediabetic)  Discontinued

## 2024-03-27 NOTE — PROGRESS NOTES
Medicare Annual Wellness Visit    Dexter Campoverde is here for Medicare AWV and Other (Pt states he does not know his meds since his wife takes care of them )    Assessment & Plan   Medicare annual wellness visit, subsequent  Type 2 diabetes mellitus without complication, without long-term current use of insulin (HCC)  -     POCT glycosylated hemoglobin (Hb A1C)  Gait instability  Severe obesity (BMI 35.0-39.9) with comorbidity (HCC)  Prediabetes    Recommendations for Preventive Services Due: see orders and patient instructions/AVS.  Recommended screening schedule for the next 5-10 years is provided to the patient in written form: see Patient Instructions/AVS.     Return for keep ignacio in may .     Subjective   The following acute and/or chronic problems were also addressed today:    Diabetes controlled, A1c 6.2 is currently on metformin denies any complaints.    Obesity fairly stable, patient is trying to be active reports he usually tries to do his ADLs.  He watches his diet alone on carbs and fats.    Patient is risk for fall due to previous stroke and also lumbar degenerative disc disease was referred to pain management.  Patient is currently using walker at home.      Patient's complete Health Risk Assessment and screening values have been reviewed and are found in Flowsheets. The following problems were reviewed today and where indicated follow up appointments were made and/or referrals ordered.    Positive Risk Factor Screenings with Interventions:    Fall Risk:  Do you feel unsteady or are you worried about falling? : (!) yes  2 or more falls in past year?: (!) yes  Fall with injury in past year?: no     Interventions:    Patient comments: pt has history of stroke and has balance problems . He uses walker at home.  Patient has done physical therapy, he refused to go for therapy again.  Patient was referred to pain management has MRI scheduled tomorrow.             Activity, Diet, and Weight:  On average, how

## 2024-03-27 NOTE — PATIENT INSTRUCTIONS
doctor if you are having problems. It's also a good idea to know your test results and keep a list of the medicines you take.  How can you care for yourself at home?  Diet    Use less salt when you cook and eat. This helps lower your blood pressure. Taste food before salting. Add only a little salt when you think you need it. With time, your taste buds will adjust to less salt.     Eat fewer snack items, fast foods, canned soups, and other high-salt, high-fat, processed foods.     Read food labels and try to avoid saturated and trans fats. They increase your risk of heart disease by raising cholesterol levels.     Limit the amount of solid fat--butter, margarine, and shortening--you eat. Use olive, peanut, or canola oil when you cook. Bake, broil, and steam foods instead of frying them.     Eat a variety of fruit and vegetables every day. Dark green, deep orange, red, or yellow fruits and vegetables are especially good for you. Examples include spinach, carrots, peaches, and berries.     Foods high in fiber can reduce your cholesterol and provide important vitamins and minerals. High-fiber foods include whole-grain cereals and breads, oatmeal, beans, brown rice, citrus fruits, and apples.     Eat lean proteins. Heart-healthy proteins include seafood, lean meats and poultry, eggs, beans, peas, nuts, seeds, and soy products.     Limit drinks and foods with added sugar. These include candy, desserts, and soda pop.   Heart-healthy lifestyle    If your doctor recommends it, get more exercise. For many people, walking is a good choice. Or you may want to swim, bike, or do other activities. Bit by bit, increase the time you're active every day. Try for at least 30 minutes on most days of the week.     Try to quit or cut back on using tobacco and other nicotine products. This includes smoking and vaping. If you need help quitting, talk to your doctor about stop-smoking programs and medicines. These can increase your chances

## 2024-03-28 ENCOUNTER — HOSPITAL ENCOUNTER (OUTPATIENT)
Dept: MRI IMAGING | Age: 79
Discharge: HOME | End: 2024-03-30
Attending: ANESTHESIOLOGY
Payer: MEDICARE

## 2024-03-28 DIAGNOSIS — M51.36 LUMBAR DEGENERATIVE DISC DISEASE: ICD-10-CM

## 2024-03-28 DIAGNOSIS — R26.81 GAIT INSTABILITY: ICD-10-CM

## 2024-03-28 DIAGNOSIS — R69 SEVERE COMORBID ILLNESS: ICD-10-CM

## 2024-03-28 PROCEDURE — 72148 MRI LUMBAR SPINE W/O DYE: CPT

## 2024-04-18 ENCOUNTER — HOSPITAL ENCOUNTER (OUTPATIENT)
Dept: PAIN MANAGEMENT | Age: 79
Discharge: HOME | End: 2024-04-18
Payer: MEDICARE

## 2024-04-18 VITALS — BODY MASS INDEX: 35.5 KG/M2 | WEIGHT: 248 LBS | HEIGHT: 70 IN

## 2024-04-18 DIAGNOSIS — R26.81 GAIT INSTABILITY: Primary | ICD-10-CM

## 2024-04-18 DIAGNOSIS — R69 SEVERE COMORBID ILLNESS: ICD-10-CM

## 2024-04-18 DIAGNOSIS — M54.51 VERTEBROGENIC LOW BACK PAIN: ICD-10-CM

## 2024-04-18 DIAGNOSIS — M47.817 LUMBOSACRAL SPONDYLOSIS WITHOUT MYELOPATHY: ICD-10-CM

## 2024-04-18 PROCEDURE — 99214 OFFICE O/P EST MOD 30 MIN: CPT | Performed by: ANESTHESIOLOGY

## 2024-04-18 PROCEDURE — 99213 OFFICE O/P EST LOW 20 MIN: CPT

## 2024-04-18 RX ORDER — TRAMADOL HYDROCHLORIDE 50 MG/1
50 TABLET ORAL NIGHTLY PRN
Qty: 30 TABLET | Refills: 1 | Status: SHIPPED | OUTPATIENT
Start: 2024-04-18 | End: 2024-06-17

## 2024-04-18 ASSESSMENT — ENCOUNTER SYMPTOMS
BACK PAIN: 1
EYES NEGATIVE: 1
RESPIRATORY NEGATIVE: 1

## 2024-04-18 ASSESSMENT — PAIN SCALES - GENERAL: PAINLEVEL_OUTOF10: 4

## 2024-04-18 NOTE — PROGRESS NOTES
The patient is a 78 y.o.Non- / non  male.    Chief Complaint   Patient presents with    Back Pain        HPI  This is a 78-year-old man with history of chronic low back pain going on for decades related to a remote injury  Past history significant for cerebrovascular disease with left-sided residual weakness, on long-term antiplatelet therapy     Patient have noticed worsening back pain in last several years  He has done multiple courses of physical therapy  With increasing pain he has noticed poor balance and tendency to fall  Now ambulating with a walker  Pain predominantly in the lumbar area extends over the hip and right lower extremity  No previous lumbar spine injection history  No previous lumbar spine surgical history  Had CT scan lumbar spine 3 years ago that showed a severe lumbar disc degenerative changes            Patient is here today for: MRI results  Pain level: 4  Character: aching  Radiating:  no  Weakness or numbness: fingers  Aggravating Factors:  laying down  Alleviating Factors:  sitting   Constant or intermitting:  constant  Bladder/bowel loss:  no      Past Medical History:   Diagnosis Date    Acquired skull defect     Angina pectoris (HCC)     Anxiety     Anxiety     At high risk for falls     Ataxia following nontraumatic intracerebral hemorrhage     Bronchitis with asthma, acute 11/24/2015    CAD (coronary artery disease)     Carotid stenosis     Left    Carotid stenosis, left 02/02/2016    Chest pain     Compression of brainstem (HCC)     Diabetes mellitus (HCC)     borderline patient off metformin    Diplopia     Dysphagia     Gait instability     GERD (gastroesophageal reflux disease)     H/O carotid endarterectomy 4/18/2016    H/O heart artery stent     x5    Heart attack (HCC) 08/05/2019    Hyperlipidemia     Hypertension     Hyponatremia     Impaired fasting glucose 7/13/2015    Intermittent lightheadedness     Myocardiopathy (HCC)     Neuropathy     Obesity

## 2024-05-20 ENCOUNTER — OFFICE VISIT (OUTPATIENT)
Dept: FAMILY MEDICINE CLINIC | Age: 79
End: 2024-05-20
Payer: MEDICARE

## 2024-05-20 VITALS
SYSTOLIC BLOOD PRESSURE: 98 MMHG | HEART RATE: 77 BPM | WEIGHT: 251 LBS | BODY MASS INDEX: 35.93 KG/M2 | HEIGHT: 70 IN | OXYGEN SATURATION: 98 % | DIASTOLIC BLOOD PRESSURE: 60 MMHG

## 2024-05-20 DIAGNOSIS — M47.27 LUMBOSACRAL RADICULOPATHY DUE TO DEGENERATIVE JOINT DISEASE OF SPINE: ICD-10-CM

## 2024-05-20 DIAGNOSIS — M19.041 OSTEOARTHRITIS OF RIGHT HAND, UNSPECIFIED OSTEOARTHRITIS TYPE: ICD-10-CM
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
## 2024-05-20 DIAGNOSIS — N13.8 BPH WITH OBSTRUCTION/LOWER URINARY TRACT SYMPTOMS: ICD-10-CM

## 2024-05-20 DIAGNOSIS — G93.32 CHRONIC FATIGUE SYNDROME: ICD-10-CM

## 2024-05-20 DIAGNOSIS — E55.9 VITAMIN D DEFICIENCY: ICD-10-CM

## 2024-05-20 DIAGNOSIS — E78.2 MIXED HYPERLIPIDEMIA: ICD-10-CM

## 2024-05-20 DIAGNOSIS — Z12.5 PROSTATE CANCER SCREENING: ICD-10-CM

## 2024-05-20 DIAGNOSIS — I69.193 ATAXIA FOLLOWING NONTRAUMATIC INTRACEREBRAL HEMORRHAGE: ICD-10-CM

## 2024-05-20 DIAGNOSIS — R19.09 LUMP IN THE GROIN: ICD-10-CM

## 2024-05-20 DIAGNOSIS — N40.1 BPH WITH OBSTRUCTION/LOWER URINARY TRACT SYMPTOMS: ICD-10-CM

## 2024-05-20 DIAGNOSIS — I10 ESSENTIAL HYPERTENSION: ICD-10-CM

## 2024-05-20 DIAGNOSIS — I95.2 HYPOTENSION DUE TO DRUGS: ICD-10-CM

## 2024-05-20 DIAGNOSIS — I25.10 CORONARY ARTERY DISEASE INVOLVING NATIVE CORONARY ARTERY OF NATIVE HEART WITHOUT ANGINA PECTORIS: ICD-10-CM

## 2024-05-20 DIAGNOSIS — E11.9 TYPE 2 DIABETES MELLITUS WITHOUT COMPLICATION, WITHOUT LONG-TERM CURRENT USE OF INSULIN (HCC): Primary | ICD-10-CM

## 2024-05-20 DIAGNOSIS — F33.1 MODERATE EPISODE OF RECURRENT MAJOR DEPRESSIVE DISORDER (HCC): ICD-10-CM

## 2024-05-20 PROBLEM — E11.40 TYPE 2 DIABETES MELLITUS WITH DIABETIC NEUROPATHY (HCC): Status: ACTIVE | Noted: 2024-05-20
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
## 2024-05-20 PROCEDURE — 1036F TOBACCO NON-USER: CPT | Performed by: FAMILY MEDICINE

## 2024-05-20 PROCEDURE — G8427 DOCREV CUR MEDS BY ELIG CLIN: HCPCS | Performed by: FAMILY MEDICINE

## 2024-05-20 PROCEDURE — G8417 CALC BMI ABV UP PARAM F/U: HCPCS | Performed by: FAMILY MEDICINE

## 2024-05-20 PROCEDURE — 99215 OFFICE O/P EST HI 40 MIN: CPT | Performed by: FAMILY MEDICINE

## 2024-05-20 PROCEDURE — 3078F DIAST BP <80 MM HG: CPT | Performed by: FAMILY MEDICINE

## 2024-05-20 PROCEDURE — 1123F ACP DISCUSS/DSCN MKR DOCD: CPT | Performed by: FAMILY MEDICINE

## 2024-05-20 PROCEDURE — 3288F FALL RISK ASSESSMENT DOCD: CPT | Performed by: FAMILY MEDICINE

## 2024-05-20 PROCEDURE — 3074F SYST BP LT 130 MM HG: CPT | Performed by: FAMILY MEDICINE

## 2024-05-20 PROCEDURE — 0518F FALL PLAN OF CARE DOCD: CPT | Performed by: FAMILY MEDICINE

## 2024-05-20 PROCEDURE — 3044F HG A1C LEVEL LT 7.0%: CPT | Performed by: FAMILY MEDICINE

## 2024-05-20 RX ORDER — ROSUVASTATIN CALCIUM 40 MG/1
40 TABLET, COATED ORAL DAILY
Qty: 90 TABLET | Refills: 3 | Status: SHIPPED | OUTPATIENT
Start: 2024-05-20

## 2024-05-20 RX ORDER — DULOXETIN HYDROCHLORIDE 60 MG/1
60 CAPSULE, DELAYED RELEASE ORAL DAILY
Qty: 90 CAPSULE | Refills: 1 | Status: SHIPPED | OUTPATIENT
Start: 2024-05-20

## 2024-05-20 RX ORDER — ACETAMINOPHEN 500 MG
500 TABLET ORAL
Qty: 90 TABLET | Refills: 1 | Status: SHIPPED | OUTPATIENT
Start: 2024-05-20

## 2024-05-20 RX ORDER — ERGOCALCIFEROL 1.25 MG/1
50000 CAPSULE ORAL WEEKLY
Qty: 12 CAPSULE | Refills: 0 | Status: SHIPPED | OUTPATIENT
Start: 2024-05-20

## 2024-05-20 RX ORDER — DONEPEZIL HYDROCHLORIDE 5 MG/1
5 TABLET, FILM COATED ORAL NIGHTLY
Qty: 90 TABLET | Refills: 1 | Status: SHIPPED | OUTPATIENT
Start: 2024-05-20

## 2024-05-20 RX ORDER — CLOPIDOGREL BISULFATE 75 MG/1
75 TABLET ORAL DAILY
Qty: 90 TABLET | Refills: 3 | Status: SHIPPED | OUTPATIENT
Start: 2024-05-20

## 2024-05-20 RX ORDER — LISINOPRIL 10 MG/1
10 TABLET ORAL DAILY
Qty: 90 TABLET | Refills: 1 | Status: SHIPPED | OUTPATIENT
Start: 2024-05-20

## 2024-05-20 RX ORDER — METFORMIN HYDROCHLORIDE 500 MG/1
500 TABLET, EXTENDED RELEASE ORAL
Qty: 90 TABLET | Refills: 1 | Status: SHIPPED | OUTPATIENT
Start: 2024-05-20 | End: 2024-05-20 | Stop reason: ALTCHOICE

## 2024-05-20 RX ORDER — METOPROLOL SUCCINATE 50 MG/1
50 TABLET, EXTENDED RELEASE ORAL DAILY
Qty: 90 TABLET | Refills: 3 | Status: SHIPPED | OUTPATIENT
Start: 2024-05-20

## 2024-05-20 RX ORDER — TAMSULOSIN HYDROCHLORIDE 0.4 MG/1
0.4 CAPSULE ORAL DAILY
Qty: 90 CAPSULE | Refills: 3 | Status: SHIPPED | OUTPATIENT
Start: 2024-05-20

## 2024-05-20 RX ORDER — TIZANIDINE 4 MG/1
4 TABLET ORAL NIGHTLY
Qty: 90 TABLET | Refills: 0 | Status: CANCELLED | OUTPATIENT
Start: 2024-05-20

## 2024-05-20 ASSESSMENT — ENCOUNTER SYMPTOMS
SHORTNESS OF BREATH: 0
SORE THROAT: 0
DIARRHEA: 0
RECTAL PAIN: 0
CONSTIPATION: 0
BLOOD IN STOOL: 0
SINUS PRESSURE: 0
RHINORRHEA: 0
STRIDOR: 0
COUGH: 0
COLOR CHANGE: 0
BACK PAIN: 1
ABDOMINAL PAIN: 0
NAUSEA: 0
VOMITING: 0
WHEEZING: 0
TROUBLE SWALLOWING: 0
ABDOMINAL DISTENTION: 0
CHEST TIGHTNESS: 0
EYE REDNESS: 0

## 2024-05-20 NOTE — PROGRESS NOTES
Visit Information    Have you changed or started any medications since your last visit including any over-the-counter medicines, vitamins, or herbal medicines? no   Are you having any side effects from any of your medications? -  no  Have you stopped taking any of your medications? Is so, why? -  no    Have you seen any other physician or provider since your last visit? No  Have you had any other diagnostic tests since your last visit? No  Have you been seen in the emergency room and/or had an admission to a hospital since we last saw you? No  Have you had your routine dental cleaning in the past 6 months? yes     Have you activated your [employer] account? If not, what are your barriers? Yes     Patient Care Team:  Andie Glover MD as PCP - General (Family Medicine)  Andie Glover MD as PCP - Empaneled Provider  Valery Long MD as Consulting Physician (Gastroenterology)    Medical History Review  Past Medical, Family, and Social History reviewed and does contribute to the patient presenting condition    Health Maintenance   Topic Date Due    Respiratory Syncytial Virus (RSV) Pregnant or age 60 yrs+ (1 - 1-dose 60+ series) Never done    COVID-19 Vaccine (3 - 2023-24 season) 09/01/2023    Lipids  06/27/2024    Depression Monitoring  03/27/2025    Colorectal Cancer Screen  01/31/2029    DTaP/Tdap/Td vaccine (2 - Td or Tdap) 08/05/2033    Annual Wellness Visit (Medicare Advantage)  Completed    Flu vaccine  Completed    Shingles vaccine  Completed    Pneumococcal 65+ years Vaccine  Completed    Hepatitis C screen  Completed    Hepatitis A vaccine  Aged Out    Hepatitis B vaccine  Aged Out    Hib vaccine  Aged Out    Polio vaccine  Aged Out    Meningococcal (ACWY) vaccine  Aged Out    A1C test (Diabetic or Prediabetic)  Discontinued     
6.5, will discontinue metformin.  Continue to monitor blood sugars.    If blood sugars will start running high can restart on metformin.    2. Osteoarthritis of right hand, unspecified osteoarthritis type  Chronic problem recently referred to pain management.  Discontinue muscle relaxant to avoid side effects and risk for fall.  - acetaminophen (TYLENOL) 500 MG tablet; Take 1 tablet by mouth every 8-12 hours as needed for Pain  Dispense: 90 tablet; Refill: 1    3. Fall, subsequent encounter  Recurrent problem multifactorial, discussed with patient in detail about safety measures at home, wheelchair ordered patient will benefit from the wheelchair to avoid falls.  Discussed about the bedside railings.  Prescription ordered  - DME Order for Wheel Chair as OP  - CT ABDOMEN PELVIS W IV CONTRAST Additional Contrast? None; Future    4. Hypotension due to drugs  Patient blood pressure is running low, will decrease lisinopril to 10 mg.  Discussed to monitor blood pressure at home call back with results    5. Lump in the groin  Likely hematoma CT abdomen pelvis.  Take Tylenol as needed for pain  - CT ABDOMEN PELVIS W IV CONTRAST Additional Contrast? None; Future    6. Essential hypertension  Blood pressure is running low, decrease lisinopril to 10 mg continue metoprolol  - lisinopril (PRINIVIL;ZESTRIL) 10 MG tablet; Take 1 tablet by mouth daily  Dispense: 90 tablet; Refill: 1  - metoprolol succinate (TOPROL XL) 50 MG extended release tablet; Take 1 tablet by mouth daily  Dispense: 90 tablet; Refill: 3  - Magnesium; Future    7. Ataxia following nontraumatic intracerebral hemorrhage  Chronic problem from the stroke, continue to monitor  - acetaminophen (TYLENOL) 500 MG tablet; Take 1 tablet by mouth every 8-12 hours as needed for Pain  Dispense: 90 tablet; Refill: 1  - DME Order for Wheel Chair as OP    8. Mixed hyperlipidemia  Stable, continue statins recheck lipid panel  - rosuvastatin (CRESTOR) 40 MG tablet; Take 1 tablet

## 2024-05-20 NOTE — PATIENT INSTRUCTIONS
Dear valued patient,    We hope this message finds you in good health. At [], we are committed to providing you with the best possible healthcare experience. To further enhance your convenience and streamline your access to our services, we would like to introduce you to the benefits of utilizing direct scheduling through the [facility].    Direct scheduling is a feature within the [facility] that allows you to schedule appointments with your healthcare provider directly, without the need to make a phone call or wait for a callback. We understand that your time is tiara, and we want to ensure that you have quick and easy access to our services whenever you need them.  Here are some reasons why you should consider utilizing direct scheduling through the [facility]:    1. Convenience: With direct scheduling, you can book appointments at any time that suits you best, 24 hours a day, 7 days a week. No more waiting on hold or playing phone tag with our office staff. It puts you in control of managing your healthcare appointments effortlessly.    2. Accessibility: The [facility] is accessible through your computer, smartphone, or tablet, allowing you to schedule appointments from the comfort of your home, office, or on the go. You can access your medical records, review test results, and communicate with your healthcare provider all in one secure and user-friendly platform.    3. Timesaving: By utilizing direct scheduling, you can avoid unnecessary delays and save tiara time. You can easily browse the available appointment slots and select the one that works best for you, eliminating the back-and-forth communication typically required when scheduling via phone.    4. Reminders and notifications: [facility] sends automatic reminders for upcoming appointments, ensuring that you never miss an important visit. You can also receive notifications about test

## 2024-06-03 ENCOUNTER — HOSPITAL ENCOUNTER (OUTPATIENT)
Dept: CT IMAGING | Age: 79
Discharge: HOME | End: 2024-06-05
Payer: MEDICARE

## 2024-06-03 ENCOUNTER — HOSPITAL ENCOUNTER (OUTPATIENT)
Age: 79
Discharge: HOME | End: 2024-06-03
Payer: MEDICARE

## 2024-06-03 DIAGNOSIS — R19.09 LUMP IN THE GROIN: ICD-10-CM
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
## 2024-06-03 LAB
25(OH)D3 SERPL-MCNC: 28.1 NG/ML (ref 30–100)
ALBUMIN SERPL-MCNC: 4.6 G/DL (ref 3.5–5.2)
ALP SERPL-CCNC: 84 U/L (ref 40–129)
ALT SERPL-CCNC: 16 U/L (ref 5–41)
ANION GAP SERPL CALCULATED.3IONS-SCNC: 9 MMOL/L (ref 9–17)
AST SERPL-CCNC: 24 U/L
BASOPHILS # BLD: 0 K/UL (ref 0–0.2)
BASOPHILS NFR BLD: 0 % (ref 0–2)
BILIRUB SERPL-MCNC: 0.6 MG/DL (ref 0.3–1.2)
BUN SERPL-MCNC: 17 MG/DL (ref 8–23)
CALCIUM SERPL-MCNC: 9.7 MG/DL (ref 8.6–10.4)
CHLORIDE SERPL-SCNC: 101 MMOL/L (ref 98–107)
CHOLEST SERPL-MCNC: 115 MG/DL
CHOLESTEROL/HDL RATIO: 2.6
CO2 SERPL-SCNC: 30 MMOL/L (ref 20–31)
CREAT SERPL-MCNC: 0.6 MG/DL (ref 0.7–1.2)
EGFR, POC: >90 ML/MIN/1.73M2
EOSINOPHIL # BLD: 0.1 K/UL (ref 0–0.4)
EOSINOPHILS RELATIVE PERCENT: 1 % (ref 0–4)
ERYTHROCYTE [DISTWIDTH] IN BLOOD BY AUTOMATED COUNT: 13.7 % (ref 11.5–14.9)
GFR, ESTIMATED: >90 ML/MIN/1.73M2
GLUCOSE SERPL-MCNC: 110 MG/DL (ref 70–99)
HCT VFR BLD AUTO: 39.2 % (ref 41–53)
HDLC SERPL-MCNC: 45 MG/DL
HGB BLD-MCNC: 13.2 G/DL (ref 13.5–17.5)
LDLC SERPL CALC-MCNC: 57 MG/DL (ref 0–130)
LYMPHOCYTES NFR BLD: 2.1 K/UL (ref 1–4.8)
LYMPHOCYTES RELATIVE PERCENT: 36 % (ref 24–44)
MAGNESIUM SERPL-MCNC: 2 MG/DL (ref 1.6–2.6)
MCH RBC QN AUTO: 31 PG (ref 26–34)
MCHC RBC AUTO-ENTMCNC: 33.7 G/DL (ref 31–37)
MCV RBC AUTO: 91.8 FL (ref 80–100)
MONOCYTES NFR BLD: 0.5 K/UL (ref 0.1–1.3)
MONOCYTES NFR BLD: 8 % (ref 1–7)
NEUTROPHILS NFR BLD: 55 % (ref 36–66)
NEUTS SEG NFR BLD: 3.2 K/UL (ref 1.3–9.1)
PLATELET # BLD AUTO: 170 K/UL (ref 150–450)
PMV BLD AUTO: 9 FL (ref 6–12)
POC CREATININE: 0.7 MG/DL (ref 0.51–1.19)
POTASSIUM SERPL-SCNC: 3.9 MMOL/L (ref 3.7–5.3)
PROT SERPL-MCNC: 7 G/DL (ref 6.4–8.3)
PSA SERPL-MCNC: 0.9 NG/ML (ref 0–4)
RBC # BLD AUTO: 4.27 M/UL (ref 4.5–5.9)
SODIUM SERPL-SCNC: 140 MMOL/L (ref 135–144)
T4 FREE SERPL-MCNC: 1.1 NG/DL (ref 0.9–1.7)
TRIGL SERPL-MCNC: 66 MG/DL
TSH SERPL DL<=0.05 MIU/L-ACNC: 0.12 UIU/ML (ref 0.3–5)
URATE SERPL-MCNC: 4.6 MG/DL (ref 3.4–7)
WBC OTHER # BLD: 5.9 K/UL (ref 3.5–11)

## 2024-06-03 PROCEDURE — 84439 ASSAY OF FREE THYROXINE: CPT

## 2024-06-03 PROCEDURE — 80053 COMPREHEN METABOLIC PANEL: CPT

## 2024-06-03 PROCEDURE — 84443 ASSAY THYROID STIM HORMONE: CPT

## 2024-06-03 PROCEDURE — 84550 ASSAY OF BLOOD/URIC ACID: CPT

## 2024-06-03 PROCEDURE — 80061 LIPID PANEL: CPT

## 2024-06-03 PROCEDURE — G0103 PSA SCREENING: HCPCS

## 2024-06-03 PROCEDURE — 74177 CT ABD & PELVIS W/CONTRAST: CPT

## 2024-06-03 PROCEDURE — 6360000004 HC RX CONTRAST MEDICATION: Performed by: FAMILY MEDICINE

## 2024-06-03 PROCEDURE — 83735 ASSAY OF MAGNESIUM: CPT

## 2024-06-03 PROCEDURE — 36415 COLL VENOUS BLD VENIPUNCTURE: CPT

## 2024-06-03 PROCEDURE — 85025 COMPLETE CBC W/AUTO DIFF WBC: CPT

## 2024-06-03 PROCEDURE — 82565 ASSAY OF CREATININE: CPT

## 2024-06-03 PROCEDURE — 2580000003 HC RX 258: Performed by: FAMILY MEDICINE

## 2024-06-03 PROCEDURE — 82306 VITAMIN D 25 HYDROXY: CPT

## 2024-06-03 RX ORDER — 0.9 % SODIUM CHLORIDE 0.9 %
100 INTRAVENOUS SOLUTION INTRAVENOUS ONCE
Status: COMPLETED | OUTPATIENT
Start: 2024-06-03 | End: 2024-06-03

## 2024-06-03 RX ORDER — SODIUM CHLORIDE 0.9 % (FLUSH) 0.9 %
10 SYRINGE (ML) INJECTION PRN
Status: COMPLETED | OUTPATIENT
Start: 2024-06-03 | End: 2024-06-03

## 2024-06-03 RX ADMIN — IOPAMIDOL 75 ML: 755 INJECTION, SOLUTION INTRAVENOUS at 07:24

## 2024-06-03 RX ADMIN — SODIUM CHLORIDE, PRESERVATIVE FREE 10 ML: 5 INJECTION INTRAVENOUS at 07:24

## 2024-06-03 RX ADMIN — SODIUM CHLORIDE 100 ML: 9 INJECTION, SOLUTION INTRAVENOUS at 07:24

## 2024-06-04 DIAGNOSIS — R79.89 LOW TSH LEVEL: Primary | ICD-10-CM

## 2024-06-06 ENCOUNTER — HOSPITAL ENCOUNTER (OUTPATIENT)
Dept: ULTRASOUND IMAGING | Age: 79
Discharge: HOME | End: 2024-06-08
Payer: MEDICARE

## 2024-06-06 ENCOUNTER — HOSPITAL ENCOUNTER (OUTPATIENT)
Age: 79
Discharge: HOME | End: 2024-06-06
Payer: MEDICARE

## 2024-06-06 DIAGNOSIS — R79.89 LOW TSH LEVEL: ICD-10-CM

## 2024-06-06 PROCEDURE — 83520 IMMUNOASSAY QUANT NOS NONAB: CPT

## 2024-06-06 PROCEDURE — 76536 US EXAM OF HEAD AND NECK: CPT

## 2024-06-06 PROCEDURE — 86800 THYROGLOBULIN ANTIBODY: CPT

## 2024-06-06 PROCEDURE — 86376 MICROSOMAL ANTIBODY EACH: CPT

## 2024-06-06 PROCEDURE — 36415 COLL VENOUS BLD VENIPUNCTURE: CPT

## 2024-06-08 DIAGNOSIS — M47.27 LUMBOSACRAL RADICULOPATHY DUE TO DEGENERATIVE JOINT DISEASE OF SPINE: ICD-10-CM

## 2024-06-08 LAB — TSH RECEPTOR AB: <1.1 IU/L

## 2024-06-09 LAB
THYROGLOBULIN AB: <12 IU/ML (ref 0–40)
THYROPEROXIDASE AB SERPL IA-ACNC: <4 IU/ML (ref 0–25)

## 2024-06-10 RX ORDER — TIZANIDINE 4 MG/1
4 TABLET ORAL NIGHTLY
Qty: 90 TABLET | Refills: 0 | OUTPATIENT
Start: 2024-06-10

## 2024-06-10 NOTE — TELEPHONE ENCOUNTER
Please Approve or Refuse.  Send to Pharmacy per Pt's Request:      Next Visit Date:  6/17/2024   Last Visit Date: 5/20/2024    Hemoglobin A1C (%)   Date Value   03/27/2024 6.1   11/27/2023 6.2   06/21/2023 6.1             ( goal A1C is < 7)   BP Readings from Last 3 Encounters:   05/20/24 98/60   03/27/24 130/80   02/20/24 130/70          (goal 120/80)  BUN   Date Value Ref Range Status   06/03/2024 17 8 - 23 mg/dL Final     Creatinine   Date Value Ref Range Status   06/03/2024 0.6 (L) 0.7 - 1.2 mg/dL Final     POC Creatinine   Date Value Ref Range Status   06/03/2024 0.7 0.51 - 1.19 mg/dL Final     Potassium   Date Value Ref Range Status   06/03/2024 3.9 3.7 - 5.3 mmol/L Final

## 2024-06-13 ENCOUNTER — HOSPITAL ENCOUNTER (OUTPATIENT)
Dept: PAIN MANAGEMENT | Age: 79
Discharge: HOME | End: 2024-06-13
Payer: MEDICARE

## 2024-06-13 VITALS — BODY MASS INDEX: 35.93 KG/M2 | WEIGHT: 251 LBS | HEIGHT: 70 IN

## 2024-06-13 DIAGNOSIS — F11.90 CHRONIC, CONTINUOUS USE OF OPIOIDS: ICD-10-CM

## 2024-06-13 DIAGNOSIS — M54.51 VERTEBROGENIC LOW BACK PAIN: ICD-10-CM

## 2024-06-13 DIAGNOSIS — M47.817 LUMBOSACRAL SPONDYLOSIS WITHOUT MYELOPATHY: ICD-10-CM

## 2024-06-13 DIAGNOSIS — M51.36 LUMBAR DEGENERATIVE DISC DISEASE: Primary | ICD-10-CM

## 2024-06-13 PROCEDURE — 99213 OFFICE O/P EST LOW 20 MIN: CPT

## 2024-06-13 PROCEDURE — 99214 OFFICE O/P EST MOD 30 MIN: CPT | Performed by: NURSE PRACTITIONER

## 2024-06-13 RX ORDER — TRAMADOL HYDROCHLORIDE 50 MG/1
50 TABLET ORAL 2 TIMES DAILY PRN
Qty: 60 TABLET | Refills: 0 | Status: SHIPPED | OUTPATIENT
Start: 2024-06-13 | End: 2024-07-13

## 2024-06-13 RX ORDER — GABAPENTIN 100 MG/1
100 CAPSULE ORAL NIGHTLY
Qty: 30 CAPSULE | Refills: 0 | Status: SHIPPED | OUTPATIENT
Start: 2024-06-13 | End: 2024-07-13

## 2024-06-13 RX ORDER — TRAMADOL HYDROCHLORIDE 50 MG/1
50 TABLET ORAL NIGHTLY PRN
Qty: 30 TABLET | Refills: 0 | Status: SHIPPED | OUTPATIENT
Start: 2024-06-13 | End: 2024-06-13 | Stop reason: SDUPTHER

## 2024-06-13 ASSESSMENT — ENCOUNTER SYMPTOMS
COUGH: 0
BACK PAIN: 1
SHORTNESS OF BREATH: 0
BOWEL INCONTINENCE: 0
CONSTIPATION: 0

## 2024-06-13 NOTE — PROGRESS NOTES
Chief Complaint   Patient presents with    Back Pain     8 wk f/u         PMH     Past history significant for cerebrovascular disease with left-sided residual weakness, on long-term antiplatelet therapy     Patient c/o chronic lumbar radicular pain with no known injury or surgery to the area with onset more than 1 year ago and has progressively worsened. Pain predominantly in the lumbar area extends over the hip and right lower extremity. Pain predominantly in the lumbar area extends over the hip and right lower extremity to his knee  Pt is a high risk for any major spine surgery as well as for interventional procedures that will require interruption in antiplatelet therapy and topical lidocaine before and did not find it   Lumbar MRI 3/2024 Spondylosis and multilevel spinal stenosis, most severe at L4-5.    Pt was started on tramadol at last OV reporting moderate but short term relief and requesting an increase     HPI:     Back Pain  This is a chronic problem. The current episode started more than 1 year ago. The problem occurs constantly. The problem is unchanged. The pain is present in the lumbar spine. The quality of the pain is described as aching. The pain radiates to the right knee. The pain is at a severity of 10/10. The pain is severe. The pain is Worse during the night. The symptoms are aggravated by lying down, standing, twisting and position. Stiffness is present At night. Associated symptoms include leg pain (right ankle all the way up to the knee), numbness (in right hand), tingling (in right hand) and weakness (in right hand). Pertinent negatives include no bladder incontinence, bowel incontinence, chest pain, fever or headaches. Risk factors include poor posture, sedentary lifestyle and lack of exercise. He has tried heat and analgesics for the symptoms. The treatment provided mild relief.         Patient denies any new neurological symptoms. No bowel or bladder incontinence, no weakness,

## 2024-06-16 DIAGNOSIS — M47.27 LUMBOSACRAL RADICULOPATHY DUE TO DEGENERATIVE JOINT DISEASE OF SPINE: ICD-10-CM

## 2024-06-17 ENCOUNTER — OFFICE VISIT (OUTPATIENT)
Dept: FAMILY MEDICINE CLINIC | Age: 79
End: 2024-06-17
Payer: MEDICARE

## 2024-06-17 VITALS
BODY MASS INDEX: 35.9 KG/M2 | OXYGEN SATURATION: 98 % | HEART RATE: 74 BPM | DIASTOLIC BLOOD PRESSURE: 56 MMHG | HEIGHT: 70 IN | SYSTOLIC BLOOD PRESSURE: 104 MMHG | WEIGHT: 250.8 LBS | TEMPERATURE: 98.6 F

## 2024-06-17 DIAGNOSIS — I10 ESSENTIAL HYPERTENSION: ICD-10-CM

## 2024-06-17 DIAGNOSIS — E11.40 TYPE 2 DIABETES MELLITUS WITH DIABETIC NEUROPATHY, UNSPECIFIED WHETHER LONG TERM INSULIN USE (HCC): ICD-10-CM

## 2024-06-17 DIAGNOSIS — F32.A DEPRESSION, UNSPECIFIED DEPRESSION TYPE: Primary | ICD-10-CM

## 2024-06-17 PROCEDURE — G8417 CALC BMI ABV UP PARAM F/U: HCPCS | Performed by: NURSE PRACTITIONER

## 2024-06-17 PROCEDURE — 1123F ACP DISCUSS/DSCN MKR DOCD: CPT | Performed by: NURSE PRACTITIONER

## 2024-06-17 PROCEDURE — 3044F HG A1C LEVEL LT 7.0%: CPT | Performed by: NURSE PRACTITIONER

## 2024-06-17 PROCEDURE — 3074F SYST BP LT 130 MM HG: CPT | Performed by: NURSE PRACTITIONER

## 2024-06-17 PROCEDURE — 3288F FALL RISK ASSESSMENT DOCD: CPT | Performed by: NURSE PRACTITIONER

## 2024-06-17 PROCEDURE — 99213 OFFICE O/P EST LOW 20 MIN: CPT | Performed by: NURSE PRACTITIONER

## 2024-06-17 PROCEDURE — 1036F TOBACCO NON-USER: CPT | Performed by: NURSE PRACTITIONER

## 2024-06-17 PROCEDURE — 0518F FALL PLAN OF CARE DOCD: CPT | Performed by: NURSE PRACTITIONER

## 2024-06-17 PROCEDURE — 3078F DIAST BP <80 MM HG: CPT | Performed by: NURSE PRACTITIONER

## 2024-06-17 PROCEDURE — G8427 DOCREV CUR MEDS BY ELIG CLIN: HCPCS | Performed by: NURSE PRACTITIONER

## 2024-06-17 RX ORDER — LISINOPRIL 5 MG/1
5 TABLET ORAL DAILY
Qty: 30 TABLET | Refills: 2 | Status: SHIPPED | OUTPATIENT
Start: 2024-06-17 | End: 2024-09-15

## 2024-06-17 RX ORDER — TIZANIDINE 4 MG/1
4 TABLET ORAL NIGHTLY
Qty: 90 TABLET | Refills: 0 | OUTPATIENT
Start: 2024-06-17

## 2024-06-17 SDOH — ECONOMIC STABILITY: FOOD INSECURITY: WITHIN THE PAST 12 MONTHS, YOU WORRIED THAT YOUR FOOD WOULD RUN OUT BEFORE YOU GOT MONEY TO BUY MORE.: NEVER TRUE

## 2024-06-17 SDOH — ECONOMIC STABILITY: FOOD INSECURITY: WITHIN THE PAST 12 MONTHS, THE FOOD YOU BOUGHT JUST DIDN'T LAST AND YOU DIDN'T HAVE MONEY TO GET MORE.: NEVER TRUE

## 2024-06-17 SDOH — ECONOMIC STABILITY: INCOME INSECURITY: HOW HARD IS IT FOR YOU TO PAY FOR THE VERY BASICS LIKE FOOD, HOUSING, MEDICAL CARE, AND HEATING?: NOT HARD AT ALL

## 2024-06-17 ASSESSMENT — PATIENT HEALTH QUESTIONNAIRE - PHQ9
7. TROUBLE CONCENTRATING ON THINGS, SUCH AS READING THE NEWSPAPER OR WATCHING TELEVISION: NOT AT ALL
1. LITTLE INTEREST OR PLEASURE IN DOING THINGS: NEARLY EVERY DAY
6. FEELING BAD ABOUT YOURSELF - OR THAT YOU ARE A FAILURE OR HAVE LET YOURSELF OR YOUR FAMILY DOWN: SEVERAL DAYS
SUM OF ALL RESPONSES TO PHQ QUESTIONS 1-9: 14
5. POOR APPETITE OR OVEREATING: NOT AT ALL
4. FEELING TIRED OR HAVING LITTLE ENERGY: NEARLY EVERY DAY
8. MOVING OR SPEAKING SO SLOWLY THAT OTHER PEOPLE COULD HAVE NOTICED. OR THE OPPOSITE, BEING SO FIGETY OR RESTLESS THAT YOU HAVE BEEN MOVING AROUND A LOT MORE THAN USUAL: NOT AT ALL
SUM OF ALL RESPONSES TO PHQ QUESTIONS 1-9: 13
SUM OF ALL RESPONSES TO PHQ9 QUESTIONS 1 & 2: 6
2. FEELING DOWN, DEPRESSED OR HOPELESS: NEARLY EVERY DAY
SUM OF ALL RESPONSES TO PHQ QUESTIONS 1-9: 14
9. THOUGHTS THAT YOU WOULD BE BETTER OFF DEAD, OR OF HURTING YOURSELF: SEVERAL DAYS
3. TROUBLE FALLING OR STAYING ASLEEP: NEARLY EVERY DAY
SUM OF ALL RESPONSES TO PHQ QUESTIONS 1-9: 14
10. IF YOU CHECKED OFF ANY PROBLEMS, HOW DIFFICULT HAVE THESE PROBLEMS MADE IT FOR YOU TO DO YOUR WORK, TAKE CARE OF THINGS AT HOME, OR GET ALONG WITH OTHER PEOPLE: SOMEWHAT DIFFICULT

## 2024-06-17 ASSESSMENT — ENCOUNTER SYMPTOMS
VOMITING: 0
CHEST TIGHTNESS: 0
SHORTNESS OF BREATH: 0
ABDOMINAL PAIN: 0
COUGH: 0
WHEEZING: 0
ABDOMINAL DISTENTION: 0
DIARRHEA: 0
NAUSEA: 0
BACK PAIN: 1
CONSTIPATION: 0

## 2024-06-17 ASSESSMENT — COLUMBIA-SUICIDE SEVERITY RATING SCALE - C-SSRS
1. WITHIN THE PAST MONTH, HAVE YOU WISHED YOU WERE DEAD OR WISHED YOU COULD GO TO SLEEP AND NOT WAKE UP?: NO
2. HAVE YOU ACTUALLY HAD ANY THOUGHTS OF KILLING YOURSELF?: NO
6. HAVE YOU EVER DONE ANYTHING, STARTED TO DO ANYTHING, OR PREPARED TO DO ANYTHING TO END YOUR LIFE?: NO

## 2024-06-17 NOTE — PROGRESS NOTES
Visit Information    Have you changed or started any medications since your last visit including any over-the-counter medicines, vitamins, or herbal medicines? yes -    Have you stopped taking any of your medications? Is so, why? -  yes -   Are you having any side effects from any of your medications? - yes -     Have you seen any other physician or provider since your last visit?  yes -    Have you had any other diagnostic tests since your last visit?  yes -    Have you been seen in the emergency room and/or had an admission in a hospital since we last saw you?  no   Have you had your routine dental cleaning in the past 6 months?  NO     Do you have an active MyChart account? If no, what is the barrier?  Yes    Patient Care Team:  Andie Glover MD as PCP - General (Family Medicine)  Andie Glover MD as PCP - Empaneled Provider  Valery Long MD as Consulting Physician (Gastroenterology)    Medical History Review  Past Medical, Family, and Social History reviewed and does contribute to the patient presenting condition    Health Maintenance   Topic Date Due    Respiratory Syncytial Virus (RSV) Pregnant or age 60 yrs+ (1 - 1-dose 60+ series) Never done    COVID-19 Vaccine (3 - 2023-24 season) 09/01/2023    Depression Monitoring  03/27/2025    Lipids  06/03/2025    Colorectal Cancer Screen  01/31/2029    DTaP/Tdap/Td vaccine (2 - Td or Tdap) 08/05/2033    Annual Wellness Visit (Medicare Advantage)  Completed    Flu vaccine  Completed    Shingles vaccine  Completed    Pneumococcal 65+ years Vaccine  Completed    Hepatitis C screen  Completed    Hepatitis A vaccine  Aged Out    Hepatitis B vaccine  Aged Out    Hib vaccine  Aged Out    Polio vaccine  Aged Out    Meningococcal (ACWY) vaccine  Aged Out    A1C test (Diabetic or Prediabetic)  Discontinued             
06/03/2024         Return in about 1 week (around 6/24/2024) for blood pressure check only .      This note was completed by using the assistance of a speech-recognition program. However, inadvertent computerized transcription errors may be present. Although every effort was made to ensure accuracy, no guarantees can be provided that every mistake has been identified and corrected by editing.      An electronic signature was used to authenticate this note.  Electronically signed by MARKUS Kang CNP on 6/17/2024 at 11:43 AM
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
## 2024-06-24 ENCOUNTER — NURSE ONLY (OUTPATIENT)
Dept: FAMILY MEDICINE CLINIC | Age: 79
End: 2024-06-24
Payer: MEDICARE

## 2024-06-24 VITALS — HEART RATE: 61 BPM | DIASTOLIC BLOOD PRESSURE: 60 MMHG | OXYGEN SATURATION: 98 % | SYSTOLIC BLOOD PRESSURE: 100 MMHG

## 2024-06-24 DIAGNOSIS — I10 ESSENTIAL HYPERTENSION: Primary | ICD-10-CM

## 2024-06-24 PROCEDURE — 3074F SYST BP LT 130 MM HG: CPT | Performed by: NURSE PRACTITIONER

## 2024-06-24 PROCEDURE — 1123F ACP DISCUSS/DSCN MKR DOCD: CPT | Performed by: NURSE PRACTITIONER

## 2024-06-24 PROCEDURE — 3078F DIAST BP <80 MM HG: CPT | Performed by: NURSE PRACTITIONER

## 2024-06-24 PROCEDURE — 99212 OFFICE O/P EST SF 10 MIN: CPT | Performed by: NURSE PRACTITIONER

## 2024-06-24 RX ORDER — LISINOPRIL 2.5 MG/1
2.5 TABLET ORAL DAILY
Qty: 30 TABLET | Refills: 2 | Status: SHIPPED | OUTPATIENT
Start: 2024-06-24 | End: 2024-09-22

## 2024-06-24 NOTE — PROGRESS NOTES
Chief Complaint   Patient presents with    Hypertension    Blood Pressure Check      Dexter Campoverde is here for BP check    BP Readings from Last 3 Encounters:   06/24/24 100/60   06/17/24 (!) 104/56   05/20/24 98/60       BP is 100/60      Future Appointments   Date Time Provider Department Center   6/24/2024  9:30 AM SCHEDULE, [facility]   7/12/2024 11:00 AM Alma Francois APRN - CNP [facility]   8/15/2024 10:20 AM Carmen Pritchett MD Neuro Spec Neurology -   9/3/2024 10:00 AM Mari Marshall LISW [facility]

## 2024-06-24 NOTE — PROGRESS NOTES
Vitals:    06/24/24 0910   BP: 100/60   Pulse: 61   SpO2: 98%   Blood pressure is still low, will decrease his lisinopril to 2.5 mg.  Continue checking blood pressure at home.  If patient states experience any headache, dizziness, syncope or near syncope to please let us know, and seek help.    Electronically signed by MARKUS Kang CNP on 6/24/2024 at 9:13 AM

## 2024-07-03 ENCOUNTER — HOSPITAL ENCOUNTER (OUTPATIENT)
Age: 79
Discharge: HOME | End: 2024-07-05
Payer: MEDICARE

## 2024-07-03 ENCOUNTER — HOSPITAL ENCOUNTER (OUTPATIENT)
Dept: GENERAL RADIOLOGY | Age: 79
Discharge: HOME | End: 2024-07-05
Payer: MEDICARE

## 2024-07-03 ENCOUNTER — TELEPHONE (OUTPATIENT)
Dept: FAMILY MEDICINE CLINIC | Age: 79
End: 2024-07-03

## 2024-07-03 DIAGNOSIS — R07.81 RIB PAIN ON RIGHT SIDE: ICD-10-CM

## 2024-07-03 DIAGNOSIS — I42.8 OTHER CARDIOMYOPATHY (HCC): ICD-10-CM

## 2024-07-03 DIAGNOSIS — I42.8 OTHER CARDIOMYOPATHY (HCC): Primary | ICD-10-CM

## 2024-07-03 PROCEDURE — 71101 X-RAY EXAM UNILAT RIBS/CHEST: CPT

## 2024-07-03 NOTE — TELEPHONE ENCOUNTER
To do x-rays, order placed     Diagnosis Orders   1. Other cardiomyopathy (HCC)  XR RIBS RIGHT INCLUDE CHEST (MIN 3 VIEWS)      2. Rib pain on right side  XR RIBS RIGHT INCLUDE CHEST (MIN 3 VIEWS)           Future Appointments   Date Time Provider Department Center   7/12/2024 11:00 AM Alma Francois, APRN - CNP [facility] [place]   8/15/2024 10:20 AM Carmen Pritchett MD Neuro Spec Neurology -   9/3/2024 10:00 AM [facility]

## 2024-07-03 NOTE — RESULT ENCOUNTER NOTE
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    Future Appointments  7/12/2024  11:00 AM   Alma Francois A* [facility] PAINT        [place]  8/15/2024  10:20 AM   Carmen Pritchett MD        Neuro Spec          Neurology -  9/3/2024   10:00 AM   Mari Marshall LISW      [facility]             [facility]

## 2024-07-03 NOTE — TELEPHONE ENCOUNTER
PATIENT WIFE CALLED IN STATING THAT [place] IS C/O RIGHT SIDED RIB PAIN X 4 DAYS HURTS TO EVEN COUGH PAIN SCORE IS A 10 HEATING PAD HELPS SOME NO SHORTNESS OF BREATH NO CHEST PAIN, NO OTHER SYMPTOMS - PLEASE ADVISE

## 2024-07-05 NOTE — TELEPHONE ENCOUNTER
Pts wife called in stating they are still waiting on x-ray results, writer adv they were reviewed earlier today and wife states she hasn't received a response to know what to do next. Pts wife states pt is now having pain up by gallbladder. Writer aric will send message back.

## 2024-07-05 NOTE — RESULT ENCOUNTER NOTE
Please notify patient there is telephone encounter from today, I send them to urgent care for today,    Please make an appointment with PCP to discuss the right fifth rib chronic deformity which is stable per radiologist and right upper quadrant pain    Future Appointments  7/12/2024  11:00 AM   Alma Francois, TAMARA* [facility]        [place]  8/15/2024  10:20 AM   Carmen Pritchett MD        Neuro Spec          Neurology -  9/3/2024   10:00 AM   Mari Marshall LISW      [facility]             [facility]

## 2024-07-05 NOTE — TELEPHONE ENCOUNTER
I called and spoke with wife at 5:20 PM and I told them they need appointment with PCP and to call on Monday, if severe symptoms develop to go to the emergency room.  Symptoms are mild, I also advised the wife to go to urgent care which she agreed, she says they have an urgent care close by and they will go to urgent care now        Please check on patient on Monday and make them an appointment with the PCP  Future Appointments   Date Time Provider Department Center   7/12/2024 11:00 AM Alma Francois APRN - CNP [facility]   8/15/2024 10:20 AM Carmen Pritchett MD Neuro Spec Neurology -   9/3/2024 10:00 AM [facility]

## 2024-07-08 ENCOUNTER — OFFICE VISIT (OUTPATIENT)
Dept: FAMILY MEDICINE CLINIC | Age: 79
End: 2024-07-08
Payer: MEDICARE

## 2024-07-08 VITALS
SYSTOLIC BLOOD PRESSURE: 124 MMHG | HEART RATE: 68 BPM | BODY MASS INDEX: 35.79 KG/M2 | DIASTOLIC BLOOD PRESSURE: 66 MMHG | HEIGHT: 70 IN | OXYGEN SATURATION: 96 % | WEIGHT: 250 LBS

## 2024-07-08 DIAGNOSIS — R07.89 COSTOCHONDRAL CHEST PAIN: ICD-10-CM

## 2024-07-08 DIAGNOSIS — R10.11 RIGHT UPPER QUADRANT ABDOMINAL PAIN: Primary | ICD-10-CM

## 2024-07-08 DIAGNOSIS — R07.81 PLEURITIC CHEST PAIN: ICD-10-CM

## 2024-07-08 PROCEDURE — 3074F SYST BP LT 130 MM HG: CPT | Performed by: FAMILY MEDICINE

## 2024-07-08 PROCEDURE — 1036F TOBACCO NON-USER: CPT | Performed by: FAMILY MEDICINE

## 2024-07-08 PROCEDURE — 99214 OFFICE O/P EST MOD 30 MIN: CPT | Performed by: FAMILY MEDICINE

## 2024-07-08 PROCEDURE — 1123F ACP DISCUSS/DSCN MKR DOCD: CPT | Performed by: FAMILY MEDICINE

## 2024-07-08 PROCEDURE — G8427 DOCREV CUR MEDS BY ELIG CLIN: HCPCS | Performed by: FAMILY MEDICINE

## 2024-07-08 PROCEDURE — G8417 CALC BMI ABV UP PARAM F/U: HCPCS | Performed by: FAMILY MEDICINE

## 2024-07-08 PROCEDURE — 3078F DIAST BP <80 MM HG: CPT | Performed by: FAMILY MEDICINE

## 2024-07-08 PROCEDURE — 3288F FALL RISK ASSESSMENT DOCD: CPT | Performed by: FAMILY MEDICINE

## 2024-07-08 PROCEDURE — 0518F FALL PLAN OF CARE DOCD: CPT | Performed by: FAMILY MEDICINE

## 2024-07-08 RX ORDER — PREDNISONE 10 MG/1
10 TABLET ORAL DAILY
Qty: 10 TABLET | Refills: 0 | Status: SHIPPED | OUTPATIENT
Start: 2024-07-08 | End: 2024-07-18

## 2024-07-08 SDOH — ECONOMIC STABILITY: FOOD INSECURITY: WITHIN THE PAST 12 MONTHS, THE FOOD YOU BOUGHT JUST DIDN'T LAST AND YOU DIDN'T HAVE MONEY TO GET MORE.: NEVER TRUE

## 2024-07-08 SDOH — ECONOMIC STABILITY: FOOD INSECURITY: WITHIN THE PAST 12 MONTHS, YOU WORRIED THAT YOUR FOOD WOULD RUN OUT BEFORE YOU GOT MONEY TO BUY MORE.: NEVER TRUE

## 2024-07-08 SDOH — ECONOMIC STABILITY: INCOME INSECURITY: HOW HARD IS IT FOR YOU TO PAY FOR THE VERY BASICS LIKE FOOD, HOUSING, MEDICAL CARE, AND HEATING?: NOT HARD AT ALL

## 2024-07-08 ASSESSMENT — ENCOUNTER SYMPTOMS
SINUS PRESSURE: 0
VOMITING: 0
COUGH: 1
TROUBLE SWALLOWING: 0
WHEEZING: 0
ABDOMINAL DISTENTION: 0
COLOR CHANGE: 0
RECTAL PAIN: 0
BLOOD IN STOOL: 0
DIARRHEA: 0
NAUSEA: 0
SORE THROAT: 0
CONSTIPATION: 0
RHINORRHEA: 0
BACK PAIN: 0
ABDOMINAL PAIN: 1
EYE REDNESS: 0
CHEST TIGHTNESS: 1
STRIDOR: 0

## 2024-07-08 NOTE — PROGRESS NOTES
Visit Information    Have you changed or started any medications since your last visit including any over-the-counter medicines, vitamins, or herbal medicines? no   Have you stopped taking any of your medications? Is so, why? -  no  Are you having any side effects from any of your medications? - no    Have you seen any other physician or provider since your last visit?  no   Have you had any other diagnostic tests since your last visit?  yes   Have you been seen in the emergency room and/or had an admission in a hospital since we last saw you?  no   Have you had your routine dental cleaning in the past 6 months?  no     Do you have an active MyChart account? If no, what is the barrier?  Yes    Patient Care Team:  Andie Glover MD as PCP - General (Family Medicine)  Andie Glover MD as PCP - Empaneled Provider  Valery Long MD as Consulting Physician (Gastroenterology)    Medical History Review  Past Medical, Family, and Social History reviewed and does contribute to the patient presenting condition    Health Maintenance   Topic Date Due    Respiratory Syncytial Virus (RSV) Pregnant or age 60 yrs+ (1 - 1-dose 60+ series) Never done    COVID-19 Vaccine (3 - 2023-24 season) 09/01/2023    Flu vaccine (1) 08/01/2024    Lipids  06/03/2025    Depression Monitoring  06/17/2025    Colorectal Cancer Screen  01/31/2029    DTaP/Tdap/Td vaccine (2 - Td or Tdap) 08/05/2033    Annual Wellness Visit (Medicare Advantage)  Completed    Shingles vaccine  Completed    Pneumococcal 65+ years Vaccine  Completed    Hepatitis C screen  Completed    Hepatitis A vaccine  Aged Out    Hepatitis B vaccine  Aged Out    Hib vaccine  Aged Out    Polio vaccine  Aged Out    Meningococcal (ACWY) vaccine  Aged Out    A1C test (Diabetic or Prediabetic)  Discontinued              
for 10 days  Dispense: 10 tablet; Refill: 0      Orders Placed This Encounter   Procedures    XR CHEST STANDARD (2 VW)     Standing Status:   Future     Standing Expiration Date:   7/8/2025     Order Specific Question:   Reason for exam:     Answer:   cough , pain on breathing    US GALLBLADDER RUQ     This procedure can be scheduled via MyChart.      Standing Status:   Future     Standing Expiration Date:   7/8/2025         Medications Discontinued During This Encounter   Medication Reason    indomethacin (INDOCIN) 25 MG capsule Therapy completed       Dexter received counseling on the following healthy behaviors: nutrition and exercise  Reviewed prior labs and health maintenance.  Continue current medications, diet and exercise.  Discussed use, benefit, and side effects of prescribed medications. Barriers to medication compliance addressed.   Patient given educational materials - see patient instructions.    All patient questions answered.  Patient voiced understanding.     The patient'spast medical, surgical, social, and family history as well as his   current medications and allergies were reviewed as documented in today's encounter.      Medications, labs, diagnostic studies, consultations andfollow-up as documented in this encounter.    Return in about 2 weeks (around 7/22/2024) for pain , .    Patient wasseen with total face to face time of 30 minutes. More than 50% of this visit was counseling and education.       Future Appointments   Date Time Provider Department Center   7/12/2024 11:00 AM Alma Francois, MARKUS - CNP [facility] [place]   7/22/2024 10:15 [facility]   8/15/2024 10:20 AM Carmen Pritchett MD Neuro Spec Neurology -   9/3/2024 10:00 AM [facility]     This note was completed by using the assistance of a speech-recognition program. However, inadvertent computerized transcription errors may be present. Althoughevery effort was made to

## 2024-07-09 ENCOUNTER — HOSPITAL ENCOUNTER (OUTPATIENT)
Age: 79
Discharge: HOME | End: 2024-07-11
Payer: MEDICARE

## 2024-07-09 ENCOUNTER — HOSPITAL ENCOUNTER (OUTPATIENT)
Dept: GENERAL RADIOLOGY | Age: 79
Discharge: HOME | End: 2024-07-11
Payer: MEDICARE

## 2024-07-09 DIAGNOSIS — R07.89 COSTOCHONDRAL CHEST PAIN: ICD-10-CM

## 2024-07-09 DIAGNOSIS — R07.81 PLEURITIC CHEST PAIN: ICD-10-CM

## 2024-07-09 PROCEDURE — 71046 X-RAY EXAM CHEST 2 VIEWS: CPT

## 2024-07-11 ENCOUNTER — HOSPITAL ENCOUNTER (OUTPATIENT)
Dept: ULTRASOUND IMAGING | Age: 79
Discharge: HOME | End: 2024-07-13
Payer: MEDICARE

## 2024-07-11 DIAGNOSIS — R10.11 RIGHT UPPER QUADRANT ABDOMINAL PAIN: ICD-10-CM

## 2024-07-11 PROCEDURE — 76705 ECHO EXAM OF ABDOMEN: CPT

## 2024-07-12 ENCOUNTER — HOSPITAL ENCOUNTER (OUTPATIENT)
Dept: PAIN MANAGEMENT | Age: 79
Discharge: HOME | End: 2024-07-12
Payer: MEDICARE

## 2024-07-12 VITALS — BODY MASS INDEX: 35.79 KG/M2 | WEIGHT: 250 LBS | HEIGHT: 70 IN

## 2024-07-12 DIAGNOSIS — M54.51 VERTEBROGENIC LOW BACK PAIN: ICD-10-CM

## 2024-07-12 DIAGNOSIS — G62.9 NEUROPATHY: Primary | ICD-10-CM

## 2024-07-12 DIAGNOSIS — F11.90 CHRONIC, CONTINUOUS USE OF OPIOIDS: ICD-10-CM

## 2024-07-12 DIAGNOSIS — M51.36 LUMBAR DEGENERATIVE DISC DISEASE: ICD-10-CM

## 2024-07-12 DIAGNOSIS — M47.817 LUMBOSACRAL SPONDYLOSIS WITHOUT MYELOPATHY: ICD-10-CM

## 2024-07-12 DIAGNOSIS — M47.27 LUMBOSACRAL RADICULOPATHY DUE TO DEGENERATIVE JOINT DISEASE OF SPINE: ICD-10-CM

## 2024-07-12 PROCEDURE — 99214 OFFICE O/P EST MOD 30 MIN: CPT | Performed by: NURSE PRACTITIONER

## 2024-07-12 PROCEDURE — G0481 DRUG TEST DEF 8-14 CLASSES: HCPCS

## 2024-07-12 PROCEDURE — 99213 OFFICE O/P EST LOW 20 MIN: CPT

## 2024-07-12 PROCEDURE — 80307 DRUG TEST PRSMV CHEM ANLYZR: CPT

## 2024-07-12 RX ORDER — TRAMADOL HYDROCHLORIDE 50 MG/1
50 TABLET ORAL 2 TIMES DAILY PRN
Qty: 60 TABLET | Refills: 0 | Status: SHIPPED | OUTPATIENT
Start: 2024-07-12 | End: 2024-08-11

## 2024-07-12 RX ORDER — GABAPENTIN 100 MG/1
200 CAPSULE ORAL NIGHTLY
Qty: 60 CAPSULE | Refills: 0 | Status: SHIPPED | OUTPATIENT
Start: 2024-07-12 | End: 2024-08-11

## 2024-07-12 ASSESSMENT — PAIN DESCRIPTION - PAIN TYPE: TYPE: CHRONIC PAIN

## 2024-07-12 ASSESSMENT — PAIN DESCRIPTION - FREQUENCY: FREQUENCY: CONTINUOUS

## 2024-07-12 ASSESSMENT — ENCOUNTER SYMPTOMS
CONSTIPATION: 0
SHORTNESS OF BREATH: 0
BOWEL INCONTINENCE: 0
COUGH: 0
BACK PAIN: 1

## 2024-07-12 ASSESSMENT — PAIN DESCRIPTION - DESCRIPTORS: DESCRIPTORS: ACHING

## 2024-07-12 ASSESSMENT — PAIN DESCRIPTION - LOCATION: LOCATION: BACK

## 2024-07-12 ASSESSMENT — PAIN DESCRIPTION - ORIENTATION: ORIENTATION: LOWER

## 2024-07-12 ASSESSMENT — PAIN SCALES - GENERAL: PAINLEVEL_OUTOF10: 10

## 2024-07-12 NOTE — PROGRESS NOTES
There is no significant disc herniation, spinal canal stenosis.  Disc  marginal osteophyte and moderate bilateral neural foraminal narrowing.     L4-L5: There is no significant disc herniation,.  Moderate trefoil type  spinal canal stenosis.  Hypertrophic facet disease and moderate  circumferential disc marginal osteophyte causing moderate bilateral neural  foraminal narrowing.  Cystic change inferior endplate L4.     L5-S1: There is no significant disc herniation,.  Moderate trefoil type  spinal stenosis and neural foraminal narrowing bilaterally..  Hypertrophic  facet disease.     IMPRESSION:  Spondylosis and multilevel spinal stenosis, most severe at L4-5.    Assessment:  Problem List Items Addressed This Visit       Lumbar degenerative disc disease    Relevant Medications    traMADol (ULTRAM) 50 MG tablet    Neuropathy - Primary    Lumbosacral radiculopathy due to degenerative joint disease of spine    Relevant Medications    traMADol (ULTRAM) 50 MG tablet    gabapentin (NEURONTIN) 100 MG capsule    Lumbosacral spondylosis without myelopathy    Relevant Medications    traMADol (ULTRAM) 50 MG tablet    Vertebrogenic low back pain    Relevant Medications    traMADol (ULTRAM) 50 MG tablet    Chronic, continuous use of opioids    Relevant Orders    DRUG SCREEN, PAIN          Treatment Plan:  Patient relates current medications are helping the pain. Patient reports taking pain medications as prescribed, denies obtaining medications from different sources and denies use of illegal drugs. Medication risk and benefits have been discussed. Patient denies side effects from medications like nausea, vomiting, constipation or drowsiness. Patient reports current activities of daily living are possible due to medications and would like to continue them.     As always, we encourage daily stretching and strengthening exercises, and recommend minimizing use of pain medications unless patient cannot get through daily activities

## 2024-07-17 LAB
6-ACETYLMORPHINE, UR: NOT DETECTED
7-AMINOCLONAZEPAM, URINE: NOT DETECTED
ALPHA-OH-ALPRAZ, URINE: NOT DETECTED
ALPHA-OH-MIDAZOLAM, URINE: NOT DETECTED
ALPRAZOLAM, URINE: NOT DETECTED
AMPHETAMINE, URINE: NOT DETECTED
BARBITURATES, URINE: NEGATIVE
BENZOYLECGONINE, UR: NEGATIVE
BUPRENORPHINE URINE: NOT DETECTED
CARISOPRODOL, UR: NEGATIVE
CLONAZEPAM, URINE: NOT DETECTED
CODEINE, URINE: NOT DETECTED
CREAT UR-MCNC: 107 MG/DL (ref 20–400)
DIAZEPAM, URINE: NOT DETECTED
DRUGS EXPECTED, UR: NORMAL
EER HI RES INTERP UR: NORMAL
ETHYL GLUCURONIDE UR: NORMAL
FENTANYL URINE: NOT DETECTED
GABAPENTIN: PRESENT
HYDROCODONE, URINE: NOT DETECTED
HYDROMORPHONE, URINE: NOT DETECTED
LORAZEPAM, URINE: NOT DETECTED
MARIJUANA METAB, UR: NEGATIVE
MDA, UR: NOT DETECTED
MDEA, EVE, UR: NOT DETECTED
MDMA URINE: NOT DETECTED
MEPERIDINE METAB, UR: NOT DETECTED
METHADONE, URINE: NEGATIVE
METHAMPHETAMINE, URINE: NOT DETECTED
METHYLPHENIDATE: NOT DETECTED
MIDAZOLAM, URINE: NOT DETECTED
MORPHINE, OPI1M: NOT DETECTED
NALOXONE URINE: NOT DETECTED
NORBUPRENORPHINE, URINE: NOT DETECTED
NORDIAZEPAM, URINE: NOT DETECTED
NORFENTANYL, URINE: NOT DETECTED
NORHYDROCODONE, URINE: NOT DETECTED
NOROXYCODONE, URINE: NOT DETECTED
NOROXYMORPHONE, URINE: NOT DETECTED
OXAZEPAM, URINE: NOT DETECTED
OXYCODONE URINE: NOT DETECTED
OXYMORPHONE, URINE: NOT DETECTED
PAIN MANAGEMENT DRUG PANEL INTERP, URINE: NORMAL
PAIN MGT DRUG PANEL, HI RES, UR: NORMAL
PCP,URINE: NEGATIVE
PHENTERMINE, UR: NOT DETECTED
PREGABALIN: NOT DETECTED
TAPENTADOL, URINE: NOT DETECTED
TAPENTADOL-O-SULFATE, URINE: NOT DETECTED
TEMAZEPAM, URINE: NOT DETECTED
TRAMADOL, URINE: NORMAL
ZOLPIDEM METABOLITE (ZCA), URINE: NOT DETECTED
ZOLPIDEM, URINE: NOT DETECTED

## 2024-07-22 ENCOUNTER — OFFICE VISIT (OUTPATIENT)
Dept: FAMILY MEDICINE CLINIC | Age: 79
End: 2024-07-22
Payer: MEDICARE

## 2024-07-22 VITALS
BODY MASS INDEX: 35.65 KG/M2 | DIASTOLIC BLOOD PRESSURE: 70 MMHG | HEART RATE: 74 BPM | OXYGEN SATURATION: 98 % | WEIGHT: 249 LBS | HEIGHT: 70 IN | SYSTOLIC BLOOD PRESSURE: 118 MMHG

## 2024-07-22 DIAGNOSIS — K21.9 GASTROESOPHAGEAL REFLUX DISEASE WITHOUT ESOPHAGITIS: ICD-10-CM

## 2024-07-22 DIAGNOSIS — F11.20 OPIOID DEPENDENCE WITH CURRENT USE (HCC): ICD-10-CM

## 2024-07-22 DIAGNOSIS — R07.82 INTERCOSTAL PAIN: ICD-10-CM

## 2024-07-22 DIAGNOSIS — I25.118 CORONARY ARTERY DISEASE OF NATIVE ARTERY OF NATIVE HEART WITH STABLE ANGINA PECTORIS (HCC): ICD-10-CM

## 2024-07-22 DIAGNOSIS — I25.5 ISCHEMIC CARDIOMYOPATHY: ICD-10-CM

## 2024-07-22 DIAGNOSIS — I10 ESSENTIAL HYPERTENSION: ICD-10-CM

## 2024-07-22 DIAGNOSIS — E11.9 TYPE 2 DIABETES MELLITUS WITHOUT COMPLICATION, WITHOUT LONG-TERM CURRENT USE OF INSULIN (HCC): Primary | ICD-10-CM

## 2024-07-22 PROBLEM — I95.2 HYPOTENSION DUE TO DRUGS: Status: RESOLVED | Noted: 2024-05-20 | Resolved: 2024-07-22

## 2024-07-22 LAB — HBA1C MFR BLD: 5.8 %

## 2024-07-22 PROCEDURE — 1123F ACP DISCUSS/DSCN MKR DOCD: CPT | Performed by: FAMILY MEDICINE

## 2024-07-22 PROCEDURE — 3288F FALL RISK ASSESSMENT DOCD: CPT | Performed by: FAMILY MEDICINE

## 2024-07-22 PROCEDURE — G8427 DOCREV CUR MEDS BY ELIG CLIN: HCPCS | Performed by: FAMILY MEDICINE

## 2024-07-22 PROCEDURE — 83036 HEMOGLOBIN GLYCOSYLATED A1C: CPT | Performed by: FAMILY MEDICINE

## 2024-07-22 PROCEDURE — 3078F DIAST BP <80 MM HG: CPT | Performed by: FAMILY MEDICINE

## 2024-07-22 PROCEDURE — 3044F HG A1C LEVEL LT 7.0%: CPT | Performed by: FAMILY MEDICINE

## 2024-07-22 PROCEDURE — 1036F TOBACCO NON-USER: CPT | Performed by: FAMILY MEDICINE

## 2024-07-22 PROCEDURE — G8417 CALC BMI ABV UP PARAM F/U: HCPCS | Performed by: FAMILY MEDICINE

## 2024-07-22 PROCEDURE — 3074F SYST BP LT 130 MM HG: CPT | Performed by: FAMILY MEDICINE

## 2024-07-22 PROCEDURE — 99214 OFFICE O/P EST MOD 30 MIN: CPT | Performed by: FAMILY MEDICINE

## 2024-07-22 PROCEDURE — 0518F FALL PLAN OF CARE DOCD: CPT | Performed by: FAMILY MEDICINE

## 2024-07-22 RX ORDER — FAMOTIDINE 20 MG/1
20 TABLET, FILM COATED ORAL 2 TIMES DAILY
Qty: 60 TABLET | Refills: 3 | Status: SHIPPED | OUTPATIENT
Start: 2024-07-22

## 2024-07-22 ASSESSMENT — ENCOUNTER SYMPTOMS
TROUBLE SWALLOWING: 0
ABDOMINAL DISTENTION: 0
COLOR CHANGE: 0
ABDOMINAL PAIN: 1
CHEST TIGHTNESS: 1
RECTAL PAIN: 0
DIARRHEA: 0
SHORTNESS OF BREATH: 0
STRIDOR: 0
WHEEZING: 0
VOMITING: 0
SINUS PRESSURE: 0
BLOOD IN STOOL: 0
NAUSEA: 0
EYE REDNESS: 0
CONSTIPATION: 0
COUGH: 0
BACK PAIN: 1
RHINORRHEA: 0
SORE THROAT: 0

## 2024-07-22 NOTE — PATIENT INSTRUCTIONS
results and important health updates, keeping you well-informed and engaged in your healthcare journey.    5. Increased engagement and collaboration: Direct scheduling encourages greater patient engagement and empowers you to take an active role in managing your healthcare. By accessing the [facility] Patient Portal, you can securely message your healthcare provider, ask questions, request prescription refills, and seek medical advice whenever you need it.    To get started with direct scheduling through the [facility] Patient Portal or to register with [facility], simply visit [email or web address].     We understand that change can sometimes be overwhelming, but we assure you that adopting direct scheduling through the [facility] Patient Portal will enhance your healthcare experience and put you in control of your appointments. Our dedicated staff is available to answer any questions or provide assistance throughout the process.    Thank you for entrusting us with your healthcare needs. We are committed to continuously improving our services and ensuring your satisfaction. Together, let's embrace the future of healthcare convenience and accessibility through direct scheduling on the [facility] Patient Portal.    Wishing you good health and happiness,    []

## 2024-07-22 NOTE — PROGRESS NOTES
Visit Information    Have you changed or started any medications since your last visit including any over-the-counter medicines, vitamins, or herbal medicines? no   Are you having any side effects from any of your medications? -  no  Have you stopped taking any of your medications? Is so, why? -  no    Have you seen any other physician or provider since your last visit? No  Have you had any other diagnostic tests since your last visit? No  Have you been seen in the emergency room and/or had an admission to a hospital since we last saw you? No  Have you had your routine dental cleaning in the past 6 months? yes     Have you activated your [employer] account? If not, what are your barriers? Yes     Patient Care Team:  Andie Glover MD as PCP - General (Family Medicine)  Andie Glover MD as PCP - Empaneled Provider  Valery Long MD as Consulting Physician (Gastroenterology)    Medical History Review  Past Medical, Family, and Social History reviewed and does contribute to the patient presenting condition    Health Maintenance   Topic Date Due    Respiratory Syncytial Virus (RSV) Pregnant or age 60 yrs+ (1 - 1-dose 60+ series) Never done    COVID-19 Vaccine (3 - 2023-24 season) 09/01/2023    Flu vaccine (1) 08/01/2024    Lipids  06/03/2025    Depression Monitoring  06/17/2025    Colorectal Cancer Screen  01/31/2029    DTaP/Tdap/Td vaccine (2 - Td or Tdap) 08/05/2033    Annual Wellness Visit (Medicare Advantage)  Completed    Shingles vaccine  Completed    Pneumococcal 65+ years Vaccine  Completed    Hepatitis C screen  Completed    Hepatitis A vaccine  Aged Out    Hepatitis B vaccine  Aged Out    Hib vaccine  Aged Out    Polio vaccine  Aged Out    Meningococcal (ACWY) vaccine  Aged Out    A1C test (Diabetic or Prediabetic)  Discontinued     
tablet up to max of 3 total doses. If no relief after 1 dose, call 911. 25 tablet 3    vitamin D (ERGOCALCIFEROL) 1.25 MG (08825 UT) CAPS capsule Take 1 capsule by mouth once a week (Patient not taking: Reported on 7/8/2024) 12 capsule 0    Misc. Devices (HOME STYLE BED RAILS) MISC Use on bed to prevent fall (Patient not taking: Reported on 6/17/2024) 2 each 0    diclofenac sodium (VOLTAREN) 1 % GEL Apply 4 g topically 4 times daily (Patient not taking: Reported on 6/17/2024) 150 g 0     No current facility-administered medications for this visit.             Social History     Socioeconomic History    Marital status:      Spouse name: Not on file    Number of children: Not on file    Years of education: Not on file    Highest education level: Not on file   Occupational History    Not on file   Tobacco Use    Smoking status: Never     Passive exposure: Never    Smokeless tobacco: Never   Vaping Use    Vaping Use: Never used   Substance and Sexual Activity    Alcohol use: Not Currently    Drug use: No    Sexual activity: Defer   Other Topics Concern    Not on file   Social History Narrative    Not on file     Social Determinants of Health     Financial Resource Strain: Low Risk  (7/22/2024)    Overall Financial Resource Strain (CARDIA)     Difficulty of Paying Living Expenses: Not hard at all   Food Insecurity: No Food Insecurity (7/22/2024)    Hunger Vital Sign     Worried About Running Out of Food in the Last Year: Never true     Ran Out of Food in the Last Year: Never true   Transportation Needs: Unknown (7/22/2024)    PRAPARE - Transportation     Lack of Transportation (Medical): Not on file     Lack of Transportation (Non-Medical): No   Physical Activity: Insufficiently Active (3/27/2024)    Exercise Vital Sign     Days of Exercise per Week: 2 days     Minutes of Exercise per Session: 10 min   Stress: Not on file   Social Connections: Not on file   Intimate Partner Violence: Not on file   Housing

## 2024-07-26 ENCOUNTER — TELEPHONE (OUTPATIENT)
Dept: FAMILY MEDICINE CLINIC | Age: 79
End: 2024-07-26

## 2024-07-26 NOTE — TELEPHONE ENCOUNTER
Incoming call came from pt wife asking if a new order can be placed for him for a CT chest without contrast to assist. As she stated that they want him to go to a [facility]. [facility] with [facility].

## 2024-07-29 NOTE — TELEPHONE ENCOUNTER
I have ordered stress test not the CT chest.  Why she wants me to order the CT chest for.  Patient can follow-up with the cardiologist and he will decide he wants to do, if insurance is not covering the stress test.

## 2024-08-01 ENCOUNTER — HOSPITAL ENCOUNTER (OUTPATIENT)
Age: 79
Discharge: HOME | End: 2024-08-03
Payer: MEDICARE

## 2024-08-01 ENCOUNTER — HOSPITAL ENCOUNTER (OUTPATIENT)
Dept: NUCLEAR MEDICINE | Age: 79
Discharge: HOME | End: 2024-08-03
Payer: MEDICARE

## 2024-08-01 DIAGNOSIS — I25.5 ISCHEMIC CARDIOMYOPATHY: ICD-10-CM

## 2024-08-01 DIAGNOSIS — I25.118 CORONARY ARTERY DISEASE OF NATIVE ARTERY OF NATIVE HEART WITH STABLE ANGINA PECTORIS (HCC): ICD-10-CM

## 2024-08-01 DIAGNOSIS — R07.82 INTERCOSTAL PAIN: ICD-10-CM

## 2024-08-01 LAB
STRESS BASELINE DIAS BP: 62 MMHG
STRESS BASELINE HR: 56 BPM
STRESS BASELINE SYS BP: 141 MMHG
STRESS ESTIMATED WORKLOAD: 1 METS
STRESS PEAK DIAS BP: 62 MMHG
STRESS PEAK SYS BP: 141 MMHG
STRESS PERCENT HR ACHIEVED: 53 %
STRESS POST PEAK HR: 75 BPM
STRESS RATE PRESSURE PRODUCT: NORMAL BPM*MMHG
STRESS STAGE RECOVERY 1 BP: NORMAL MMHG
STRESS STAGE RECOVERY 1 DURATION: 1 MIN:SEC
STRESS STAGE RECOVERY 1 HR: 68 BPM
STRESS STAGE RECOVERY 2 BP: NORMAL MMHG
STRESS STAGE RECOVERY 2 DURATION: 6 MIN:SEC
STRESS STAGE RECOVERY 2 HR: 67 BPM
STRESS TARGET HR: 141 BPM

## 2024-08-01 PROCEDURE — 93016 CV STRESS TEST SUPVJ ONLY: CPT | Performed by: RADIOLOGY

## 2024-08-01 PROCEDURE — 2580000003 HC RX 258: Performed by: FAMILY MEDICINE

## 2024-08-01 PROCEDURE — 3430000000 HC RX DIAGNOSTIC RADIOPHARMACEUTICAL: Performed by: FAMILY MEDICINE

## 2024-08-01 PROCEDURE — 78452 HT MUSCLE IMAGE SPECT MULT: CPT

## 2024-08-01 PROCEDURE — 6360000002 HC RX W HCPCS: Performed by: FAMILY MEDICINE

## 2024-08-01 PROCEDURE — A9500 TC99M SESTAMIBI: HCPCS | Performed by: FAMILY MEDICINE

## 2024-08-01 PROCEDURE — 93017 CV STRESS TEST TRACING ONLY: CPT

## 2024-08-01 PROCEDURE — 93018 CV STRESS TEST I&R ONLY: CPT | Performed by: RADIOLOGY

## 2024-08-01 RX ORDER — ATROPINE SULFATE 0.1 MG/ML
0.5 INJECTION INTRAVENOUS EVERY 5 MIN PRN
Status: ACTIVE | OUTPATIENT
Start: 2024-08-01 | End: 2024-08-01

## 2024-08-01 RX ORDER — METOPROLOL TARTRATE 1 MG/ML
5 INJECTION, SOLUTION INTRAVENOUS EVERY 5 MIN PRN
Status: ACTIVE | OUTPATIENT
Start: 2024-08-01 | End: 2024-08-01

## 2024-08-01 RX ORDER — SODIUM CHLORIDE 0.9 % (FLUSH) 0.9 %
10 SYRINGE (ML) INJECTION PRN
Status: DISCONTINUED | OUTPATIENT
Start: 2024-08-01 | End: 2024-08-04 | Stop reason: HOSPADM

## 2024-08-01 RX ORDER — NITROGLYCERIN 0.4 MG/1
0.4 TABLET SUBLINGUAL EVERY 5 MIN PRN
Status: ACTIVE | OUTPATIENT
Start: 2024-08-01 | End: 2024-08-01

## 2024-08-01 RX ORDER — SODIUM CHLORIDE 9 MG/ML
500 INJECTION, SOLUTION INTRAVENOUS CONTINUOUS PRN
Status: ACTIVE | OUTPATIENT
Start: 2024-08-01 | End: 2024-08-01

## 2024-08-01 RX ORDER — TETRAKIS(2-METHOXYISOBUTYLISOCYANIDE)COPPER(I) TETRAFLUOROBORATE 1 MG/ML
10.5 INJECTION, POWDER, LYOPHILIZED, FOR SOLUTION INTRAVENOUS
Status: COMPLETED | OUTPATIENT
Start: 2024-08-01 | End: 2024-08-01

## 2024-08-01 RX ORDER — TETRAKIS(2-METHOXYISOBUTYLISOCYANIDE)COPPER(I) TETRAFLUOROBORATE 1 MG/ML
30 INJECTION, POWDER, LYOPHILIZED, FOR SOLUTION INTRAVENOUS
Status: COMPLETED | OUTPATIENT
Start: 2024-08-01 | End: 2024-08-01

## 2024-08-01 RX ORDER — ALBUTEROL SULFATE 90 UG/1
2 AEROSOL, METERED RESPIRATORY (INHALATION) PRN
Status: ACTIVE | OUTPATIENT
Start: 2024-08-01 | End: 2024-08-01

## 2024-08-01 RX ORDER — AMINOPHYLLINE 25 MG/ML
50 INJECTION, SOLUTION INTRAVENOUS PRN
Status: ACTIVE | OUTPATIENT
Start: 2024-08-01 | End: 2024-08-01

## 2024-08-01 RX ORDER — REGADENOSON 0.08 MG/ML
0.4 INJECTION, SOLUTION INTRAVENOUS
Status: COMPLETED | OUTPATIENT
Start: 2024-08-01 | End: 2024-08-01

## 2024-08-01 RX ORDER — SODIUM CHLORIDE 0.9 % (FLUSH) 0.9 %
5-40 SYRINGE (ML) INJECTION PRN
Status: ACTIVE | OUTPATIENT
Start: 2024-08-01 | End: 2024-08-01

## 2024-08-01 RX ADMIN — Medication 34.5 MILLICURIE: at 08:55

## 2024-08-01 RX ADMIN — REGADENOSON 0.4 MG: 0.08 INJECTION, SOLUTION INTRAVENOUS at 08:52

## 2024-08-01 RX ADMIN — Medication 10.5 MILLICURIE: at 07:14

## 2024-08-01 RX ADMIN — SODIUM CHLORIDE, PRESERVATIVE FREE 10 ML: 5 INJECTION INTRAVENOUS at 07:14

## 2024-08-05 ENCOUNTER — APPOINTMENT (OUTPATIENT)
Dept: GENERAL RADIOLOGY | Age: 79
End: 2024-08-05
Payer: MEDICARE

## 2024-08-05 ENCOUNTER — HOSPITAL ENCOUNTER (EMERGENCY)
Age: 79
Discharge: HOME | End: 2024-08-05
Attending: STUDENT IN AN ORGANIZED HEALTH CARE EDUCATION/TRAINING PROGRAM
Payer: MEDICARE

## 2024-08-05 VITALS
OXYGEN SATURATION: 97 % | HEART RATE: 71 BPM | BODY MASS INDEX: 35.65 KG/M2 | DIASTOLIC BLOOD PRESSURE: 68 MMHG | SYSTOLIC BLOOD PRESSURE: 105 MMHG | WEIGHT: 249 LBS | RESPIRATION RATE: 18 BRPM | HEIGHT: 70 IN | TEMPERATURE: 98.2 F
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
## 2024-08-05 DIAGNOSIS — M25.551 RIGHT HIP PAIN: ICD-10-CM

## 2024-08-05 LAB
ANION GAP SERPL CALCULATED.3IONS-SCNC: 11 MMOL/L (ref 9–17)
BASOPHILS # BLD: 0 K/UL (ref 0–0.2)
BASOPHILS NFR BLD: 0 % (ref 0–2)
BUN SERPL-MCNC: 20 MG/DL (ref 8–23)
CALCIUM SERPL-MCNC: 9.5 MG/DL (ref 8.6–10.4)
CHLORIDE SERPL-SCNC: 103 MMOL/L (ref 98–107)
CO2 SERPL-SCNC: 24 MMOL/L (ref 20–31)
CREAT SERPL-MCNC: 0.7 MG/DL (ref 0.7–1.2)
EOSINOPHIL # BLD: 0.1 K/UL (ref 0–0.4)
EOSINOPHILS RELATIVE PERCENT: 1 % (ref 0–4)
ERYTHROCYTE [DISTWIDTH] IN BLOOD BY AUTOMATED COUNT: 13.7 % (ref 11.5–14.9)
GFR, ESTIMATED: >90 ML/MIN/1.73M2
GLUCOSE SERPL-MCNC: 109 MG/DL (ref 70–99)
HCT VFR BLD AUTO: 38.8 % (ref 41–53)
HGB BLD-MCNC: 12.6 G/DL (ref 13.5–17.5)
LYMPHOCYTES NFR BLD: 1.5 K/UL (ref 1–4.8)
LYMPHOCYTES RELATIVE PERCENT: 18 % (ref 24–44)
MCH RBC QN AUTO: 30 PG (ref 26–34)
MCHC RBC AUTO-ENTMCNC: 32.5 G/DL (ref 31–37)
MCV RBC AUTO: 92.3 FL (ref 80–100)
MONOCYTES NFR BLD: 0.6 K/UL (ref 0.1–1.3)
MONOCYTES NFR BLD: 7 % (ref 1–7)
NEUTROPHILS NFR BLD: 74 % (ref 36–66)
NEUTS SEG NFR BLD: 6.2 K/UL (ref 1.3–9.1)
PLATELET # BLD AUTO: 150 K/UL (ref 150–450)
PMV BLD AUTO: 9.3 FL (ref 6–12)
POTASSIUM SERPL-SCNC: 4.2 MMOL/L (ref 3.7–5.3)
RBC # BLD AUTO: 4.2 M/UL (ref 4.5–5.9)
SODIUM SERPL-SCNC: 138 MMOL/L (ref 135–144)
WBC OTHER # BLD: 8.4 K/UL (ref 3.5–11)

## 2024-08-05 PROCEDURE — 6370000000 HC RX 637 (ALT 250 FOR IP): Performed by: STUDENT IN AN ORGANIZED HEALTH CARE EDUCATION/TRAINING PROGRAM

## 2024-08-05 PROCEDURE — 85025 COMPLETE CBC W/AUTO DIFF WBC: CPT

## 2024-08-05 PROCEDURE — 2580000003 HC RX 258: Performed by: STUDENT IN AN ORGANIZED HEALTH CARE EDUCATION/TRAINING PROGRAM

## 2024-08-05 PROCEDURE — 73502 X-RAY EXAM HIP UNI 2-3 VIEWS: CPT

## 2024-08-05 PROCEDURE — 36415 COLL VENOUS BLD VENIPUNCTURE: CPT

## 2024-08-05 PROCEDURE — 6360000002 HC RX W HCPCS: Performed by: STUDENT IN AN ORGANIZED HEALTH CARE EDUCATION/TRAINING PROGRAM

## 2024-08-05 PROCEDURE — 80048 BASIC METABOLIC PNL TOTAL CA: CPT

## 2024-08-05 PROCEDURE — 99284 EMERGENCY DEPT VISIT MOD MDM: CPT

## 2024-08-05 PROCEDURE — 96374 THER/PROPH/DIAG INJ IV PUSH: CPT

## 2024-08-05 RX ORDER — FENTANYL CITRATE 0.05 MG/ML
50 INJECTION, SOLUTION INTRAMUSCULAR; INTRAVENOUS ONCE
Status: COMPLETED | OUTPATIENT
Start: 2024-08-05 | End: 2024-08-05

## 2024-08-05 RX ORDER — 0.9 % SODIUM CHLORIDE 0.9 %
1000 INTRAVENOUS SOLUTION INTRAVENOUS ONCE
Status: COMPLETED | OUTPATIENT
Start: 2024-08-05 | End: 2024-08-05

## 2024-08-05 RX ORDER — LIDOCAINE 4 G/G
1 PATCH TOPICAL ONCE
Status: DISCONTINUED | OUTPATIENT
Start: 2024-08-05 | End: 2024-08-05 | Stop reason: HOSPADM

## 2024-08-05 RX ADMIN — SODIUM CHLORIDE 1000 ML: 9 INJECTION, SOLUTION INTRAVENOUS at 15:48

## 2024-08-05 RX ADMIN — FENTANYL CITRATE 50 MCG: 0.05 INJECTION, SOLUTION INTRAMUSCULAR; INTRAVENOUS at 15:47

## 2024-08-05 ASSESSMENT — PAIN SCALES - GENERAL
PAINLEVEL_OUTOF10: 8
PAINLEVEL_OUTOF10: 8

## 2024-08-05 ASSESSMENT — ENCOUNTER SYMPTOMS
RHINORRHEA: 0
ABDOMINAL PAIN: 0
NAUSEA: 0
COUGH: 0
VOMITING: 0
SHORTNESS OF BREATH: 0

## 2024-08-05 ASSESSMENT — PAIN DESCRIPTION - LOCATION: LOCATION: HIP

## 2024-08-05 ASSESSMENT — LIFESTYLE VARIABLES
HOW MANY STANDARD DRINKS CONTAINING ALCOHOL DO YOU HAVE ON A TYPICAL DAY: PATIENT DOES NOT DRINK
HOW OFTEN DO YOU HAVE A DRINK CONTAINING ALCOHOL: NEVER

## 2024-08-05 ASSESSMENT — PAIN - FUNCTIONAL ASSESSMENT: PAIN_FUNCTIONAL_ASSESSMENT: 0-10

## 2024-08-05 ASSESSMENT — PAIN DESCRIPTION - ORIENTATION: ORIENTATION: RIGHT

## 2024-08-05 NOTE — DISCHARGE INSTRUCTIONS
Please follow-up with your primary care provider  Please ask your primary care provider if you still need to be on lisinopril and metoprolol as your blood pressure has been low  You may take Tylenol up to 1 g every 8 hours as needed for pain  You may also use over-the-counter Lidoderm patches to the bruised area in your hip  May also ice your hip in the bruised area, 20 to 30 minutes at a time, 4-5 times a day  Return to the ER with any severe worsening of symptoms especially if you are having worsening pain, spreading bruising, numbness, tingling, or difficulty moving your leg

## 2024-08-05 NOTE — ED PROVIDER NOTES
[facility]  Emergency Department Encounter  Emergency Medicine Physician     Pt Name: Dexter Campoverde  MRN: [id number]  Birthdate 1945  Date of evaluation: 8/5/24  PCP:  Andie Glover MD    CHIEF COMPLAINT       Chief Complaint   Patient presents with    Fall    Hip Pain       HISTORY OF PRESENT ILLNESS  (Location/Symptom, Timing/Onset, Context/Setting, Quality, Duration, Modifying Factors, Severity.)    Dexter Campoverde is a 78 y.o. male who presents with right hip pain.  Noticed a worsening bruise today.  States that he was trying to put a sticker on his car, tripped backwards and landed on his right hip.  He is having worsening right hip pain since the fall which was on concrete.  States he is never broken hip before but he is having trouble walking now.  Having pain to the right hip and into the buttock but not down the leg.  Denies any back pain.  Denies any numbness or tingling or paresthesias.  Denies hitting his head or neck or back.  No headache, no neck pain, no back pain        PAST MEDICAL / SURGICAL / SOCIAL / FAMILY HISTORY    has a past medical history of Acquired skull defect, Angina pectoris (HCC), Anxiety, Anxiety, At high risk for falls, Ataxia following nontraumatic intracerebral hemorrhage, Bronchitis with asthma, acute, CAD (coronary artery disease), Carotid stenosis, Carotid stenosis, left, Chest pain, Compression of brainstem (HCC), Diabetes mellitus (HCC), Diplopia, Dysphagia, Gait instability, GERD (gastroesophageal reflux disease), H/O carotid endarterectomy, H/O heart artery stent, Heart attack (HCC), Hyperlipidemia, Hypertension, Hyponatremia, Impaired fasting glucose, Intermittent lightheadedness, Myocardiopathy (HCC), Neuropathy, Obesity, Osteoarthritis, Pericardial tamponade, S/P total knee arthroplasty, Spontaneous intracranial hemorrhage (HCC), and Stroke, hemorrhagic (HCC).     has a past surgical history that includes Colonoscopy; Tonsillectomy and adenoidectomy;

## 2024-08-06 ENCOUNTER — TELEPHONE (OUTPATIENT)
Dept: FAMILY MEDICINE CLINIC | Age: 79
End: 2024-08-06

## 2024-08-06 NOTE — TELEPHONE ENCOUNTER
[facility] ED Follow up Call    Reason for ED visit:  Fall, Right hip pain     Mikael Renteria ,     This is RO CHAPMAN MA from Andie Wood's office, just calling to see how you are doing after your recent ED visit.    Did you receive discharge instructions?  Yes  Do you understand the discharge instructions? Yes  Did the ED give you any new prescriptions? No  Were you able to fill your prescriptions? No    Do you have one of our red, yellow and green  Zone sheets that help you to determine when you should go to the ED?Not Applicable    Do you need or want to make a follow up appt with your PCP?Yes    Do you have any further needs in the home i.e. Equipment?  No    FU appts/Provider:      Future Appointments   Date Time Provider Department Center   8/9/2024 11:00 AM Alma Francois, APRN - CNP [facility] [place]   8/15/2024 10:20 AM Carmen Pritchett MD Neuro Spec Neurology -   9/3/2024 10:00 AM Mari Marshall LISW [facility]   10/22/2024  9:30 AM [facility]

## 2024-08-06 NOTE — TELEPHONE ENCOUNTER
Pts wife called in to schedule ED follow up and adv pts BP was low at ED and he was advised to discuss with PCP about possibly reducing BP meds

## 2024-08-07 PROBLEM — R07.9 CHEST PAIN, UNSPECIFIED: Status: ACTIVE | Noted: 2024-07-23

## 2024-08-08 ENCOUNTER — OFFICE VISIT (OUTPATIENT)
Dept: FAMILY MEDICINE CLINIC | Age: 79
End: 2024-08-08
Payer: MEDICARE

## 2024-08-08 VITALS
BODY MASS INDEX: 35.82 KG/M2 | HEIGHT: 70 IN | WEIGHT: 250.2 LBS | OXYGEN SATURATION: 97 % | SYSTOLIC BLOOD PRESSURE: 118 MMHG | DIASTOLIC BLOOD PRESSURE: 66 MMHG | HEART RATE: 66 BPM
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
## 2024-08-08 DIAGNOSIS — I63.442 CEREBROVASCULAR ACCIDENT (CVA) DUE TO EMBOLISM OF LEFT CEREBELLAR ARTERY (HCC): ICD-10-CM

## 2024-08-08 DIAGNOSIS — T14.8XXA BRUISE OF MUSCLE: ICD-10-CM

## 2024-08-08 DIAGNOSIS — R26.81 GAIT INSTABILITY: Primary | ICD-10-CM

## 2024-08-08 DIAGNOSIS — M47.817 LUMBOSACRAL SPONDYLOSIS WITHOUT MYELOPATHY: ICD-10-CM

## 2024-08-08 PROBLEM — R07.81 PLEURITIC CHEST PAIN: Status: RESOLVED | Noted: 2018-12-10 | Resolved: 2024-08-08

## 2024-08-08 PROBLEM — R07.9 CHEST PAIN, UNSPECIFIED: Status: RESOLVED | Noted: 2024-07-23 | Resolved: 2024-08-08

## 2024-08-08 PROBLEM — R69 SEVERE COMORBID ILLNESS: Status: RESOLVED | Noted: 2024-03-21 | Resolved: 2024-08-08

## 2024-08-08 PROCEDURE — 0518F FALL PLAN OF CARE DOCD: CPT | Performed by: FAMILY MEDICINE

## 2024-08-08 PROCEDURE — 3288F FALL RISK ASSESSMENT DOCD: CPT | Performed by: FAMILY MEDICINE

## 2024-08-08 PROCEDURE — 1123F ACP DISCUSS/DSCN MKR DOCD: CPT | Performed by: FAMILY MEDICINE

## 2024-08-08 PROCEDURE — G8427 DOCREV CUR MEDS BY ELIG CLIN: HCPCS | Performed by: FAMILY MEDICINE

## 2024-08-08 PROCEDURE — 1036F TOBACCO NON-USER: CPT | Performed by: FAMILY MEDICINE

## 2024-08-08 PROCEDURE — 99214 OFFICE O/P EST MOD 30 MIN: CPT | Performed by: FAMILY MEDICINE

## 2024-08-08 PROCEDURE — 3074F SYST BP LT 130 MM HG: CPT | Performed by: FAMILY MEDICINE

## 2024-08-08 PROCEDURE — 3078F DIAST BP <80 MM HG: CPT | Performed by: FAMILY MEDICINE

## 2024-08-08 PROCEDURE — G8417 CALC BMI ABV UP PARAM F/U: HCPCS | Performed by: FAMILY MEDICINE

## 2024-08-08 SDOH — ECONOMIC STABILITY: INCOME INSECURITY: HOW HARD IS IT FOR YOU TO PAY FOR THE VERY BASICS LIKE FOOD, HOUSING, MEDICAL CARE, AND HEATING?: NOT HARD AT ALL

## 2024-08-08 SDOH — ECONOMIC STABILITY: FOOD INSECURITY: WITHIN THE PAST 12 MONTHS, YOU WORRIED THAT YOUR FOOD WOULD RUN OUT BEFORE YOU GOT MONEY TO BUY MORE.: NEVER TRUE

## 2024-08-08 SDOH — ECONOMIC STABILITY: FOOD INSECURITY: WITHIN THE PAST 12 MONTHS, THE FOOD YOU BOUGHT JUST DIDN'T LAST AND YOU DIDN'T HAVE MONEY TO GET MORE.: NEVER TRUE

## 2024-08-08 ASSESSMENT — ENCOUNTER SYMPTOMS
ABDOMINAL PAIN: 0
STRIDOR: 0
NAUSEA: 0
CONSTIPATION: 0
SHORTNESS OF BREATH: 0
COLOR CHANGE: 0
RECTAL PAIN: 0
TROUBLE SWALLOWING: 0
BLOOD IN STOOL: 0
VOMITING: 0
DIARRHEA: 0
WHEEZING: 0
CHEST TIGHTNESS: 0
EYE REDNESS: 0
SINUS PRESSURE: 0
ABDOMINAL DISTENTION: 0
COUGH: 0
SORE THROAT: 0
BACK PAIN: 1
RHINORRHEA: 0

## 2024-08-08 NOTE — PROGRESS NOTES
Visit Information    Have you changed or started any medications since your last visit including any over-the-counter medicines, vitamins, or herbal medicines? no   Have you stopped taking any of your medications? Is so, why? -  no  Are you having any side effects from any of your medications? - no    Have you seen any other physician or provider since your last visit?  no   Have you had any other diagnostic tests since your last visit?  yes    Have you been seen in the emergency room and/or had an admission in a hospital since we last saw you?  yes    Have you had your routine dental cleaning in the past 6 months?  no     Do you have an active MyChart account? If no, what is the barrier?  Yes    Patient Care Team:  Andie Glover MD as PCP - General (Family Medicine)  Andie Glover MD as PCP - Empaneled Provider  Valery Long MD as Consulting Physician (Gastroenterology)    Medical History Review  Past Medical, Family, and Social History reviewed and does contribute to the patient presenting condition    Health Maintenance   Topic Date Due    Respiratory Syncytial Virus (RSV) Pregnant or age 60 yrs+ (1 - 1-dose 60+ series) Never done    COVID-19 Vaccine (3 - 2023-24 season) 09/01/2023    Flu vaccine (1) 08/01/2024    Lipids  06/03/2025    Depression Monitoring  07/22/2025    Colorectal Cancer Screen  01/31/2029    DTaP/Tdap/Td vaccine (2 - Td or Tdap) 08/05/2033    Annual Wellness Visit (Medicare Advantage)  Completed    Shingles vaccine  Completed    Pneumococcal 65+ years Vaccine  Completed    Hepatitis C screen  Completed    Hepatitis A vaccine  Aged Out    Hepatitis B vaccine  Aged Out    Hib vaccine  Aged Out    Polio vaccine  Aged Out    Meningococcal (ACWY) vaccine  Aged Out    A1C test (Diabetic or Prediabetic)  Discontinued

## 2024-08-08 NOTE — PROGRESS NOTES
Chief Complaint   Patient presents with    ED Follow-up     Fall - Right hip, buttocks, back    Discuss Medications     BP meds         Dexter Campoverde  here today for follow up on chronic medical problems, go over labs and/or diagnostic studies, and medication refills. ED Follow-up (Fall - Right hip, buttocks, back) and Discuss Medications (BP meds)      HPI: Patient presented for ED follow-up along with his wife.  Patient had a fall on August 5.  Wife reports he felt while coming out from the car.  Patient reports he took the car himself and he does not listen to her.  Patient does have underlying history of gait instability, lower extremity weakness after he had stroke.  Patient uses usually walker for ambulation.  Patient was advised not to drive, patient is still driving.  He had previous falls also.  Patient has done physical therapy multiple times in the past.  Patient denies any increased weakness but has chronic gait instability.       Patient was referred to pain management 6 months before for chronic lumbar degenerative disc disease.  He was referred to occupational therapy with pain management never scheduled appointment.  Wife reports that he should not be driving due to his gait instability and weakness.  Patient also has vision changes in both eyes.      In the ER patient has workup done for x-ray has blood work which is all normal.  Patient has large bruise on the right buttock area, which is hard.  Patient is on Plavix due to history of stroke.    I discussed with patient that we had to inform [employer] about the revocation of the driving license.  Patient and wife both agree with the plan.      /66   Pulse 66   Ht 1.778 m (5' 10\")   Wt 113.5 kg (250 lb 3.2 oz)   SpO2 97%   BMI 35.90 kg/m²    Body mass index is 35.9 kg/m².  Wt Readings from Last 3 Encounters:   08/08/24 113.5 kg (250 lb 3.2 oz)   08/05/24 112.9 kg (249 lb)   07/22/24 112.9 kg (249 lb)        [x]Negative depression screening.

## 2024-08-09 ENCOUNTER — HOSPITAL ENCOUNTER (OUTPATIENT)
Dept: PAIN MANAGEMENT | Age: 79
Discharge: HOME | End: 2024-08-09
Payer: MEDICARE

## 2024-08-09 VITALS — HEIGHT: 70 IN | BODY MASS INDEX: 35.79 KG/M2 | WEIGHT: 250 LBS

## 2024-08-09 DIAGNOSIS — M47.27 LUMBOSACRAL RADICULOPATHY DUE TO DEGENERATIVE JOINT DISEASE OF SPINE: ICD-10-CM

## 2024-08-09 DIAGNOSIS — M54.51 VERTEBROGENIC LOW BACK PAIN: ICD-10-CM

## 2024-08-09 DIAGNOSIS — M51.36 LUMBAR DEGENERATIVE DISC DISEASE: ICD-10-CM

## 2024-08-09 DIAGNOSIS — F11.90 CHRONIC, CONTINUOUS USE OF OPIOIDS: ICD-10-CM

## 2024-08-09 DIAGNOSIS — M47.817 LUMBOSACRAL SPONDYLOSIS WITHOUT MYELOPATHY: Primary | ICD-10-CM

## 2024-08-09 PROCEDURE — 99213 OFFICE O/P EST LOW 20 MIN: CPT | Performed by: NURSE PRACTITIONER

## 2024-08-09 PROCEDURE — 99213 OFFICE O/P EST LOW 20 MIN: CPT

## 2024-08-09 RX ORDER — TRAMADOL HYDROCHLORIDE 50 MG/1
50 TABLET ORAL 2 TIMES DAILY PRN
Qty: 60 TABLET | Refills: 0 | Status: SHIPPED | OUTPATIENT
Start: 2024-08-20 | End: 2024-09-19

## 2024-08-09 ASSESSMENT — ENCOUNTER SYMPTOMS
COUGH: 0
BACK PAIN: 1
SHORTNESS OF BREATH: 0
CONSTIPATION: 0

## 2024-08-09 NOTE — PROGRESS NOTES
(Start on 8/20/2024)    Chronic, continuous use of opioids          Treatment Plan:  Patient relates current medications are helping the pain. Patient reports taking pain medications as prescribed, denies obtaining medications from different sources and denies use of illegal drugs. Medication risk and benefits have been discussed. Patient denies side effects from medications like nausea, vomiting, constipation or drowsiness. Patient reports current activities of daily living are possible due to medications and would like to continue them.     As always, we encourage daily stretching and strengthening exercises, and recommend minimizing use of pain medications unless patient cannot get through daily activities due to pain.    We have discussed with the patient the effect the patient’s medical condition and opioid medication may have on the patient’s ability to safely operate a vehicle. Pt verbalized understanding.     Due to the high risk nature of this patient's pain medication close monitoring is required.   Continue current medication management, pt has been stable and compliant.  Script written for tramadol  Follow up appointment made for 4 weeks    I have reviewed the chief complaint and history of present illness (including ROS and PFSH) and vital documentation by my staff and I agree with their documentation and have added where applicable.

## 2024-08-15 ENCOUNTER — OFFICE VISIT (OUTPATIENT)
Dept: NEUROLOGY | Age: 79
End: 2024-08-15
Payer: MEDICARE

## 2024-08-15 VITALS
HEIGHT: 70 IN | SYSTOLIC BLOOD PRESSURE: 105 MMHG | DIASTOLIC BLOOD PRESSURE: 61 MMHG | WEIGHT: 245.6 LBS | HEART RATE: 65 BPM | BODY MASS INDEX: 35.16 KG/M2

## 2024-08-15 DIAGNOSIS — G56.01 CARPAL TUNNEL SYNDROME OF RIGHT WRIST: Primary | ICD-10-CM

## 2024-08-15 DIAGNOSIS — R27.0 ATAXIA: ICD-10-CM

## 2024-08-15 PROCEDURE — 3078F DIAST BP <80 MM HG: CPT | Performed by: PSYCHIATRY & NEUROLOGY

## 2024-08-15 PROCEDURE — 1123F ACP DISCUSS/DSCN MKR DOCD: CPT | Performed by: PSYCHIATRY & NEUROLOGY

## 2024-08-15 PROCEDURE — G8427 DOCREV CUR MEDS BY ELIG CLIN: HCPCS | Performed by: PSYCHIATRY & NEUROLOGY

## 2024-08-15 PROCEDURE — 3288F FALL RISK ASSESSMENT DOCD: CPT | Performed by: PSYCHIATRY & NEUROLOGY

## 2024-08-15 PROCEDURE — 0518F FALL PLAN OF CARE DOCD: CPT | Performed by: PSYCHIATRY & NEUROLOGY

## 2024-08-15 PROCEDURE — G8417 CALC BMI ABV UP PARAM F/U: HCPCS | Performed by: PSYCHIATRY & NEUROLOGY

## 2024-08-15 PROCEDURE — 3074F SYST BP LT 130 MM HG: CPT | Performed by: PSYCHIATRY & NEUROLOGY

## 2024-08-15 PROCEDURE — 99214 OFFICE O/P EST MOD 30 MIN: CPT | Performed by: PSYCHIATRY & NEUROLOGY

## 2024-08-15 PROCEDURE — 1036F TOBACCO NON-USER: CPT | Performed by: PSYCHIATRY & NEUROLOGY

## 2024-08-15 RX ORDER — DONEPEZIL HYDROCHLORIDE 10 MG/1
10 TABLET, FILM COATED ORAL NIGHTLY
Qty: 90 TABLET | Refills: 3 | Status: SHIPPED | OUTPATIENT
Start: 2024-08-15 | End: 2024-11-13

## 2024-08-15 NOTE — PROGRESS NOTES
[facility] [place]  [address], [address]  [place] [address]  Ph: [phone] or [phone]  FAX: [phone]    Chief Complaint: stroke     Dear Andie Glover MD     I had the pleasure of seeing your patient today in neurology consultation for his symptoms. As you would recall Dexter Campoverde is a 78 y.o. male.  He presented to [facility] on 4/19/22 regarding his history of stroke. Patient was accompanied by his wife for this visit. On 6/18/2019, patient underwent craniectomy of the suboccipital with cervical laminectomy for decompression of intracerebral hemorrhage via the [facility]. Per report, the event was secondary to hypertension. Since, he has experienced left-sided weakness and ataxic gait. He has been clinically stable since. Patient's wife reports that the patient has history of cardiac problems and had also had a stent placed via [facility]. The patient's wife states that the patient was being seen by [facility] for his cardiac follow ups, and was later seen by neuro after the stroke. Patient's report from 2019 advised to discontinue aspirin, however the patient reports that he is still taking aspirin. Patient has been taking Plavix and Lipitor for stroke prophylaxis. The patient's wife reports that the patient complains of foot pain upon walking.     On 8/15/2024 the patient presents to the office for a follow up regarding numbness. They are currently tolerating and compliant in taking Plavix, Cymbalta, and Aricept, with no adverse side effects. Today, the patient notes numbness bilaterally, with right sided tremors. The patient admits to falling recently, with one such fall leading to trauma to the head. Finally, the patient notes pain when coughing on the right side of their chest. The patient ambulates using a walker      Current prophylactic medication Dosage   Plavix 75 mg daily   Cymbalta 30 mg daily   Aricept 5 mg nightly        CT Head WO

## 2024-08-16 ENCOUNTER — HOSPITAL ENCOUNTER (OUTPATIENT)
Dept: OCCUPATIONAL THERAPY | Age: 79
Setting detail: THERAPIES SERIES
Discharge: HOME | End: 2024-08-16
Payer: MEDICARE

## 2024-08-16 PROCEDURE — 97166 OT EVAL MOD COMPLEX 45 MIN: CPT

## 2024-08-16 ASSESSMENT — PAIN SCALES - GENERAL: PAINLEVEL_OUTOF10: 7

## 2024-08-16 ASSESSMENT — PAIN DESCRIPTION - PAIN TYPE: TYPE: ACUTE PAIN

## 2024-08-16 ASSESSMENT — 9 HOLE PEG TEST
TEST_RESULT: IMPAIRED
TESTTIME_SECONDS: 55
TESTTIME_SECONDS: 119
TEST_RESULT: IMPAIRED

## 2024-08-16 ASSESSMENT — PAIN DESCRIPTION - ORIENTATION: ORIENTATION: RIGHT

## 2024-08-16 NOTE — FLOWSHEET NOTE
[facility] Fall Risk Assessment    Patient Name:  Dexter Campoverde  : 1945        Risk Factor Scale  Score   History of Falls [x] Yes  [] No 25  0 25   Secondary Diagnosis [x] Yes  [] No 15  0 15   Ambulatory Aid [] Furniture  [x] walker  [] None/bedrest/wheelchair/nurse 30  15  0   15   IV/Heparin Lock [] Yes  [x] No 20  0   0   Gait/Transferring [x] Impaired  [] Weak  [] Normal/bedrest/immobile 20  10  0 20   Mental Status [] Forgets limitations  [x] Oriented to own ability 15  0   0      Total:75     Based on the Assessment score: check the appropriate box.    []  No intervention needed   Low =   Score of 0-24    []  Use standard prevention interventions Moderate =  Score of 24-44   [] Give patient handout and discuss fall prevention strategies   [] Establish goal of education for patient/family RE: fall prevention strategies    [x]  Use high risk prevention interventions High = Score of 45 and higher   [x] Give patient handout and discuss fall prevention strategies   [x] Establish goal of education for patient/family Re: fall prevention strategies   [] Discuss lifeline / other resources    Electronically signed by:   Kellie Hayes OT  Date: 2024

## 2024-08-16 NOTE — PROGRESS NOTES
[facility] Outpatient Rehabilitation and Therapy  Occupational Therapy  [address]    [place], [place] [address]  Phone: [phone]  Fax: [phone]  Occupational Therapy: Initial Evaluation   Patient: Dexter Campoverde (78 y.o. male)   Examination Date: 2024  Plan of Care Certification Period: 2024 to        :  1945  MRN: [id number]  CSN: [id number]   Insurance: Payor: [employer] MEDICARE / Plan: HUMANA GOLD PLUS HMO / Product Type: *No Product type* /   Insurance ID: [id number] - (Medicare Managed) Secondary Insurance (if applicable):    Insurance Information: [employer] . Need authorization for tx from [facility] after eval.   Referring Physician: Andie Glover MD Ahmad, Rayeesa   PCP: Andie Glover MD Visits to Date/Visits Approved:   (Need authorization from insurance company .Recommend 20 visits.)    No Show/Cancelled Appts:   /       Medical Diagnosis: Gait instability [R26.81]  Fall, subsequent encounter [W19.XXXD]  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 (HCC) [I63.442]  Lumbosacral spondylosis without myelopathy [M47.817] Diagnosis  R26.81 (ICD-10-CM) - Gait instability  W19.XXXD (ICD-10-CM) - Fall, subsequent encounter  I63.442 (ICD-10-CM) - 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 (HCC)  M47.817 (ICD-10-CM) - Lumbosacral spondylosis without myelopathy  Treatment Diagnosis: Bilateral UE (shoulder & hand) weakness,impaired bilateral fine motor coordination, rt hand pain ,numbness rt hand (ICD-10 code: M62.81, R27.9,M79.641,R20.2         PERTINENT MEDICAL HISTORY   Patient assessed for rehabilitation services?: Yes       Medical History:     Past Medical History:   Diagnosis Date    Acquired skull defect     Angina pectoris (HCC)     Anxiety     Anxiety     At high risk for falls     Ataxia following nontraumatic intracerebral hemorrhage     Bronchitis with asthma, acute 2015    CAD

## 2024-08-23 ENCOUNTER — HOSPITAL ENCOUNTER (OUTPATIENT)
Dept: OCCUPATIONAL THERAPY | Age: 79
Setting detail: THERAPIES SERIES
Discharge: HOME | End: 2024-08-23
Payer: MEDICARE

## 2024-08-23 PROCEDURE — 97110 THERAPEUTIC EXERCISES: CPT

## 2024-08-23 PROCEDURE — 97140 MANUAL THERAPY 1/> REGIONS: CPT

## 2024-08-23 PROCEDURE — 97530 THERAPEUTIC ACTIVITIES: CPT

## 2024-08-23 ASSESSMENT — PAIN DESCRIPTION - PAIN TYPE: TYPE: ACUTE PAIN

## 2024-08-23 ASSESSMENT — PAIN DESCRIPTION - ORIENTATION: ORIENTATION: LOWER

## 2024-08-23 ASSESSMENT — PAIN DESCRIPTION - LOCATION: LOCATION: BACK

## 2024-08-23 ASSESSMENT — PAIN DESCRIPTION - DESCRIPTORS: DESCRIPTORS: SHARP

## 2024-08-23 ASSESSMENT — PAIN SCALES - GENERAL: PAINLEVEL_OUTOF10: 3

## 2024-08-23 NOTE — FLOWSHEET NOTE
[facility] Outpatient Rehabilitation and Therapy  Occupational Therapy  [address]    [place], [place] [address]  Phone: [phone]  Fax: [phone]     Occupational Therapy Daily Treatment note     Occupational Therapy: Daily Note   Patient: Dexter Campoverde (79 y.o. male)   Examination Date: 2024  No data recorded  No data recorded   :  1945 # of Visits since SOC:   2   MRN: [id number]  CSN: [id number] Start of Care Date: 2024   Insurance: Payor: HUMANA MEDICARE / Plan: HUMANA GOLD PLUS HMO / Product Type: *No Product type* /   Insurance ID: [id number] - (Medicare Managed) Secondary Insurance (if applicable):    Insurance Information: [employer] Medicare . Need authorization for tx from [facility] after eval. Pt approved OT 10 visits from 2024-10/19/2024 (manual therapy 48216, therapeutic exercise 15574, therapeutic activity 71825)   Referring Physician: Andie Glover MD Ahmad, Rayeesa   PCP: Andie Glover MD Visits to Date/Visits Approved:     No Show/Cancelled Appts:   /       Medical Diagnosis: Gait instability [R26.81]  Fall, subsequent encounter [W19.XXXD]  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 (HCC) [I63.442]  Lumbosacral spondylosis without myelopathy [M47.817] Diagnosis  R26.81 (ICD-10-CM) - Gait instability  W19.XXXD (ICD-10-CM) - Fall, subsequent encounter  I63.442 (ICD-10-CM) - 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 (HCC)  M47.817 (ICD-10-CM) - Lumbosacral spondylosis without myelopathy  Treatment Diagnosis: Bilateral UE (shoulder & hand) weakness,impaired bilateral fine motor coordination, rt hand pain ,numbness rt hand (ICD-10 code: M62.81, R27.9,M79.641,R20.2        SUBJECTIVE EXAMINATION   Pain Level: Pain Screening  Patient Currently in Pain: Yes  Pain Assessment: 0-10  Pain Level: 3  Post Treatment Pain Level: 3  Pain Type: Acute pain  Pain Location: Back  Pain

## 2024-08-26 ENCOUNTER — TELEPHONE (OUTPATIENT)
Dept: PAIN MANAGEMENT | Age: 79
End: 2024-08-26

## 2024-08-26 RX ORDER — GABAPENTIN 100 MG/1
CAPSULE ORAL
Qty: 60 CAPSULE | Refills: 0 | OUTPATIENT
Start: 2024-08-26

## 2024-08-26 RX ORDER — GABAPENTIN 100 MG/1
200 CAPSULE ORAL NIGHTLY
Qty: 60 CAPSULE | Refills: 0 | Status: SHIPPED | OUTPATIENT
Start: 2024-08-26 | End: 2024-09-25

## 2024-08-26 NOTE — TELEPHONE ENCOUNTER
Pt wife was at clinic today stating her  needed a refill on his gabapentin 100mg capsules. Please advise

## 2024-08-28 ENCOUNTER — HOSPITAL ENCOUNTER (OUTPATIENT)
Dept: OCCUPATIONAL THERAPY | Age: 79
Setting detail: THERAPIES SERIES
Discharge: HOME | End: 2024-08-28
Payer: MEDICARE

## 2024-08-28 PROCEDURE — 97140 MANUAL THERAPY 1/> REGIONS: CPT

## 2024-08-28 PROCEDURE — 97110 THERAPEUTIC EXERCISES: CPT

## 2024-08-28 PROCEDURE — 97530 THERAPEUTIC ACTIVITIES: CPT

## 2024-08-28 ASSESSMENT — PAIN SCALES - GENERAL: PAINLEVEL_OUTOF10: 6

## 2024-08-28 ASSESSMENT — PAIN DESCRIPTION - LOCATION: LOCATION: BACK

## 2024-08-28 ASSESSMENT — PAIN DESCRIPTION - PAIN TYPE: TYPE: ACUTE PAIN

## 2024-08-28 ASSESSMENT — PAIN DESCRIPTION - DESCRIPTORS: DESCRIPTORS: SORE;ACHING

## 2024-08-28 ASSESSMENT — PAIN DESCRIPTION - ORIENTATION: ORIENTATION: LOWER

## 2024-08-28 NOTE — FLOWSHEET NOTE
3. Pt will report increase in UEFI total score by 9 points & report less diffculty doing fine motor ADLs & light IADLs       4. Pt will report decrease rt hand pain to 5 on 0-10 when doing his daily activities.       5. Increase rt hand strength : by 5# & pinch(lateral,souza, tip) by 2# to improve strength for cutting food         6. Increase lt hand strength : by 5# & pinch(lateral,souza, tip) by 2# to improve strength for stabilizing  food when cutting food with rt hand.               7. Pt will demo improved bilateral hand coordination with completing  9 hole peg test quicker (by 10 seconds using rt hand, by 15 seconds using lt hand).Pt will report buttoning buttons & doing puzzles is getting easier.                                 Long Term Goals Completed by 20 visits Current Status Goal Status   Pt will report decrease rt hand pain to 3 on 0-10 when doing his daily activities.       2. Increase bilateral UE strength : shoulder (flexor,abductor) to 4/5 & wrist extensor to 4/5 to improve strength for pushing on his arms to get out of chairs & carrying light items at home.       3. Pt will report increase in UEFI total score by 18 points & report less diffculty doing fine motor ADLs & light IADLs       4. Compared to eval:Pt will demo improved bilateral hand coordination with completing 9 hole peg test quicker (by 20 seconds using rt hand, by 30 seconds using lt hand).Pt will report buttoning buttons & doing puzzles is getting easier.                          TREATMENT PLAN   REQUIRES OT FOLLOW-UP: Yes  Type: Outpatient  Plan  Plan Frequency: 2x/week  Plan Weeks: 20 visits.Pt approved OT 10 visits from 8/19/2024-10/19/2024 (manual therapy 66213, therapeutic exercise 32394, therapeutic activity 10503)  Current Treatment Recommendations: Strengthening, ROM, Coordination training, Patient/Caregiver education & training, Safety education & training  Additional Comments: Continue OT.     Therapy  Time  Individual Time In: 1530  Individual Time Out: 1617  Minutes: 47  Timed Code Treatment Minutes: 47 Minutes         Electronically signed by Kellie Hayes OT  on 8/29/2024 at 12:00 PM       Treatment Charges: Mins Units Time In/Out   [] Evaluation       []  Low       []  Moderate       []  High      []  Electrical Stim      []  Iontophoresis      []  Paraffin      []  Ultrasound        []  Masage      []  Neuromuscular Re-ed      []  ADL      [x]  Ther Exercise 20 1 15:47-15:57,16:07-16:17   [x]  Ther Activities 10 1 15:57-16:07   []  Neuro Re-Ed      []  Splinting      [x] manual therapy 17 1 15:30-15:47   Total Treatment time 47 min           POC NOTE

## 2024-08-30 ENCOUNTER — HOSPITAL ENCOUNTER (OUTPATIENT)
Dept: OCCUPATIONAL THERAPY | Age: 79
Setting detail: THERAPIES SERIES
Discharge: HOME | End: 2024-08-30
Payer: MEDICARE

## 2024-08-30 PROCEDURE — 97140 MANUAL THERAPY 1/> REGIONS: CPT

## 2024-08-30 PROCEDURE — 97110 THERAPEUTIC EXERCISES: CPT

## 2024-08-30 PROCEDURE — 97530 THERAPEUTIC ACTIVITIES: CPT

## 2024-08-30 ASSESSMENT — PAIN SCALES - GENERAL: PAINLEVEL_OUTOF10: 0

## 2024-08-30 ASSESSMENT — PAIN DESCRIPTION - DESCRIPTORS: DESCRIPTORS: TIGHTNESS

## 2024-08-30 ASSESSMENT — PAIN DESCRIPTION - ORIENTATION: ORIENTATION: LEFT

## 2024-08-30 ASSESSMENT — PAIN DESCRIPTION - LOCATION: LOCATION: ARM

## 2024-08-30 NOTE — FLOWSHEET NOTE
[facility] Outpatient Rehabilitation and Therapy  Occupational Therapy  [address]    [place], [place] [address]  Phone: [phone]  Fax: [phone]     Occupational Therapy Daily Treatment note     Occupational Therapy: Daily Note   Patient: Dexter Campoverde (79 y.o. male)   Examination Date: 2024  No data recorded  No data recorded   :  1945 # of Visits since SOC:   4   MRN: [id number]  CSN: [id number] Start of Care Date: 2024   Insurance: Payor: HUMANA MEDICARE / Plan: HUMANA GOLD PLUS HMO / Product Type: *No Product type* /   Insurance ID: [id number] - (Medicare Managed) Secondary Insurance (if applicable):    Insurance Information: [employer] . Need authorization for tx from [facility] after eval. Pt approved OT 10 visits from 2024-10/19/2024 (manual therapy 65977, therapeutic exercise 80248, therapeutic activity 67635)   Referring Physician: Andie Glover MD Ahmad, Rayeesa   PCP: Andie Glover MD Visits to Date/Visits Approved:     No Show/Cancelled Appts:   /       Medical Diagnosis: Gait instability [R26.81]  Fall, subsequent encounter [W19.XXXD]  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 (HCC) [I63.442]  Lumbosacral spondylosis without myelopathy [M47.817] Diagnosis  R26.81 (ICD-10-CM) - Gait instability  W19.XXXD (ICD-10-CM) - Fall, subsequent encounter  I63.442 (ICD-10-CM) - 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 (HCC)  M47.817 (ICD-10-CM) - Lumbosacral spondylosis without myelopathy  Treatment Diagnosis: Bilateral UE (shoulder & hand) weakness,impaired bilateral fine motor coordination, rt hand pain ,numbness rt hand (ICD-10 code: M62.81, R27.9,M79.641,R20.2        SUBJECTIVE EXAMINATION   Pain Level: Pain Screening  Patient Currently in Pain: No  Pain Assessment: 0-10  Pain Level: 0  Pain Location: Arm  Pain Orientation: Left  Pain Descriptors: Tightness  gripping.Increase reps manipulation nuts on valpar 4.                     Patient Education:  OT demo exercises in tx for pt.Good follow through by pt.        ASSESSMENT     Assessment: Assessment: Stiffness lt UE. PROM completed to lt UE wfls. Little tightness rt wrist during PROM. Pt participates Lt UE & rt wrist/hand rom, coordination, & strengthening exercises. Pt's lt hand shaky when unscrewing nuts off bolts & pt drops a nuts. Pt's lt hand clumsy picking up nuts trying to screw nuts onto bolts using lt hand.Pt reports hard to hold onto small buttons using rt hand d/t not feeling buttons so he looks at the buttons. Pt takes longer time & drops different size buttons using lt hand.See OT exercises for details. Pt will benefit from skilled occupational therapy to provide :manual therapy (01927), therapeutic exercise (15547), therapeutic activity(11484) to educate pt on fall prevention, provide exercises to improve bilateral UE/hand strength & coordination for daily home activity & fine motor ADL participation with increase independence.  Performance deficits / Impairments: Decreased strength, Decreased safe awareness, Decreased high-level IADLs, Decreased fine motor control, Decreased sensation    Post-Treatment Pain Level:      Prognosis: Good    Activity Tolerance: Patient tolerated treatment well             GOALS   Patient Goals   Patient goals : To improve his arm/hand strength & coordination so he can help around the house more    Short Term Goals Completed by 10 visits Current Status Goal Status   Pt will be educated in fall prevention & verbalize 3 techniques he can use at home       2. Pt will demo independence with bilateral UE HEP       3. Pt will report increase in UEFI total score by 9 points & report less diffculty doing fine motor ADLs & light IADLs       4. Pt will report decrease rt hand pain to 5 on 0-10 when doing his daily activities.       5. Increase rt hand strength : by 5# &

## 2024-09-04 ENCOUNTER — HOSPITAL ENCOUNTER (OUTPATIENT)
Dept: OCCUPATIONAL THERAPY | Age: 79
Setting detail: THERAPIES SERIES
Discharge: HOME | End: 2024-09-04

## 2024-09-04 NOTE — FLOWSHEET NOTE
[] [facility]  Outpatient Rehabilitation &  Therapy  [address]  [phone]  F: [phone] [] [facility]  Outpatient Rehabilitation &  Therapy  [address]  P: [phone]  F: [phone] [] [facility]  Outpatient Rehabilitation &  Therapy  [address]  P: [phone]  F: [phone]  [x] [facility]  Rehabilitation Services  [address]  [place], [place] [address]  Phone [phone]  Fax [phone]      Occupational Therapy Cancel/No Show note    Date: 2024  Patient: Dexter Campoverde  : 1945  MRN: [id number]    Cancels/No Shows to date:     For today's appointment patient:    []  Cancelled    [] Rescheduled appointment    [x] No-show     Reason given by patient:    []  Patient ill    []  Conflicting appointment    [] No transportation      [] Conflict with work    [] No reason given    [] Weather related    [] COVID-19    [] Other:      Comments:  Pt no show/no call. OT called and left message around 12:13 that pt had appt at noon,left therapy phone # [phone] for them to call if he is still coming.       [] Next appointment was confirmed    Electronically signed by: Kellie Hayes OT

## 2024-09-06 ENCOUNTER — HOSPITAL ENCOUNTER (OUTPATIENT)
Dept: OCCUPATIONAL THERAPY | Age: 79
Setting detail: THERAPIES SERIES
Discharge: HOME | End: 2024-09-06
Payer: MEDICARE

## 2024-09-10 ENCOUNTER — HOSPITAL ENCOUNTER (OUTPATIENT)
Dept: OCCUPATIONAL THERAPY | Age: 79
Setting detail: THERAPIES SERIES
Discharge: HOME | End: 2024-09-10
Payer: MEDICARE

## 2024-09-10 PROCEDURE — 97140 MANUAL THERAPY 1/> REGIONS: CPT

## 2024-09-10 PROCEDURE — 97530 THERAPEUTIC ACTIVITIES: CPT

## 2024-09-10 PROCEDURE — 97110 THERAPEUTIC EXERCISES: CPT

## 2024-09-10 ASSESSMENT — PAIN SCALES - GENERAL: PAINLEVEL_OUTOF10: 5

## 2024-09-10 ASSESSMENT — PAIN DESCRIPTION - DESCRIPTORS: DESCRIPTORS: NUMBNESS;ACHING

## 2024-09-10 ASSESSMENT — PAIN DESCRIPTION - LOCATION: LOCATION: BACK

## 2024-09-10 ASSESSMENT — PAIN DESCRIPTION - PAIN TYPE: TYPE: ACUTE PAIN

## 2024-09-10 ASSESSMENT — PAIN DESCRIPTION - ORIENTATION: ORIENTATION: LOWER

## 2024-09-13 ENCOUNTER — HOSPITAL ENCOUNTER (OUTPATIENT)
Dept: OCCUPATIONAL THERAPY | Age: 79
Setting detail: THERAPIES SERIES
Discharge: HOME | End: 2024-09-13
Payer: MEDICARE

## 2024-09-13 ENCOUNTER — HOSPITAL ENCOUNTER (OUTPATIENT)
Dept: MRI IMAGING | Age: 79
Discharge: HOME | End: 2024-09-15
Attending: PSYCHIATRY & NEUROLOGY
Payer: MEDICARE

## 2024-09-13 DIAGNOSIS — R27.0 ATAXIA: ICD-10-CM

## 2024-09-13 PROCEDURE — 97530 THERAPEUTIC ACTIVITIES: CPT

## 2024-09-13 PROCEDURE — 97140 MANUAL THERAPY 1/> REGIONS: CPT

## 2024-09-13 PROCEDURE — 70551 MRI BRAIN STEM W/O DYE: CPT

## 2024-09-13 PROCEDURE — 97110 THERAPEUTIC EXERCISES: CPT

## 2024-09-13 ASSESSMENT — PAIN DESCRIPTION - ORIENTATION: ORIENTATION: RIGHT

## 2024-09-13 ASSESSMENT — PAIN DESCRIPTION - DESCRIPTORS: DESCRIPTORS: SORE;NUMBNESS

## 2024-09-13 ASSESSMENT — PAIN SCALES - GENERAL: PAINLEVEL_OUTOF10: 7

## 2024-09-13 ASSESSMENT — PAIN DESCRIPTION - LOCATION: LOCATION: HAND

## 2024-09-13 ASSESSMENT — PAIN DESCRIPTION - PAIN TYPE: TYPE: ACUTE PAIN

## 2024-09-18 ENCOUNTER — HOSPITAL ENCOUNTER (OUTPATIENT)
Dept: OCCUPATIONAL THERAPY | Age: 79
Setting detail: THERAPIES SERIES
Discharge: HOME | End: 2024-09-18
Payer: MEDICARE

## 2024-09-23 ENCOUNTER — TELEPHONE (OUTPATIENT)
Dept: NEUROLOGY | Age: 79
End: 2024-09-23

## 2024-09-25 ENCOUNTER — APPOINTMENT (OUTPATIENT)
Dept: OCCUPATIONAL THERAPY | Age: 79
End: 2024-09-25
Payer: MEDICARE

## 2024-09-26 ENCOUNTER — HOSPITAL ENCOUNTER (OUTPATIENT)
Dept: PAIN MANAGEMENT | Age: 79
Discharge: HOME | End: 2024-09-26
Payer: MEDICARE

## 2024-09-26 VITALS — WEIGHT: 248 LBS | BODY MASS INDEX: 35.5 KG/M2 | HEIGHT: 70 IN

## 2024-09-26 DIAGNOSIS — M47.817 LUMBOSACRAL SPONDYLOSIS WITHOUT MYELOPATHY: ICD-10-CM

## 2024-09-26 DIAGNOSIS — M19.049 ARTHRITIS OF HAND: ICD-10-CM

## 2024-09-26 DIAGNOSIS — M54.51 VERTEBROGENIC LOW BACK PAIN: Primary | ICD-10-CM

## 2024-09-26 PROCEDURE — 99213 OFFICE O/P EST LOW 20 MIN: CPT

## 2024-09-26 PROCEDURE — 99214 OFFICE O/P EST MOD 30 MIN: CPT | Performed by: ANESTHESIOLOGY

## 2024-09-26 RX ORDER — HYDROCODONE BITARTRATE AND ACETAMINOPHEN 5; 325 MG/1; MG/1
1 TABLET ORAL 2 TIMES DAILY PRN
Qty: 60 TABLET | Refills: 0 | Status: SHIPPED | OUTPATIENT
Start: 2024-09-26 | End: 2024-10-26

## 2024-09-26 ASSESSMENT — PAIN SCALES - GENERAL: PAINLEVEL_OUTOF10: 8

## 2024-09-26 ASSESSMENT — ENCOUNTER SYMPTOMS
BACK PAIN: 1
EYES NEGATIVE: 1
RESPIRATORY NEGATIVE: 1

## 2024-09-27 ENCOUNTER — APPOINTMENT (OUTPATIENT)
Dept: OCCUPATIONAL THERAPY | Age: 79
End: 2024-09-27
Payer: MEDICARE

## 2024-10-22 ENCOUNTER — OFFICE VISIT (OUTPATIENT)
Dept: FAMILY MEDICINE CLINIC | Age: 79
End: 2024-10-22

## 2024-10-22 VITALS
BODY MASS INDEX: 35.07 KG/M2 | DIASTOLIC BLOOD PRESSURE: 60 MMHG | HEART RATE: 64 BPM | OXYGEN SATURATION: 98 % | HEIGHT: 70 IN | WEIGHT: 245 LBS | SYSTOLIC BLOOD PRESSURE: 120 MMHG

## 2024-10-22 DIAGNOSIS — E78.2 MIXED HYPERLIPIDEMIA: ICD-10-CM

## 2024-10-22 DIAGNOSIS — I10 ESSENTIAL HYPERTENSION: ICD-10-CM

## 2024-10-22 DIAGNOSIS — D34 FOLLICULAR ADENOMA: ICD-10-CM

## 2024-10-22 DIAGNOSIS — I25.118 CORONARY ARTERY DISEASE OF NATIVE ARTERY OF NATIVE HEART WITH STABLE ANGINA PECTORIS (HCC): ICD-10-CM

## 2024-10-22 DIAGNOSIS — E04.1 THYROID NODULE: ICD-10-CM

## 2024-10-22 DIAGNOSIS — N13.8 BPH WITH OBSTRUCTION/LOWER URINARY TRACT SYMPTOMS: ICD-10-CM

## 2024-10-22 DIAGNOSIS — G56.01 RIGHT CARPAL TUNNEL SYNDROME: ICD-10-CM

## 2024-10-22 DIAGNOSIS — N40.1 BPH WITH OBSTRUCTION/LOWER URINARY TRACT SYMPTOMS: ICD-10-CM

## 2024-10-22 DIAGNOSIS — M47.27 LUMBOSACRAL RADICULOPATHY DUE TO DEGENERATIVE JOINT DISEASE OF SPINE: ICD-10-CM

## 2024-10-22 DIAGNOSIS — E11.9 TYPE 2 DIABETES MELLITUS WITHOUT COMPLICATION, WITHOUT LONG-TERM CURRENT USE OF INSULIN (HCC): Primary | ICD-10-CM

## 2024-10-22 PROBLEM — T14.8XXA BRUISE: Status: RESOLVED | Noted: 2024-08-08 | Resolved: 2024-10-22

## 2024-10-22 PROBLEM — R19.09 LUMP IN THE GROIN: Status: RESOLVED | Noted: 2024-05-20 | Resolved: 2024-10-22

## 2024-10-22 PROBLEM — R07.89 COSTOCHONDRAL CHEST PAIN: Status: RESOLVED | Noted: 2024-07-08 | Resolved: 2024-10-22

## 2024-10-22 PROBLEM — M70.22 OLECRANON BURSITIS OF BOTH ELBOWS: Status: RESOLVED | Noted: 2024-01-03 | Resolved: 2024-10-22

## 2024-10-22 PROBLEM — M70.21 OLECRANON BURSITIS OF BOTH ELBOWS: Status: RESOLVED | Noted: 2024-01-03 | Resolved: 2024-10-22

## 2024-10-22 PROBLEM — R10.11 RIGHT UPPER QUADRANT ABDOMINAL PAIN: Status: RESOLVED | Noted: 2024-07-08 | Resolved: 2024-10-22

## 2024-10-22 LAB — HBA1C MFR BLD: 6.1 %

## 2024-10-22 RX ORDER — LISINOPRIL 2.5 MG/1
2.5 TABLET ORAL DAILY
Qty: 30 TABLET | Refills: 2 | Status: SHIPPED | OUTPATIENT
Start: 2024-10-22 | End: 2025-01-20

## 2024-10-22 ASSESSMENT — ENCOUNTER SYMPTOMS
CONSTIPATION: 0
SHORTNESS OF BREATH: 0
ABDOMINAL DISTENTION: 0
SINUS PRESSURE: 0
BLOOD IN STOOL: 0
COUGH: 0
CHEST TIGHTNESS: 0
STRIDOR: 0
VOMITING: 0
WHEEZING: 0
SORE THROAT: 0
NAUSEA: 0
EYE REDNESS: 0
ABDOMINAL PAIN: 0
TROUBLE SWALLOWING: 0
COLOR CHANGE: 0
BACK PAIN: 1
DIARRHEA: 0
RECTAL PAIN: 0
RHINORRHEA: 0

## 2024-10-22 NOTE — PROGRESS NOTES
Visit Information    Have you changed or started any medications since your last visit including any over-the-counter medicines, vitamins, or herbal medicines? no   Are you having any side effects from any of your medications? -  no  Have you stopped taking any of your medications? Is so, why? -  no    Have you seen any other physician or provider since your last visit? No  Have you had any other diagnostic tests since your last visit? No  Have you been seen in the emergency room and/or had an admission to a hospital since we last saw you? No  Have you had your routine dental cleaning in the past 6 months? no    Have you activated your idemama account? If not, what are your barriers? Yes     Patient Care Team:  Andie Glover MD as PCP - General (Family Medicine)  Andie Glover MD as PCP - Empaneled Provider  Valery Long MD as Consulting Physician (Gastroenterology)    Medical History Review  Past Medical, Family, and Social History reviewed and does contribute to the patient presenting condition    Health Maintenance   Topic Date Due    Respiratory Syncytial Virus (RSV) Pregnant or age 60 yrs+ (1 - 1-dose 60+ series) Never done    Flu vaccine (1) 08/01/2024    COVID-19 Vaccine (3 - 2023-24 season) 09/01/2024    Lipids  06/03/2025    Depression Monitoring  07/22/2025    Colorectal Cancer Screen  01/31/2029    DTaP/Tdap/Td vaccine (2 - Td or Tdap) 08/05/2033    Annual Wellness Visit (Medicare Advantage)  Completed    Shingles vaccine  Completed    Pneumococcal 65+ years Vaccine  Completed    Hepatitis A vaccine  Aged Out    Hepatitis B vaccine  Aged Out    Hib vaccine  Aged Out    Polio vaccine  Aged Out    Meningococcal (ACWY) vaccine  Aged Out    A1C test (Diabetic or Prediabetic)  Discontinued    Hepatitis C screen  Discontinued     
Provider:   King Guajardo MD     Requested Specialty:   Orthopedic Surgery     Number of Visits Requested:   1    POCT glycosylated hemoglobin (Hb A1C)         Medications Discontinued During This Encounter   Medication Reason    lisinopril (PRINIVIL;ZESTRIL) 2.5 MG tablet REORDER       Dexter received counseling on the following healthy behaviors: nutrition, exercise, and medication adherence  Reviewed prior labs and health maintenance  Continue current medications, diet and exercise.  Discussed use, benefit, and side effects of prescribed medications.  Barriers to medication compliance addressed.  Patient given educational materials - see patient instructions  Was a self-tracking handout given in paper form or via [employer]? Yes    Requested Prescriptions     Signed Prescriptions Disp Refills    lisinopril (PRINIVIL;ZESTRIL) 2.5 MG tablet 30 tablet 2     Sig: Take 1 tablet by mouth daily       All patient questions answered.  Patient voiced understanding.     Quality Measures    Body mass index is 35.15 kg/m². Elevated. Weight control planned discussed daily exercise regimen and Healthy diet and regular exercise.    BP: 120/60. Blood pressure is normal. Treatment plan consists of No treatment change needed.        3/27/2024    10:03 AM 11/27/2023    10:31 AM 12/21/2022     7:15 PM 11/23/2022     9:09 AM 3/31/2022     2:12 PM 7/15/2021     2:18 PM 11/20/2020     1:26 PM   Fall Risk   Do you feel unsteady or are you worried about falling?  yes yes yes yes yes     2 or more falls in past year? yes yes no no yes yes yes   Fall with injury in past year? no no no no no yes yes         No results found for: \"LDLDIRECT\" (goal LDL reduction with dx if diabetes is 50% LDL reduction)        7/22/2024    10:21 AM 6/17/2024    10:28 AM 3/27/2024    10:00 AM 2/20/2024    11:36 AM 1/3/2024     1:42 PM 11/27/2023    10:59 AM 11/27/2023    10:31 AM   PHQ Scores   PHQ2 Score 0 6 1 3 0 2    PHQ9 Score 0 14 2 6 0 3 13

## 2024-11-07 ENCOUNTER — HOSPITAL ENCOUNTER (OUTPATIENT)
Dept: PAIN MANAGEMENT | Age: 79
Discharge: HOME | End: 2024-11-07
Payer: MEDICARE

## 2024-11-07 VITALS — HEIGHT: 70 IN | WEIGHT: 245 LBS | BODY MASS INDEX: 35.07 KG/M2

## 2024-11-07 DIAGNOSIS — M54.51 VERTEBROGENIC LOW BACK PAIN: ICD-10-CM

## 2024-11-07 DIAGNOSIS — M47.817 LUMBOSACRAL SPONDYLOSIS WITHOUT MYELOPATHY: Primary | ICD-10-CM

## 2024-11-07 DIAGNOSIS — Z79.891 CHRONIC USE OF OPIATE FOR THERAPEUTIC PURPOSE: ICD-10-CM

## 2024-11-07 PROCEDURE — 99213 OFFICE O/P EST LOW 20 MIN: CPT

## 2024-11-07 PROCEDURE — 99214 OFFICE O/P EST MOD 30 MIN: CPT | Performed by: ANESTHESIOLOGY

## 2024-11-07 RX ORDER — HYDROCODONE BITARTRATE AND ACETAMINOPHEN 5; 325 MG/1; MG/1
1 TABLET ORAL 2 TIMES DAILY PRN
Qty: 60 TABLET | Refills: 0 | Status: SHIPPED | OUTPATIENT
Start: 2024-11-07 | End: 2024-12-07

## 2024-11-07 ASSESSMENT — ENCOUNTER SYMPTOMS
RESPIRATORY NEGATIVE: 1
EYES NEGATIVE: 1
BACK PAIN: 1

## 2024-11-07 NOTE — PROGRESS NOTES
The patient is a 79 y.o.Non- / non  male.    Chief Complaint   Patient presents with    Back Pain     Med refill        HPI        -79-year-old gentleman with history of chronic low back pain  Past history significant for left-sided stroke and weakness on long-term antiplatelet therapy with Plavix  Had frequent fall  He is followed in the clinic history of back pain predominantly axial located in the lower lumbar area across midline on both sides  Back pain is aching nagging stiffness  No dermatomal radiation of pain  Have done multiple courses of physical therapy in past  Recent MRI imaging showed multilevel lumbar facet arthropathy and advanced disc degenerative changes  Patient failed tramadol and gabapentin  Currently prescribed Norco 1 mg 1 tablet twice a day  R[place] out [place] medication [place] but do find the medication helpful when he was taking it reports no side effects  Denies any illicit substance use  No sign or symptom of any aberrancy  Clinical presentation suggest facet mediated pain  Oswestry disability index to moderate disability associated with pain  Discussed diagnostic lumbar medial branch nerve block at L4-5 and L5-S1 facet with fluoroscopy guidance  Patient is agreeable  If this provide 80% plus relief, we will then consider for confirmatory block and radiofrequency ablation    Pain score Today:  8  Adverse effects (Constipation / Nausea / Sedation / sexual Dysfunction / others) : no  Mood: poor  Sleep pattern and quality: poor  Activity level: poor    Pill count Today:0 pt didn't bering bottle   Last dose taken October 28, 2024  OARRS report reviewed today: yes  ER/Hospitalizations/PCP visit related to pain since last visit:no   Any legal problems e.g. DUI etc.:  Satisfied with current management: yes    Opioid Contract:07/12/24  Last Urine Dug screen dated:07/12/24    Hemoglobin A1C   Date Value Ref Range Status   10/22/2024 6.1 % Final       Past Medical

## 2024-12-04 ENCOUNTER — HOSPITAL ENCOUNTER (OUTPATIENT)
Dept: PAIN MANAGEMENT | Facility: CLINIC | Age: 79
Discharge: HOME | End: 2024-12-04
Payer: MEDICARE

## 2024-12-04 VITALS
OXYGEN SATURATION: 94 % | SYSTOLIC BLOOD PRESSURE: 141 MMHG | BODY MASS INDEX: 35.07 KG/M2 | HEART RATE: 79 BPM | HEIGHT: 70 IN | DIASTOLIC BLOOD PRESSURE: 75 MMHG | TEMPERATURE: 98.1 F | WEIGHT: 245 LBS | RESPIRATION RATE: 12 BRPM

## 2024-12-04 DIAGNOSIS — M47.817 LUMBOSACRAL SPONDYLOSIS WITHOUT MYELOPATHY: Primary | ICD-10-CM

## 2024-12-04 DIAGNOSIS — R52 PAIN MANAGEMENT: ICD-10-CM

## 2024-12-04 PROCEDURE — 64493 INJ PARAVERT F JNT L/S 1 LEV: CPT

## 2024-12-04 PROCEDURE — 6360000004 HC RX CONTRAST MEDICATION: Performed by: ANESTHESIOLOGY

## 2024-12-04 PROCEDURE — 64493 INJ PARAVERT F JNT L/S 1 LEV: CPT | Performed by: ANESTHESIOLOGY

## 2024-12-04 PROCEDURE — 6360000002 HC RX W HCPCS: Performed by: ANESTHESIOLOGY

## 2024-12-04 PROCEDURE — 64494 INJ PARAVERT F JNT L/S 2 LEV: CPT

## 2024-12-04 PROCEDURE — 64494 INJ PARAVERT F JNT L/S 2 LEV: CPT | Performed by: ANESTHESIOLOGY

## 2024-12-04 RX ORDER — BUPIVACAINE HYDROCHLORIDE 5 MG/ML
INJECTION, SOLUTION EPIDURAL; INTRACAUDAL
Status: COMPLETED | OUTPATIENT
Start: 2024-12-04 | End: 2024-12-04

## 2024-12-04 RX ORDER — LIDOCAINE HYDROCHLORIDE 10 MG/ML
INJECTION, SOLUTION EPIDURAL; INFILTRATION; INTRACAUDAL; PERINEURAL
Status: COMPLETED | OUTPATIENT
Start: 2024-12-04 | End: 2024-12-04

## 2024-12-04 RX ADMIN — BUPIVACAINE HYDROCHLORIDE 5 ML: 5 INJECTION, SOLUTION EPIDURAL; INTRACAUDAL; PERINEURAL at 14:23

## 2024-12-04 RX ADMIN — IOHEXOL 3 ML: 180 INJECTION INTRAVENOUS at 14:23

## 2024-12-04 RX ADMIN — LIDOCAINE HYDROCHLORIDE 5 ML: 10 INJECTION, SOLUTION EPIDURAL; INFILTRATION; INTRACAUDAL at 14:23

## 2024-12-04 ASSESSMENT — PAIN - FUNCTIONAL ASSESSMENT
PAIN_FUNCTIONAL_ASSESSMENT: 0-10
PAIN_FUNCTIONAL_ASSESSMENT: PREVENTS OR INTERFERES WITH ALL ACTIVE AND SOME PASSIVE ACTIVITIES
PAIN_FUNCTIONAL_ASSESSMENT: NONE - DENIES PAIN

## 2024-12-04 ASSESSMENT — PAIN SCALES - GENERAL: PAINLEVEL_OUTOF10: 5

## 2024-12-04 ASSESSMENT — PAIN DESCRIPTION - DESCRIPTORS: DESCRIPTORS: ACHING

## 2024-12-04 NOTE — OP NOTE
Patient Name: Dexter Campoverde   YOB: 1945  Room/Bed: Room/bed info not found  Medical Record Number: [id number]  Date: 12/4/2024       Sedation/ Anesthesia Plan:   intravenous sedation   as needed.    Medications Planned:   midazolam (Versed) / Fentanyl  Intravenously  as needed.    Preoperative Diagnosis: Lumbar spondylosis w/o myelopathy or radiculopathy  Postoperative Diagnosis: Lumbar spondylosis w/o myelopathy or radiculopathy  Blood Loss: none    Procedure Performed:  Bilateral Lumbar Medial Branch nerve Blocks at the transverse processes of L4, L5 and sacral  ala under fluoroscopy guidance    Procedure:      The Patient was seen in the preop area, chart was reviewed, informed consent was obtained. Patient was taken to procedure room and was placed in prone position. Vital signs were monitored through out the  Procedure. A time out was completed.  The skin over the back was prepped and draped in sterile manner.     The target point was marked at the junction of Transverse process and superior articular process at the target levels.  Skin and deep tissues were anesthetized with 1 % lidocaine. A 25-gauge needlele was advanced to the target spots under fluoroscopy guidance in AP / Lateral and Oblique views.     Then after negative aspiration contrast dye was injected with live fluoroscopy in AP views that showed  spread of the contrast with no epidural space and no vascular runoff or intrathecal spread.    Finally 0.5 ml of treatment solution 0.5% BUPIVACAINE  was injected at each level.  The needle was removed and a Band-Aid was placed over the needle  insertion site.  The patient's vital signs remained stable and the patient tolerated the procedure well.      Electronically signed by Efren Samuel MD on 12/4/2024 at 2:31 PM

## 2024-12-04 NOTE — DISCHARGE INSTRUCTIONS
Discharge Instructions following Sedation or Anesthesia:  You have  received  a sedative/anesthetic therefore, you should not consume any alcoholic beverages for minimum of 12 hours.  Do not drive or operate machinery for 24 hours.  Do not sign legal documents for 24 hours.  Dizziness, drowsiness, and unsteadiness may occur.  Rest when need to.  Increase diet as tolerated.  Keep up on fluids if diet allows.      General Instructions:  Do not take a tub bath for 72 hours after procedure (this includes hot tubs and swimming pools).  You may shower, but avoid hot water to injection site.   Avoid strenuous activity TODAY especially if you experience dizziness.   Remove band-aid the next day.  Wash off any residual iodine   Do not use heat, heating pad, or any other heating device over the injection site for 3 days after the procedure.  If you experience pain after your procedure, you may continue with your current pain medication as prescribed.  (DO NOT INCREASE YOUR PAIN MEDICATION WITHOUT TALKING TO DOCTOR)  Soreness and pain at injection site is common, may use ice to reduce soreness.    Please complete pain diary as instructed.     Call [facility] at [phone] if you experience:   Fever, chills or temperature over 100    Vomiting, Headache, persistent stiff neck, nausea, blurred vision   Difficulty in urinating or unable to urinate with 8 hours   Increase in weakness, numbness or loss of function   Increased redness, swelling or drainage at the injection site

## 2024-12-04 NOTE — INTERVAL H&P NOTE
Update History & Physical    The patient's History and Physical of November 7, 2024 was reviewed with the patient and I examined the patient. There was no change. The surgical site was confirmed by the patient and me.     Plan: The risks, benefits, expected outcome, and alternative to the recommended procedure have been discussed with the patient. Patient understands and wants to proceed with the procedure.   ASA 3  MP 3      Electronically signed by Efren Samuel MD on 12/4/2024 at 2:21 PM

## 2024-12-05 ENCOUNTER — TELEPHONE (OUTPATIENT)
Dept: PAIN MANAGEMENT | Age: 79
End: 2024-12-05

## 2024-12-05 ENCOUNTER — HOSPITAL ENCOUNTER (OUTPATIENT)
Dept: PAIN MANAGEMENT | Age: 79
Discharge: HOME | End: 2024-12-05
Payer: MEDICARE

## 2024-12-05 VITALS — WEIGHT: 245 LBS | HEIGHT: 70 IN | BODY MASS INDEX: 35.07 KG/M2

## 2024-12-05 DIAGNOSIS — M47.817 LUMBOSACRAL SPONDYLOSIS WITHOUT MYELOPATHY: Primary | ICD-10-CM

## 2024-12-05 DIAGNOSIS — G56.01 RIGHT CARPAL TUNNEL SYNDROME: ICD-10-CM

## 2024-12-05 DIAGNOSIS — Z79.891 CHRONIC USE OF OPIATE FOR THERAPEUTIC PURPOSE: ICD-10-CM

## 2024-12-05 PROCEDURE — 99213 OFFICE O/P EST LOW 20 MIN: CPT | Performed by: ANESTHESIOLOGY

## 2024-12-05 PROCEDURE — 99213 OFFICE O/P EST LOW 20 MIN: CPT

## 2024-12-05 RX ORDER — HYDROCODONE BITARTRATE AND ACETAMINOPHEN 5; 325 MG/1; MG/1
1 TABLET ORAL 2 TIMES DAILY PRN
Qty: 60 TABLET | Refills: 0 | Status: SHIPPED | OUTPATIENT
Start: 2024-12-05 | End: 2025-01-04

## 2024-12-05 ASSESSMENT — PAIN DESCRIPTION - LOCATION: LOCATION: BACK

## 2024-12-05 ASSESSMENT — ENCOUNTER SYMPTOMS
BACK PAIN: 1
RESPIRATORY NEGATIVE: 1
GASTROINTESTINAL NEGATIVE: 1

## 2024-12-05 ASSESSMENT — PAIN DESCRIPTION - DESCRIPTORS: DESCRIPTORS: ACHING

## 2024-12-05 ASSESSMENT — PAIN SCALES - GENERAL: PAINLEVEL_OUTOF10: 3

## 2024-12-05 ASSESSMENT — PAIN DESCRIPTION - ORIENTATION: ORIENTATION: LOWER

## 2024-12-05 ASSESSMENT — PAIN DESCRIPTION - FREQUENCY: FREQUENCY: CONTINUOUS

## 2024-12-05 ASSESSMENT — PAIN DESCRIPTION - PAIN TYPE: TYPE: CHRONIC PAIN

## 2024-12-05 NOTE — PROGRESS NOTES
The patient is a 79 y.o.Non- / non  male.    Chief Complaint   Patient presents with    Back Pain    Medication Refill     Norco        Very pleasant 79-year-old gentleman with history of chronic predominantly axial lower back pain  Failed conservative measures  Imaging showed facet arthropathy clinical presentation suggested facet mediated pain  Patient had bilateral diagnostic lumbar medial branch nerve block at L4-5 and L5-S1 facet with fluoroscopy guidance yesterday  Today for postprocedure follow-up report 80% plus improvement in axial lower back pain for several hours with improved range of motion and then the pain returned back to the baseline  Pain is affecting quality of life and activity level    Considering excellent short-term pain relief with diagnostic block consistent with the duration of local anesthetic, will recommend to proceed with confirmatory block at L4-5 L5-S1 facet bilateral with fluoroscopy guidance    On chronic low-dose opioid therapy with [place]  Daily morphine equaling 10  Reports no side effect find it helpful  No sign or symptom of any aberrancy  Automated prescription report reviewed  Refill ordered    Medication Refill: Norco    Pain score Today:  3  Adverse effects (Constipation / Nausea / Sedation / sexual Dysfunction / others) : no   Mood: good  Sleep pattern and quality: good  Activity level: poor    Pill count Today:11  Last dose taken  12/5/2024 AM   OARRS report reviewed today: yes  ER/Hospitalizations/PCP visit related to pain since last visit:no   Any legal problems e.g. DUI etc.:No  Satisfied with current management: Yes    Opioid Contract:11/07/2024  Last Urine Dug screen dated:07/12/2024      Hemoglobin A1C   Date Value Ref Range Status   10/22/2024 6.1 % Final       Past Medical History, Past Surgical History, Social History, Allergies and Medications reviewed and updated in EPIC as indicated    Family History reviewed and is

## 2024-12-05 NOTE — TELEPHONE ENCOUNTER
Patients wife asnwered stated patient is not at home at the moment, will call the office back to go over [facility] pain dairy follow up questions

## 2024-12-09 RX ORDER — DONEPEZIL HYDROCHLORIDE 5 MG/1
5 TABLET, FILM COATED ORAL NIGHTLY
Qty: 90 TABLET | Refills: 0 | Status: SHIPPED | OUTPATIENT
Start: 2024-12-09

## 2024-12-09 NOTE — TELEPHONE ENCOUNTER
Please Approve or Refuse.  Send to Pharmacy per Pt's Request:      Next Visit Date:  2/26/2025   Last Visit Date: 10/22/2024    Hemoglobin A1C (%)   Date Value   10/22/2024 6.1   07/22/2024 5.8   03/27/2024 6.1             ( goal A1C is < 7)   BP Readings from Last 3 Encounters:   12/04/24 (!) 141/75   10/22/24 120/60   08/15/24 105/61          (goal 120/80)  BUN   Date Value Ref Range Status   08/05/2024 20 8 - 23 mg/dL Final     Creatinine   Date Value Ref Range Status   08/05/2024 0.7 0.7 - 1.2 mg/dL Final     Potassium   Date Value Ref Range Status   08/05/2024 4.2 3.7 - 5.3 mmol/L Final

## 2025-01-07 ENCOUNTER — OFFICE VISIT (OUTPATIENT)
Dept: NEUROLOGY | Age: 80
End: 2025-01-07
Payer: MEDICARE

## 2025-01-07 VITALS
SYSTOLIC BLOOD PRESSURE: 109 MMHG | WEIGHT: 244 LBS | BODY MASS INDEX: 34.93 KG/M2 | DIASTOLIC BLOOD PRESSURE: 58 MMHG | HEIGHT: 70 IN | HEART RATE: 63 BPM

## 2025-01-07 DIAGNOSIS — R42 DIZZINESS: ICD-10-CM

## 2025-01-07 DIAGNOSIS — G56.01 CARPAL TUNNEL SYNDROME OF RIGHT WRIST: Primary | ICD-10-CM

## 2025-01-07 DIAGNOSIS — G30.9 ALZHEIMER'S DISEASE, UNSPECIFIED (CODE) (HCC): ICD-10-CM

## 2025-01-07 PROCEDURE — 3288F FALL RISK ASSESSMENT DOCD: CPT | Performed by: PSYCHIATRY & NEUROLOGY

## 2025-01-07 PROCEDURE — 3074F SYST BP LT 130 MM HG: CPT | Performed by: PSYCHIATRY & NEUROLOGY

## 2025-01-07 PROCEDURE — 1123F ACP DISCUSS/DSCN MKR DOCD: CPT | Performed by: PSYCHIATRY & NEUROLOGY

## 2025-01-07 PROCEDURE — 3078F DIAST BP <80 MM HG: CPT | Performed by: PSYCHIATRY & NEUROLOGY

## 2025-01-07 PROCEDURE — 1159F MED LIST DOCD IN RCRD: CPT | Performed by: PSYCHIATRY & NEUROLOGY

## 2025-01-07 PROCEDURE — 99214 OFFICE O/P EST MOD 30 MIN: CPT | Performed by: PSYCHIATRY & NEUROLOGY

## 2025-01-07 PROCEDURE — G8417 CALC BMI ABV UP PARAM F/U: HCPCS | Performed by: PSYCHIATRY & NEUROLOGY

## 2025-01-07 PROCEDURE — G8427 DOCREV CUR MEDS BY ELIG CLIN: HCPCS | Performed by: PSYCHIATRY & NEUROLOGY

## 2025-01-07 PROCEDURE — 1036F TOBACCO NON-USER: CPT | Performed by: PSYCHIATRY & NEUROLOGY

## 2025-01-07 PROCEDURE — 0518F FALL PLAN OF CARE DOCD: CPT | Performed by: PSYCHIATRY & NEUROLOGY

## 2025-01-07 RX ORDER — HYDROCODONE BITARTRATE AND ACETAMINOPHEN 5; 325 MG/1; MG/1
1 TABLET ORAL EVERY 6 HOURS PRN
COMMUNITY

## 2025-01-07 RX ORDER — DONEPEZIL HYDROCHLORIDE 10 MG/1
10 TABLET, FILM COATED ORAL NIGHTLY
Qty: 90 TABLET | Refills: 3 | Status: SHIPPED | OUTPATIENT
Start: 2025-01-07 | End: 2025-04-07

## 2025-01-07 NOTE — PROGRESS NOTES
[facility]  [address], [address]  [place] [address]  Ph: [phone] or [phone]  FAX: [phone]      Reason for Consult: Right-hand numbness, memory loss, dizziness, falls, and numbness.    I had the pleasure of seeing your patient, Dexter Campoverde, in neurology consultation for his symptoms. As you would recall, Mr. 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 He presents today with persistent right-hand numbness, progressive memory issues, dizziness, frequent falls, and musculoskeletal pain following a recent fall.    Mr. Campoverde reports persistent numbness in his right hand, progressively involving the entire hand and extending to his fingertips. He notes associated tremors and difficulty gripping objects, often dropping them. An injection given previously for presumed carpal tunnel syndrome was ineffective. He has yet to undergo an EMG to confirm the diagnosis.    He also describes progressive memory decline over the past year, as noted by his wife. Though he scored 30/30 on today's MMSE, his wife reports frequent forgetfulness and repetitive conversations. His speech has slowed, although he denies confusion or disorientation. He is on Aricept, currently increased to 10 mg nightly.    Mr. Campoverde experiences significant dizziness, predominantly in the mornings upon rising, which resolves gradually. He does not use compression stockings and has not trialed other interventions. He has had recurrent falls, the most recent resulting in trauma to his knee and head. He ambulates with a walker but reports challenges with balance.  His history includes chronic low back pain, managed with lumbar medial branch nerve blocks, which provided relief for one side. He is scheduled for injections on the opposite side.  He has a notable medical history of hypertension,

## 2025-01-08 ENCOUNTER — HOSPITAL ENCOUNTER (OUTPATIENT)
Dept: PAIN MANAGEMENT | Facility: CLINIC | Age: 80
Discharge: HOME | End: 2025-01-08
Payer: MEDICARE

## 2025-01-08 VITALS
SYSTOLIC BLOOD PRESSURE: 167 MMHG | HEIGHT: 70 IN | OXYGEN SATURATION: 97 % | WEIGHT: 244 LBS | BODY MASS INDEX: 34.93 KG/M2 | RESPIRATION RATE: 12 BRPM | HEART RATE: 65 BPM | DIASTOLIC BLOOD PRESSURE: 97 MMHG | TEMPERATURE: 97 F

## 2025-01-08 DIAGNOSIS — R52 PAIN MANAGEMENT: ICD-10-CM

## 2025-01-08 DIAGNOSIS — M47.817 LUMBOSACRAL SPONDYLOSIS WITHOUT MYELOPATHY: Primary | ICD-10-CM

## 2025-01-08 PROCEDURE — 64493 INJ PARAVERT F JNT L/S 1 LEV: CPT | Performed by: ANESTHESIOLOGY

## 2025-01-08 PROCEDURE — 64494 INJ PARAVERT F JNT L/S 2 LEV: CPT | Performed by: ANESTHESIOLOGY

## 2025-01-08 PROCEDURE — 64494 INJ PARAVERT F JNT L/S 2 LEV: CPT

## 2025-01-08 PROCEDURE — 6360000002 HC RX W HCPCS: Performed by: ANESTHESIOLOGY

## 2025-01-08 PROCEDURE — 64493 INJ PARAVERT F JNT L/S 1 LEV: CPT

## 2025-01-08 PROCEDURE — 99152 MOD SED SAME PHYS/QHP 5/>YRS: CPT | Performed by: ANESTHESIOLOGY

## 2025-01-08 PROCEDURE — 6360000004 HC RX CONTRAST MEDICATION: Performed by: ANESTHESIOLOGY

## 2025-01-08 RX ORDER — BUPIVACAINE HYDROCHLORIDE 5 MG/ML
INJECTION, SOLUTION EPIDURAL; INTRACAUDAL
Status: COMPLETED | OUTPATIENT
Start: 2025-01-08 | End: 2025-01-08

## 2025-01-08 RX ORDER — LIDOCAINE HYDROCHLORIDE 10 MG/ML
INJECTION, SOLUTION EPIDURAL; INFILTRATION; INTRACAUDAL; PERINEURAL
Status: COMPLETED | OUTPATIENT
Start: 2025-01-08 | End: 2025-01-08

## 2025-01-08 RX ADMIN — LIDOCAINE HYDROCHLORIDE 5 ML: 10 INJECTION, SOLUTION EPIDURAL; INFILTRATION; INTRACAUDAL at 13:41

## 2025-01-08 RX ADMIN — BUPIVACAINE HYDROCHLORIDE 5 ML: 5 INJECTION, SOLUTION EPIDURAL; INTRACAUDAL; PERINEURAL at 13:41

## 2025-01-08 RX ADMIN — IOHEXOL 3 ML: 180 INJECTION INTRAVENOUS at 13:41

## 2025-01-08 ASSESSMENT — PAIN - FUNCTIONAL ASSESSMENT
PAIN_FUNCTIONAL_ASSESSMENT: PREVENTS OR INTERFERES WITH ALL ACTIVE AND SOME PASSIVE ACTIVITIES
PAIN_FUNCTIONAL_ASSESSMENT: 0-10

## 2025-01-08 ASSESSMENT — PAIN DESCRIPTION - DESCRIPTORS: DESCRIPTORS: ACHING

## 2025-01-08 NOTE — DISCHARGE INSTRUCTIONS
Discharge Instructions following Sedation or Anesthesia:  You have  received  a sedative/anesthetic therefore, you should not consume any alcoholic beverages for minimum of 12 hours.  Do not drive or operate machinery for 24 hours.  Do not sign legal documents for 24 hours.  Dizziness, drowsiness, and unsteadiness may occur.  Rest when need to.  Increase diet as tolerated.  Keep up on fluids if diet allows.      General Instructions:  Do not take a tub bath for 72 hours after procedure (this includes hot tubs and swimming pools).  You may shower, but avoid hot water to injection site.   Avoid strenuous activity TODAY especially if you experience dizziness.   Remove band-aid the next day.  Wash off any residual iodine   Do not use heat, heating pad, or any other heating device over the injection site for 3 days after the procedure.  If you experience pain after your procedure, you may continue with your current pain medication as prescribed.  (DO NOT INCREASE YOUR PAIN MEDICATION WITHOUT TALKING TO DOCTOR)  Soreness and pain at injection site is common, may use ice to reduce soreness.    Please complete pain diary as instructed.     Call [facility] at [phone] if you experience:   Fever, chills or temperature over 100    Vomiting, Headache, persistent stiff neck, nausea, blurred vision   Difficulty in urinating or unable to urinate with 8 hours   Increase in weakness, numbness or loss of function   Increased redness, swelling or drainage at the injection site

## 2025-01-08 NOTE — OP NOTE
Patient Name: Dexter Campoverde   YOB: 1945  Room/Bed: Room/bed info not found  Medical Record Number: [id number]  Date: 1/8/2025       Sedation/ Anesthesia Plan:   intravenous sedation   as needed.    Medications Planned:   midazolam (Versed) / Fentanyl  Intravenously  as needed.    Preoperative Diagnosis: Lumbar spondylosis w/o myelopathy or radiculopathy  Postoperative Diagnosis: Lumbar spondylosis w/o myelopathy or radiculopathy  Blood Loss: none    Procedure Performed:  Bilateral Lumbar Medial Branch nerve Blocks at the transverse processes of L4, L5 and sacral  ala under fluoroscopy guidance    Procedure:      The Patient was seen in the preop area, chart was reviewed, informed consent was obtained. Patient was taken to procedure room and was placed in prone position. Vital signs were monitored through out the  Procedure. A time out was completed.  The skin over the back was prepped and draped in sterile manner.     The target point was marked at the junction of Transverse process and superior articular process at the target levels.  Skin and deep tissues were anesthetized with 1 % lidocaine. A 25-gauge needlele was advanced to the target spots under fluoroscopy guidance in AP / Lateral and Oblique views.     Then after negative aspiration contrast dye was injected with live fluoroscopy in AP views that showed  spread of the contrast with no epidural space and no vascular runoff or intrathecal spread.    Finally 0.5 ml of treatment solution 0.5% BUPIVACAINE  was injected at each level.  The needle was removed and a Band-Aid was placed over the needle  insertion site.  The patient's vital signs remained stable and the patient tolerated the procedure well.      Electronically signed by Efren Samuel MD on 1/8/2025 at 1:47 PM

## 2025-01-08 NOTE — H&P
Pain Pre-Op H&P Note    Efren Samuel MD    HPI: Dexter Campoverde  presents with   Very pleasant 79-year-old gentleman with history of chronic predominantly axial lower back pain  Failed conservative measures  Imaging showed facet arthropathy clinical presentation suggested facet mediated pain  Patient had bilateral diagnostic lumbar medial branch nerve block at L4-5 and L5-S1 facet with fluoroscopy guidance y   postprocedure follow-up report 80% plus improvement in axial lower back pain for several hours with improved range of motion and then the pain returned back to the baseline  Pain is affecting quality of life and activity level       Past Medical History:   Diagnosis Date    Acquired skull defect     Angina pectoris (HCC)     Anxiety     Anxiety     At high risk for falls     Ataxia following nontraumatic intracerebral hemorrhage     Bronchitis with asthma, acute 11/24/2015    CAD (coronary artery disease)     Carotid stenosis     Left    Carotid stenosis, left 02/02/2016    Chest pain     Compression of brainstem (HCC)     Diabetes mellitus (HCC)     borderline patient off metformin    Diplopia     Dysphagia     Gait instability     GERD (gastroesophageal reflux disease)     H/O carotid endarterectomy 4/18/2016    H/O heart artery stent     x5    Heart attack (HCC) 08/05/2019    Hyperlipidemia     Hypertension     Hyponatremia     Impaired fasting glucose 7/13/2015    Intermittent lightheadedness     Myocardiopathy (HCC)     Neuropathy     Obesity     Osteoarthritis     both knees    Pericardial tamponade     S/P total knee arthroplasty 7/13/2015    Spontaneous intracranial hemorrhage (HCC)     Stroke, hemorrhagic (HCC) 08/18/2019       Past Surgical History:   Procedure Laterality Date    CAROTID ENDARTERECTOMY Left 02/02/2016    COLONOSCOPY      COLONOSCOPY N/A 1/4/2023    COLONOSCOPY POLYPECTOMY SNARE/COLD BIOPSY performed by Valery Long MD at [facility]    COLONOSCOPY N/A 1/31/2023    COLONOSCOPY

## 2025-01-09 ENCOUNTER — TELEPHONE (OUTPATIENT)
Dept: PAIN MANAGEMENT | Age: 80
End: 2025-01-09

## 2025-01-09 NOTE — TELEPHONE ENCOUNTER
Procedure: Bilateral Lumbar Medial Branch nerve Blocks at the transverse processes of L4, L5 and sacral  ala under fluoroscopy guidance     DOS: 1/8/2025    Pain level before procedure with activity: 8    Pain with activity after procedure: 5    What activities done the day of procedure: came home and sat down, did some dishes, walked the dog     What percentage of  pain relief from procedure did you receive: 10%     Success: Yes     OV Scheduled: 2/5/2025

## 2025-01-23 ENCOUNTER — OFFICE VISIT (OUTPATIENT)
Dept: FAMILY MEDICINE CLINIC | Age: 80
End: 2025-01-23

## 2025-01-23 VITALS
HEIGHT: 70 IN | WEIGHT: 246 LBS | SYSTOLIC BLOOD PRESSURE: 110 MMHG | BODY MASS INDEX: 35.22 KG/M2 | OXYGEN SATURATION: 96 % | DIASTOLIC BLOOD PRESSURE: 80 MMHG | HEART RATE: 95 BPM

## 2025-01-23 DIAGNOSIS — I10 ESSENTIAL HYPERTENSION: ICD-10-CM

## 2025-01-23 DIAGNOSIS — E11.40 TYPE 2 DIABETES MELLITUS WITH DIABETIC NEUROPATHY, UNSPECIFIED WHETHER LONG TERM INSULIN USE (HCC): ICD-10-CM

## 2025-01-23 DIAGNOSIS — F33.1 MODERATE EPISODE OF RECURRENT MAJOR DEPRESSIVE DISORDER (HCC): ICD-10-CM

## 2025-01-23 DIAGNOSIS — R29.6 RECURRENT FALLS: Primary | ICD-10-CM

## 2025-01-23 DIAGNOSIS — I42.8 OTHER CARDIOMYOPATHY (HCC): ICD-10-CM

## 2025-01-23 DIAGNOSIS — I95.1 ORTHOSTATIC HYPOTENSION: ICD-10-CM

## 2025-01-23 DIAGNOSIS — F11.20 OPIOID DEPENDENCE WITH CURRENT USE (HCC): ICD-10-CM

## 2025-01-23 DIAGNOSIS — I25.10 CORONARY ARTERY DISEASE INVOLVING NATIVE CORONARY ARTERY OF NATIVE HEART WITHOUT ANGINA PECTORIS: ICD-10-CM

## 2025-01-23 DIAGNOSIS — Z23 ENCOUNTER FOR IMMUNIZATION: ICD-10-CM

## 2025-01-23 PROBLEM — E11.9 TYPE 2 DIABETES MELLITUS WITHOUT COMPLICATION, WITHOUT LONG-TERM CURRENT USE OF INSULIN (HCC): Status: RESOLVED | Noted: 2022-10-13 | Resolved: 2025-01-23

## 2025-01-23 PROBLEM — I62.9: Status: RESOLVED | Noted: 2019-07-03 | Resolved: 2025-01-23

## 2025-01-23 LAB — HBA1C MFR BLD: 5.9 %

## 2025-01-23 RX ORDER — METOPROLOL SUCCINATE 25 MG/1
25 TABLET, EXTENDED RELEASE ORAL DAILY
Qty: 90 TABLET | Refills: 1 | Status: SHIPPED | OUTPATIENT
Start: 2025-01-23

## 2025-01-23 SDOH — ECONOMIC STABILITY: FOOD INSECURITY: WITHIN THE PAST 12 MONTHS, YOU WORRIED THAT YOUR FOOD WOULD RUN OUT BEFORE YOU GOT MONEY TO BUY MORE.: NEVER TRUE

## 2025-01-23 SDOH — ECONOMIC STABILITY: FOOD INSECURITY: WITHIN THE PAST 12 MONTHS, THE FOOD YOU BOUGHT JUST DIDN'T LAST AND YOU DIDN'T HAVE MONEY TO GET MORE.: NEVER TRUE

## 2025-01-23 ASSESSMENT — ENCOUNTER SYMPTOMS
VOMITING: 0
DIARRHEA: 0
RECTAL PAIN: 0
SINUS PRESSURE: 0
BACK PAIN: 1
CONSTIPATION: 0
ABDOMINAL PAIN: 0
CHEST TIGHTNESS: 0
SORE THROAT: 0
WHEEZING: 0
EYE REDNESS: 0
TROUBLE SWALLOWING: 0
COUGH: 0
STRIDOR: 0
ABDOMINAL DISTENTION: 0
RHINORRHEA: 0
SHORTNESS OF BREATH: 0
BLOOD IN STOOL: 0
COLOR CHANGE: 0
NAUSEA: 0

## 2025-01-23 NOTE — PROGRESS NOTES
Visit Information    Have you changed or started any medications since your last visit including any over-the-counter medicines, vitamins, or herbal medicines? no   Are you having any side effects from any of your medications? -  no  Have you stopped taking any of your medications? Is so, why? -  no    Have you seen any other physician or provider since your last visit? No  Have you had any other diagnostic tests since your last visit? No  Have you been seen in the emergency room and/or had an admission to a hospital since we last saw you? No  Have you had your routine dental cleaning in the past 6 months? no    Have you activated your [employer] account? If not, what are your barriers? Yes     Patient Care Team:  Andie Glover MD as PCP - General (Family Medicine)  Andie Glover MD as PCP - Empaneled Provider  Valery Long MD as Consulting Physician (Gastroenterology)    Medical History Review  Past Medical, Family, and Social History reviewed and does contribute to the patient presenting condition    Health Maintenance   Topic Date Due    Respiratory Syncytial Virus (RSV) Pregnant or age 60 yrs+ (1 - 1-dose 75+ series) Never done    Diabetic Alb to Cr ratio (uACR) test  10/17/2023    Flu vaccine (1) 08/01/2024    COVID-19 Vaccine (3 - 2023-24 season) 09/01/2024    Annual Wellness Visit (Medicare Advantage)  01/01/2025    Lipids  06/03/2025    Depression Monitoring  07/22/2025    GFR test (Diabetes, CKD 3-4, OR last GFR 15-59)  08/05/2025    Colorectal Cancer Screen  01/31/2029    DTaP/Tdap/Td vaccine (2 - Td or Tdap) 08/05/2033    Shingles vaccine  Completed    Pneumococcal 65+ years Vaccine  Completed    Hepatitis A vaccine  Aged Out    Hepatitis B vaccine  Aged Out    Hib vaccine  Aged Out    Polio vaccine  Aged Out    Meningococcal (ACWY) vaccine  Aged Out    A1C test (Diabetic or Prediabetic)  Discontinued    Hepatitis C screen  Discontinued     
physical therapy multiple times.    2. Type 2 diabetes mellitus with diabetic neuropathy, unspecified whether long term insulin use (HCC)  Controlled not on any medications continue to monitor    3. Opioid dependence with current use (HCC)  Patient ran out of the opioids follows with pain management denies any dependence.    4. Moderate episode of recurrent major depressive disorder (HCC)  Stable on Celexa continue current treatment    5. Other cardiomyopathy (HCC)  Stable, follow-up with cardiologist    6. Coronary artery disease involving native coronary artery of native heart without angina pectoris  Stable denies any acute chest pain has recent stress test done that is negative, decrease metoprolol to 25 mg close follow-up in 1 month.  - metoprolol succinate (TOPROL XL) 25 MG extended release tablet; Take 1 tablet by mouth daily  Dispense: 90 tablet; Refill: 1    7. Essential hypertension  Blood pressure is running low decrease metoprolol to 25 mg continue lisinopril 2.5 mg  - metoprolol succinate (TOPROL XL) 25 MG extended release tablet; Take 1 tablet by mouth daily  Dispense: 90 tablet; Refill: 1    8. Orthostatic hypotension  Blood pressure on the lower side, patient is high risk for fall decrease metoprolol to 25 mg.  Continue to monitor blood pressure at home  - metoprolol succinate (TOPROL XL) 25 MG extended release tablet; Take 1 tablet by mouth daily  Dispense: 90 tablet; Refill: 1    9. Body mass index [BMI] 35.0-35.9, adult (Z68.35)  Stable continue to monitor    10. Encounter for immunization    - Influenza, FLUAD Trivalent, (age 65 y+), IM, Preservative Free, 0.5mL        Orders Placed This Encounter    Influenza, FLUAD Trivalent, (age 65 y+), IM, Preservative Free, 0.5mL    POCT glycosylated hemoglobin (Hb A1C)    metoprolol succinate (TOPROL XL) 25 MG extended release tablet     Sig: Take 1 tablet by mouth daily     Dispense:  90 tablet     Refill:  1       Medications Discontinued During This

## 2025-02-01 DIAGNOSIS — I25.118 CORONARY ARTERY DISEASE OF NATIVE ARTERY OF NATIVE HEART WITH STABLE ANGINA PECTORIS: ICD-10-CM

## 2025-02-01 DIAGNOSIS — I10 ESSENTIAL HYPERTENSION: ICD-10-CM

## 2025-02-01 NOTE — TELEPHONE ENCOUNTER
Please Approve or Refuse.  Send to Pharmacy per Pt's Request:     The original prescription was discontinued on 5/20/2024 by Andie Glover MD for the following reason: REORDER. Renewing this prescription may not be appropriate.      Possible duplicate: Hover to review recent actions on this medication        Next Visit Date:  2/26/2025   Last Visit Date: 1/23/2025    Hemoglobin A1C (%)   Date Value   01/23/2025 5.9   10/22/2024 6.1   07/22/2024 5.8             ( goal A1C is < 7)   BP Readings from Last 3 Encounters:   01/23/25 110/80   01/08/25 (!) 167/97   01/07/25 (!) 109/58          (goal 120/80)  BUN   Date Value Ref Range Status   08/05/2024 20 8 - 23 mg/dL Final     Creatinine   Date Value Ref Range Status   08/05/2024 0.7 0.7 - 1.2 mg/dL Final     Potassium   Date Value Ref Range Status   08/05/2024 4.2 3.7 - 5.3 mmol/L Final

## 2025-02-03 RX ORDER — METOPROLOL SUCCINATE 50 MG/1
50 TABLET, EXTENDED RELEASE ORAL DAILY
Qty: 90 TABLET | Refills: 0 | OUTPATIENT
Start: 2025-02-03

## 2025-02-04 RX ORDER — METFORMIN HYDROCHLORIDE 500 MG/1
500 TABLET, EXTENDED RELEASE ORAL
Qty: 90 TABLET | Refills: 0 | OUTPATIENT
Start: 2025-02-04

## 2025-02-10 ENCOUNTER — HOSPITAL ENCOUNTER (OUTPATIENT)
Dept: PAIN MANAGEMENT | Age: 80
Discharge: HOME | End: 2025-02-10
Payer: MEDICARE

## 2025-02-10 VITALS — HEIGHT: 70 IN | WEIGHT: 246 LBS | BODY MASS INDEX: 35.22 KG/M2

## 2025-02-10 DIAGNOSIS — M47.27 LUMBOSACRAL RADICULOPATHY DUE TO DEGENERATIVE JOINT DISEASE OF SPINE: Primary | ICD-10-CM

## 2025-02-10 DIAGNOSIS — M47.817 LUMBOSACRAL SPONDYLOSIS WITHOUT MYELOPATHY: ICD-10-CM

## 2025-02-10 DIAGNOSIS — Z79.891 CHRONIC USE OF OPIATE FOR THERAPEUTIC PURPOSE: ICD-10-CM

## 2025-02-10 PROCEDURE — 99213 OFFICE O/P EST LOW 20 MIN: CPT | Performed by: NURSE PRACTITIONER

## 2025-02-10 PROCEDURE — 99213 OFFICE O/P EST LOW 20 MIN: CPT

## 2025-02-10 RX ORDER — HYDROCODONE BITARTRATE AND ACETAMINOPHEN 5; 325 MG/1; MG/1
1 TABLET ORAL 2 TIMES DAILY PRN
Qty: 60 TABLET | Refills: 0 | Status: SHIPPED | OUTPATIENT
Start: 2025-02-10 | End: 2025-03-12

## 2025-02-10 ASSESSMENT — ENCOUNTER SYMPTOMS
BOWEL INCONTINENCE: 0
COUGH: 0
BACK PAIN: 1
SHORTNESS OF BREATH: 0
CONSTIPATION: 0

## 2025-02-10 ASSESSMENT — PAIN SCALES - GENERAL: PAINLEVEL_OUTOF10: 7

## 2025-02-10 NOTE — PROGRESS NOTES
Chief Complaint   Patient presents with    Back Pain     F/u failed procedure- MBB         [facility]       Past history significant for cerebrovascular disease with left-sided residual weakness, on long-term antiplatelet therapy     Patient c/o chronic lumbar radicular pain with no known injury or surgery to the area with onset more than 1 year ago and has progressively worsened.   Lumbar MRI 3/2024 Spondylosis and multilevel spinal stenosis, most severe at L4-5.       Pain predominantly in the lumbar area extends over the hip and right lower extremity to his knee  Pt is a high risk for any major spine surgery as well as for interventional procedures that will require interruption in antiplatelet therapy    S/p lumbar MBB 12/4/2024 with 80% improvement in pain and function. He has confirmatory block 1/8/25 and reports no relief. He was not scheduled for RFA    He was taking Norco. Last refill was 12/7/24. He states he took the last dose two weeks ago.       Back Pain  This is a chronic problem. The current episode started more than 1 year ago. The problem occurs constantly. The problem is unchanged. The pain is present in the lumbar spine. The quality of the pain is described as aching. The pain does not radiate. The pain is at a severity of 7/10. The pain is moderate. The pain is The same all the time. The symptoms are aggravated by lying down, position and bending. Pertinent negatives include no bladder incontinence, bowel incontinence, chest pain or fever. He has tried heat, analgesics and NSAIDs for the symptoms.     Patient denies any new neurological symptoms. No bowel or bladder incontinence, no weakness, and no falling.    Pill count: - last refill was 12/7    Morphine equivalent: 10    Controlled Substance Monitoring:    Acute and Chronic Pain Monitoring:   RX Monitoring Periodic Controlled Substance Monitoring   2/10/2025   1:54 PM Possible medication side effects, risk of tolerance/dependence &

## 2025-02-26 ENCOUNTER — OFFICE VISIT (OUTPATIENT)
Dept: FAMILY MEDICINE CLINIC | Age: 80
End: 2025-02-26

## 2025-02-26 VITALS
BODY MASS INDEX: 35.65 KG/M2 | HEART RATE: 74 BPM | OXYGEN SATURATION: 98 % | DIASTOLIC BLOOD PRESSURE: 80 MMHG | SYSTOLIC BLOOD PRESSURE: 130 MMHG | HEIGHT: 70 IN | WEIGHT: 249 LBS

## 2025-02-26 DIAGNOSIS — I10 ESSENTIAL HYPERTENSION: ICD-10-CM

## 2025-02-26 DIAGNOSIS — E66.811 CLASS 1 OBESITY WITH SERIOUS COMORBIDITY AND BODY MASS INDEX (BMI) OF 34.0 TO 34.9 IN ADULT, UNSPECIFIED OBESITY TYPE: ICD-10-CM

## 2025-02-26 DIAGNOSIS — Z71.89 ACP (ADVANCE CARE PLANNING): ICD-10-CM

## 2025-02-26 DIAGNOSIS — Z00.00 MEDICARE ANNUAL WELLNESS VISIT, SUBSEQUENT: Primary | ICD-10-CM

## 2025-02-26 DIAGNOSIS — I25.10 CORONARY ARTERY DISEASE INVOLVING NATIVE CORONARY ARTERY OF NATIVE HEART WITHOUT ANGINA PECTORIS: ICD-10-CM

## 2025-02-26 PROBLEM — E66.01 MORBIDLY OBESE: Status: RESOLVED | Noted: 2020-11-20 | Resolved: 2025-02-26

## 2025-02-26 PROBLEM — Z95.5 HX OF HEART ARTERY STENT: Status: RESOLVED | Noted: 2021-08-16 | Resolved: 2025-02-26

## 2025-02-26 PROBLEM — M54.51 VERTEBROGENIC LOW BACK PAIN: Status: RESOLVED | Noted: 2024-04-18 | Resolved: 2025-02-26

## 2025-02-26 PROBLEM — I63.442 CEREBROVASCULAR ACCIDENT (CVA) DUE TO EMBOLISM OF LEFT CEREBELLAR ARTERY (HCC): Status: RESOLVED | Noted: 2022-10-13 | Resolved: 2025-02-26

## 2025-02-26 RX ORDER — METOPROLOL SUCCINATE 25 MG/1
25 TABLET, EXTENDED RELEASE ORAL DAILY
Qty: 90 TABLET | Refills: 1 | Status: SHIPPED | OUTPATIENT
Start: 2025-02-26

## 2025-02-26 RX ORDER — LISINOPRIL 2.5 MG/1
2.5 TABLET ORAL DAILY
Qty: 90 TABLET | Refills: 0 | Status: SHIPPED | OUTPATIENT
Start: 2025-02-26 | End: 2025-05-27

## 2025-02-26 ASSESSMENT — PATIENT HEALTH QUESTIONNAIRE - PHQ9
SUM OF ALL RESPONSES TO PHQ QUESTIONS 1-9: 0
1. LITTLE INTEREST OR PLEASURE IN DOING THINGS: NOT AT ALL
9. THOUGHTS THAT YOU WOULD BE BETTER OFF DEAD, OR OF HURTING YOURSELF: NOT AT ALL
7. TROUBLE CONCENTRATING ON THINGS, SUCH AS READING THE NEWSPAPER OR WATCHING TELEVISION: NOT AT ALL
2. FEELING DOWN, DEPRESSED OR HOPELESS: NOT AT ALL
10. IF YOU CHECKED OFF ANY PROBLEMS, HOW DIFFICULT HAVE THESE PROBLEMS MADE IT FOR YOU TO DO YOUR WORK, TAKE CARE OF THINGS AT HOME, OR GET ALONG WITH OTHER PEOPLE: NOT DIFFICULT AT ALL
3. TROUBLE FALLING OR STAYING ASLEEP: NOT AT ALL
8. MOVING OR SPEAKING SO SLOWLY THAT OTHER PEOPLE COULD HAVE NOTICED. OR THE OPPOSITE, BEING SO FIGETY OR RESTLESS THAT YOU HAVE BEEN MOVING AROUND A LOT MORE THAN USUAL: NOT AT ALL
5. POOR APPETITE OR OVEREATING: NOT AT ALL
SUM OF ALL RESPONSES TO PHQ QUESTIONS 1-9: 0
SUM OF ALL RESPONSES TO PHQ QUESTIONS 1-9: 0
4. FEELING TIRED OR HAVING LITTLE ENERGY: NOT AT ALL
SUM OF ALL RESPONSES TO PHQ9 QUESTIONS 1 & 2: 0
SUM OF ALL RESPONSES TO PHQ QUESTIONS 1-9: 0
6. FEELING BAD ABOUT YOURSELF - OR THAT YOU ARE A FAILURE OR HAVE LET YOURSELF OR YOUR FAMILY DOWN: NOT AT ALL

## 2025-02-26 ASSESSMENT — LIFESTYLE VARIABLES
HOW MANY STANDARD DRINKS CONTAINING ALCOHOL DO YOU HAVE ON A TYPICAL DAY: 1 OR 2
HOW OFTEN DO YOU HAVE A DRINK CONTAINING ALCOHOL: MONTHLY OR LESS

## 2025-02-26 NOTE — PROGRESS NOTES
Visit Information    Have you changed or started any medications since your last visit including any over-the-counter medicines, vitamins, or herbal medicines? no   Are you having any side effects from any of your medications? -  no  Have you stopped taking any of your medications? Is so, why? -  no    Have you seen any other physician or provider since your last visit? No  Have you had any other diagnostic tests since your last visit? No  Have you been seen in the emergency room and/or had an admission to a hospital since we last saw you? No  Have you had your routine dental cleaning in the past 6 months?     Have you activated your [employer] account? If not, what are your barriers? Yes     Patient Care Team:  Andie Glover MD as PCP - General (Family Medicine)  Andei Glover MD as PCP - Empaneled Provider  Valery Long MD as Consulting Physician (Gastroenterology)    Medical History Review  Past Medical, Family, and Social History reviewed and does contribute to the patient presenting condition    Health Maintenance   Topic Date Due    Respiratory Syncytial Virus (RSV) Pregnant or age 60 yrs+ (1 - 1-dose 75+ series) Never done    Diabetic Alb to Cr ratio (uACR) test  10/17/2023    COVID-19 Vaccine (3 - 2024-25 season) 09/01/2024    Annual Wellness Visit (Medicare Advantage)  01/01/2025    Lipids  06/03/2025    Depression Monitoring  07/22/2025    GFR test (Diabetes, CKD 3-4, OR last GFR 15-59)  08/05/2025    Colorectal Cancer Screen  01/31/2029    DTaP/Tdap/Td vaccine (2 - Td or Tdap) 08/05/2033    Flu vaccine  Completed    Shingles vaccine  Completed    Pneumococcal 50+ years Vaccine  Completed    Hepatitis A vaccine  Aged Out    Hepatitis B vaccine  Aged Out    Hib vaccine  Aged Out    Polio vaccine  Aged Out    Meningococcal (ACWY) vaccine  Aged Out    A1C test (Diabetic or Prediabetic)  Discontinued    Hepatitis C screen  Discontinued

## 2025-02-26 NOTE — PATIENT INSTRUCTIONS
they may have dentures that need to be cared for. Some older adults may have trouble holding a toothbrush.  You can help remind the person you are caring for to brush and floss their teeth or to clean their dentures. In some cases, you may need to do the brushing and other dental care tasks. People who have trouble using their hands or who have dementia may need this extra help.  How can you help with dental care?  Normal dental care  To keep the teeth and gums healthy:  Brush the teeth with fluoride toothpaste twice a day--in the morning and at night--and floss at least once a day. Plaque can quickly build up on the teeth of older adults.  Watch for the signs of gum disease. These signs include gums that bleed after brushing or after eating hard foods, such as apples.  See a dentist regularly. Many experts recommend checkups every 6 months.  Keep the dentist up to date on any new medications the person is taking.  Encourage a balanced diet that includes whole grains, vegetables, and fruits, and that is low in saturated fat and sodium.  Encourage the person you're caring for not to use tobacco products. They can affect dental and general health.  Many older adults have a fixed income and feel that they can't afford dental care. But most towns and cities have programs in which dentists help older adults by lowering fees. Contact your area's public health offices or  for information about dental care in your area.  Using a toothbrush  Older adults with arthritis sometimes have trouble brushing their teeth because they can't easily hold the toothbrush. Their hands and fingers may be stiff, painful, or weak. If this is the case, you can:  Offer an electric toothbrush.  Enlarge the handle of a non-electric toothbrush by wrapping a sponge, an elastic bandage, or adhesive tape around it.  Push the toothbrush handle through a ball made of rubber or soft foam.  Make the handle longer and thicker by taping

## 2025-02-26 NOTE — PROGRESS NOTES
Medicare Annual Wellness Visit    Dexter Campoverde is here for Medicare AWV    Assessment & Plan   Medicare annual wellness visit, subsequent  ACP (advance care planning)  Essential hypertension  -     lisinopril (PRINIVIL;ZESTRIL) 2.5 MG tablet; Take 1 tablet by mouth daily, Disp-90 tablet, R-0Normal  -     metoprolol succinate (TOPROL XL) 25 MG extended release tablet; Take 1 tablet by mouth daily, Disp-90 tablet, R-1Normal  Coronary artery disease involving native coronary artery of native heart without angina pectoris  -     metoprolol succinate (TOPROL XL) 25 MG extended release tablet; Take 1 tablet by mouth daily, Disp-90 tablet, R-1Normal  Orthostatic hypotension  Blood pressure improved continue current treatment.  -     metoprolol succinate (TOPROL XL) 25 MG extended release tablet; Take 1 tablet by mouth daily, Disp-90 tablet, R-1Normal  Class 1 obesity with serious comorbidity and body mass index (BMI) of 34.0 to 34.9 in adult, unspecified obesity type       Return in about 3 months (around 5/26/2025) for dm ,htn, hld, 30min,always chronic conditons.     Subjective   The following acute and/or chronic problems were also addressed today:    Patient is scheduled for Medicare wellness visit, labs addressed.  Up-to-date on blood work.    She was seen about a month ago due to low blood pressure readings, medications were adjusted, metoprolol was decreased to 25 mg.  Patient's blood pressure has improved dizziness and lightheadedness has also improved.    Patient's complete Health Risk Assessment and screening values have been reviewed and are found in Flowsheets. The following problems were reviewed today and where indicated follow up appointments were made and/or referrals ordered.    Positive Risk Factor Screenings with Interventions:    Fall Risk:  Do you feel unsteady or are you worried about falling? : (!) yes  2 or more falls in past year?: (!) yes  Fall with injury in past year?: no     Interventions:

## 2025-02-28 ENCOUNTER — PROCEDURE VISIT (OUTPATIENT)
Dept: NEUROLOGY | Age: 80
End: 2025-02-28
Payer: MEDICARE

## 2025-02-28 VITALS — HEIGHT: 70 IN | WEIGHT: 249 LBS | BODY MASS INDEX: 35.65 KG/M2

## 2025-02-28 DIAGNOSIS — G56.01 CARPAL TUNNEL SYNDROME OF RIGHT WRIST: Primary | ICD-10-CM

## 2025-02-28 PROCEDURE — 95909 NRV CNDJ TST 5-6 STUDIES: CPT | Performed by: PSYCHIATRY & NEUROLOGY

## 2025-02-28 PROCEDURE — 95886 MUSC TEST DONE W/N TEST COMP: CPT | Performed by: PSYCHIATRY & NEUROLOGY

## 2025-02-28 NOTE — PROGRESS NOTES
[facility]  [address], [address]  [place], [place] [address]  Ph: [phone] or [phone]  FAX: [phone]        [facility] [place] [address], [address]  [place], [place] [address]    [phone] phone  [phone] fax          Name: Dexter Campoverde Patient ID: [id number]   Gender: Male Date of Exam: 2/28/2025   Age: 79 y YOB: 1945   Height: 5'10\" Weight: 249 Lbs   Referring Physician: Scott Pritchett   Examining Physician: Carmen Pritchett MD        Patient History:   EMG Right Upper Extermity        Motor Nerve Conduction:    Nerve and Site Latency Amplitude Segment Latency  Difference Distance Conduction  Velocity            Median.R         Wrist 6.1 ms 1.2 mV Abductor pollicis brevis-Wrist 6.1 ms 70 mm  m/s   Elbow 12.2 ms 1.6 mV Wrist-Elbow 6.1 ms 255 mm 42 m/s   Ulnar.R         Wrist 4.4 ms 5.0 mV Abductor digiti minimi (manus)-Wrist 4.4 ms 70 mm  m/s   Below elbow 9.1 ms 5.1 mV Wrist-Below elbow 4.7 ms 210 mm 45 m/s   Above elbow 14.2 ms 4.7 mV Below elbow-Above elbow 5.1 ms 100 mm 20 m/s   Ulnar.R         D4 8.9 ms 4.8 mV   ms  mm  m/s   D2 9.3 ms 5.2 mV D4-D2 0.4 ms 20 mm 50 m/s   P 10.6 ms 3.8 mV D2-P 1.3 ms 20 mm 15 m/s   P2 13.4 ms 4.6 mV P-P2 2.8 ms 20 mm 7 m/s   P4 13.7 ms 4.5 mV P2-P4 0.3 ms 20 mm 67 m/s     F-Wave Studies    Nerve M-Latency F-Latency   Median.R 12.2 30.0     Sensory Nerve Conduction:    Nerve and Site Onset Latency Peak  Latency Amplitude Segment Latency  Difference Distance Conduction  Velocity             Median.R          Wrist (Median) 3.3 ms 4.2 ms 1 mV Mid palm-Wrist (Median) 3.3 ms 80 mm 24 m/s     ms  ms  mV Wrist (Median)-Wrist (Ulnar) 2.1 ms  mm  m/s   Ulnar.R          Wrist (Ulnar) 1.3 ms 1.9 ms 5 mV Mid palm-Wrist (Ulnar) 1.3 ms 80 mm 63 m/s          Spontaneous Activity Volitional MUAPs   Muscle Insertional Fibs & positive wave Fasciculations Duration Amplitude Poly Activation Recruitment   R.

## 2025-03-12 ENCOUNTER — HOSPITAL ENCOUNTER (OUTPATIENT)
Dept: PAIN MANAGEMENT | Age: 80
Discharge: HOME | End: 2025-03-12
Payer: MEDICARE

## 2025-03-12 VITALS — HEIGHT: 70 IN | BODY MASS INDEX: 35.65 KG/M2 | WEIGHT: 249 LBS

## 2025-03-12 DIAGNOSIS — M47.817 LUMBOSACRAL SPONDYLOSIS WITHOUT MYELOPATHY: Primary | ICD-10-CM

## 2025-03-12 DIAGNOSIS — F11.90 CHRONIC, CONTINUOUS USE OF OPIOIDS: ICD-10-CM

## 2025-03-12 DIAGNOSIS — Z79.891 CHRONIC USE OF OPIATE FOR THERAPEUTIC PURPOSE: ICD-10-CM

## 2025-03-12 DIAGNOSIS — M47.27 LUMBOSACRAL RADICULOPATHY DUE TO DEGENERATIVE JOINT DISEASE OF SPINE: ICD-10-CM

## 2025-03-12 PROCEDURE — G0480 DRUG TEST DEF 1-7 CLASSES: HCPCS

## 2025-03-12 PROCEDURE — 99213 OFFICE O/P EST LOW 20 MIN: CPT

## 2025-03-12 PROCEDURE — 99214 OFFICE O/P EST MOD 30 MIN: CPT | Performed by: NURSE PRACTITIONER

## 2025-03-12 PROCEDURE — 80307 DRUG TEST PRSMV CHEM ANLYZR: CPT

## 2025-03-12 RX ORDER — HYDROCODONE BITARTRATE AND ACETAMINOPHEN 5; 325 MG/1; MG/1
1 TABLET ORAL 2 TIMES DAILY PRN
Qty: 60 TABLET | Refills: 0 | Status: SHIPPED | OUTPATIENT
Start: 2025-03-12 | End: 2025-04-11

## 2025-03-12 ASSESSMENT — ENCOUNTER SYMPTOMS
COUGH: 0
CONSTIPATION: 0
BOWEL INCONTINENCE: 0
SHORTNESS OF BREATH: 0
BACK PAIN: 1

## 2025-03-12 ASSESSMENT — PAIN SCALES - GENERAL: PAINLEVEL_OUTOF10: 7

## 2025-03-12 NOTE — PROGRESS NOTES
Chief Complaint   Patient presents with    Back Pain    Medication Refill     Norco  due 3/12/25         PMH     Past history significant for cerebrovascular disease with left-sided residual weakness, on long-term antiplatelet therapy     Patient c/o chronic lumbar radicular pain with no known injury or surgery to the area with onset more than 1 year ago and has progressively worsened.   Lumbar MRI 3/2024 Spondylosis and multilevel spinal stenosis, most severe at L4-5.       Pain predominantly in the lumbar area extends over the hip and right lower extremity to his knee  Pt is a high risk for any major spine surgery as well as for interventional procedures that will require interruption in antiplatelet therapy     S/p lumbar MBB 12/4/2024 with 80% improvement in pain and function. He has confirmatory block 1/8/25 and reports no relief. He was not scheduled for RFA     He was taking Norco. Last refill was 12/7/24. He states he took the last dose two weeks ago.     Back Pain  This is a chronic problem. The current episode started more than 1 year ago. The problem occurs constantly. The problem is unchanged. The pain is present in the lumbar spine. The quality of the pain is described as aching. The pain does not radiate. The pain is at a severity of 7/10. The pain is moderate. The pain is The same all the time. The symptoms are aggravated by bending, lying down, position and standing. Pertinent negatives include no bladder incontinence, bowel incontinence, chest pain or fever. He has tried analgesics and heat for the symptoms.     Patient denies any new neurological symptoms. No bowel or bladder incontinence, no weakness, and no falling.    Pill count: Pt does not have meds with him - due today - states he took his last does this morning.     Morphine equivalent: 10    Controlled Substance Monitoring:    Acute and Chronic Pain Monitoring:   RX Monitoring Periodic Controlled Substance Monitoring   3/12/2025

## 2025-03-15 LAB
6-ACETYLMORPHINE, UR: NOT DETECTED
7-AMINOCLONAZEPAM, URINE: NOT DETECTED
ALPHA-OH-ALPRAZ, URINE: NOT DETECTED
ALPHA-OH-MIDAZOLAM, URINE: NOT DETECTED
ALPRAZOLAM, URINE: NOT DETECTED
AMPHETAMINE, URINE: NOT DETECTED
BARBITURATES, URINE: NEGATIVE
BENZOYLECGONINE, UR: NEGATIVE
BUPRENORPHINE URINE: NOT DETECTED
CARISOPRODOL, UR: NEGATIVE
CLONAZEPAM, URINE: NOT DETECTED
CODEINE, URINE: NOT DETECTED
CREAT UR-MCNC: 188.9 MG/DL (ref 20–400)
DIAZEPAM, URINE: NOT DETECTED
DRUGS EXPECTED, UR: NORMAL
EER HI RES INTERP UR: NORMAL
ETHYL GLUCURONIDE UR: NORMAL
FENTANYL URINE: NOT DETECTED
GABAPENTIN: NOT DETECTED
HYDROCODONE, URINE: PRESENT
HYDROMORPHONE, URINE: PRESENT
LORAZEPAM, URINE: NOT DETECTED
MARIJUANA METAB, UR: NEGATIVE
MDA, URINE: NOT DETECTED
MDEA, EVE, UR: NOT DETECTED
MDMA, URINE: NOT DETECTED
MEPERIDINE METAB, UR: NOT DETECTED
METHADONE, URINE: NEGATIVE
METHAMPHETAMINE, URINE: NOT DETECTED
METHYLPHENIDATE: NOT DETECTED
MIDAZOLAM, URINE: NOT DETECTED
MORPHINE, OPI1M: NOT DETECTED
NALOXONE URINE: NOT DETECTED
NORBUPRENORPHINE, URINE: NOT DETECTED
NORDIAZEPAM, URINE: NOT DETECTED
NORFENTANYL, URINE: NOT DETECTED
NORHYDROCODONE, URINE: PRESENT
NOROXYCODONE, URINE: NOT DETECTED
NOROXYMORPHONE, URINE: NOT DETECTED
OXAZEPAM, URINE: NOT DETECTED
OXYCODONE URINE: NOT DETECTED
OXYMORPHONE, URINE: NOT DETECTED
PAIN MANAGEMENT DRUG PANEL INTERP, URINE: NORMAL
PAIN MGT DRUG PANEL, HI RES, UR: NORMAL
PCP,URINE: NEGATIVE
PHENTERMINE, UR: NOT DETECTED
PREGABALIN: NOT DETECTED
TAPENTADOL, URINE: NOT DETECTED
TAPENTADOL-O-SULFATE, URINE: NOT DETECTED
TEMAZEPAM, URINE: NOT DETECTED
TRAMADOL, URINE: NEGATIVE
ZOLPIDEM METABOLITE (ZCA), URINE: NOT DETECTED
ZOLPIDEM, URINE: NOT DETECTED

## 2025-04-08 ENCOUNTER — HOSPITAL ENCOUNTER (OUTPATIENT)
Dept: PAIN MANAGEMENT | Age: 80
Discharge: HOME | End: 2025-04-08
Payer: MEDICARE

## 2025-04-08 VITALS — HEIGHT: 70 IN | WEIGHT: 249 LBS | BODY MASS INDEX: 35.65 KG/M2

## 2025-04-08 DIAGNOSIS — R69 SEVERE COMORBID ILLNESS: ICD-10-CM

## 2025-04-08 DIAGNOSIS — Z79.891 CHRONIC USE OF OPIATE FOR THERAPEUTIC PURPOSE: Primary | ICD-10-CM

## 2025-04-08 DIAGNOSIS — M47.817 LUMBOSACRAL SPONDYLOSIS WITHOUT MYELOPATHY: ICD-10-CM

## 2025-04-08 DIAGNOSIS — M51.362 DEGENERATION OF INTERVERTEBRAL DISC OF LUMBAR REGION WITH DISCOGENIC BACK PAIN AND LOWER EXTREMITY PAIN: ICD-10-CM

## 2025-04-08 DIAGNOSIS — M47.27 LUMBOSACRAL RADICULOPATHY DUE TO DEGENERATIVE JOINT DISEASE OF SPINE: ICD-10-CM

## 2025-04-08 DIAGNOSIS — G62.9 NEUROPATHY: ICD-10-CM

## 2025-04-08 PROCEDURE — 99213 OFFICE O/P EST LOW 20 MIN: CPT

## 2025-04-08 PROCEDURE — 99213 OFFICE O/P EST LOW 20 MIN: CPT | Performed by: NURSE PRACTITIONER

## 2025-04-08 RX ORDER — HYDROCODONE BITARTRATE AND ACETAMINOPHEN 5; 325 MG/1; MG/1
1 TABLET ORAL 2 TIMES DAILY PRN
Qty: 60 TABLET | Refills: 0 | Status: SHIPPED | OUTPATIENT
Start: 2025-04-11 | End: 2025-05-11

## 2025-04-08 ASSESSMENT — ENCOUNTER SYMPTOMS
BOWEL INCONTINENCE: 0
BACK PAIN: 1

## 2025-04-08 ASSESSMENT — PAIN SCALES - GENERAL: PAINLEVEL_OUTOF10: 8

## 2025-04-08 NOTE — PROGRESS NOTES
Chief Complaint   Patient presents with    Back Pain     Med refill     PMH     Past history significant for cerebrovascular disease with left-sided residual weakness, on long-term antiplatelet therapy     Patient c/o chronic lumbar radicular pain with no known injury or surgery to the area with onset more than 1 year ago and has progressively worsened.   Lumbar MRI 3/2024 Spondylosis and multilevel spinal stenosis, most severe at L4-5.       Pain predominantly in the lumbar area extends over the hip and right lower extremity to his knee  Pt is a high risk for any major spine surgery as well as for interventional procedures that will require interruption in antiplatelet therapy, on Plavix.      S/p lumbar MBB 12/4/2024 with 80% improvement in pain and function. He has confirmatory block 1/8/25 and reports no relief. He was not scheduled for RFA. He does not interest in scheduling.     He continues to take norco 5-325 up to 2 times daily as needed. Reports that this is no longer adequately controlling his pain during the day. States that it does allow him to rest better at night. Requests increased dose. Denies side effects.     HPI:     Back Pain  This is a chronic problem. The current episode started more than 1 year ago. The problem occurs constantly. The problem is unchanged. The pain is present in the lumbar spine. The pain radiates to the right knee, right foot and right thigh. The pain is at a severity of 8/10. The pain is The same all the time. The symptoms are aggravated by bending, sitting and standing. Associated symptoms include chest pain and weakness. Pertinent negatives include no bladder incontinence, bowel incontinence, fever, headaches, numbness or tingling. He has tried ice and heat for the symptoms.     Patient denies any new neurological symptoms. No bowel or bladder incontinence, no weakness, and no falling.    Pill count: appropriate / Norco - 10, due for refill 4/11/2025    Morphine

## 2025-04-09 ENCOUNTER — PREP FOR PROCEDURE (OUTPATIENT)
Dept: ORTHOPEDIC SURGERY | Age: 80
End: 2025-04-09

## 2025-04-09 ENCOUNTER — OFFICE VISIT (OUTPATIENT)
Dept: ORTHOPEDIC SURGERY | Age: 80
End: 2025-04-09
Payer: MEDICARE

## 2025-04-09 VITALS — WEIGHT: 252.6 LBS | BODY MASS INDEX: 36.16 KG/M2 | RESPIRATION RATE: 14 BRPM | HEIGHT: 70 IN

## 2025-04-09 DIAGNOSIS — M17.11 PRIMARY OSTEOARTHRITIS OF RIGHT KNEE: Primary | ICD-10-CM

## 2025-04-09 PROCEDURE — 1159F MED LIST DOCD IN RCRD: CPT | Performed by: ORTHOPAEDIC SURGERY

## 2025-04-09 PROCEDURE — G8417 CALC BMI ABV UP PARAM F/U: HCPCS | Performed by: ORTHOPAEDIC SURGERY

## 2025-04-09 PROCEDURE — 1123F ACP DISCUSS/DSCN MKR DOCD: CPT | Performed by: ORTHOPAEDIC SURGERY

## 2025-04-09 PROCEDURE — 1036F TOBACCO NON-USER: CPT | Performed by: ORTHOPAEDIC SURGERY

## 2025-04-09 PROCEDURE — 0518F FALL PLAN OF CARE DOCD: CPT | Performed by: ORTHOPAEDIC SURGERY

## 2025-04-09 PROCEDURE — 99213 OFFICE O/P EST LOW 20 MIN: CPT | Performed by: ORTHOPAEDIC SURGERY

## 2025-04-09 PROCEDURE — 3288F FALL RISK ASSESSMENT DOCD: CPT | Performed by: ORTHOPAEDIC SURGERY

## 2025-04-09 PROCEDURE — G8427 DOCREV CUR MEDS BY ELIG CLIN: HCPCS | Performed by: ORTHOPAEDIC SURGERY

## 2025-04-09 NOTE — PROGRESS NOTES
[facility]                   Fabio Mathur M.D.            [address], [address]               [place], [place] [address]           Dept Phone: [phone]           Dept Fax:  [phone] [address] [facility]                       [address]           [place], [place] [address]          Dept Phone: [phone]           Dept Fax:  [phone]      Chief Compliant:  Chief Complaint   Patient presents with    Knee Pain     Right knee pain, sharp, shooting pain down leg to foot        History of Present Illness:  This is a 79 y.o. male who presents to the clinic today for evaluation / follow up of severe right knee pain.  Patient is a history of a left total knee arthroplasty done by me 15 years ago.  He has been seen Yazmin in the past and most recently today.  She has seen him with a repeat x-rays and it showed that his right knee has shows bone-on-bone disease throughout the entire knee and he is severely painful.    Of note patient has had a history of a CVA which makes his left side weak.  He is also diabetic but his hemoglobin A1c is well-controlled..       Review of Systems   Constitutional: Negative for fever, chills, sweats.   Eyes: Negative for changes in vision, or pain.   HENT: Negative for ear ache, epistaxis, or sore throat.  Respiratory/Cardio: Negative for Chest pain, palpitations, SOB, or cough.  Gastrointestinal: Negative for abdominal pain, N/V/D.   Genitourinary: Negative for dysuria, frequency, urgency, or hematuria.   Neurological: Negative for headache, numbness, or weakness.   Integumentary: Negative for rash, itching, laceration, or abrasion.   Musculoskeletal: Positive for Knee Pain (Right knee pain, sharp, shooting pain down leg to foot)       Physical Exam:  Constitutional: Patient is oriented to person, place, and time. Patient appears well-developed and well nourished.   HENT: Negative otherwise noted  Head: Normocephalic and

## 2025-04-16 RX ORDER — METFORMIN HYDROCHLORIDE 500 MG/1
TABLET, EXTENDED RELEASE ORAL
COMMUNITY
End: 2025-04-23 | Stop reason: ALTCHOICE

## 2025-04-16 RX ORDER — AMLODIPINE BESYLATE 5 MG/1
1 TABLET ORAL
COMMUNITY

## 2025-04-16 RX ORDER — SIMVASTATIN 20 MG
TABLET ORAL
COMMUNITY

## 2025-04-23 ENCOUNTER — HOSPITAL ENCOUNTER (OUTPATIENT)
Dept: PREADMISSION TESTING | Age: 80
Discharge: HOME | End: 2025-04-27
Attending: ORTHOPAEDIC SURGERY | Admitting: ORTHOPAEDIC SURGERY
Payer: MEDICARE

## 2025-04-23 VITALS
RESPIRATION RATE: 18 BRPM | TEMPERATURE: 98.2 F | DIASTOLIC BLOOD PRESSURE: 66 MMHG | HEIGHT: 70 IN | WEIGHT: 245 LBS | HEART RATE: 71 BPM | SYSTOLIC BLOOD PRESSURE: 112 MMHG | OXYGEN SATURATION: 96 % | BODY MASS INDEX: 35.07 KG/M2

## 2025-04-23 LAB
ANION GAP SERPL CALCULATED.3IONS-SCNC: 9 MMOL/L (ref 9–16)
BASOPHILS # BLD: 0 K/UL (ref 0–0.2)
BASOPHILS NFR BLD: 1 % (ref 0–2)
BILIRUB UR QL STRIP: NEGATIVE
BUN SERPL-MCNC: 19 MG/DL (ref 8–23)
CALCIUM SERPL-MCNC: 9.7 MG/DL (ref 8.6–10.4)
CHLORIDE SERPL-SCNC: 101 MMOL/L (ref 98–107)
CLARITY UR: CLEAR
CO2 SERPL-SCNC: 30 MMOL/L (ref 20–31)
COLOR UR: YELLOW
COMMENT: NORMAL
CREAT SERPL-MCNC: 0.7 MG/DL (ref 0.7–1.2)
EOSINOPHIL # BLD: 0.1 K/UL (ref 0–0.4)
EOSINOPHILS RELATIVE PERCENT: 1 % (ref 0–4)
ERYTHROCYTE [DISTWIDTH] IN BLOOD BY AUTOMATED COUNT: 14 % (ref 11.5–14.9)
GFR, ESTIMATED: >90 ML/MIN/1.73M2
GLUCOSE SERPL-MCNC: 94 MG/DL (ref 74–99)
GLUCOSE UR STRIP-MCNC: NEGATIVE MG/DL
HCT VFR BLD AUTO: 39.2 % (ref 41–53)
HGB BLD-MCNC: 12.8 G/DL (ref 13.5–17.5)
HGB UR QL STRIP.AUTO: NEGATIVE
KETONES UR STRIP-MCNC: NEGATIVE MG/DL
LEUKOCYTE ESTERASE UR QL STRIP: NEGATIVE
LYMPHOCYTES NFR BLD: 1.8 K/UL (ref 1–4.8)
LYMPHOCYTES RELATIVE PERCENT: 38 % (ref 24–44)
MCH RBC QN AUTO: 29.6 PG (ref 26–34)
MCHC RBC AUTO-ENTMCNC: 32.6 G/DL (ref 31–37)
MCV RBC AUTO: 90.7 FL (ref 80–100)
MONOCYTES NFR BLD: 0.4 K/UL (ref 0.1–1.3)
MONOCYTES NFR BLD: 9 % (ref 1–7)
NEUTROPHILS NFR BLD: 51 % (ref 36–66)
NEUTS SEG NFR BLD: 2.5 K/UL (ref 1.3–9.1)
NITRITE UR QL STRIP: NEGATIVE
PH UR STRIP: 7 [PH] (ref 5–8)
PLATELET # BLD AUTO: 170 K/UL (ref 150–450)
PMV BLD AUTO: 9.1 FL (ref 6–12)
POTASSIUM SERPL-SCNC: 4.6 MMOL/L (ref 3.7–5.3)
PROT UR STRIP-MCNC: NEGATIVE MG/DL
RBC # BLD AUTO: 4.33 M/UL (ref 4.5–5.9)
SODIUM SERPL-SCNC: 140 MMOL/L (ref 136–145)
SP GR UR STRIP: 1.02 (ref 1–1.03)
UROBILINOGEN UR STRIP-ACNC: NORMAL EU/DL (ref 0–1)
WBC OTHER # BLD: 4.8 K/UL (ref 3.5–11)

## 2025-04-23 PROCEDURE — 85025 COMPLETE CBC W/AUTO DIFF WBC: CPT

## 2025-04-23 PROCEDURE — 80048 BASIC METABOLIC PNL TOTAL CA: CPT

## 2025-04-23 PROCEDURE — 87641 MR-STAPH DNA AMP PROBE: CPT

## 2025-04-23 PROCEDURE — 81003 URINALYSIS AUTO W/O SCOPE: CPT

## 2025-04-23 PROCEDURE — 36415 COLL VENOUS BLD VENIPUNCTURE: CPT

## 2025-04-23 PROCEDURE — APPSS45 APP SPLIT SHARED TIME 31-45 MINUTES: Performed by: NURSE PRACTITIONER

## 2025-04-23 RX ORDER — ACETAMINOPHEN 500 MG
500 TABLET ORAL EVERY 6 HOURS PRN
COMMUNITY

## 2025-04-23 ASSESSMENT — ENCOUNTER SYMPTOMS
SORE THROAT: 0
CONSTIPATION: 1
VOMITING: 0
SHORTNESS OF BREATH: 1
COUGH: 1
APNEA: 0
DIARRHEA: 0
EYES NEGATIVE: 1
ABDOMINAL PAIN: 0
TROUBLE SWALLOWING: 1
NAUSEA: 0

## 2025-04-23 NOTE — H&P
HISTORY and PHYSICAL  [facility]       NAME:  Dexter Campoverde  MRN: [id number]   YOB: 1945   Date: 4/23/2025   Age: 79 y.o.  Gender: male     COMPLAINT AND PRESENT HISTORY:   Dexter Campoverde is 79 y.o.,  male, presents for pre-anesthesia/admission testing for KNEE TOTAL ARTHROPLASTY RIGHT per Dr. Mathur.  Primary dx: Primary osteoarthritis of right knee [M17.11].      Office note per Dr Mathur on 4/9/225  History of Present Illness:  This is a 79 y.o. male who presents to the clinic today for evaluation / follow up of severe right knee pain.  Patient is a history of a left total knee arthroplasty done by me 15 years ago.  He has been seen Yazmin in the past and most recently today.  She has seen him with a repeat x-rays and it showed that his right knee has shows bone-on-bone disease throughout the entire knee and he is severely painful.     Of note patient has had a history of a CVA which makes his left side weak.  He is also diabetic but his hemoglobin A1c is well-controlled..   Labs and Imaging:   XR taken today: X-rays taken today reviewed by me as show standing AP of both knees lateral the right knee.  Patient has end-stage degenerative disease of the right knee with bone-on-bone apposition throughout with a varus disposition.  Patient noted to have severe patellofemoral arthrosis as well in the lateral view.  Patient with a history of left total knee arthroplasty in the left the components appear to be in stable position alignment.      UPDATE 4/23/2025  Dexter Campoverde is 79 y.o.,  male, presents for pre-anesthesia/admission testing for KNEE TOTAL ARTHROPLASTY RIGHT per Dr. Mathur.  Primary dx: Primary osteoarthritis of right knee [M17.11].    Pt arrived in PAT with use of walker for ambulation safety h/o CVA    Pt c/o pain to right medial knee pain  Describes pain as ache increases in intensity when he first gets up in the morning.  Pain increased in the afternoon and helps to use heating  mobility/use of walker s/p CVA  Functional Capacity per patient:              1. Patient unable to walk 2 city blocks on level ground without SOB.              2. Patient unable to climb 2 flights of stairs without SOB.    Anticoagulation:  Plavix  Denies hx of MRSA infection.  Personal hx of blood clots. 15 years ago s/p knee surgery  Denies hx of any personal or family hx of complications w/anesthesia.   RECENT IMAGING R/T HPI   No results found.      PAST MEDICAL HISTORY     Past Medical History:   Diagnosis Date    Acquired skull defect     Angina pectoris     Anxiety     Anxiety     At high risk for falls     Ataxia following nontraumatic intracerebral hemorrhage     Bronchitis with asthma, acute 11/24/2015    CAD (coronary artery disease)     Carotid stenosis     Left    Carotid stenosis, left 02/02/2016    Cerebral artery occlusion with cerebral infarction (HCC)     Chest pain     Compression of brainstem (HCC)     Diabetes mellitus (HCC)     borderline patient off metformin    Diplopia     Dysphagia     Gait instability     GERD (gastroesophageal reflux disease)     H/O carotid endarterectomy 04/18/2016    H/O heart artery stent     x5    Heart attack (HCC) 08/05/2019    Hyperlipidemia     Hypertension     Hyponatremia     Impaired fasting glucose 07/13/2015    Intermittent lightheadedness     Myocardiopathy (HCC)     Neuropathy     Obesity     Osteoarthritis     both knees    PAD (peripheral artery disease)     Pericardial tamponade     S/P total knee arthroplasty 07/13/2015    Snores     Spontaneous intracranial hemorrhage (HCC)     Stroke, hemorrhagic (HCC) 08/18/2019       SURGICAL HISTORY       Past Surgical History:   Procedure Laterality Date    CAROTID ENDARTERECTOMY Left 02/02/2016    CATARACT EXTRACTION EXTRACAPSULAR W/ INTRAOCULAR LENS IMPLANTATION Bilateral     COLONOSCOPY      COLONOSCOPY N/A 01/04/2023    COLONOSCOPY POLYPECTOMY SNARE/COLD BIOPSY performed by Valery Long MD at [facility] END[facility]

## 2025-04-23 NOTE — H&P (VIEW-ONLY)
HISTORY and PHYSICAL  [facility]       NAME:  Dexter Campoverde  MRN: [id number]   YOB: 1945   Date: 4/23/2025   Age: 79 y.o.  Gender: male     COMPLAINT AND PRESENT HISTORY:   Dexter Campoverde is 79 y.o.,  male, presents for pre-anesthesia/admission testing for KNEE TOTAL ARTHROPLASTY RIGHT per Dr. Mathur.  Primary dx: Primary osteoarthritis of right knee [M17.11].      Office note per Dr Mathur on 4/9/225  History of Present Illness:  This is a 79 y.o. male who presents to the clinic today for evaluation / follow up of severe right knee pain.  Patient is a history of a left total knee arthroplasty done by me 15 years ago.  He has been seen Yazmin in the past and most recently today.  She has seen him with a repeat x-rays and it showed that his right knee has shows bone-on-bone disease throughout the entire knee and he is severely painful.     Of note patient has had a history of a CVA which makes his left side weak.  He is also diabetic but his hemoglobin A1c is well-controlled..   Labs and Imaging:   XR taken today: X-rays taken today reviewed by me as show standing AP of both knees lateral the right knee.  Patient has end-stage degenerative disease of the right knee with bone-on-bone apposition throughout with a varus disposition.  Patient noted to have severe patellofemoral arthrosis as well in the lateral view.  Patient with a history of left total knee arthroplasty in the left the components appear to be in stable position alignment.      UPDATE 4/23/2025  Dexter Campoverde is 79 y.o.,  male, presents for pre-anesthesia/admission testing for KNEE TOTAL ARTHROPLASTY RIGHT per Dr. Mathur.  Primary dx: Primary osteoarthritis of right knee [M17.11].    Pt arrived in PAT with use of walker for ambulation safety h/o CVA    Pt c/o pain to right medial knee pain  Describes pain as ache increases in intensity when he first gets up in the morning.  Pain increased in the afternoon and helps to use heating

## 2025-04-23 NOTE — DISCHARGE INSTRUCTIONS
Pre-op Instructions For Out-Patient Surgery    Medication Instructions:  Please stop herbs and any supplements now (includes vitamins and minerals).    For these medications:  Dulaglutide (Trulicity), Exenatide (Byetta and Bydureon, Liraglutide (Victoza), Lixisenatide (Adlyxin), Semaglutide (Ozempic and Rybelsus), Tirzepatide (Mounjaro, Zepbound)- Stop 1 week prior if taking weekly or 1 day prior if taking every 12 hours or daily.     Please contact your surgeon and prescribing physician for pre-op instructions for any blood thinners. Stop Plavix as directed    If you have inhalers/aerosol treatments at home, please use them the morning of your surgery and bring the inhalers with you to the hospital.    Please take the following medications the morning of your surgery with a sip of water:    Metoprolol, Amlodipine, Famotidine    Surgery Instructions:  After midnight before surgery:  Do not eat or drink anything, including water, mints, gum, and hard candy.  You may brush your teeth without swallowing.  No smoking, chewing tobacco, or street drugs.    Please shower or bathe before surgery.  If you were given Surgical Scrub Chlorhexidine Gluconate Liquid (CHG), please shower the night before and the morning of your surgery following the detailed instructions you received during your pre-admission visit.     Please do not wear any cologne, lotion, powder, deodorant, jewelry, piercings, perfume, makeup, nail polish, hair accessories, or hair spray on the day of surgery.  Wear loose comfortable clothing.    Leave your valuables at home but bring a payment source for any after-surgery prescriptions you plan to fill at [facility].  Bring a storage case for any glasses/contacts.    An adult who is responsible for you MUST drive you home and should be with you for the first 24 hours after surgery.     If having out-patient knee and foot surgeries, please arrange for planned crutches,

## 2025-04-24 LAB
MRSA, DNA, NASAL: NEGATIVE
SPECIMEN DESCRIPTION: NORMAL

## 2025-04-25 ENCOUNTER — OFFICE VISIT (OUTPATIENT)
Dept: FAMILY MEDICINE CLINIC | Age: 80
End: 2025-04-25

## 2025-04-25 ENCOUNTER — RESULTS FOLLOW-UP (OUTPATIENT)
Dept: FAMILY MEDICINE CLINIC | Age: 80
End: 2025-04-25

## 2025-04-25 VITALS
SYSTOLIC BLOOD PRESSURE: 124 MMHG | WEIGHT: 249 LBS | HEIGHT: 70 IN | OXYGEN SATURATION: 99 % | BODY MASS INDEX: 35.65 KG/M2 | HEART RATE: 80 BPM | DIASTOLIC BLOOD PRESSURE: 60 MMHG

## 2025-04-25 DIAGNOSIS — I25.10 CORONARY ARTERY DISEASE INVOLVING NATIVE CORONARY ARTERY OF NATIVE HEART WITHOUT ANGINA PECTORIS: ICD-10-CM

## 2025-04-25 DIAGNOSIS — E04.1 THYROID NODULE: ICD-10-CM

## 2025-04-25 DIAGNOSIS — E11.40 TYPE 2 DIABETES MELLITUS WITH DIABETIC NEUROPATHY, WITHOUT LONG-TERM CURRENT USE OF INSULIN (HCC): ICD-10-CM

## 2025-04-25 DIAGNOSIS — M17.0 PRIMARY OSTEOARTHRITIS OF BOTH KNEES: ICD-10-CM

## 2025-04-25 DIAGNOSIS — I10 ESSENTIAL HYPERTENSION: ICD-10-CM

## 2025-04-25 DIAGNOSIS — Z01.818 PRE-OPERATIVE CLEARANCE: Primary | ICD-10-CM

## 2025-04-25 LAB
ALB/CREAT RATIO, POC: <30 MG/G
ALBUMIN, URINE, POC: 30 MG/L
CREATININE, URINE, POC: 200 MG/DL
HBA1C MFR BLD: 6.5 %

## 2025-04-25 RX ORDER — METFORMIN HYDROCHLORIDE 500 MG/1
500 TABLET, EXTENDED RELEASE ORAL
Qty: 90 TABLET | Refills: 1 | Status: SHIPPED | OUTPATIENT
Start: 2025-04-25

## 2025-04-25 ASSESSMENT — ENCOUNTER SYMPTOMS
SINUS PRESSURE: 0
TROUBLE SWALLOWING: 0
COUGH: 0
ABDOMINAL DISTENTION: 0
SORE THROAT: 0
ABDOMINAL PAIN: 0
BACK PAIN: 1
VOMITING: 0
RECTAL PAIN: 0
COLOR CHANGE: 0
WHEEZING: 0
CHEST TIGHTNESS: 0
EYE REDNESS: 0
SHORTNESS OF BREATH: 0
STRIDOR: 0
BLOOD IN STOOL: 0
DIARRHEA: 0
RHINORRHEA: 0
NAUSEA: 0
CONSTIPATION: 0

## 2025-04-25 NOTE — PROGRESS NOTES
Visit Information    Have you changed or started any medications since your last visit including any over-the-counter medicines, vitamins, or herbal medicines? no   Have you stopped taking any of your medications? Is so, why? -  no  Are you having any side effects from any of your medications? - no    Have you seen any other physician or provider since your last visit?  yes -     Have you had any other diagnostic tests since your last visit?  yes -     Have you been seen in the emergency room and/or had an admission in a hospital since we last saw you?  no   Have you had your routine dental cleaning in the past 6 months?  no     Do you have an active MyChart account? If no, what is the barrier?  Yes    Patient Care Team:  Andie Glover MD as PCP - General (Family Medicine)  Andie Glover MD as PCP - Empaneled Provider  Valery Long MD as Consulting Physician (Gastroenterology)    Medical History Review  Past Medical, Family, and Social History reviewed and does contribute to the patient presenting condition    Health Maintenance   Topic Date Due    Respiratory Syncytial Virus (RSV) Pregnant or age 60 yrs+ (1 - 1-dose 75+ series) Never done    Diabetic Alb to Cr ratio (uACR) test  10/17/2023    COVID-19 Vaccine (3 - 2024-25 season) 09/01/2024    Lipids  06/03/2025    Depression Monitoring  02/26/2026    GFR test (Diabetes, CKD 3-4, OR last GFR 15-59)  04/23/2026    Colorectal Cancer Screen  01/31/2029    DTaP/Tdap/Td vaccine (2 - Td or Tdap) 08/05/2033    Annual Wellness Visit (Medicare Advantage)  Completed    Flu vaccine  Completed    Shingles vaccine  Completed    Pneumococcal 50+ years Vaccine  Completed    Hepatitis A vaccine  Aged Out    Hepatitis B vaccine  Aged Out    Hib vaccine  Aged Out    Polio vaccine  Aged Out    Meningococcal (ACWY) vaccine  Aged Out    Meningococcal B vaccine  Aged Out    A1C test (Diabetic or Prediabetic)  Discontinued    Hepatitis C screen  Discontinued

## 2025-04-25 NOTE — PROGRESS NOTES
Chief Complaint   Patient presents with    medical clearance      Total knee replacement right knee     The patient (or guardian, if applicable) and other individuals in attendance with the patient were advised that Artificial Intelligence will be utilized during this visit to record, process the conversation to generate a clinical note, and support improvement of the AI technology. The patient (or guardian, if applicable) and other individuals in attendance at the appointment consented to the use of AI, including the recording.                    medical clearance  (Total knee replacement right knee)      History of Present Illness  The patient presents for preoperative clearance for a total right knee replacement surgery.    A total right knee replacement surgery is scheduled for 05/06/2025. A preoperative evaluation was completed on 04/24/2025 at [facility], which included an EKG and blood work. Clearance from a heart doctor was obtained, confirming that the heart is in good condition. No chest pain or shortness of breath is reported. Pain medication prescribed by a pain management specialist provides some relief but does not completely alleviate the pain. Discontinuation of Plavix is advised 7 days prior to the surgery.  Patient is up-to-date on blood work and EKG.  Patient is cleared by cardiologist.  Lab work is up-to-date.        A history of thyroid nodules is noted, with a repeat thyroid ultrasound scheduled for 06/2025.    Blood sugar levels have worsened, with an A1c increase to 6.5. The patient does not currently take medication for blood sugar control but has previously taken metformin without adverse effects.    Patient has history of thyroid nodule 2.6 cm which needs to be monitored yearly for 5 years is due for ultrasound of the thyroid and Joanna.      Hypertension controlled, denies any lightheadedness or dizziness.    Results  Labs   - A1c: 6.5    Diagnostic Testing   - EKG: Normal      /60

## 2025-05-05 ENCOUNTER — ANESTHESIA EVENT (OUTPATIENT)
Dept: OPERATING ROOM | Age: 80
End: 2025-05-05
Payer: MEDICARE

## 2025-05-05 DIAGNOSIS — F33.1 MODERATE EPISODE OF RECURRENT MAJOR DEPRESSIVE DISORDER (HCC): ICD-10-CM

## 2025-05-05 DIAGNOSIS — G93.32 CHRONIC FATIGUE SYNDROME: ICD-10-CM

## 2025-05-05 RX ORDER — DULOXETIN HYDROCHLORIDE 60 MG/1
60 CAPSULE, DELAYED RELEASE ORAL DAILY
Qty: 90 CAPSULE | Refills: 0 | Status: ON HOLD | OUTPATIENT
Start: 2025-05-05

## 2025-05-05 NOTE — TELEPHONE ENCOUNTER
Please Approve or Refuse.  Send to Pharmacy per Pt's Request:      Next Visit Date:  5/28/2025   Last Visit Date: 4/25/2025    Hemoglobin A1C (%)   Date Value   04/25/2025 6.5 (H)   01/23/2025 5.9   10/22/2024 6.1             ( goal A1C is < 7)   BP Readings from Last 3 Encounters:   04/23/25 112/66   04/25/25 124/60   02/26/25 130/80          (goal 120/80)  BUN   Date Value Ref Range Status   04/23/2025 19 8 - 23 mg/dL Final     Creatinine   Date Value Ref Range Status   04/23/2025 0.7 0.7 - 1.2 mg/dL Final     Potassium   Date Value Ref Range Status   04/23/2025 4.6 3.7 - 5.3 mmol/L Final

## 2025-05-05 NOTE — PRE-CERTIFICATION NOTE
No answer, left message ?       YES                     Unable to leave message ?    When were you told to arrive at hospital ?  1030    Do you have a  ?    Are you on any blood thinners ?                     If yes when did you stop taking ?    Do you have your prep Rx filled and instruction ?      Nothing to eat the day before , only clear liquids.    Are you experiencing any covid symptoms ?     Do you have any infections or rash we should be aware of ?      Do you have the Hibiclens soap to use the night before and the morning of surgery ?    Nothing to eat or drink after midnight, only a sip of water to take any medication instructed to take the night before.  Wear comfortable clothing, leave any valuables at home, remove any jewelry and body piercing .

## 2025-05-06 ENCOUNTER — APPOINTMENT (OUTPATIENT)
Dept: GENERAL RADIOLOGY | Age: 80
DRG: 470 | End: 2025-05-06
Attending: ORTHOPAEDIC SURGERY
Payer: MEDICARE

## 2025-05-06 ENCOUNTER — ANESTHESIA (OUTPATIENT)
Dept: OPERATING ROOM | Age: 80
End: 2025-05-06
Payer: MEDICARE

## 2025-05-06 ENCOUNTER — HOSPITAL ENCOUNTER (INPATIENT)
Age: 80
LOS: 5 days | Discharge: NURSING, REHAB OR LONG-TERM CARE | DRG: 470 | End: 2025-05-12
Attending: ORTHOPAEDIC SURGERY | Admitting: ORTHOPAEDIC SURGERY
Payer: MEDICARE

## 2025-05-06 DIAGNOSIS — M17.11 PRIMARY OSTEOARTHRITIS OF RIGHT KNEE: Primary | ICD-10-CM

## 2025-05-06 LAB — GLUCOSE BLD-MCNC: 151 MG/DL (ref 75–110)

## 2025-05-06 PROCEDURE — C1713 ANCHOR/SCREW BN/BN,TIS/BN: HCPCS | Performed by: ORTHOPAEDIC SURGERY

## 2025-05-06 PROCEDURE — 97535 SELF CARE MNGMENT TRAINING: CPT

## 2025-05-06 PROCEDURE — 97116 GAIT TRAINING THERAPY: CPT

## 2025-05-06 PROCEDURE — 64447 NJX AA&/STRD FEMORAL NRV IMG: CPT | Performed by: ANESTHESIOLOGY

## 2025-05-06 PROCEDURE — 7100000001 HC PACU RECOVERY - ADDTL 15 MIN: Performed by: ORTHOPAEDIC SURGERY

## 2025-05-06 PROCEDURE — 3600000013 HC SURGERY LEVEL 3 ADDTL 15MIN: Performed by: ORTHOPAEDIC SURGERY

## 2025-05-06 PROCEDURE — 2580000003 HC RX 258: Performed by: ORTHOPAEDIC SURGERY

## 2025-05-06 PROCEDURE — 6360000002 HC RX W HCPCS: Performed by: ANESTHESIOLOGY

## 2025-05-06 PROCEDURE — 2580000003 HC RX 258: Performed by: ANESTHESIOLOGY

## 2025-05-06 PROCEDURE — 6360000002 HC RX W HCPCS: Performed by: NURSE ANESTHETIST, CERTIFIED REGISTERED

## 2025-05-06 PROCEDURE — 6370000000 HC RX 637 (ALT 250 FOR IP): Performed by: NURSE PRACTITIONER

## 2025-05-06 PROCEDURE — 97166 OT EVAL MOD COMPLEX 45 MIN: CPT

## 2025-05-06 PROCEDURE — 6360000002 HC RX W HCPCS: Performed by: ORTHOPAEDIC SURGERY

## 2025-05-06 PROCEDURE — 2500000003 HC RX 250 WO HCPCS: Performed by: ORTHOPAEDIC SURGERY

## 2025-05-06 PROCEDURE — 2500000003 HC RX 250 WO HCPCS: Performed by: NURSE ANESTHETIST, CERTIFIED REGISTERED

## 2025-05-06 PROCEDURE — 3700000001 HC ADD 15 MINUTES (ANESTHESIA): Performed by: ORTHOPAEDIC SURGERY

## 2025-05-06 PROCEDURE — 6370000000 HC RX 637 (ALT 250 FOR IP): Performed by: ORTHOPAEDIC SURGERY

## 2025-05-06 PROCEDURE — 3600000003 HC SURGERY LEVEL 3 BASE: Performed by: ORTHOPAEDIC SURGERY

## 2025-05-06 PROCEDURE — 0SRC0J9 REPLACEMENT OF RIGHT KNEE JOINT WITH SYNTHETIC SUBSTITUTE, CEMENTED, OPEN APPROACH: ICD-10-PCS | Performed by: ORTHOPAEDIC SURGERY

## 2025-05-06 PROCEDURE — C1776 JOINT DEVICE (IMPLANTABLE): HCPCS | Performed by: ORTHOPAEDIC SURGERY

## 2025-05-06 PROCEDURE — 82947 ASSAY GLUCOSE BLOOD QUANT: CPT

## 2025-05-06 PROCEDURE — 6370000000 HC RX 637 (ALT 250 FOR IP)

## 2025-05-06 PROCEDURE — 2709999900 HC NON-CHARGEABLE SUPPLY: Performed by: ORTHOPAEDIC SURGERY

## 2025-05-06 PROCEDURE — 3700000000 HC ANESTHESIA ATTENDED CARE: Performed by: ORTHOPAEDIC SURGERY

## 2025-05-06 PROCEDURE — 97162 PT EVAL MOD COMPLEX 30 MIN: CPT

## 2025-05-06 PROCEDURE — 73560 X-RAY EXAM OF KNEE 1 OR 2: CPT

## 2025-05-06 PROCEDURE — 7100000000 HC PACU RECOVERY - FIRST 15 MIN: Performed by: ORTHOPAEDIC SURGERY

## 2025-05-06 PROCEDURE — 2720000010 HC SURG SUPPLY STERILE: Performed by: ORTHOPAEDIC SURGERY

## 2025-05-06 DEVICE — IMPLANTABLE DEVICE
Type: IMPLANTABLE DEVICE | Site: KNEE | Status: FUNCTIONAL
Brand: BIOMET® BONE CEMENT R

## 2025-05-06 DEVICE — IMPLANTABLE DEVICE
Type: IMPLANTABLE DEVICE | Site: KNEE | Status: FUNCTIONAL
Brand: BIOMET® KNEE SYSTEM

## 2025-05-06 DEVICE — IMPLANTABLE DEVICE
Type: IMPLANTABLE DEVICE | Site: KNEE | Status: FUNCTIONAL
Brand: VANGUARD® KNEE SYSTEM

## 2025-05-06 RX ORDER — GABAPENTIN 600 MG/1
300 TABLET ORAL ONCE
Status: COMPLETED | OUTPATIENT
Start: 2025-05-06 | End: 2025-05-06

## 2025-05-06 RX ORDER — SODIUM CHLORIDE 9 MG/ML
INJECTION, SOLUTION INTRAVENOUS PRN
Status: DISCONTINUED | OUTPATIENT
Start: 2025-05-06 | End: 2025-05-06 | Stop reason: HOSPADM

## 2025-05-06 RX ORDER — CLOPIDOGREL BISULFATE 75 MG/1
75 TABLET ORAL DAILY
Status: DISCONTINUED | OUTPATIENT
Start: 2025-05-07 | End: 2025-05-12 | Stop reason: HOSPADM

## 2025-05-06 RX ORDER — ACETAMINOPHEN 500 MG
1000 TABLET ORAL ONCE
Status: COMPLETED | OUTPATIENT
Start: 2025-05-06 | End: 2025-05-06

## 2025-05-06 RX ORDER — SODIUM CHLORIDE 0.9 % (FLUSH) 0.9 %
5-40 SYRINGE (ML) INJECTION PRN
Status: DISCONTINUED | OUTPATIENT
Start: 2025-05-06 | End: 2025-05-06 | Stop reason: HOSPADM

## 2025-05-06 RX ORDER — SODIUM CHLORIDE 9 MG/ML
INJECTION, SOLUTION INTRAVENOUS CONTINUOUS
Status: DISCONTINUED | OUTPATIENT
Start: 2025-05-06 | End: 2025-05-06 | Stop reason: HOSPADM

## 2025-05-06 RX ORDER — DEXAMETHASONE SODIUM PHOSPHATE 4 MG/ML
INJECTION, SOLUTION INTRA-ARTICULAR; INTRALESIONAL; INTRAMUSCULAR; INTRAVENOUS; SOFT TISSUE
Status: DISCONTINUED | OUTPATIENT
Start: 2025-05-06 | End: 2025-05-06 | Stop reason: SDUPTHER

## 2025-05-06 RX ORDER — SODIUM CHLORIDE 0.9 % (FLUSH) 0.9 %
5-40 SYRINGE (ML) INJECTION EVERY 12 HOURS SCHEDULED
Status: DISCONTINUED | OUTPATIENT
Start: 2025-05-06 | End: 2025-05-12 | Stop reason: HOSPADM

## 2025-05-06 RX ORDER — FENTANYL CITRATE 50 UG/ML
INJECTION, SOLUTION INTRAMUSCULAR; INTRAVENOUS
Status: DISCONTINUED | OUTPATIENT
Start: 2025-05-06 | End: 2025-05-06 | Stop reason: SDUPTHER

## 2025-05-06 RX ORDER — ONDANSETRON 2 MG/ML
4 INJECTION INTRAMUSCULAR; INTRAVENOUS EVERY 6 HOURS PRN
Status: DISCONTINUED | OUTPATIENT
Start: 2025-05-06 | End: 2025-05-12 | Stop reason: HOSPADM

## 2025-05-06 RX ORDER — METOPROLOL SUCCINATE 25 MG/1
25 TABLET, EXTENDED RELEASE ORAL DAILY
Status: DISCONTINUED | OUTPATIENT
Start: 2025-05-07 | End: 2025-05-12 | Stop reason: HOSPADM

## 2025-05-06 RX ORDER — LIDOCAINE HYDROCHLORIDE 10 MG/ML
1 INJECTION, SOLUTION EPIDURAL; INFILTRATION; INTRACAUDAL; PERINEURAL
Status: DISCONTINUED | OUTPATIENT
Start: 2025-05-06 | End: 2025-05-06 | Stop reason: HOSPADM

## 2025-05-06 RX ORDER — MIDAZOLAM HYDROCHLORIDE 2 MG/2ML
INJECTION, SOLUTION INTRAMUSCULAR; INTRAVENOUS
Status: DISCONTINUED | OUTPATIENT
Start: 2025-05-06 | End: 2025-05-06 | Stop reason: SDUPTHER

## 2025-05-06 RX ORDER — PROPOFOL 10 MG/ML
INJECTION, EMULSION INTRAVENOUS
Status: DISCONTINUED | OUTPATIENT
Start: 2025-05-06 | End: 2025-05-06 | Stop reason: SDUPTHER

## 2025-05-06 RX ORDER — CHLORHEXIDINE GLUCONATE ORAL RINSE 1.2 MG/ML
SOLUTION DENTAL
Status: COMPLETED
Start: 2025-05-06 | End: 2025-05-06

## 2025-05-06 RX ORDER — AMLODIPINE BESYLATE 5 MG/1
5 TABLET ORAL DAILY
Status: DISCONTINUED | OUTPATIENT
Start: 2025-05-07 | End: 2025-05-07

## 2025-05-06 RX ORDER — BUPIVACAINE HYDROCHLORIDE 7.5 MG/ML
INJECTION, SOLUTION INTRASPINAL
Status: COMPLETED | OUTPATIENT
Start: 2025-05-06 | End: 2025-05-06

## 2025-05-06 RX ORDER — DIPHENHYDRAMINE HYDROCHLORIDE 50 MG/ML
12.5 INJECTION, SOLUTION INTRAMUSCULAR; INTRAVENOUS
Status: DISCONTINUED | OUTPATIENT
Start: 2025-05-06 | End: 2025-05-06 | Stop reason: HOSPADM

## 2025-05-06 RX ORDER — HYDROCODONE BITARTRATE AND ACETAMINOPHEN 5; 325 MG/1; MG/1
1 TABLET ORAL EVERY 4 HOURS PRN
Qty: 42 TABLET | Refills: 0 | Status: SHIPPED | OUTPATIENT
Start: 2025-05-06 | End: 2025-05-11

## 2025-05-06 RX ORDER — FAMOTIDINE 20 MG/1
20 TABLET, FILM COATED ORAL 2 TIMES DAILY
Status: DISCONTINUED | OUTPATIENT
Start: 2025-05-06 | End: 2025-05-12 | Stop reason: HOSPADM

## 2025-05-06 RX ORDER — CALCIUM CHLORIDE 100 MG/ML
INJECTION INTRAVENOUS; INTRAVENTRICULAR PRN
Status: DISCONTINUED | OUTPATIENT
Start: 2025-05-06 | End: 2025-05-06 | Stop reason: ALTCHOICE

## 2025-05-06 RX ORDER — SODIUM CHLORIDE 0.9 % (FLUSH) 0.9 %
5-40 SYRINGE (ML) INJECTION EVERY 12 HOURS SCHEDULED
Status: DISCONTINUED | OUTPATIENT
Start: 2025-05-06 | End: 2025-05-06 | Stop reason: HOSPADM

## 2025-05-06 RX ORDER — DONEPEZIL HYDROCHLORIDE 10 MG/1
10 TABLET, FILM COATED ORAL NIGHTLY
Status: DISCONTINUED | OUTPATIENT
Start: 2025-05-06 | End: 2025-05-12 | Stop reason: HOSPADM

## 2025-05-06 RX ORDER — FENTANYL CITRATE 0.05 MG/ML
50 INJECTION, SOLUTION INTRAMUSCULAR; INTRAVENOUS EVERY 5 MIN PRN
Status: DISCONTINUED | OUTPATIENT
Start: 2025-05-06 | End: 2025-05-06 | Stop reason: HOSPADM

## 2025-05-06 RX ORDER — LIDOCAINE HYDROCHLORIDE 20 MG/ML
INJECTION, SOLUTION INFILTRATION; PERINEURAL
Status: DISCONTINUED | OUTPATIENT
Start: 2025-05-06 | End: 2025-05-06 | Stop reason: SDUPTHER

## 2025-05-06 RX ORDER — ROPIVACAINE HYDROCHLORIDE 5 MG/ML
INJECTION, SOLUTION EPIDURAL; INFILTRATION; PERINEURAL
Status: DISCONTINUED | OUTPATIENT
Start: 2025-05-06 | End: 2025-05-06 | Stop reason: SDUPTHER

## 2025-05-06 RX ORDER — SODIUM CHLORIDE 9 MG/ML
INJECTION, SOLUTION INTRAVENOUS PRN
Status: DISCONTINUED | OUTPATIENT
Start: 2025-05-06 | End: 2025-05-12 | Stop reason: HOSPADM

## 2025-05-06 RX ORDER — TAMSULOSIN HYDROCHLORIDE 0.4 MG/1
0.4 CAPSULE ORAL DAILY
Status: DISCONTINUED | OUTPATIENT
Start: 2025-05-07 | End: 2025-05-12 | Stop reason: HOSPADM

## 2025-05-06 RX ORDER — SODIUM CHLORIDE 0.9 % (FLUSH) 0.9 %
5-40 SYRINGE (ML) INJECTION PRN
Status: DISCONTINUED | OUTPATIENT
Start: 2025-05-06 | End: 2025-05-12 | Stop reason: HOSPADM

## 2025-05-06 RX ORDER — DULOXETIN HYDROCHLORIDE 60 MG/1
60 CAPSULE, DELAYED RELEASE ORAL DAILY
Status: DISCONTINUED | OUTPATIENT
Start: 2025-05-07 | End: 2025-05-12 | Stop reason: HOSPADM

## 2025-05-06 RX ORDER — OXYCODONE HYDROCHLORIDE 5 MG/1
5 TABLET ORAL EVERY 4 HOURS PRN
Status: DISCONTINUED | OUTPATIENT
Start: 2025-05-06 | End: 2025-05-06

## 2025-05-06 RX ORDER — TRANEXAMIC ACID 100 MG/ML
INJECTION, SOLUTION INTRAVENOUS
Status: DISCONTINUED | OUTPATIENT
Start: 2025-05-06 | End: 2025-05-06 | Stop reason: SDUPTHER

## 2025-05-06 RX ORDER — ACETAMINOPHEN 325 MG/1
650 TABLET ORAL EVERY 6 HOURS
Status: DISCONTINUED | OUTPATIENT
Start: 2025-05-06 | End: 2025-05-12 | Stop reason: HOSPADM

## 2025-05-06 RX ORDER — LISINOPRIL 5 MG/1
2.5 TABLET ORAL DAILY
Status: DISCONTINUED | OUTPATIENT
Start: 2025-05-07 | End: 2025-05-07

## 2025-05-06 RX ORDER — DEXAMETHASONE SODIUM PHOSPHATE 10 MG/ML
10 INJECTION, SOLUTION INTRAMUSCULAR; INTRAVENOUS ONCE
Status: COMPLETED | OUTPATIENT
Start: 2025-05-06 | End: 2025-05-06

## 2025-05-06 RX ORDER — NITROGLYCERIN 0.4 MG/1
0.4 TABLET SUBLINGUAL EVERY 5 MIN PRN
Status: DISCONTINUED | OUTPATIENT
Start: 2025-05-06 | End: 2025-05-12 | Stop reason: HOSPADM

## 2025-05-06 RX ORDER — HYDROCODONE BITARTRATE AND ACETAMINOPHEN 5; 325 MG/1; MG/1
1 TABLET ORAL EVERY 6 HOURS PRN
Refills: 0 | Status: DISCONTINUED | OUTPATIENT
Start: 2025-05-06 | End: 2025-05-12 | Stop reason: HOSPADM

## 2025-05-06 RX ORDER — ROSUVASTATIN CALCIUM 40 MG/1
40 TABLET, COATED ORAL DAILY
Status: DISCONTINUED | OUTPATIENT
Start: 2025-05-07 | End: 2025-05-12 | Stop reason: HOSPADM

## 2025-05-06 RX ORDER — FENTANYL CITRATE 0.05 MG/ML
25 INJECTION, SOLUTION INTRAMUSCULAR; INTRAVENOUS EVERY 5 MIN PRN
Status: DISCONTINUED | OUTPATIENT
Start: 2025-05-06 | End: 2025-05-06 | Stop reason: HOSPADM

## 2025-05-06 RX ORDER — ONDANSETRON 2 MG/ML
INJECTION INTRAMUSCULAR; INTRAVENOUS
Status: DISCONTINUED | OUTPATIENT
Start: 2025-05-06 | End: 2025-05-06 | Stop reason: SDUPTHER

## 2025-05-06 RX ORDER — ONDANSETRON 2 MG/ML
4 INJECTION INTRAMUSCULAR; INTRAVENOUS
Status: DISCONTINUED | OUTPATIENT
Start: 2025-05-06 | End: 2025-05-06 | Stop reason: HOSPADM

## 2025-05-06 RX ORDER — OXYCODONE HYDROCHLORIDE 10 MG/1
10 TABLET ORAL EVERY 4 HOURS PRN
Status: DISCONTINUED | OUTPATIENT
Start: 2025-05-06 | End: 2025-05-06

## 2025-05-06 RX ORDER — SODIUM CHLORIDE, SODIUM LACTATE, POTASSIUM CHLORIDE, CALCIUM CHLORIDE 600; 310; 30; 20 MG/100ML; MG/100ML; MG/100ML; MG/100ML
INJECTION, SOLUTION INTRAVENOUS CONTINUOUS
Status: DISCONTINUED | OUTPATIENT
Start: 2025-05-06 | End: 2025-05-07

## 2025-05-06 RX ORDER — LIDOCAINE HYDROCHLORIDE 20 MG/ML
INJECTION, SOLUTION EPIDURAL; INFILTRATION; INTRACAUDAL; PERINEURAL
Status: DISCONTINUED | OUTPATIENT
Start: 2025-05-06 | End: 2025-05-06 | Stop reason: SDUPTHER

## 2025-05-06 RX ADMIN — PROPOFOL 30 MG: 10 INJECTION, EMULSION INTRAVENOUS at 12:43

## 2025-05-06 RX ADMIN — Medication 2000 MG: at 12:43

## 2025-05-06 RX ADMIN — SODIUM CHLORIDE, SODIUM LACTATE, POTASSIUM CHLORIDE, AND CALCIUM CHLORIDE: .6; .31; .03; .02 INJECTION, SOLUTION INTRAVENOUS at 23:02

## 2025-05-06 RX ADMIN — BUPIVACAINE HYDROCHLORIDE IN DEXTROSE 12 MG: 7.5 INJECTION, SOLUTION SUBARACHNOID at 12:39

## 2025-05-06 RX ADMIN — FAMOTIDINE 20 MG: 20 TABLET, FILM COATED ORAL at 22:56

## 2025-05-06 RX ADMIN — ACETAMINOPHEN 1000 MG: 500 TABLET ORAL at 10:54

## 2025-05-06 RX ADMIN — TRANEXAMIC ACID 1000 MG: 100 INJECTION, SOLUTION INTRAVENOUS at 14:20

## 2025-05-06 RX ADMIN — TRANEXAMIC ACID 1000 MG: 100 INJECTION, SOLUTION INTRAVENOUS at 12:45

## 2025-05-06 RX ADMIN — PROPOFOL 50 MCG/KG/MIN: 10 INJECTION, EMULSION INTRAVENOUS at 12:43

## 2025-05-06 RX ADMIN — LIDOCAINE HYDROCHLORIDE 10 ML: 20 INJECTION, SOLUTION EPIDURAL; INFILTRATION; INTRACAUDAL; PERINEURAL at 15:09

## 2025-05-06 RX ADMIN — FENTANYL CITRATE 25 MCG: 50 INJECTION INTRAMUSCULAR; INTRAVENOUS at 15:06

## 2025-05-06 RX ADMIN — DEXAMETHASONE SODIUM PHOSPHATE 4 MG: 4 INJECTION INTRA-ARTICULAR; INTRALESIONAL; INTRAMUSCULAR; INTRAVENOUS; SOFT TISSUE at 15:09

## 2025-05-06 RX ADMIN — ACETAMINOPHEN 650 MG: 325 TABLET ORAL at 22:56

## 2025-05-06 RX ADMIN — SODIUM CHLORIDE: 900 INJECTION, SOLUTION INTRAVENOUS at 15:58

## 2025-05-06 RX ADMIN — LIDOCAINE HYDROCHLORIDE 10 MG: 20 INJECTION, SOLUTION EPIDURAL; INFILTRATION; INTRACAUDAL; PERINEURAL at 12:43

## 2025-05-06 RX ADMIN — DONEPEZIL HYDROCHLORIDE 10 MG: 10 TABLET, FILM COATED ORAL at 22:56

## 2025-05-06 RX ADMIN — ROPIVACAINE HYDROCHLORIDE 20 ML: 5 INJECTION EPIDURAL; INFILTRATION; PERINEURAL at 15:09

## 2025-05-06 RX ADMIN — FENTANYL CITRATE 25 MCG: 50 INJECTION INTRAMUSCULAR; INTRAVENOUS at 14:01

## 2025-05-06 RX ADMIN — Medication 2000 MG: at 20:07

## 2025-05-06 RX ADMIN — SODIUM CHLORIDE: 900 INJECTION, SOLUTION INTRAVENOUS at 12:29

## 2025-05-06 RX ADMIN — ONDANSETRON 4 MG: 2 INJECTION, SOLUTION INTRAMUSCULAR; INTRAVENOUS at 12:29

## 2025-05-06 RX ADMIN — MIDAZOLAM HYDROCHLORIDE 1 MG: 1 INJECTION, SOLUTION INTRAMUSCULAR; INTRAVENOUS at 12:29

## 2025-05-06 RX ADMIN — FENTANYL CITRATE 25 MCG: 50 INJECTION INTRAMUSCULAR; INTRAVENOUS at 14:12

## 2025-05-06 RX ADMIN — FENTANYL CITRATE 25 MCG: 50 INJECTION INTRAMUSCULAR; INTRAVENOUS at 15:24

## 2025-05-06 RX ADMIN — CHLORHEXIDINE GLUCONATE 0.12% ORAL RINSE 15 ML: 1.2 LIQUID ORAL at 10:55

## 2025-05-06 RX ADMIN — DEXAMETHASONE SODIUM PHOSPHATE 10 MG: 10 INJECTION, SOLUTION INTRAMUSCULAR; INTRAVENOUS at 11:25

## 2025-05-06 RX ADMIN — GABAPENTIN 300 MG: 600 TABLET, FILM COATED ORAL at 10:54

## 2025-05-06 RX ADMIN — FENTANYL CITRATE 50 MCG: 50 INJECTION INTRAMUSCULAR; INTRAVENOUS at 14:22

## 2025-05-06 ASSESSMENT — PAIN - FUNCTIONAL ASSESSMENT
PAIN_FUNCTIONAL_ASSESSMENT: NONE - DENIES PAIN
PAIN_FUNCTIONAL_ASSESSMENT: ACTIVITIES ARE NOT PREVENTED
PAIN_FUNCTIONAL_ASSESSMENT: 0-10

## 2025-05-06 ASSESSMENT — PAIN SCALES - GENERAL
PAINLEVEL_OUTOF10: 4
PAINLEVEL_OUTOF10: 6
PAINLEVEL_OUTOF10: 6
PAINLEVEL_OUTOF10: 2
PAINLEVEL_OUTOF10: 3

## 2025-05-06 ASSESSMENT — PAIN DESCRIPTION - PAIN TYPE
TYPE: SURGICAL PAIN
TYPE: SURGICAL PAIN

## 2025-05-06 ASSESSMENT — PAIN DESCRIPTION - DESCRIPTORS
DESCRIPTORS: ACHING;DISCOMFORT
DESCRIPTORS: ACHING;SORE
DESCRIPTORS: ACHING;SORE
DESCRIPTORS: SORE

## 2025-05-06 ASSESSMENT — PAIN DESCRIPTION - ORIENTATION
ORIENTATION: RIGHT

## 2025-05-06 ASSESSMENT — PAIN DESCRIPTION - LOCATION
LOCATION: KNEE

## 2025-05-06 NOTE — ACP (ADVANCE CARE PLANNING)
Advance Care Planning     Advance Care Planning Activator (Inpatient)  Conversation Note      Date of ACP Conversation: 5/6/2025     Conversation Conducted with: Patient with Decision Making Capacity    ACP Activator: Christine Jackson RN      Health Care Decision Maker:     Current Designated Health Care Decision Maker:     Primary Decision Maker: Candida Campoverde - Spouse - [phone]    Today we documented Decision Maker(s) consistent with Legal Next of Kin hierarchy.    Care Preferences    Ventilation:  \"If you were in your present state of health and suddenly became very ill and were unable to breathe on your own, what would your preference be about the use of a ventilator (breathing machine) if it were available to you?\"      Would the patient desire the use of ventilator (breathing machine)?: yes    \"If your health worsens and it becomes clear that your chance of recovery is unlikely, what would your preference be about the use of a ventilator (breathing machine) if it were available to you?\"     Would the patient desire the use of ventilator (breathing machine)?: No      Resuscitation  \"CPR works best to restart the heart when there is a sudden event, like a heart attack, in someone who is otherwise healthy. Unfortunately, CPR does not typically restart the heart for people who have serious health conditions or who are very sick.\"    \"In the event your heart stopped as a result of an underlying serious health condition, would you want attempts to be made to restart your heart (answer \"yes\" for attempt to resuscitate) or would you prefer a natural death (answer \"no\" for do not attempt to resuscitate)?\" yes       [] Yes   [] No   Educated Patient / Decision Maker regarding differences between Advance Directives and portable DNR orders.    Length of ACP Conversation in minutes:      Conversation Outcomes:  ACP discussion completed    Follow-up plan:    [] Schedule follow-up conversation to continue planning  []

## 2025-05-06 NOTE — DISCHARGE INSTRUCTIONS
DISCHARGE INSTRUCTIONS  Caring for yourself after joint replacement surgery (Total Hip and Total Knee Replacement)    Activity and Therapy  Receive physical therapy three times per week. (Pain medication one hour prior to therapy)   Perform PT exercises on own when not receiving home or outpatient PT.  Ideally exercises should be at least two times a day.  Increase level of activity and ambulation each day.  Perform deep breathing exercises daily.  Patient provides self-care when possible.  Work on Range of motion for Total knee patients.   No pillow under the knee for Total knee patients.  Elevate the surgical leg when seated.  No driving until cleared by surgeon      Diet:  Increase oral intake of fruits, fiber and water to prevent constipation.  Drink fluids frequently and take stool softeners to aid in bowel motility.  Increase protein intake/reduce high-sugar intake to help promote healing and prevent infection.    Incision Care:  Keep Primaseal or other dressing intact and do not remove. If dressing becomes saturated or falls off, please call surgeon's office.  Stephen Hose on in the am and off in the pm to reduce swelling.  Ice affected area four times a day, for twenty minutes.    Pain Medications and Anticoagulant  You have been place on an anticoagulant to prevent blood clots.  Take this medication exactly as prescribed.  Be alert for signs of bleeding.  Take care not to injure yourself.  You have been provided pain medicine to control your pain.  Do not take more narcotics than prescribed.  You may begin weaning from narcotics as your pain level improves by decreasing the amount or frequency of the narcotics.  You may also take plain acetaminophen as an alternate to the narcotics.   Never exceed the recommended dosage.   Ice, rest and elevating the surgical limb also help with pain control.      When to call the Surgeon:  Increased redness, warmth, drainage, swelling or odor from incision site.  Temperature

## 2025-05-06 NOTE — INTERVAL H&P NOTE
Update History & Physical    The patient's History and Physical of April 23, 2025 was reviewed with the patient and I examined the patient. There was no change. The surgical site was confirmed by the patient and me.     Pt undergoing KNEE TOTAL ARTHROPLASTY RIGHT per Dr. Mathur for Primary osteoarthritis of right knee.  Pt c/o ongoing right knee pain   Pt denies fever/chills, chest pain or SOB  Pt NPO since MN,   Pt denies hx MRSA  Personal hx of blood clots. 15 years ago s/p knee surgery   Anticoagulation Plavix Last dose 10 days ago  Denies personal hx and family hx of complications with anesthesia  Physical exam remains unchanged including cardiac and pulmonary assessment  See nursing flow sheet for vital signs     Cardiac Clearance in chart   Medical Clearance in chart    Electronically signed by MARKUS Levin CNP on 5/6/2025 at 10:54 AM

## 2025-05-06 NOTE — ANESTHESIA PROCEDURE NOTES
Spinal Block    Patient location during procedure: OR  End time: 5/6/2025 12:39 PM  Reason for block: procedure for pain, post-op pain management, primary anesthetic and at surgeon's request  Staffing  Performed: anesthesiologist and resident/CRNA   Anesthesiologist: Angie Carbajal MD  Resident/CRNA: Ulysses Cornejo APRN - CRNA  Performed by: Ulysses Cornejo APRN - CRNA  Authorized by: Angie Carbajal MD    Spinal Block  Patient position: sitting  Prep: Betadine  Patient monitoring: continuous pulse ox and frequent blood pressure checks  Approach: midline  Location: L4/L5  Guidance: transarterial technique  Provider prep: mask and sterile gloves  Needle  Needle type: pencil-tip   Needle gauge: 24 G  Needle length: 3.5 in  Assessment  Sensory level: T6  Swirl obtained: Yes  CSF: clear  Attempts: 1  Hemodynamics: stable  Preanesthetic Checklist  Completed: patient identified, IV checked, site marked, risks and benefits discussed, surgical/procedural consents, equipment checked, pre-op evaluation, timeout performed, anesthesia consent given, oxygen available, monitors applied/VS acknowledged, fire risk safety assessment completed and verbalized and blood product R/B/A discussed and consented

## 2025-05-06 NOTE — ANESTHESIA PROCEDURE NOTES
Peripheral Block    Patient location during procedure: PACU  Reason for block: post-op pain management and at surgeon's request  Start time: 5/6/2025 3:09 PM  End time: 5/6/2025 3:03 PM  Staffing  Performed: anesthesiologist   Anesthesiologist: Angie Carbajal MD  Performed by: Angie Carbajal MD  Authorized by: Angie Carbajal MD    Preanesthetic Checklist  Completed: patient identified, IV checked, site marked, risks and benefits discussed, surgical/procedural consents, equipment checked, pre-op evaluation, timeout performed, anesthesia consent given, oxygen available, monitors applied/VS acknowledged, fire risk safety assessment completed and verbalized and blood product R/B/A discussed and consented  Peripheral Block   Patient position: supine  Prep: ChloraPrep  Provider prep: mask and sterile gown  Patient monitoring: cardiac monitor, continuous pulse ox, frequent blood pressure checks, IV access, oxygen and responsive to questions  Block type: Saphenous  Laterality: right  Injection technique: single-shot  Guidance: ultrasound guided    Needle   Needle type: insulated echogenic nerve stimulator needle   Needle gauge: 20 G  Needle localization: ultrasound guidance  Test dose: negative  Assessment   Injection assessment: negative aspiration for heme, no paresthesia on injection, local visualized surrounding nerve on ultrasound and no intravascular symptoms  Paresthesia pain: none  Slow fractionated injection: yes  Hemodynamics: stable  Outcomes: uncomplicated and patient tolerated procedure well    Additional Notes  2% Lidocaine with Epi 1:200,000 used  Medications Administered  dexAMETHasone (DECADRON) injection 4 mg/mL - Perineural   4 mg - 5/6/2025 3:09:00 PM  lidocaine 2 % (PF) injection - Perineural   10 mL - 5/6/2025 3:09:00 PM  ropivacaine (NAROPIN) injection 0.5% - Perineural   20 mL - 5/6/2025 3:09:00 PM

## 2025-05-06 NOTE — CARE COORDINATION
Case Management Assessment  Initial Evaluation    Date/Time of Evaluation: 5/6/2025 5:12 PM  Assessment Completed by: Christine Jackson RN    If patient is discharged prior to next notation, then this note serves as note for discharge by case management.    Patient Name: Dexter Campoverde                   YOB: 1945  Diagnosis: Primary osteoarthritis of right knee [M17.11]                   Date / Time: 5/6/2025 10:15 AM    Patient Admission Status: Outpatient in a bed   Readmission Risk (Low < 19, Mod (19-27), High > 27): No data recorded  Current PCP: Andie Glover MD  PCP verified by CM? Yes    Chart Reviewed: Yes      History Provided by: Patient, Spouse  Patient Orientation: Alert and Oriented    Patient Cognition: Alert    Hospitalization in the last 30 days (Readmission):  No    If yes, Readmission Assessment in  Navigator will be completed.    Advance Directives:      Code Status: Full Code   Patient's Primary Decision Maker is: Legal Next of Kin    Primary Decision Maker: Candida Campoverde - Spouse - [phone]    Discharge Planning:    Patient lives with: Children Type of Home: House  Primary Care Giver: Self  Patient Support Systems include: Spouse/Significant Other, Family Members   Current Financial resources: Medicare  Current community resources: None  Current services prior to admission: Durable Medical Equipment            Current DME: Walker, Shower Chair, Wheelchair            Type of Home Care services:  None    ADLS  Prior functional level: Independent in ADLs/IADLs  Current functional level: Independent in ADLs/IADLs, Assistance with the following:, Bathing, Dressing, Toileting, Mobility, Shopping, Cooking, Housework    PT AM-PAC:   /24  OT AM-PAC:   /24    Family can provide assistance at DC: Yes  Would you like Case Management to discuss the discharge plan with any other family members/significant others, and if so, who? Yes (SPOUSE)  Plans to Return to Present Housing: No  Other

## 2025-05-06 NOTE — OP NOTE
Operative Note      Patient: Dexter Campoverde  YOB: 1945  MRN: [id number]    Date of Procedure: 5/6/2025    Pre-Op Diagnosis Codes:      * Primary osteoarthritis of right knee [M17.11]    Post-Op Diagnosis: Same       Procedure(s):  KNEE TOTAL ARTHROPLASTY RIGHT    Surgeon(s):  Fabio Mathur MD    Assistant:   Resident: Kristie Kirk DO Joe Bielecki, CST    Anesthesia: Spinal    Estimated Blood Loss (mL): less than 50     Complications: None    Specimens:   * No specimens in log *    Implants:  Implant Name Type Inv. Item Serial No.  Lot No. LRB No. Used Action   CEMENT BNE 40GM HI VISC RADPQ FOR REV SURG - [id number]  CEMENT BNE 40GM HI VISC RADPQ FOR REV SURG  [employer]- [id number] Right 1 Implanted   CEMENT BNE 40GM HI VISC RADPQ FOR REV SURG - [id number]  CEMENT BNE 40GM HI VISC RADPQ FOR REV SURG  [employer]- [id number] Right 1 Implanted   TRAY TIB L79MM KNEE CO CHROM FIN MOD INTLOK VANGUARD - [id number]  TRAY TIB L79MM KNEE CO CHROM FIN MOD INTLOK VANGUARD  [employer]- [id number] Right 1 Implanted   COMPONENT PAT ADN59DE [id number] STD KNEE TI ALLY S STL UHMWPE - [id number]  COMPONENT PAT MYF55RD [id number] STD KNEE TI ALLY S STL UHMWPE  [facility] [id number] Right 1 Implanted   COMPONENT FEM 75MM R KNEE CO CHROM NP CRUCE RET INTLOK ERNESTO - [id number]  COMPONENT FEM 75MM R KNEE CO CHROM NP CRUCE RET INTLOK ERNESTO  [facility] [id number] Right 1 Implanted   INSERT TIB L79MM [id number] 0DEG UNIV POLY ANT KNEE ERICA STBL - [id number]  INSERT TIB L79MM [id number] 0DEG UNIV POLY ANT KNEE ERICA STBL  [employer] [id number] Right 1 Implanted         Drains: * No LDAs found *    Findings:  Infection Present At Time Of Surgery (PATOS) (choose all levels that have infection present):  No infection present  Other Findings: Severe degenerative joint disease of the right knee with a preoperative

## 2025-05-06 NOTE — ANESTHESIA PRE PROCEDURE
Department of Anesthesiology  Preprocedure Note       Name:  Dexter Campoverde   Age:  79 y.o.  :  1945                                          MRN:  [id number]         Date:  2025      Surgeon: Surgeon(s):  Fabio Mathur MD    Procedure: Procedure(s):  KNEE TOTAL ARTHROPLASTY RIGHT    Medications prior to admission:   Prior to Admission medications    Medication Sig Start Date End Date Taking? Authorizing Provider   HYDROcodone-acetaminophen (NORCO) 5-325 MG per tablet Take 1 tablet by mouth every 4 hours as needed for Pain for up to 5 days. Intended supply: 5 days. Take lowest dose possible to manage pain Max Daily Amount: 6 tablets 25 Yes Fabio Mathur MD   DULoxetine (CYMBALTA) 60 MG extended release capsule Take 1 capsule by mouth once daily 25  Yes Andie Glover MD   acetaminophen (TYLENOL) 500 MG tablet Take 1 tablet by mouth every 6 hours as needed for Pain   Yes Provider, MD Tali   amLODIPine (NORVASC) 5 MG tablet Take 1 tablet by mouth Every Day   Yes Provider, MD Tali   simvastatin (ZOCOR) 20 MG tablet 1 tablet in the evening Orally Daily   Yes ProviderTali MD   HYDROcodone-acetaminophen (NORCO) 5-325 MG per tablet Take 1 tablet by mouth 2 times daily as needed for Pain for up to 30 days. Max Daily Amount: 2 tablets 25 Yes Genevieve Kerr, MARKUS - CNP   lisinopril (PRINIVIL;ZESTRIL) 2.5 MG tablet Take 1 tablet by mouth daily 25 Yes Andie Glover MD   metoprolol succinate (TOPROL XL) 25 MG extended release tablet Take 1 tablet by mouth daily 25  Yes Andie Glover MD   famotidine (PEPCID) 20 MG tablet Take 1 tablet by mouth 2 times daily 24  Yes Andie Glover MD   rosuvastatin (CRESTOR) 40 MG tablet Take 1 tablet by mouth daily 24  Yes Andie Glover MD   tamsulosin (FLOMAX) 0.4 MG capsule Take 1 capsule by mouth daily 24  Yes Andie Glover MD   metFORMIN (GLUCOPHAGE-XR) 500 MG extended release tablet

## 2025-05-07 PROCEDURE — 97530 THERAPEUTIC ACTIVITIES: CPT

## 2025-05-07 PROCEDURE — 6360000002 HC RX W HCPCS: Performed by: ORTHOPAEDIC SURGERY

## 2025-05-07 PROCEDURE — 6370000000 HC RX 637 (ALT 250 FOR IP): Performed by: ORTHOPAEDIC SURGERY

## 2025-05-07 PROCEDURE — 6360000002 HC RX W HCPCS: Performed by: NURSE PRACTITIONER

## 2025-05-07 PROCEDURE — 1200000000 HC SEMI PRIVATE

## 2025-05-07 PROCEDURE — 6370000000 HC RX 637 (ALT 250 FOR IP): Performed by: NURSE PRACTITIONER

## 2025-05-07 PROCEDURE — 2500000003 HC RX 250 WO HCPCS: Performed by: ORTHOPAEDIC SURGERY

## 2025-05-07 PROCEDURE — 97116 GAIT TRAINING THERAPY: CPT

## 2025-05-07 PROCEDURE — 2580000003 HC RX 258: Performed by: ORTHOPAEDIC SURGERY

## 2025-05-07 PROCEDURE — 97535 SELF CARE MNGMENT TRAINING: CPT

## 2025-05-07 PROCEDURE — 97110 THERAPEUTIC EXERCISES: CPT

## 2025-05-07 PROCEDURE — 99223 1ST HOSP IP/OBS HIGH 75: CPT | Performed by: PHYSICAL MEDICINE & REHABILITATION

## 2025-05-07 PROCEDURE — 99222 1ST HOSP IP/OBS MODERATE 55: CPT | Performed by: INTERNAL MEDICINE

## 2025-05-07 RX ORDER — HYDRALAZINE HYDROCHLORIDE 20 MG/ML
10 INJECTION INTRAMUSCULAR; INTRAVENOUS EVERY 6 HOURS PRN
Status: DISCONTINUED | OUTPATIENT
Start: 2025-05-07 | End: 2025-05-12 | Stop reason: HOSPADM

## 2025-05-07 RX ORDER — METFORMIN HYDROCHLORIDE 500 MG/1
500 TABLET, EXTENDED RELEASE ORAL
Status: DISCONTINUED | OUTPATIENT
Start: 2025-05-08 | End: 2025-05-12 | Stop reason: HOSPADM

## 2025-05-07 RX ORDER — LISINOPRIL 20 MG/1
20 TABLET ORAL DAILY
Status: DISCONTINUED | OUTPATIENT
Start: 2025-05-08 | End: 2025-05-12 | Stop reason: HOSPADM

## 2025-05-07 RX ADMIN — ROSUVASTATIN 40 MG: 40 TABLET, FILM COATED ORAL at 08:00

## 2025-05-07 RX ADMIN — AMLODIPINE BESYLATE 5 MG: 5 TABLET ORAL at 08:00

## 2025-05-07 RX ADMIN — HYDROCODONE BITARTRATE AND ACETAMINOPHEN 1 TABLET: 5; 325 TABLET ORAL at 13:34

## 2025-05-07 RX ADMIN — HYDROCODONE BITARTRATE AND ACETAMINOPHEN 1 TABLET: 5; 325 TABLET ORAL at 06:36

## 2025-05-07 RX ADMIN — CLOPIDOGREL BISULFATE 75 MG: 75 TABLET, FILM COATED ORAL at 08:00

## 2025-05-07 RX ADMIN — METOPROLOL SUCCINATE 25 MG: 25 TABLET, EXTENDED RELEASE ORAL at 08:00

## 2025-05-07 RX ADMIN — ACETAMINOPHEN 650 MG: 325 TABLET ORAL at 04:47

## 2025-05-07 RX ADMIN — ACETAMINOPHEN 650 MG: 325 TABLET ORAL at 16:59

## 2025-05-07 RX ADMIN — DULOXETINE 60 MG: 60 CAPSULE, DELAYED RELEASE ORAL at 08:00

## 2025-05-07 RX ADMIN — TAMSULOSIN HYDROCHLORIDE 0.4 MG: 0.4 CAPSULE ORAL at 08:00

## 2025-05-07 RX ADMIN — SODIUM CHLORIDE, PRESERVATIVE FREE 10 ML: 5 INJECTION INTRAVENOUS at 20:50

## 2025-05-07 RX ADMIN — FAMOTIDINE 20 MG: 20 TABLET, FILM COATED ORAL at 20:49

## 2025-05-07 RX ADMIN — DONEPEZIL HYDROCHLORIDE 10 MG: 10 TABLET, FILM COATED ORAL at 20:49

## 2025-05-07 RX ADMIN — FAMOTIDINE 20 MG: 20 TABLET, FILM COATED ORAL at 08:00

## 2025-05-07 RX ADMIN — Medication 2000 MG: at 04:46

## 2025-05-07 RX ADMIN — SODIUM CHLORIDE, SODIUM LACTATE, POTASSIUM CHLORIDE, AND CALCIUM CHLORIDE: .6; .31; .03; .02 INJECTION, SOLUTION INTRAVENOUS at 06:37

## 2025-05-07 RX ADMIN — ACETAMINOPHEN 650 MG: 325 TABLET ORAL at 11:17

## 2025-05-07 RX ADMIN — HYDRALAZINE HYDROCHLORIDE 10 MG: 20 INJECTION INTRAMUSCULAR; INTRAVENOUS at 00:44

## 2025-05-07 RX ADMIN — LISINOPRIL 2.5 MG: 5 TABLET ORAL at 08:00

## 2025-05-07 RX ADMIN — HYDROCODONE BITARTRATE AND ACETAMINOPHEN 1 TABLET: 5; 325 TABLET ORAL at 20:49

## 2025-05-07 ASSESSMENT — PAIN SCALES - GENERAL
PAINLEVEL_OUTOF10: 5
PAINLEVEL_OUTOF10: 5
PAINLEVEL_OUTOF10: 4
PAINLEVEL_OUTOF10: 8
PAINLEVEL_OUTOF10: 5
PAINLEVEL_OUTOF10: 5
PAINLEVEL_OUTOF10: 2
PAINLEVEL_OUTOF10: 7
PAINLEVEL_OUTOF10: 6
PAINLEVEL_OUTOF10: 7
PAINLEVEL_OUTOF10: 5
PAINLEVEL_OUTOF10: 4
PAINLEVEL_OUTOF10: 4

## 2025-05-07 ASSESSMENT — PAIN SCALES - WONG BAKER
WONGBAKER_NUMERICALRESPONSE: NO HURT

## 2025-05-07 ASSESSMENT — PAIN DESCRIPTION - ORIENTATION
ORIENTATION: RIGHT

## 2025-05-07 ASSESSMENT — PAIN - FUNCTIONAL ASSESSMENT
PAIN_FUNCTIONAL_ASSESSMENT: ACTIVITIES ARE NOT PREVENTED

## 2025-05-07 ASSESSMENT — PAIN DESCRIPTION - DESCRIPTORS
DESCRIPTORS: ACHING
DESCRIPTORS: ACHING
DESCRIPTORS: ACHING;DISCOMFORT
DESCRIPTORS: ACHING
DESCRIPTORS: ACHING;DISCOMFORT
DESCRIPTORS: ACHING;DISCOMFORT

## 2025-05-07 ASSESSMENT — PAIN DESCRIPTION - LOCATION
LOCATION: KNEE
LOCATION: LEG

## 2025-05-07 NOTE — PLAN OF CARE
Problem: Chronic Conditions and Co-morbidities  Goal: Patient's chronic conditions and co-morbidity symptoms are monitored and maintained or improved  5/7/2025 0937 by Lacie Nesbitt RN  Outcome: Progressing  5/7/2025 0129 by Екатерина Allen RN  Outcome: Progressing  Flowsheets (Taken 5/6/2025 2002)  Care Plan - Patient's Chronic Conditions and Co-Morbidity Symptoms are Monitored and Maintained or Improved: Monitor and assess patient's chronic conditions and comorbid symptoms for stability, deterioration, or improvement     Problem: Discharge Planning  Goal: Discharge to home or other facility with appropriate resources  5/7/2025 0937 by Lacie Nesbitt RN  Outcome: Progressing  5/7/2025 0129 by Екатерина Allen RN  Outcome: Progressing  Flowsheets (Taken 5/6/2025 2002)  Discharge to home or other facility with appropriate resources: Identify barriers to discharge with patient and caregiver     Problem: Pain  Goal: Verbalizes/displays adequate comfort level or baseline comfort level  5/7/2025 0937 by Lacie Nesbitt RN  Outcome: Progressing  5/7/2025 0129 by Екатерина Allen RN  Outcome: Progressing     Problem: Safety - Adult  Goal: Free from fall injury  5/7/2025 0937 by Lacie Nesbitt RN  Outcome: Progressing  5/7/2025 0129 by Екатерина Allen RN  Outcome: Progressing  Flowsheets (Taken 5/7/2025 0128)  Free From Fall Injury: Instruct family/caregiver on patient safety     Problem: ABCDS Injury Assessment  Goal: Absence of physical injury  5/7/2025 0937 by Lacie Nesbitt RN  Outcome: Progressing  5/7/2025 0129 by Екатерина Allen RN  Outcome: Progressing  Flowsheets (Taken 5/7/2025 0128)  Absence of Physical Injury: Implement safety measures based on patient assessment

## 2025-05-07 NOTE — PLAN OF CARE
Problem: Chronic Conditions and Co-morbidities  Goal: Patient's chronic conditions and co-morbidity symptoms are monitored and maintained or improved  Outcome: Progressing  Flowsheets (Taken 5/6/2025 2002)  Care Plan - Patient's Chronic Conditions and Co-Morbidity Symptoms are Monitored and Maintained or Improved: Monitor and assess patient's chronic conditions and comorbid symptoms for stability, deterioration, or improvement     Problem: Discharge Planning  Goal: Discharge to home or other facility with appropriate resources  Outcome: Progressing  Flowsheets (Taken 5/6/2025 2002)  Discharge to home or other facility with appropriate resources: Identify barriers to discharge with patient and caregiver     Problem: Pain  Goal: Verbalizes/displays adequate comfort level or baseline comfort level  Outcome: Progressing     Problem: Safety - Adult  Goal: Free from fall injury  Outcome: Progressing  Flowsheets (Taken 5/7/2025 0128)  Free From Fall Injury: Instruct family/caregiver on patient safety     Problem: ABCDS Injury Assessment  Goal: Absence of physical injury  Outcome: Progressing  Flowsheets (Taken 5/7/2025 0128)  Absence of Physical Injury: Implement safety measures based on patient assessment

## 2025-05-07 NOTE — CARE COORDINATION
Acute Inpatient Rehabilitation referral received via Fax    \"Inpatient Consult to PM&R - Physiatry [CON17]\" Consult Order Status: Order not yet placed by referring provider, will need placed for PM&R consult completion    PM&R physiatrist Dr. Shemar Siddiqi on service for PM&R consult.    Updated Christine CM: Via [employer] at this time.

## 2025-05-07 NOTE — CARE COORDINATION
DISCHARGE PLANNING NOTE:    LSW following for potential discharge to ARU. PM&R order placed. Awaiting determination. Will need insurance authorization submitted if patient is appropriate for inpatient rehab.     [place] following for SNF if denied for ARU.

## 2025-05-08 ENCOUNTER — APPOINTMENT (OUTPATIENT)
Dept: GENERAL RADIOLOGY | Age: 80
DRG: 470 | End: 2025-05-08
Attending: ORTHOPAEDIC SURGERY
Payer: MEDICARE

## 2025-05-08 PROCEDURE — 97535 SELF CARE MNGMENT TRAINING: CPT

## 2025-05-08 PROCEDURE — 97116 GAIT TRAINING THERAPY: CPT

## 2025-05-08 PROCEDURE — 97530 THERAPEUTIC ACTIVITIES: CPT

## 2025-05-08 PROCEDURE — 99232 SBSQ HOSP IP/OBS MODERATE 35: CPT | Performed by: PHYSICAL MEDICINE & REHABILITATION

## 2025-05-08 PROCEDURE — 1200000000 HC SEMI PRIVATE

## 2025-05-08 PROCEDURE — 6370000000 HC RX 637 (ALT 250 FOR IP): Performed by: NURSE PRACTITIONER

## 2025-05-08 PROCEDURE — 97110 THERAPEUTIC EXERCISES: CPT

## 2025-05-08 PROCEDURE — 99232 SBSQ HOSP IP/OBS MODERATE 35: CPT | Performed by: INTERNAL MEDICINE

## 2025-05-08 PROCEDURE — 6370000000 HC RX 637 (ALT 250 FOR IP): Performed by: INTERNAL MEDICINE

## 2025-05-08 PROCEDURE — 6370000000 HC RX 637 (ALT 250 FOR IP): Performed by: ORTHOPAEDIC SURGERY

## 2025-05-08 PROCEDURE — 2500000003 HC RX 250 WO HCPCS: Performed by: ORTHOPAEDIC SURGERY

## 2025-05-08 PROCEDURE — 73560 X-RAY EXAM OF KNEE 1 OR 2: CPT

## 2025-05-08 RX ADMIN — DONEPEZIL HYDROCHLORIDE 10 MG: 10 TABLET, FILM COATED ORAL at 20:59

## 2025-05-08 RX ADMIN — HYDROCODONE BITARTRATE AND ACETAMINOPHEN 1 TABLET: 5; 325 TABLET ORAL at 14:25

## 2025-05-08 RX ADMIN — SODIUM CHLORIDE, PRESERVATIVE FREE 10 ML: 5 INJECTION INTRAVENOUS at 10:09

## 2025-05-08 RX ADMIN — LISINOPRIL 20 MG: 20 TABLET ORAL at 10:04

## 2025-05-08 RX ADMIN — METFORMIN HYDROCHLORIDE 500 MG: 500 TABLET, EXTENDED RELEASE ORAL at 10:04

## 2025-05-08 RX ADMIN — SODIUM CHLORIDE, PRESERVATIVE FREE 10 ML: 5 INJECTION INTRAVENOUS at 20:59

## 2025-05-08 RX ADMIN — ROSUVASTATIN 40 MG: 40 TABLET, FILM COATED ORAL at 10:04

## 2025-05-08 RX ADMIN — ACETAMINOPHEN 650 MG: 325 TABLET ORAL at 10:05

## 2025-05-08 RX ADMIN — CLOPIDOGREL BISULFATE 75 MG: 75 TABLET, FILM COATED ORAL at 10:04

## 2025-05-08 RX ADMIN — HYDROCODONE BITARTRATE AND ACETAMINOPHEN 1 TABLET: 5; 325 TABLET ORAL at 06:38

## 2025-05-08 RX ADMIN — METOPROLOL SUCCINATE 25 MG: 25 TABLET, EXTENDED RELEASE ORAL at 10:05

## 2025-05-08 RX ADMIN — FAMOTIDINE 20 MG: 20 TABLET, FILM COATED ORAL at 10:05

## 2025-05-08 RX ADMIN — DULOXETINE 60 MG: 60 CAPSULE, DELAYED RELEASE ORAL at 10:04

## 2025-05-08 RX ADMIN — TAMSULOSIN HYDROCHLORIDE 0.4 MG: 0.4 CAPSULE ORAL at 10:04

## 2025-05-08 RX ADMIN — ACETAMINOPHEN 650 MG: 325 TABLET ORAL at 05:56

## 2025-05-08 RX ADMIN — FAMOTIDINE 20 MG: 20 TABLET, FILM COATED ORAL at 20:59

## 2025-05-08 ASSESSMENT — PAIN - FUNCTIONAL ASSESSMENT
PAIN_FUNCTIONAL_ASSESSMENT: ACTIVITIES ARE NOT PREVENTED
PAIN_FUNCTIONAL_ASSESSMENT: ACTIVITIES ARE NOT PREVENTED

## 2025-05-08 ASSESSMENT — PAIN DESCRIPTION - LOCATION
LOCATION: KNEE

## 2025-05-08 ASSESSMENT — PAIN DESCRIPTION - ORIENTATION
ORIENTATION: RIGHT

## 2025-05-08 ASSESSMENT — PAIN SCALES - GENERAL
PAINLEVEL_OUTOF10: 8
PAINLEVEL_OUTOF10: 4
PAINLEVEL_OUTOF10: 8
PAINLEVEL_OUTOF10: 8
PAINLEVEL_OUTOF10: 7

## 2025-05-08 ASSESSMENT — PAIN DESCRIPTION - DESCRIPTORS
DESCRIPTORS: ACHING;BURNING
DESCRIPTORS: ACHING;DISCOMFORT
DESCRIPTORS: ACHING;BURNING
DESCRIPTORS: ACHING
DESCRIPTORS: ACHING;DISCOMFORT

## 2025-05-08 NOTE — PLAN OF CARE
Problem: Chronic Conditions and Co-morbidities  Goal: Patient's chronic conditions and co-morbidity symptoms are monitored and maintained or improved  Outcome: Progressing  Flowsheets (Taken 5/7/2025 2050)  Care Plan - Patient's Chronic Conditions and Co-Morbidity Symptoms are Monitored and Maintained or Improved: Monitor and assess patient's chronic conditions and comorbid symptoms for stability, deterioration, or improvement     Problem: Discharge Planning  Goal: Discharge to home or other facility with appropriate resources  Outcome: Progressing  Flowsheets (Taken 5/7/2025 2050)  Discharge to home or other facility with appropriate resources: Identify barriers to discharge with patient and caregiver     Problem: Pain  Goal: Verbalizes/displays adequate comfort level or baseline comfort level  Outcome: Progressing     Problem: Safety - Adult  Goal: Free from fall injury  Outcome: Progressing  Flowsheets (Taken 5/8/2025 0221)  Free From Fall Injury: Instruct family/caregiver on patient safety     Problem: ABCDS Injury Assessment  Goal: Absence of physical injury  Outcome: Progressing  Flowsheets (Taken 5/8/2025 0221)  Absence of Physical Injury: Implement safety measures based on patient assessment

## 2025-05-08 NOTE — CONSULTS
Physical Medicine & Rehabilitation  Consult Note      Admitting Physician:   Fabio Mathur MD    Primary Care Provider:   Andie Glover MD     Reason for Consult:  Acute Inpatient Rehabilitation    Chief Complaint: Elective right total knee arthroplasty    History of Present Illness:  Referring Provider is requesting an evaluation for appropriate placement upon discharge from acute care. History from chart review and patient    Dexter Campoverde is a 79 y.o. male admitted to [facility] on 5/6/2025.      79-year-old male with history of left total knee arthroplasty 15 years ago by Dr. Mathur.  Has bone-on-bone on the right knee failed conservative treatment, has a history of CVA with left-sided weakness as well as diabetes.  Underwent elective right total knee arthroplasty on 5/6/2025 by Dr. Mathur    Family practice history of depression, chronic fatigue syndrome, obesity chronic opioid use, diabetes with neuropathy, lumbosacral spondylosis without myelopathy, and gait instability cleared by family practice for surgery    Sees pain management-4/8 CVA with left-sided residual weakness on long-term antiplatelet therapy has chronic lumbar radicular pain MRI showed multilevel spinal stenosis most severe at L4-5 high risk for spine surgery had medial branch block 12//24 with 80% improvement had block 1/8/2025 with no relief scheduled for RFA on Criders 5/3/2025 2 times a day-continued current        Review of Systems:  Constitutional: negative for anorexia, chills, fatigue, fevers, sweats and weight loss  Eyes: negative for redness and visual disturbance  Ears, nose, mouth, throat, and face: negative for earaches, sore throat and tinnitus  Respiratory: negative for cough and shortness of breath  Cardiovascular: negative for chest pain, dyspnea and palpitations  Gastrointestinal: negative for abdominal pain, change in bowel habits, constipation, nausea and vomiting  Genitourinary:negative for dysuria, frequency, hesitancy 
coronary disease with multiple stents history of carotid endarterectomy history of intracranial hemorrhage  Admitted with osteoarthritis elective right total knee replacement  Status post right knee replacement pain controlled with oral medication no nausea vomiting no focal neurological deficit  Diabetes mellitus A1c 6.5 restart metformin 500 twice a day  Hypertension increase lisinopril to 20 mg daily and discontinue 5 mg Norvasc and Toprol-XL 25 mg daily monitor blood pressure log  Hyperlipidemia Crestor 40 mg daily  Benign prostatic hypertrophy Flomax 0.4 mg daily  DVT prophylaxis per primary surgeon  Discontinue IV fluids patient taking oral nutrition adequate    Consultations:   IP CONSULT TO SOCIAL WORK  IP CONSULT TO INTERNAL MEDICINE  IP CONSULT TO PHYSICAL MEDICINE REHAB     Patient is admitted as inpatient status because of co-morbidities listed above, severity of signs and symptoms as outlined, requirement for current medical therapies and most importantly because of direct risk to patient if care not provided in a hospital setting.  Expected length of stay > 48 hours.    Harpreet Cali MD  5/7/2025  12:23 PM    Copy sent to Andie Wood MD    Please note that this chart was generated using voice recognition Dragon dictation software.  Although every effort was made to ensure the accuracy of this automated transcription, some errors in transcription may have occurred.  
pain controlled with oral medication no nausea vomiting no focal neurological deficit  Diabetes mellitus A1c 6.5 restart metformin 500 twice a day  Hypertension increase lisinopril to 20 mg daily and discontinue 5 mg Norvasc and Toprol-XL 25 mg daily monitor blood pressure log  Hyperlipidemia Crestor 40 mg daily  Benign prostatic hypertrophy Flomax 0.4 mg daily  DVT prophylaxis per primary surgeon  Discontinue IV fluids patient taking oral nutrition adequate    Post op doing well  ARU rehab plan noted      Consultations:   IP CONSULT TO SOCIAL WORK  IP CONSULT TO INTERNAL MEDICINE  IP CONSULT TO PHYSICAL MEDICINE REHAB  IP CONSULT TO INTERNAL MEDICINE     Patient is admitted as inpatient status because of co-morbidities listed above, severity of signs and symptoms as outlined, requirement for current medical therapies and most importantly because of direct risk to patient if care not provided in a hospital setting.  Expected length of stay > 48 hours.    Harpreet Cali MD  5/8/2025  10:42 AM    Copy sent to Andie Wood MD    Please note that this chart was generated using voice recognition Dragon dictation software.  Although every effort was made to ensure the accuracy of this automated transcription, some errors in transcription may have occurred.

## 2025-05-08 NOTE — CARE COORDINATION
DISCHARGE PLANNING NOTE:    LSW following for potential discharge to ARU. PM&R completed. Patient is appropriate for inpatient rehab per Dr. Siddiqi. Message to Betsy in admissions to submit for insurance authorization today

## 2025-05-08 NOTE — CASE COMMUNICATION
ACUTE INPATIENT REHABILITATION  Financial Disclosure Statement: Eligibility and Benefit Verification    Patient Name: Dexter Campoverde MRN: [id number]     Disclosure Statement  [facility] Acute Inpatient Rehabilitation at [facility] provides 24 hour individualized service to patients with functional limitations due to but not limited to stroke, brain injury, spinal cord injury, major multiple trauma, hip fracture, amputation, and neurological disorders.  Acute Inpatient Rehabilitation provides rehabilitative nursing, physician coverage, as well as physical therapy, occupational therapy, speech therapy, recreational therapy and other services as deemed necessary by our Board Certified Physical Medicine and Rehabilitation Physicians Dr. Shemar Siddiqi, Dr. Angeline Scott, Dr. Tere Garcia and Dr. Ron Watt.    [facility] Acute Inpatient Rehabilitation at [facility] is fully accredited by the Commission on Accreditation of Rehabilitation Facilities (CARF) and The Joint Commission (TJC).    At a minimum, you will receive 5 days of therapy services totaling at least 15 hours per week. Your treatment program will consist of physical therapy at least 7.5 hours per week; occupational therapy 7.5 hours per week; and speech therapy 1.5 hours per week, as applicable.    Your estimated length of stay is currently:  Approximately 2 Weeks  Please Note: Estimated length of stay is based on individual condition and Acute Inpatient Rehabilitation specific needs. Length of stay may vary based upon interdisciplinary team assessment, insurance approval, and patient progression.    Your insurance coverage has been verified as follows:   Date Verified: 05/07/2025   Method:  Online Portal    Primary Insurance: [employer]   Member/Subscriber ID: [id number]  Coverage: Per Benefit Period  Plan Effective Date: 01/01/2023 Status: active   Deductible: $0.00   Co-Pay: $0.00  Co-Insurance: 0%  Maximum Out of  September 12, 2020      Nereyda Olmos MD  [address] [address]           [facility]  [address]  [place] [address]  Phone: [phone]          Patient: Laura Arce   MR Number: [id number]   YOB: 2021   Date of Visit: 2020       Dear Dr. Nereyda Olmos:    Thank you for referring Laura Arce to me for evaluation. Attached you will find relevant portions of my assessment and plan of care.    If you have questions, please do not hesitate to call me. I look forward to following Laura Arce along with you.    Sincerely,    Yolanda Beltran MD    Enclosure  CC:  No Recipients    If you would like to receive this communication electronically, please contact [email or web address] or [phone] to request more information on [employer] Link access.    For providers and/or their staff who would like to refer a patient to [facility], please contact us through our one-stop-shop provider referral line, [facility], at [phone].    If you feel you have received this communication in error or would no longer like to receive these types of communications, please e-mail [email or web address]

## 2025-05-08 NOTE — CARE COORDINATION
Per PM&R physiatrist Dr. Shemar Siddiqi, patient appropriate for Acute Inpatient Rehabilitation level of care.    Eligibility & Benefits Verified - See Note    Prior authorization request for admission initiated with primary insurance [employer] via: Online Portal: [employer]     Pending Case Reference/Authorization # [id number]     Clinical documentation submitted via Online Portal: [employer]     Updated Sanam FLYNN:  Perfect [employer]  at this time.

## 2025-05-09 PROCEDURE — 6360000002 HC RX W HCPCS: Performed by: INTERNAL MEDICINE

## 2025-05-09 PROCEDURE — 97110 THERAPEUTIC EXERCISES: CPT

## 2025-05-09 PROCEDURE — 99232 SBSQ HOSP IP/OBS MODERATE 35: CPT | Performed by: INTERNAL MEDICINE

## 2025-05-09 PROCEDURE — 6370000000 HC RX 637 (ALT 250 FOR IP): Performed by: INTERNAL MEDICINE

## 2025-05-09 PROCEDURE — 6370000000 HC RX 637 (ALT 250 FOR IP): Performed by: NURSE PRACTITIONER

## 2025-05-09 PROCEDURE — 1200000000 HC SEMI PRIVATE

## 2025-05-09 PROCEDURE — 97530 THERAPEUTIC ACTIVITIES: CPT

## 2025-05-09 PROCEDURE — 97535 SELF CARE MNGMENT TRAINING: CPT

## 2025-05-09 PROCEDURE — 6370000000 HC RX 637 (ALT 250 FOR IP): Performed by: ORTHOPAEDIC SURGERY

## 2025-05-09 PROCEDURE — 97116 GAIT TRAINING THERAPY: CPT

## 2025-05-09 PROCEDURE — 2500000003 HC RX 250 WO HCPCS: Performed by: ORTHOPAEDIC SURGERY

## 2025-05-09 RX ORDER — ENOXAPARIN SODIUM 100 MG/ML
30 INJECTION SUBCUTANEOUS 2 TIMES DAILY
Status: DISCONTINUED | OUTPATIENT
Start: 2025-05-09 | End: 2025-05-12 | Stop reason: HOSPADM

## 2025-05-09 RX ADMIN — FAMOTIDINE 20 MG: 20 TABLET, FILM COATED ORAL at 20:22

## 2025-05-09 RX ADMIN — ENOXAPARIN SODIUM 30 MG: 100 INJECTION SUBCUTANEOUS at 09:41

## 2025-05-09 RX ADMIN — SODIUM CHLORIDE, PRESERVATIVE FREE 10 ML: 5 INJECTION INTRAVENOUS at 09:06

## 2025-05-09 RX ADMIN — ROSUVASTATIN 40 MG: 40 TABLET, FILM COATED ORAL at 09:04

## 2025-05-09 RX ADMIN — TAMSULOSIN HYDROCHLORIDE 0.4 MG: 0.4 CAPSULE ORAL at 09:04

## 2025-05-09 RX ADMIN — ACETAMINOPHEN 650 MG: 325 TABLET ORAL at 17:12

## 2025-05-09 RX ADMIN — CLOPIDOGREL BISULFATE 75 MG: 75 TABLET, FILM COATED ORAL at 09:04

## 2025-05-09 RX ADMIN — METFORMIN HYDROCHLORIDE 500 MG: 500 TABLET, EXTENDED RELEASE ORAL at 09:04

## 2025-05-09 RX ADMIN — ACETAMINOPHEN 650 MG: 325 TABLET ORAL at 05:40

## 2025-05-09 RX ADMIN — ENOXAPARIN SODIUM 30 MG: 100 INJECTION SUBCUTANEOUS at 20:22

## 2025-05-09 RX ADMIN — FAMOTIDINE 20 MG: 20 TABLET, FILM COATED ORAL at 09:04

## 2025-05-09 RX ADMIN — DONEPEZIL HYDROCHLORIDE 10 MG: 10 TABLET, FILM COATED ORAL at 20:22

## 2025-05-09 RX ADMIN — LISINOPRIL 20 MG: 20 TABLET ORAL at 09:04

## 2025-05-09 RX ADMIN — ACETAMINOPHEN 650 MG: 325 TABLET ORAL at 11:21

## 2025-05-09 RX ADMIN — DULOXETINE 60 MG: 60 CAPSULE, DELAYED RELEASE ORAL at 09:04

## 2025-05-09 RX ADMIN — METOPROLOL SUCCINATE 25 MG: 25 TABLET, EXTENDED RELEASE ORAL at 09:04

## 2025-05-09 RX ADMIN — SODIUM CHLORIDE, PRESERVATIVE FREE 10 ML: 5 INJECTION INTRAVENOUS at 20:22

## 2025-05-09 ASSESSMENT — PAIN DESCRIPTION - ORIENTATION
ORIENTATION: RIGHT
ORIENTATION: RIGHT

## 2025-05-09 ASSESSMENT — PAIN DESCRIPTION - LOCATION
LOCATION: KNEE
LOCATION: KNEE

## 2025-05-09 ASSESSMENT — PAIN DESCRIPTION - DESCRIPTORS
DESCRIPTORS: ACHING;SHARP
DESCRIPTORS: ACHING

## 2025-05-09 ASSESSMENT — PAIN SCALES - GENERAL
PAINLEVEL_OUTOF10: 7
PAINLEVEL_OUTOF10: 7

## 2025-05-09 NOTE — CARE COORDINATION
DISCHARGE PLANNING NOTE:    Writer is following for potential discharge to [facility]. Perfect[employer] message placed to ALBERT Meyer with ARU to check authorization status. Peer to peer offered, scheduled for 1300 today.     Writer met with pt for update on peer to peer. Agreeable to referral sent to [facility] for back up. Referral sent.    Referral previously sent to [facility] by ALBERT King. Pt states he is aware, but was treated very well by [facility] during a prior admission.    Electronically signed by ANJANA Sanders on 5/9/2025 at 9:33 AM

## 2025-05-09 NOTE — CARE COORDINATION
Received Voicemail from payor [employer] representative Minerva. Call Ref#[id number]    Intent to deny admission to Acute Inpatient Rehabilitation Stay under Auth/CaseRef# [id number]     Rationale to deny Acute Inpatient Rehabilitation level of care: Does not meet inpatient rehab criteria    Peer to Peer Deadline: schedule by  at 3:30pm est    Peer to Peer Instructions: Call [phone] to schedule. Fax clinical updates to [phone]  Minerva contact info [phone] ext [phone]    Per PM&R physiatrist Dr. Shemar Siddiqi, Pursue peer to peer to attempt to overturn denial: Scheduled for 1pm    Updated Cammy CM:  anne  at this time.    Name: Dexter Campoverde  : 1945  Member ID: [id number]   MRN: [id number]  2025 1pm  MD will call PM&R

## 2025-05-09 NOTE — CARE COORDINATION
DISCHARGE PLANNING NOTE:    Call received from [facility], [facility] denies d/t cost of care.     Message received from ALBERT Chawla with ARU to confirm denial was upheld. Writer met with pt and spouse, agreeable to [facility] [place].    Writer placed call to Minerva with [facility] to check referral status. No answer, voicemail left for return call.    Electronically signed by ANJANA Sanders on 5/9/2025 at 11:15 AM

## 2025-05-09 NOTE — PLAN OF CARE
Problem: Discharge Planning  Goal: Discharge to home or other facility with appropriate resources  5/9/2025 0232 by Cristel Botello, RN  Outcome: Progressing     Problem: Pain  Goal: Verbalizes/displays adequate comfort level or baseline comfort level  5/9/2025 0232 by Cristel Botello, RN  Outcome: Progressing     Problem: Safety - Adult  Goal: Free from fall injury  5/9/2025 0232 by Cristel Botello, RN  Outcome: Progressing

## 2025-05-09 NOTE — PLAN OF CARE
Problem: Chronic Conditions and Co-morbidities  Goal: Patient's chronic conditions and co-morbidity symptoms are monitored and maintained or improved  5/9/2025 1707 by Marya Humphreys RN  Outcome: Progressing  5/9/2025 1446 by Marya Humphreys RN  Outcome: Progressing     Problem: Discharge Planning  Goal: Discharge to home or other facility with appropriate resources  5/9/2025 1707 by Marya Humphreys RN  Outcome: Progressing  5/9/2025 1446 by Marya Humphreys RN  Outcome: Progressing     Problem: Pain  Goal: Verbalizes/displays adequate comfort level or baseline comfort level  5/9/2025 1707 by Marya Humphreys RN  Outcome: Progressing  5/9/2025 1446 by Marya Humphreys RN  Outcome: Progressing     Problem: Safety - Adult  Goal: Free from fall injury  5/9/2025 1707 by Marya Humphreys RN  Outcome: Progressing  5/9/2025 1446 by Marya Humphreys RN  Outcome: Progressing  Flowsheets (Taken 5/9/2025 0904)  Free From Fall Injury: Instruct family/caregiver on patient safety     Problem: ABCDS Injury Assessment  Goal: Absence of physical injury  5/9/2025 1707 by Marya Humphreys RN  Outcome: Progressing  5/9/2025 1446 by Marya Humphreys RN  Outcome: Progressing  Flowsheets (Taken 5/9/2025 0904)  Absence of Physical Injury: Implement safety measures based on patient assessment

## 2025-05-09 NOTE — CARE COORDINATION
DISCHARGE PLANNING NOTE:    Message received from Elisa Medeiros, facility accepts and will start authorization for this pt.    Writer met with pt for update, all questions answered at this time.    IMM letter provided to patient.  Patient offered four hours to make informed decision regarding appeal process; patient agreeable to discharge.     Electronically signed by ANJANA Sanders on 5/9/2025 at 12:09 PM

## 2025-05-09 NOTE — PLAN OF CARE
Problem: Chronic Conditions and Co-morbidities  Goal: Patient's chronic conditions and co-morbidity symptoms are monitored and maintained or improved  Outcome: Progressing     Problem: Discharge Planning  Goal: Discharge to home or other facility with appropriate resources  5/9/2025 1446 by Marya Humphreys RN  Outcome: Progressing  5/9/2025 0232 by Cristel Botello RN  Outcome: Progressing     Problem: Pain  Goal: Verbalizes/displays adequate comfort level or baseline comfort level  5/9/2025 1446 by Marya Humphreys RN  Outcome: Progressing  5/9/2025 0232 by Cristel Botello RN  Outcome: Progressing     Problem: Safety - Adult  Goal: Free from fall injury  5/9/2025 1446 by Marya Humphreys RN  Outcome: Progressing  Flowsheets (Taken 5/9/2025 0904)  Free From Fall Injury: Instruct family/caregiver on patient safety  5/9/2025 0232 by Cristel Botello RN  Outcome: Progressing     Problem: ABCDS Injury Assessment  Goal: Absence of physical injury  Outcome: Progressing  Flowsheets (Taken 5/9/2025 0904)  Absence of Physical Injury: Implement safety measures based on patient assessment

## 2025-05-10 PROCEDURE — 6370000000 HC RX 637 (ALT 250 FOR IP): Performed by: INTERNAL MEDICINE

## 2025-05-10 PROCEDURE — 2500000003 HC RX 250 WO HCPCS: Performed by: ORTHOPAEDIC SURGERY

## 2025-05-10 PROCEDURE — 6370000000 HC RX 637 (ALT 250 FOR IP): Performed by: NURSE PRACTITIONER

## 2025-05-10 PROCEDURE — 1200000000 HC SEMI PRIVATE

## 2025-05-10 PROCEDURE — 6360000002 HC RX W HCPCS: Performed by: INTERNAL MEDICINE

## 2025-05-10 PROCEDURE — 97116 GAIT TRAINING THERAPY: CPT

## 2025-05-10 PROCEDURE — 97110 THERAPEUTIC EXERCISES: CPT

## 2025-05-10 PROCEDURE — 6370000000 HC RX 637 (ALT 250 FOR IP): Performed by: ORTHOPAEDIC SURGERY

## 2025-05-10 PROCEDURE — 99233 SBSQ HOSP IP/OBS HIGH 50: CPT

## 2025-05-10 RX ADMIN — ENOXAPARIN SODIUM 30 MG: 100 INJECTION SUBCUTANEOUS at 19:46

## 2025-05-10 RX ADMIN — LISINOPRIL 20 MG: 20 TABLET ORAL at 07:50

## 2025-05-10 RX ADMIN — ENOXAPARIN SODIUM 30 MG: 100 INJECTION SUBCUTANEOUS at 07:50

## 2025-05-10 RX ADMIN — FAMOTIDINE 20 MG: 20 TABLET, FILM COATED ORAL at 07:50

## 2025-05-10 RX ADMIN — ROSUVASTATIN 40 MG: 40 TABLET, FILM COATED ORAL at 07:50

## 2025-05-10 RX ADMIN — DULOXETINE 60 MG: 60 CAPSULE, DELAYED RELEASE ORAL at 07:50

## 2025-05-10 RX ADMIN — CLOPIDOGREL BISULFATE 75 MG: 75 TABLET, FILM COATED ORAL at 07:49

## 2025-05-10 RX ADMIN — ACETAMINOPHEN 650 MG: 325 TABLET ORAL at 10:38

## 2025-05-10 RX ADMIN — METFORMIN HYDROCHLORIDE 500 MG: 500 TABLET, EXTENDED RELEASE ORAL at 07:49

## 2025-05-10 RX ADMIN — ACETAMINOPHEN 650 MG: 325 TABLET ORAL at 05:48

## 2025-05-10 RX ADMIN — ACETAMINOPHEN 650 MG: 325 TABLET ORAL at 17:18

## 2025-05-10 RX ADMIN — TAMSULOSIN HYDROCHLORIDE 0.4 MG: 0.4 CAPSULE ORAL at 07:50

## 2025-05-10 RX ADMIN — FAMOTIDINE 20 MG: 20 TABLET, FILM COATED ORAL at 19:46

## 2025-05-10 RX ADMIN — SODIUM CHLORIDE, PRESERVATIVE FREE 10 ML: 5 INJECTION INTRAVENOUS at 08:23

## 2025-05-10 RX ADMIN — METOPROLOL SUCCINATE 25 MG: 25 TABLET, EXTENDED RELEASE ORAL at 08:24

## 2025-05-10 RX ADMIN — HYDROCODONE BITARTRATE AND ACETAMINOPHEN 1 TABLET: 5; 325 TABLET ORAL at 14:13

## 2025-05-10 RX ADMIN — DONEPEZIL HYDROCHLORIDE 10 MG: 10 TABLET, FILM COATED ORAL at 19:46

## 2025-05-10 RX ADMIN — SODIUM CHLORIDE, PRESERVATIVE FREE 10 ML: 5 INJECTION INTRAVENOUS at 19:46

## 2025-05-10 ASSESSMENT — PAIN SCALES - GENERAL
PAINLEVEL_OUTOF10: 6
PAINLEVEL_OUTOF10: 7
PAINLEVEL_OUTOF10: 3
PAINLEVEL_OUTOF10: 3
PAINLEVEL_OUTOF10: 5
PAINLEVEL_OUTOF10: 8

## 2025-05-10 ASSESSMENT — PAIN DESCRIPTION - LOCATION
LOCATION: KNEE
LOCATION: LEG

## 2025-05-10 ASSESSMENT — PAIN DESCRIPTION - ORIENTATION
ORIENTATION: RIGHT

## 2025-05-10 ASSESSMENT — PAIN DESCRIPTION - DESCRIPTORS
DESCRIPTORS: ACHING
DESCRIPTORS: ACHING;SHARP
DESCRIPTORS: THROBBING;BURNING
DESCRIPTORS: ACHING

## 2025-05-10 ASSESSMENT — PAIN SCALES - WONG BAKER: WONGBAKER_NUMERICALRESPONSE: HURTS A LITTLE BIT

## 2025-05-10 NOTE — CARE COORDINATION
Case Management   Daily Progress Note       Patient Name: Dexter Campoverde                   YOB: 1945  Diagnosis: Primary osteoarthritis of right knee [M17.11]                       GMLOS: 1.7 days  Length of Stay: 3  days    Readmission Risk (Low < 19, Mod (19-27), High > 27): Readmission Risk Score: 7.6      Patient is alert and oriented.    Spoke with Pt, and Current Transitional Plan is:    [] Home Independently    [] Home with HC    [x] Skilled Nursing Facility, [facility].     [] Acute Rehabilitation    [] Long Term Acute Care (LTAC)    [] Other:     Medical Management: Pt is POD #4, RTKA w/ Ortho.     Testing Ordered: NA    Additional Notes: Awaiting for Auth from Insurance, which was started on 5/9/25, By Facility.     Electronically signed by Pream Garland RN on 5/10/2025 at 9:00 AM

## 2025-05-10 NOTE — PLAN OF CARE
Problem: Chronic Conditions and Co-morbidities  Goal: Patient's chronic conditions and co-morbidity symptoms are monitored and maintained or improved  Outcome: Progressing  Flowsheets (Taken 5/10/2025 0812)  Care Plan - Patient's Chronic Conditions and Co-Morbidity Symptoms are Monitored and Maintained or Improved:   Monitor and assess patient's chronic conditions and comorbid symptoms for stability, deterioration, or improvement   Collaborate with multidisciplinary team to address chronic and comorbid conditions and prevent exacerbation or deterioration   Update acute care plan with appropriate goals if chronic or comorbid symptoms are exacerbated and prevent overall improvement and discharge     Problem: Discharge Planning  Goal: Discharge to home or other facility with appropriate resources  Outcome: Progressing  Flowsheets (Taken 5/10/2025 0812)  Discharge to home or other facility with appropriate resources:   Identify barriers to discharge with patient and caregiver   Arrange for needed discharge resources and transportation as appropriate   Identify discharge learning needs (meds, wound care, etc)   Refer to discharge planning if patient needs post-hospital services based on physician order or complex needs related to functional status, cognitive ability or social support system     Problem: Pain  Goal: Verbalizes/displays adequate comfort level or baseline comfort level  Outcome: Progressing     Problem: Safety - Adult  Goal: Free from fall injury  Outcome: Progressing  Flowsheets (Taken 5/10/2025 1112)  Free From Fall Injury: Instruct family/caregiver on patient safety     Problem: ABCDS Injury Assessment  Goal: Absence of physical injury  Outcome: Progressing  Flowsheets (Taken 5/10/2025 1112)  Absence of Physical Injury: Implement safety measures based on patient assessment

## 2025-05-10 NOTE — PLAN OF CARE
Problem: Discharge Planning  Goal: Discharge to home or other facility with appropriate resources  5/10/2025 0007 by Cristel Botello RN  Outcome: Progressing     Problem: Pain  Goal: Verbalizes/displays adequate comfort level or baseline comfort level  5/10/2025 0007 by Cristel Botello, RN  Outcome: Progressing     Problem: Safety - Adult  Goal: Free from fall injury  5/10/2025 0007 by Cristel Botello RN  Outcome: Progressing

## 2025-05-10 NOTE — CARE COORDINATION
Writer was notified by Elisa, from [facility], that they have received Pre- Cert.     Writer spoke to Brenda, from [facility]. They do NOT have any WC transport available for today, Not available til Monday.  Would need to use [employer].     Writer was notified by Nurse, Shania, that Per Dr. Mathur, DC is planned for Monday.     Writer did Notify, Elisa, from [facility] of above & she states, Pre-Cert should be good Until Monday as long as he admits by 11PM.     Writer did inform Charge Nurse, Leeann as well.

## 2025-05-11 ENCOUNTER — APPOINTMENT (OUTPATIENT)
Dept: CT IMAGING | Age: 80
DRG: 470 | End: 2025-05-11
Attending: ORTHOPAEDIC SURGERY
Payer: MEDICARE

## 2025-05-11 PROCEDURE — 6360000002 HC RX W HCPCS: Performed by: INTERNAL MEDICINE

## 2025-05-11 PROCEDURE — 6370000000 HC RX 637 (ALT 250 FOR IP): Performed by: INTERNAL MEDICINE

## 2025-05-11 PROCEDURE — 6370000000 HC RX 637 (ALT 250 FOR IP): Performed by: ORTHOPAEDIC SURGERY

## 2025-05-11 PROCEDURE — 97110 THERAPEUTIC EXERCISES: CPT

## 2025-05-11 PROCEDURE — 70450 CT HEAD/BRAIN W/O DYE: CPT

## 2025-05-11 PROCEDURE — 99233 SBSQ HOSP IP/OBS HIGH 50: CPT

## 2025-05-11 PROCEDURE — 2500000003 HC RX 250 WO HCPCS: Performed by: ORTHOPAEDIC SURGERY

## 2025-05-11 PROCEDURE — 6370000000 HC RX 637 (ALT 250 FOR IP): Performed by: NURSE PRACTITIONER

## 2025-05-11 PROCEDURE — 1200000000 HC SEMI PRIVATE

## 2025-05-11 RX ADMIN — LISINOPRIL 20 MG: 20 TABLET ORAL at 08:13

## 2025-05-11 RX ADMIN — SODIUM CHLORIDE, PRESERVATIVE FREE 10 ML: 5 INJECTION INTRAVENOUS at 08:12

## 2025-05-11 RX ADMIN — ACETAMINOPHEN 650 MG: 325 TABLET ORAL at 10:13

## 2025-05-11 RX ADMIN — METFORMIN HYDROCHLORIDE 500 MG: 500 TABLET, EXTENDED RELEASE ORAL at 08:14

## 2025-05-11 RX ADMIN — TAMSULOSIN HYDROCHLORIDE 0.4 MG: 0.4 CAPSULE ORAL at 08:14

## 2025-05-11 RX ADMIN — METOPROLOL SUCCINATE 25 MG: 25 TABLET, EXTENDED RELEASE ORAL at 08:13

## 2025-05-11 RX ADMIN — DONEPEZIL HYDROCHLORIDE 10 MG: 10 TABLET, FILM COATED ORAL at 20:13

## 2025-05-11 RX ADMIN — ROSUVASTATIN 40 MG: 40 TABLET, FILM COATED ORAL at 08:14

## 2025-05-11 RX ADMIN — HYDROCODONE BITARTRATE AND ACETAMINOPHEN 1 TABLET: 5; 325 TABLET ORAL at 08:18

## 2025-05-11 RX ADMIN — ACETAMINOPHEN 650 MG: 325 TABLET ORAL at 06:02

## 2025-05-11 RX ADMIN — SODIUM CHLORIDE, PRESERVATIVE FREE 10 ML: 5 INJECTION INTRAVENOUS at 19:21

## 2025-05-11 RX ADMIN — ACETAMINOPHEN 650 MG: 325 TABLET ORAL at 22:14

## 2025-05-11 RX ADMIN — ENOXAPARIN SODIUM 30 MG: 100 INJECTION SUBCUTANEOUS at 21:00

## 2025-05-11 RX ADMIN — HYDROCODONE BITARTRATE AND ACETAMINOPHEN 1 TABLET: 5; 325 TABLET ORAL at 20:15

## 2025-05-11 RX ADMIN — ENOXAPARIN SODIUM 30 MG: 100 INJECTION SUBCUTANEOUS at 08:14

## 2025-05-11 RX ADMIN — FAMOTIDINE 20 MG: 20 TABLET, FILM COATED ORAL at 08:13

## 2025-05-11 RX ADMIN — FAMOTIDINE 20 MG: 20 TABLET, FILM COATED ORAL at 20:13

## 2025-05-11 RX ADMIN — ACETAMINOPHEN 650 MG: 325 TABLET ORAL at 17:03

## 2025-05-11 RX ADMIN — DULOXETINE 60 MG: 60 CAPSULE, DELAYED RELEASE ORAL at 08:13

## 2025-05-11 RX ADMIN — CLOPIDOGREL BISULFATE 75 MG: 75 TABLET, FILM COATED ORAL at 08:14

## 2025-05-11 ASSESSMENT — PAIN DESCRIPTION - LOCATION
LOCATION: OTHER (COMMENT)
LOCATION: LEG
LOCATION: KNEE
LOCATION: KNEE

## 2025-05-11 ASSESSMENT — PAIN SCALES - WONG BAKER
WONGBAKER_NUMERICALRESPONSE: HURTS A LITTLE BIT
WONGBAKER_NUMERICALRESPONSE: HURTS A LITTLE BIT

## 2025-05-11 ASSESSMENT — PAIN - FUNCTIONAL ASSESSMENT: PAIN_FUNCTIONAL_ASSESSMENT: ACTIVITIES ARE NOT PREVENTED

## 2025-05-11 ASSESSMENT — PAIN DESCRIPTION - ORIENTATION
ORIENTATION: RIGHT
ORIENTATION: RIGHT;LEFT

## 2025-05-11 ASSESSMENT — PAIN SCALES - GENERAL
PAINLEVEL_OUTOF10: 6
PAINLEVEL_OUTOF10: 5
PAINLEVEL_OUTOF10: 4
PAINLEVEL_OUTOF10: 8
PAINLEVEL_OUTOF10: 5
PAINLEVEL_OUTOF10: 4
PAINLEVEL_OUTOF10: 5

## 2025-05-11 ASSESSMENT — PAIN DESCRIPTION - DESCRIPTORS
DESCRIPTORS: ACHING

## 2025-05-11 NOTE — CARE COORDINATION
ONGOING DISCHARGE PLAN:    Plan remains for Pt. To DC to [facility].     Per Elisa, Pre-Cert has been received on 5/10/25 & should be good Until Monday as long as he admits by 11PM.    As of yesterday, Per Dr. Mathur, Plan is to DC on Monday.      Pt is POD #5, RTKA w/ Ortho.     Will continue to follow for additional discharge needs.    If patient is discharged prior to next notation, then this note serves as note for discharge by case management.    Electronically signed by Prema Garland RN on 5/11/2025 at 9:09 AM

## 2025-05-11 NOTE — PLAN OF CARE
Problem: Chronic Conditions and Co-morbidities  Goal: Patient's chronic conditions and co-morbidity symptoms are monitored and maintained or improved  5/11/2025 0304 by Gayatri Rivero RN  Outcome: Progressing    Problem: Discharge Planning  Goal: Discharge to home or other facility with appropriate resources  5/11/2025 0304 by Gayatri Rivero RN  Outcome: Progressing       Problem: Pain  Goal: Verbalizes/displays adequate comfort level or baseline comfort level  5/11/2025 0304 by Gayatri Rivero RN  Outcome: Progressing       Problem: Safety - Adult  Goal: Free from fall injury  5/11/2025 0304 by Gayatri Rivero RN  Outcome: Progressing       Problem: ABCDS Injury Assessment  Goal: Absence of physical injury  5/11/2025 0304 by Gayatri Rivero RN  Outcome: Progressing

## 2025-05-11 NOTE — PLAN OF CARE
Problem: Chronic Conditions and Co-morbidities  Goal: Patient's chronic conditions and co-morbidity symptoms are monitored and maintained or improved  5/11/2025 1612 by Shania Brar RN  Outcome: Progressing  Flowsheets (Taken 5/11/2025 0808)  Care Plan - Patient's Chronic Conditions and Co-Morbidity Symptoms are Monitored and Maintained or Improved:   Monitor and assess patient's chronic conditions and comorbid symptoms for stability, deterioration, or improvement   Collaborate with multidisciplinary team to address chronic and comorbid conditions and prevent exacerbation or deterioration   Update acute care plan with appropriate goals if chronic or comorbid symptoms are exacerbated and prevent overall improvement and discharge     Problem: Discharge Planning  Goal: Discharge to home or other facility with appropriate resources  5/11/2025 1612 by Shania Brar RN  Outcome: Progressing  Flowsheets (Taken 5/11/2025 0808)  Discharge to home or other facility with appropriate resources:   Identify barriers to discharge with patient and caregiver   Arrange for needed discharge resources and transportation as appropriate   Identify discharge learning needs (meds, wound care, etc)   Refer to discharge planning if patient needs post-hospital services based on physician order or complex needs related to functional status, cognitive ability or social support system     Problem: Pain  Goal: Verbalizes/displays adequate comfort level or baseline comfort level  5/11/2025 1612 by Shania Brar RN  Outcome: Progressing     Problem: Safety - Adult  Goal: Free from fall injury  5/11/2025 1612 by Shania Brar RN  Outcome: Progressing     Problem: ABCDS Injury Assessment  Goal: Absence of physical injury  5/11/2025 1612 by Shania Brar, RN  Outcome: Progressing

## 2025-05-12 VITALS
TEMPERATURE: 98.6 F | WEIGHT: 245 LBS | HEIGHT: 70 IN | OXYGEN SATURATION: 98 % | RESPIRATION RATE: 18 BRPM | SYSTOLIC BLOOD PRESSURE: 126 MMHG | HEART RATE: 78 BPM | BODY MASS INDEX: 35.07 KG/M2 | DIASTOLIC BLOOD PRESSURE: 68 MMHG

## 2025-05-12 PROCEDURE — 97110 THERAPEUTIC EXERCISES: CPT

## 2025-05-12 PROCEDURE — 6370000000 HC RX 637 (ALT 250 FOR IP): Performed by: NURSE PRACTITIONER

## 2025-05-12 PROCEDURE — 97535 SELF CARE MNGMENT TRAINING: CPT

## 2025-05-12 PROCEDURE — 97168 OT RE-EVAL EST PLAN CARE: CPT

## 2025-05-12 PROCEDURE — 6370000000 HC RX 637 (ALT 250 FOR IP): Performed by: ORTHOPAEDIC SURGERY

## 2025-05-12 PROCEDURE — 6360000002 HC RX W HCPCS: Performed by: INTERNAL MEDICINE

## 2025-05-12 PROCEDURE — 2500000003 HC RX 250 WO HCPCS: Performed by: ORTHOPAEDIC SURGERY

## 2025-05-12 PROCEDURE — 6370000000 HC RX 637 (ALT 250 FOR IP): Performed by: INTERNAL MEDICINE

## 2025-05-12 PROCEDURE — 97530 THERAPEUTIC ACTIVITIES: CPT

## 2025-05-12 PROCEDURE — 97116 GAIT TRAINING THERAPY: CPT

## 2025-05-12 PROCEDURE — 99232 SBSQ HOSP IP/OBS MODERATE 35: CPT | Performed by: INTERNAL MEDICINE

## 2025-05-12 RX ADMIN — TAMSULOSIN HYDROCHLORIDE 0.4 MG: 0.4 CAPSULE ORAL at 08:41

## 2025-05-12 RX ADMIN — DULOXETINE 60 MG: 60 CAPSULE, DELAYED RELEASE ORAL at 08:41

## 2025-05-12 RX ADMIN — METFORMIN HYDROCHLORIDE 500 MG: 500 TABLET, EXTENDED RELEASE ORAL at 08:42

## 2025-05-12 RX ADMIN — ACETAMINOPHEN 650 MG: 325 TABLET ORAL at 05:31

## 2025-05-12 RX ADMIN — ENOXAPARIN SODIUM 30 MG: 100 INJECTION SUBCUTANEOUS at 08:41

## 2025-05-12 RX ADMIN — ROSUVASTATIN 40 MG: 40 TABLET, FILM COATED ORAL at 08:41

## 2025-05-12 RX ADMIN — CLOPIDOGREL BISULFATE 75 MG: 75 TABLET, FILM COATED ORAL at 08:42

## 2025-05-12 RX ADMIN — METOPROLOL SUCCINATE 25 MG: 25 TABLET, EXTENDED RELEASE ORAL at 08:42

## 2025-05-12 RX ADMIN — SODIUM CHLORIDE, PRESERVATIVE FREE 10 ML: 5 INJECTION INTRAVENOUS at 11:16

## 2025-05-12 RX ADMIN — ACETAMINOPHEN 650 MG: 325 TABLET ORAL at 11:15

## 2025-05-12 RX ADMIN — LISINOPRIL 20 MG: 20 TABLET ORAL at 08:42

## 2025-05-12 RX ADMIN — FAMOTIDINE 20 MG: 20 TABLET, FILM COATED ORAL at 08:42

## 2025-05-12 ASSESSMENT — PAIN DESCRIPTION - LOCATION: LOCATION: LEG

## 2025-05-12 NOTE — CARE COORDINATION
DISCHARGE PLANNING NOTE:    Writer met with pt for update on discharge, all questions answered at this time.    IMM letter provided to patient.  Patient offered four hours to make informed decision regarding appeal process; patient agreeable to discharge.     Electronically signed by ANJANA Sanders on 5/12/2025 at 12:29 PM

## 2025-05-12 NOTE — DISCHARGE SUMMARY
Discharge Summary     Patient ID:  Dexter Campoverde  [id number]  79 y.o.  1945    Admit date: 5/6/2025    Discharge date and time: 5/12/2025  2:19 PM     Admitting Physician: Fabio Mathur MD     Admission Diagnoses: Primary osteoarthritis of right knee [M17.11]    Discharge Diagnoses: same    Admission Condition: fair    Discharged Condition: fair    Indication for Admission: Status post right total knee arthroplasty and need for placement    Hospital Course: No complication    Consults: [facility]    Treatments: surgery and PT    Disposition: SNF    Patient Instructions:   Discharge Medication List as of 5/12/2025 11:37 AM        CONTINUE these medications which have NOT CHANGED    Details   DULoxetine (CYMBALTA) 60 MG extended release capsule Take 1 capsule by mouth once daily, Disp-90 capsule, R-0Normal      metFORMIN (GLUCOPHAGE-XR) 500 MG extended release tablet Take 1 tablet by mouth daily (with breakfast), Disp-90 tablet, R-1Normal      acetaminophen (TYLENOL) 500 MG tablet Take 1 tablet by mouth every 6 hours as needed for PainHistorical Med      amLODIPine (NORVASC) 5 MG tablet Take 1 tablet by mouth Every DayHistorical Med      simvastatin (ZOCOR) 20 MG tablet 1 tablet in the evening Orally DailyHistorical Med      lisinopril (PRINIVIL;ZESTRIL) 2.5 MG tablet Take 1 tablet by mouth daily, Disp-90 tablet, R-0Normal      metoprolol succinate (TOPROL XL) 25 MG extended release tablet Take 1 tablet by mouth daily, Disp-90 tablet, R-1Normal      donepezil (ARICEPT) 10 MG tablet Take 1 tablet by mouth nightly, Disp-90 tablet, R-3Normal      famotidine (PEPCID) 20 MG tablet Take 1 tablet by mouth 2 times daily, Disp-60 tablet, R-3Normal      clopidogrel (PLAVIX) 75 MG tablet Take 1 tablet by mouth daily, Disp-90 tablet, R-3Normal      rosuvastatin (CRESTOR) 40 MG tablet Take 1 tablet by mouth daily, Disp-90 tablet, R-3Normal      tamsulosin (FLOMAX) 0.4 MG capsule Take 1 capsule by mouth daily, Disp-90

## 2025-05-12 NOTE — CARE COORDINATION
Continuity of Care Form    Patient Name: Dexter Campoverde   :  1945  MRN:  [id number]    Admit date:  2025  Discharge date:  25    Code Status Order: Full Code   Advance Directives:    Date/Time Healthcare Directive Type of Healthcare Directive Copy in Chart Healthcare Agent Appointed Healthcare Agent's Name Healthcare Agent's Phone Number    25 1047 No, patient does not have an advance directive for healthcare treatment  --  --  --  --  --             Admitting Physician:  Fabio Mathur MD  PCP: Andie Glover MD    Discharging Nurse: EDINSON Wade  Discharging Hospital Unit/Room#: ST OR Pool/NONE  Discharging Unit Phone Number: [phone]    Emergency Contact:   Extended Emergency Contact Information  Primary Emergency Contact: Candida Campoverde  Address: [facility] [address]           [address]  Home Phone: [phone]  Relation: Spouse    Past Surgical History:  Past Surgical History:   Procedure Laterality Date    CAROTID ENDARTERECTOMY Left 2016    CATARACT EXTRACTION EXTRACAPSULAR W/ INTRAOCULAR LENS IMPLANTATION Bilateral     COLONOSCOPY      COLONOSCOPY N/A 2023    COLONOSCOPY POLYPECTOMY SNARE/COLD BIOPSY performed by Valery Long MD at [facility] ENDO    COLONOSCOPY N/A 2023    COLONOSCOPY POLYPECTOMY SNARE/ HOT BIOPSY performed by Valery Long MD at [facility] ENDO    CORONARY ANGIOPLASTY WITH STENT PLACEMENT      \"total 5 stents\"    CRANIOTOMY      IR BIOPSY THYROID PERC CORE NEEDLE  2022    IR BIOPSY THYROID PERC CORE NEEDLE 2022 [facility] SPECIAL PROCEDURES    JOINT REPLACEMENT Left     knee    SHOULDER SURGERY Right     collar bone    TONSILLECTOMY AND ADENOIDECTOMY      UPPER GASTROINTESTINAL ENDOSCOPY N/A 2023    EGD BIOPSY performed by Valery Long MD at [facility] ENDO       Immunization History:   Immunization History   Administered Date(s) Administered    COVID-19, MODERNA BLUE border, Primary or Immunocompromised, (age 12y+),

## 2025-05-12 NOTE — PLAN OF CARE
Problem: Chronic Conditions and Co-morbidities  Goal: Patient's chronic conditions and co-morbidity symptoms are monitored and maintained or improved  5/12/2025 1158 by Doris Wade RN  Outcome: Adequate for Discharge     Problem: Chronic Conditions and Co-morbidities  Goal: Patient's chronic conditions and co-morbidity symptoms are monitored and maintained or improved  5/12/2025 1120 by Doris Wade RN  Outcome: Adequate for Discharge     Problem: Discharge Planning  Goal: Discharge to home or other facility with appropriate resources  5/12/2025 1158 by Doris Wade RN  Outcome: Adequate for Discharge     Problem: Pain  Goal: Verbalizes/displays adequate comfort level or baseline comfort level  5/12/2025 1158 by Doris Wade RN  Outcome: Adequate for Discharge     Problem: Safety - Adult  Goal: Free from fall injury  5/12/2025 1158 by Doris Wade RN  Outcome: Adequate for Discharge     Problem: ABCDS Injury Assessment  Goal: Absence of physical injury  5/12/2025 1158 by Doris Wade RN  Outcome: Adequate for Discharge     Problem: Skin/Tissue Integrity  Goal: Skin integrity remains intact  Description: 1.  Monitor for areas of redness and/or skin breakdown2.  Assess vascular access sites hourly3.  Every 4-6 hours minimum:  Change oxygen saturation probe site4.  Every 4-6 hours:  If on nasal continuous positive airway pressure, respiratory therapy assess nares and determine need for appliance change or resting period  5/12/2025 1158 by Doris Wade RN  Outcome: Adequate for Discharge

## 2025-05-12 NOTE — PLAN OF CARE
Problem: Chronic Conditions and Co-morbidities  Goal: Patient's chronic conditions and co-morbidity symptoms are monitored and maintained or improved  5/12/2025 0007 by Caleb Taylor RN  Outcome: Progressing  5/11/2025 1612 by Shania Brar RN  Outcome: Progressing  Flowsheets (Taken 5/11/2025 0808)  Care Plan - Patient's Chronic Conditions and Co-Morbidity Symptoms are Monitored and Maintained or Improved:   Monitor and assess patient's chronic conditions and comorbid symptoms for stability, deterioration, or improvement   Collaborate with multidisciplinary team to address chronic and comorbid conditions and prevent exacerbation or deterioration   Update acute care plan with appropriate goals if chronic or comorbid symptoms are exacerbated and prevent overall improvement and discharge     Problem: Pain  Goal: Verbalizes/displays adequate comfort level or baseline comfort level  5/12/2025 0007 by Caleb Taylor RN  Outcome: Progressing  5/11/2025 1612 by Shania Brar RN  Outcome: Progressing     Problem: Safety - Adult  Goal: Free from fall injury  5/12/2025 0007 by Caleb Taylor RN  Outcome: Progressing  5/11/2025 1612 by Shania Brar RN  Outcome: Progressing

## 2025-05-12 NOTE — PROGRESS NOTES
[facility] Internal Medicine   Harpreet Cali MD; MD Jazmin Rausch MD, MD , Shawn Lim MD    [facility] Internal Medicine   IN-PATIENT SERVICE   [facility]    Progress note            Date:   5/12/2025  Patient name:  Dexter Campoverde  Date of admission:  5/6/2025 10:15 AM  MRN:   [id number]  Account:  [id number]  YOB: 1945  PCP:    Andie Glover MD  Room:   2036/2036-01  Code Status:    Full Code    Chief Complaint:     No chief complaint on file.  Status post right total knee arthroplasty  History of diabetes mellitus hypertension hyperlipidemia      History Obtained From:     Patient medical record nursing staff    History of Present Illness:     Dexter Campoverde is a 79 y.o. Non- / non  male who presents with No chief complaint on file.   and is admitted to the hospital for the management of OA (osteoarthritis) of knee.    79-year-old gentleman class II obesity BMI 35.15 weighs 245 pounds history of diabetes mellitus history of ischemic CVA left carotid stenosis history of coronary disease with multiple stents history of carotid endarterectomy history of intracranial hemorrhage  Admitted with osteoarthritis elective right total knee replacement  Status post right knee replacement pain controlled with oral medication no nausea vomiting no focal neurological deficit  5/9   Patient has history of diabetes, hypertension, stroke, coronary artery disease status post stents carotid artery endarterectomy, history of ICH, with left-sided weakness, plan for total right knee arthroplasty  Past Medical History:     Past Medical History:   Diagnosis Date    Acquired skull defect     Angina pectoris     Anxiety     Anxiety     At high risk for falls     Ataxia following nontraumatic intracerebral hemorrhage     Bronchitis with asthma, acute 11/24/2015    CAD (coronary artery disease)     Carotid stenosis     Left    
     [facility] Internal Medicine   Harpreet Cali MD; MD Jazmin Rausch MD, MD , Shawn Lim MD    [facility] Internal Medicine   IN-PATIENT SERVICE   [facility]    Progress note            Date:   5/11/2025  Patient name:  Dexter Campoverde  Date of admission:  5/6/2025 10:15 AM  MRN:   [id number]  Account:  [id number]  YOB: 1945  PCP:    Andie Glover MD  Room:   2036/2036-01  Code Status:    Full Code    Chief Complaint:     No chief complaint on file.  Status post right total knee arthroplasty  History of diabetes mellitus hypertension hyperlipidemia      History Obtained From:     Patient medical record nursing staff    History of Present Illness:     Dexter Campoverde is a 79 y.o. Non- / non  male who presents with No chief complaint on file.   and is admitted to the hospital for the management of OA (osteoarthritis) of knee.    79-year-old gentleman class II obesity BMI 35.15 weighs 245 pounds history of diabetes mellitus history of ischemic CVA left carotid stenosis history of coronary disease with multiple stents history of carotid endarterectomy history of intracranial hemorrhage  Admitted with osteoarthritis elective right total knee replacement  Status post right knee replacement pain controlled with oral medication no nausea vomiting no focal neurological deficit      Past Medical History:     Past Medical History:   Diagnosis Date    Acquired skull defect     Angina pectoris     Anxiety     Anxiety     At high risk for falls     Ataxia following nontraumatic intracerebral hemorrhage     Bronchitis with asthma, acute 11/24/2015    CAD (coronary artery disease)     Carotid stenosis     Left    Carotid stenosis, left 02/02/2016    Cerebral artery occlusion with cerebral infarction (HCC)     Chest pain     Compression of brainstem (HCC)     Diabetes mellitus (HCC)     borderline patient off metformin    
     [facility] Internal Medicine   Harpreet Cali MD; MD Jazmin Rausch MD, MD , Shawn Lim MD    [facility] Internal Medicine   IN-PATIENT SERVICE   [facility]    Progress note            Date:   5/10/2025  Patient name:  Dexter Campoverde  Date of admission:  5/6/2025 10:15 AM  MRN:   [id number]  Account:  [id number]  YOB: 1945  PCP:    Andie Glover MD  Room:   2036/2036-01  Code Status:    Full Code    Chief Complaint:     No chief complaint on file.  Status post right total knee arthroplasty  History of diabetes mellitus hypertension hyperlipidemia      History Obtained From:     Patient medical record nursing staff    History of Present Illness:     Dexter Campoverde is a 79 y.o. Non- / non  male who presents with No chief complaint on file.   and is admitted to the hospital for the management of OA (osteoarthritis) of knee.    79-year-old gentleman class II obesity BMI 35.15 weighs 245 pounds history of diabetes mellitus history of ischemic CVA left carotid stenosis history of coronary disease with multiple stents history of carotid endarterectomy history of intracranial hemorrhage  Admitted with osteoarthritis elective right total knee replacement  Status post right knee replacement pain controlled with oral medication no nausea vomiting no focal neurological deficit    5/10  Patient seen and examined at bedside.       Past Medical History:     Past Medical History:   Diagnosis Date    Acquired skull defect     Angina pectoris     Anxiety     Anxiety     At high risk for falls     Ataxia following nontraumatic intracerebral hemorrhage     Bronchitis with asthma, acute 11/24/2015    CAD (coronary artery disease)     Carotid stenosis     Left    Carotid stenosis, left 02/02/2016    Cerebral artery occlusion with cerebral infarction (HCC)     Chest pain     Compression of brainstem (HCC)     Diabetes mellitus 
     [facility] Internal Medicine   Harpreet Cali MD; MD Jazmin Rausch MD, MD , Shawn Lim MD    [facility] Internal Medicine   IN-PATIENT SERVICE   [facility]    Progress note            Date:   5/9/2025  Patient name:  Dexter Campoverde  Date of admission:  5/6/2025 10:15 AM  MRN:   [id number]  Account:  [id number]  YOB: 1945  PCP:    Andie Glover MD  Room:   2036/2036-01  Code Status:    Full Code    Chief Complaint:     No chief complaint on file.  Status post right total knee arthroplasty  History of diabetes mellitus hypertension hyperlipidemia      History Obtained From:     Patient medical record nursing staff    History of Present Illness:     Dexter Campoverde is a 79 y.o. Non- / non  male who presents with No chief complaint on file.   and is admitted to the hospital for the management of OA (osteoarthritis) of knee.    79-year-old gentleman class II obesity BMI 35.15 weighs 245 pounds history of diabetes mellitus history of ischemic CVA left carotid stenosis history of coronary disease with multiple stents history of carotid endarterectomy history of intracranial hemorrhage  Admitted with osteoarthritis elective right total knee replacement  Status post right knee replacement pain controlled with oral medication no nausea vomiting no focal neurological deficit  5/9   Patient has history of diabetes, hypertension, stroke, coronary artery disease status post stents carotid artery endarterectomy, history of ICH, with left-sided weakness, plan for total right knee arthroplasty  Past Medical History:     Past Medical History:   Diagnosis Date    Acquired skull defect     Angina pectoris     Anxiety     Anxiety     At high risk for falls     Ataxia following nontraumatic intracerebral hemorrhage     Bronchitis with asthma, acute 11/24/2015    CAD (coronary artery disease)     Carotid stenosis     Left    
   05/08/25 1430   Encounter Summary   Encounter Overview/Reason Attempted Encounter   Service Provided For Patient not available  (Sleeping)   Referral/Consult From Rounding   Support System Unknown   Complexity of Encounter Low   Spiritual/Emotional needs   Type Spiritual Support   Assessment/Intervention/Outcome   Assessment Unable to assess  (Patient sleeping)   Intervention Prayer (assurance of)/[place]       
  Patient was ambulating to the bathroom with walker and right knee buckled. Patient was lowered to the ground. Vital signs are stable, no complaints of knee pain at this time. House sup. And physician were notified and x-ray of right knee has been ordered.   
5/6/25 12:59pm spoke to family Candida via phone (attempted in-person, but not present in Family Waiting Room) No [facility] at this time may discharge to a facility - note placed on pt's printed Rx in PACU kbg  
Attending will come evaluate patient.  
Auth approved for SNF. Surgeon aware. Still wants to plan to discharge until Monday.  
[facility]   INPATIENT OCCUPATIONAL THERAPY  PROGRESS NOTE  Date: 2025  Patient Name: Dexter Campoverde       Room:   MRN: [id number]    : 1945  (79 y.o.)  Gender: male   Referring Practitioner: Fabio Mathur MD  Diagnosis: OA of knee      Discharge Recommendations:  Further Occupational Therapy is recommended upon facility discharge.    OT Equipment Recommendations  Other: CTA    Restrictions/Precautions  Restrictions/Precautions  Restrictions/Precautions: General Precautions;Fall Risk;Weight Bearing  Required Braces or Orthoses?: No  Implants Present? : Metal implants (B TKA)  Lower Extremity Weight Bearing Restrictions  Right Lower Extremity Weight Bearing: Weight Bearing As Tolerated    O2 Device: None (Room air)    Subjective  Subjective  Subjective: \"oh this knee is sore\"  Pain  Pre-Pain: 8  Pain Location: Right;Knee  Pain Descriptor: Sore  Pain Interventions: Repositioning;Ice;Rest  Comments: ALBERT stanley session, pt agreeable to session and pleasant/cooperative throughout, co-tx with KARENA PTA for safety, balance deficits and cognition    Objective  Orientation  Overall Orientation Status: Within Functional Limits  Orientation Level: Oriented to person  Cognition  Overall Cognitive Status: Exceptions  Arousal/Alertness: Appears intact  Following Commands: Follows multistep commands with repetition  Attention Span: Attends with cues to redirect  Memory: Appears intact  Safety Judgement: Decreased awareness of need for safety;Decreased awareness of need for assistance  Problem Solving: Assistance required to generate solutions;Assistance required to implement solutions;Decreased awareness of errors;Assistance required to identify errors made;Assistance required to correct errors made  Insights: Decreased awareness of deficits  Initiation: Requires cues for some  Sequencing: Requires cues for some  Cognition Comment: impulsive, cuing req  Patient affect:: 
[facility]   Occupational Therapy Evaluation  Date: 25  Patient Name: Dexter Campoverde       Room:   MRN: [id number]  Account: [id number]   : 1945  (79 y.o.) Gender: male     Discharge Recommendations:  Further Occupational Therapy is recommended upon facility discharge.      OT Equipment Recommendations  Other: CTA    Referring Practitioner: Fabio Mathur MD  Diagnosis: OA of knee        Treatment Diagnosis: impaired self-care status    Past Medical History:  has a past medical history of Acquired skull defect, Angina pectoris, Anxiety, Anxiety, At high risk for falls, Ataxia following nontraumatic intracerebral hemorrhage, Bronchitis with asthma, acute, CAD (coronary artery disease), Carotid stenosis, Carotid stenosis, left, Cerebral artery occlusion with cerebral infarction (HCC), Chest pain, Compression of brainstem (HCC), Diabetes mellitus (HCC), Diplopia, Dysphagia, Gait instability, GERD (gastroesophageal reflux disease), H/O carotid endarterectomy, H/O heart artery stent, Heart attack (HCC), Hyperlipidemia, Hypertension, Hyponatremia, Impaired fasting glucose, Intermittent lightheadedness, Myocardiopathy (HCC), Neuropathy, Obesity, Osteoarthritis, PAD (peripheral artery disease), Pericardial tamponade, S/P total knee arthroplasty, Snores, Spontaneous intracranial hemorrhage (HCC), and Stroke, hemorrhagic (HCC).    Past Surgical History:   has a past surgical history that includes Colonoscopy; Tonsillectomy and adenoidectomy; joint replacement (Left); shoulder surgery (Right); Carotid endarterectomy (Left, 2016); Coronary angioplasty with stent (); IR BIOPSY THYROID PERC CORE NEEDLE (2022); craniotomy; Upper gastrointestinal endoscopy (N/A, 2023); Colonoscopy (N/A, 2023); Colonoscopy (N/A, 2023); Cataract extraction, extracapsular w/ intraocular lens implant (Bilateral); and Total knee arthroplasty (Right, 
[facility]   INPATIENT OCCUPATIONAL THERAPY  PROGRESS NOTE  Date: 2025  Patient Name: Dexter Campoverde       Room:   MRN: [id number]    : 1945  (79 y.o.)  Gender: male   Referring Practitioner: Fabio Mathur MD  Diagnosis: OA of knee      Discharge Recommendations:  Further Occupational Therapy is recommended upon facility discharge.    OT Equipment Recommendations  Equipment Needed: Yes  Mobility Devices: ADL Assistive Devices  ADL Assistive Devices: Sock-Aid Hard, Long-handled Shoe Horn, Long-handled Sponge, Reacher  Other: CTA    Restrictions/Precautions  Restrictions/Precautions  Restrictions/Precautions: General Precautions;Fall Risk;Weight Bearing  Required Braces or Orthoses?: No  Implants Present? : Metal implants (B TKA)  Lower Extremity Weight Bearing Restrictions  Right Lower Extremity Weight Bearing: Weight Bearing As Tolerated    O2 Device: None (Room air)    Subjective  Subjective  Subjective: \"It's like I'm wearing a thong.\"  Pain  Pre-Pain: 7  Post-Pain: 5  Pain Location: Right;Knee  Pain Descriptor: Sore  Pain Interventions: Repositioning;Ice;Rest       Objective  Orientation  Overall Orientation Status: Within Functional Limits  Orientation Level: Oriented to person  Cognition  Overall Cognitive Status: Exceptions  Arousal/Alertness: Appears intact  Following Commands: Follows multistep commands with repetition  Attention Span: Attends with cues to redirect  Memory: Appears intact  Safety Judgement: Decreased awareness of need for safety;Decreased awareness of need for assistance  Problem Solving: Assistance required to generate solutions;Assistance required to implement solutions;Decreased awareness of errors;Assistance required to identify errors made;Assistance required to correct errors made  Insights: Decreased awareness of deficits  Initiation: Requires cues for some  Sequencing: Requires cues for some    Activities of Daily Living  LE Dressing: 
[facility]   Physical Therapy Treatment  Date: 25  Patient Name: Dexter Campoverde       Room:   MRN: [id number]  Account: [id number]   : 1945  (79 y.o.) Gender: male     Discharge Recommendations:  Discharge Recommendations: Patient would benefit from continued therapy after discharge, Therapy recommended at discharge               Past Medical History:  has a past medical history of Acquired skull defect, Angina pectoris, Anxiety, Anxiety, At high risk for falls, Ataxia following nontraumatic intracerebral hemorrhage, Bronchitis with asthma, acute, CAD (coronary artery disease), Carotid stenosis, Carotid stenosis, left, Cerebral artery occlusion with cerebral infarction (HCC), Chest pain, Compression of brainstem (HCC), Diabetes mellitus (HCC), Diplopia, Dysphagia, Gait instability, GERD (gastroesophageal reflux disease), H/O carotid endarterectomy, H/O heart artery stent, Heart attack (HCC), Hyperlipidemia, Hypertension, Hyponatremia, Impaired fasting glucose, Intermittent lightheadedness, Myocardiopathy (HCC), Neuropathy, Obesity, Osteoarthritis, PAD (peripheral artery disease), Pericardial tamponade, S/P total knee arthroplasty, Snores, Spontaneous intracranial hemorrhage (HCC), and Stroke, hemorrhagic (HCC).  Past Surgical History:   has a past surgical history that includes Colonoscopy; Tonsillectomy and adenoidectomy; joint replacement (Left); shoulder surgery (Right); Carotid endarterectomy (Left, 2016); Coronary angioplasty with stent (2019); IR BIOPSY THYROID PERC CORE NEEDLE (2022); craniotomy; Upper gastrointestinal endoscopy (N/A, 2023); Colonoscopy (N/A, 2023); Colonoscopy (N/A, 2023); Cataract extraction, extracapsular w/ intraocular lens implant (Bilateral); and Total knee arthroplasty (Right, 2025).    Restrictions  Restrictions/Precautions  Restrictions/Precautions: General Precautions, Fall Risk, Weight Bearing  Required 
Patient c/o dizzziness. He says he usually wakes up with the dizziness and then it goes away but has not at this time. All vitals WNL aside from B/P. Last B/P was 96/59. Attending notified. Awaiting response. No new orders at this time.  
Patient taken down for CT.  
Patient was seen earlier this morning however still sleeping.    Patient's right knee wound is visible and shows the bandages intact.    Patient to be discharged to [place] today  
Peer to peer completed, reviewed medical necessity, PT and OT need, etc.    Denial upheld.  Notes patient can appeal .      
Physical Medicine & Rehabilitation  Progress Note    CC: Debility secondary elective left total knee arthroplasty complicated by history of CVA with left hemiparesis    Subjective:      Feels well.  No complaints.  Does note some knee pain.    ROS:  Denies fevers, chills, sweats.  No chest pain, palpitations, lightheadedness.  Denies coughing, wheezing or shortness of breath.  Denies abdominal pain, nausea, diarrhea or constipation.  No new areas of joint pain.  Denies new areas of numbness or weakness.  Denies new anxiety or depression issues.  No new skin problems.    Rehabilitation:     PT:    Bed mobility  Rolling to Left: Minimal assistance (needs assit to reach fo rbed rail on left side)  Rolling to Right: Stand by assistance (cuing for bed rail use)  Supine to Sit: Minimal assistance (for trunk progression)  Sit to Supine: Unable to assess (patient left in bedside chair)  Scooting: Stand by assistance (hips to EOB)  Bed Mobility Comments: using bed rail         Transfers  Sit to Stand: Dependent/Total, 2 Person Assistance (Donald x2)  Stand to Sit: Dependent/Total, 2 Person Assistance (Donald x2 with cueing for right LE management)  Bed to Chair: 2 Person Assistance, Minimal assistance (RW)  Stand Pivot Transfers: Dependent/Total, 2 Person Assistance (modA x2 due to RW management)  Comment: cueing for safe hand placement & right LE positioning prior to sitting for pain management         Ambulation  WB Status: WBAT R LE  Ambulation  Surface: Level tile  Device: Rolling Walker  Assistance: Dependent/Total, 2 Person assistance (modA x2)  Quality of Gait: decreased knee flexion bilaterally, increased right side lean corrected intermittently with fleeting response, decreased stance time on right LE, lack of toe off/heel strike, patient tips RW laterally during gait requiring assistance to steady RW  Gait Deviations: Slow Birdie, Increased JOSE, Decreased step length, Decreased step height  Distance: 50' & 40' 
Physical Therapy    [facility]   Physical Therapy Treatment  Date: 25  Patient Name: Dexter Campoverde       Room: -  MRN: [id number]  Account: [id number]   : 1945  (79 y.o.) Gender: male     Discharge Recommendations:  Discharge Recommendations: Patient would benefit from continued therapy after discharge, Therapy recommended at discharge     PT D/C Equipment  Walker: Rolling  PT Equipment Recommendations  Equipment Needed: Yes  Mobility Devices: Walker  Walker: Rolling    General  Chart Reviewed: Yes  Patient assessed for rehabilitation services?: Yes  Additional Pertinent Hx: R TKA By Dr Mathur, 25  Family/Caregiver Present: No  Referring Practitioner: Dr Mathur  Referral Date : 25  Diagnosis: OA R knee, S/P R TKA  Follows Commands: Within Functional Limits    Past Medical History:  has a past medical history of Acquired skull defect, Angina pectoris, Anxiety, Anxiety, At high risk for falls, Ataxia following nontraumatic intracerebral hemorrhage, Bronchitis with asthma, acute, CAD (coronary artery disease), Carotid stenosis, Carotid stenosis, left, Cerebral artery occlusion with cerebral infarction (HCC), Chest pain, Compression of brainstem (HCC), Diabetes mellitus (HCC), Diplopia, Dysphagia, Gait instability, GERD (gastroesophageal reflux disease), H/O carotid endarterectomy, H/O heart artery stent, Heart attack (HCC), Hyperlipidemia, Hypertension, Hyponatremia, Impaired fasting glucose, Intermittent lightheadedness, Myocardiopathy (HCC), Neuropathy, Obesity, Osteoarthritis, PAD (peripheral artery disease), Pericardial tamponade, S/P total knee arthroplasty, Snores, Spontaneous intracranial hemorrhage (HCC), and Stroke, hemorrhagic (HCC).  Past Surgical History:   has a past surgical history that includes Colonoscopy; Tonsillectomy and adenoidectomy; joint replacement (Left); shoulder surgery (Right); Carotid endarterectomy (Left, 2016); Coronary 
Physical Therapy    [facility]   Physical Therapy Evaluation  Date: 25  Patient Name: Dexter Campoverde       Room:   MRN: [id number]  Account: [id number]   : 1945  (79 y.o.) Gender: male     Discharge Recommendations:  Discharge Recommendations: Patient would benefit from continued therapy after discharge, Therapy recommended at discharge           Past Medical History:  has a past medical history of Acquired skull defect, Angina pectoris, Anxiety, Anxiety, At high risk for falls, Ataxia following nontraumatic intracerebral hemorrhage, Bronchitis with asthma, acute, CAD (coronary artery disease), Carotid stenosis, Carotid stenosis, left, Cerebral artery occlusion with cerebral infarction (HCC), Chest pain, Compression of brainstem (HCC), Diabetes mellitus (HCC), Diplopia, Dysphagia, Gait instability, GERD (gastroesophageal reflux disease), H/O carotid endarterectomy, H/O heart artery stent, Heart attack (HCC), Hyperlipidemia, Hypertension, Hyponatremia, Impaired fasting glucose, Intermittent lightheadedness, Myocardiopathy (HCC), Neuropathy, Obesity, Osteoarthritis, PAD (peripheral artery disease), Pericardial tamponade, S/P total knee arthroplasty, Snores, Spontaneous intracranial hemorrhage (HCC), and Stroke, hemorrhagic (HCC).  Past Surgical History:   has a past surgical history that includes Colonoscopy; Tonsillectomy and adenoidectomy; joint replacement (Left); shoulder surgery (Right); Carotid endarterectomy (Left, 2016); Coronary angioplasty with stent (2019); IR BIOPSY THYROID PERC CORE NEEDLE (2022); craniotomy; Upper gastrointestinal endoscopy (N/A, 2023); Colonoscopy (N/A, 2023); Colonoscopy (N/A, 2023); Cataract extraction, extracapsular w/ intraocular lens implant (Bilateral); and Total knee arthroplasty (Right, 2025).    Subjective  Subjective  Subjective: OK for PT/OT per ALBERT LYNN     General  Patient assessed for 
Physical Therapy  [facility]   Physical Therapy Treatment  Date: 25  Patient Name: Dexter Campoverde       Room: -  MRN: [id number]  Account: [id number]   : 1945  (79 y.o.) Gender: male     Discharge Recommendations:  Discharge Recommendations: Patient would benefit from continued therapy after discharge, Therapy recommended at discharge     General  Patient assessed for rehabilitation services?: Yes  Additional Pertinent Hx: R TKA By Dr Mathur, 25  Referring Practitioner: Dr Mathur  Referral Date : 25  Diagnosis: OA R knee, S/P R TKA  Follows Commands: Within Functional Limits  Other (Comment): Pt needs re-direction at times.    Past Medical History:  has a past medical history of Acquired skull defect, Angina pectoris, Anxiety, Anxiety, At high risk for falls, Ataxia following nontraumatic intracerebral hemorrhage, Bronchitis with asthma, acute, CAD (coronary artery disease), Carotid stenosis, Carotid stenosis, left, Cerebral artery occlusion with cerebral infarction (HCC), Chest pain, Compression of brainstem (HCC), Diabetes mellitus (HCC), Diplopia, Dysphagia, Gait instability, GERD (gastroesophageal reflux disease), H/O carotid endarterectomy, H/O heart artery stent, Heart attack (HCC), Hyperlipidemia, Hypertension, Hyponatremia, Impaired fasting glucose, Intermittent lightheadedness, Myocardiopathy (HCC), Neuropathy, Obesity, Osteoarthritis, PAD (peripheral artery disease), Pericardial tamponade, S/P total knee arthroplasty, Snores, Spontaneous intracranial hemorrhage (HCC), and Stroke, hemorrhagic (HCC).  Past Surgical History:   has a past surgical history that includes Colonoscopy; Tonsillectomy and adenoidectomy; joint replacement (Left); shoulder surgery (Right); Carotid endarterectomy (Left, 2016); Coronary angioplasty with stent (2019); IR BIOPSY THYROID PERC CORE NEEDLE (2022); craniotomy; Upper gastrointestinal endoscopy (N/A, 2023); 
Physical Therapy  [facility]   Physical Therapy Treatment  Date: 5/10/25  Patient Name: Dexter Campoverde       Room:   MRN: [id number]  Account: [id number]   : 1945  (79 y.o.) Gender: male     Discharge Recommendations:  Discharge Recommendations: Patient would benefit from continued therapy after discharge, Therapy recommended at discharge     PT D/C Equipment  Walker: Rolling  PT Equipment Recommendations  Equipment Needed: Yes  Mobility Devices: Walker  Walker: Rolling         Past Medical History:  has a past medical history of Acquired skull defect, Angina pectoris, Anxiety, Anxiety, At high risk for falls, Ataxia following nontraumatic intracerebral hemorrhage, Bronchitis with asthma, acute, CAD (coronary artery disease), Carotid stenosis, Carotid stenosis, left, Cerebral artery occlusion with cerebral infarction (HCC), Chest pain, Compression of brainstem (HCC), Diabetes mellitus (HCC), Diplopia, Dysphagia, Gait instability, GERD (gastroesophageal reflux disease), H/O carotid endarterectomy, H/O heart artery stent, Heart attack (HCC), Hyperlipidemia, Hypertension, Hyponatremia, Impaired fasting glucose, Intermittent lightheadedness, Myocardiopathy (HCC), Neuropathy, Obesity, Osteoarthritis, PAD (peripheral artery disease), Pericardial tamponade, S/P total knee arthroplasty, Snores, Spontaneous intracranial hemorrhage (HCC), and Stroke, hemorrhagic (HCC).  Past Surgical History:   has a past surgical history that includes Colonoscopy; Tonsillectomy and adenoidectomy; joint replacement (Left); shoulder surgery (Right); Carotid endarterectomy (Left, 2016); Coronary angioplasty with stent (); IR BIOPSY THYROID PERC CORE NEEDLE (2022); craniotomy; Upper gastrointestinal endoscopy (N/A, 2023); Colonoscopy (N/A, 2023); Colonoscopy (N/A, 2023); Cataract extraction, extracapsular w/ intraocular lens implant (Bilateral); and Total knee 
Physical Therapy  [facility]   Physical Therapy Treatment  Date: 25  Patient Name: Dexter Campoverde       Room: -  MRN: [id number]  Account: [id number]   : 1945  (79 y.o.) Gender: male     Discharge Recommendations:  Discharge Recommendations: Patient would benefit from continued therapy after discharge, Therapy recommended at discharge     General  Patient assessed for rehabilitation services?: Yes  Additional Pertinent Hx: R TKA By Dr Mathur, 25  Referring Practitioner: Dr Mathur  Referral Date : 25  Diagnosis: OA R knee, S/P R TKA  Follows Commands: Within Functional Limits  Other (Comment): Pt needs re-direction at times.    Past Medical History:  has a past medical history of Acquired skull defect, Angina pectoris, Anxiety, Anxiety, At high risk for falls, Ataxia following nontraumatic intracerebral hemorrhage, Bronchitis with asthma, acute, CAD (coronary artery disease), Carotid stenosis, Carotid stenosis, left, Cerebral artery occlusion with cerebral infarction (HCC), Chest pain, Compression of brainstem (HCC), Diabetes mellitus (HCC), Diplopia, Dysphagia, Gait instability, GERD (gastroesophageal reflux disease), H/O carotid endarterectomy, H/O heart artery stent, Heart attack (HCC), Hyperlipidemia, Hypertension, Hyponatremia, Impaired fasting glucose, Intermittent lightheadedness, Myocardiopathy (HCC), Neuropathy, Obesity, Osteoarthritis, PAD (peripheral artery disease), Pericardial tamponade, S/P total knee arthroplasty, Snores, Spontaneous intracranial hemorrhage (HCC), and Stroke, hemorrhagic (HCC).  Past Surgical History:   has a past surgical history that includes Colonoscopy; Tonsillectomy and adenoidectomy; joint replacement (Left); shoulder surgery (Right); Carotid endarterectomy (Left, 2016); Coronary angioplasty with stent (2019); IR BIOPSY THYROID PERC CORE NEEDLE (2022); craniotomy; Upper gastrointestinal endoscopy (N/A, 2023); 
Physical Therapy  [facility]   Physical Therapy Treatment  Date: 25  Patient Name: Dexter Campoverde       Room: -  MRN: [id number]  Account: [id number]   : 1945  (79 y.o.) Gender: male     Discharge Recommendations:  Discharge Recommendations: Patient would benefit from continued therapy after discharge, Therapy recommended at discharge     General  Patient assessed for rehabilitation services?: Yes  Additional Pertinent Hx: R TKA By Dr Mathur, 25  Referring Practitioner: Dr Mathur  Referral Date : 25  Diagnosis: OA R knee, S/P R TKA  Follows Commands: Within Functional Limits  Other (Comment): Pt needs re-direction at times.    Past Medical History:  has a past medical history of Acquired skull defect, Angina pectoris, Anxiety, Anxiety, At high risk for falls, Ataxia following nontraumatic intracerebral hemorrhage, Bronchitis with asthma, acute, CAD (coronary artery disease), Carotid stenosis, Carotid stenosis, left, Cerebral artery occlusion with cerebral infarction (HCC), Chest pain, Compression of brainstem (HCC), Diabetes mellitus (HCC), Diplopia, Dysphagia, Gait instability, GERD (gastroesophageal reflux disease), H/O carotid endarterectomy, H/O heart artery stent, Heart attack (HCC), Hyperlipidemia, Hypertension, Hyponatremia, Impaired fasting glucose, Intermittent lightheadedness, Myocardiopathy (HCC), Neuropathy, Obesity, Osteoarthritis, PAD (peripheral artery disease), Pericardial tamponade, S/P total knee arthroplasty, Snores, Spontaneous intracranial hemorrhage (HCC), and Stroke, hemorrhagic (HCC).  Past Surgical History:   has a past surgical history that includes Colonoscopy; Tonsillectomy and adenoidectomy; joint replacement (Left); shoulder surgery (Right); Carotid endarterectomy (Left, 2016); Coronary angioplasty with stent (2019); IR BIOPSY THYROID PERC CORE NEEDLE (2022); craniotomy; Upper gastrointestinal endoscopy (N/A, 2023); 
Post Operative Progress Note    5/10/2025 9:08 AM  Info:   Admit Date: 5/6/2025  PCP: Andie Glover MD      Medications:   Scheduled Meds:   enoxaparin  30 mg SubCUTAneous BID    lisinopril  20 mg Oral Daily    metFORMIN  500 mg Oral Daily with breakfast    sodium chloride flush  5-40 mL IntraVENous 2 times per day    acetaminophen  650 mg Oral Q6H    tamsulosin  0.4 mg Oral Daily    rosuvastatin  40 mg Oral Daily    metoprolol succinate  25 mg Oral Daily    famotidine  20 mg Oral BID    DULoxetine  60 mg Oral Daily    donepezil  10 mg Oral Nightly    clopidogrel  75 mg Oral Daily     Continuous Infusions:   sodium chloride       PRN Meds:hydrALAZINE, sodium chloride flush, sodium chloride, HYDROmorphone **OR** HYDROmorphone, ondansetron, nitroGLYCERIN, HYDROcodone-acetaminophen    Diet:   Diet: ADULT DIET; Regular    Subjective:   Systemic or Specific Complaints:No Complaints    Objective:     Patient Vitals for the past 24 hrs:   BP Temp Temp src Pulse Resp SpO2   05/10/25 0824 130/71 -- -- 83 -- --   05/10/25 0627 130/71 98.4 °F (36.9 °C) Oral 83 18 96 %   05/09/25 1824 (!) 115/57 99 °F (37.2 °C) Oral 84 -- 95 %   05/09/25 1712 (!) 111/58 -- -- 76 -- --         Wound: incision clean and dry without sign of infection   Motion: expected discomfort with range of motion in affected extremity   DVT Exam:  no evidence of DVT on physical examination         Data Review  CBC: No results for input(s): \"WBC\", \"HGB\", \"PLT\" in the last 72 hours.        Assessment:   Patient is S/P right Knee, Arthoplasty  Pain is well controlled. He has no nausea and no vomiting.    Current activity is up with assistance        Plan:      1:  Continue Physical Therapy  2:  Continue Deep venous thrombosis prophylaxis  3:  Continue Pain Control  4:  Discharge plans SNF       Electronically signed by Fabio Mathur MD on 5/10/2025 at 9:08 AM      
Post Operative Progress Note    5/7/2025 1:13 PM  Info:   Admit Date: 5/6/2025  PCP: Andie Glover MD      Medications:   Scheduled Meds:   [START ON 5/8/2025] lisinopril  20 mg Oral Daily    [START ON 5/8/2025] metFORMIN  500 mg Oral Daily with breakfast    sodium chloride flush  5-40 mL IntraVENous 2 times per day    acetaminophen  650 mg Oral Q6H    tamsulosin  0.4 mg Oral Daily    rosuvastatin  40 mg Oral Daily    metoprolol succinate  25 mg Oral Daily    famotidine  20 mg Oral BID    DULoxetine  60 mg Oral Daily    donepezil  10 mg Oral Nightly    clopidogrel  75 mg Oral Daily     Continuous Infusions:   sodium chloride       PRN Meds:hydrALAZINE, sodium chloride flush, sodium chloride, HYDROmorphone **OR** HYDROmorphone, ondansetron, nitroGLYCERIN, HYDROcodone-acetaminophen    Diet:   Diet: ADULT DIET; Regular    Subjective:   Systemic or Specific Complaints:No Complaints    Objective:     Patient Vitals for the past 24 hrs:   BP Temp Temp src Pulse Resp SpO2   05/07/25 1214 (!) 123/55 97.9 °F (36.6 °C) Oral 82 16 99 %   05/07/25 0807 (!) 124/55 99 °F (37.2 °C) Oral 93 16 97 %   05/07/25 0706 -- -- -- -- 16 --   05/07/25 0633 (!) 140/69 -- -- 87 -- --   05/07/25 0401 (!) 162/76 98.2 °F (36.8 °C) Oral 79 16 96 %   05/07/25 0207 (!) 158/74 -- -- -- -- --   05/07/25 0012 (!) 173/86 98.1 °F (36.7 °C) -- 79 17 93 %   05/06/25 2026 (!) 166/78 98.1 °F (36.7 °C) -- 88 17 93 %   05/06/25 1610 129/68 97.7 °F (36.5 °C) -- 83 15 92 %   05/06/25 1600 124/65 -- -- 67 13 95 %   05/06/25 1550 130/64 -- -- 77 17 93 %   05/06/25 1540 (!) 154/70 97.7 °F (36.5 °C) -- 80 14 92 %   05/06/25 1530 (!) 165/78 -- -- 91 19 93 %   05/06/25 1524 -- -- -- -- 17 --   05/06/25 1520 (!) 170/80 -- -- 90 20 96 %   05/06/25 1510 (!) 177/88 -- -- 90 18 99 %   05/06/25 1506 -- -- -- -- 22 --   05/06/25 1500 (!) 176/88 -- -- 93 20 100 %   05/06/25 1450 (!) 163/71 -- -- (!) 101 26 99 %   05/06/25 1445 (!) 158/81 97.5 °F (36.4 °C) Infrared 97 21 
Post Operative Progress Note    5/8/2025 2:46 PM  Info:   Admit Date: 5/6/2025  PCP: Andie Glover MD      Medications:   Scheduled Meds:   lisinopril  20 mg Oral Daily    metFORMIN  500 mg Oral Daily with breakfast    sodium chloride flush  5-40 mL IntraVENous 2 times per day    acetaminophen  650 mg Oral Q6H    tamsulosin  0.4 mg Oral Daily    rosuvastatin  40 mg Oral Daily    metoprolol succinate  25 mg Oral Daily    famotidine  20 mg Oral BID    DULoxetine  60 mg Oral Daily    donepezil  10 mg Oral Nightly    clopidogrel  75 mg Oral Daily     Continuous Infusions:   sodium chloride       PRN Meds:hydrALAZINE, sodium chloride flush, sodium chloride, HYDROmorphone **OR** HYDROmorphone, ondansetron, nitroGLYCERIN, HYDROcodone-acetaminophen    Diet:   Diet: ADULT DIET; Regular    Subjective:   Systemic or Specific Complaints:No Complaints    Objective:     Patient Vitals for the past 24 hrs:   BP Temp Temp src Pulse Resp SpO2 Height Weight   05/08/25 1332 126/62 -- -- -- -- -- -- --   05/08/25 1215 (!) 105/46 -- -- 73 -- -- -- --   05/08/25 1208 (!) 101/43 98.2 °F (36.8 °C) Oral 75 17 97 % -- --   05/08/25 1004 122/62 -- -- 79 -- -- -- --   05/08/25 0833 (!) 126/57 98.1 °F (36.7 °C) Oral 80 18 93 % -- --   05/08/25 0403 133/61 98.1 °F (36.7 °C) Oral 84 19 94 % -- --   05/08/25 0006 (!) 119/54 98.6 °F (37 °C) Oral 83 18 94 % -- --   05/07/25 2119 -- -- -- -- 18 -- -- --   05/07/25 2053 (!) 126/54 98.1 °F (36.7 °C) Oral 79 20 97 % 1.777 m (5' 9.96\") 111.1 kg (245 lb)   05/07/25 1803 (!) 107/52 99 °F (37.2 °C) Oral 85 16 95 % -- --   05/07/25 1613 126/62 98.4 °F (36.9 °C) Oral 79 16 95 % -- --         Wound: incision clean and dry without sign of infection   Motion: expected discomfort with range of motion in affected extremity   DVT Exam:  no evidence of DVT on physical examination         Data Review  CBC: No results for input(s): \"WBC\", \"HGB\", \"PLT\" in the last 72 hours.        Assessment:   Patient is S/P right 
[facility] discharge planner notified Dr. Mathur states plan is for discharge on Monday for this patient.  
RN contacted [facility] Gonzales in regards to patients BP. RN received a PRN Hydralazine, see MAR for details.   
RN spoke to patients wife Candida with updates via telephone.   
[facility]   INPATIENT OCCUPATIONAL THERAPY  PROGRESS NOTE  Date: 2025  Patient Name: Dexter Campoverde       Room:   MRN: [id number]    : 1945  (79 y.o.)  Gender: male   Referring Practitioner: Fabio Mathur MD  Diagnosis: OA of knee      Discharge Recommendations:  Further Occupational Therapy is recommended upon facility discharge.    OT Equipment Recommendations  Equipment Needed: Yes  Mobility Devices: ADL Assistive Devices  ADL Assistive Devices: Sock-Aid Hard, Long-handled Shoe Horn, Long-handled Sponge, Reacher  Other: CTA    Restrictions/Precautions  Restrictions/Precautions  Restrictions/Precautions: General Precautions;Fall Risk;Weight Bearing;Bed Alarm  Activity Level: Up with Assist  Required Braces or Orthoses?: No  Implants Present? : Metal implants  Lower Extremity Weight Bearing Restrictions  Right Lower Extremity Weight Bearing: Weight Bearing As Tolerated    O2 Device: None (Room air)    Subjective  Subjective  Subjective: Pt supine in bed when [facility] arrived for skilled services pt asked to get out of bed and into recliner chair.  Pain  Pre-Pain: 5  Pain Location: Right;Knee  Pain Descriptor: Burning;Throbbing  Pain Interventions: Repositioning;Ice;Rest  Comments: Skilled services okayed by ALBERT George    Objective  Orientation  Overall Orientation Status: Within Normal Limits  Orientation Level: Oriented to place;Oriented to situation;Oriented to person  Cognition  Overall Cognitive Status: Exceptions  Arousal/Alertness: Appears intact  Following Commands: Follows one step commands with repetition;Follows one step commands with increased time  Attention Span: Attends with cues to redirect  Memory: Impaired;Decreased recall of precautions  Safety Judgement: Decreased awareness of need for safety;Decreased awareness of need for assistance;Impaired  Problem Solving: Assistance required to generate solutions;Assistance required to implement 
Shemar Siddiqi MD(PM&R) notified of consult.  
[facility] called unit to inquire about medications. Report given to the RN. No questions at this time. Transport set up prior.   
Writer attempted to call patient's wife Candida to inform her of discharge, no answer at this time. Voice message left.   
Writer returned call to patient's wife Candida at the number provided but there was no answer Will notify incoming nurse.  
evidence of DVT on physical examination         Data Review  CBC: No results for input(s): \"WBC\", \"HGB\", \"PLT\" in the last 72 hours.        Assessment:   Patient is S/P right Knee, Arthoplasty  Pain is well controlled. He has no nausea and no vomiting.    Current activity is up with assistance        Plan:      1:  Continue Physical Therapy  2:  Continue Deep venous thrombosis prophylaxis  3:  Continue Pain Control  4:  Discharge plans SNF       Electronically signed by Fabio Mathur MD on 5/9/2025 at 1:59 PM      
ankle  Strength LLE  Comment: 4/5           Bed mobility  Bed Mobility Comments: NT-pt up in recliner  Transfers  Comment: pt remained seated throughout therex due to hypotension  Ambulation  WB Status: WBAT R LE  Ambulation  Comments: deferred today due to hyotension     Balance  Sitting - Static: Fair  Sitting - Dynamic: Fair  Comments: sitting balance forward in chair WFL, standing NT          OutComes Score  AM-PAC - Mobility    AM-PAC Basic Mobility - Inpatient   How much help is needed turning from your back to your side while in a flat bed without using bedrails?: A Little  How much help is needed moving from lying on your back to sitting on the side of a flat bed without using bedrails?: A Little  How much help is needed moving to and from a bed to a chair?: A Little  How much help is needed standing up from a chair using your arms?: A Lot  How much help is needed walking in hospital room?: A Lot  How much help is needed climbing 3-5 steps with a railing?: A Lot  AM-PAC Inpatient Mobility Raw Score : 15  AM-PAC Inpatient T-Scale Score : 39.45  Mobility Inpatient CMS 0-100% Score: 57.7  Mobility Inpatient CMS G-Code Modifier : CK      Goals  Short Term Goals  Time Frame for Short Term Goals: 5 to 6 visits  Short Term Goal 1: Supine<>sit SBA  Short Term Goal 2: Sit<>stand and transfers with RW min A  Short Term Goal 3: Gait with RW distance of 50 ft x2, CGA.  Short Term Goal 4: Improve R knee PROM-AAROM 5 to 85 degrees  Short Term Goal 5: Pt able to go up and down 4\" steps with B rail, min A  Short Term Goal 6: Pt to tolerate 10 to 15 reps of TKA ex's to improve strengthand ROM R Knee to improve function.  Short Term Goal 7: Progress pt to perform 4\" and 6\" stp ups x4 reps with UE support, min a X 2.  Patient Goals   Patient Goals : Decreased pain R knn and walk       Education-TKA exes         Therapy Time   Individual Concurrent Group Co-treatment   Time In 1005 (note pt eating breakfast initally at 838, 
placement. Education provided on safety and BLE positioning with poor carryover in AM. In PM, pt requiring ModA for sit>stand due to increased pain in RLE. Pt continues to be very impulsive and requires frequent redirection.    Functional Mobility  Functional - Mobility Device: Rolling Walker  Activity: Other (room mobility)  Assist Level: Minimal assistance  Functional Mobility Comments: MinAx1 CGAx1 for short distance in room. Pt keeps RLE extended throughout mobility and has poor RW management, keeping RW positioned far from body. Poor safety awareness throughout; pt moves quickly and does not have good carryover with safety education for AM. In PM, ModA at times as pt has increased pain in RLE, fluctuating to Mendez with continued assistance of RW.    OT Exercises  Static Standing Balance Exercises: PM: OTR engages pt in standing activity at sink to improve activity tolerance, endurance, balance, and safety awareness. Pt able to use intermittent UE support on RW to complete grooming with Mendez for standing; pt continues to need VC for safety awareness. Pt only able to stand statically for ~1-2 mins before requesting to return to bed due to pain levels.    Patient Education  Patient Education  Education Given To: Patient  Education Provided: Role of Therapy, Plan of Care, Precautions, Transfer Training, Mobility Training, ADL Adaptive Strategies  Education Provided Comments: Pt educated on AE for self-care such as reacher and sock-aide as well as safety for self-care and mobility. TANYA hose donned and educated upon use.  Education Method: Verbal  Barriers to Learning: Cognition  Education Outcome: Verbalized understanding, Continued education needed    Goals  Short Term Goals  Time Frame for Short Term Goals: By discharge  Short Term Goal 1: Pt will perform UB bathing/dressing Mod I with Fair safety  Short Term Goal 2: Pt will perform LB bathing/dressing CGA with Fair safety and use of AE/modified techniques as

## 2025-05-12 NOTE — CARE COORDINATION
DISCHARGE PLANNING NOTE:    HENS COMPLETE.  Document ID : [id number]    Electronically signed by ANJANA Sanders on 5/12/2025 at 1:08 PM

## 2025-05-12 NOTE — CARE COORDINATION
DISCHARGE PLANNING NOTE:    Writer is following for potential discharge to [facility]. [employer] message placed to bedside ALBERT George to verify discharge and request CHRISTIAN to be signed and completed.     Call placed to charge ALBERT Juares for update, will verify with Dr. Mckinney.    Message placed to Elisa with [facility] to verify pt admission, can admit to facility when medically ready.    Electronically signed by ANJANA Sanders on 5/12/2025 at 8:50 AM

## 2025-05-12 NOTE — CARE COORDINATION
Transportation arranged for patient to go to [facility] at [phone] via [facility]. Facility informed. Bedside nurse informed.     Number for Report: [phone]    Electronically signed by ANJANA Sanders on 5/12/2025 at 11:49 AM

## 2025-05-12 NOTE — PLAN OF CARE
Problem: Chronic Conditions and Co-morbidities  Goal: Patient's chronic conditions and co-morbidity symptoms are monitored and maintained or improved  5/12/2025 1120 by Doris Wade RN  Outcome: Adequate for Discharge     Problem: Discharge Planning  Goal: Discharge to home or other facility with appropriate resources  5/12/2025 1120 by Doris Wade RN  Outcome: Adequate for Discharge     Problem: Pain  Goal: Verbalizes/displays adequate comfort level or baseline comfort level  5/12/2025 1120 by Doris Wade RN  Outcome: Adequate for Discharge     Problem: Safety - Adult  Goal: Free from fall injury  5/12/2025 1120 by Doris Wade RN  Outcome: Adequate for Discharge  Flowsheets (Taken 5/12/2025 0921)  Free From Fall Injury: Instruct family/caregiver on patient safety     Problem: ABCDS Injury Assessment  Goal: Absence of physical injury  5/12/2025 1120 by Doris Wade RN  Outcome: Adequate for Discharge  Flowsheets (Taken 5/12/2025 0921)  Absence of Physical Injury: Implement safety measures based on patient assessment     Problem: Skin/Tissue Integrity  Goal: Skin integrity remains intact  Description: 1.  Monitor for areas of redness and/or skin breakdown2.  Assess vascular access sites hourly3.  Every 4-6 hours minimum:  Change oxygen saturation probe site4.  Every 4-6 hours:  If on nasal continuous positive airway pressure, respiratory therapy assess nares and determine need for appliance change or resting period  Outcome: Adequate for Discharge

## 2025-05-16 ENCOUNTER — TELEPHONE (OUTPATIENT)
Dept: ORTHOPEDIC SURGERY | Age: 80
End: 2025-05-16

## 2025-05-16 NOTE — TELEPHONE ENCOUNTER
Nurse for pt called stating that the pts wound is red, warm to touch and seeping. The house doc wants to start Doxy. PC to Dr Mathur to see if he is OK with that and he agreed.

## 2025-05-17 DIAGNOSIS — I25.10 CORONARY ARTERY DISEASE INVOLVING NATIVE CORONARY ARTERY OF NATIVE HEART WITHOUT ANGINA PECTORIS: ICD-10-CM

## 2025-05-19 ENCOUNTER — OFFICE VISIT (OUTPATIENT)
Dept: NEUROSURGERY | Age: 80
End: 2025-05-19
Payer: MEDICARE

## 2025-05-19 VITALS
OXYGEN SATURATION: 93 % | HEIGHT: 70 IN | DIASTOLIC BLOOD PRESSURE: 72 MMHG | SYSTOLIC BLOOD PRESSURE: 122 MMHG | HEART RATE: 77 BPM | BODY MASS INDEX: 34.79 KG/M2 | WEIGHT: 243 LBS

## 2025-05-19 DIAGNOSIS — G56.01 ENTRAPMENT OF RIGHT MEDIAN NERVE: ICD-10-CM

## 2025-05-19 DIAGNOSIS — G56.01 RIGHT CARPAL TUNNEL SYNDROME: Primary | ICD-10-CM

## 2025-05-19 DIAGNOSIS — G56.21 CUBITAL TUNNEL SYNDROME ON RIGHT: ICD-10-CM

## 2025-05-19 PROCEDURE — 99204 OFFICE O/P NEW MOD 45 MIN: CPT

## 2025-05-19 PROCEDURE — 1159F MED LIST DOCD IN RCRD: CPT

## 2025-05-19 PROCEDURE — 1160F RVW MEDS BY RX/DR IN RCRD: CPT

## 2025-05-19 PROCEDURE — 3288F FALL RISK ASSESSMENT DOCD: CPT

## 2025-05-19 PROCEDURE — 1111F DSCHRG MED/CURRENT MED MERGE: CPT

## 2025-05-19 PROCEDURE — 3078F DIAST BP <80 MM HG: CPT

## 2025-05-19 PROCEDURE — 3074F SYST BP LT 130 MM HG: CPT

## 2025-05-19 PROCEDURE — 1036F TOBACCO NON-USER: CPT

## 2025-05-19 PROCEDURE — G8417 CALC BMI ABV UP PARAM F/U: HCPCS

## 2025-05-19 PROCEDURE — 0518F FALL PLAN OF CARE DOCD: CPT

## 2025-05-19 PROCEDURE — 1123F ACP DISCUSS/DSCN MKR DOCD: CPT

## 2025-05-19 PROCEDURE — G8427 DOCREV CUR MEDS BY ELIG CLIN: HCPCS

## 2025-05-19 RX ORDER — CLOPIDOGREL BISULFATE 75 MG/1
75 TABLET ORAL DAILY
Qty: 90 TABLET | Refills: 0 | Status: SHIPPED | OUTPATIENT
Start: 2025-05-19

## 2025-05-19 NOTE — PROGRESS NOTES
Eyes: Negative for discharge and itching.   Respiratory: Negative for choking and chest tightness.    Cardiovascular: Negative for chest pain and leg swelling.   Gastrointestinal: Negative for nausea and abdominal pain.   Endocrine: Negative for cold intolerance and heat intolerance.   Genitourinary: Negative for frequency and flank pain.   Musculoskeletal: Negative for myalgias and joint swelling.   Skin: Negative for rash and wound.   Allergic/Immunologic: Negative for environmental allergies and food allergies.   Hematological: Negative for adenopathy. Does not bruise/bleed easily.   Psychiatric/Behavioral: Negative for self-injury. The patient is not nervous/anxious.      Physical Exam:      /72   Pulse 77   Ht 1.777 m (5' 9.96\")   Wt 110.2 kg (243 lb)   SpO2 93%   BMI 34.91 kg/m²   Estimated body mass index is 34.91 kg/m² as calculated from the following:    Height as of this encounter: 1.777 m (5' 9.96\").    Weight as of this encounter: 110.2 kg (243 lb).    General:  Dexter Campoverde is a 79 y.o. year old male who appears his stated age.   HEENT: Normocephalic atraumatic. Neck supple.  Chest: regular rate; pulses equal  Abdomen: Soft nontender nondistended.  Ext: DP and PT pulses 2+, good cap refill  Neuro    Mentation  Appropriate affect  Registration intact  Orientation intact  Judgment intact to situation    Cranial Nerves:   Pupils equal and reactive to light  Extraocular motion intact  Face and shrug symmetric  Tongue midline  No dysarthria  v1-3 sensation symmetric, masseter tone symmetric  Hearing symmetric    Sensation: decreased right fingers    Motor  L deltoid 5/5; R deltoid 5/5  L biceps 5/5; R biceps 5/5  L triceps 5/5; R triceps 5/5  L wrist extension 5/5; R wrist extension 5/5  L intrinsics 5/5; R intrinsics 5/5     L hip flexion 5/5 , R hip flexion 5/5  L knee extension 5/5; R knee extension 5/5  L Dorsiflexion 5/5; R dorsiflexion 5/5  L Plantarflexion 5/5; R plantarflexion 5/5  L

## 2025-05-19 NOTE — TELEPHONE ENCOUNTER
Please Approve or Refuse.  Send to Pharmacy per Pt's Request:      Next Visit Date:  5/28/2025   Last Visit Date: 4/25/2025    Hemoglobin A1C (%)   Date Value   04/25/2025 6.5 (H)   01/23/2025 5.9   10/22/2024 6.1             ( goal A1C is < 7)   BP Readings from Last 3 Encounters:   05/12/25 126/68   04/23/25 112/66   04/25/25 124/60          (goal 120/80)  BUN   Date Value Ref Range Status   04/23/2025 19 8 - 23 mg/dL Final     Creatinine   Date Value Ref Range Status   04/23/2025 0.7 0.7 - 1.2 mg/dL Final     Potassium   Date Value Ref Range Status   04/23/2025 4.6 3.7 - 5.3 mmol/L Final

## 2025-05-21 ENCOUNTER — OFFICE VISIT (OUTPATIENT)
Dept: ORTHOPEDIC SURGERY | Age: 80
End: 2025-05-21

## 2025-05-21 DIAGNOSIS — Z96.651 S/P TOTAL KNEE ARTHROPLASTY, RIGHT: Primary | ICD-10-CM

## 2025-05-21 PROCEDURE — 99024 POSTOP FOLLOW-UP VISIT: CPT | Performed by: ORTHOPAEDIC SURGERY

## 2025-05-21 NOTE — PROGRESS NOTES
[facility]                   Fabio Mathur M.D.            [address], [address]               [place], [place] [address]           Dept Phone: [phone]           Dept Fax:  [phone] [address]                       [address]           [place], [place] [address]          Dept Phone: [phone]           Dept Fax:  [phone]      Chief Compliant:  Chief Complaint   Patient presents with    Post-Op Check     Rt TKA        History of Present Illness:  Patient returns today status post right TKA times 2 weeks. Patient has no major complaints     Review of Systems   Constitutional: Negative for fever, chills, sweats, recent injury, recent illness  Neurological: Negative for Headaches, numbness, or weakness.    Integumentary: Negative for rash, itching, ecchymosis, or wounds.   Musculoskeletal: Positive for Post-Op Check (Rt TKA)       Physical Exam:  Constitutional: Patient is oriented to person, place, and time. Patient appears well-developed and well nourished.   Musculoskeletal: Normal gait. Motion 0-100 degrees with expected pain with ROM. No Calf tenderness, Negative Sergio's sign. Neurovascular intact.  Neurological: Patient is alert and oriented to person, place, and time. Normal strenght. No sensory deficit.  Skin: Skin is warm and dry. Incision is healing well without signs of redness or drainage  Nursing note and vitals reviewed.     Labs and Imaging:     XR taken today:  XR KNEE RIGHT (1-2 VIEWS)  Result Date: 5/21/2025  X-rays taken today reviewed by me show standing AP of both knees a lateral of the right knee patient's recent right total knee arthroplasty components in good alignment position on AP and lateral views.  No obvious periprosthetic complications are noted.  Patient's previous left total knee appears to be in good position without any obvious complications.          Orders Placed This Encounter   Procedures    XR KNEE RIGHT

## 2025-05-26 DIAGNOSIS — I10 ESSENTIAL HYPERTENSION: ICD-10-CM

## 2025-05-27 RX ORDER — LISINOPRIL 2.5 MG/1
2.5 TABLET ORAL DAILY
Qty: 90 TABLET | Refills: 0 | Status: SHIPPED | OUTPATIENT
Start: 2025-05-27

## 2025-05-27 NOTE — TELEPHONE ENCOUNTER
Please Approve or Refuse.  Send to Pharmacy per Pt's Request:      Next Visit Date:  5/27/2025   Last Visit Date: 4/25/2025    Hemoglobin A1C (%)   Date Value   04/25/2025 6.5 (H)   01/23/2025 5.9   10/22/2024 6.1             ( goal A1C is < 7)   BP Readings from Last 3 Encounters:   05/19/25 122/72   05/12/25 126/68   04/23/25 112/66          (goal 120/80)  BUN   Date Value Ref Range Status   04/23/2025 19 8 - 23 mg/dL Final     Creatinine   Date Value Ref Range Status   04/23/2025 0.7 0.7 - 1.2 mg/dL Final     Potassium   Date Value Ref Range Status   04/23/2025 4.6 3.7 - 5.3 mmol/L Final

## 2025-05-29 ENCOUNTER — OUTSIDE SERVICES (OUTPATIENT)
Dept: FAMILY MEDICINE CLINIC | Age: 80
End: 2025-05-29
Payer: MEDICARE

## 2025-05-29 DIAGNOSIS — Z96.651 STATUS POST RIGHT KNEE REPLACEMENT: Primary | ICD-10-CM

## 2025-05-29 PROCEDURE — G0180 MD CERTIFICATION HHA PATIENT: HCPCS | Performed by: FAMILY MEDICINE

## 2025-05-29 NOTE — PROGRESS NOTES
This patient is currently under my care and considered medically homebound, requiring skilled Home Health services. I have reviewed the patient's status and plan of care.     Has the patient been seen within the last 90 days?  yes, 4/25    Time spent completing forms?  10

## 2025-06-02 ENCOUNTER — APPOINTMENT (OUTPATIENT)
Dept: CT IMAGING | Age: 80
End: 2025-06-02
Payer: MEDICARE

## 2025-06-02 ENCOUNTER — HOSPITAL ENCOUNTER (OUTPATIENT)
Dept: PAIN MANAGEMENT | Age: 80
Discharge: HOME | End: 2025-06-02
Payer: MEDICARE

## 2025-06-02 ENCOUNTER — HOSPITAL ENCOUNTER (EMERGENCY)
Age: 80
Discharge: HOME | End: 2025-06-02
Attending: EMERGENCY MEDICINE
Payer: MEDICARE

## 2025-06-02 VITALS
TEMPERATURE: 98.3 F | HEART RATE: 76 BPM | RESPIRATION RATE: 19 BRPM | OXYGEN SATURATION: 96 % | SYSTOLIC BLOOD PRESSURE: 108 MMHG | DIASTOLIC BLOOD PRESSURE: 58 MMHG

## 2025-06-02 VITALS — HEIGHT: 69 IN | BODY MASS INDEX: 35.99 KG/M2 | WEIGHT: 243 LBS
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
## 2025-06-02 DIAGNOSIS — R26.81 GAIT INSTABILITY: ICD-10-CM

## 2025-06-02 DIAGNOSIS — M47.817 LUMBOSACRAL SPONDYLOSIS WITHOUT MYELOPATHY: Primary | ICD-10-CM

## 2025-06-02 DIAGNOSIS — Z79.891 CHRONIC USE OF OPIATE FOR THERAPEUTIC PURPOSE: ICD-10-CM

## 2025-06-02 DIAGNOSIS — M47.27 LUMBOSACRAL RADICULOPATHY DUE TO DEGENERATIVE JOINT DISEASE OF SPINE: ICD-10-CM

## 2025-06-02 PROCEDURE — G0480 DRUG TEST DEF 1-7 CLASSES: HCPCS

## 2025-06-02 PROCEDURE — 70450 CT HEAD/BRAIN W/O DYE: CPT

## 2025-06-02 PROCEDURE — 99213 OFFICE O/P EST LOW 20 MIN: CPT

## 2025-06-02 PROCEDURE — 99214 OFFICE O/P EST MOD 30 MIN: CPT | Performed by: NURSE PRACTITIONER

## 2025-06-02 PROCEDURE — 80307 DRUG TEST PRSMV CHEM ANLYZR: CPT

## 2025-06-02 PROCEDURE — 72125 CT NECK SPINE W/O DYE: CPT

## 2025-06-02 ASSESSMENT — PAIN SCALES - GENERAL: PAINLEVEL_OUTOF10: 6

## 2025-06-02 ASSESSMENT — LIFESTYLE VARIABLES
HOW OFTEN DO YOU HAVE A DRINK CONTAINING ALCOHOL: 2-4 TIMES A MONTH
HOW MANY STANDARD DRINKS CONTAINING ALCOHOL DO YOU HAVE ON A TYPICAL DAY: 1 OR 2

## 2025-06-02 ASSESSMENT — ENCOUNTER SYMPTOMS
BOWEL INCONTINENCE: 0
BACK PAIN: 1

## 2025-06-02 NOTE — ED PROVIDER NOTES
[facility] EMERGENCY DEPARTMENT  Emergency Department Encounter  Emergency Medicine Resident     Pt Name:Dexter Campoverde  MRN: [id number]  Birthdate 1945  Date of evaluation: 6/2/25  PCP:  Andie Glover MD  Note Started: 3:32 PM EDT      CHIEF COMPLAINT       Chief Complaint   Patient presents with    Fall     Pt was walking dog, tripped and fell. Pt hit back of head, denies LOC. Pt is on blood thinners. Hx of previous strokes.       HISTORY OF PRESENT ILLNESS  (Location/Symptom, Timing/Onset, Context/Setting, Quality, Duration, Modifying Factors, Severity.)      Dexter Campoverde is a 79 y.o. male who presents with mechanical fall.  Patient was walking the dog when he states he tripped and fell backwards.  Did hit the back of his head.  Denies loss of consciousness.  Is on Plavix he does not think he is on any other blood thinners.  Was able to get up by himself without assistance afterwards.  Waited for the ambulance.  No pain in the neck or back.  Denies any chest pain, shortness of breath, abdominal pain, nausea or vomiting.  No changes in vision.  Patient with a history of multiple stents but denies any lightheadedness or dizziness prior to fall.    PAST MEDICAL / SURGICAL / SOCIAL / FAMILY HISTORY      has a past medical history of Acquired skull defect, Angina pectoris, Anxiety, Anxiety, At high risk for falls, Ataxia following nontraumatic intracerebral hemorrhage, Bronchitis with asthma, acute, CAD (coronary artery disease), Carotid stenosis, Carotid stenosis, left, Cerebral artery occlusion with cerebral infarction (HCC), Chest pain, Compression of brainstem (HCC), Diabetes mellitus (HCC), Diplopia, Dysphagia, Gait instability, GERD (gastroesophageal reflux disease), H/O carotid endarterectomy, H/O heart artery stent, Heart attack (HCC), Hyperlipidemia, Hypertension, Hyponatremia, Impaired fasting glucose, Intermittent lightheadedness, Myocardiopathy (HCC), Neuropathy, Obesity, Osteoarthritis, PAD

## 2025-06-02 NOTE — ED PROVIDER NOTES
EMERGENCY DEPARTMENT ENCOUNTER   ATTENDING ATTESTATION     Pt Name: Dexter Campoverde  MRN: [id number]  Birthdate 1945  Date of evaluation: 6/2/25       Dexter Campoverde is a 79 y.o. male who presents with Fall (Pt was walking dog, tripped and fell. Pt hit back of head, denies LOC. Pt is on blood thinners. Hx of previous strokes.)      MDM:   Trip and fall on the concrete at the concrete grass transition, he had his walker sat to the side and he tripped on that transition.  Hit the back of his head on the ground.  No LOC.  He has chronic left-sided weakness left facial droop with speech abnormality from previous stroke.  He is on Plavix.  Currently comfortable, back of his head a little sore but otherwise feeling normal.  Here with family.    Vitals:   Vitals:    06/02/25 1527 06/02/25 1630   BP: (!) 110/58 (!) 106/54   Pulse: 88 78   Resp: 21 22   Temp: 98.3 °F (36.8 °C)    TempSrc: Oral    SpO2: 94% 96%         I personally saw and examined the patient. I have reviewed and agree with the resident's findings, including all diagnostic interpretations and treatment plan as written. I was present for the key portions of any procedures performed and the inclusive time noted for any critical care statement.    Eugene Whyte MD  Attending Emergency Physician            Eugene Whyte MD  06/02/25 [id number]

## 2025-06-02 NOTE — DISCHARGE INSTRUCTIONS
You are seen in the emergency room after mechanical fall.  Your CT of the head and neck were negative at this time.  You experience any loss of consciousness, seizure-like activity or any new symptoms of concern please return to the emergency room for reevaluation.  Please ensure you follow-up with your primary care within the week for reevaluation

## 2025-06-02 NOTE — PROGRESS NOTES
Chief Complaint   Patient presents with    Back Pain     Medication Refill        [facility]   Past history significant for cerebrovascular disease with left-sided residual weakness, on long-term antiplatelet therapy     Patient c/o chronic lumbar radicular pain with no known injury or surgery to the area with onset more than 1 year ago and has progressively worsened.   Lumbar MRI 3/2024 Spondylosis and multilevel spinal stenosis, most severe at L4-5.       Pain predominantly in the lumbar area extends over the hip and right lower extremity to his knee  Pt is a high risk for any major spine surgery as well as for interventional procedures that will require interruption in antiplatelet therapy, on Plavix.      S/p lumbar MBB 12/4/2024 with 80% improvement in pain and function. He has confirmatory block 1/8/25 and reports no relief. He was not scheduled for RFA. He does not interest in scheduling.      S/P right knee arthroplasty and did have a stay in SNF - his last refill of norco was 4/14    He has had alcohol in two previous UDS - last warning given at previous visit. Today, UDS requested and patient and his accompanying family member both state pt has been drinking beer regularly - states he only gets two norco. Did again explain the risks of mixing alcohol with opioid medication.      Back Pain  This is a chronic problem. The current episode started more than 1 year ago. The problem occurs constantly. The problem is unchanged. The pain is present in the lumbar spine. Quality: sharp. The pain radiates to the right thigh (Rt Hip). The pain is at a severity of 6/10. The pain is moderate. The pain is The same all the time. The symptoms are aggravated by bending, standing and twisting. Pertinent negatives include no bladder incontinence, bowel incontinence, chest pain, fever, headaches, numbness, tingling or weakness. He has tried heat and bed rest (Pain Medication) for the symptoms. The treatment provided mild

## 2025-06-03 ENCOUNTER — TELEPHONE (OUTPATIENT)
Dept: NEUROLOGY | Age: 80
End: 2025-06-03

## 2025-06-03 ENCOUNTER — TELEPHONE (OUTPATIENT)
Dept: FAMILY MEDICINE CLINIC | Age: 80
End: 2025-06-03

## 2025-06-03 ENCOUNTER — TELEPHONE (OUTPATIENT)
Dept: PAIN MANAGEMENT | Age: 80
End: 2025-06-03

## 2025-06-03 NOTE — TELEPHONE ENCOUNTER
Patient's wife called into office stating Dexter fell yesterday and hit his head. She stated that he was was seen at [facility] where they did a CT of the cervical spine and neck. She stated that today he is still having a headache and he is sore. She is asking if he needs to be seen in the office following this incident. Please advise.

## 2025-06-03 NOTE — TELEPHONE ENCOUNTER
[facility] ED Follow up Call    Reason for ED visit:   6/2/2025 (2 hours)  [facility] Emergency Department     Eugene Whyte MD  Last attending  Treatment team Fall, initial encounter  Clinical impression Fall   Chief complaint       Hi Dexter ,     This is Perla Verma from Andie Wood's office, just calling to see how you are doing after your recent ED visit.    Did you receive discharge instructions?  Yes  Do you understand the discharge instructions? Yes  Did the ED give you any new prescriptions? Yes  Were you able to fill your prescriptions? Yes    Do you have one of our red, yellow and green  Zone sheets that help you to determine when you should go to the ED?Yes    Do you need or want to make a follow up appt with your PCP?Yes    Do you have any further needs in the home i.e. Equipment?  No    FU appts/Provider:      Future Appointments   Date Time Provider Department Center   6/26/2025  9:30 AM Efren Samuel MD [facility] [place]   6/30/2025  1:40 PM Fabio Mathur MD [facility]   7/23/2025  1:00 PM Jenifer Hutchins DO [facility]   7/29/2025  2:00 PM Andie Glover MD [facility] DEP

## 2025-06-03 NOTE — TELEPHONE ENCOUNTER
I LM on patient's VM to call the office.  RE move 6/26 OV to later in the day, d/t MD being in surgery.

## 2025-06-03 NOTE — TELEPHONE ENCOUNTER
Call placed back to wife Candida and this information was given.  Wife states that Dexter had another fall earlier, even while using a walker.  Wife denies any injury with this fall.  When writer asked if he's falling due to weakness wife stated that she didn't know, \"He also had a total knee replacement on the right side on May 6th.  Wife states that he is having neck stiffness as well.  Patient has a hospital follow up with his PCP on 6/525 and will discuss this further.  Writer asked that she keep our office updated.  Wife verbally stated her understanding.

## 2025-06-03 NOTE — TELEPHONE ENCOUNTER
CT of the head and spine do not show any acute changes.  However, if patient is complaining of neck pain, difficulty balance, recommend MRI cervical spine without contrast and then follow-up with neurology clinic.  Thank you

## 2025-06-05 ENCOUNTER — OFFICE VISIT (OUTPATIENT)
Dept: FAMILY MEDICINE CLINIC | Age: 80
End: 2025-06-05

## 2025-06-05 ENCOUNTER — TELEPHONE (OUTPATIENT)
Dept: FAMILY MEDICINE CLINIC | Age: 80
End: 2025-06-05

## 2025-06-05 VITALS
WEIGHT: 240 LBS | HEART RATE: 74 BPM | DIASTOLIC BLOOD PRESSURE: 80 MMHG | SYSTOLIC BLOOD PRESSURE: 120 MMHG | BODY MASS INDEX: 35.55 KG/M2 | OXYGEN SATURATION: 98 % | HEIGHT: 69 IN

## 2025-06-05 DIAGNOSIS — G93.32 CHRONIC FATIGUE SYNDROME: ICD-10-CM

## 2025-06-05 DIAGNOSIS — E78.2 MIXED HYPERLIPIDEMIA: ICD-10-CM

## 2025-06-05 DIAGNOSIS — E11.40 TYPE 2 DIABETES MELLITUS WITH DIABETIC NEUROPATHY, WITHOUT LONG-TERM CURRENT USE OF INSULIN (HCC): ICD-10-CM

## 2025-06-05 DIAGNOSIS — S00.03XD HEMATOMA OF SCALP, SUBSEQUENT ENCOUNTER: ICD-10-CM

## 2025-06-05 DIAGNOSIS — E66.01 MORBID (SEVERE) OBESITY DUE TO EXCESS CALORIES (HCC): ICD-10-CM

## 2025-06-05 DIAGNOSIS — Z96.651 STATUS POST RIGHT KNEE REPLACEMENT: Primary | ICD-10-CM
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
## 2025-06-05 DIAGNOSIS — I10 ESSENTIAL HYPERTENSION: ICD-10-CM
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
## 2025-06-05 SDOH — ECONOMIC STABILITY: FOOD INSECURITY: WITHIN THE PAST 12 MONTHS, THE FOOD YOU BOUGHT JUST DIDN'T LAST AND YOU DIDN'T HAVE MONEY TO GET MORE.: NEVER TRUE

## 2025-06-05 SDOH — ECONOMIC STABILITY: FOOD INSECURITY: WITHIN THE PAST 12 MONTHS, YOU WORRIED THAT YOUR FOOD WOULD RUN OUT BEFORE YOU GOT MONEY TO BUY MORE.: NEVER TRUE

## 2025-06-05 ASSESSMENT — ENCOUNTER SYMPTOMS
CONSTIPATION: 0
WHEEZING: 0
SORE THROAT: 0
VOMITING: 0
ABDOMINAL DISTENTION: 0
COLOR CHANGE: 0
TROUBLE SWALLOWING: 0
SINUS PRESSURE: 0
ABDOMINAL PAIN: 0
RECTAL PAIN: 0
BLOOD IN STOOL: 0
CHEST TIGHTNESS: 0
NAUSEA: 0
STRIDOR: 0
SHORTNESS OF BREATH: 0
BACK PAIN: 1
EYE REDNESS: 0
COUGH: 0
DIARRHEA: 0
RHINORRHEA: 0

## 2025-06-05 NOTE — PROGRESS NOTES
Visit Information    Have you changed or started any medications since your last visit including any over-the-counter medicines, vitamins, or herbal medicines? no   Are you having any side effects from any of your medications? -  no  Have you stopped taking any of your medications? Is so, why? -  no    Have you seen any other physician or provider since your last visit? Yes - Records Obtained  Have you had any other diagnostic tests since your last visit? Yes - Records Obtained  Have you been seen in the emergency room and/or had an admission to a hospital since we last saw you? Yes - Records Obtained  Have you had your routine dental cleaning in the past 6 months? no    Have you activated your [employer] account? If not, what are your barriers? Yes     Patient Care Team:  Andie Glover MD as PCP - General (Family Medicine)  Andie Glover MD as PCP - Empaneled Provider  Valery Long MD as Consulting Physician (Gastroenterology)    Medical History Review  Past Medical, Family, and Social History reviewed and does contribute to the patient presenting condition    Health Maintenance   Topic Date Due    Respiratory Syncytial Virus (RSV) Pregnant or age 60 yrs+ (1 - 1-dose 75+ series) Never done    Diabetic Alb to Cr ratio (uACR) test  10/17/2023    COVID-19 Vaccine (3 - 2024-25 season) 09/01/2024    Lipids  06/03/2025    Depression Monitoring  02/26/2026    GFR test (Diabetes, CKD 3-4, OR last GFR 15-59)  04/23/2026    Colorectal Cancer Screen  01/31/2029    DTaP/Tdap/Td vaccine (2 - Td or Tdap) 08/05/2033    Annual Wellness Visit (Medicare Advantage)  Completed    Flu vaccine  Completed    Shingles vaccine  Completed    Pneumococcal 50+ years Vaccine  Completed    Hepatitis A vaccine  Aged Out    Hepatitis B vaccine  Aged Out    Hib vaccine  Aged Out    Polio vaccine  Aged Out    Meningococcal (ACWY) vaccine  Aged Out    Meningococcal B vaccine  Aged Out    A1C test (Diabetic or Prediabetic)  Discontinued

## 2025-06-05 NOTE — PROGRESS NOTES
Chief Complaint   Patient presents with    ED Follow-up     [facility]     Fall     Has fallen again since      The patient (or guardian, if applicable) and other individuals in attendance with the patient were advised that Artificial Intelligence will be utilized during this visit to record, process the conversation to generate a clinical note, and support improvement of the AI technology. The patient (or guardian, if applicable) and other individuals in attendance at the appointment consented to the use of AI, including the recording.                    ED Follow-up ([facility] ) and Fall (Has fallen again since )      History of Present Illness  The patient presents for evaluation of a fall, accompanied with his wife.    Two days ago, he experienced a fall at home, resulting in a minor head injury with subsequent bleeding and the development of a significant hematoma. The incident occurred when he was attempting to retrieve his dog from the grass. He was conscious during his visit to the ER. He has a history of balance issues, which have previously led to falls. He has been consuming alcohol, although not on a daily basis. He underwent right knee replacement surgery on 05/06/2025 and has been recovering well. He completed a week of rehabilitation and continues to receive home therapy. He has a history of stroke and residual weakness.      Patient is high risk for fall due to recent knee replacement and also has previous residual weakness from the stroke.  Patient also had few beers discussed with patient detail to avoid things which will put you at risk for the fall.    Patient is currently using walker refused to use wheelchair.      He has small swelling on the scalp which is likely his peripheral hematoma.  CT cervical spine and head was stable.  Type 2 diabetes controlled on the previous A1c is due for microalbumin test.        PAST SURGICAL HISTORY:  Right knee replacement surgery on 05/06/2025    SOCIAL

## 2025-06-05 NOTE — TELEPHONE ENCOUNTER
[facility] ED Follow up Call    Reason for ED visit:      6/2/2025 (2 hours)  [facility] Emergency Department     Eugene Whyte MD  Last attending  Treatment team Fall, initial encounter  Clinical impression Fall   Chief complaint          Mikael Renteria ,     This is Perla Verma from Andie Wood's office, just calling to see how you are doing after your recent ED visit.    Did you receive discharge instructions?  Yes  Do you understand the discharge instructions? Yes  Did the ED give you any new prescriptions? Yes  Were you able to fill your prescriptions? Yes    Do you have one of our red, yellow and green  Zone sheets that help you to determine when you should go to the ED?Yes    Do you need or want to make a follow up appt with your PCP?Yes    Do you have any further needs in the home i.e. Equipment?  No    FU appts/Provider:      Future Appointments   Date Time Provider Department Center   6/5/2025  1:00 PM Andie Glover MD fp [facility] DEP   6/26/2025 10:15 AM Efren Samuel MD [facility] [place]   6/30/2025  1:40 PM Fabio Mathur MD [facility]   7/23/2025  1:00 PM Jenifer Hutchins DO [facility]   7/29/2025  2:00 PM Andie Glover MD fp [facility] DEP

## 2025-06-08 LAB
6-ACETYLMORPHINE, UR: NOT DETECTED
7-AMINOCLONAZEPAM, URINE: NOT DETECTED
ALPHA-OH-ALPRAZ, URINE: NOT DETECTED
ALPHA-OH-MIDAZOLAM, URINE: NOT DETECTED
ALPRAZOLAM, URINE: NOT DETECTED
AMPHETAMINE, URINE: NOT DETECTED
BARBITURATES, URINE: NEGATIVE
BENZOYLECGONINE, UR: NEGATIVE
BUPRENORPHINE URINE: NOT DETECTED
CARISOPRODOL, UR: NEGATIVE
CLONAZEPAM, URINE: NOT DETECTED
CODEINE, URINE: NOT DETECTED
CREAT UR-MCNC: 340.3 MG/DL (ref 20–400)
DIAZEPAM, URINE: NOT DETECTED
DRUGS EXPECTED, UR: NORMAL
EER HI RES INTERP UR: NORMAL
ETHYL GLUCURONIDE UR: NORMAL
FENTANYL URINE: NOT DETECTED
GABAPENTIN: NOT DETECTED
HYDROCODONE, URINE: PRESENT
HYDROMORPHONE, URINE: PRESENT
LORAZEPAM, URINE: NOT DETECTED
MARIJUANA METAB, UR: NEGATIVE
MDA, URINE: NOT DETECTED
MDEA, EVE, UR: NOT DETECTED
MDMA, URINE: NOT DETECTED
MEPERIDINE METAB, UR: NOT DETECTED
METHADONE, URINE: NEGATIVE
METHAMPHETAMINE, URINE: NOT DETECTED
METHYLPHENIDATE: NOT DETECTED
MIDAZOLAM, URINE: NOT DETECTED
MORPHINE, OPI1M: NOT DETECTED
NALOXONE URINE: NOT DETECTED
NORBUPRENORPHINE, URINE: NOT DETECTED
NORDIAZEPAM, URINE: NOT DETECTED
NORFENTANYL, URINE: NOT DETECTED
NORHYDROCODONE, URINE: PRESENT
NOROXYCODONE, URINE: NOT DETECTED
NOROXYMORPHONE, URINE: NOT DETECTED
OXAZEPAM, URINE: NOT DETECTED
OXYCODONE URINE: NOT DETECTED
OXYMORPHONE, URINE: NOT DETECTED
PAIN MANAGEMENT DRUG PANEL INTERP, URINE: NORMAL
PAIN MGT DRUG PANEL, HI RES, UR: NORMAL
PCP,URINE: NEGATIVE
PHENTERMINE, UR: NOT DETECTED
PREGABALIN: NOT DETECTED
TAPENTADOL, URINE: NOT DETECTED
TAPENTADOL-O-SULFATE, URINE: NOT DETECTED
TEMAZEPAM, URINE: NOT DETECTED
TRAMADOL, URINE: NEGATIVE
ZOLPIDEM METABOLITE (ZCA), URINE: NOT DETECTED
ZOLPIDEM, URINE: NOT DETECTED

## 2025-06-09 ENCOUNTER — TELEPHONE (OUTPATIENT)
Dept: PAIN MANAGEMENT | Age: 80
End: 2025-06-09

## 2025-06-09 ENCOUNTER — RESULTS FOLLOW-UP (OUTPATIENT)
Dept: FAMILY MEDICINE CLINIC | Age: 80
End: 2025-06-09

## 2025-06-10 DIAGNOSIS — N13.8 BPH WITH OBSTRUCTION/LOWER URINARY TRACT SYMPTOMS: ICD-10-CM

## 2025-06-10 DIAGNOSIS — N40.1 BPH WITH OBSTRUCTION/LOWER URINARY TRACT SYMPTOMS: ICD-10-CM

## 2025-06-10 RX ORDER — TAMSULOSIN HYDROCHLORIDE 0.4 MG/1
0.4 CAPSULE ORAL DAILY
Qty: 90 CAPSULE | Refills: 0 | Status: SHIPPED | OUTPATIENT
Start: 2025-06-10

## 2025-06-10 NOTE — TELEPHONE ENCOUNTER
Please Approve or Refuse.  Send to Pharmacy per Pt's Request:      Next Visit Date:  9/8/2025   Last Visit Date: 6/5/2025    Hemoglobin A1C (%)   Date Value   04/25/2025 6.5 (H)   01/23/2025 5.9   10/22/2024 6.1             ( goal A1C is < 7)   BP Readings from Last 3 Encounters:   06/05/25 120/80   06/02/25 (!) 108/58   05/19/25 122/72          (goal 120/80)  BUN   Date Value Ref Range Status   04/23/2025 19 8 - 23 mg/dL Final     Creatinine   Date Value Ref Range Status   04/23/2025 0.7 0.7 - 1.2 mg/dL Final     Potassium   Date Value Ref Range Status   04/23/2025 4.6 3.7 - 5.3 mmol/L Final

## 2025-06-26 ENCOUNTER — HOSPITAL ENCOUNTER (OUTPATIENT)
Dept: PAIN MANAGEMENT | Age: 80
Discharge: HOME | End: 2025-06-26
Payer: MEDICARE

## 2025-06-26 VITALS — WEIGHT: 240 LBS | BODY MASS INDEX: 35.55 KG/M2 | HEIGHT: 69 IN

## 2025-06-26 DIAGNOSIS — Z79.891 CHRONIC USE OF OPIATE FOR THERAPEUTIC PURPOSE: ICD-10-CM

## 2025-06-26 DIAGNOSIS — M47.817 LUMBOSACRAL SPONDYLOSIS WITHOUT MYELOPATHY: Primary | ICD-10-CM

## 2025-06-26 PROCEDURE — 99214 OFFICE O/P EST MOD 30 MIN: CPT | Performed by: ANESTHESIOLOGY

## 2025-06-26 PROCEDURE — 99213 OFFICE O/P EST LOW 20 MIN: CPT

## 2025-06-26 RX ORDER — HYDROCODONE BITARTRATE AND ACETAMINOPHEN 5; 325 MG/1; MG/1
1 TABLET ORAL 2 TIMES DAILY PRN
Qty: 30 TABLET | Refills: 0 | Status: SHIPPED | OUTPATIENT
Start: 2025-06-26 | End: 2025-07-26

## 2025-06-26 ASSESSMENT — ENCOUNTER SYMPTOMS
EYES NEGATIVE: 1
BACK PAIN: 1
RESPIRATORY NEGATIVE: 1

## 2025-06-26 NOTE — PROGRESS NOTES
The patient is a 79 y.o.Non- / non  male.    Chief Complaint   Patient presents with    Back Pain     Med refill  Discuss med change        Back Pain  Pertinent negatives include no fever, headaches, numbness or weakness.     Chief Complaint: back pain  Medication Refill: Norco     Pain score Today:  8  Adverse effects (Constipation / Nausea / Sedation / sexual Dysfunction / others) : no  Mood: poor  Sleep pattern and quality: fair  Activity level: poor    Pill count Today:0  Last dose taken  May 2025   [place]AR[place] report reviewed today: yes  ER/Hospitalizations/PCP visit related to pain since last visit:no   Any legal problems e.g. DUI etc.:  Satisfied with current management: Yes    Opioid Contract:11/7/24  Last Urine Dug screen dated:6/2/25    Hemoglobin A1C   Date Value Ref Range Status   04/25/2025 6.5 (H) % Final       Past Medical History, Past Surgical History, Social History, Allergies and Medications reviewed and updated in EPIC as indicated    Family History reviewed and is noncontributory.         Past Medical History:   Diagnosis Date    Acquired skull defect     Angina pectoris     Anxiety     Anxiety     At high risk for falls     Ataxia following nontraumatic intracerebral hemorrhage     Bronchitis with asthma, acute 11/24/2015    CAD (coronary artery disease)     Carotid stenosis     Left    Carotid stenosis, left 02/02/2016    Cerebral artery occlusion with cerebral infarction (HCC)     Chest pain     Compression of brainstem (HCC)     Diabetes mellitus (HCC)     borderline patient off metformin    Diplopia     Dysphagia     Gait instability     GERD (gastroesophageal reflux disease)     H/O carotid endarterectomy 04/18/2016    H/O heart artery stent     x5    Heart attack (HCC) 08/05/2019    Hyperlipidemia     Hypertension     Hyponatremia     Impaired fasting glucose 07/13/2015    Intermittent lightheadedness     Myocardiopathy (HCC)     Neuropathy     Obesity     Osteoarthritis     both

## 2025-06-30 ENCOUNTER — OFFICE VISIT (OUTPATIENT)
Dept: ORTHOPEDIC SURGERY | Age: 80
End: 2025-06-30

## 2025-06-30 VITALS — BODY MASS INDEX: 35.55 KG/M2 | RESPIRATION RATE: 18 BRPM | WEIGHT: 240 LBS | HEIGHT: 69 IN

## 2025-06-30 DIAGNOSIS — Z96.651 S/P TOTAL KNEE ARTHROPLASTY, RIGHT: Primary | ICD-10-CM

## 2025-06-30 PROCEDURE — 99024 POSTOP FOLLOW-UP VISIT: CPT | Performed by: ORTHOPAEDIC SURGERY

## 2025-06-30 NOTE — PROGRESS NOTES
Andrea returns today he status post right total knee on 5/6/2025.  He says overall the pain is much better he still has little bit of swelling in his lower leg that he says is painful he has been doing his own exercise for now has had apparently had some problems getting into outpatient physical therapy    Examination notes the patient's wound is well-healed he is able to get about 2 degrees in full extension and he flexes to about 100 degrees maybe a little bit more good stability and good patellar tracking no calf tenderness    Impression  Status post right total knee on 5/6/2025    Plan  Patient to encouraged to get into outpatient physical therapy we gave him another prescription for this.  He is plan on doing this at [facility] we will see him back here in 2 months.
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
## 2025-07-15 ENCOUNTER — HOSPITAL ENCOUNTER (OUTPATIENT)
Dept: PAIN MANAGEMENT | Age: 80
Discharge: HOME | End: 2025-07-15
Payer: MEDICARE

## 2025-07-15 VITALS — WEIGHT: 240 LBS | HEIGHT: 70 IN | BODY MASS INDEX: 34.36 KG/M2

## 2025-07-15 DIAGNOSIS — M47.817 LUMBOSACRAL SPONDYLOSIS WITHOUT MYELOPATHY: Primary | ICD-10-CM

## 2025-07-15 DIAGNOSIS — R69 SEVERE COMORBID ILLNESS: ICD-10-CM

## 2025-07-15 DIAGNOSIS — M51.362 DEGENERATION OF INTERVERTEBRAL DISC OF LUMBAR REGION WITH DISCOGENIC BACK PAIN AND LOWER EXTREMITY PAIN: ICD-10-CM

## 2025-07-15 DIAGNOSIS — Z79.891 CHRONIC USE OF OPIATE FOR THERAPEUTIC PURPOSE: ICD-10-CM

## 2025-07-15 DIAGNOSIS — M47.27 LUMBOSACRAL RADICULOPATHY DUE TO DEGENERATIVE JOINT DISEASE OF SPINE: ICD-10-CM

## 2025-07-15 DIAGNOSIS — R26.81 GAIT INSTABILITY: ICD-10-CM

## 2025-07-15 PROCEDURE — 99213 OFFICE O/P EST LOW 20 MIN: CPT

## 2025-07-15 PROCEDURE — 99214 OFFICE O/P EST MOD 30 MIN: CPT | Performed by: NURSE PRACTITIONER

## 2025-07-15 RX ORDER — HYDROCODONE BITARTRATE AND ACETAMINOPHEN 5; 325 MG/1; MG/1
1 TABLET ORAL 2 TIMES DAILY PRN
Qty: 60 TABLET | Refills: 0 | Status: SHIPPED | OUTPATIENT
Start: 2025-07-15 | End: 2025-08-14

## 2025-07-15 ASSESSMENT — ENCOUNTER SYMPTOMS
BOWEL INCONTINENCE: 0
BACK PAIN: 1

## 2025-07-15 ASSESSMENT — PAIN SCALES - GENERAL: PAINLEVEL_OUTOF10: 6

## 2025-07-15 NOTE — PROGRESS NOTES
Never Used   Substance and Sexual Activity    Alcohol use: Not Currently    Drug use: No    Sexual activity: Defer   Other Topics Concern    Not on file   Social History Narrative    Not on file     Social Drivers of Health     Financial Resource Strain: Low Risk  (8/8/2024)    Overall Financial Resource Strain (CARDIA)     Difficulty of Paying Living Expenses: Not hard at all   Food Insecurity: No Food Insecurity (6/5/2025)    Hunger Vital Sign     Worried About Running Out of Food in the Last Year: Never true     Ran Out of Food in the Last Year: Never true   Transportation Needs: No Transportation Needs (6/5/2025)    PRAPARE - Transportation     Lack of Transportation (Medical): No     Lack of Transportation (Non-Medical): No   Physical Activity: Insufficiently Active (2/26/2025)    Exercise Vital Sign     Days of Exercise per Week: 7 days     Minutes of Exercise per Session: 10 min   Stress: Not on file   Social Connections: Not on file   Intimate Partner Violence: Not on file   Housing Stability: Low Risk  (6/5/2025)    Housing Stability Vital Sign     Unable to Pay for Housing in the Last Year: No     Number of Times Moved in the Last Year: 0     Homeless in the Last Year: No       Review of Systems:  Review of Systems   Constitutional: Negative for fever.   Cardiovascular:  Positive for chest pain.   Musculoskeletal:  Positive for back pain and joint pain.   Gastrointestinal:  Negative for bowel incontinence.   Genitourinary:  Negative for bladder incontinence.   Neurological:  Negative for headaches, numbness, tingling and weakness.       Physical Exam:  Ht 1.778 m (5' 10\")   Wt 108.9 kg (240 lb)   BMI 34.44 kg/m²     Physical Exam  Constitutional:       General: He is not in acute distress.     Appearance: He is not ill-appearing.   HENT:      Head: Normocephalic.   Pulmonary:      Effort: Pulmonary effort is normal. No respiratory distress.   Musculoskeletal:      Lumbar back: Tenderness present.

## 2025-07-20 DIAGNOSIS — E78.2 MIXED HYPERLIPIDEMIA: ICD-10-CM

## 2025-07-21 DIAGNOSIS — I25.10 CORONARY ARTERY DISEASE INVOLVING NATIVE CORONARY ARTERY OF NATIVE HEART WITHOUT ANGINA PECTORIS: ICD-10-CM

## 2025-07-21 DIAGNOSIS — I10 ESSENTIAL HYPERTENSION: ICD-10-CM

## 2025-07-21 RX ORDER — ROSUVASTATIN CALCIUM 40 MG/1
40 TABLET, COATED ORAL DAILY
Qty: 90 TABLET | Refills: 0 | Status: SHIPPED | OUTPATIENT
Start: 2025-07-21

## 2025-07-21 RX ORDER — METOPROLOL SUCCINATE 25 MG/1
25 TABLET, EXTENDED RELEASE ORAL DAILY
Qty: 90 TABLET | Refills: 1 | Status: SHIPPED | OUTPATIENT
Start: 2025-07-21

## 2025-07-23 ENCOUNTER — TELEPHONE (OUTPATIENT)
Dept: NEUROSURGERY | Age: 80
End: 2025-07-23

## 2025-07-23 ENCOUNTER — OFFICE VISIT (OUTPATIENT)
Dept: NEUROSURGERY | Age: 80
End: 2025-07-23
Payer: MEDICARE

## 2025-07-23 VITALS
HEIGHT: 70 IN | WEIGHT: 246 LBS | HEART RATE: 65 BPM | DIASTOLIC BLOOD PRESSURE: 66 MMHG | BODY MASS INDEX: 35.22 KG/M2 | SYSTOLIC BLOOD PRESSURE: 117 MMHG

## 2025-07-23 DIAGNOSIS — G99.2 STENOSIS OF CERVICAL SPINE WITH MYELOPATHY (HCC): ICD-10-CM

## 2025-07-23 DIAGNOSIS — R13.13 PHARYNGEAL DYSPHAGIA: Primary | ICD-10-CM

## 2025-07-23 DIAGNOSIS — M48.02 STENOSIS OF CERVICAL SPINE WITH MYELOPATHY (HCC): ICD-10-CM

## 2025-07-23 DIAGNOSIS — G56.21 CUBITAL TUNNEL SYNDROME ON RIGHT: ICD-10-CM

## 2025-07-23 DIAGNOSIS — G56.01 RIGHT CARPAL TUNNEL SYNDROME: ICD-10-CM

## 2025-07-23 PROCEDURE — G8417 CALC BMI ABV UP PARAM F/U: HCPCS | Performed by: NEUROLOGICAL SURGERY

## 2025-07-23 PROCEDURE — 3078F DIAST BP <80 MM HG: CPT | Performed by: NEUROLOGICAL SURGERY

## 2025-07-23 PROCEDURE — 1036F TOBACCO NON-USER: CPT | Performed by: NEUROLOGICAL SURGERY

## 2025-07-23 PROCEDURE — 99215 OFFICE O/P EST HI 40 MIN: CPT | Performed by: NEUROLOGICAL SURGERY

## 2025-07-23 PROCEDURE — 3074F SYST BP LT 130 MM HG: CPT | Performed by: NEUROLOGICAL SURGERY

## 2025-07-23 PROCEDURE — G8428 CUR MEDS NOT DOCUMENT: HCPCS | Performed by: NEUROLOGICAL SURGERY

## 2025-07-23 PROCEDURE — 1123F ACP DISCUSS/DSCN MKR DOCD: CPT | Performed by: NEUROLOGICAL SURGERY

## 2025-07-23 NOTE — PROGRESS NOTES
[facility] [employer]  [address] [facility]  [id number]  [address], [address]  [facility] [address]  Dept: [phone]    Patient:  Dexter Campoverde  YOB: 1945  Date: 7/23/25    The patient is a 79 y.o. male who presents today for consult of the following problems:     Chief Complaint   Patient presents with    Follow-up     Right carpal tunnel syndrome; imaging to review, has numbness in the right hand frequently             HPI:     Dexter Campoverde is a 79 y.o. male on whom neurosurgical consultation was requested by Andie Glover MD for management of significant degree of weakness of the bilateral upper extremities right side greater than left.  He has baseline tremors of left upper extremity.  On the right side he has significant difficulty dexterity including pincer grasp and right dentures and has resorted to Velcro shoes.  Also has a significant degree of radiating pain numbness throughout the right upper extremity but is diffuse.  In similar fashion does have progressive ataxia requiring use of a walker that he has needed for the last few months.  No bowel or bladder incontinence or saddle anesthesia.  Does have significant degree of dysphagia but has adapted to it has trouble with more solid foods as well as pills..      History:     Past Medical History:   Diagnosis Date    Acquired skull defect     Angina pectoris     Anxiety     Anxiety     At high risk for falls     Ataxia following nontraumatic intracerebral hemorrhage     Bronchitis with asthma, acute 11/24/2015    CAD (coronary artery disease)     Carotid stenosis     Left    Carotid stenosis, left 02/02/2016    Cerebral artery occlusion with cerebral infarction (HCC)     Chest pain     Compression of brainstem (HCC)     Diabetes mellitus (HCC)     borderline patient off metformin    Diplopia     Dysphagia     Gait instability     GERD (gastroesophageal reflux

## 2025-07-23 NOTE — TELEPHONE ENCOUNTER
Dr. Hutchins ordered patient to have a Barium Swallow w/ video done prior to surgery.    Gave the order and central scheduling phone number to patients wife.   Will follow up to see if scheduled when I call patient to go over dates and times for surgery.

## 2025-07-24 PROBLEM — R13.13 PHARYNGEAL DYSPHAGIA: Status: ACTIVE | Noted: 2025-07-24

## 2025-07-24 PROBLEM — M48.02 STENOSIS OF CERVICAL SPINE WITH MYELOPATHY (HCC): Status: ACTIVE | Noted: 2025-07-24

## 2025-07-24 PROBLEM — G56.21 CUBITAL TUNNEL SYNDROME ON RIGHT: Status: ACTIVE | Noted: 2025-07-24

## 2025-07-24 PROBLEM — G99.2 STENOSIS OF CERVICAL SPINE WITH MYELOPATHY (HCC): Status: ACTIVE | Noted: 2025-07-24

## 2025-07-29 ENCOUNTER — TELEPHONE (OUTPATIENT)
Dept: NEUROSURGERY | Age: 80
End: 2025-07-29

## 2025-07-29 RX ORDER — SODIUM CHLORIDE, SODIUM LACTATE, POTASSIUM CHLORIDE, CALCIUM CHLORIDE 600; 310; 30; 20 MG/100ML; MG/100ML; MG/100ML; MG/100ML
INJECTION, SOLUTION INTRAVENOUS CONTINUOUS
OUTPATIENT
Start: 2025-07-29

## 2025-07-29 NOTE — TELEPHONE ENCOUNTER
Called and spoke with patiens wife, Candida  Went over surgery, PAT and PO dates and times  Surgery 8/19/2025  Arrive 900AM  Surgery 1100AM  PAT 8/8/2025 1030AM  PO 9/2/2025 930AM  PO 10/1/2025 100PM    NPO after midnight, will be admitted 1-2 days  Will need  when discharges    Wife verbalized understanding    Letter sent  [place] and spreadsheet updated

## 2025-08-06 ENCOUNTER — HOSPITAL ENCOUNTER (OUTPATIENT)
Dept: GENERAL RADIOLOGY | Age: 80
Discharge: HOME | End: 2025-08-08
Attending: NEUROLOGICAL SURGERY
Payer: MEDICARE

## 2025-08-06 DIAGNOSIS — R13.13 PHARYNGEAL DYSPHAGIA: ICD-10-CM

## 2025-08-06 PROCEDURE — 92611 MOTION FLUOROSCOPY/SWALLOW: CPT

## 2025-08-06 PROCEDURE — 74230 X-RAY XM SWLNG FUNCJ C+: CPT

## 2025-08-07 DIAGNOSIS — R13.10 DYSPHAGIA, UNSPECIFIED TYPE: Primary | ICD-10-CM

## 2025-08-08 ENCOUNTER — HOSPITAL ENCOUNTER (OUTPATIENT)
Dept: PREADMISSION TESTING | Age: 80
Discharge: HOME | End: 2025-08-12
Payer: MEDICARE

## 2025-08-08 VITALS
OXYGEN SATURATION: 96 % | BODY MASS INDEX: 34.5 KG/M2 | SYSTOLIC BLOOD PRESSURE: 102 MMHG | HEIGHT: 70 IN | TEMPERATURE: 97.7 F | RESPIRATION RATE: 18 BRPM | HEART RATE: 67 BPM | WEIGHT: 241 LBS | DIASTOLIC BLOOD PRESSURE: 60 MMHG

## 2025-08-08 LAB
ABO + RH BLD: NORMAL
ANION GAP SERPL CALCULATED.3IONS-SCNC: 9 MMOL/L (ref 9–16)
ARM BAND NUMBER: NORMAL
BILIRUB UR QL STRIP: NEGATIVE
BLOOD BANK SAMPLE EXPIRATION: NORMAL
BLOOD GROUP ANTIBODIES SERPL: NEGATIVE
BUN SERPL-MCNC: 19 MG/DL (ref 8–23)
CALCIUM SERPL-MCNC: 9.9 MG/DL (ref 8.6–10.4)
CHLORIDE SERPL-SCNC: 103 MMOL/L (ref 98–107)
CLARITY UR: CLEAR
CO2 SERPL-SCNC: 27 MMOL/L (ref 20–31)
COLOR UR: ABNORMAL
COMMENT: ABNORMAL
CREAT SERPL-MCNC: 0.7 MG/DL (ref 0.7–1.2)
ERYTHROCYTE [DISTWIDTH] IN BLOOD BY AUTOMATED COUNT: 13.1 % (ref 11.8–14.4)
GFR, ESTIMATED: >90 ML/MIN/1.73M2
GLUCOSE SERPL-MCNC: 106 MG/DL (ref 74–99)
GLUCOSE UR STRIP-MCNC: NEGATIVE MG/DL
HCT VFR BLD AUTO: 38.2 % (ref 40.7–50.3)
HGB BLD-MCNC: 11.9 G/DL (ref 13–17)
HGB UR QL STRIP.AUTO: NEGATIVE
KETONES UR STRIP-MCNC: ABNORMAL MG/DL
LEUKOCYTE ESTERASE UR QL STRIP: NEGATIVE
MCH RBC QN AUTO: 28.2 PG (ref 25.2–33.5)
MCHC RBC AUTO-ENTMCNC: 31.2 G/DL (ref 28.4–34.8)
MCV RBC AUTO: 90.5 FL (ref 82.6–102.9)
NITRITE UR QL STRIP: NEGATIVE
NRBC BLD-RTO: 0 PER 100 WBC
PH UR STRIP: 5.5 [PH] (ref 5–8)
PLATELET # BLD AUTO: 184 K/UL (ref 138–453)
PMV BLD AUTO: 10.5 FL (ref 8.1–13.5)
POTASSIUM SERPL-SCNC: 4.5 MMOL/L (ref 3.7–5.3)
PROT UR STRIP-MCNC: NEGATIVE MG/DL
RBC # BLD AUTO: 4.22 M/UL (ref 4.21–5.77)
SODIUM SERPL-SCNC: 139 MMOL/L (ref 136–145)
SP GR UR STRIP: 1.03 (ref 1–1.03)
UROBILINOGEN UR STRIP-ACNC: NORMAL EU/DL (ref 0–1)
WBC OTHER # BLD: 5.8 K/UL (ref 3.5–11.3)

## 2025-08-08 PROCEDURE — 80048 BASIC METABOLIC PNL TOTAL CA: CPT

## 2025-08-08 PROCEDURE — 86850 RBC ANTIBODY SCREEN: CPT

## 2025-08-08 PROCEDURE — 86901 BLOOD TYPING SEROLOGIC RH(D): CPT

## 2025-08-08 PROCEDURE — 81003 URINALYSIS AUTO W/O SCOPE: CPT

## 2025-08-08 PROCEDURE — 85027 COMPLETE CBC AUTOMATED: CPT

## 2025-08-08 PROCEDURE — 36415 COLL VENOUS BLD VENIPUNCTURE: CPT

## 2025-08-08 PROCEDURE — 93005 ELECTROCARDIOGRAM TRACING: CPT | Performed by: ANESTHESIOLOGY

## 2025-08-08 PROCEDURE — 86900 BLOOD TYPING SEROLOGIC ABO: CPT

## 2025-08-08 ASSESSMENT — PAIN DESCRIPTION - LOCATION: LOCATION: BACK

## 2025-08-08 ASSESSMENT — PAIN DESCRIPTION - FREQUENCY: FREQUENCY: CONTINUOUS

## 2025-08-08 ASSESSMENT — PAIN DESCRIPTION - ONSET: ONSET: ON-GOING

## 2025-08-08 ASSESSMENT — PAIN DESCRIPTION - DESCRIPTORS: DESCRIPTORS: ACHING

## 2025-08-08 ASSESSMENT — PAIN DESCRIPTION - PAIN TYPE: TYPE: ACUTE PAIN;CHRONIC PAIN

## 2025-08-09 LAB
EKG ATRIAL RATE: 64 BPM
EKG P AXIS: 67 DEGREES
EKG P-R INTERVAL: 236 MS
EKG Q-T INTERVAL: 402 MS
EKG QRS DURATION: 82 MS
EKG QTC CALCULATION (BAZETT): 414 MS
EKG R AXIS: 34 DEGREES
EKG T AXIS: 34 DEGREES
EKG VENTRICULAR RATE: 64 BPM

## 2025-08-09 PROCEDURE — 93010 ELECTROCARDIOGRAM REPORT: CPT | Performed by: INTERNAL MEDICINE

## 2025-08-11 ENCOUNTER — OFFICE VISIT (OUTPATIENT)
Dept: GASTROENTEROLOGY | Age: 80
End: 2025-08-11
Payer: MEDICARE

## 2025-08-11 VITALS
DIASTOLIC BLOOD PRESSURE: 74 MMHG | HEIGHT: 70 IN | HEART RATE: 66 BPM | SYSTOLIC BLOOD PRESSURE: 144 MMHG | BODY MASS INDEX: 34.65 KG/M2 | WEIGHT: 242 LBS | OXYGEN SATURATION: 95 %

## 2025-08-11 DIAGNOSIS — R13.13 PHARYNGEAL DYSPHAGIA: Primary | ICD-10-CM

## 2025-08-11 DIAGNOSIS — Z87.19 HISTORY OF RECTAL ULCER: ICD-10-CM

## 2025-08-11 DIAGNOSIS — Z86.0101 HISTORY OF ADENOMATOUS AND SERRATED COLON POLYPS: ICD-10-CM

## 2025-08-11 PROBLEM — Z86.0100 HX OF COLONIC POLYPS: Status: ACTIVE | Noted: 2025-07-24

## 2025-08-11 PROCEDURE — 3077F SYST BP >= 140 MM HG: CPT | Performed by: PHYSICIAN ASSISTANT

## 2025-08-11 PROCEDURE — 1159F MED LIST DOCD IN RCRD: CPT | Performed by: PHYSICIAN ASSISTANT

## 2025-08-11 PROCEDURE — G8427 DOCREV CUR MEDS BY ELIG CLIN: HCPCS | Performed by: PHYSICIAN ASSISTANT

## 2025-08-11 PROCEDURE — 1123F ACP DISCUSS/DSCN MKR DOCD: CPT | Performed by: PHYSICIAN ASSISTANT

## 2025-08-11 PROCEDURE — 1126F AMNT PAIN NOTED NONE PRSNT: CPT | Performed by: PHYSICIAN ASSISTANT

## 2025-08-11 PROCEDURE — 3078F DIAST BP <80 MM HG: CPT | Performed by: PHYSICIAN ASSISTANT

## 2025-08-11 PROCEDURE — 1160F RVW MEDS BY RX/DR IN RCRD: CPT | Performed by: PHYSICIAN ASSISTANT

## 2025-08-11 PROCEDURE — 1036F TOBACCO NON-USER: CPT | Performed by: PHYSICIAN ASSISTANT

## 2025-08-11 PROCEDURE — 99214 OFFICE O/P EST MOD 30 MIN: CPT | Performed by: PHYSICIAN ASSISTANT

## 2025-08-11 PROCEDURE — G8417 CALC BMI ABV UP PARAM F/U: HCPCS | Performed by: PHYSICIAN ASSISTANT

## 2025-08-11 RX ORDER — POLYETHYLENE GLYCOL 3350, SODIUM SULFATE ANHYDROUS, SODIUM BICARBONATE, SODIUM CHLORIDE, POTASSIUM CHLORIDE 236; 22.74; 6.74; 5.86; 2.97 G/4L; G/4L; G/4L; G/4L; G/4L
4 POWDER, FOR SOLUTION ORAL ONCE
Qty: 1 ML | Refills: 0 | Status: SHIPPED | OUTPATIENT
Start: 2025-08-11 | End: 2025-08-11

## 2025-08-11 RX ORDER — BISACODYL 5 MG
TABLET, DELAYED RELEASE (ENTERIC COATED) ORAL
Qty: 4 TABLET | Refills: 0 | Status: SHIPPED | OUTPATIENT
Start: 2025-08-11

## 2025-08-12 ENCOUNTER — TELEPHONE (OUTPATIENT)
Dept: PAIN MANAGEMENT | Age: 80
End: 2025-08-12

## 2025-08-18 ENCOUNTER — ANESTHESIA EVENT (OUTPATIENT)
Dept: OPERATING ROOM | Age: 80
End: 2025-08-18
Payer: MEDICARE

## 2025-08-19 ENCOUNTER — APPOINTMENT (OUTPATIENT)
Dept: GENERAL RADIOLOGY | Age: 80
End: 2025-08-19
Attending: NEUROLOGICAL SURGERY
Payer: MEDICARE

## 2025-08-19 ENCOUNTER — ANESTHESIA (OUTPATIENT)
Dept: OPERATING ROOM | Age: 80
End: 2025-08-19
Payer: MEDICARE

## 2025-08-19 ENCOUNTER — HOSPITAL ENCOUNTER (INPATIENT)
Age: 80
LOS: 4 days | Discharge: HOME | End: 2025-08-23
Attending: NEUROLOGICAL SURGERY | Admitting: NEUROLOGICAL SURGERY
Payer: MEDICARE

## 2025-08-19 DIAGNOSIS — M47.817 LUMBOSACRAL SPONDYLOSIS WITHOUT MYELOPATHY: ICD-10-CM

## 2025-08-19 DIAGNOSIS — I25.10 CORONARY ARTERY DISEASE INVOLVING NATIVE CORONARY ARTERY OF NATIVE HEART WITHOUT ANGINA PECTORIS: ICD-10-CM

## 2025-08-19 DIAGNOSIS — M47.27 LUMBOSACRAL RADICULOPATHY DUE TO DEGENERATIVE JOINT DISEASE OF SPINE: Primary | ICD-10-CM

## 2025-08-19 PROBLEM — G95.9 CERVICAL MYELOPATHY (HCC): Status: ACTIVE | Noted: 2025-08-19

## 2025-08-19 LAB
GLUCOSE BLD-MCNC: 111 MG/DL (ref 75–110)
GLUCOSE BLD-MCNC: 128 MG/DL (ref 75–110)

## 2025-08-19 PROCEDURE — 22600 ARTHRD PST TQ 1NTRSPC CRV: CPT | Performed by: NEUROLOGICAL SURGERY

## 2025-08-19 PROCEDURE — 3700000000 HC ANESTHESIA ATTENDED CARE: Performed by: NEUROLOGICAL SURGERY

## 2025-08-19 PROCEDURE — 2580000003 HC RX 258

## 2025-08-19 PROCEDURE — 2500000003 HC RX 250 WO HCPCS: Performed by: NEUROLOGICAL SURGERY

## 2025-08-19 PROCEDURE — 63015 REMOVE SPINE LAMINA >2 CRVCL: CPT | Performed by: PHYSICIAN ASSISTANT

## 2025-08-19 PROCEDURE — 7100000000 HC PACU RECOVERY - FIRST 15 MIN: Performed by: NEUROLOGICAL SURGERY

## 2025-08-19 PROCEDURE — 22842 INSERT SPINE FIXATION DEVICE: CPT | Performed by: PHYSICIAN ASSISTANT

## 2025-08-19 PROCEDURE — 61783 SCAN PROC SPINAL: CPT | Performed by: NEUROLOGICAL SURGERY

## 2025-08-19 PROCEDURE — 8E0WXBF COMPUTER ASSISTED PROCEDURE OF TRUNK REGION, WITH FLUOROSCOPY: ICD-10-PCS | Performed by: NEUROLOGICAL SURGERY

## 2025-08-19 PROCEDURE — 7100000001 HC PACU RECOVERY - ADDTL 15 MIN: Performed by: NEUROLOGICAL SURGERY

## 2025-08-19 PROCEDURE — 64721 CARPAL TUNNEL SURGERY: CPT | Performed by: NEUROLOGICAL SURGERY

## 2025-08-19 PROCEDURE — 2500000003 HC RX 250 WO HCPCS

## 2025-08-19 PROCEDURE — 6360000002 HC RX W HCPCS: Performed by: NEUROLOGICAL SURGERY

## 2025-08-19 PROCEDURE — 22600 ARTHRD PST TQ 1NTRSPC CRV: CPT | Performed by: PHYSICIAN ASSISTANT

## 2025-08-19 PROCEDURE — 2720000010 HC SURG SUPPLY STERILE: Performed by: NEUROLOGICAL SURGERY

## 2025-08-19 PROCEDURE — C1713 ANCHOR/SCREW BN/BN,TIS/BN: HCPCS | Performed by: NEUROLOGICAL SURGERY

## 2025-08-19 PROCEDURE — 22614 ARTHRD PST TQ 1NTRSPC EA ADD: CPT | Performed by: PHYSICIAN ASSISTANT

## 2025-08-19 PROCEDURE — 00NW0ZZ RELEASE CERVICAL SPINAL CORD, OPEN APPROACH: ICD-10-PCS | Performed by: NEUROLOGICAL SURGERY

## 2025-08-19 PROCEDURE — 3700000001 HC ADD 15 MINUTES (ANESTHESIA): Performed by: NEUROLOGICAL SURGERY

## 2025-08-19 PROCEDURE — 82947 ASSAY GLUCOSE BLOOD QUANT: CPT

## 2025-08-19 PROCEDURE — 0RG2071 FUSION OF 2 OR MORE CERVICAL VERTEBRAL JOINTS WITH AUTOLOGOUS TISSUE SUBSTITUTE, POSTERIOR APPROACH, POSTERIOR COLUMN, OPEN APPROACH: ICD-10-PCS | Performed by: NEUROLOGICAL SURGERY

## 2025-08-19 PROCEDURE — 63015 REMOVE SPINE LAMINA >2 CRVCL: CPT | Performed by: NEUROLOGICAL SURGERY

## 2025-08-19 PROCEDURE — 3600000014 HC SURGERY LEVEL 4 ADDTL 15MIN: Performed by: NEUROLOGICAL SURGERY

## 2025-08-19 PROCEDURE — 2709999900 HC NON-CHARGEABLE SUPPLY: Performed by: NEUROLOGICAL SURGERY

## 2025-08-19 PROCEDURE — 2580000003 HC RX 258: Performed by: PHYSICIAN ASSISTANT

## 2025-08-19 PROCEDURE — 2060000000 HC ICU INTERMEDIATE R&B

## 2025-08-19 PROCEDURE — 2580000003 HC RX 258: Performed by: ANESTHESIOLOGY

## 2025-08-19 PROCEDURE — 6360000002 HC RX W HCPCS: Performed by: PHYSICIAN ASSISTANT

## 2025-08-19 PROCEDURE — 2700000000 HC OXYGEN THERAPY PER DAY

## 2025-08-19 PROCEDURE — 22842 INSERT SPINE FIXATION DEVICE: CPT | Performed by: NEUROLOGICAL SURGERY

## 2025-08-19 PROCEDURE — 6370000000 HC RX 637 (ALT 250 FOR IP): Performed by: NEUROLOGICAL SURGERY

## 2025-08-19 PROCEDURE — 3600000004 HC SURGERY LEVEL 4 BASE: Performed by: NEUROLOGICAL SURGERY

## 2025-08-19 PROCEDURE — 22614 ARTHRD PST TQ 1NTRSPC EA ADD: CPT | Performed by: NEUROLOGICAL SURGERY

## 2025-08-19 PROCEDURE — 01N50ZZ RELEASE MEDIAN NERVE, OPEN APPROACH: ICD-10-PCS | Performed by: NEUROLOGICAL SURGERY

## 2025-08-19 PROCEDURE — 6360000002 HC RX W HCPCS

## 2025-08-19 PROCEDURE — 6370000000 HC RX 637 (ALT 250 FOR IP): Performed by: PHYSICIAN ASSISTANT

## 2025-08-19 PROCEDURE — 2500000003 HC RX 250 WO HCPCS: Performed by: PHYSICIAN ASSISTANT

## 2025-08-19 PROCEDURE — 2580000003 HC RX 258: Performed by: NEUROLOGICAL SURGERY

## 2025-08-19 DEVICE — IMPLANTABLE DEVICE: Type: IMPLANTABLE DEVICE | Site: SPINE CERVICAL | Status: FUNCTIONAL

## 2025-08-19 DEVICE — SCREW SPNL 3.5X14MM SYMPHONY: Type: IMPLANTABLE DEVICE | Site: SPINE CERVICAL | Status: FUNCTIONAL

## 2025-08-19 DEVICE — ROD SPNL LORDOTIC 4X55MM TI ALLOY NON-STERILE SYMPHONY: Type: IMPLANTABLE DEVICE | Site: SPINE CERVICAL | Status: FUNCTIONAL

## 2025-08-19 DEVICE — SCREW SPINAL F/SYMPHONY OCT SYSTEM: Type: IMPLANTABLE DEVICE | Site: SPINE CERVICAL | Status: FUNCTIONAL

## 2025-08-19 RX ORDER — OXYCODONE HYDROCHLORIDE 5 MG/1
5 TABLET ORAL EVERY 4 HOURS PRN
Status: DISCONTINUED | OUTPATIENT
Start: 2025-08-19 | End: 2025-08-23 | Stop reason: HOSPADM

## 2025-08-19 RX ORDER — ONDANSETRON 2 MG/ML
4 INJECTION INTRAMUSCULAR; INTRAVENOUS EVERY 6 HOURS PRN
Status: DISCONTINUED | OUTPATIENT
Start: 2025-08-19 | End: 2025-08-23 | Stop reason: HOSPADM

## 2025-08-19 RX ORDER — LABETALOL HYDROCHLORIDE 5 MG/ML
INJECTION, SOLUTION INTRAVENOUS
Status: DISCONTINUED | OUTPATIENT
Start: 2025-08-19 | End: 2025-08-19 | Stop reason: SDUPTHER

## 2025-08-19 RX ORDER — ROCURONIUM BROMIDE 10 MG/ML
INJECTION, SOLUTION INTRAVENOUS
Status: DISCONTINUED | OUTPATIENT
Start: 2025-08-19 | End: 2025-08-19 | Stop reason: SDUPTHER

## 2025-08-19 RX ORDER — DONEPEZIL HYDROCHLORIDE 10 MG/1
10 TABLET, FILM COATED ORAL NIGHTLY
Status: DISCONTINUED | OUTPATIENT
Start: 2025-08-19 | End: 2025-08-23 | Stop reason: HOSPADM

## 2025-08-19 RX ORDER — SODIUM CHLORIDE 0.9 % (FLUSH) 0.9 %
5-40 SYRINGE (ML) INJECTION PRN
Status: DISCONTINUED | OUTPATIENT
Start: 2025-08-19 | End: 2025-08-19 | Stop reason: HOSPADM

## 2025-08-19 RX ORDER — GINSENG 100 MG
CAPSULE ORAL PRN
Status: DISCONTINUED | OUTPATIENT
Start: 2025-08-19 | End: 2025-08-19 | Stop reason: ALTCHOICE

## 2025-08-19 RX ORDER — SODIUM CHLORIDE 0.9 % (FLUSH) 0.9 %
5-40 SYRINGE (ML) INJECTION PRN
Status: DISCONTINUED | OUTPATIENT
Start: 2025-08-19 | End: 2025-08-23 | Stop reason: HOSPADM

## 2025-08-19 RX ORDER — SODIUM CHLORIDE 9 MG/ML
INJECTION, SOLUTION INTRAVENOUS PRN
Status: DISCONTINUED | OUTPATIENT
Start: 2025-08-19 | End: 2025-08-19 | Stop reason: HOSPADM

## 2025-08-19 RX ORDER — OXYCODONE HYDROCHLORIDE 10 MG/1
10 TABLET ORAL EVERY 4 HOURS PRN
Status: DISCONTINUED | OUTPATIENT
Start: 2025-08-19 | End: 2025-08-23 | Stop reason: HOSPADM

## 2025-08-19 RX ORDER — CEFAZOLIN SODIUM 1 G/3ML
INJECTION, POWDER, FOR SOLUTION INTRAMUSCULAR; INTRAVENOUS
Status: DISCONTINUED | OUTPATIENT
Start: 2025-08-19 | End: 2025-08-19 | Stop reason: SDUPTHER

## 2025-08-19 RX ORDER — METOPROLOL SUCCINATE 25 MG/1
25 TABLET, EXTENDED RELEASE ORAL DAILY
Status: DISCONTINUED | OUTPATIENT
Start: 2025-08-19 | End: 2025-08-23 | Stop reason: HOSPADM

## 2025-08-19 RX ORDER — ONDANSETRON 2 MG/ML
INJECTION INTRAMUSCULAR; INTRAVENOUS
Status: DISCONTINUED | OUTPATIENT
Start: 2025-08-19 | End: 2025-08-19 | Stop reason: SDUPTHER

## 2025-08-19 RX ORDER — SODIUM CHLORIDE 9 MG/ML
INJECTION, SOLUTION INTRAVENOUS PRN
Status: DISCONTINUED | OUTPATIENT
Start: 2025-08-19 | End: 2025-08-23 | Stop reason: HOSPADM

## 2025-08-19 RX ORDER — ROSUVASTATIN CALCIUM 20 MG/1
40 TABLET, COATED ORAL DAILY
Status: DISCONTINUED | OUTPATIENT
Start: 2025-08-19 | End: 2025-08-23 | Stop reason: HOSPADM

## 2025-08-19 RX ORDER — SODIUM CHLORIDE 9 MG/ML
INJECTION, SOLUTION INTRAVENOUS
Status: DISCONTINUED | OUTPATIENT
Start: 2025-08-19 | End: 2025-08-19 | Stop reason: SDUPTHER

## 2025-08-19 RX ORDER — ONDANSETRON 4 MG/1
4 TABLET, ORALLY DISINTEGRATING ORAL EVERY 8 HOURS PRN
Status: DISCONTINUED | OUTPATIENT
Start: 2025-08-19 | End: 2025-08-23 | Stop reason: HOSPADM

## 2025-08-19 RX ORDER — EPHEDRINE SULFATE/0.9% NACL/PF 25 MG/5 ML
SYRINGE (ML) INTRAVENOUS
Status: DISCONTINUED | OUTPATIENT
Start: 2025-08-19 | End: 2025-08-19 | Stop reason: SDUPTHER

## 2025-08-19 RX ORDER — ACETAMINOPHEN 325 MG/1
650 TABLET ORAL EVERY 6 HOURS
Status: DISCONTINUED | OUTPATIENT
Start: 2025-08-19 | End: 2025-08-23 | Stop reason: HOSPADM

## 2025-08-19 RX ORDER — SODIUM CHLORIDE 0.9 % (FLUSH) 0.9 %
5-40 SYRINGE (ML) INJECTION EVERY 12 HOURS SCHEDULED
Status: DISCONTINUED | OUTPATIENT
Start: 2025-08-19 | End: 2025-08-19 | Stop reason: HOSPADM

## 2025-08-19 RX ORDER — LISINOPRIL 2.5 MG/1
2.5 TABLET ORAL DAILY
Status: DISCONTINUED | OUTPATIENT
Start: 2025-08-19 | End: 2025-08-23 | Stop reason: HOSPADM

## 2025-08-19 RX ORDER — PROPOFOL 10 MG/ML
INJECTION, EMULSION INTRAVENOUS
Status: DISCONTINUED | OUTPATIENT
Start: 2025-08-19 | End: 2025-08-19 | Stop reason: SDUPTHER

## 2025-08-19 RX ORDER — DEXAMETHASONE SODIUM PHOSPHATE 10 MG/ML
INJECTION, SOLUTION INTRA-ARTICULAR; INTRALESIONAL; INTRAMUSCULAR; INTRAVENOUS; SOFT TISSUE
Status: DISCONTINUED | OUTPATIENT
Start: 2025-08-19 | End: 2025-08-19 | Stop reason: SDUPTHER

## 2025-08-19 RX ORDER — HYDRALAZINE HYDROCHLORIDE 20 MG/ML
10 INJECTION INTRAMUSCULAR; INTRAVENOUS
Status: DISCONTINUED | OUTPATIENT
Start: 2025-08-19 | End: 2025-08-19 | Stop reason: HOSPADM

## 2025-08-19 RX ORDER — LABETALOL HYDROCHLORIDE 5 MG/ML
10 INJECTION, SOLUTION INTRAVENOUS
Status: DISCONTINUED | OUTPATIENT
Start: 2025-08-19 | End: 2025-08-19 | Stop reason: HOSPADM

## 2025-08-19 RX ORDER — SODIUM CHLORIDE, SODIUM LACTATE, POTASSIUM CHLORIDE, CALCIUM CHLORIDE 600; 310; 30; 20 MG/100ML; MG/100ML; MG/100ML; MG/100ML
INJECTION, SOLUTION INTRAVENOUS
Status: DISCONTINUED | OUTPATIENT
Start: 2025-08-19 | End: 2025-08-19 | Stop reason: SDUPTHER

## 2025-08-19 RX ORDER — VITAMIN B COMPLEX
1000 TABLET ORAL DAILY
Status: DISCONTINUED | OUTPATIENT
Start: 2025-08-19 | End: 2025-08-23 | Stop reason: HOSPADM

## 2025-08-19 RX ORDER — SODIUM CHLORIDE 9 MG/ML
INJECTION, SOLUTION INTRAVENOUS CONTINUOUS
Status: DISCONTINUED | OUTPATIENT
Start: 2025-08-19 | End: 2025-08-23 | Stop reason: HOSPADM

## 2025-08-19 RX ORDER — MAGNESIUM HYDROXIDE 1200 MG/15ML
LIQUID ORAL CONTINUOUS PRN
Status: COMPLETED | OUTPATIENT
Start: 2025-08-19 | End: 2025-08-19

## 2025-08-19 RX ORDER — DIPHENHYDRAMINE HYDROCHLORIDE 50 MG/ML
INJECTION, SOLUTION INTRAMUSCULAR; INTRAVENOUS
Status: DISCONTINUED | OUTPATIENT
Start: 2025-08-19 | End: 2025-08-19 | Stop reason: SDUPTHER

## 2025-08-19 RX ORDER — METFORMIN HYDROCHLORIDE 500 MG/1
500 TABLET, EXTENDED RELEASE ORAL
Status: DISCONTINUED | OUTPATIENT
Start: 2025-08-20 | End: 2025-08-23 | Stop reason: HOSPADM

## 2025-08-19 RX ORDER — LIDOCAINE HYDROCHLORIDE AND EPINEPHRINE 10; 10 MG/ML; UG/ML
INJECTION, SOLUTION INFILTRATION; PERINEURAL PRN
Status: DISCONTINUED | OUTPATIENT
Start: 2025-08-19 | End: 2025-08-19 | Stop reason: ALTCHOICE

## 2025-08-19 RX ORDER — ONDANSETRON 2 MG/ML
4 INJECTION INTRAMUSCULAR; INTRAVENOUS
Status: DISCONTINUED | OUTPATIENT
Start: 2025-08-19 | End: 2025-08-19 | Stop reason: HOSPADM

## 2025-08-19 RX ORDER — BISACODYL 5 MG/1
5 TABLET, DELAYED RELEASE ORAL DAILY PRN
Status: DISCONTINUED | OUTPATIENT
Start: 2025-08-19 | End: 2025-08-23 | Stop reason: HOSPADM

## 2025-08-19 RX ORDER — LIDOCAINE HYDROCHLORIDE 10 MG/ML
INJECTION, SOLUTION INFILTRATION; PERINEURAL PRN
Status: DISCONTINUED | OUTPATIENT
Start: 2025-08-19 | End: 2025-08-19 | Stop reason: ALTCHOICE

## 2025-08-19 RX ORDER — DULOXETIN HYDROCHLORIDE 30 MG/1
60 CAPSULE, DELAYED RELEASE ORAL DAILY
Status: DISCONTINUED | OUTPATIENT
Start: 2025-08-19 | End: 2025-08-23 | Stop reason: HOSPADM

## 2025-08-19 RX ORDER — FENTANYL CITRATE 50 UG/ML
INJECTION, SOLUTION INTRAMUSCULAR; INTRAVENOUS
Status: DISCONTINUED | OUTPATIENT
Start: 2025-08-19 | End: 2025-08-19 | Stop reason: SDUPTHER

## 2025-08-19 RX ORDER — GLYCOPYRROLATE 1 MG/5 ML
SYRINGE (ML) INTRAVENOUS
Status: DISCONTINUED | OUTPATIENT
Start: 2025-08-19 | End: 2025-08-19 | Stop reason: SDUPTHER

## 2025-08-19 RX ORDER — TAMSULOSIN HYDROCHLORIDE 0.4 MG/1
0.4 CAPSULE ORAL DAILY
Status: DISCONTINUED | OUTPATIENT
Start: 2025-08-19 | End: 2025-08-23 | Stop reason: HOSPADM

## 2025-08-19 RX ORDER — CYCLOBENZAPRINE HCL 10 MG
10 TABLET ORAL 3 TIMES DAILY PRN
Status: DISCONTINUED | OUTPATIENT
Start: 2025-08-19 | End: 2025-08-23 | Stop reason: HOSPADM

## 2025-08-19 RX ORDER — SODIUM CHLORIDE 0.9 % (FLUSH) 0.9 %
5-40 SYRINGE (ML) INJECTION EVERY 12 HOURS SCHEDULED
Status: DISCONTINUED | OUTPATIENT
Start: 2025-08-19 | End: 2025-08-23 | Stop reason: HOSPADM

## 2025-08-19 RX ORDER — SODIUM CHLORIDE, SODIUM LACTATE, POTASSIUM CHLORIDE, CALCIUM CHLORIDE 600; 310; 30; 20 MG/100ML; MG/100ML; MG/100ML; MG/100ML
INJECTION, SOLUTION INTRAVENOUS CONTINUOUS
Status: DISCONTINUED | OUTPATIENT
Start: 2025-08-19 | End: 2025-08-19 | Stop reason: HOSPADM

## 2025-08-19 RX ORDER — POLYETHYLENE GLYCOL 3350 17 G/17G
17 POWDER, FOR SOLUTION ORAL DAILY
Status: DISCONTINUED | OUTPATIENT
Start: 2025-08-19 | End: 2025-08-23 | Stop reason: HOSPADM

## 2025-08-19 RX ORDER — ENOXAPARIN SODIUM 100 MG/ML
30 INJECTION SUBCUTANEOUS 2 TIMES DAILY
Status: DISCONTINUED | OUTPATIENT
Start: 2025-08-20 | End: 2025-08-23 | Stop reason: HOSPADM

## 2025-08-19 RX ORDER — VANCOMYCIN HYDROCHLORIDE 1 G/20ML
INJECTION, POWDER, LYOPHILIZED, FOR SOLUTION INTRAVENOUS PRN
Status: DISCONTINUED | OUTPATIENT
Start: 2025-08-19 | End: 2025-08-19 | Stop reason: ALTCHOICE

## 2025-08-19 RX ORDER — FAMOTIDINE 20 MG/1
20 TABLET, FILM COATED ORAL 2 TIMES DAILY
Status: DISCONTINUED | OUTPATIENT
Start: 2025-08-19 | End: 2025-08-23 | Stop reason: HOSPADM

## 2025-08-19 RX ORDER — SENNOSIDES 8.6 MG/1
1 TABLET ORAL DAILY PRN
Status: DISCONTINUED | OUTPATIENT
Start: 2025-08-19 | End: 2025-08-23 | Stop reason: HOSPADM

## 2025-08-19 RX ADMIN — PHENYLEPHRINE HYDROCHLORIDE 100 MCG: 10 INJECTION INTRAVENOUS at 14:55

## 2025-08-19 RX ADMIN — ONDANSETRON 4 MG: 2 INJECTION, SOLUTION INTRAMUSCULAR; INTRAVENOUS at 14:05

## 2025-08-19 RX ADMIN — FAMOTIDINE 20 MG: 20 TABLET, FILM COATED ORAL at 20:54

## 2025-08-19 RX ADMIN — ROCURONIUM BROMIDE 10 MG: 10 INJECTION, SOLUTION INTRAVENOUS at 13:37

## 2025-08-19 RX ADMIN — PROPOFOL 200 MG: 10 INJECTION, EMULSION INTRAVENOUS at 11:15

## 2025-08-19 RX ADMIN — PHENYLEPHRINE HYDROCHLORIDE 100 MCG: 10 INJECTION INTRAVENOUS at 12:50

## 2025-08-19 RX ADMIN — DONEPEZIL HYDROCHLORIDE 10 MG: 10 TABLET ORAL at 20:54

## 2025-08-19 RX ADMIN — ROCURONIUM BROMIDE 20 MG: 10 INJECTION, SOLUTION INTRAVENOUS at 12:47

## 2025-08-19 RX ADMIN — ROCURONIUM BROMIDE 20 MG: 10 INJECTION, SOLUTION INTRAVENOUS at 11:59

## 2025-08-19 RX ADMIN — SODIUM CHLORIDE: 9 INJECTION, SOLUTION INTRAVENOUS at 13:27

## 2025-08-19 RX ADMIN — Medication 20 MG: at 11:56

## 2025-08-19 RX ADMIN — SODIUM CHLORIDE: 0.9 INJECTION, SOLUTION INTRAVENOUS at 21:02

## 2025-08-19 RX ADMIN — PHENYLEPHRINE HYDROCHLORIDE 200 MCG: 10 INJECTION INTRAVENOUS at 12:36

## 2025-08-19 RX ADMIN — CYCLOBENZAPRINE 10 MG: 10 TABLET, FILM COATED ORAL at 20:54

## 2025-08-19 RX ADMIN — EPHEDRINE SULFATE 10 MG: 5 INJECTION INTRAVENOUS at 12:39

## 2025-08-19 RX ADMIN — SUGAMMADEX 200 MG: 100 INJECTION, SOLUTION INTRAVENOUS at 15:20

## 2025-08-19 RX ADMIN — DEXAMETHASONE SODIUM PHOSPHATE 10 MG: 10 INJECTION INTRAMUSCULAR; INTRAVENOUS at 11:29

## 2025-08-19 RX ADMIN — Medication 15 MG: at 13:59

## 2025-08-19 RX ADMIN — TAMSULOSIN HYDROCHLORIDE 0.4 MG: 0.4 CAPSULE ORAL at 20:54

## 2025-08-19 RX ADMIN — CEFAZOLIN 2 G: 1 INJECTION, POWDER, FOR SOLUTION INTRAMUSCULAR; INTRAVENOUS at 11:25

## 2025-08-19 RX ADMIN — Medication 0.1 MG: at 13:02

## 2025-08-19 RX ADMIN — PROPOFOL 50 MG: 10 INJECTION, EMULSION INTRAVENOUS at 12:27

## 2025-08-19 RX ADMIN — SODIUM CHLORIDE, POTASSIUM CHLORIDE, SODIUM LACTATE AND CALCIUM CHLORIDE: 600; 310; 30; 20 INJECTION, SOLUTION INTRAVENOUS at 11:27

## 2025-08-19 RX ADMIN — DULOXETINE 60 MG: 30 CAPSULE, DELAYED RELEASE ORAL at 20:57

## 2025-08-19 RX ADMIN — ROCURONIUM BROMIDE 10 MG: 10 INJECTION, SOLUTION INTRAVENOUS at 12:30

## 2025-08-19 RX ADMIN — ACETAMINOPHEN 650 MG: 325 TABLET ORAL at 20:53

## 2025-08-19 RX ADMIN — Medication 1000 UNITS: at 20:54

## 2025-08-19 RX ADMIN — PHENYLEPHRINE HYDROCHLORIDE 100 MCG: 10 INJECTION INTRAVENOUS at 12:37

## 2025-08-19 RX ADMIN — PHENYLEPHRINE HYDROCHLORIDE 25 MCG/MIN: 10 INJECTION INTRAVENOUS at 12:15

## 2025-08-19 RX ADMIN — EPHEDRINE SULFATE 10 MG: 5 INJECTION INTRAVENOUS at 12:46

## 2025-08-19 RX ADMIN — ROCURONIUM BROMIDE 20 MG: 10 INJECTION, SOLUTION INTRAVENOUS at 12:12

## 2025-08-19 RX ADMIN — SODIUM CHLORIDE, SODIUM LACTATE, POTASSIUM CHLORIDE, CALCIUM CHLORIDE: 600; 310; 30; 20 INJECTION, SOLUTION INTRAVENOUS at 11:05

## 2025-08-19 RX ADMIN — PHENYLEPHRINE HYDROCHLORIDE 100 MCG: 10 INJECTION INTRAVENOUS at 12:34

## 2025-08-19 RX ADMIN — PHENYLEPHRINE HYDROCHLORIDE 100 MCG: 10 INJECTION INTRAVENOUS at 14:40

## 2025-08-19 RX ADMIN — DIPHENHYDRAMINE HYDROCHLORIDE 25 MG: 50 INJECTION, SOLUTION INTRAMUSCULAR; INTRAVENOUS at 11:27

## 2025-08-19 RX ADMIN — PHENYLEPHRINE HYDROCHLORIDE 100 MCG: 10 INJECTION INTRAVENOUS at 11:57

## 2025-08-19 RX ADMIN — PHENYLEPHRINE HYDROCHLORIDE 100 MCG: 10 INJECTION INTRAVENOUS at 13:01

## 2025-08-19 RX ADMIN — Medication 15 MG: at 12:47

## 2025-08-19 RX ADMIN — FENTANYL CITRATE 50 MCG: 50 INJECTION, SOLUTION INTRAMUSCULAR; INTRAVENOUS at 12:05

## 2025-08-19 RX ADMIN — SODIUM CHLORIDE, POTASSIUM CHLORIDE, SODIUM LACTATE AND CALCIUM CHLORIDE: 600; 310; 30; 20 INJECTION, SOLUTION INTRAVENOUS at 10:39

## 2025-08-19 RX ADMIN — PHENYLEPHRINE HYDROCHLORIDE 50 MCG: 10 INJECTION INTRAVENOUS at 13:47

## 2025-08-19 RX ADMIN — FENTANYL CITRATE 50 MCG: 50 INJECTION, SOLUTION INTRAMUSCULAR; INTRAVENOUS at 11:15

## 2025-08-19 RX ADMIN — ROCURONIUM BROMIDE 10 MG: 10 INJECTION, SOLUTION INTRAVENOUS at 14:01

## 2025-08-19 RX ADMIN — PHENYLEPHRINE HYDROCHLORIDE 100 MCG: 10 INJECTION INTRAVENOUS at 11:45

## 2025-08-19 RX ADMIN — PHENYLEPHRINE HYDROCHLORIDE 200 MCG: 10 INJECTION INTRAVENOUS at 12:17

## 2025-08-19 RX ADMIN — SODIUM CHLORIDE, PRESERVATIVE FREE 10 ML: 5 INJECTION INTRAVENOUS at 20:55

## 2025-08-19 RX ADMIN — Medication 0.1 MG: at 11:48

## 2025-08-19 RX ADMIN — FENTANYL CITRATE 50 MCG: 50 INJECTION, SOLUTION INTRAMUSCULAR; INTRAVENOUS at 12:09

## 2025-08-19 RX ADMIN — PHENYLEPHRINE HYDROCHLORIDE 50 MCG: 10 INJECTION INTRAVENOUS at 14:32

## 2025-08-19 RX ADMIN — METOPROLOL SUCCINATE 25 MG: 25 TABLET, EXTENDED RELEASE ORAL at 20:54

## 2025-08-19 RX ADMIN — PROPOFOL 100 MG: 10 INJECTION, EMULSION INTRAVENOUS at 11:27

## 2025-08-19 RX ADMIN — Medication 2 G: at 14:45

## 2025-08-19 RX ADMIN — PHENYLEPHRINE HYDROCHLORIDE 100 MCG: 10 INJECTION INTRAVENOUS at 14:58

## 2025-08-19 RX ADMIN — PHENYLEPHRINE HYDROCHLORIDE 100 MCG: 10 INJECTION INTRAVENOUS at 13:09

## 2025-08-19 RX ADMIN — FENTANYL CITRATE 50 MCG: 50 INJECTION, SOLUTION INTRAMUSCULAR; INTRAVENOUS at 11:26

## 2025-08-19 RX ADMIN — FENTANYL CITRATE 50 MCG: 50 INJECTION, SOLUTION INTRAMUSCULAR; INTRAVENOUS at 15:21

## 2025-08-19 RX ADMIN — PHENYLEPHRINE HYDROCHLORIDE 100 MCG: 10 INJECTION INTRAVENOUS at 12:32

## 2025-08-19 RX ADMIN — PHENYLEPHRINE HYDROCHLORIDE 100 MCG: 10 INJECTION INTRAVENOUS at 12:15

## 2025-08-19 RX ADMIN — Medication 2000 MG: at 21:39

## 2025-08-19 RX ADMIN — PHENYLEPHRINE HYDROCHLORIDE 100 MCG: 10 INJECTION INTRAVENOUS at 13:10

## 2025-08-19 RX ADMIN — ROSUVASTATIN CALCIUM 40 MG: 20 TABLET, FILM COATED ORAL at 20:54

## 2025-08-19 RX ADMIN — PHENYLEPHRINE HYDROCHLORIDE 100 MCG: 10 INJECTION INTRAVENOUS at 13:35

## 2025-08-19 RX ADMIN — PHENYLEPHRINE HYDROCHLORIDE 100 MCG: 10 INJECTION INTRAVENOUS at 12:54

## 2025-08-19 RX ADMIN — LISINOPRIL 2.5 MG: 2.5 TABLET ORAL at 20:54

## 2025-08-19 RX ADMIN — PROPOFOL 20 MG: 10 INJECTION, EMULSION INTRAVENOUS at 14:47

## 2025-08-19 RX ADMIN — Medication 5 MG: at 15:35

## 2025-08-19 RX ADMIN — EPHEDRINE SULFATE 5 MG: 5 INJECTION INTRAVENOUS at 12:54

## 2025-08-19 ASSESSMENT — PAIN SCALES - GENERAL
PAINLEVEL_OUTOF10: 0
PAINLEVEL_OUTOF10: 6
PAINLEVEL_OUTOF10: 0
PAINLEVEL_OUTOF10: 5
PAINLEVEL_OUTOF10: 0

## 2025-08-19 ASSESSMENT — PAIN - FUNCTIONAL ASSESSMENT
PAIN_FUNCTIONAL_ASSESSMENT: 0-10

## 2025-08-19 ASSESSMENT — PAIN DESCRIPTION - DESCRIPTORS
DESCRIPTORS: STABBING
DESCRIPTORS: STABBING

## 2025-08-19 ASSESSMENT — PAIN DESCRIPTION - PAIN TYPE: TYPE: ACUTE PAIN;CHRONIC PAIN

## 2025-08-19 ASSESSMENT — PAIN DESCRIPTION - LOCATION: LOCATION: NECK

## 2025-08-19 ASSESSMENT — PAIN DESCRIPTION - ORIENTATION: ORIENTATION: RIGHT

## 2025-08-20 ENCOUNTER — APPOINTMENT (OUTPATIENT)
Dept: GENERAL RADIOLOGY | Age: 80
End: 2025-08-20
Attending: NEUROLOGICAL SURGERY
Payer: MEDICARE

## 2025-08-20 PROBLEM — I10 HYPERTENSION: Status: ACTIVE | Noted: 2025-08-20

## 2025-08-20 PROBLEM — E11.9 DIABETES MELLITUS (HCC): Status: ACTIVE | Noted: 2025-08-20

## 2025-08-20 LAB
ANION GAP SERPL CALCULATED.3IONS-SCNC: 11 MMOL/L (ref 9–16)
BASOPHILS # BLD: <0.03 K/UL (ref 0–0.2)
BASOPHILS NFR BLD: 0 % (ref 0–2)
BUN SERPL-MCNC: 13 MG/DL (ref 8–23)
CALCIUM SERPL-MCNC: 8.9 MG/DL (ref 8.6–10.4)
CHLORIDE SERPL-SCNC: 104 MMOL/L (ref 98–107)
CO2 SERPL-SCNC: 25 MMOL/L (ref 20–31)
CREAT SERPL-MCNC: 0.7 MG/DL (ref 0.7–1.2)
EOSINOPHIL # BLD: <0.03 K/UL (ref 0–0.44)
EOSINOPHILS RELATIVE PERCENT: 0 % (ref 1–4)
ERYTHROCYTE [DISTWIDTH] IN BLOOD BY AUTOMATED COUNT: 13.3 % (ref 11.8–14.4)
GFR, ESTIMATED: >90 ML/MIN/1.73M2
GLUCOSE BLD-MCNC: 111 MG/DL (ref 75–110)
GLUCOSE BLD-MCNC: 120 MG/DL (ref 75–110)
GLUCOSE BLD-MCNC: 130 MG/DL (ref 75–110)
GLUCOSE BLD-MCNC: 97 MG/DL (ref 75–110)
GLUCOSE SERPL-MCNC: 120 MG/DL (ref 74–99)
HCT VFR BLD AUTO: 34.3 % (ref 40.7–50.3)
HGB BLD-MCNC: 10.7 G/DL (ref 13–17)
IMM GRANULOCYTES # BLD AUTO: 0.03 K/UL (ref 0–0.3)
IMM GRANULOCYTES NFR BLD: 0 %
LYMPHOCYTES NFR BLD: 1.42 K/UL (ref 1.1–3.7)
LYMPHOCYTES RELATIVE PERCENT: 15 % (ref 24–43)
MCH RBC QN AUTO: 28.2 PG (ref 25.2–33.5)
MCHC RBC AUTO-ENTMCNC: 31.2 G/DL (ref 28.4–34.8)
MCV RBC AUTO: 90.3 FL (ref 82.6–102.9)
MONOCYTES NFR BLD: 0.7 K/UL (ref 0.1–1.2)
MONOCYTES NFR BLD: 7 % (ref 3–12)
NEUTROPHILS NFR BLD: 78 % (ref 36–65)
NEUTS SEG NFR BLD: 7.62 K/UL (ref 1.5–8.1)
NRBC BLD-RTO: 0 PER 100 WBC
PLATELET # BLD AUTO: 186 K/UL (ref 138–453)
PMV BLD AUTO: 10.7 FL (ref 8.1–13.5)
POTASSIUM SERPL-SCNC: 4.6 MMOL/L (ref 3.7–5.3)
RBC # BLD AUTO: 3.8 M/UL (ref 4.21–5.77)
SODIUM SERPL-SCNC: 140 MMOL/L (ref 136–145)
WBC OTHER # BLD: 9.8 K/UL (ref 3.5–11.3)

## 2025-08-20 PROCEDURE — 6360000002 HC RX W HCPCS: Performed by: PHYSICIAN ASSISTANT

## 2025-08-20 PROCEDURE — 97535 SELF CARE MNGMENT TRAINING: CPT

## 2025-08-20 PROCEDURE — 82947 ASSAY GLUCOSE BLOOD QUANT: CPT

## 2025-08-20 PROCEDURE — 2060000000 HC ICU INTERMEDIATE R&B

## 2025-08-20 PROCEDURE — 85025 COMPLETE CBC W/AUTO DIFF WBC: CPT

## 2025-08-20 PROCEDURE — 97162 PT EVAL MOD COMPLEX 30 MIN: CPT

## 2025-08-20 PROCEDURE — 2580000003 HC RX 258

## 2025-08-20 PROCEDURE — 97116 GAIT TRAINING THERAPY: CPT

## 2025-08-20 PROCEDURE — 72040 X-RAY EXAM NECK SPINE 2-3 VW: CPT

## 2025-08-20 PROCEDURE — 2580000003 HC RX 258: Performed by: PHYSICIAN ASSISTANT

## 2025-08-20 PROCEDURE — 94761 N-INVAS EAR/PLS OXIMETRY MLT: CPT

## 2025-08-20 PROCEDURE — 97530 THERAPEUTIC ACTIVITIES: CPT

## 2025-08-20 PROCEDURE — 36415 COLL VENOUS BLD VENIPUNCTURE: CPT

## 2025-08-20 PROCEDURE — 2500000003 HC RX 250 WO HCPCS: Performed by: PHYSICIAN ASSISTANT

## 2025-08-20 PROCEDURE — 97166 OT EVAL MOD COMPLEX 45 MIN: CPT

## 2025-08-20 PROCEDURE — 6370000000 HC RX 637 (ALT 250 FOR IP): Performed by: PHYSICIAN ASSISTANT

## 2025-08-20 PROCEDURE — 99221 1ST HOSP IP/OBS SF/LOW 40: CPT | Performed by: INTERNAL MEDICINE

## 2025-08-20 PROCEDURE — 80048 BASIC METABOLIC PNL TOTAL CA: CPT

## 2025-08-20 RX ORDER — GLUCAGON 1 MG/ML
1 KIT INJECTION PRN
Status: DISCONTINUED | OUTPATIENT
Start: 2025-08-20 | End: 2025-08-23 | Stop reason: HOSPADM

## 2025-08-20 RX ORDER — DEXTROSE MONOHYDRATE 100 MG/ML
INJECTION, SOLUTION INTRAVENOUS CONTINUOUS PRN
Status: DISCONTINUED | OUTPATIENT
Start: 2025-08-20 | End: 2025-08-23 | Stop reason: HOSPADM

## 2025-08-20 RX ORDER — 0.9 % SODIUM CHLORIDE 0.9 %
500 INTRAVENOUS SOLUTION INTRAVENOUS ONCE
Status: COMPLETED | OUTPATIENT
Start: 2025-08-20 | End: 2025-08-20

## 2025-08-20 RX ORDER — INSULIN LISPRO 100 [IU]/ML
0-8 INJECTION, SOLUTION INTRAVENOUS; SUBCUTANEOUS
Status: DISCONTINUED | OUTPATIENT
Start: 2025-08-20 | End: 2025-08-23 | Stop reason: HOSPADM

## 2025-08-20 RX ADMIN — FAMOTIDINE 20 MG: 20 TABLET, FILM COATED ORAL at 21:23

## 2025-08-20 RX ADMIN — METFORMIN HYDROCHLORIDE 500 MG: 500 TABLET, EXTENDED RELEASE ORAL at 09:47

## 2025-08-20 RX ADMIN — ENOXAPARIN SODIUM 30 MG: 100 INJECTION SUBCUTANEOUS at 09:46

## 2025-08-20 RX ADMIN — SODIUM CHLORIDE 500 ML: 0.9 INJECTION, SOLUTION INTRAVENOUS at 20:27

## 2025-08-20 RX ADMIN — LISINOPRIL 2.5 MG: 2.5 TABLET ORAL at 09:47

## 2025-08-20 RX ADMIN — Medication 1000 UNITS: at 09:46

## 2025-08-20 RX ADMIN — SODIUM CHLORIDE, PRESERVATIVE FREE 10 ML: 5 INJECTION INTRAVENOUS at 21:23

## 2025-08-20 RX ADMIN — Medication 2000 MG: at 03:31

## 2025-08-20 RX ADMIN — TAMSULOSIN HYDROCHLORIDE 0.4 MG: 0.4 CAPSULE ORAL at 09:45

## 2025-08-20 RX ADMIN — POLYETHYLENE GLYCOL 3350 17 G: 17 POWDER, FOR SOLUTION ORAL at 09:46

## 2025-08-20 RX ADMIN — ACETAMINOPHEN 650 MG: 325 TABLET ORAL at 14:20

## 2025-08-20 RX ADMIN — ROSUVASTATIN CALCIUM 40 MG: 20 TABLET, FILM COATED ORAL at 09:46

## 2025-08-20 RX ADMIN — METOPROLOL SUCCINATE 25 MG: 25 TABLET, EXTENDED RELEASE ORAL at 09:44

## 2025-08-20 RX ADMIN — Medication 2000 MG: at 11:53

## 2025-08-20 RX ADMIN — SODIUM CHLORIDE: 0.9 INJECTION, SOLUTION INTRAVENOUS at 17:25

## 2025-08-20 RX ADMIN — ACETAMINOPHEN 650 MG: 325 TABLET ORAL at 02:27

## 2025-08-20 RX ADMIN — ENOXAPARIN SODIUM 30 MG: 100 INJECTION SUBCUTANEOUS at 21:23

## 2025-08-20 RX ADMIN — ACETAMINOPHEN 650 MG: 325 TABLET ORAL at 09:44

## 2025-08-20 RX ADMIN — OXYCODONE HYDROCHLORIDE 10 MG: 10 TABLET ORAL at 02:27

## 2025-08-20 RX ADMIN — CYCLOBENZAPRINE 10 MG: 10 TABLET, FILM COATED ORAL at 14:20

## 2025-08-20 RX ADMIN — DONEPEZIL HYDROCHLORIDE 10 MG: 10 TABLET ORAL at 21:23

## 2025-08-20 RX ADMIN — CYCLOBENZAPRINE 10 MG: 10 TABLET, FILM COATED ORAL at 21:23

## 2025-08-20 RX ADMIN — DULOXETINE 60 MG: 30 CAPSULE, DELAYED RELEASE ORAL at 09:45

## 2025-08-20 RX ADMIN — ACETAMINOPHEN 650 MG: 325 TABLET ORAL at 21:23

## 2025-08-20 RX ADMIN — FAMOTIDINE 20 MG: 20 TABLET, FILM COATED ORAL at 09:46

## 2025-08-20 RX ADMIN — SODIUM CHLORIDE, PRESERVATIVE FREE 10 ML: 5 INJECTION INTRAVENOUS at 09:54

## 2025-08-20 ASSESSMENT — PAIN SCALES - GENERAL
PAINLEVEL_OUTOF10: 4
PAINLEVEL_OUTOF10: 4
PAINLEVEL_OUTOF10: 8
PAINLEVEL_OUTOF10: 0
PAINLEVEL_OUTOF10: 5
PAINLEVEL_OUTOF10: 7
PAINLEVEL_OUTOF10: 4
PAINLEVEL_OUTOF10: 2
PAINLEVEL_OUTOF10: 4
PAINLEVEL_OUTOF10: 7
PAINLEVEL_OUTOF10: 2
PAINLEVEL_OUTOF10: 0
PAINLEVEL_OUTOF10: 7

## 2025-08-20 ASSESSMENT — PAIN - FUNCTIONAL ASSESSMENT
PAIN_FUNCTIONAL_ASSESSMENT: 0-10
PAIN_FUNCTIONAL_ASSESSMENT: PREVENTS OR INTERFERES SOME ACTIVE ACTIVITIES AND ADLS
PAIN_FUNCTIONAL_ASSESSMENT: 0-10
PAIN_FUNCTIONAL_ASSESSMENT: 0-10
PAIN_FUNCTIONAL_ASSESSMENT: ACTIVITIES ARE NOT PREVENTED

## 2025-08-20 ASSESSMENT — PAIN DESCRIPTION - DESCRIPTORS
DESCRIPTORS: SORE
DESCRIPTORS: ACHING;NUMBNESS
DESCRIPTORS: ACHING

## 2025-08-20 ASSESSMENT — PAIN DESCRIPTION - LOCATION
LOCATION: HAND
LOCATION: NECK

## 2025-08-20 ASSESSMENT — PAIN DESCRIPTION - ORIENTATION
ORIENTATION: MID
ORIENTATION: RIGHT
ORIENTATION: POSTERIOR

## 2025-08-21 ENCOUNTER — APPOINTMENT (OUTPATIENT)
Dept: GENERAL RADIOLOGY | Age: 80
End: 2025-08-21
Attending: NEUROLOGICAL SURGERY
Payer: MEDICARE

## 2025-08-21 LAB
GLUCOSE BLD-MCNC: 110 MG/DL (ref 75–110)
GLUCOSE BLD-MCNC: 124 MG/DL (ref 75–110)
GLUCOSE BLD-MCNC: 88 MG/DL (ref 75–110)
GLUCOSE BLD-MCNC: 92 MG/DL (ref 75–110)

## 2025-08-21 PROCEDURE — 2580000003 HC RX 258: Performed by: PHYSICIAN ASSISTANT

## 2025-08-21 PROCEDURE — 99232 SBSQ HOSP IP/OBS MODERATE 35: CPT | Performed by: INTERNAL MEDICINE

## 2025-08-21 PROCEDURE — 6370000000 HC RX 637 (ALT 250 FOR IP): Performed by: PHYSICIAN ASSISTANT

## 2025-08-21 PROCEDURE — 99223 1ST HOSP IP/OBS HIGH 75: CPT | Performed by: PHYSICAL MEDICINE & REHABILITATION

## 2025-08-21 PROCEDURE — 6360000002 HC RX W HCPCS: Performed by: PHYSICIAN ASSISTANT

## 2025-08-21 PROCEDURE — 2060000000 HC ICU INTERMEDIATE R&B

## 2025-08-21 PROCEDURE — 2500000003 HC RX 250 WO HCPCS: Performed by: PHYSICIAN ASSISTANT

## 2025-08-21 PROCEDURE — 92611 MOTION FLUOROSCOPY/SWALLOW: CPT

## 2025-08-21 PROCEDURE — 97110 THERAPEUTIC EXERCISES: CPT

## 2025-08-21 PROCEDURE — 97116 GAIT TRAINING THERAPY: CPT

## 2025-08-21 PROCEDURE — 74230 X-RAY XM SWLNG FUNCJ C+: CPT

## 2025-08-21 PROCEDURE — 82947 ASSAY GLUCOSE BLOOD QUANT: CPT

## 2025-08-21 RX ADMIN — FAMOTIDINE 20 MG: 20 TABLET, FILM COATED ORAL at 19:28

## 2025-08-21 RX ADMIN — CYCLOBENZAPRINE 10 MG: 10 TABLET, FILM COATED ORAL at 09:30

## 2025-08-21 RX ADMIN — SODIUM CHLORIDE, PRESERVATIVE FREE 10 ML: 5 INJECTION INTRAVENOUS at 19:36

## 2025-08-21 RX ADMIN — ACETAMINOPHEN 650 MG: 325 TABLET ORAL at 09:24

## 2025-08-21 RX ADMIN — METOPROLOL SUCCINATE 25 MG: 25 TABLET, EXTENDED RELEASE ORAL at 09:24

## 2025-08-21 RX ADMIN — ACETAMINOPHEN 650 MG: 325 TABLET ORAL at 00:44

## 2025-08-21 RX ADMIN — OXYCODONE HYDROCHLORIDE 10 MG: 10 TABLET ORAL at 23:47

## 2025-08-21 RX ADMIN — Medication 1000 UNITS: at 09:24

## 2025-08-21 RX ADMIN — DULOXETINE 60 MG: 30 CAPSULE, DELAYED RELEASE ORAL at 09:23

## 2025-08-21 RX ADMIN — SENNOSIDES 8.6 MG: 8.6 TABLET, FILM COATED ORAL at 23:47

## 2025-08-21 RX ADMIN — LISINOPRIL 2.5 MG: 2.5 TABLET ORAL at 09:23

## 2025-08-21 RX ADMIN — METFORMIN HYDROCHLORIDE 500 MG: 500 TABLET, EXTENDED RELEASE ORAL at 09:23

## 2025-08-21 RX ADMIN — ROSUVASTATIN CALCIUM 40 MG: 20 TABLET, FILM COATED ORAL at 09:24

## 2025-08-21 RX ADMIN — CYCLOBENZAPRINE 10 MG: 10 TABLET, FILM COATED ORAL at 19:28

## 2025-08-21 RX ADMIN — SENNOSIDES 8.6 MG: 8.6 TABLET, FILM COATED ORAL at 00:44

## 2025-08-21 RX ADMIN — SODIUM CHLORIDE: 0.9 INJECTION, SOLUTION INTRAVENOUS at 00:53

## 2025-08-21 RX ADMIN — TAMSULOSIN HYDROCHLORIDE 0.4 MG: 0.4 CAPSULE ORAL at 09:24

## 2025-08-21 RX ADMIN — OXYCODONE HYDROCHLORIDE 10 MG: 10 TABLET ORAL at 09:30

## 2025-08-21 RX ADMIN — ACETAMINOPHEN 650 MG: 325 TABLET ORAL at 19:28

## 2025-08-21 RX ADMIN — OXYCODONE 5 MG: 5 TABLET ORAL at 00:44

## 2025-08-21 RX ADMIN — ENOXAPARIN SODIUM 30 MG: 100 INJECTION SUBCUTANEOUS at 19:28

## 2025-08-21 RX ADMIN — SODIUM CHLORIDE: 0.9 INJECTION, SOLUTION INTRAVENOUS at 21:10

## 2025-08-21 RX ADMIN — DONEPEZIL HYDROCHLORIDE 10 MG: 10 TABLET ORAL at 19:29

## 2025-08-21 RX ADMIN — ENOXAPARIN SODIUM 30 MG: 100 INJECTION SUBCUTANEOUS at 09:23

## 2025-08-21 RX ADMIN — POLYETHYLENE GLYCOL 3350 17 G: 17 POWDER, FOR SOLUTION ORAL at 09:23

## 2025-08-21 RX ADMIN — FAMOTIDINE 20 MG: 20 TABLET, FILM COATED ORAL at 09:24

## 2025-08-21 ASSESSMENT — PAIN DESCRIPTION - LOCATION
LOCATION: NECK
LOCATION: HEAD;NECK

## 2025-08-21 ASSESSMENT — PAIN SCALES - GENERAL
PAINLEVEL_OUTOF10: 8
PAINLEVEL_OUTOF10: 8
PAINLEVEL_OUTOF10: 6
PAINLEVEL_OUTOF10: 6
PAINLEVEL_OUTOF10: 7
PAINLEVEL_OUTOF10: 0

## 2025-08-21 ASSESSMENT — PAIN - FUNCTIONAL ASSESSMENT
PAIN_FUNCTIONAL_ASSESSMENT: 0-10
PAIN_FUNCTIONAL_ASSESSMENT: 0-10
PAIN_FUNCTIONAL_ASSESSMENT: ACTIVITIES ARE NOT PREVENTED
PAIN_FUNCTIONAL_ASSESSMENT: ACTIVITIES ARE NOT PREVENTED
PAIN_FUNCTIONAL_ASSESSMENT: 0-10
PAIN_FUNCTIONAL_ASSESSMENT: 0-10
PAIN_FUNCTIONAL_ASSESSMENT: ACTIVITIES ARE NOT PREVENTED

## 2025-08-21 ASSESSMENT — PAIN DESCRIPTION - ORIENTATION
ORIENTATION: POSTERIOR

## 2025-08-21 ASSESSMENT — PAIN DESCRIPTION - DESCRIPTORS
DESCRIPTORS: DISCOMFORT;ACHING;SORE
DESCRIPTORS: ACHING;DISCOMFORT;SORE
DESCRIPTORS: ACHING;DISCOMFORT;SORE

## 2025-08-22 DIAGNOSIS — I25.10 CORONARY ARTERY DISEASE INVOLVING NATIVE CORONARY ARTERY OF NATIVE HEART WITHOUT ANGINA PECTORIS: ICD-10-CM

## 2025-08-22 LAB
ANION GAP SERPL CALCULATED.3IONS-SCNC: 13 MMOL/L (ref 9–16)
BUN SERPL-MCNC: 11 MG/DL (ref 8–23)
CALCIUM SERPL-MCNC: 9.3 MG/DL (ref 8.6–10.4)
CHLORIDE SERPL-SCNC: 101 MMOL/L (ref 98–107)
CO2 SERPL-SCNC: 22 MMOL/L (ref 20–31)
CREAT SERPL-MCNC: 0.6 MG/DL (ref 0.7–1.2)
ERYTHROCYTE [DISTWIDTH] IN BLOOD BY AUTOMATED COUNT: 13.4 % (ref 11.8–14.4)
GFR, ESTIMATED: >90 ML/MIN/1.73M2
GLUCOSE BLD-MCNC: 115 MG/DL (ref 75–110)
GLUCOSE BLD-MCNC: 127 MG/DL (ref 75–110)
GLUCOSE BLD-MCNC: 81 MG/DL (ref 75–110)
GLUCOSE BLD-MCNC: 90 MG/DL (ref 75–110)
GLUCOSE SERPL-MCNC: 89 MG/DL (ref 74–99)
HCT VFR BLD AUTO: 38.6 % (ref 40.7–50.3)
HGB BLD-MCNC: 11.7 G/DL (ref 13–17)
MCH RBC QN AUTO: 27.8 PG (ref 25.2–33.5)
MCHC RBC AUTO-ENTMCNC: 30.3 G/DL (ref 28.4–34.8)
MCV RBC AUTO: 91.7 FL (ref 82.6–102.9)
NRBC BLD-RTO: 0 PER 100 WBC
PLATELET # BLD AUTO: 155 K/UL (ref 138–453)
PMV BLD AUTO: 10.5 FL (ref 8.1–13.5)
POTASSIUM SERPL-SCNC: 4.1 MMOL/L (ref 3.7–5.3)
RBC # BLD AUTO: 4.21 M/UL (ref 4.21–5.77)
SODIUM SERPL-SCNC: 136 MMOL/L (ref 136–145)
WBC OTHER # BLD: 6.3 K/UL (ref 3.5–11.3)

## 2025-08-22 PROCEDURE — 2500000003 HC RX 250 WO HCPCS: Performed by: PHYSICIAN ASSISTANT

## 2025-08-22 PROCEDURE — 36415 COLL VENOUS BLD VENIPUNCTURE: CPT

## 2025-08-22 PROCEDURE — 97535 SELF CARE MNGMENT TRAINING: CPT

## 2025-08-22 PROCEDURE — 99231 SBSQ HOSP IP/OBS SF/LOW 25: CPT | Performed by: INTERNAL MEDICINE

## 2025-08-22 PROCEDURE — 80048 BASIC METABOLIC PNL TOTAL CA: CPT

## 2025-08-22 PROCEDURE — 6360000002 HC RX W HCPCS

## 2025-08-22 PROCEDURE — 85027 COMPLETE CBC AUTOMATED: CPT

## 2025-08-22 PROCEDURE — 82947 ASSAY GLUCOSE BLOOD QUANT: CPT

## 2025-08-22 PROCEDURE — 92526 ORAL FUNCTION THERAPY: CPT

## 2025-08-22 PROCEDURE — 6370000000 HC RX 637 (ALT 250 FOR IP): Performed by: PHYSICIAN ASSISTANT

## 2025-08-22 PROCEDURE — 2580000003 HC RX 258: Performed by: PHYSICIAN ASSISTANT

## 2025-08-22 PROCEDURE — 2060000000 HC ICU INTERMEDIATE R&B

## 2025-08-22 PROCEDURE — 99232 SBSQ HOSP IP/OBS MODERATE 35: CPT | Performed by: PHYSICAL MEDICINE & REHABILITATION

## 2025-08-22 PROCEDURE — 6360000002 HC RX W HCPCS: Performed by: PHYSICIAN ASSISTANT

## 2025-08-22 RX ORDER — CLOPIDOGREL BISULFATE 75 MG/1
75 TABLET ORAL DAILY
Qty: 90 TABLET | Refills: 0 | Status: SHIPPED | OUTPATIENT
Start: 2025-08-22 | End: 2025-08-23

## 2025-08-22 RX ORDER — LABETALOL HYDROCHLORIDE 5 MG/ML
10 INJECTION, SOLUTION INTRAVENOUS EVERY 4 HOURS PRN
Status: DISCONTINUED | OUTPATIENT
Start: 2025-08-22 | End: 2025-08-23 | Stop reason: HOSPADM

## 2025-08-22 RX ADMIN — OXYCODONE 5 MG: 5 TABLET ORAL at 04:37

## 2025-08-22 RX ADMIN — ACETAMINOPHEN 650 MG: 325 TABLET ORAL at 16:19

## 2025-08-22 RX ADMIN — SODIUM CHLORIDE, PRESERVATIVE FREE 10 ML: 5 INJECTION INTRAVENOUS at 20:43

## 2025-08-22 RX ADMIN — METOPROLOL SUCCINATE 25 MG: 25 TABLET, EXTENDED RELEASE ORAL at 08:38

## 2025-08-22 RX ADMIN — LABETALOL HYDROCHLORIDE 10 MG: 5 INJECTION, SOLUTION INTRAVENOUS at 04:37

## 2025-08-22 RX ADMIN — ACETAMINOPHEN 650 MG: 325 TABLET ORAL at 20:44

## 2025-08-22 RX ADMIN — ACETAMINOPHEN 650 MG: 325 TABLET ORAL at 08:38

## 2025-08-22 RX ADMIN — ENOXAPARIN SODIUM 30 MG: 100 INJECTION SUBCUTANEOUS at 20:43

## 2025-08-22 RX ADMIN — DULOXETINE 60 MG: 30 CAPSULE, DELAYED RELEASE ORAL at 08:38

## 2025-08-22 RX ADMIN — ENOXAPARIN SODIUM 30 MG: 100 INJECTION SUBCUTANEOUS at 08:39

## 2025-08-22 RX ADMIN — DONEPEZIL HYDROCHLORIDE 10 MG: 10 TABLET ORAL at 20:44

## 2025-08-22 RX ADMIN — LISINOPRIL 2.5 MG: 2.5 TABLET ORAL at 08:41

## 2025-08-22 RX ADMIN — ROSUVASTATIN CALCIUM 40 MG: 20 TABLET, FILM COATED ORAL at 08:38

## 2025-08-22 RX ADMIN — FAMOTIDINE 20 MG: 20 TABLET, FILM COATED ORAL at 08:39

## 2025-08-22 RX ADMIN — Medication 1000 UNITS: at 08:38

## 2025-08-22 RX ADMIN — METFORMIN HYDROCHLORIDE 500 MG: 500 TABLET, EXTENDED RELEASE ORAL at 08:41

## 2025-08-22 RX ADMIN — FAMOTIDINE 20 MG: 20 TABLET, FILM COATED ORAL at 20:44

## 2025-08-22 RX ADMIN — SODIUM CHLORIDE: 0.9 INJECTION, SOLUTION INTRAVENOUS at 20:04

## 2025-08-22 RX ADMIN — SODIUM CHLORIDE, PRESERVATIVE FREE 5 ML: 5 INJECTION INTRAVENOUS at 08:50

## 2025-08-22 RX ADMIN — TAMSULOSIN HYDROCHLORIDE 0.4 MG: 0.4 CAPSULE ORAL at 08:39

## 2025-08-22 RX ADMIN — POLYETHYLENE GLYCOL 3350 17 G: 17 POWDER, FOR SOLUTION ORAL at 08:39

## 2025-08-22 ASSESSMENT — PAIN DESCRIPTION - ORIENTATION
ORIENTATION: POSTERIOR
ORIENTATION: POSTERIOR

## 2025-08-22 ASSESSMENT — PAIN - FUNCTIONAL ASSESSMENT
PAIN_FUNCTIONAL_ASSESSMENT: 0-10
PAIN_FUNCTIONAL_ASSESSMENT: 0-10
PAIN_FUNCTIONAL_ASSESSMENT: ACTIVITIES ARE NOT PREVENTED
PAIN_FUNCTIONAL_ASSESSMENT: 0-10
PAIN_FUNCTIONAL_ASSESSMENT: ACTIVITIES ARE NOT PREVENTED

## 2025-08-22 ASSESSMENT — PAIN SCALES - GENERAL
PAINLEVEL_OUTOF10: 6
PAINLEVEL_OUTOF10: 2
PAINLEVEL_OUTOF10: 4
PAINLEVEL_OUTOF10: 6
PAINLEVEL_OUTOF10: 4

## 2025-08-22 ASSESSMENT — PAIN DESCRIPTION - LOCATION
LOCATION: HEAD;NECK
LOCATION: NECK

## 2025-08-22 ASSESSMENT — PAIN DESCRIPTION - DESCRIPTORS
DESCRIPTORS: DISCOMFORT;ACHING
DESCRIPTORS: ACHING

## 2025-08-23 VITALS
HEIGHT: 70 IN | DIASTOLIC BLOOD PRESSURE: 81 MMHG | RESPIRATION RATE: 19 BRPM | HEART RATE: 73 BPM | TEMPERATURE: 97.3 F | SYSTOLIC BLOOD PRESSURE: 151 MMHG | WEIGHT: 241 LBS | OXYGEN SATURATION: 98 % | BODY MASS INDEX: 34.5 KG/M2

## 2025-08-23 LAB
GLUCOSE BLD-MCNC: 110 MG/DL (ref 75–110)
GLUCOSE BLD-MCNC: 152 MG/DL (ref 75–110)

## 2025-08-23 PROCEDURE — 97112 NEUROMUSCULAR REEDUCATION: CPT

## 2025-08-23 PROCEDURE — 97116 GAIT TRAINING THERAPY: CPT

## 2025-08-23 PROCEDURE — 2500000003 HC RX 250 WO HCPCS: Performed by: PHYSICIAN ASSISTANT

## 2025-08-23 PROCEDURE — 82947 ASSAY GLUCOSE BLOOD QUANT: CPT

## 2025-08-23 PROCEDURE — 6360000002 HC RX W HCPCS

## 2025-08-23 PROCEDURE — 94761 N-INVAS EAR/PLS OXIMETRY MLT: CPT

## 2025-08-23 PROCEDURE — 99231 SBSQ HOSP IP/OBS SF/LOW 25: CPT | Performed by: INTERNAL MEDICINE

## 2025-08-23 PROCEDURE — 97530 THERAPEUTIC ACTIVITIES: CPT

## 2025-08-23 PROCEDURE — 6360000002 HC RX W HCPCS: Performed by: PHYSICIAN ASSISTANT

## 2025-08-23 PROCEDURE — 6370000000 HC RX 637 (ALT 250 FOR IP): Performed by: PHYSICIAN ASSISTANT

## 2025-08-23 RX ORDER — CLOPIDOGREL BISULFATE 75 MG/1
75 TABLET ORAL DAILY
Qty: 90 TABLET | Refills: 0 | Status: SHIPPED | OUTPATIENT
Start: 2025-08-30

## 2025-08-23 RX ORDER — CYCLOBENZAPRINE HCL 10 MG
10 TABLET ORAL 3 TIMES DAILY PRN
Qty: 21 TABLET | Refills: 0 | Status: SHIPPED | OUTPATIENT
Start: 2025-08-23 | End: 2025-09-02

## 2025-08-23 RX ORDER — OXYCODONE AND ACETAMINOPHEN 5; 325 MG/1; MG/1
1 TABLET ORAL EVERY 6 HOURS PRN
Qty: 28 TABLET | Refills: 0 | Status: SHIPPED | OUTPATIENT
Start: 2025-08-23 | End: 2025-08-30

## 2025-08-23 RX ADMIN — ROSUVASTATIN CALCIUM 40 MG: 20 TABLET, FILM COATED ORAL at 09:15

## 2025-08-23 RX ADMIN — METFORMIN HYDROCHLORIDE 500 MG: 500 TABLET, EXTENDED RELEASE ORAL at 09:18

## 2025-08-23 RX ADMIN — METOPROLOL SUCCINATE 25 MG: 25 TABLET, EXTENDED RELEASE ORAL at 09:15

## 2025-08-23 RX ADMIN — ACETAMINOPHEN 650 MG: 325 TABLET ORAL at 09:15

## 2025-08-23 RX ADMIN — CYCLOBENZAPRINE 10 MG: 10 TABLET, FILM COATED ORAL at 01:38

## 2025-08-23 RX ADMIN — Medication 1000 UNITS: at 09:15

## 2025-08-23 RX ADMIN — ENOXAPARIN SODIUM 30 MG: 100 INJECTION SUBCUTANEOUS at 09:15

## 2025-08-23 RX ADMIN — LISINOPRIL 2.5 MG: 2.5 TABLET ORAL at 09:19

## 2025-08-23 RX ADMIN — DULOXETINE 60 MG: 30 CAPSULE, DELAYED RELEASE ORAL at 09:15

## 2025-08-23 RX ADMIN — ACETAMINOPHEN 650 MG: 325 TABLET ORAL at 00:42

## 2025-08-23 RX ADMIN — TAMSULOSIN HYDROCHLORIDE 0.4 MG: 0.4 CAPSULE ORAL at 09:15

## 2025-08-23 RX ADMIN — LABETALOL HYDROCHLORIDE 10 MG: 5 INJECTION, SOLUTION INTRAVENOUS at 00:40

## 2025-08-23 RX ADMIN — FAMOTIDINE 20 MG: 20 TABLET, FILM COATED ORAL at 09:15

## 2025-08-23 RX ADMIN — SODIUM CHLORIDE, PRESERVATIVE FREE 10 ML: 5 INJECTION INTRAVENOUS at 09:19

## 2025-08-23 ASSESSMENT — PAIN - FUNCTIONAL ASSESSMENT
PAIN_FUNCTIONAL_ASSESSMENT: 0-10
PAIN_FUNCTIONAL_ASSESSMENT: ACTIVITIES ARE NOT PREVENTED
PAIN_FUNCTIONAL_ASSESSMENT: 0-10
PAIN_FUNCTIONAL_ASSESSMENT: ACTIVITIES ARE NOT PREVENTED
PAIN_FUNCTIONAL_ASSESSMENT: 0-10

## 2025-08-23 ASSESSMENT — PAIN DESCRIPTION - DESCRIPTORS
DESCRIPTORS: ACHING;DISCOMFORT
DESCRIPTORS: ACHING;DISCOMFORT

## 2025-08-23 ASSESSMENT — PAIN DESCRIPTION - LOCATION
LOCATION: NECK
LOCATION: NECK;HEAD
LOCATION: NECK

## 2025-08-23 ASSESSMENT — PAIN SCALES - GENERAL
PAINLEVEL_OUTOF10: 5
PAINLEVEL_OUTOF10: 8
PAINLEVEL_OUTOF10: 8
PAINLEVEL_OUTOF10: 3

## 2025-08-23 ASSESSMENT — PAIN DESCRIPTION - ORIENTATION
ORIENTATION: POSTERIOR
ORIENTATION: POSTERIOR

## 2025-08-26 ENCOUNTER — TELEPHONE (OUTPATIENT)
Dept: FAMILY MEDICINE CLINIC | Age: 80
End: 2025-08-26

## 2025-08-26 ENCOUNTER — TELEPHONE (OUTPATIENT)
Dept: NEUROLOGY | Age: 80
End: 2025-08-26

## 2025-08-27 ENCOUNTER — TELEPHONE (OUTPATIENT)
Dept: FAMILY MEDICINE CLINIC | Age: 80
End: 2025-08-27

## 2025-08-28 ENCOUNTER — TELEPHONE (OUTPATIENT)
Dept: NEUROSURGERY | Age: 80
End: 2025-08-28

## 2025-09-02 ENCOUNTER — OFFICE VISIT (OUTPATIENT)
Dept: NEUROSURGERY | Age: 80
End: 2025-09-02

## 2025-09-02 VITALS
TEMPERATURE: 97.1 F | BODY MASS INDEX: 33.64 KG/M2 | OXYGEN SATURATION: 97 % | SYSTOLIC BLOOD PRESSURE: 114 MMHG | HEIGHT: 70 IN | HEART RATE: 83 BPM | DIASTOLIC BLOOD PRESSURE: 68 MMHG | WEIGHT: 235 LBS

## 2025-09-02 DIAGNOSIS — Z98.890 S/P CARPAL TUNNEL RELEASE: ICD-10-CM

## 2025-09-02 DIAGNOSIS — Z98.1 S/P CERVICAL SPINAL FUSION: Primary | ICD-10-CM

## 2025-09-02 RX ORDER — CYCLOBENZAPRINE HCL 10 MG
10 TABLET ORAL 3 TIMES DAILY PRN
Qty: 21 TABLET | Refills: 0 | Status: SHIPPED | OUTPATIENT
Start: 2025-09-02 | End: 2025-09-12

## 2025-09-02 RX ORDER — OXYCODONE AND ACETAMINOPHEN 5; 325 MG/1; MG/1
1-2 TABLET ORAL EVERY 6 HOURS PRN
Qty: 35 TABLET | Refills: 0 | Status: SHIPPED | OUTPATIENT
Start: 2025-09-02 | End: 2025-09-09

## 2025-09-03 ENCOUNTER — OFFICE VISIT (OUTPATIENT)
Dept: ORTHOPEDIC SURGERY | Age: 80
End: 2025-09-03

## 2025-09-03 VITALS — HEIGHT: 70 IN | RESPIRATION RATE: 18 BRPM | BODY MASS INDEX: 33.64 KG/M2 | WEIGHT: 235 LBS

## 2025-09-03 DIAGNOSIS — Z96.651 S/P TOTAL KNEE ARTHROPLASTY, RIGHT: Primary | ICD-10-CM

## (undated) DEVICE — DRAPE LAP W102X78XL122IN POLYPR SMS TROUGH ABSRB REINF

## (undated) DEVICE — DRESSING BORDERED ADH GZ UNIV GEN USE 5IN 4IN AND 2 1/2IN

## (undated) DEVICE — DRAPE C ARM W/ POLY STRP W42XL72IN FOR MOB XR

## (undated) DEVICE — MITT SURG PREP L ADH DISPOSABLE

## (undated) DEVICE — BLANKET WRM W29.9XL79.1IN UP BODY FORC AIR MISTRAL-AIR

## (undated) DEVICE — KIT DRN FLAT W/ 100CC EVAC 7MM FULL PERF

## (undated) DEVICE — DRSG POSTOP PRMSL AG 3.5X14IN

## (undated) DEVICE — GLOVE ORTHO 8   MSG9480

## (undated) DEVICE — GARMENT COMPR M FOR 12-18IN CALF INTMIT SGL BLDR HEMO-FORCE

## (undated) DEVICE — Device

## (undated) DEVICE — DRESSING BORDERED ADH GZ UNIV GEN USE 8INX4IN AND 6INX2IN

## (undated) DEVICE — SUTURE VICRYL + SZ 0 L18IN ABSRB UD L36MM CT-1 1/2 CIR VCP840D

## (undated) DEVICE — SUTURE NONABSORBABLE MONOFILAMENT 3-0 PS-1 18 IN BLK ETHILON 1663H

## (undated) DEVICE — CONNEXT SURGICAL MATRIX - LARGE SYRINGE: Brand: CONNEXT SURGICAL MATRIX

## (undated) DEVICE — SYSTEM WND DEBRIDEMENT AND CLEANSING IRRISEPT

## (undated) DEVICE — SYRINGE IRRIG 60ML SFT PLIABLE BLB EZ TO GRP 1 HND USE W/

## (undated) DEVICE — FORCEPS BX L240CM WRK CHN 2.8MM STD CAP W/ NDL MIC MESH

## (undated) DEVICE — APPLICATOR MEDICATED 26 CC SOLUTION HI LT ORNG CHLORAPREP

## (undated) DEVICE — GLOVE SURG SZ 85 L12IN FNGR THK79MIL GRN LTX FREE

## (undated) DEVICE — MARKER RAD PASS REFLCT DISP SNAP ON SPHERZ 5PK

## (undated) DEVICE — SEALER BPLR DIA2.3MM DISP AQUAMANTYS

## (undated) DEVICE — ST. CHARLES TOTAL KNEE: Brand: MEDLINE INDUSTRIES, INC.

## (undated) DEVICE — ZIP 24 SURGICAL SKIN CLOSURE DEVICE: Brand: ZIP 24 SURGICAL SKIN CLOSURE DEVICE

## (undated) DEVICE — SUTURE MONOCRYL STRATAFIX SPRL + SZ 2 0 L27IN ABSRB UD W NDL SXMP1B419

## (undated) DEVICE — CONNECTOR TBNG WHT PLAS SUCT STR 5IN1 LTWT W/ M CONN

## (undated) DEVICE — HANDLE SUCT REG CAP FLANGETIP SMOOTH YANK

## (undated) DEVICE — 4-PORT MANIFOLD: Brand: NEPTUNE 2

## (undated) DEVICE — APPLICATOR MEDICATED 10.5 CC SOLUTION HI LT ORNG CHLORAPREP

## (undated) DEVICE — GOWN SURG XL L47IN BLU NONREINFORCED HK AND LOOP AAMI LEV 3

## (undated) DEVICE — SYRINGE MED 30ML STD CLR PLAS LUERLOCK TIP N CTRL DISP

## (undated) DEVICE — SUTURE VICRYL SZ 0 L36IN ABSRB UD CT-1 L36MM 1/2 CIR TAPR PNT VCP946H

## (undated) DEVICE — ELECTRODE PT RET AD L9FT HI MOIST COND ADH HYDRGEL CORDED

## (undated) DEVICE — GLOVE ORANGE PI 7 1/2   MSG9075

## (undated) DEVICE — NEEDLE SCLERO 25GA L240CM OD0.51MM ID0.24MM EXTN L4MM SHTH

## (undated) DEVICE — SOL IRR SOD CHL 0.9% TITAN XL CNTNR 3000ML

## (undated) DEVICE — ENDO KIT W/SYRINGE: Brand: MEDLINE INDUSTRIES, INC.

## (undated) DEVICE — DRESSING TRNSPAR W5XL4.5IN FLM SHT SEMIPERMEABLE WIND

## (undated) DEVICE — SUTURE STRATAFIX SYMMETRIC PDS + SZ 1 L18IN ABSRB VLT L48MM SXPP1A400

## (undated) DEVICE — NEEDLE HYPO 21GA L1.5IN INTUITIVE 1 HND DSGN BVL MAGELLAN

## (undated) DEVICE — GLOVE SURG SZ 8 CRM LTX FREE POLYISOPRENE POLYMER BEAD ANTI

## (undated) DEVICE — SYRINGE MED 12ML RNG CTRL LUERLOCK TIP MJCT

## (undated) DEVICE — FOLEY TRAY 1 LAYR 16 FR 10 CC URIMTR SNAPSECURE URO175816S

## (undated) DEVICE — SUTURE VICRYL SZ 2-0 L18IN ABSRB UD CT-1 L36MM 1/2 CIR J839D

## (undated) DEVICE — TUBING SUCT L2FT DIA0.187IN LNG CONN W/ CLMP FRAZ

## (undated) DEVICE — DUAL CUT SAGITTAL BLADE

## (undated) DEVICE — DEFENDO AIR WATER SUCTION AND BIOPSY VALVE KIT FOR  OLYMPUS: Brand: DEFENDO AIR/WATER/SUCTION AND BIOPSY VALVE

## (undated) DEVICE — SPONGE LAP W18XL18IN WHT COT 4 PLY FLD STRUNG RADPQ DISP ST 2 PER PACK

## (undated) DEVICE — KIT APPL 11:1 PROC W/ FIBRIJET MED CUP APPL TIP TY

## (undated) DEVICE — CEMENT MIXING SYSTEM WITH FEMORAL BREAKWAY NOZZLE: Brand: REVOLUTION

## (undated) DEVICE — STRAP POS FOAM SFT STR FOR KNEE BODY

## (undated) DEVICE — COVER TBL W60XL90IN SMS SURG HVY DUTY REINF DISPOSABLE

## (undated) DEVICE — AGENT HEMOSTATIC SURGIFLOW MATRIX KIT W/THROMBIN

## (undated) DEVICE — MARKER SURG SKIN GENTIAN VLT REG TIP W/ 6IN RUL AND BLNK DYNJSM02

## (undated) DEVICE — KIT AUTOTRNS APPL AERO 2 SET SYR 2 TIP FOR PLT SEP SYS GPS

## (undated) DEVICE — GLOVE SURG SZ 8 L11.8IN FNGR THK7.9MIL CUF THK5.5MIL BLU

## (undated) DEVICE — POLYP TRAP: Brand: TRAPEASE®

## (undated) DEVICE — SNARE ENDOSCP L240CM LOOP W13MM DIA2.4MM SHT THROW SM OVL

## (undated) DEVICE — 2108 SERIES SAGITTAL BLADE FLARED, GROUND  (29.0 X 1.32 X 84.0MM)

## (undated) DEVICE — COVER MAYO STD W24XL53IN UNIV SMS DISP

## (undated) DEVICE — GLOVE SURG SZ 75 CRM LTX FREE POLYISOPRENE POLYMER BEAD ANTI

## (undated) DEVICE — COOLER THER 20-31IN L CRYO COMB W/ PD KNEE TB GRAV FLO

## (undated) DEVICE — DRAPE SURG W53XL77IN STD SMS POLYPR 3 QTR ABSRB REINF W/O

## (undated) DEVICE — SNARE ENDOSCP POLYP MED STD AD 2.4X27X240 CM 2.8 MM OVL SENS

## (undated) DEVICE — BLADE ES ELASTOMERIC COAT INSUL DURABLE BEND UPTO 90DEG

## (undated) DEVICE — BUR SURG MTCH HD 3X3.8 MM 14 CM FLUT LG BOR MIDAS REX 8

## (undated) DEVICE — Device: Brand: SPOT EX ENDOSCOPIC TATTOO

## (undated) DEVICE — ERBE NESSY® OMEGA PLATE USA (85+23)CM² , WITH CABLE 3 M: Brand: ERBE

## (undated) DEVICE — SPONGE GZ W6XL6.75IN COT 6 PLY SUP FLUF EXTRA ABSRB FOR PRE

## (undated) DEVICE — KIT SEP W/ BLD DRAW TB SYR NDL TRNQT PD

## (undated) DEVICE — PROTECTOR ULN NRV PUR FOAM HK LOOP STRP ANATOMICALLY

## (undated) DEVICE — BLADE SURG 15 TWIN BK CARBON STL STRL CISION LF DISP

## (undated) DEVICE — 450 ML BOTTLE OF 0.05% CHLORHEXIDINE GLUCONATE IN 99.95% STERILE WATER FOR IRRIGATION, USP AND APPLICATOR.: Brand: IRRISEPT ANTIMICROBIAL WOUND LAVAGE

## (undated) DEVICE — BITEBLOCK 54FR W/ DENT RIM BLOX